# Patient Record
Sex: MALE | Race: WHITE | Employment: UNEMPLOYED | URBAN - METROPOLITAN AREA
[De-identification: names, ages, dates, MRNs, and addresses within clinical notes are randomized per-mention and may not be internally consistent; named-entity substitution may affect disease eponyms.]

---

## 2017-07-31 ENCOUNTER — HOSPITAL ENCOUNTER (OUTPATIENT)
Dept: OCCUPATIONAL THERAPY | Age: 62
Setting detail: THERAPIES SERIES
Discharge: HOME | End: 2017-07-31
Payer: MEDICARE

## 2017-07-31 PROCEDURE — G8987 SELF CARE CURRENT STATUS: HCPCS

## 2017-07-31 PROCEDURE — G8988 SELF CARE GOAL STATUS: HCPCS

## 2017-07-31 PROCEDURE — L3906 WHO W/O JOINTS CF: HCPCS

## 2017-07-31 PROCEDURE — 97167 OT EVAL HIGH COMPLEX 60 MIN: CPT

## 2017-08-02 ENCOUNTER — OFFICE VISIT (OUTPATIENT)
Dept: PRIMARY CARE CLINIC | Age: 62
End: 2017-08-02

## 2017-08-02 VITALS
WEIGHT: 200 LBS | TEMPERATURE: 97.2 F | BODY MASS INDEX: 30.31 KG/M2 | DIASTOLIC BLOOD PRESSURE: 82 MMHG | OXYGEN SATURATION: 97 % | SYSTOLIC BLOOD PRESSURE: 110 MMHG | HEART RATE: 53 BPM | RESPIRATION RATE: 14 BRPM | HEIGHT: 68 IN

## 2017-08-02 DIAGNOSIS — Z87.74 S/P PATENT FORAMEN OVALE CLOSURE: ICD-10-CM

## 2017-08-02 DIAGNOSIS — E78.5 HYPERLIPIDEMIA, UNSPECIFIED HYPERLIPIDEMIA TYPE: ICD-10-CM

## 2017-08-02 DIAGNOSIS — I10 ESSENTIAL HYPERTENSION: ICD-10-CM

## 2017-08-02 DIAGNOSIS — I63.032 CEREBROVASCULAR ACCIDENT (CVA) DUE TO THROMBOSIS OF LEFT CAROTID ARTERY (HCC): Primary | ICD-10-CM

## 2017-08-02 DIAGNOSIS — F33.9 RECURRENT DEPRESSION (HCC): ICD-10-CM

## 2017-08-02 DIAGNOSIS — R56.9 SEIZURE (HCC): ICD-10-CM

## 2017-08-02 PROCEDURE — 99203 OFFICE O/P NEW LOW 30 MIN: CPT | Performed by: FAMILY MEDICINE

## 2017-08-02 RX ORDER — ASPIRIN 81 MG/1
81 TABLET, CHEWABLE ORAL
COMMUNITY
Start: 2013-01-18

## 2017-08-02 RX ORDER — FOLIC ACID 1 MG/1
1 TABLET ORAL
COMMUNITY
Start: 2016-03-12 | End: 2017-08-18 | Stop reason: SDUPTHER

## 2017-08-02 RX ORDER — PREGABALIN 75 MG/1
75 CAPSULE ORAL
COMMUNITY
End: 2017-08-02 | Stop reason: SDUPTHER

## 2017-08-02 RX ORDER — LEVETIRACETAM 500 MG/1
500 TABLET, EXTENDED RELEASE ORAL
COMMUNITY
End: 2017-08-18 | Stop reason: SDUPTHER

## 2017-08-02 RX ORDER — TRAMADOL HYDROCHLORIDE 50 MG/1
TABLET ORAL
Qty: 60 TABLET | Refills: 1 | Status: SHIPPED | OUTPATIENT
Start: 2017-08-02 | End: 2017-08-18 | Stop reason: SDUPTHER

## 2017-08-02 RX ORDER — SENNOSIDES 8.6 MG
TABLET ORAL
COMMUNITY
End: 2017-08-02 | Stop reason: ALTCHOICE

## 2017-08-02 RX ORDER — PROPRANOLOL HYDROCHLORIDE 10 MG/1
10 TABLET ORAL
COMMUNITY
Start: 2016-03-12 | End: 2017-08-18 | Stop reason: SDUPTHER

## 2017-08-02 RX ORDER — ACETAMINOPHEN 325 MG/1
650 TABLET ORAL EVERY 4 HOURS PRN
Qty: 120 TABLET | Refills: 3
Start: 2017-08-02 | End: 2017-08-18 | Stop reason: SDUPTHER

## 2017-08-02 RX ORDER — NORTRIPTYLINE HYDROCHLORIDE 25 MG/1
25 CAPSULE ORAL NIGHTLY
COMMUNITY
End: 2017-08-18 | Stop reason: SDUPTHER

## 2017-08-02 RX ORDER — BUPROPION HYDROCHLORIDE 150 MG/1
150 TABLET ORAL
COMMUNITY
Start: 2016-01-25 | End: 2017-08-18 | Stop reason: SDUPTHER

## 2017-08-02 RX ORDER — IBUPROFEN 800 MG/1
800 TABLET ORAL
COMMUNITY
Start: 2016-03-12 | End: 2017-10-04 | Stop reason: SDUPTHER

## 2017-08-02 RX ORDER — PREGABALIN 150 MG/1
75 CAPSULE ORAL 2 TIMES DAILY
Qty: 60 CAPSULE | Refills: 1 | Status: SHIPPED | OUTPATIENT
Start: 2017-08-02 | End: 2017-08-18 | Stop reason: SDUPTHER

## 2017-08-02 RX ORDER — BACLOFEN 10 MG/1
TABLET ORAL
Refills: 2 | COMMUNITY
Start: 2017-07-25 | End: 2017-08-18 | Stop reason: SDUPTHER

## 2017-08-02 RX ORDER — QUETIAPINE FUMARATE 25 MG/1
25 TABLET, FILM COATED ORAL
COMMUNITY
Start: 2016-03-12 | End: 2017-08-18 | Stop reason: SDUPTHER

## 2017-08-02 RX ORDER — DULOXETIN HYDROCHLORIDE 60 MG/1
CAPSULE, DELAYED RELEASE ORAL
COMMUNITY
End: 2017-08-18 | Stop reason: SDUPTHER

## 2017-08-02 ASSESSMENT — ENCOUNTER SYMPTOMS
SHORTNESS OF BREATH: 0
CHEST TIGHTNESS: 0

## 2017-08-02 ASSESSMENT — PATIENT HEALTH QUESTIONNAIRE - PHQ9
SUM OF ALL RESPONSES TO PHQ9 QUESTIONS 1 & 2: 2
1. LITTLE INTEREST OR PLEASURE IN DOING THINGS: 1
2. FEELING DOWN, DEPRESSED OR HOPELESS: 1
SUM OF ALL RESPONSES TO PHQ QUESTIONS 1-9: 2

## 2017-08-07 ENCOUNTER — HOSPITAL ENCOUNTER (OUTPATIENT)
Dept: OCCUPATIONAL THERAPY | Age: 62
Setting detail: THERAPIES SERIES
Discharge: HOME | End: 2017-08-07
Payer: MEDICARE

## 2017-08-07 PROCEDURE — 97760 ORTHOTIC MGMT&TRAING 1ST ENC: CPT

## 2017-08-07 PROCEDURE — 97530 THERAPEUTIC ACTIVITIES: CPT

## 2017-08-08 ENCOUNTER — HOSPITAL ENCOUNTER (OUTPATIENT)
Dept: OCCUPATIONAL THERAPY | Age: 62
Setting detail: THERAPIES SERIES
Discharge: HOME | End: 2017-08-08
Payer: MEDICARE

## 2017-08-08 ENCOUNTER — APPOINTMENT (OUTPATIENT)
Dept: SPEECH THERAPY | Age: 62
End: 2017-08-08
Payer: MEDICARE

## 2017-08-08 PROCEDURE — 97530 THERAPEUTIC ACTIVITIES: CPT

## 2017-08-15 ENCOUNTER — APPOINTMENT (OUTPATIENT)
Dept: OCCUPATIONAL THERAPY | Age: 62
End: 2017-08-15
Payer: MEDICARE

## 2017-08-15 ENCOUNTER — APPOINTMENT (OUTPATIENT)
Dept: PHYSICAL THERAPY | Age: 62
End: 2017-08-15
Payer: MEDICARE

## 2017-08-18 DIAGNOSIS — I63.032 CEREBROVASCULAR ACCIDENT (CVA) DUE TO THROMBOSIS OF LEFT CAROTID ARTERY (HCC): ICD-10-CM

## 2017-08-18 RX ORDER — DULOXETIN HYDROCHLORIDE 60 MG/1
60 CAPSULE, DELAYED RELEASE ORAL DAILY
Qty: 90 CAPSULE | Refills: 1 | Status: SHIPPED | OUTPATIENT
Start: 2017-08-18 | End: 2017-10-04 | Stop reason: SDUPTHER

## 2017-08-18 RX ORDER — PROPRANOLOL HYDROCHLORIDE 10 MG/1
10 TABLET ORAL DAILY
Qty: 90 TABLET | Refills: 1 | Status: SHIPPED | OUTPATIENT
Start: 2017-08-18 | End: 2017-08-28

## 2017-08-18 RX ORDER — BACLOFEN 10 MG/1
TABLET ORAL
Qty: 90 TABLET | Refills: 1 | Status: SHIPPED | OUTPATIENT
Start: 2017-08-18 | End: 2017-08-28

## 2017-08-18 RX ORDER — LEVETIRACETAM 500 MG/1
500 TABLET, EXTENDED RELEASE ORAL DAILY
Qty: 90 TABLET | Refills: 1 | Status: SHIPPED | OUTPATIENT
Start: 2017-08-18 | End: 2017-10-04 | Stop reason: SDUPTHER

## 2017-08-18 RX ORDER — PREGABALIN 150 MG/1
75 CAPSULE ORAL 2 TIMES DAILY
Qty: 180 CAPSULE | Refills: 1 | Status: SHIPPED | OUTPATIENT
Start: 2017-08-18 | End: 2017-10-04 | Stop reason: SDUPTHER

## 2017-08-18 RX ORDER — BUPROPION HYDROCHLORIDE 150 MG/1
150 TABLET ORAL EVERY MORNING
Qty: 90 TABLET | Refills: 1 | Status: SHIPPED | OUTPATIENT
Start: 2017-08-18 | End: 2017-10-04 | Stop reason: SDUPTHER

## 2017-08-18 RX ORDER — NORTRIPTYLINE HYDROCHLORIDE 25 MG/1
25 CAPSULE ORAL NIGHTLY
Qty: 90 CAPSULE | Refills: 1 | Status: SHIPPED | OUTPATIENT
Start: 2017-08-18 | End: 2017-10-04 | Stop reason: SDUPTHER

## 2017-08-18 RX ORDER — QUETIAPINE FUMARATE 25 MG/1
25 TABLET, FILM COATED ORAL DAILY
Qty: 90 TABLET | Refills: 1 | Status: SHIPPED | OUTPATIENT
Start: 2017-08-18 | End: 2017-10-04

## 2017-08-18 RX ORDER — ACETAMINOPHEN 325 MG/1
650 TABLET ORAL EVERY 4 HOURS PRN
Qty: 120 TABLET | Refills: 1 | Status: SHIPPED | OUTPATIENT
Start: 2017-08-18 | End: 2018-05-07 | Stop reason: ALTCHOICE

## 2017-08-18 RX ORDER — TRAMADOL HYDROCHLORIDE 50 MG/1
TABLET ORAL
Qty: 180 TABLET | Refills: 1 | Status: SHIPPED | OUTPATIENT
Start: 2017-08-18 | End: 2017-10-10 | Stop reason: SDUPTHER

## 2017-08-18 RX ORDER — FOLIC ACID 1 MG/1
1 TABLET ORAL DAILY
Qty: 90 TABLET | Refills: 1 | Status: SHIPPED | OUTPATIENT
Start: 2017-08-18 | End: 2017-10-04 | Stop reason: SDUPTHER

## 2017-08-20 ASSESSMENT — ENCOUNTER SYMPTOMS
ORTHOPNEA: 0
BLURRED VISION: 0

## 2017-08-22 ENCOUNTER — HOSPITAL ENCOUNTER (OUTPATIENT)
Dept: SPEECH THERAPY | Age: 62
Setting detail: THERAPIES SERIES
Discharge: HOME | End: 2017-08-22
Payer: MEDICARE

## 2017-08-22 PROCEDURE — 92522 EVALUATE SPEECH PRODUCTION: CPT

## 2017-08-28 ENCOUNTER — TELEPHONE (OUTPATIENT)
Dept: PRIMARY CARE CLINIC | Age: 62
End: 2017-08-28

## 2017-08-28 ENCOUNTER — HOSPITAL ENCOUNTER (OUTPATIENT)
Dept: OCCUPATIONAL THERAPY | Age: 62
Setting detail: THERAPIES SERIES
Discharge: HOME | End: 2017-08-28
Payer: MEDICARE

## 2017-08-28 PROCEDURE — G8988 SELF CARE GOAL STATUS: HCPCS

## 2017-08-28 PROCEDURE — 97760 ORTHOTIC MGMT&TRAING 1ST ENC: CPT

## 2017-08-28 PROCEDURE — 97112 NEUROMUSCULAR REEDUCATION: CPT

## 2017-08-28 PROCEDURE — G8987 SELF CARE CURRENT STATUS: HCPCS

## 2017-08-28 RX ORDER — BACLOFEN 10 MG/1
TABLET ORAL
Qty: 90 TABLET | Refills: 1
Start: 2017-08-28 | End: 2017-10-04 | Stop reason: SDUPTHER

## 2017-08-28 RX ORDER — PROPRANOLOL HCL 60 MG
60 CAPSULE, EXTENDED RELEASE 24HR ORAL DAILY
Qty: 90 CAPSULE | Refills: 1 | Status: SHIPPED | OUTPATIENT
Start: 2017-08-28 | End: 2017-09-20 | Stop reason: SDUPTHER

## 2017-09-05 ENCOUNTER — HOSPITAL ENCOUNTER (OUTPATIENT)
Dept: OCCUPATIONAL THERAPY | Age: 62
Setting detail: THERAPIES SERIES
Discharge: HOME | End: 2017-09-05
Payer: MEDICARE

## 2017-09-05 ENCOUNTER — HOSPITAL ENCOUNTER (OUTPATIENT)
Dept: SPEECH THERAPY | Age: 62
Setting detail: THERAPIES SERIES
Discharge: HOME | End: 2017-09-05
Payer: MEDICARE

## 2017-09-05 ENCOUNTER — HOSPITAL ENCOUNTER (OUTPATIENT)
Dept: PHYSICAL THERAPY | Age: 62
Setting detail: THERAPIES SERIES
Discharge: HOME | End: 2017-09-05
Payer: MEDICARE

## 2017-09-05 PROCEDURE — 97162 PT EVAL MOD COMPLEX 30 MIN: CPT

## 2017-09-05 PROCEDURE — G0283 ELEC STIM OTHER THAN WOUND: HCPCS

## 2017-09-05 PROCEDURE — 97530 THERAPEUTIC ACTIVITIES: CPT

## 2017-09-05 PROCEDURE — G8978 MOBILITY CURRENT STATUS: HCPCS

## 2017-09-05 PROCEDURE — G8979 MOBILITY GOAL STATUS: HCPCS

## 2017-09-05 PROCEDURE — 97110 THERAPEUTIC EXERCISES: CPT

## 2017-09-05 PROCEDURE — 92507 TX SP LANG VOICE COMM INDIV: CPT

## 2017-09-05 ASSESSMENT — PAIN DESCRIPTION - ORIENTATION: ORIENTATION: RIGHT

## 2017-09-05 ASSESSMENT — PAIN DESCRIPTION - PAIN TYPE: TYPE: CHRONIC PAIN

## 2017-09-05 ASSESSMENT — PAIN SCALES - GENERAL: PAINLEVEL_OUTOF10: 3

## 2017-09-05 ASSESSMENT — PAIN DESCRIPTION - DESCRIPTORS: DESCRIPTORS: ACHING

## 2017-09-05 ASSESSMENT — PAIN DESCRIPTION - LOCATION: LOCATION: ARM;SHOULDER

## 2017-09-07 ENCOUNTER — HOSPITAL ENCOUNTER (OUTPATIENT)
Dept: OCCUPATIONAL THERAPY | Age: 62
Setting detail: THERAPIES SERIES
Discharge: HOME | End: 2017-09-07
Payer: MEDICARE

## 2017-09-07 ENCOUNTER — HOSPITAL ENCOUNTER (OUTPATIENT)
Dept: SPEECH THERAPY | Age: 62
Setting detail: THERAPIES SERIES
Discharge: HOME | End: 2017-09-07
Payer: MEDICARE

## 2017-09-07 PROCEDURE — 97112 NEUROMUSCULAR REEDUCATION: CPT

## 2017-09-07 PROCEDURE — 92507 TX SP LANG VOICE COMM INDIV: CPT

## 2017-09-07 PROCEDURE — G8987 SELF CARE CURRENT STATUS: HCPCS

## 2017-09-12 ENCOUNTER — HOSPITAL ENCOUNTER (OUTPATIENT)
Dept: OCCUPATIONAL THERAPY | Age: 62
Setting detail: THERAPIES SERIES
Discharge: HOME | End: 2017-09-12
Payer: MEDICARE

## 2017-09-12 ENCOUNTER — HOSPITAL ENCOUNTER (OUTPATIENT)
Dept: PHYSICAL THERAPY | Age: 62
Setting detail: THERAPIES SERIES
Discharge: HOME | End: 2017-09-12
Payer: MEDICARE

## 2017-09-12 ENCOUNTER — HOSPITAL ENCOUNTER (OUTPATIENT)
Dept: SPEECH THERAPY | Age: 62
Setting detail: THERAPIES SERIES
Discharge: HOME | End: 2017-09-12
Payer: MEDICARE

## 2017-09-12 PROCEDURE — 92507 TX SP LANG VOICE COMM INDIV: CPT

## 2017-09-12 PROCEDURE — 97112 NEUROMUSCULAR REEDUCATION: CPT

## 2017-09-12 PROCEDURE — 97116 GAIT TRAINING THERAPY: CPT

## 2017-09-12 PROCEDURE — 97110 THERAPEUTIC EXERCISES: CPT

## 2017-09-12 ASSESSMENT — PAIN DESCRIPTION - DESCRIPTORS
DESCRIPTORS: ACHING
DESCRIPTORS: ACHING

## 2017-09-12 ASSESSMENT — PAIN DESCRIPTION - ORIENTATION
ORIENTATION: RIGHT
ORIENTATION: RIGHT

## 2017-09-12 ASSESSMENT — PAIN SCALES - GENERAL
PAINLEVEL_OUTOF10: 5
PAINLEVEL_OUTOF10: 3

## 2017-09-12 ASSESSMENT — PAIN DESCRIPTION - LOCATION
LOCATION: ARM;HAND;SHOULDER
LOCATION: ARM;HAND;SHOULDER

## 2017-09-14 ENCOUNTER — HOSPITAL ENCOUNTER (OUTPATIENT)
Dept: PHYSICAL THERAPY | Age: 62
Setting detail: THERAPIES SERIES
Discharge: HOME | End: 2017-09-14
Payer: MEDICARE

## 2017-09-14 ENCOUNTER — HOSPITAL ENCOUNTER (OUTPATIENT)
Dept: OCCUPATIONAL THERAPY | Age: 62
Setting detail: THERAPIES SERIES
Discharge: HOME | End: 2017-09-14
Payer: MEDICARE

## 2017-09-14 ENCOUNTER — HOSPITAL ENCOUNTER (OUTPATIENT)
Dept: SPEECH THERAPY | Age: 62
Setting detail: THERAPIES SERIES
Discharge: HOME | End: 2017-09-14
Payer: MEDICARE

## 2017-09-14 PROCEDURE — 97112 NEUROMUSCULAR REEDUCATION: CPT

## 2017-09-14 PROCEDURE — 97110 THERAPEUTIC EXERCISES: CPT

## 2017-09-14 PROCEDURE — 97116 GAIT TRAINING THERAPY: CPT

## 2017-09-14 PROCEDURE — 92507 TX SP LANG VOICE COMM INDIV: CPT

## 2017-09-14 ASSESSMENT — PAIN DESCRIPTION - ORIENTATION: ORIENTATION: RIGHT

## 2017-09-14 ASSESSMENT — PAIN SCALES - GENERAL: PAINLEVEL_OUTOF10: 4

## 2017-09-14 ASSESSMENT — PAIN DESCRIPTION - PAIN TYPE: TYPE: CHRONIC PAIN

## 2017-09-14 ASSESSMENT — PAIN DESCRIPTION - LOCATION: LOCATION: ARM;SHOULDER;HAND

## 2017-09-19 ENCOUNTER — HOSPITAL ENCOUNTER (OUTPATIENT)
Dept: PHYSICAL THERAPY | Age: 62
Setting detail: THERAPIES SERIES
Discharge: HOME | End: 2017-09-19
Payer: MEDICARE

## 2017-09-20 ENCOUNTER — OFFICE VISIT (OUTPATIENT)
Dept: FAMILY MEDICINE CLINIC | Age: 62
End: 2017-09-20

## 2017-09-20 VITALS
BODY MASS INDEX: 30.58 KG/M2 | TEMPERATURE: 97.6 F | HEART RATE: 66 BPM | HEIGHT: 68 IN | SYSTOLIC BLOOD PRESSURE: 138 MMHG | DIASTOLIC BLOOD PRESSURE: 82 MMHG | WEIGHT: 201.8 LBS | OXYGEN SATURATION: 93 % | RESPIRATION RATE: 17 BRPM

## 2017-09-20 DIAGNOSIS — Z12.5 PROSTATE CANCER SCREENING: ICD-10-CM

## 2017-09-20 DIAGNOSIS — F33.9 RECURRENT DEPRESSION (HCC): Primary | ICD-10-CM

## 2017-09-20 DIAGNOSIS — I63.032 CEREBROVASCULAR ACCIDENT (CVA) DUE TO THROMBOSIS OF LEFT CAROTID ARTERY (HCC): ICD-10-CM

## 2017-09-20 DIAGNOSIS — G89.4 CHRONIC PAIN SYNDROME: ICD-10-CM

## 2017-09-20 PROCEDURE — 99213 OFFICE O/P EST LOW 20 MIN: CPT | Performed by: FAMILY MEDICINE

## 2017-09-20 RX ORDER — BUPROPION HYDROCHLORIDE 150 MG/1
150 TABLET ORAL EVERY MORNING
Qty: 30 TABLET | Refills: 5 | Status: CANCELLED | OUTPATIENT
Start: 2017-09-20

## 2017-09-20 RX ORDER — TIZANIDINE 4 MG/1
4 TABLET ORAL EVERY 6 HOURS PRN
COMMUNITY
End: 2017-09-20 | Stop reason: SDUPTHER

## 2017-09-20 RX ORDER — PROPRANOLOL HCL 60 MG
60 CAPSULE, EXTENDED RELEASE 24HR ORAL DAILY
Qty: 30 CAPSULE | Refills: 0 | Status: SHIPPED | OUTPATIENT
Start: 2017-09-20 | End: 2017-10-20

## 2017-09-20 RX ORDER — TIZANIDINE 4 MG/1
4 TABLET ORAL EVERY 6 HOURS PRN
Qty: 30 TABLET | Refills: 0 | Status: SHIPPED | OUTPATIENT
Start: 2017-09-20 | End: 2017-10-04 | Stop reason: SDUPTHER

## 2017-09-20 ASSESSMENT — ENCOUNTER SYMPTOMS
ABDOMINAL PAIN: 0
SHORTNESS OF BREATH: 0

## 2017-09-21 ENCOUNTER — APPOINTMENT (OUTPATIENT)
Dept: PHYSICAL THERAPY | Age: 62
End: 2017-09-21
Payer: MEDICARE

## 2017-09-21 ENCOUNTER — APPOINTMENT (OUTPATIENT)
Dept: SPEECH THERAPY | Age: 62
End: 2017-09-21
Payer: MEDICARE

## 2017-09-21 ENCOUNTER — APPOINTMENT (OUTPATIENT)
Dept: OCCUPATIONAL THERAPY | Age: 62
End: 2017-09-21
Payer: MEDICARE

## 2017-09-26 ENCOUNTER — HOSPITAL ENCOUNTER (OUTPATIENT)
Dept: OCCUPATIONAL THERAPY | Age: 62
Setting detail: THERAPIES SERIES
Discharge: HOME | End: 2017-09-26
Payer: MEDICARE

## 2017-09-26 ENCOUNTER — HOSPITAL ENCOUNTER (OUTPATIENT)
Dept: SPEECH THERAPY | Age: 62
Setting detail: THERAPIES SERIES
Discharge: HOME | End: 2017-09-26
Payer: MEDICARE

## 2017-09-26 ENCOUNTER — HOSPITAL ENCOUNTER (OUTPATIENT)
Dept: PHYSICAL THERAPY | Age: 62
Setting detail: THERAPIES SERIES
Discharge: HOME | End: 2017-09-26
Payer: MEDICARE

## 2017-09-26 VITALS — HEART RATE: 86 BPM | DIASTOLIC BLOOD PRESSURE: 81 MMHG | SYSTOLIC BLOOD PRESSURE: 121 MMHG | TEMPERATURE: 97.5 F

## 2017-09-26 PROCEDURE — 97116 GAIT TRAINING THERAPY: CPT

## 2017-09-26 PROCEDURE — 97110 THERAPEUTIC EXERCISES: CPT

## 2017-09-26 PROCEDURE — 97112 NEUROMUSCULAR REEDUCATION: CPT

## 2017-09-26 ASSESSMENT — PAIN DESCRIPTION - LOCATION: LOCATION: ARM;SHOULDER

## 2017-09-26 ASSESSMENT — PAIN SCALES - GENERAL: PAINLEVEL_OUTOF10: 3

## 2017-09-26 ASSESSMENT — PAIN DESCRIPTION - DESCRIPTORS: DESCRIPTORS: ACHING

## 2017-09-28 ENCOUNTER — HOSPITAL ENCOUNTER (OUTPATIENT)
Dept: OCCUPATIONAL THERAPY | Age: 62
Setting detail: THERAPIES SERIES
Discharge: HOME | End: 2017-09-28
Payer: MEDICARE

## 2017-09-28 ENCOUNTER — HOSPITAL ENCOUNTER (OUTPATIENT)
Dept: SPEECH THERAPY | Age: 62
Setting detail: THERAPIES SERIES
Discharge: HOME | End: 2017-09-28
Payer: MEDICARE

## 2017-09-28 ENCOUNTER — HOSPITAL ENCOUNTER (OUTPATIENT)
Dept: PHYSICAL THERAPY | Age: 62
Setting detail: THERAPIES SERIES
Discharge: HOME | End: 2017-09-28
Payer: MEDICARE

## 2017-09-28 VITALS — DIASTOLIC BLOOD PRESSURE: 71 MMHG | HEART RATE: 62 BPM | SYSTOLIC BLOOD PRESSURE: 115 MMHG

## 2017-09-28 PROCEDURE — 97110 THERAPEUTIC EXERCISES: CPT

## 2017-09-28 PROCEDURE — 97112 NEUROMUSCULAR REEDUCATION: CPT

## 2017-09-28 PROCEDURE — 97116 GAIT TRAINING THERAPY: CPT

## 2017-09-28 PROCEDURE — 92507 TX SP LANG VOICE COMM INDIV: CPT

## 2017-09-28 ASSESSMENT — PAIN DESCRIPTION - DESCRIPTORS: DESCRIPTORS: SORE

## 2017-09-28 ASSESSMENT — PAIN SCALES - GENERAL
PAINLEVEL_OUTOF10: 3
PAINLEVEL_OUTOF10: 4

## 2017-09-28 ASSESSMENT — PAIN DESCRIPTION - LOCATION
LOCATION: ARM
LOCATION: ARM;SHOULDER

## 2017-09-28 ASSESSMENT — PAIN DESCRIPTION - ORIENTATION: ORIENTATION: RIGHT

## 2017-09-28 ASSESSMENT — PAIN DESCRIPTION - PAIN TYPE: TYPE: CHRONIC PAIN

## 2017-09-30 DIAGNOSIS — Z12.5 PROSTATE CANCER SCREENING: ICD-10-CM

## 2017-09-30 DIAGNOSIS — I63.032 CEREBROVASCULAR ACCIDENT (CVA) DUE TO THROMBOSIS OF LEFT CAROTID ARTERY (HCC): ICD-10-CM

## 2017-09-30 LAB
ALBUMIN SERPL-MCNC: 4.4 G/DL (ref 3.9–4.9)
ALP BLD-CCNC: 98 U/L (ref 35–104)
ALT SERPL-CCNC: 53 U/L (ref 0–41)
ANION GAP SERPL CALCULATED.3IONS-SCNC: 17 MEQ/L (ref 7–13)
AST SERPL-CCNC: 49 U/L (ref 0–40)
BASOPHILS ABSOLUTE: 0 K/UL (ref 0–0.2)
BASOPHILS RELATIVE PERCENT: 0.9 %
BILIRUB SERPL-MCNC: 0.4 MG/DL (ref 0–1.2)
BUN BLDV-MCNC: 20 MG/DL (ref 8–23)
CALCIUM SERPL-MCNC: 9.7 MG/DL (ref 8.6–10.2)
CHLORIDE BLD-SCNC: 102 MEQ/L (ref 98–107)
CHOLESTEROL, TOTAL: 164 MG/DL (ref 0–199)
CO2: 22 MEQ/L (ref 22–29)
CREAT SERPL-MCNC: 1.24 MG/DL (ref 0.7–1.2)
EOSINOPHILS ABSOLUTE: 0.5 K/UL (ref 0–0.7)
EOSINOPHILS RELATIVE PERCENT: 9.4 %
GFR AFRICAN AMERICAN: >60
GFR NON-AFRICAN AMERICAN: 59.1
GLOBULIN: 2.7 G/DL (ref 2.3–3.5)
GLUCOSE BLD-MCNC: 86 MG/DL (ref 74–109)
HCT VFR BLD CALC: 37.1 % (ref 42–52)
HDLC SERPL-MCNC: 44 MG/DL (ref 40–59)
HEMOGLOBIN: 12.2 G/DL (ref 14–18)
LDL CHOLESTEROL CALCULATED: 106 MG/DL (ref 0–129)
LYMPHOCYTES ABSOLUTE: 1.5 K/UL (ref 1–4.8)
LYMPHOCYTES RELATIVE PERCENT: 29.1 %
MCH RBC QN AUTO: 29.4 PG (ref 27–31.3)
MCHC RBC AUTO-ENTMCNC: 32.8 % (ref 33–37)
MCV RBC AUTO: 89.7 FL (ref 80–100)
MONOCYTES ABSOLUTE: 0.3 K/UL (ref 0.2–0.8)
MONOCYTES RELATIVE PERCENT: 5.4 %
NEUTROPHILS ABSOLUTE: 2.8 K/UL (ref 1.4–6.5)
NEUTROPHILS RELATIVE PERCENT: 55.2 %
PDW BLD-RTO: 13.5 % (ref 11.5–14.5)
PLATELET # BLD: 198 K/UL (ref 130–400)
POTASSIUM SERPL-SCNC: 4.7 MEQ/L (ref 3.5–5.1)
PROSTATE SPECIFIC ANTIGEN: 0.63 NG/ML (ref 0–5.4)
RBC # BLD: 4.14 M/UL (ref 4.7–6.1)
SODIUM BLD-SCNC: 141 MEQ/L (ref 132–144)
TOTAL PROTEIN: 7.1 G/DL (ref 6.4–8.1)
TRIGL SERPL-MCNC: 69 MG/DL (ref 0–200)
WBC # BLD: 5.1 K/UL (ref 4.8–10.8)

## 2017-10-03 ENCOUNTER — APPOINTMENT (OUTPATIENT)
Dept: SPEECH THERAPY | Age: 62
End: 2017-10-03
Payer: MEDICARE

## 2017-10-04 ENCOUNTER — OFFICE VISIT (OUTPATIENT)
Dept: FAMILY MEDICINE CLINIC | Age: 62
End: 2017-10-04

## 2017-10-04 VITALS
HEART RATE: 72 BPM | RESPIRATION RATE: 12 BRPM | DIASTOLIC BLOOD PRESSURE: 80 MMHG | TEMPERATURE: 97.1 F | SYSTOLIC BLOOD PRESSURE: 122 MMHG

## 2017-10-04 DIAGNOSIS — M25.511 CHRONIC RIGHT SHOULDER PAIN: ICD-10-CM

## 2017-10-04 DIAGNOSIS — Z99.81 HYPOXEMIA REQUIRING SUPPLEMENTAL OXYGEN: ICD-10-CM

## 2017-10-04 DIAGNOSIS — I10 ESSENTIAL HYPERTENSION: Primary | ICD-10-CM

## 2017-10-04 DIAGNOSIS — I69.359 HEMIPLEGIA AFFECTING DOMINANT SIDE, POST-STROKE (HCC): ICD-10-CM

## 2017-10-04 DIAGNOSIS — I63.032 CEREBROVASCULAR ACCIDENT (CVA) DUE TO THROMBOSIS OF LEFT CAROTID ARTERY (HCC): ICD-10-CM

## 2017-10-04 DIAGNOSIS — G89.29 CHRONIC RIGHT SHOULDER PAIN: ICD-10-CM

## 2017-10-04 DIAGNOSIS — R56.9 SEIZURE (HCC): ICD-10-CM

## 2017-10-04 DIAGNOSIS — E78.5 HYPERLIPIDEMIA, UNSPECIFIED HYPERLIPIDEMIA TYPE: ICD-10-CM

## 2017-10-04 DIAGNOSIS — R09.02 HYPOXEMIA REQUIRING SUPPLEMENTAL OXYGEN: ICD-10-CM

## 2017-10-04 DIAGNOSIS — F33.9 RECURRENT DEPRESSION (HCC): ICD-10-CM

## 2017-10-04 PROCEDURE — 99214 OFFICE O/P EST MOD 30 MIN: CPT | Performed by: FAMILY MEDICINE

## 2017-10-04 RX ORDER — PREGABALIN 150 MG/1
150 CAPSULE ORAL 2 TIMES DAILY
Qty: 180 CAPSULE | Refills: 1 | Status: SHIPPED | OUTPATIENT
Start: 2017-10-04 | End: 2017-10-26 | Stop reason: SDUPTHER

## 2017-10-04 RX ORDER — LEVETIRACETAM 500 MG/1
500 TABLET, EXTENDED RELEASE ORAL DAILY
Qty: 90 TABLET | Refills: 1 | Status: SHIPPED | OUTPATIENT
Start: 2017-10-04 | End: 2018-06-19 | Stop reason: SDUPTHER

## 2017-10-04 RX ORDER — TIZANIDINE 4 MG/1
4 TABLET ORAL EVERY 6 HOURS PRN
Qty: 30 TABLET | Refills: 0 | Status: SHIPPED | OUTPATIENT
Start: 2017-10-04 | End: 2017-10-26 | Stop reason: SDUPTHER

## 2017-10-04 RX ORDER — BACLOFEN 10 MG/1
TABLET ORAL
Qty: 90 TABLET | Refills: 1 | Status: SHIPPED | OUTPATIENT
Start: 2017-10-04 | End: 2018-01-22 | Stop reason: SDUPTHER

## 2017-10-04 RX ORDER — QUETIAPINE FUMARATE 25 MG/1
25 TABLET, FILM COATED ORAL DAILY
Qty: 90 TABLET | Refills: 1 | Status: CANCELLED | OUTPATIENT
Start: 2017-10-04

## 2017-10-04 RX ORDER — DULOXETIN HYDROCHLORIDE 60 MG/1
60 CAPSULE, DELAYED RELEASE ORAL DAILY
Qty: 90 CAPSULE | Refills: 1 | Status: SHIPPED | OUTPATIENT
Start: 2017-10-04 | End: 2018-04-09 | Stop reason: SDUPTHER

## 2017-10-04 RX ORDER — BUPROPION HYDROCHLORIDE 150 MG/1
150 TABLET ORAL EVERY MORNING
Qty: 90 TABLET | Refills: 1 | Status: SHIPPED | OUTPATIENT
Start: 2017-10-04 | End: 2017-12-28 | Stop reason: SDUPTHER

## 2017-10-04 RX ORDER — FOLIC ACID 1 MG/1
1 TABLET ORAL DAILY
Qty: 90 TABLET | Refills: 1 | Status: SHIPPED | OUTPATIENT
Start: 2017-10-04 | End: 2018-02-22 | Stop reason: SDUPTHER

## 2017-10-04 RX ORDER — IBUPROFEN 800 MG/1
800 TABLET ORAL EVERY 8 HOURS PRN
Qty: 90 TABLET | Refills: 5 | Status: SHIPPED | OUTPATIENT
Start: 2017-10-04 | End: 2017-12-28 | Stop reason: SDUPTHER

## 2017-10-04 RX ORDER — NORTRIPTYLINE HYDROCHLORIDE 25 MG/1
25 CAPSULE ORAL NIGHTLY
Qty: 90 CAPSULE | Refills: 1 | Status: SHIPPED | OUTPATIENT
Start: 2017-10-04 | End: 2018-05-07

## 2017-10-04 ASSESSMENT — ENCOUNTER SYMPTOMS
ALLERGIC/IMMUNOLOGIC NEGATIVE: 1
CHEST TIGHTNESS: 0
WHEEZING: 0
GASTROINTESTINAL NEGATIVE: 1
SHORTNESS OF BREATH: 1
APNEA: 0
ABDOMINAL PAIN: 0
EYES NEGATIVE: 1
STRIDOR: 0
COUGH: 0

## 2017-10-04 NOTE — MR AVS SNAPSHOT
Bring a paper prescription for each of these medications     pregabalin 150 MG capsule    tiZANidine 4 MG tablet               Your Current Medications Are              tiZANidine (ZANAFLEX) 4 MG tablet Take 1 tablet by mouth every 6 hours as needed (muscle cramping)    pregabalin (LYRICA) 150 MG capsule Take 1 capsule by mouth 2 times daily    nortriptyline (PAMELOR) 25 MG capsule Take 1 capsule by mouth nightly    levETIRAcetam (KEPPRA XR) 500 MG TB24 extended release tablet Take 1 tablet by mouth daily    folic acid (FOLVITE) 1 MG tablet Take 1 tablet by mouth daily    DULoxetine (CYMBALTA) 60 MG extended release capsule Take 1 capsule by mouth daily    buPROPion (WELLBUTRIN XL) 150 MG extended release tablet Take 1 tablet by mouth every morning    baclofen (LIORESAL) 10 MG tablet TAKE 1 TABLET BY MOUTH THREE TIMES DAILY AS NEEDED    ibuprofen (ADVIL;MOTRIN) 800 MG tablet Take 1 tablet by mouth every 8 hours as needed for Pain    propranolol (INDERAL LA) 60 MG extended release capsule Take 1 capsule by mouth daily    traMADol (ULTRAM) 50 MG tablet Bid prn    acetaminophen (AMINOFEN) 325 MG tablet Take 2 tablets by mouth every 4 hours as needed for Pain    aspirin 81 MG chewable tablet Take 81 mg by mouth    Multiple Vitamin (MULTI VITAMIN PO) Take by mouth      Allergies           No Known Allergies      We Ordered/Performed the following           Amb External Referral To Neurology     Scheduling Instructions:    Neurology [address]  [address] [facility], [address], [address] [facility]  Phone: [phone]    Comments: The patient can be scheduled with any member of the group, including the provider with the first available appointments. You will be contacted to schedule your appointment. If not contacted within 3 business days, please call the department listed above. DME Order for Home Oxygen as OP     Comments:     You must complete the order parameters below and add the medical necessity documentation for this DME in a separate note. Stationary Oxygen Concentrator at 2 lpm via Nasal Cannula    Stationary Prescribed at:  Non Continuous while sleeping, walking and exercise    Portable Gaseous O2 System and contents at 2 lpm via Nasal Cannula    Conserving Device: Yes    1-2hrs/day    Diagnosis: restrictive lung disease d/t spastic hemiplegia s/p CVA  Length of need: 12 Months         Additional Information        Basic Information     Date Of Birth Sex Race Ethnicity Preferred Language    1955 Male White Non-/Non  English      Problem List as of 10/4/2017  Date Reviewed: 10/4/2017                Chronic right shoulder pain    Hypoxemia requiring supplemental oxygen    Cerebrovascular accident (CVA) due to thrombosis of left carotid artery ([address]    Essential hypertension    Recurrent depression ([facility]    Seizure ([facility]    S/P patent foramen ovale closure    Hyperlipidemia      Immunizations as of 10/4/2017     Name Date    Influenza, Dalila Bravo, 3 yrs and older, IM, Preservative Free 9/28/2016, 10/7/2014    Td 12/15/2004    Tdap (Boostrix, Adacel) 9/28/2016      Preventive Care        Date Due    Hepatitis C screening is recommended for all adults regardless of risk factors born between [facility] at least once (lifetime) who have never been tested. 1955    HIV screening is recommended for all people regardless of risk factors  aged 15-65 years at least once (lifetime) who have never been HIV tested. 12/1/1970    Colonoscopy 12/1/2005    Zoster Vaccine 12/1/2015    Yearly Flu Vaccine (1) 10/4/2018 (Originally 9/1/2017)    Cholesterol Screening 9/30/2022    Tetanus Combination Vaccine (2 - Td) 9/28/2026            MyChart Signup           [facility] allows you to send messages to your doctor, view your test results, renew your prescriptions, schedule appointments, view visit notes, and more. How Do I Sign Up? 1.  In your Internet browser, go to [email or web address]  2. Click on the Sign Up Now link in the Sign In box. You will see the New Member Sign Up page. 3. Enter your Whimt Access Code exactly as it appears below. You will not need to use this code after youve completed the sign-up process. If you do not sign up before the expiration date, you must request a new code. Sp[employer] Access Code: [email or web address]  Expires: 12/4/2017 11:48 AM    4. Enter your Social Security Number ([id number]) and Date of Birth (mm/dd/yyyy) as indicated and click Submit. You will be taken to the next sign-up page. 5. Create a Whimt ID. This will be your Spunkmobile login ID and cannot be changed, so think of one that is secure and easy to remember. 6. Create a Whimt password. You can change your password at any time. 7. Enter your Password Reset Question and Answer. This can be used at a later time if you forget your password. 8. Enter your e-mail address. You will receive e-mail notification when new information is available in [address] 9. Click Sign Up. You can now view your medical record. Additional Information  If you have questions, please contact the physician practice where you receive care. Remember, Spunkmobile is NOT to be used for urgent needs. For medical emergencies, dial 911. For questions regarding your Spunkmobile account call [phone]. If you have a clinical question, please call your doctor's office.

## 2017-10-04 NOTE — PROGRESS NOTES
Subjective  Allan Howard, 64 y.o. male presents today with:  Chief Complaint   Patient presents with    Follow-up     pt. here to check up on oxygen levels, complains of right arm oain     Arm Pain    Dizziness       HPI Comments: Recommended to come in by therapist, as patient was noted to have saturations at around mid 70s when he was dyspneic with exercise. Also needs refills on all of his medications for his spastic hemiplegia affecting his dominant side from prior CVA. He really is uncertain as to why he is on Seroquel. He does have problems sleeping, but has never had issues with agitation. Does have problems with depression and chronic right-sided pain particularly in his right arm. He would like referral to neurology      Review of Systems   Constitutional: Positive for fatigue. Negative for unexpected weight change. HENT: Negative. Eyes: Negative. Respiratory: Positive for shortness of breath (with exertion). Negative for apnea, cough, chest tightness, wheezing and stridor. Cardiovascular: Positive for leg swelling. Negative for chest pain. Gastrointestinal: Negative. Negative for abdominal pain. Endocrine: Negative. Genitourinary: Negative. Musculoskeletal: Positive for arthralgias, gait problem and myalgias. Skin: Negative. Allergic/Immunologic: Negative. Neurological: Positive for weakness and numbness. Hematological: Negative. Psychiatric/Behavioral: Positive for dysphoric mood and sleep disturbance. Negative for agitation, behavioral problems, confusion, decreased concentration, hallucinations, self-injury and suicidal ideas. The patient is not nervous/anxious and is not hyperactive. History reviewed. No pertinent past medical history. History reviewed. No pertinent surgical history.   Social History     Social History    Marital status:      Spouse name: N/A    Number of children: N/A    Years of education: N/A     Occupational History    present. No rigidity. No tracheal deviation present. No thyroid mass and no thyromegaly present. Cardiovascular: Normal rate, regular rhythm and intact distal pulses. Pulmonary/Chest: Effort normal. No accessory muscle usage. No respiratory distress. He has decreased breath sounds. He has no wheezes. He has no rhonchi. He has no rales. He exhibits no tenderness and no deformity. Abdominal: Soft. Normal appearance and bowel sounds are normal. He exhibits no distension and no mass. There is no splenomegaly or hepatomegaly. There is no tenderness. There is no rigidity, no rebound and no guarding. No hernia. Musculoskeletal: Normal range of motion. Cervical back: Normal.        Thoracic back: Normal.        Lumbar back: Normal.   AFO in place Right lower extremity  Bracing, also in place. Right upper extremity  Global right-sided weakness       Lymphadenopathy:        Head (right side): No submandibular and no tonsillar adenopathy present. Head (left side): No submandibular and no tonsillar adenopathy present. He has no cervical adenopathy. Right cervical: No posterior cervical adenopathy present. Left cervical: No posterior cervical adenopathy present. He has no axillary adenopathy. Right: No inguinal adenopathy present. Left: No inguinal adenopathy present. Neurological: He is alert. He displays atrophy. He displays no tremor. A sensory deficit is present. No cranial nerve deficit. He exhibits abnormal muscle tone. He displays no seizure activity. Coordination and gait abnormal.   Burning paresthesias to light touch, right forearm   Skin: Skin is warm, dry and intact. No rash noted. Psychiatric: He has a normal mood and affect. His speech is normal and behavior is normal. Judgment and thought content normal. Cognition and memory are normal.            Assessment & Plan   1. Essential hypertension     2.  Cerebrovascular accident (CVA) due to thrombosis of left carotid artery (HCC)  pregabalin (LYRICA) 150 MG capsule    Amb External Referral To Neurology   3. Hyperlipidemia, unspecified hyperlipidemia type  folic acid (FOLVITE) 1 MG tablet   4. Recurrent depression (HCC)  nortriptyline (PAMELOR) 25 MG capsule    DULoxetine (CYMBALTA) 60 MG extended release capsule    buPROPion (WELLBUTRIN XL) 150 MG extended release tablet   5. Seizure ([address]  levETIRAcetam (KEPPRA XR) 500 MG TB24 extended release tablet    Amb External Referral To Neurology   6. Hemiplegia affecting dominant side, post-stroke (HCC)  tiZANidine (ZANAFLEX) 4 MG tablet    pregabalin (LYRICA) 150 MG capsule    baclofen (LIORESAL) 10 MG tablet    ibuprofen (ADVIL;MOTRIN) 800 MG tablet    DME Order for Home Oxygen as OP    Amb External Referral To Neurology   7. Chronic right shoulder pain  baclofen (LIORESAL) 10 MG tablet    ibuprofen (ADVIL;MOTRIN) 800 MG tablet   8. Hypoxemia requiring supplemental oxygen  DME Order for Home Oxygen as OP     Orders Placed This Encounter   Procedures    Amb External Referral To Neurology     Referral Priority:   Routine     Referral Type:   Consult for Advice and Opinion     Referral Reason:   Specialty Services Required     Referred to Provider:   Helio Grewal MD     Requested Specialty:   Neurology     Number of Visits Requested:   1    DME Order for Home Oxygen as OP     You must complete the order parameters below and add the medical necessity documentation for this DME in a separate note.     Stationary Oxygen Concentrator at 2 lpm via Nasal Cannula    Stationary Prescribed at:  Non Continuous while sleeping, walking and exercise    Portable Gaseous O2 System and contents at 2 lpm via Nasal Cannula    Conserving Device: Yes    1-2hrs/day    Diagnosis: restrictive lung disease d/t spastic hemiplegia s/p CVA  Length of need: 12 Months     Orders Placed This Encounter   Medications    tiZANidine (ZANAFLEX) 4 MG tablet     Sig: Take 1 tablet by mouth every 6 hours as needed (muscle cramping)     Dispense:  30 tablet     Refill:  0    pregabalin (LYRICA) 150 MG capsule     Sig: Take 1 capsule by mouth 2 times daily     Dispense:  180 capsule     Refill:  1    nortriptyline (PAMELOR) 25 MG capsule     Sig: Take 1 capsule by mouth nightly     Dispense:  90 capsule     Refill:  1    levETIRAcetam (KEPPRA XR) 500 MG TB24 extended release tablet     Sig: Take 1 tablet by mouth daily     Dispense:  90 tablet     Refill:  1    folic acid (FOLVITE) 1 MG tablet     Sig: Take 1 tablet by mouth daily     Dispense:  90 tablet     Refill:  1    DULoxetine (CYMBALTA) 60 MG extended release capsule     Sig: Take 1 capsule by mouth daily     Dispense:  90 capsule     Refill:  1    buPROPion (WELLBUTRIN XL) 150 MG extended release tablet     Sig: Take 1 tablet by mouth every morning     Dispense:  90 tablet     Refill:  1    baclofen (LIORESAL) 10 MG tablet     Sig: TAKE 1 TABLET BY MOUTH THREE TIMES DAILY AS NEEDED     Dispense:  90 tablet     Refill:  1    ibuprofen (ADVIL;MOTRIN) 800 MG tablet     Sig: Take 1 tablet by mouth every 8 hours as needed for Pain     Dispense:  90 tablet     Refill:  5     Medications Discontinued During This Encounter   Medication Reason    QUEtiapine (SEROQUEL) 25 MG tablet     tiZANidine (ZANAFLEX) 4 MG tablet Reorder    pregabalin (LYRICA) 150 MG capsule Reorder    nortriptyline (PAMELOR) 25 MG capsule Reorder    levETIRAcetam (KEPPRA XR) 500 MG TB24 extended release tablet Reorder    folic acid (FOLVITE) 1 MG tablet Reorder    DULoxetine (CYMBALTA) 60 MG extended release capsule Reorder    buPROPion (WELLBUTRIN XL) 150 MG extended release tablet Reorder    baclofen (LIORESAL) 10 MG tablet Reorder    ibuprofen (ADVIL;MOTRIN) 800 MG tablet Reorder   with desaturation with exercise.  It will be extremely important to make sure patient maintains O2 at 2 L per nasal cannula with any physical activity and at   Bedtime we will try to get that initiated as soon as possible. We will continue other medications as before, but I would like him to try to taper off of Seroquel as he really has no clear-cut indication and he can actually increase issues with depression. We will work to get him into neurology as soon as possible and pain management to evaluate this painful right arm-at this point concern is more for an RSD type picture. We will have him otherwise follow-up with me as previously scheduled. I did review labs and he is missing some vitamin levels to rule out some neuropathy issues. I will arrange to get those at next visit. Counseling given: Not Answered      Return if symptoms worsen or fail to improve.     Darlene Bauer, DO

## 2017-10-05 ENCOUNTER — APPOINTMENT (OUTPATIENT)
Dept: SPEECH THERAPY | Age: 62
End: 2017-10-05
Payer: MEDICARE

## 2017-10-10 ENCOUNTER — HOSPITAL ENCOUNTER (OUTPATIENT)
Dept: PHYSICAL THERAPY | Age: 62
Setting detail: THERAPIES SERIES
Discharge: HOME | End: 2017-10-10
Payer: MEDICARE

## 2017-10-10 ENCOUNTER — HOSPITAL ENCOUNTER (OUTPATIENT)
Dept: OCCUPATIONAL THERAPY | Age: 62
Setting detail: THERAPIES SERIES
Discharge: HOME | End: 2017-10-10
Payer: MEDICARE

## 2017-10-10 ENCOUNTER — APPOINTMENT (OUTPATIENT)
Dept: SPEECH THERAPY | Age: 62
End: 2017-10-10
Payer: MEDICARE

## 2017-10-10 PROCEDURE — 97112 NEUROMUSCULAR REEDUCATION: CPT

## 2017-10-10 PROCEDURE — 97110 THERAPEUTIC EXERCISES: CPT

## 2017-10-10 PROCEDURE — 97116 GAIT TRAINING THERAPY: CPT

## 2017-10-10 PROCEDURE — L3906 WHO W/O JOINTS CF: HCPCS

## 2017-10-10 PROCEDURE — 97760 ORTHOTIC MGMT&TRAING 1ST ENC: CPT

## 2017-10-10 ASSESSMENT — PAIN DESCRIPTION - ORIENTATION
ORIENTATION: RIGHT
ORIENTATION: RIGHT

## 2017-10-10 ASSESSMENT — PAIN DESCRIPTION - LOCATION
LOCATION: ARM;LEG
LOCATION: ARM;SHOULDER

## 2017-10-10 ASSESSMENT — PAIN DESCRIPTION - PAIN TYPE
TYPE: CHRONIC PAIN
TYPE: CHRONIC PAIN

## 2017-10-10 ASSESSMENT — PAIN DESCRIPTION - DESCRIPTORS
DESCRIPTORS: ACHING
DESCRIPTORS: ACHING

## 2017-10-10 ASSESSMENT — PAIN DESCRIPTION - FREQUENCY: FREQUENCY: INTERMITTENT

## 2017-10-10 ASSESSMENT — PAIN SCALES - GENERAL
PAINLEVEL_OUTOF10: 8
PAINLEVEL_OUTOF10: 5

## 2017-10-10 NOTE — PROGRESS NOTES
states he is very happy with the new splint. I educated the patient to wear, care, and skin integrity with the new splint and this was also provided in writing. Patient was instructed to wear the new splint for 2 or 3 hours and check the skin for red marks, and then check again in one hour for them to have disappeared. If no skin irritation is present and the patient can tolerate wearing the splint during sleeping hours it was recommended. Plan Continue with current POC. Goals  Long term goals 4 & 6 addressed.   Time Frame for Long term goals : 2-3 x a week for 12 weeks  Long term goal 1: Pt will complete upper body dressing with modified independence  Long term goal 2: Pt will complete lower body dressing with min assist and adaptive equipment PRN  Long term goal 3: Pt will complete bathtub transfers with modified independence  Long term goal 4: Pt will tolerate splint and shoulder support on right without SOS of irritation to increase ROM for HEP  Long term goal 5: Patient will be independent with HEP for bilateral UEs for D/C  Long term goals 6: Pt will exhibit increased PROM to right UE to [facility]    Therapy Time   Individual Concurrent Group Co-treatment   Time In  2:00 pm         Time Out  3:00 pm         Minutes  60                 CHEN Kilgore/L  Electronically signed by PAULETTE Kilgore on 10/10/17 at 4:41 PM

## 2017-10-10 NOTE — PROGRESS NOTES
[address] [address]  Outpatient Physical Therapy    Treatment Note        Date: 10/10/2017  Patient: Darren Dahl  : 1955  ACCT #: [de-identified]  Referring Practitioner: Dr. Angle Naylor   Diagnosis: ataxia     Visit Information:  PT Visit Information  PT Insurance Information: M[employer] Medicare   Total # of Visits to Date: 6  Plan of Care/Certification Expiration Date: 10/17/17  No Show: 1  Canceled Appointment: 1  Progress Note Counter: 6/10    Subjective: Pt's sister states pt had appt with  who will possibly prescribe oxygen for at home. O2 levels decreased while at appointment with activity. Follow up appt next week. Pt reports feeling better, recently purchased a new shoulder brace. Pt's sister inquiring about power W/C to improve mobility. HEP Compliance:  [x] Good [] Fair [] Poor [] Reports not doing due to:    Vital Signs  Patient Currently in Pain: Yes   Pain Screening  Patient Currently in Pain: Yes  Pain Assessment  Pain Level: 8  Pain Type: Chronic pain  Pain Location: Arm; Shoulder  Pain Orientation: Right  Pain Descriptors: Aching  Pain Frequency: Intermittent    OBJECTIVE:   Exercises  Exercise 1: gastroc step str 30s/2, hamstring stretch B  Exercise 5: Sink ex's x10  Exercise 10: Step ups 4'' step leading with R x10 F  Exercise 12: Dorsey tasks  Exercise 14: TKEs red TB x10 on R  Exercise 20: HEP: sink ex's, SLS, TKEs                 Ambulation 1  Surface: carpet  Device: Single point cane  Other Apparatus: AFO, Right  Assistance: Modified Independent, Supervision  Quality of Gait: Good brenden and foot clearance,  VCs to improve heel strike and knee flexion at toe off.    Distance: 100ft              Strength: [x] NT  [] MMT completed:     ROM: [x] NT  [] ROM measurements:     *Indicates exercise, modality, or manual techniques to be initiated when appropriate    Assessment:   Activity Tolerance  Activity Tolerance: Patient Tolerated treatment well    Body structures, Functions, Activity limitations: Decreased functional mobility , Decreased ROM, Decreased strength, Decreased ADL status, Decreased safe awareness, Decreased endurance, Decreased coordination, Decreased balance  Assessment: Pt with improved activity tolerance, decreased RBs required. Able to maintain O2 WNLs with activity. Tx focused on strength and balance activities. Continues to display R knee hyperextension with WBing, able to correct but difficulty maintaining. Treatment Diagnosis: ataxia, Rt hemiparesis, Rt LE weakness, decreased ROM Rt LE, imbalance, impaired mobility, abnormal gait   Prognosis: Good       Goals:  Short term goals  Time Frame for Short term goals: 3 wks   Short term goal 1: Independent with HEP   Short term goal 2: Improve LE ROM 5-10 degrees SLR and DF   Short term goal 3: Ambulate with str cane with improved Rt stance and step with decreased circumduction and ER     Long term goals  Time Frame for Long term goals : 6-8 wks   Long term goal 1: Ambulate without ' with improved Rt stance and Rt LE positioning with swing   Long term goal 2: Dorsey >/= 50/56 to demonstrate improved static balance and decreased risk for falls   Long term goal 3: Improve Rt LE strength by 1/2 grade to improve balance and gait quality   Progress toward goals: Strength, balance, gait    POST-PAIN       Pain Rating (0-10 pain scale):   3/10   Location and pain description same as pre-treatment unless indicated.    Action: [] NA   [] Perform HEP  [x] Meds as prescribed  [] Modalities as prescribed   [] Call Physician     Frequency/Duration:  Plan  Times per week: 2  Plan weeks: 6-8  Current Treatment Recommendations: Strengthening, ROM, Balance Training, Functional Mobility Training, Transfer Training, Home Exercise Program, Safety Education & Training, Patient/Caregiver Education & Training, Equipment Evaluation, Education, & procurement, Modalities, Endurance Training, Gait Training, Stair training, Neuromuscular

## 2017-10-12 ENCOUNTER — HOSPITAL ENCOUNTER (OUTPATIENT)
Dept: OCCUPATIONAL THERAPY | Age: 62
Setting detail: THERAPIES SERIES
Discharge: HOME | End: 2017-10-12
Payer: MEDICARE

## 2017-10-12 ENCOUNTER — APPOINTMENT (OUTPATIENT)
Dept: SPEECH THERAPY | Age: 62
End: 2017-10-12
Payer: MEDICARE

## 2017-10-12 ENCOUNTER — HOSPITAL ENCOUNTER (OUTPATIENT)
Dept: PHYSICAL THERAPY | Age: 62
Setting detail: THERAPIES SERIES
End: 2017-10-12
Payer: MEDICARE

## 2017-10-12 ENCOUNTER — HOSPITAL ENCOUNTER (OUTPATIENT)
Dept: PHYSICAL THERAPY | Age: 62
Setting detail: THERAPIES SERIES
Discharge: HOME | End: 2017-10-12
Payer: MEDICARE

## 2017-10-12 PROCEDURE — 97112 NEUROMUSCULAR REEDUCATION: CPT

## 2017-10-12 PROCEDURE — 97116 GAIT TRAINING THERAPY: CPT

## 2017-10-12 PROCEDURE — 97110 THERAPEUTIC EXERCISES: CPT

## 2017-10-12 ASSESSMENT — PAIN DESCRIPTION - DESCRIPTORS
DESCRIPTORS: ACHING;CONSTANT
DESCRIPTORS: SHARP

## 2017-10-12 ASSESSMENT — PAIN SCALES - GENERAL
PAINLEVEL_OUTOF10: 8
PAINLEVEL_OUTOF10: 5

## 2017-10-12 ASSESSMENT — PAIN DESCRIPTION - LOCATION
LOCATION: ARM;SHOULDER
LOCATION: ARM;SHOULDER

## 2017-10-12 ASSESSMENT — PAIN DESCRIPTION - ORIENTATION
ORIENTATION: RIGHT
ORIENTATION: RIGHT

## 2017-10-12 ASSESSMENT — PAIN DESCRIPTION - PAIN TYPE
TYPE: CHRONIC PAIN
TYPE: CHRONIC PAIN

## 2017-10-12 ASSESSMENT — PAIN DESCRIPTION - FREQUENCY: FREQUENCY: CONTINUOUS

## 2017-10-12 NOTE — PROGRESS NOTES
[address] [address]  Outpatient Physical Therapy    Treatment Note        Date: 10/12/2017  Patient: Sumaya Palmer  : 1955  ACCT #: [de-identified]  Referring Practitioner: Dr. Sheth Speaker   Diagnosis: ataxia     Visit Information:  PT Visit Information  PT Insurance Information: M[employer] Medicare   Total # of Visits to Date: 7  Plan of Care/Certification Expiration Date: 10/17/17  No Show: 1  Canceled Appointment: 1  Progress Note Counter: 7/10    Subjective: Pt c/o of AFO moving with every step, brother reports sometimes it is painful on medial ankle, brace was made in [place]     HEP Compliance:  [x] Good [] Fair [] Poor [] Reports not doing due to:    Vital Signs  Patient Currently in Pain: Yes   Pain Screening  Patient Currently in Pain: Yes  Pain Assessment  Pain Level: 5  Pain Type: Chronic pain  Pain Location: Arm; Shoulder  Pain Orientation: Right  Pain Descriptors: Sharp    OBJECTIVE:   Exercises  Exercise 4:  R hip flexor str over edge of mat, and manually in S/L 30s x 3  Exercise 6: 2 way SLR x15  Exercise 10: Step ups 6'' step leading with R x10 F, step downs from 4\" step with good eccentric control  Exercise 12: Dorsey tasks  Exercise 17: Bridges x10  Exercise 18: 6\" step NDT in/out,   Exercise 19: 4' step taps alt with 1 HHA          Balance  Comments: Dorsey/56        Ambulation 1  Surface: carpet  Device: Single point cane  Other Apparatus: AFO, Right  Assistance: Modified Independent, Supervision  Quality of Gait: Good brenden and foot clearance, toe catch x 2, improved speed with distance  Distance: 200ft     *Indicates exercise, modality, or manual techniques to be initiated when appropriate    Assessment:         Body structures, Functions, Activity limitations: Decreased functional mobility , Decreased ROM, Decreased strength, Decreased ADL status, Decreased safe awareness, Decreased endurance, Decreased coordination, Decreased balance  Assessment: Pt demo'd improved Dorsey score by 4 pts, 44/56, dififculty with alternating step taps d/t decreased ability for Rt SLS without ue support although pt attempted and req'd CGA. Pt demo'd increased speed with distance during gait with only 2 episodes towards end of rt toe catch. Pt advised family to seek out opinion from [employer] re: ill fitting AFO        Goals:  Short term goals  Time Frame for Short term goals: 3 wks   Short term goal 1: Independent with HEP   Short term goal 2: Improve LE ROM 5-10 degrees SLR and DF   Short term goal 3: Ambulate with str cane with improved Rt stance and step with decreased circumduction and ER     Long term goals  Time Frame for Long term goals : 6-8 wks   Long term goal 1: Ambulate without ' with improved Rt stance and Rt LE positioning with swing   Long term goal 2: Dorsey >/= 50/56 to demonstrate improved static balance and decreased risk for falls   Long term goal 3: Improve Rt LE strength by 1/2 grade to improve balance and gait quality   Progress toward goals:gait, balance strength, rom    POST-PAIN       Pain Rating (0-10 pain scale):   0/10   Location and pain description same as pre-treatment unless indicated. Action: [] NA   [] Perform HEP  [] Meds as prescribed  [] Modalities as prescribed   [] Call Physician     Frequency/Duration:  Plan  Times per week: 2  Plan weeks: 6-8  Current Treatment Recommendations: Strengthening, ROM, Balance Training, Functional Mobility Training, Transfer Training, Home Exercise Program, Safety Education & Training, Patient/Caregiver Education & Training, Equipment Evaluation, Education, & procurement, Modalities, Endurance Training, Gait Training, Stair training, Neuromuscular Re-education, Manual Therapy - Soft Tissue Mobilization, Manual Therapy - Joint Manipulation     Pt to continue current HEP. See objective section for any therapeutic exercise changes, additions or modifications this date.          PT Individual Minutes  Time In: 1600  Time Out: 4392  Minutes: 58     Neuro x 30  Gait x 10  TE x 28    Signature:  Electronically signed by Gabe Romero PTA on 10/12/17 at 11:40 AM

## 2017-10-12 NOTE — PROGRESS NOTES
Occupational Therapy  Daily Treatment Note  Date: 10/12/2017  Patient Name: Natividad Mckinney  :  1955  MRN: [id number]         Additional Pertinent Hx: Eval 17 PORTIA Garces  Diagnosis: Right hemiplegia    Onset Date: 17  No Show: 1  Canceled Appointment: 4  Progress Note Counter: 8    Subjective: Patient here alone today. Patient Currently in Pain: Yes  Pain Level: 8  Pain Type: Chronic pain  Pain Location: Arm; Shoulder  Pain Orientation: Right  Pain Descriptors: Aching;Constant  Pain Frequency: Continuous     Treatment Activities:  Provided NMES to R UE for digit extension, direct treatment to R 5th digit as DIP joint is hyperextended from the PIP joint being in hyperflexion. Discussed botox injections with the patient and will contact the neurologist when I am informed as to who that is. Provided PROM to R hand during NMES and without. Provided static right wrist extension stretch. Electrical Stimulation  Electrical Stimulation Location: Right; Hand/forearm, for digit extension. Electrical Stimulation Action: decrease pain assoc. with hyperflexion. Electrical Stimulation Parameters: Large  muscle, atrophy, 15  intensity  Electrical Stimulation Goals: Pain  reduction, ;Neuromuscular Facilitation      Splinting  Location: right wrist/hand and forearm  Type: right resting splint   Splinting: Modification;Adjustment;Skin Check  Splinting Education: Wear Schedule;Precautions  Comment: OT recommends daily  use of splint until an alternative splint can be obtained which provides stretch vs. static positioning         Assessment Patient tolerated treatment fair. Splint appears to be fitting well. Patient reports the shoulder orthotic has not been received yet. Patient then went on to physical therapy. Plan Continue with current POC.           G-Code       Goals  Long term goals 4 & 6  Time Frame for Long term goals : 2-3 x a week for 12 weeks  Long term goal 1: Pt will

## 2017-10-17 ENCOUNTER — HOSPITAL ENCOUNTER (OUTPATIENT)
Dept: PHYSICAL THERAPY | Age: 62
Setting detail: THERAPIES SERIES
Discharge: HOME | End: 2017-10-17
Payer: MEDICARE

## 2017-10-17 ENCOUNTER — HOSPITAL ENCOUNTER (OUTPATIENT)
Dept: OCCUPATIONAL THERAPY | Age: 62
Setting detail: THERAPIES SERIES
Discharge: HOME | End: 2017-10-17
Payer: MEDICARE

## 2017-10-17 PROCEDURE — 97140 MANUAL THERAPY 1/> REGIONS: CPT

## 2017-10-17 PROCEDURE — 97112 NEUROMUSCULAR REEDUCATION: CPT

## 2017-10-17 ASSESSMENT — PAIN SCALES - GENERAL
PAINLEVEL_OUTOF10: 4
PAINLEVEL_OUTOF10: 6

## 2017-10-17 ASSESSMENT — PAIN DESCRIPTION - FREQUENCY
FREQUENCY: CONTINUOUS
FREQUENCY: CONTINUOUS

## 2017-10-17 ASSESSMENT — PAIN DESCRIPTION - ORIENTATION
ORIENTATION: RIGHT
ORIENTATION: RIGHT

## 2017-10-17 ASSESSMENT — PAIN DESCRIPTION - DIRECTION: RADIATING_TOWARDS: FINGERS

## 2017-10-17 ASSESSMENT — PAIN DESCRIPTION - DESCRIPTORS
DESCRIPTORS: SHARP
DESCRIPTORS: ACHING;CONSTANT

## 2017-10-17 ASSESSMENT — PAIN DESCRIPTION - PAIN TYPE: TYPE: CHRONIC PAIN

## 2017-10-17 ASSESSMENT — PAIN DESCRIPTION - LOCATION
LOCATION: WRIST;HAND
LOCATION: ARM

## 2017-10-17 NOTE — PROGRESS NOTES
[id number] [address]  Outpatient Physical Therapy    Treatment Note        Date: 10/17/2017  Patient: Trisha Dominguez  : 1955  ACCT #: [de-identified]  Referring Practitioner: Dr. Jose Magallanes   Diagnosis: ataxia     Visit Information:  PT Visit Information  PT Insurance Information: [employer]   Total # of Visits to Date: 8  Plan of Care/Certification Expiration Date: 10/17/17  No Show: 1  Canceled Appointment: 1  Progress Note Counter: 8/10    Subjective: Pt with c/o pain in wrist and hand and not sleeping well  Comments: sister inquired about getting a hospital bed, advised to get script from MD  HEP Compliance:  [x] Good [] Fair [] Poor [] Reports not doing due to:    Vital Signs  Patient Currently in Pain: Yes   Pain Screening  Patient Currently in Pain: Yes  Pain Assessment  Pain Level: 4  Pain Location: Wrist;Hand  Pain Orientation: Right  Pain Descriptors: Sharp  Pain Frequency: Continuous    OBJECTIVE:   Exercises  Exercise 1: gastroc step str 30s/3, hamstring stretch B, contract/relax  Exercise 5: Sink ex's x10 on rt  Exercise 11:  reaching with step  leading with rt x 10  Exercise 13: Clams with R x15 (mod difficulty)  Exercise 16: NDT pivots  x10 bilaterally  Exercise 17: Hip hikes b/l x 10 , mod A rt      Manual:   Manual therapy  PROM:  rt LE all planes , gastroc str 30s x 3, contract /relax HS x 5 approx~23 min  Soft Tissue Mobalization: STM RT Calf and gastroc      *Indicates exercise, modality, or manual techniques to be initiated when appropriate    Assessment: Body structures, Functions, Activity limitations: Decreased functional mobility , Decreased ROM, Decreased strength, Decreased ADL status, Decreased safe awareness, Decreased endurance, Decreased coordination, Decreased balance  Assessment: Pt fatigued upon coming in to appt. focused on rt le rom to initiate tx. Pt req'd hoha to initiate hipe hikes , with visual cues to complete.  Initiated stepping with reach without LOB and pt able to perform NDT pivot without assist. Sister inquiring about hospital bed, massage, for pt comfort. Treatment Diagnosis: ataxia, Rt hemiparesis, Rt LE weakness, decreased ROM Rt LE, imbalance, impaired mobility, abnormal gait      Goals:  Short term goals  Time Frame for Short term goals: 3 wks   Short term goal 1: Independent with HEP   Short term goal 2: Improve LE ROM 5-10 degrees SLR and DF   Short term goal 3: Ambulate with str cane with improved Rt stance and step with decreased circumduction and ER     Long term goals  Time Frame for Long term goals : 6-8 wks   Long term goal 1: Ambulate without ' with improved Rt stance and Rt LE positioning with swing   Long term goal 2: Dorsey >/= 50/56 to demonstrate improved static balance and decreased risk for falls   Long term goal 3: Improve Rt LE strength by 1/2 grade to improve balance and gait quality   Progress toward goals: rom, balance,     POST-PAIN       Pain Rating (0-10 pain scale):  5/10   Location and pain description same as pre-treatment unless indicated. Action: [] NA   [] Perform HEP  [x] Meds as prescribed  [] Modalities as prescribed   [] Call Physician     Frequency/Duration:  Plan  Times per week: 2  Plan weeks: 6-8  Current Treatment Recommendations: Strengthening, ROM, Balance Training, Functional Mobility Training, Transfer Training, Home Exercise Program, Safety Education & Training, Patient/Caregiver Education & Training, Equipment Evaluation, Education, & procurement, Modalities, Endurance Training, Gait Training, Stair training, Neuromuscular Re-education, Manual Therapy - Soft Tissue Mobilization, Manual Therapy - Joint Manipulation     Pt to continue current HEP. See objective section for any therapeutic exercise changes, additions or modifications this date.     PT Individual Minutes  Time In: 1302  Time Out: 4542  Minutes: 56     Manual x 23  Neuro x 33    Signature:  Electronically signed by Ramandeep Stringer PTA on 10/17/17 at 11:46 AM

## 2017-10-18 ENCOUNTER — HOSPITAL ENCOUNTER (OUTPATIENT)
Dept: OCCUPATIONAL THERAPY | Age: 62
Setting detail: THERAPIES SERIES
Discharge: HOME | End: 2017-10-18
Payer: MEDICARE

## 2017-10-18 ENCOUNTER — HOSPITAL ENCOUNTER (OUTPATIENT)
Dept: PHYSICAL THERAPY | Age: 62
Setting detail: THERAPIES SERIES
Discharge: HOME | End: 2017-10-18
Payer: MEDICARE

## 2017-10-18 VITALS — SYSTOLIC BLOOD PRESSURE: 128 MMHG | DIASTOLIC BLOOD PRESSURE: 76 MMHG | HEART RATE: 69 BPM

## 2017-10-18 PROCEDURE — G8991 OTHER PT/OT GOAL STATUS: HCPCS

## 2017-10-18 PROCEDURE — 97140 MANUAL THERAPY 1/> REGIONS: CPT

## 2017-10-18 PROCEDURE — G8990 OTHER PT/OT CURRENT STATUS: HCPCS

## 2017-10-18 PROCEDURE — 97530 THERAPEUTIC ACTIVITIES: CPT

## 2017-10-18 PROCEDURE — 97112 NEUROMUSCULAR REEDUCATION: CPT

## 2017-10-18 ASSESSMENT — PAIN DESCRIPTION - DESCRIPTORS: DESCRIPTORS: ACHING

## 2017-10-18 ASSESSMENT — PAIN DESCRIPTION - PAIN TYPE: TYPE: CHRONIC PAIN

## 2017-10-18 ASSESSMENT — PAIN DESCRIPTION - ORIENTATION: ORIENTATION: RIGHT

## 2017-10-18 ASSESSMENT — PAIN DESCRIPTION - LOCATION: LOCATION: ARM

## 2017-10-18 NOTE — PROGRESS NOTES
[id number] [address]  Outpatient Physical Therapy    Treatment Note        Date: 10/18/2017  Patient: Donita Harrington  : 1955  ACCT #: [de-identified]  Referring Practitioner: Dr. Violette Walters   Diagnosis: ataxia     Visit Information:  PT Visit Information  PT Insurance Information: [employer] Medicare   Total # of Visits to Date: 9  Plan of Care/Certification Expiration Date: 10/17/17  No Show: 1  Canceled Appointment: 1  Progress Note Counter: 9/10    Subjective: Pt reports not sleeping well. Met with W/C company yesterday regarding powered W/C. Sister states pt has  appt with PCP Friday and appt for AFO next monday. Comments: sister inquired about getting a hospital bed, advised to get script from MD  HEP Compliance:  [x] Good [] Fair [] Poor [] Reports not doing due to:    Vital Signs  Pulse: 69  BP: 128/76  Patient Currently in Pain: Yes   Pain Screening  Patient Currently in Pain: Yes  Pain Assessment  Pain Type: Chronic pain  Pain Location: Arm  Pain Orientation: Right  Pain Descriptors: Aching    OBJECTIVE:   Exercises  Exercise 2: Tall kneel: static stand, head turns, weightshifting; Min A to assist R LE onto mat  Exercise 3: Pball hamstring curls on R x10  Exercise 4:  R hip flexor str over edge of mat 3/30''  Exercise 12:  Stepping in 6'' box with R LE, alt toe taps with AD; focus on weightshifting  Exercise 15: NDT Rt sit to stand x5           Strength: [] NT  [x] MMT completed:  Strength RLE  R Hip Flexion: 4+/5  R Hip ABduction: 3/5  R Knee Extension: 5/5          ROM: [] NT  [x] ROM measurements:  PROM RLE (degrees)  R SLR: 60 passively        Manual:   Manual therapy  Joint mobilization: Grade II joint Mobs A/P to increase DF  PROM:  rt LE all planes , gastroc str 30s x 3, contract /relax HS x 5 approx  Soft Tissue Mobalization:  R Great toe stretch 3/30''      *Indicates exercise, modality, or manual techniques to be initiated when appropriate    Assessment:   Activity Tolerance  Activity Tolerance: Patient limited by fatigue  Activity Tolerance: c/o nausea    Body structures, Functions, Activity limitations: Decreased functional mobility , Decreased ROM, Decreased strength, Decreased ADL status, Decreased safe awareness, Decreased endurance, Decreased coordination, Decreased balance  Assessment: Tx focused on NDT and weightshifting ot improve WBing and strength on R LE. Pt fatigued today with reports of nausea. Vitals WNLs throughout tx with activity. Initiated tall kneeling activities with good tolerance, min A to place R LE on mat. Improved hamstring flexibility and R LE strength today. Treatment Diagnosis: ataxia, Rt hemiparesis, Rt LE weakness, decreased ROM Rt LE, imbalance, impaired mobility, abnormal gait   Prognosis: Good       Goals:  Short term goals  Time Frame for Short term goals: 3 wks   Short term goal 1: Independent with HEP   Short term goal 2: Improve LE ROM 5-10 degrees SLR and DF   Short term goal 3: Ambulate with str cane with improved Rt stance and step with decreased circumduction and ER     Long term goals  Time Frame for Long term goals : 6-8 wks   Long term goal 1: Ambulate without ' with improved Rt stance and Rt LE positioning with swing   Long term goal 2: Dorsey >/= 50/56 to demonstrate improved static balance and decreased risk for falls   Long term goal 3: Improve Rt LE strength by 1/2 grade to improve balance and gait quality   Progress toward goals: Progressing towards strength and ROM    POST-PAIN       Pain Rating (0-10 pain scale):   5/10   Location and pain description same as pre-treatment unless indicated.    Action: [] NA   [] Perform HEP  [x] Meds as prescribed  [] Modalities as prescribed   [] Call Physician     Frequency/Duration:  Plan  Times per week: 2  Plan weeks: 6-8  Current Treatment Recommendations: Strengthening, ROM, Balance Training, Functional Mobility Training, Transfer Training, Home Exercise Program, Safety Education & Training, Patient/Caregiver Education & Training, Equipment Evaluation, Education, & procurement, Modalities, Endurance Training, Gait Training, Stair training, Neuromuscular Re-education, Manual Therapy - Soft Tissue Mobilization, Manual Therapy - Joint Manipulation     Pt to continue current HEP. See objective section for any therapeutic exercise changes, additions or modifications this date.          PT Individual Minutes  Time In: 1003  Time Out: 1100  Minutes: 57  Timed Code Treatment Minutes: 56 Minutes  Procedure Minutes: 0  Neuro: 38  Man: 18    Signature:  Electronically signed by Kip Lynn PTA on 10/18/17 at 11:15 AM

## 2017-10-18 NOTE — PROGRESS NOTES
Occupational Therapy  Daily Treatment Note  Date: 10/18/2017  Patient Name: Natividad Mckinney  :  1955  MRN: [id number]       Subjective   OT Visit Information  Onset Date: 17  Progress Note Counter: 10  Subjective  Subjective: Per PTA, Pt is not feeling well this morning  General Comment  Comments: Pt's sister, James Joseph present. Treatment Activities:  OT discussed pt's current plan with focus on interventions and orthotics to provide safe positioning of his right shoulder and his right hand. Pt reports that his current splint causes hand pain. Pt states he has to remove the splint and is unable to tolerate it for very long. OT recommends pt resume use of initial resting splint issued at beginning of certification period. OT discussed recommendation for splint that provides proper positioning, comfort and capability of being donned by the pt. Pt and family report pt is unable to currently pay out of pocket for a Dynasplint. Pt's brother reports he has ordered a shoulder support and it should arrive soon. OT discussed use of NMES as part of HEP. Pt's brother to borrow a unit from the PT department and initiate with pt. OT recommends additional family memebers be educated re: NMES use. Assessment  Pt progress has been slow. Further family training is required for recommended home programs. Plan Continue with established treatment plan with focus on education for recommended home program including orthotics use, therapeutic exercise, modalities use ie. NMES for right UE.          G-Code  OT G-codes  Other OT Primary Current Status (): At least 40 percent but less than 60 percent impaired, limited or restricted  Other OT Primary Goal Status ():  At least 1 percent but less than 20 percent impaired, limited or restricted                      Goals  Long term goals addressed- 4 and 6  Time Frame for Long term goals : 2-3 x a week for 12 weeks  Long term goal 1: Pt will complete upper body dressing with modified independence  Long term goal 2: Pt will complete lower body dressing with min assist and adaptive equipment PRN  Long term goal 3: Pt will complete bathtub transfers with modified independence  Long term goal 4: Pt will tolerate splint and shoulder support on right without SOS of irritation to increase ROM for HEP  Long term goal 5: Patient will be independent with [facility] for bilateral UEs for D/C  Long term goals 6: Pt will exhibit increased PROM to right UE to [facility]    Therapy Time   Individual Concurrent Group Co-treatment   Time In  1105         Time Out  1200         Minutes  54                 PORTIA Cadet/L Electronically signed by PORTIA Obrien/L on 10/18/17 at 6:30 PM

## 2017-10-20 ENCOUNTER — OFFICE VISIT (OUTPATIENT)
Dept: FAMILY MEDICINE CLINIC | Age: 62
End: 2017-10-20

## 2017-10-20 VITALS
WEIGHT: 196 LBS | DIASTOLIC BLOOD PRESSURE: 90 MMHG | TEMPERATURE: 97.5 F | BODY MASS INDEX: 29.7 KG/M2 | SYSTOLIC BLOOD PRESSURE: 136 MMHG | HEART RATE: 63 BPM | OXYGEN SATURATION: 98 % | HEIGHT: 68 IN | RESPIRATION RATE: 12 BRPM

## 2017-10-20 DIAGNOSIS — I63.032 CEREBROVASCULAR ACCIDENT (CVA) DUE TO THROMBOSIS OF LEFT CAROTID ARTERY (HCC): ICD-10-CM

## 2017-10-20 DIAGNOSIS — G56.41 CAUSALGIA OF RIGHT UPPER EXTREMITY: Primary | ICD-10-CM

## 2017-10-20 DIAGNOSIS — E78.5 HYPERLIPIDEMIA, UNSPECIFIED HYPERLIPIDEMIA TYPE: ICD-10-CM

## 2017-10-20 DIAGNOSIS — Z12.11 COLON CANCER SCREENING: ICD-10-CM

## 2017-10-20 DIAGNOSIS — I10 ESSENTIAL HYPERTENSION: ICD-10-CM

## 2017-10-20 PROCEDURE — 99214 OFFICE O/P EST MOD 30 MIN: CPT | Performed by: FAMILY MEDICINE

## 2017-10-20 RX ORDER — POLYETHYLENE GLYCOL 3350, SODIUM CHLORIDE, SODIUM BICARBONATE, POTASSIUM CHLORIDE 420; 11.2; 5.72; 1.48 G/4L; G/4L; G/4L; G/4L
4000 POWDER, FOR SOLUTION ORAL ONCE
Qty: 4000 ML | Refills: 0 | Status: SHIPPED | OUTPATIENT
Start: 2017-10-20 | End: 2017-10-20

## 2017-10-20 RX ORDER — ATORVASTATIN CALCIUM 80 MG/1
80 TABLET, FILM COATED ORAL DAILY
Qty: 90 TABLET | Refills: 3 | Status: SHIPPED | OUTPATIENT
Start: 2017-10-20 | End: 2018-05-07

## 2017-10-20 RX ORDER — TRAMADOL HYDROCHLORIDE 50 MG/1
50 TABLET ORAL 4 TIMES DAILY PRN
Qty: 120 TABLET | Refills: 2 | Status: SHIPPED | OUTPATIENT
Start: 2017-10-20 | End: 2018-01-30

## 2017-10-20 ASSESSMENT — ENCOUNTER SYMPTOMS
ALLERGIC/IMMUNOLOGIC NEGATIVE: 1
SHORTNESS OF BREATH: 1
WHEEZING: 0
ABDOMINAL PAIN: 0
APNEA: 0
COUGH: 0
GASTROINTESTINAL NEGATIVE: 1
EYES NEGATIVE: 1
CHEST TIGHTNESS: 0
STRIDOR: 0

## 2017-10-20 NOTE — PROGRESS NOTES
[place]  Trisha Dominguez, 64 y.o. male presents today with:  Chief Complaint   Patient presents with    1 Month Follow-Up    Results     labs       Patient comes into the office today for one-month follow-up on right arm pain hypertension, hyperlipidemia and to review labs. He has followed with physical therapy and will be following with neurology with consideration of Botox injections for spasticity. He does not yet have pain management visit scheduled but will be working towards that. Review of Systems   Constitutional: Positive for fatigue. Negative for unexpected weight change. HENT: Negative. Eyes: Negative. Respiratory: Positive for shortness of breath (with exertion). Negative for apnea, cough, chest tightness, wheezing and stridor. Cardiovascular: Positive for leg swelling. Negative for chest pain. Gastrointestinal: Negative. Negative for abdominal pain. Endocrine: Negative. Genitourinary: Negative. Musculoskeletal: Positive for arthralgias, gait problem and myalgias. Skin: Negative. Allergic/Immunologic: Negative. Neurological: Positive for weakness and numbness. Hematological: Negative. Psychiatric/Behavioral: Positive for dysphoric mood and sleep disturbance. Negative for agitation, behavioral problems, confusion, decreased concentration, hallucinations, self-injury and suicidal ideas. The patient is not nervous/anxious and is not hyperactive. History reviewed. No pertinent past medical history. History reviewed. No pertinent surgical history. Social History     Social History    Marital status:      Spouse name: N/A    Number of children: N/A    Years of education: N/A     Occupational History    Not on file.      Social History Main Topics    Smoking status: Never Smoker    Smokeless tobacco: Never Used    Alcohol use Not on file    Drug use: Unknown    Sexual activity: Not on file     Other Topics Concern    Not on file Social History Narrative    No narrative on file     History reviewed. No pertinent family history. No Known Allergies  Current Outpatient Prescriptions on File Prior to Visit   Medication Sig Dispense Refill    tiZANidine (ZANAFLEX) 4 MG tablet Take 1 tablet by mouth every 6 hours as needed (muscle cramping) 30 tablet 0    pregabalin (LYRICA) 150 MG capsule Take 1 capsule by mouth 2 times daily 180 capsule 1    nortriptyline (PAMELOR) 25 MG capsule Take 1 capsule by mouth nightly 90 capsule 1    levETIRAcetam (KEPPRA XR) 500 MG TB24 extended release tablet Take 1 tablet by mouth daily 90 tablet 1    folic acid (FOLVITE) 1 MG tablet Take 1 tablet by mouth daily 90 tablet 1    DULoxetine (CYMBALTA) 60 MG extended release capsule Take 1 capsule by mouth daily 90 capsule 1    buPROPion (WELLBUTRIN XL) 150 MG extended release tablet Take 1 tablet by mouth every morning 90 tablet 1    baclofen (LIORESAL) 10 MG tablet TAKE 1 TABLET BY MOUTH THREE TIMES DAILY AS NEEDED 90 tablet 1    ibuprofen (ADVIL;MOTRIN) 800 MG tablet Take 1 tablet by mouth every 8 hours as needed for Pain 90 tablet 5    acetaminophen (AMINOFEN) 325 MG tablet Take 2 tablets by mouth every 4 hours as needed for Pain 120 tablet 1    aspirin 81 MG chewable tablet Take 81 mg by mouth      Multiple Vitamin (MULTI VITAMIN PO) Take by mouth       No current facility-administered medications on file prior to visit. Objective    Vitals:    10/20/17 1123   BP: (!) 136/90   Pulse: 63   Resp: 12   Temp: 97.5 °F (36.4 °C)   TempSrc: Tympanic   SpO2: 98%   Weight: 196 lb (88.9 kg)   Height: 5' 8\" (1.727 m)     Physical Exam   Constitutional: Vital signs are normal. He appears well-developed. HENT:   Head: Normocephalic and atraumatic. Right Ear: Tympanic membrane, external ear and ear canal normal. Tympanic membrane is not injected. No middle ear effusion.    Left Ear: Tympanic membrane, external ear and ear canal normal. Tympanic membrane is not injected. No middle ear effusion. Nose: Nose normal. No mucosal edema or rhinorrhea. Right sinus exhibits no maxillary sinus tenderness and no frontal sinus tenderness. Left sinus exhibits no maxillary sinus tenderness and no frontal sinus tenderness. Eyes: Conjunctivae, EOM and lids are normal. Pupils are equal, round, and reactive to light. Neck: Normal range of motion. Neck supple. No JVD present. No muscular tenderness present. No neck rigidity. No tracheal deviation present. No thyroid mass and no thyromegaly present. Cardiovascular: Normal rate, regular rhythm and intact distal pulses. Pulmonary/Chest: Effort normal. He has no decreased breath sounds. He has no wheezes. He has no rhonchi. He has no rales. He exhibits no tenderness and no deformity. Abdominal: Soft. Normal appearance and bowel sounds are normal. He exhibits no distension and no mass. There is no splenomegaly or hepatomegaly. There is no tenderness. There is no rigidity, no rebound and no guarding. No hernia. Musculoskeletal: Normal range of motion. Right knee: Normal.        Left knee: Normal.        Cervical back: Normal.        Thoracic back: Normal.        Lumbar back: Normal.        Lymphadenopathy:     He has no cervical adenopathy. Neurological: He is alert. He has normal strength. He displays no atrophy and no tremor. No cranial nerve deficit or sensory deficit. Coordination and gait normal.   Skin: Skin is warm, dry and intact. No rash noted. Psychiatric: He has a normal mood and affect. His speech is normal and behavior is normal. Judgment and thought content normal. Cognition and memory are normal.            Assessment & Plan   1. Causalgia of right upper extremity     2. Colon cancer screening  Via Amina Torrez MD - [facility]    polyethylene glycol-electrolytes (NULYTELY) 420 g solution   3. Essential hypertension     4.  Hyperlipidemia, unspecified hyperlipidemia type atorvastatin (LIPITOR) 80 MG tablet   5. Cerebrovascular accident (CVA) due to thrombosis of left carotid artery (HCC)  traMADol (ULTRAM) 50 MG tablet     Orders Placed This Encounter   Procedures   Enid Flowers MD - [facility]     Referral Priority:   Routine     Referral Type:   Consult for Advice and Opinion     Referral Reason:   Specialty Services Required     Referred to Provider:   Law Montana MD     Requested Specialty:   Gastroenterology     Number of Visits Requested:   1    Colonoscopy     This order was created through External Result Entry    Colonoscopy     This order was created through External Result Entry    Colonoscopy     This order was created through External Result Entry     Orders Placed This Encounter   Medications    polyethylene glycol-electrolytes (NULYTELY) 420 g solution     Sig: Take 4,000 mLs by mouth once for 1 dose     Dispense:  4000 mL     Refill:  0    atorvastatin (LIPITOR) 80 MG tablet     Sig: Take 1 tablet by mouth daily     Dispense:  90 tablet     Refill:  3    traMADol (ULTRAM) 50 MG tablet     Sig: Take 1 tablet by mouth 4 times daily as needed for Pain Bid prn     Dispense:  120 tablet     Refill:  2     Medications Discontinued During This Encounter   Medication Reason    propranolol (INDERAL LA) 60 MG extended release capsule     traMADol (ULTRAM) 50 MG tablet Reorder   current thought regarding right  arm pain is either spasticity or causalgia/RSD type picture. Recommend he follow through with neurology and if he is having pain secondary to spasticity. Botox might help. If, however, this is more of a causalgia picture, then pain management, probably recommend the appropriate blocks after initial workup. From our part I will do trial of increased tramadol 4 times daily as needed while awaiting pain management evaluation    Counseling given: Yes      Return in about 6 months (around 4/20/2018).     Iveth Almazan, DO

## 2017-10-23 ENCOUNTER — HOSPITAL ENCOUNTER (OUTPATIENT)
Dept: OCCUPATIONAL THERAPY | Age: 62
Setting detail: THERAPIES SERIES
Discharge: HOME | End: 2017-10-23
Payer: MEDICARE

## 2017-10-23 ENCOUNTER — TELEPHONE (OUTPATIENT)
Dept: FAMILY MEDICINE CLINIC | Age: 62
End: 2017-10-23

## 2017-10-23 ENCOUNTER — HOSPITAL ENCOUNTER (OUTPATIENT)
Dept: PHYSICAL THERAPY | Age: 62
Setting detail: THERAPIES SERIES
Discharge: HOME | End: 2017-10-23
Payer: MEDICARE

## 2017-10-23 PROCEDURE — 97110 THERAPEUTIC EXERCISES: CPT

## 2017-10-23 PROCEDURE — 97112 NEUROMUSCULAR REEDUCATION: CPT

## 2017-10-23 PROCEDURE — 97116 GAIT TRAINING THERAPY: CPT

## 2017-10-23 ASSESSMENT — PAIN DESCRIPTION - ORIENTATION: ORIENTATION: RIGHT

## 2017-10-23 ASSESSMENT — PAIN SCALES - GENERAL
PAINLEVEL_OUTOF10: 4
PAINLEVEL_OUTOF10: 5

## 2017-10-23 ASSESSMENT — PAIN DESCRIPTION - LOCATION: LOCATION: ARM

## 2017-10-23 ASSESSMENT — PAIN DESCRIPTION - PAIN TYPE
TYPE: CHRONIC PAIN
TYPE: CHRONIC PAIN

## 2017-10-23 NOTE — TELEPHONE ENCOUNTER
EQUIPMENT ORDERED  DME Order for Home Oxygen as OP [[id number]] (ORD   #:   [id number]) Priority  Routine Class  Hospital Performed        Associated Diagnosis:  Hemiplegia affecting dominant side, post-stroke (HCC) (I69.359 [ICD-10-CM] 438.21 [ICD-9-CM]); Hypoxemia requiring supplemental oxygen (R09.02,Z99.81 [ICD-10-CM] 799.02 [ICD-9-CM])       When DME company contacted patient to fill above order, patient informed them that you told him he no longer needs to get O2. Nothing seen in chart stating this on last visit. Please advise.

## 2017-10-23 NOTE — PROGRESS NOTES
Splinting: Modification;Adjustment;Skin Check  Splinting Education: Wear Schedule;Precautions  Comment: OT recommends daily  use of splint until an alternative splint can be obtained which provides stretch vs. static positioning         Assessment Patient tolerated treatment well. Patient performed fair with self ROM seated in a chair today. Post Treatment Pain Screening  Pain at present: 5  Scale Used: Numeric Score  Intervention List: Patient able to continue with treatment         Plan Continue with current POC. Discussed with patient the plan to have him perform self ROM in supine on the mat at the next treatment session. G-Code 10th visits       Goals  Long term goals 4, 5, & 6 addressed.   Time Frame for Long term goals : 2-3 x a week for 12 weeks  Long term goal 1: Pt will complete upper body dressing with modified independence  Long term goal 2: Pt will complete lower body dressing with min assist and adaptive equipment PRN  Long term goal 3: Pt will complete bathtub transfers with modified independence  Long term goal 4: Pt will tolerate splint and shoulder support on right without SOS of irritation to increase ROM for HEP  Long term goal 5: Patient will be independent with HEP for bilateral UEs for D/C  Long term goals 6: Pt will exhibit increased PROM to right UE to [facility]    Therapy Time   Individual Concurrent Group Co-treatment   Time In  2:01 pm         Time Out  3:00 pm         Minutes  59                 CHEN Paz/L  Electronically signed by PAULETTE Paz on 10/23/17 at 3:29 PM

## 2017-10-23 NOTE — PROGRESS NOTES
[id number] [address]  Outpatient Physical Therapy    Treatment Note        Date: 10/23/2017  Patient: Allan Howard  : 1955  ACCT #: [de-identified]  Referring Practitioner: Dr. Tiffanie Strauss   Diagnosis: ataxia     Visit Information:  PT Visit Information  PT Insurance Information: MMO Medicare   Total # of Visits to Date: 10  Plan of Care/Certification Expiration Date: 10/17/17  No Show: 1  Canceled Appointment: 1  Progress Note Counter: 10/10    Subjective: Pt sister states R foot was molded for a new AFO. Pt reports being sick Sat with N/V  Comments: sister inquired about getting a hospital bed, advised to get script from MD  HEP Compliance:  [] Good [x] Fair [] Poor [x] Reports not doing due to:ill    Vital Signs  Patient Currently in Pain: Yes   Pain Screening  Patient Currently in Pain: Yes  Pain Assessment  Pain Level: 4  Pain Type: Chronic pain    OBJECTIVE:   Exercises  Exercise 9: Gait: F/March/ , exagg heel strike and knee flex  Exercise 14: TKEs red TB x12 on R  Exercise 16: NDT pivots  x10 bilaterally  Exercise 19: Dorsey/56                 Ambulation 1  Surface: carpet  Device: Single point cane  Other Apparatus: AFO, Right  Assistance: Modified Independent, Supervision  Quality of Gait: Good stride length, decreased R knee flexion with heel off/toe off, R knee hyperextended in stance  Distance: 150ft              Strength: [x] NT  [] MMT completed:          ROM: [x] NT  [] ROM measurements:      *Indicates exercise, modality, or manual techniques to be initiated when appropriate    Assessment:   Activity Tolerance  Activity Tolerance: Patient Tolerated treatment well    Body structures, Functions, Activity limitations: Decreased functional mobility , Decreased ROM, Decreased strength, Decreased ADL status, Decreased safe awareness, Decreased endurance, Decreased coordination, Decreased balance  Assessment: Pt with improved activity tolerance this date. Improved Dorsey score. Challenged with SLS on R and weightshifting on R LE. Fair carry over with VCs to avoid R knee hyperextension, difficulty maintaining. Pt feels he could benefit continuing PT to improve gait, balance and strength. Goals:  Short term goals  Time Frame for Short term goals: 3 wks   Short term goal 1: Independent with HEP   Short term goal 2: Improve LE ROM 5-10 degrees SLR and DF   Short term goal 3: Ambulate with str cane with improved Rt stance and step with decreased circumduction and ER     Long term goals  Time Frame for Long term goals : 6-8 wks   Long term goal 1: Ambulate without ' with improved Rt stance and Rt LE positioning with swing   Long term goal 2: Dorsey >/= 50/56 to demonstrate improved static balance and decreased risk for falls   Long term goal 3: Improve Rt LE strength by 1/2 grade to improve balance and gait quality   Progress toward goals: Strength, ambulation. POST-PAIN       Pain Rating (0-10 pain scale):   4/10   Location and pain description same as pre-treatment unless indicated. Action: [] NA   [] Perform HEP  [x] Meds as prescribed  [] Modalities as prescribed   [] Call Physician     Frequency/Duration:  Plan  Times per week: 2  Plan weeks: 6-8  Current Treatment Recommendations: Strengthening, ROM, Balance Training, Functional Mobility Training, Transfer Training, Home Exercise Program, Safety Education & Training, Patient/Caregiver Education & Training, Equipment Evaluation, Education, & procurement, Modalities, Endurance Training, Gait Training, Stair training, Neuromuscular Re-education, Manual Therapy - Soft Tissue Mobilization, Manual Therapy - Joint Manipulation     Pt to continue current HEP. See objective section for any therapeutic exercise changes, additions or modifications this date.          PT Individual Minutes  Time In: 6854  Time Out: 1600  Minutes: 55  Timed Code Treatment Minutes: 53 Minutes  Procedure Minutes:  Neuro: 30  Gait: 13  There x: 10    Signature:  Electronically signed by Elsy Montesinos, PTA on 10/23/17 at 5:06 PM

## 2017-10-25 ENCOUNTER — HOSPITAL ENCOUNTER (OUTPATIENT)
Dept: OCCUPATIONAL THERAPY | Age: 62
Setting detail: THERAPIES SERIES
Discharge: HOME | End: 2017-10-25
Payer: MEDICARE

## 2017-10-25 ENCOUNTER — TELEPHONE (OUTPATIENT)
Dept: FAMILY MEDICINE CLINIC | Age: 62
End: 2017-10-25

## 2017-10-25 ENCOUNTER — HOSPITAL ENCOUNTER (OUTPATIENT)
Dept: PHYSICAL THERAPY | Age: 62
Setting detail: THERAPIES SERIES
Discharge: HOME | End: 2017-10-25
Payer: MEDICARE

## 2017-10-25 DIAGNOSIS — I63.032 CEREBROVASCULAR ACCIDENT (CVA) DUE TO THROMBOSIS OF LEFT CAROTID ARTERY (HCC): ICD-10-CM

## 2017-10-25 DIAGNOSIS — G81.91 RIGHT HEMIPARESIS (HCC): Primary | ICD-10-CM

## 2017-10-25 PROCEDURE — G0283 ELEC STIM OTHER THAN WOUND: HCPCS

## 2017-10-25 PROCEDURE — G8979 MOBILITY GOAL STATUS: HCPCS

## 2017-10-25 PROCEDURE — 97164 PT RE-EVAL EST PLAN CARE: CPT

## 2017-10-25 PROCEDURE — 97112 NEUROMUSCULAR REEDUCATION: CPT

## 2017-10-25 PROCEDURE — 97530 THERAPEUTIC ACTIVITIES: CPT

## 2017-10-25 PROCEDURE — G8978 MOBILITY CURRENT STATUS: HCPCS

## 2017-10-25 ASSESSMENT — PAIN DESCRIPTION - DESCRIPTORS: DESCRIPTORS: ACHING

## 2017-10-25 ASSESSMENT — PAIN SCALES - GENERAL: PAINLEVEL_OUTOF10: 4

## 2017-10-25 ASSESSMENT — PAIN DESCRIPTION - LOCATION: LOCATION: ARM

## 2017-10-25 NOTE — PROGRESS NOTES
See note to physician  [] Discharge PT    Signature:   Electronically signed by Rahul García PT on 10/25/17 at 2:37 PM    PT Individual Minutes  Time In: 1109  Time Out: 4508  Minutes: 48  Timed Code Treatment Minutes: 18 Minutes (neuro 10, gait 8 )

## 2017-10-25 NOTE — PROGRESS NOTES
Occupational Therapy  Daily Treatment Note  Date: 10/25/2017  Patient Name: Wyline Spatz  :  1955  MRN: [id number]       Subjective   OT Visit Information  Onset Date: 17  No Show: 1  Canceled Appointment: 4  Progress Note Counter: 12  Subjective  Subjective: Pt reports continued pain throughout right UE. General Comment  Comments: Pt does not have a shoulder support, but his brother states it should be arriving soon. Pt wearing right shoulder sling and right resting hand splint. Treatment Activities:  Pt's sister present and his brother Domenica Sow was present several minutes of the session. Per family, pt to obtain a power w/c and a hospital bed. Pt does not have the recommended shoulder support yet and continues to wear a shoulder/arm sling. Pt wearing right, resting splint, but reports the strapping is not effective in securing his DIPs. OT changed strapping for hygiene, added velcro and strapping to area of DIPS. NMES completed with muscle spasm reduction goal.  Pt's brother reports completing Estim to pt's right finger extensors. Pt's did not see visible contraction and was unsure is pt was able to feel the stimulus. Pt did not have the  E stim unit he is using at this session. OT requested family bring to next session to determine if pt can assist with setup or use with minimal assist of another. Pt tolerated 20 minutes+ of NMES to right wrist and digit /s. OT discussed use of resting splint (fabricated at a previous session) that allows digits to stretch into / after NMES. OT reviewed goal for use and pt's family reports understanding. Assessment Pt continues to report pain of right UE. Pt states pain as high as 8-9/10 and that this interferes with sleep. Per sister, pt has a Neurology appointment for an initial consult in December. Per sister, pt was referred for pain management, but has not been scheduled for an appointment yet.   Pt and family continue to attend

## 2017-10-25 NOTE — PLAN OF CARE
Strength RLE  Strength RLE: Exception  R Hip Flexion: 4+/5  R Hip Extension: 3/5  R Hip ABduction: 3+/5  R Knee Flexion: 4/5  R Knee Extension: 5/5  R Ankle Dorsiflexion: 4-/5  Strength LLE  Comment: 4+/5 grossly   Strength RUE  Comment: 0/5  Strength LUE  Comment: 4+ to 5/5     [] yes  [x] no       Body structures, Functions, Activity limitations: Decreased functional mobility , Decreased ROM, Decreased strength, Decreased ADL status, Decreased safe awareness, Decreased endurance, Decreased coordination, Decreased balance    Assessment: Re-eval completed this date for w/c letter of medical necessity. Please see LMN for specific details. Vendor to attend session next week to discuss specific features for w/c. New Education Provided:    G-Niranjan  PT G-Codes  Functional Assessment Tool Used: Dorsey, DGI, and clinical judgment   Score: 49/56, 9/24  Functional Limitation: Mobility: Walking and moving around  Mobility: Walking and Moving Around Current Status (): At least 20 percent but less than 40 percent impaired, limited or restricted  Mobility: Walking and Moving Around Goal Status (): At least 1 percent but less than 20 percent impaired, limited or restricted    PLAN: [] Evaluate and Treat  Frequency/Duration:  Plan  Times per week: 2  Plan weeks: 4-5  Current Treatment Recommendations: Strengthening, ROM, Balance Training, Functional Mobility Training, Transfer Training, Home Exercise Program, Safety Education & Training, Patient/Caregiver Education & Training, Equipment Evaluation, Education, & procurement, Modalities, Endurance Training, Gait Training, Stair training, Neuromuscular Re-education, Manual Therapy - Soft Tissue Mobilization, Manual Therapy - Joint Manipulation     Precautions:             ?     Patient Status:[x] Continue/ Initiate plan of Care    [] Discharge PT. Recommend pt continue with HEP.      [x] Additional visits requested, Please re-certify for additional visits:        Signature: Electronically signed by Maurilio Taylor PT on 10/25/17 at 4:45 PM    If you have any questions or concerns, please don't hesitate to call. Thank you for your referral.    I have reviewed this plan of care and certify a need for medically necessary rehabilitation services.     Physician Signature:__________________________________________________________  Date:  Please sign and return

## 2017-10-26 DIAGNOSIS — I69.359 HEMIPLEGIA AFFECTING DOMINANT SIDE, POST-STROKE (HCC): ICD-10-CM

## 2017-10-26 DIAGNOSIS — I63.032 CEREBROVASCULAR ACCIDENT (CVA) DUE TO THROMBOSIS OF LEFT CAROTID ARTERY (HCC): ICD-10-CM

## 2017-10-26 RX ORDER — PREGABALIN 150 MG/1
150 CAPSULE ORAL 2 TIMES DAILY
Qty: 180 CAPSULE | Refills: 1 | Status: SHIPPED | OUTPATIENT
Start: 2017-10-26 | End: 2018-02-15 | Stop reason: SDUPTHER

## 2017-10-26 RX ORDER — TIZANIDINE 4 MG/1
4 TABLET ORAL EVERY 6 HOURS PRN
Qty: 30 TABLET | Refills: 0 | Status: SHIPPED | OUTPATIENT
Start: 2017-10-26 | End: 2017-11-08 | Stop reason: SDUPTHER

## 2017-10-30 ENCOUNTER — HOSPITAL ENCOUNTER (OUTPATIENT)
Dept: OCCUPATIONAL THERAPY | Age: 62
Setting detail: THERAPIES SERIES
Discharge: HOME | End: 2017-10-30
Payer: MEDICARE

## 2017-10-30 ENCOUNTER — HOSPITAL ENCOUNTER (OUTPATIENT)
Dept: PHYSICAL THERAPY | Age: 62
Setting detail: THERAPIES SERIES
Discharge: HOME | End: 2017-10-30
Payer: MEDICARE

## 2017-10-30 PROCEDURE — 97530 THERAPEUTIC ACTIVITIES: CPT

## 2017-10-30 PROCEDURE — 97112 NEUROMUSCULAR REEDUCATION: CPT

## 2017-10-30 PROCEDURE — 97110 THERAPEUTIC EXERCISES: CPT

## 2017-10-30 ASSESSMENT — PAIN DESCRIPTION - PAIN TYPE: TYPE: CHRONIC PAIN

## 2017-10-30 ASSESSMENT — PAIN SCALES - GENERAL: PAINLEVEL_OUTOF10: 3

## 2017-10-30 ASSESSMENT — PAIN DESCRIPTION - LOCATION: LOCATION: ARM

## 2017-10-30 ASSESSMENT — PAIN DESCRIPTION - DESCRIPTORS: DESCRIPTORS: ACHING

## 2017-10-30 NOTE — PROGRESS NOTES
Large muscle, atrophy, intensity 15. Electrical Stimulation Goals: Pain;Neuromuscular Facilitation         Splinting  Location: right wrist/hand and forearm  Type: right resting splint   Splinting: Modification;Adjustment;Skin Check  Splinting Education: Wear Schedule;Precautions  Comment: OT recommends daily  use of splint until an alternative splint can be obtained which provides stretch vs. static positioning         Assessment Patient tolerated treatment well today. However, patient forgot to bring the static stretch resting pan splint to be worn after NMES only. Patient and family will sign a release of protected health information at the next visit to be able to communicate with his neurologist and send a current update toward his appointment in December. Mellisa Griffith could not remember the name of the neurologist and will bring that and the static splint for the next visit. Patient is wearing the first resting pan splint that currently fits his hand in a flexed posture. Plan Continue with current POC. G-Code 10th visits     Goals  Long term goals 4,5,& 6 addressed.   Time Frame for Long term goals : 2-3 x a week for 12 weeks  Long term goal 1: Pt will complete upper body dressing with modified independence  Long term goal 2: Pt will complete lower body dressing with min assist and adaptive equipment PRN  Long term goal 3: Pt will complete bathtub transfers with modified independence  Long term goal 4: Pt will tolerate splint and shoulder support on right without SOS of irritation to increase ROM for HEP  Long term goal 5: Patient will be independent with HEP for bilateral UEs for D/C  Long term goals 6: Pt will exhibit increased PROM to right UE to [facility]    Therapy Time   Individual Concurrent Group Co-treatment   Time In  2:00 pm         Time Out  3:00 pm         Minutes  60                 CHEN Heredia/L  Electronically signed by PAULETTE Heredia on 10/30/17 at 3:43 PM

## 2017-10-30 NOTE — PROGRESS NOTES
[address] [address]  Outpatient Physical Therapy    Treatment Note        Date: 10/30/2017  Patient: Allan Howard  : 1955  ACCT #: [de-identified]  Referring Practitioner: Dr. Tiffanie Strauss   Diagnosis: ataxia     Visit Information:  PT Visit Information  Onset Date:  ()  PT Insurance Information: MMO Medicare   Total # of Visits Approved:  (based on medical necessity)  Total # of Visits to Date: 11  Plan of Care/Certification Expiration Date: 10/17/17  No Show: 1  Canceled Appointment: 1  Progress Note Counter: 1/10    Subjective: Pt's sisters states to saw pain management Dr the other day, no change in meds. Awaiting to hear from [facility] regarding type of W/C. Pt presents with new R shoulder brace to improve stability. HEP Compliance:  [x] Good [] Fair [] Poor [] Reports not doing due to:    Vital Signs  Patient Currently in Pain: Yes   Pain Screening  Patient Currently in Pain: Yes    OBJECTIVE:   Exercises  Exercise 4:  R hip flexor str over edge of mat 330''  Exercise 5: Tuning board: static stand, L foot on, EO, EC  Exercise 7: NDT STS; partials x5 ea 4'' step under L LE, R foot on step static stand  Exercise 9: Gait: increasing speed  Exercise 10:  Step ups 6'' x10 at steps alternating leading LE , downs leading with R 4''(emphasis on quad control)  Exercise 13: Clams with R x15 (mod difficulty)  Exercise 16: NDT pivots  x10 bilaterally         Strength: [x] NT  [] MMT completed:        ROM: [x] NT  [] ROM measurements:    *Indicates exercise, modality, or manual techniques to be initiated when appropriate    Assessment:   Activity Tolerance  Activity Tolerance: Patient Tolerated treatment well    Body structures, Functions, Activity limitations: Decreased functional mobility , Decreased ROM, Decreased strength, Decreased ADL status, Decreased safe awareness, Decreased endurance, Decreased coordination, Decreased balance  Assessment: Initiated balance activities on tuning board to improve

## 2017-11-01 ENCOUNTER — HOSPITAL ENCOUNTER (OUTPATIENT)
Dept: OCCUPATIONAL THERAPY | Age: 62
Setting detail: THERAPIES SERIES
Discharge: HOME | End: 2017-11-01
Payer: MEDICARE

## 2017-11-01 ENCOUNTER — HOSPITAL ENCOUNTER (OUTPATIENT)
Dept: PHYSICAL THERAPY | Age: 62
Setting detail: THERAPIES SERIES
Discharge: HOME | End: 2017-11-01
Payer: MEDICARE

## 2017-11-01 PROCEDURE — 97112 NEUROMUSCULAR REEDUCATION: CPT

## 2017-11-01 PROCEDURE — 97542 WHEELCHAIR MNGMENT TRAINING: CPT

## 2017-11-01 PROCEDURE — 97110 THERAPEUTIC EXERCISES: CPT

## 2017-11-01 PROCEDURE — 97116 GAIT TRAINING THERAPY: CPT

## 2017-11-01 ASSESSMENT — PAIN SCALES - GENERAL: PAINLEVEL_OUTOF10: 3

## 2017-11-01 ASSESSMENT — PAIN DESCRIPTION - PAIN TYPE: TYPE: CHRONIC PAIN

## 2017-11-01 ASSESSMENT — PAIN DESCRIPTION - ORIENTATION: ORIENTATION: RIGHT

## 2017-11-01 ASSESSMENT — PAIN DESCRIPTION - DESCRIPTORS: DESCRIPTORS: ACHING

## 2017-11-01 ASSESSMENT — PAIN DESCRIPTION - LOCATION: LOCATION: ARM

## 2017-11-01 NOTE — PROGRESS NOTES
coordination, Decreased balance  Assessment: Improved stability and balance on tuning board, able to maintain one foot on/off tuning board with reaching and trunk rotation up to 2'. VCs for safety awareness with transfers as pt attempts to sit too quickly vs turning to align with chair. Discussed appropriate w/c options for ease of mobility with W/C rep. Treatment Diagnosis: ataxia, Rt hemiparesis, Rt LE weakness, decreased ROM Rt LE, imbalance, impaired mobility, abnormal gait   Prognosis: Good       Goals:  Short term goals  Time Frame for Short term goals: 3 wks   Short term goal 1: Independent with HEP   Short term goal 2: Improve LE ROM 5-10 degrees SLR and DF   Short term goal 3: Ambulate with str cane with improved Rt stance and step with decreased circumduction and ER     Long term goals  Time Frame for Long term goals : 6-8 wks   Long term goal 1: Ambulate without ' with improved Rt stance and Rt LE positioning with swing   Long term goal 2: Dorsey >/= 50/56 to demonstrate improved static balance and decreased risk for falls   Long term goal 3: Improve Rt LE strength by 1/2 grade to improve balance and gait quality   Progress toward goals: Balance, gait    POST-PAIN       Pain Rating (0-10 pain scale):   3/10   Location and pain description same as pre-treatment unless indicated.    Action: [] NA   [] Perform HEP  [x] Meds as prescribed  [] Modalities as prescribed   [] Call Physician     Frequency/Duration:  Plan  Times per week: 2  Plan weeks: 4-5  Current Treatment Recommendations: Strengthening, ROM, Balance Training, Functional Mobility Training, Transfer Training, Home Exercise Program, Safety Education & Training, Patient/Caregiver Education & Training, Equipment Evaluation, Education, & procurement, Modalities, Endurance Training, Gait Training, Stair training, Neuromuscular Re-education, Manual Therapy - Soft Tissue Mobilization, Manual Therapy - Joint Manipulation     Pt to continue current

## 2017-11-01 NOTE — PROGRESS NOTES
Occupational Therapy  Daily Treatment Note  Date: 2017  Patient Name: Steven Guillen  :  1955  MRN: [id number]       Subjective Pt seen with sister present. OT Visit Information  Canceled Appointment: 4  Progress Note Counter: 14      Treatment Activities:  Pt provided with arom/aarom/prom to right UE with use of NMES. Pt tolerated,  Pt shoulder sling adjusted to decrease subluxation. Pt tolerated without c/o pain at end of session. Assessment Pt Patient participating in acts to increase ROM. Plan    Continue with OT to address goals per current POC.        G-Code     OutComes Score                                           Goals       Therapy Time   Individual Concurrent Group Co-treatment   Time In 1400         Time Out 1500         Minutes 61                 PORTIA Fields/L Electronically signed by PORTIA Blanco/L on  at 3:46 PM

## 2017-11-06 ENCOUNTER — HOSPITAL ENCOUNTER (OUTPATIENT)
Dept: OCCUPATIONAL THERAPY | Age: 62
Setting detail: THERAPIES SERIES
Discharge: HOME | End: 2017-11-06
Payer: MEDICARE

## 2017-11-06 ENCOUNTER — HOSPITAL ENCOUNTER (OUTPATIENT)
Dept: PHYSICAL THERAPY | Age: 62
Setting detail: THERAPIES SERIES
Discharge: HOME | End: 2017-11-06
Payer: MEDICARE

## 2017-11-06 NOTE — PROGRESS NOTES
[facility]                                                         Cancel Note    Date: 2017  Patient Name: Ginna Young        MRN: [id number]    Account #: [de-identified]  : 1955  (62 y.o.)  Gender: male     No Show: 1  Canceled Appointment: 5    REASON FOR MISSED TREATMENT: see below    []Cancelled due to illness. [] Therapist Canceled Appointment  []Cancelled due to other appointment   []No Show / No call. Pt called with next scheduled appointment. [] Cancelled due to transportation conflict  []Cancelled due to weather  []Frequency of order changed  []Patient on hold due to:     [x]OTHER: Patient cancelled this appointment stating the new brace is bothering him. It is unclear whether it is the new shoulder orthotic or the hand splint that is only to be applied after NMES for approximately 1 hour.         Electronically signed by:    MAURY Clemente            Date:2017   Electronically signed by PAULETTE Clemente on 17 at 2:21 PM

## 2017-11-08 ENCOUNTER — HOSPITAL ENCOUNTER (OUTPATIENT)
Dept: PHYSICAL THERAPY | Age: 62
Setting detail: THERAPIES SERIES
Discharge: HOME | End: 2017-11-08
Payer: MEDICARE

## 2017-11-08 ENCOUNTER — HOSPITAL ENCOUNTER (OUTPATIENT)
Dept: OCCUPATIONAL THERAPY | Age: 62
Setting detail: THERAPIES SERIES
Discharge: HOME | End: 2017-11-08
Payer: MEDICARE

## 2017-11-08 DIAGNOSIS — I69.359 HEMIPLEGIA AFFECTING DOMINANT SIDE, POST-STROKE (HCC): ICD-10-CM

## 2017-11-08 PROCEDURE — 97116 GAIT TRAINING THERAPY: CPT

## 2017-11-08 PROCEDURE — 97110 THERAPEUTIC EXERCISES: CPT

## 2017-11-08 PROCEDURE — 97112 NEUROMUSCULAR REEDUCATION: CPT

## 2017-11-08 PROCEDURE — 97140 MANUAL THERAPY 1/> REGIONS: CPT

## 2017-11-08 RX ORDER — TIZANIDINE 4 MG/1
4 TABLET ORAL EVERY 6 HOURS PRN
Qty: 30 TABLET | Refills: 0 | Status: SHIPPED | OUTPATIENT
Start: 2017-11-08 | End: 2017-11-29 | Stop reason: SDUPTHER

## 2017-11-08 ASSESSMENT — PAIN SCALES - GENERAL: PAINLEVEL_OUTOF10: 2

## 2017-11-08 ASSESSMENT — PAIN DESCRIPTION - DESCRIPTORS: DESCRIPTORS: THROBBING

## 2017-11-08 ASSESSMENT — PAIN DESCRIPTION - PAIN TYPE: TYPE: CHRONIC PAIN

## 2017-11-08 ASSESSMENT — PAIN DESCRIPTION - ORIENTATION: ORIENTATION: RIGHT

## 2017-11-08 ASSESSMENT — PAIN DESCRIPTION - LOCATION: LOCATION: ARM

## 2017-11-08 NOTE — PROGRESS NOTES
[address] [address]  Outpatient Physical Therapy    Treatment Note        Date: 2017  Patient: Rafael No  : 1955  ACCT #: [de-identified]  Referring Practitioner: Dr. Doreen Lee   Diagnosis: ataxia     Visit Information:  PT Visit Information  Onset Date:  ()  PT Insurance Information: MMO Medicare   Total # of Visits Approved:  (based on medical necessity)  Total # of Visits to Date: 13  No Show: 1  Canceled Appointment: 2  Progress Note Counter: 3/10    Subjective: Pt presents with new AFO today. Pain initally although improved lately. Discussed proper wearing time, increasing as able. Discussed possible padding on Medial Malleoli if it improves comfort. HEP Compliance:  [x] Good [] Fair [] Poor [] Reports not doing due to:    Vital Signs  Patient Currently in Pain: Yes   Pain Screening  Patient Currently in Pain: Yes  Pain Assessment  Pain Level: 2  Pain Type: Chronic pain  Pain Location: Arm  Pain Orientation: Right  Pain Descriptors: Throbbing    OBJECTIVE:   Exercises  Exercise 3: Squats x10: VCs to improve technique  Exercise 6: Static stand: tandem, SLS with and without UEs  Exercise 7: NDT: one foot on step with bean bag toss, reaching  Exercise 8: Hip hikes on ground and 4'' step with difficulty  Exercise 12:  Alt toe taps without UE; no LOB  Exercise 16: NDT pivots  x10 bilaterally  Exercise 17: Marching x10 alternating unsupported to promote weightshifting           Strength: [x] NT  [] MMT completed:     ROM: [x] NT  [] ROM measurements:     *Indicates exercise, modality, or manual techniques to be initiated when appropriate    Assessment:   Activity Tolerance  Activity Tolerance: Patient Tolerated treatment well    Body structures, Functions, Activity limitations: Decreased functional mobility , Decreased ROM, Decreased strength, Decreased ADL status, Decreased safe awareness, Decreased endurance, Decreased coordination, Decreased balance  Assessment: Pt with good

## 2017-11-08 NOTE — TELEPHONE ENCOUNTER
From: Ginna Young  Sent: 11/7/2017 6:13 PM EST  Subject: Medication Renewal Request    Ginna Yonug would like a refill of the following medications:  tiZANidine (ZANAFLEX) 4 MG tablet [Emmanuel Garza Query, DO]    Preferred pharmacy: [id number] - [place] [address], [id number] - F [phone]    Comment:  please re-fill

## 2017-11-13 ENCOUNTER — HOSPITAL ENCOUNTER (OUTPATIENT)
Dept: PHYSICAL THERAPY | Age: 62
Setting detail: THERAPIES SERIES
Discharge: HOME | End: 2017-11-13
Payer: MEDICARE

## 2017-11-13 ENCOUNTER — HOSPITAL ENCOUNTER (OUTPATIENT)
Dept: OCCUPATIONAL THERAPY | Age: 62
Setting detail: THERAPIES SERIES
Discharge: HOME | End: 2017-11-13
Payer: MEDICARE

## 2017-11-13 PROCEDURE — 97530 THERAPEUTIC ACTIVITIES: CPT

## 2017-11-13 PROCEDURE — 97116 GAIT TRAINING THERAPY: CPT

## 2017-11-13 PROCEDURE — 97110 THERAPEUTIC EXERCISES: CPT

## 2017-11-13 PROCEDURE — 97112 NEUROMUSCULAR REEDUCATION: CPT

## 2017-11-13 PROCEDURE — 97140 MANUAL THERAPY 1/> REGIONS: CPT

## 2017-11-13 PROCEDURE — 97535 SELF CARE MNGMENT TRAINING: CPT

## 2017-11-13 ASSESSMENT — PAIN DESCRIPTION - LOCATION: LOCATION: ARM

## 2017-11-13 ASSESSMENT — PAIN DESCRIPTION - DESCRIPTORS: DESCRIPTORS: BURNING

## 2017-11-13 ASSESSMENT — PAIN SCALES - GENERAL: PAINLEVEL_OUTOF10: 5

## 2017-11-13 ASSESSMENT — PAIN DESCRIPTION - ORIENTATION: ORIENTATION: RIGHT

## 2017-11-13 ASSESSMENT — PAIN DESCRIPTION - PAIN TYPE: TYPE: CHRONIC PAIN

## 2017-11-13 NOTE — PROGRESS NOTES
Occupational Therapy  Daily Treatment Note  Date: 2017  Patient Name: Rafael Castaneda  :  1955  MRN: [id number]       Subjective Treatment started on time. Patient seen after physical therapy. Additional Pertinent Hx: Lizal 17 PORTIA Garces  Diagnosis: Right hemiplegia    No Show: 1  Canceled Appointment: 5  Progress Note Counter: 16    Patient Currently in Pain: Yes  Pain Level: 5  Pain Type: Chronic pain  Pain Location: Arm  Pain Orientation: Right    Treatment Activities:  Assessed the patient for UE and LE dressing with adaptive techniques to make the patient more successful dressing in a timely manner and less difficulty and frustration. UE dressing modified Independent with shirts and orthotic for the shoulder. LE dressing modified Independent after instruction on modifications for seated safety, and R LE leg support while donning socks and correct donning of AFO and shoe. Provided modification to the straps to increase ease and independence. Provided PROM in supine and tone reduction techniques. Patient has 10/10 pain with any ROM over 90* shoulder flexion. Soft tissue mobility provided and compression with PROM. Splinting: Patient dons the resting pan splint independently. Location: right wrist/hand and forearm  Type: right resting splint   Splinting: Modification;Adjustment;Skin Check  Splinting Education: Wear Schedule;Precautions  Comment: OT recommends daily  use of splint until an alternative splint can be obtained which provides stretch vs. static positioning            Assessment  Patient tolerated the treatment session well, but fatigued. Plan Plan to discharge at the next treatment session and this was discussed with the patient, his sister Lefty Monroy, and lead [employer]. Patient to perform a tub transfer and attempt donning a knee high modified compression stocking at the next visit.           Goals  Long term goals  Time Frame for Long term goals : 2-3 x a week for 12 weeks  Long term goal 1: Pt will complete upper body dressing with modified independence  GOAL MET  Long term goal 2: Pt will complete lower body dressing with min assist and adaptive equipment PRN  GOAL MET  Long term goal 3: Pt will complete bathtub transfers with modified independence NOT TESTED  Long term goal 4: Pt will tolerate splint and shoulder support on right without SOS of irritation to increase ROM for HEP  GOAL MET  Long term goal 5: Patient will be independent with HEP for bilateral UEs for D/C. GOAL MET  Long term goals 6: Pt will exhibit increased PROM to right UE to [facility] NO PROGRESS MADE.     Therapy Time   Individual Concurrent Group Co-treatment   Time In  2:00 pm         Time Out  3:00 pm         Minutes  60                 CHEN Hylton/L  Electronically signed by PAULETTE Hylton on 11/13/17 at 3:25 PM

## 2017-11-13 NOTE — PROGRESS NOTES
[address] [address]  Outpatient Physical Therapy    Treatment Note        Date: 2017  Patient: Wyline Spatz  : 1955  ACCT #: [de-identified]  Referring Practitioner: Dr. Carmina Teixeira   Diagnosis: ataxia     Visit Information:  PT Visit Information  Onset Date:  ()  PT Insurance Information: MMO Medicare   Total # of Visits Approved:  (based on medical necessity)  Total # of Visits to Date: 14  No Show: 1  Canceled Appointment: 2  Progress Note Counter: 4/10    Subjective: Pt present with old AFO today d/t new one cutting in at medial malleoli with a cut noted. Pt to see Orthotist Wed to readjust brace. Pt's brother aware of cut on R ankle. Comments: Will not be here -. HEP Compliance:  [x] Good [] Fair [] Poor [] Reports not doing due to:    Vital Signs  Patient Currently in Pain: Yes   Pain Screening  Patient Currently in Pain: Yes    OBJECTIVE:   Exercises  Exercise 4:  R hip flexor str over edge of mat   Exercise 8: Hip hikes on ground and 4'' step with difficulty  Exercise 15: 360 deg turns, retro stepping in place             Strength: [x] NT  [] MMT completed:        ROM: [x] NT  [] ROM measurements:         Manual:   Manual therapy  Joint mobilization: Grade II joint Mobs A/P to increase DF  PROM: Gastroc stretch on R 3/30''  Soft Tissue Mobalization:  R Great toe stretch 3/30''      *Indicates exercise, modality, or manual techniques to be initiated when appropriate    Assessment:   Activity Tolerance  Activity Tolerance: Patient limited by fatigue, Patient limited by pain    Body structures, Functions, Activity limitations: Decreased functional mobility , Decreased ROM, Decreased strength, Decreased ADL status, Decreased safe awareness, Decreased endurance, Decreased coordination, Decreased balance  Assessment: Pt demo'ing decreased WBing through R LE today possibly d/t ankle pain.  Challenged with step ups, difficulty placing R LE onti 6'' step as well as 10  Neuro: 35    Signature:  Electronically signed by Clyde Alonso PTA on 11/13/17 at 1:49 PM

## 2017-11-15 ENCOUNTER — HOSPITAL ENCOUNTER (OUTPATIENT)
Dept: PHYSICAL THERAPY | Age: 62
Setting detail: THERAPIES SERIES
Discharge: HOME | End: 2017-11-15
Payer: MEDICARE

## 2017-11-15 ENCOUNTER — HOSPITAL ENCOUNTER (OUTPATIENT)
Dept: OCCUPATIONAL THERAPY | Age: 62
Setting detail: THERAPIES SERIES
Discharge: HOME | End: 2017-11-15
Payer: MEDICARE

## 2017-11-15 ENCOUNTER — TELEPHONE (OUTPATIENT)
Dept: FAMILY MEDICINE CLINIC | Age: 62
End: 2017-11-15

## 2017-11-15 PROCEDURE — G8991 OTHER PT/OT GOAL STATUS: HCPCS

## 2017-11-15 PROCEDURE — G8990 OTHER PT/OT CURRENT STATUS: HCPCS

## 2017-11-15 PROCEDURE — 97112 NEUROMUSCULAR REEDUCATION: CPT

## 2017-11-15 PROCEDURE — G8992 OTHER PT/OT  D/C STATUS: HCPCS

## 2017-11-15 PROCEDURE — 97530 THERAPEUTIC ACTIVITIES: CPT

## 2017-11-15 PROCEDURE — 97110 THERAPEUTIC EXERCISES: CPT

## 2017-11-15 NOTE — TELEPHONE ENCOUNTER
1 -Recommend OTC MiraLAX start by following instructions on daily dosing( I.e.  1 cap full  In 8 ounces water daily)and increase every few days until stool is desired consistency and frequency. 2- Rx. Senna 8.6 mg #120 by mouth 2 twice a day when necessary no BM for 3 days.  Refill ×5

## 2017-11-15 NOTE — TELEPHONE ENCOUNTER
Patient's sister stopped in office stating that the medication he is currently receiving for constipation is not working. She is asking if the dose can be increased or something else be prescribed. Please send RX to [facility] in [place]. Thank you.

## 2017-11-16 RX ORDER — SENNA PLUS 8.6 MG/1
2 TABLET ORAL 2 TIMES DAILY
Qty: 120 TABLET | Refills: 5 | Status: SHIPPED | OUTPATIENT
Start: 2017-11-16 | End: 2018-11-16

## 2017-11-20 ENCOUNTER — APPOINTMENT (OUTPATIENT)
Dept: OCCUPATIONAL THERAPY | Age: 62
End: 2017-11-20
Payer: MEDICARE

## 2017-11-22 ENCOUNTER — TELEPHONE (OUTPATIENT)
Dept: FAMILY MEDICINE CLINIC | Age: 62
End: 2017-11-22

## 2017-11-22 ENCOUNTER — APPOINTMENT (OUTPATIENT)
Dept: OCCUPATIONAL THERAPY | Age: 62
End: 2017-11-22
Payer: MEDICARE

## 2017-11-22 NOTE — TELEPHONE ENCOUNTER
PA for senna-lax: per cover my meds:  No products available for selection, review not available, case cannot be created.

## 2017-11-27 ENCOUNTER — APPOINTMENT (OUTPATIENT)
Dept: OCCUPATIONAL THERAPY | Age: 62
End: 2017-11-27
Payer: MEDICARE

## 2017-11-29 ENCOUNTER — APPOINTMENT (OUTPATIENT)
Dept: OCCUPATIONAL THERAPY | Age: 62
End: 2017-11-29
Payer: MEDICARE

## 2017-11-29 DIAGNOSIS — I69.359 HEMIPLEGIA AFFECTING DOMINANT SIDE, POST-STROKE (HCC): ICD-10-CM

## 2017-11-29 RX ORDER — TIZANIDINE 4 MG/1
4 TABLET ORAL EVERY 6 HOURS PRN
Qty: 30 TABLET | Refills: 0 | Status: SHIPPED | OUTPATIENT
Start: 2017-11-29 | End: 2017-12-19 | Stop reason: SDUPTHER

## 2017-12-04 ENCOUNTER — HOSPITAL ENCOUNTER (OUTPATIENT)
Dept: PHYSICAL THERAPY | Age: 62
Setting detail: THERAPIES SERIES
Discharge: HOME | End: 2017-12-04
Payer: MEDICARE

## 2017-12-11 ENCOUNTER — APPOINTMENT (OUTPATIENT)
Dept: PHYSICAL THERAPY | Age: 62
End: 2017-12-11
Payer: MEDICARE

## 2017-12-13 ENCOUNTER — APPOINTMENT (OUTPATIENT)
Dept: PHYSICAL THERAPY | Age: 62
End: 2017-12-13
Payer: MEDICARE

## 2017-12-19 DIAGNOSIS — I69.359 HEMIPLEGIA AFFECTING DOMINANT SIDE, POST-STROKE (HCC): ICD-10-CM

## 2017-12-20 RX ORDER — TIZANIDINE 4 MG/1
4 TABLET ORAL EVERY 6 HOURS PRN
Qty: 60 TABLET | Refills: 0 | Status: SHIPPED | OUTPATIENT
Start: 2017-12-20 | End: 2018-01-22 | Stop reason: SDUPTHER

## 2017-12-28 DIAGNOSIS — G89.29 CHRONIC RIGHT SHOULDER PAIN: ICD-10-CM

## 2017-12-28 DIAGNOSIS — I69.359 HEMIPLEGIA AFFECTING DOMINANT SIDE, POST-STROKE (HCC): ICD-10-CM

## 2017-12-28 DIAGNOSIS — F33.9 RECURRENT DEPRESSION (HCC): ICD-10-CM

## 2017-12-28 DIAGNOSIS — M25.511 CHRONIC RIGHT SHOULDER PAIN: ICD-10-CM

## 2017-12-28 RX ORDER — IBUPROFEN 800 MG/1
800 TABLET ORAL EVERY 8 HOURS PRN
Qty: 90 TABLET | Refills: 1 | Status: SHIPPED | OUTPATIENT
Start: 2017-12-28 | End: 2018-02-15 | Stop reason: SDUPTHER

## 2017-12-28 RX ORDER — BUPROPION HYDROCHLORIDE 150 MG/1
150 TABLET ORAL EVERY MORNING
Qty: 90 TABLET | Refills: 1 | Status: SHIPPED | OUTPATIENT
Start: 2017-12-28 | End: 2018-01-22 | Stop reason: SDUPTHER

## 2017-12-28 NOTE — TELEPHONE ENCOUNTER
From: Sebastian Mixon  Sent: 12/28/2017 9:28 AM EST  Subject: Medication Renewal Request    Sebastian Mixon would like a refill of the following medications:  buPROPion (WELLBUTRIN XL) 150 MG extended release tablet [Emmanuel Nazario DO]  ibuprofen (ADVIL;MOTRIN) 800 MG tablet [Emmanuel Nazario DO]    Preferred pharmacy: [address]    Comment:

## 2018-01-22 DIAGNOSIS — G89.29 CHRONIC RIGHT SHOULDER PAIN: ICD-10-CM

## 2018-01-22 DIAGNOSIS — M25.511 CHRONIC RIGHT SHOULDER PAIN: ICD-10-CM

## 2018-01-22 DIAGNOSIS — I69.359 HEMIPLEGIA AFFECTING DOMINANT SIDE, POST-STROKE (HCC): ICD-10-CM

## 2018-01-22 DIAGNOSIS — F33.9 RECURRENT DEPRESSION (HCC): ICD-10-CM

## 2018-01-22 RX ORDER — BUPROPION HYDROCHLORIDE 150 MG/1
150 TABLET ORAL EVERY MORNING
Qty: 90 TABLET | Refills: 1 | Status: SHIPPED | OUTPATIENT
Start: 2018-01-22 | End: 2018-04-09 | Stop reason: SDUPTHER

## 2018-01-22 RX ORDER — BUPROPION HYDROCHLORIDE 150 MG/1
150 TABLET ORAL EVERY MORNING
Qty: 90 TABLET | Refills: 1 | Status: SHIPPED | OUTPATIENT
Start: 2018-01-22 | End: 2018-01-22 | Stop reason: SDUPTHER

## 2018-01-22 RX ORDER — TIZANIDINE 4 MG/1
4 TABLET ORAL EVERY 6 HOURS PRN
Qty: 60 TABLET | Refills: 0 | Status: SHIPPED | OUTPATIENT
Start: 2018-01-22 | End: 2018-05-07

## 2018-01-22 RX ORDER — TIZANIDINE 4 MG/1
4 TABLET ORAL EVERY 6 HOURS PRN
Qty: 60 TABLET | Refills: 0 | Status: SHIPPED | OUTPATIENT
Start: 2018-01-22 | End: 2018-01-22 | Stop reason: SDUPTHER

## 2018-01-22 RX ORDER — BACLOFEN 10 MG/1
TABLET ORAL
Qty: 90 TABLET | Refills: 1 | Status: SHIPPED | OUTPATIENT
Start: 2018-01-22 | End: 2018-01-22 | Stop reason: SDUPTHER

## 2018-01-22 RX ORDER — BACLOFEN 10 MG/1
TABLET ORAL
Qty: 90 TABLET | Refills: 1 | Status: SHIPPED | OUTPATIENT
Start: 2018-01-22 | End: 2018-02-22 | Stop reason: SDUPTHER

## 2018-01-30 ENCOUNTER — OFFICE VISIT (OUTPATIENT)
Dept: FAMILY MEDICINE CLINIC | Age: 63
End: 2018-01-30
Payer: MEDICARE

## 2018-01-30 VITALS
WEIGHT: 183 LBS | OXYGEN SATURATION: 98 % | BODY MASS INDEX: 27.74 KG/M2 | DIASTOLIC BLOOD PRESSURE: 80 MMHG | RESPIRATION RATE: 12 BRPM | HEART RATE: 68 BPM | HEIGHT: 68 IN | TEMPERATURE: 97 F | SYSTOLIC BLOOD PRESSURE: 110 MMHG

## 2018-01-30 DIAGNOSIS — E78.5 HYPERLIPIDEMIA, UNSPECIFIED HYPERLIPIDEMIA TYPE: ICD-10-CM

## 2018-01-30 DIAGNOSIS — Z23 FLU VACCINE NEED: ICD-10-CM

## 2018-01-30 DIAGNOSIS — I10 ESSENTIAL HYPERTENSION: ICD-10-CM

## 2018-01-30 DIAGNOSIS — I63.032 CEREBROVASCULAR ACCIDENT (CVA) DUE TO THROMBOSIS OF LEFT CAROTID ARTERY (HCC): ICD-10-CM

## 2018-01-30 DIAGNOSIS — I69.359 HEMIPARESIS AFFECTING DOMINANT SIDE AS LATE EFFECT OF CEREBROVASCULAR ACCIDENT (CVA) (HCC): Primary | ICD-10-CM

## 2018-01-30 PROCEDURE — 99214 OFFICE O/P EST MOD 30 MIN: CPT | Performed by: FAMILY MEDICINE

## 2018-01-30 RX ORDER — HYDROCODONE BITARTRATE AND ACETAMINOPHEN 10; 325 MG/1; MG/1
TABLET ORAL
Refills: 0 | COMMUNITY
Start: 2018-01-25 | End: 2018-05-07 | Stop reason: ALTCHOICE

## 2018-01-30 ASSESSMENT — ENCOUNTER SYMPTOMS
ABDOMINAL PAIN: 0
APNEA: 0
ALLERGIC/IMMUNOLOGIC NEGATIVE: 1
COUGH: 0
GASTROINTESTINAL NEGATIVE: 1
EYES NEGATIVE: 1
STRIDOR: 0
CHEST TIGHTNESS: 0
SHORTNESS OF BREATH: 1
WHEEZING: 0

## 2018-01-30 NOTE — PROGRESS NOTES
Subjective  Dyllan Carroll, 58 y.o. male presents today with:  Chief Complaint   Patient presents with    Other     paperwork from [place]        Patient comes into the office today. Apparently for paperwork visit to review and fill out paperwork forwarded by . Of course we don't have any paperwork on hand, but it is likely a statement regarding his long-term ability after his left-sided stroke resulting in right hemiparesis (dominant side). Patient continues to have chronic arm pain. He follows with neurology and he is considering getting set up for Botox injections to reduce the residual pain from spasticity of the right upper extremity. He still really has no usage of the right arm or leg. He is able to ambulate slowly with cane in his left arm and was swinging his right leg. He maintains an AFO on his right lower extremity due to foot drop. As long as he keeps the AFO. He still can ambulate independently, although he must have assistants available with any ambulation due to risks of falling        Review of Systems   Constitutional: Positive for fatigue. Negative for unexpected weight change. HENT: Negative. Eyes: Negative. Respiratory: Positive for shortness of breath (with exertion , chronic-unchanged). Negative for apnea, cough, chest tightness, wheezing and stridor. Cardiovascular: Positive for leg swelling (right greater than left). Negative for chest pain. Gastrointestinal: Negative. Negative for abdominal pain. Endocrine: Negative. Genitourinary: Negative. Negative for difficulty urinating. Musculoskeletal: Positive for arthralgias, gait problem and myalgias. Skin: Negative. Allergic/Immunologic: Negative. Neurological: Positive for facial asymmetry, speech difficulty, weakness and numbness. Hematological: Negative. Psychiatric/Behavioral: Positive for dysphoric mood and sleep disturbance.  Negative for agitation, behavioral problems, confusion, decreased concentration, hallucinations, self-injury and suicidal ideas. The patient is not nervous/anxious and is not hyperactive. No past medical history on file. No past surgical history on file. Social History     Social History    Marital status:      Spouse name: N/A    Number of children: N/A    Years of education: N/A     Occupational History    Not on file. Social History Main Topics    Smoking status: Never Smoker    Smokeless tobacco: Never Used    Alcohol use Not on file    Drug use: Unknown    Sexual activity: Not on file     Other Topics Concern    Not on file     Social History Narrative    No narrative on file     No family history on file.   No Known Allergies  Current Outpatient Prescriptions on File Prior to Visit   Medication Sig Dispense Refill    baclofen (LIORESAL) 10 MG tablet TAKE 1 TABLET BY MOUTH THREE TIMES DAILY AS NEEDED 90 tablet 1    tiZANidine (ZANAFLEX) 4 MG tablet Take 1 tablet by mouth every 6 hours as needed (muscle cramping) 60 tablet 0    buPROPion (WELLBUTRIN XL) 150 MG extended release tablet Take 1 tablet by mouth every morning 90 tablet 1    ibuprofen (ADVIL;MOTRIN) 800 MG tablet Take 1 tablet by mouth every 8 hours as needed for Pain 90 tablet 1    senna (SENOKOT) 8.6 MG tablet Take 2 tablets by mouth 2 times daily 120 tablet 5    pregabalin (LYRICA) 150 MG capsule Take 1 capsule by mouth 2 times daily 180 capsule 1    atorvastatin (LIPITOR) 80 MG tablet Take 1 tablet by mouth daily 90 tablet 3    nortriptyline (PAMELOR) 25 MG capsule Take 1 capsule by mouth nightly 90 capsule 1    levETIRAcetam (KEPPRA XR) 500 MG TB24 extended release tablet Take 1 tablet by mouth daily 90 tablet 1    folic acid (FOLVITE) 1 MG tablet Take 1 tablet by mouth daily 90 tablet 1    DULoxetine (CYMBALTA) 60 MG extended release capsule Take 1 capsule by mouth daily 90 capsule 1    acetaminophen (AMINOFEN) 325 MG tablet Take 2 tablets by mouth every 4

## 2018-01-31 ENCOUNTER — TELEPHONE (OUTPATIENT)
Dept: FAMILY MEDICINE CLINIC | Age: 63
End: 2018-01-31

## 2018-02-01 ENCOUNTER — TELEPHONE (OUTPATIENT)
Dept: FAMILY MEDICINE CLINIC | Age: 63
End: 2018-02-01

## 2018-02-15 DIAGNOSIS — G89.29 CHRONIC RIGHT SHOULDER PAIN: ICD-10-CM

## 2018-02-15 DIAGNOSIS — I63.032 CEREBROVASCULAR ACCIDENT (CVA) DUE TO THROMBOSIS OF LEFT CAROTID ARTERY (HCC): ICD-10-CM

## 2018-02-15 DIAGNOSIS — I69.359 HEMIPLEGIA AFFECTING DOMINANT SIDE, POST-STROKE (HCC): ICD-10-CM

## 2018-02-15 DIAGNOSIS — M25.511 CHRONIC RIGHT SHOULDER PAIN: ICD-10-CM

## 2018-02-15 RX ORDER — PREGABALIN 150 MG/1
150 CAPSULE ORAL 2 TIMES DAILY
Qty: 180 CAPSULE | Refills: 1 | Status: SHIPPED | OUTPATIENT
Start: 2018-02-15 | End: 2018-05-07 | Stop reason: SDUPTHER

## 2018-02-15 RX ORDER — IBUPROFEN 800 MG/1
800 TABLET ORAL EVERY 8 HOURS PRN
Qty: 90 TABLET | Refills: 1 | Status: SHIPPED | OUTPATIENT
Start: 2018-02-15 | End: 2018-04-02 | Stop reason: SDUPTHER

## 2018-02-15 NOTE — TELEPHONE ENCOUNTER
From: Momo Gr  Sent: 2/15/2018 4:54 PM EST  Subject: Medication Renewal Request    Momo Gr would like a refill of the following medications:  pregabalin (LYRICA) 150 MG capsule [Emmanuel Caraballo DO]  ibuprofen (ADVIL;MOTRIN) 800 MG tablet [Emmanuel Caraballo, ]    Preferred pharmacy: [facility] - [place], [id number] - F [phone]    Comment:

## 2018-02-22 DIAGNOSIS — G89.29 CHRONIC RIGHT SHOULDER PAIN: ICD-10-CM

## 2018-02-22 DIAGNOSIS — E78.5 HYPERLIPIDEMIA, UNSPECIFIED HYPERLIPIDEMIA TYPE: ICD-10-CM

## 2018-02-22 DIAGNOSIS — I69.359 HEMIPLEGIA AFFECTING DOMINANT SIDE, POST-STROKE (HCC): ICD-10-CM

## 2018-02-22 DIAGNOSIS — M25.511 CHRONIC RIGHT SHOULDER PAIN: ICD-10-CM

## 2018-02-22 RX ORDER — BACLOFEN 10 MG/1
10 TABLET ORAL 4 TIMES DAILY PRN
Qty: 120 TABLET | Refills: 1 | Status: SHIPPED | OUTPATIENT
Start: 2018-02-22 | End: 2018-04-09 | Stop reason: SDUPTHER

## 2018-02-22 RX ORDER — FOLIC ACID 1 MG/1
1 TABLET ORAL DAILY
Qty: 90 TABLET | Refills: 1 | Status: SHIPPED | OUTPATIENT
Start: 2018-02-22 | End: 2018-08-07 | Stop reason: SDUPTHER

## 2018-02-22 RX ORDER — TIZANIDINE 4 MG/1
4 TABLET ORAL EVERY 6 HOURS PRN
Qty: 60 TABLET | Refills: 0 | OUTPATIENT
Start: 2018-02-22

## 2018-02-22 NOTE — TELEPHONE ENCOUNTER
Recommend discontinue Zanaflex and increased dosage of baclofen. As both are similar muscle relaxants.

## 2018-04-02 DIAGNOSIS — I69.359 HEMIPLEGIA AFFECTING DOMINANT SIDE, POST-STROKE (HCC): ICD-10-CM

## 2018-04-02 DIAGNOSIS — M25.511 CHRONIC RIGHT SHOULDER PAIN: ICD-10-CM

## 2018-04-02 DIAGNOSIS — G89.29 CHRONIC RIGHT SHOULDER PAIN: ICD-10-CM

## 2018-04-02 RX ORDER — IBUPROFEN 800 MG/1
800 TABLET ORAL EVERY 8 HOURS PRN
Qty: 90 TABLET | Refills: 3 | Status: SHIPPED | OUTPATIENT
Start: 2018-04-02 | End: 2018-10-23 | Stop reason: SDUPTHER

## 2018-04-09 DIAGNOSIS — M25.511 CHRONIC RIGHT SHOULDER PAIN: ICD-10-CM

## 2018-04-09 DIAGNOSIS — I69.359 HEMIPLEGIA AFFECTING DOMINANT SIDE, POST-STROKE (HCC): ICD-10-CM

## 2018-04-09 DIAGNOSIS — F33.9 RECURRENT DEPRESSION (HCC): ICD-10-CM

## 2018-04-09 DIAGNOSIS — G89.29 CHRONIC RIGHT SHOULDER PAIN: ICD-10-CM

## 2018-04-09 RX ORDER — BACLOFEN 10 MG/1
10 TABLET ORAL 4 TIMES DAILY PRN
Qty: 120 TABLET | Refills: 11 | Status: SHIPPED | OUTPATIENT
Start: 2018-04-09 | End: 2018-10-01 | Stop reason: SDUPTHER

## 2018-04-09 RX ORDER — BUPROPION HYDROCHLORIDE 150 MG/1
150 TABLET ORAL EVERY MORNING
Qty: 90 TABLET | Refills: 1 | Status: SHIPPED | OUTPATIENT
Start: 2018-04-09 | End: 2018-10-23 | Stop reason: SDUPTHER

## 2018-04-09 RX ORDER — DULOXETIN HYDROCHLORIDE 60 MG/1
60 CAPSULE, DELAYED RELEASE ORAL DAILY
Qty: 90 CAPSULE | Refills: 3 | Status: SHIPPED | OUTPATIENT
Start: 2018-04-09 | End: 2019-05-07 | Stop reason: SDUPTHER

## 2018-04-09 RX ORDER — TIZANIDINE 4 MG/1
4 TABLET ORAL EVERY 6 HOURS PRN
Qty: 60 TABLET | Refills: 11 | OUTPATIENT
Start: 2018-04-09

## 2018-05-01 ENCOUNTER — TELEPHONE (OUTPATIENT)
Dept: FAMILY MEDICINE CLINIC | Age: 63
End: 2018-05-01

## 2018-05-07 ENCOUNTER — OFFICE VISIT (OUTPATIENT)
Dept: FAMILY MEDICINE CLINIC | Age: 63
End: 2018-05-07
Payer: MEDICARE

## 2018-05-07 VITALS
RESPIRATION RATE: 12 BRPM | DIASTOLIC BLOOD PRESSURE: 90 MMHG | OXYGEN SATURATION: 96 % | SYSTOLIC BLOOD PRESSURE: 130 MMHG | HEART RATE: 68 BPM | WEIGHT: 180 LBS | TEMPERATURE: 97.9 F | HEIGHT: 68 IN | BODY MASS INDEX: 27.28 KG/M2

## 2018-05-07 DIAGNOSIS — L30.9 DERMATITIS: ICD-10-CM

## 2018-05-07 DIAGNOSIS — I63.032 CEREBROVASCULAR ACCIDENT (CVA) DUE TO THROMBOSIS OF LEFT CAROTID ARTERY (HCC): ICD-10-CM

## 2018-05-07 DIAGNOSIS — M79.2 NEUROPATHIC PAIN: ICD-10-CM

## 2018-05-07 DIAGNOSIS — I69.359 HEMIPARESIS AFFECTING DOMINANT SIDE AS LATE EFFECT OF CEREBROVASCULAR ACCIDENT (CVA) (HCC): Primary | ICD-10-CM

## 2018-05-07 DIAGNOSIS — G89.4 CHRONIC PAIN SYNDROME: ICD-10-CM

## 2018-05-07 PROBLEM — I63.9 CEREBROVASCULAR ACCIDENT (HCC): Status: ACTIVE | Noted: 2017-08-02

## 2018-05-07 PROCEDURE — 99214 OFFICE O/P EST MOD 30 MIN: CPT | Performed by: FAMILY MEDICINE

## 2018-05-07 RX ORDER — TRIAMCINOLONE ACETONIDE 1 MG/G
CREAM TOPICAL
Qty: 45 G | Refills: 3 | Status: SHIPPED | OUTPATIENT
Start: 2018-05-07 | End: 2019-03-19

## 2018-05-07 RX ORDER — PREGABALIN 200 MG/1
200 CAPSULE ORAL 2 TIMES DAILY
Qty: 180 CAPSULE | Refills: 1 | Status: SHIPPED | OUTPATIENT
Start: 2018-05-07 | End: 2018-12-11 | Stop reason: SDUPTHER

## 2018-05-07 RX ORDER — HYDROCODONE BITARTRATE AND ACETAMINOPHEN 10; 325 MG/1; MG/1
1 TABLET ORAL 2 TIMES DAILY
Qty: 30 TABLET | Refills: 0 | Status: SHIPPED | OUTPATIENT
Start: 2018-05-07 | End: 2018-05-22

## 2018-05-08 ASSESSMENT — ENCOUNTER SYMPTOMS
CHEST TIGHTNESS: 0
SHORTNESS OF BREATH: 1
GASTROINTESTINAL NEGATIVE: 1
APNEA: 0
ABDOMINAL PAIN: 0
COUGH: 0
WHEEZING: 0
STRIDOR: 0
EYES NEGATIVE: 1
ALLERGIC/IMMUNOLOGIC NEGATIVE: 1

## 2018-06-19 DIAGNOSIS — R56.9 SEIZURE (HCC): ICD-10-CM

## 2018-06-19 RX ORDER — LEVETIRACETAM 500 MG/1
500 TABLET, EXTENDED RELEASE ORAL DAILY
Qty: 90 TABLET | Refills: 1 | Status: SHIPPED | OUTPATIENT
Start: 2018-06-19 | End: 2018-12-11 | Stop reason: SDUPTHER

## 2018-07-20 ENCOUNTER — HOSPITAL ENCOUNTER (OUTPATIENT)
Dept: NEUROLOGY | Age: 63
Discharge: HOME | End: 2018-07-20
Payer: MEDICARE

## 2018-07-20 PROCEDURE — 6360000002 HC RX W HCPCS

## 2018-07-20 PROCEDURE — 95870 NDL EMG LMTD STD MUSC 1 XTR: CPT

## 2018-07-20 PROCEDURE — 96379 UNL THER/PROP/DIAG INJ/INF: CPT

## 2018-07-20 PROCEDURE — 96372 THER/PROPH/DIAG INJ SC/IM: CPT

## 2018-08-07 DIAGNOSIS — E78.5 HYPERLIPIDEMIA, UNSPECIFIED HYPERLIPIDEMIA TYPE: ICD-10-CM

## 2018-08-07 RX ORDER — FOLIC ACID 1 MG/1
1 TABLET ORAL DAILY
Qty: 30 TABLET | Refills: 0 | Status: SHIPPED | OUTPATIENT
Start: 2018-08-07 | End: 2018-10-01 | Stop reason: SDUPTHER

## 2018-08-20 DIAGNOSIS — I10 ESSENTIAL HYPERTENSION: Primary | ICD-10-CM

## 2018-08-20 DIAGNOSIS — E78.5 HYPERLIPIDEMIA, UNSPECIFIED HYPERLIPIDEMIA TYPE: ICD-10-CM

## 2018-08-21 DIAGNOSIS — I10 ESSENTIAL HYPERTENSION: ICD-10-CM

## 2018-08-21 DIAGNOSIS — E78.5 HYPERLIPIDEMIA, UNSPECIFIED HYPERLIPIDEMIA TYPE: ICD-10-CM

## 2018-08-21 LAB
ALBUMIN SERPL-MCNC: 4.5 G/DL (ref 3.9–4.9)
ALP BLD-CCNC: 94 U/L (ref 35–104)
ALT SERPL-CCNC: 22 U/L (ref 0–41)
ANION GAP SERPL CALCULATED.3IONS-SCNC: 16 MEQ/L (ref 7–13)
AST SERPL-CCNC: 23 U/L (ref 0–40)
BASOPHILS ABSOLUTE: 0.1 K/UL (ref 0–0.2)
BASOPHILS RELATIVE PERCENT: 1.2 %
BILIRUB SERPL-MCNC: 0.6 MG/DL (ref 0–1.2)
BUN BLDV-MCNC: 11 MG/DL (ref 8–23)
CALCIUM SERPL-MCNC: 9.5 MG/DL (ref 8.6–10.2)
CHLORIDE BLD-SCNC: 100 MEQ/L (ref 98–107)
CHOLESTEROL, TOTAL: 164 MG/DL (ref 0–199)
CO2: 24 MEQ/L (ref 22–29)
CREAT SERPL-MCNC: 0.94 MG/DL (ref 0.7–1.2)
EOSINOPHILS ABSOLUTE: 0.3 K/UL (ref 0–0.7)
EOSINOPHILS RELATIVE PERCENT: 4.3 %
GFR AFRICAN AMERICAN: >60
GFR NON-AFRICAN AMERICAN: >60
GLOBULIN: 2.9 G/DL (ref 2.3–3.5)
GLUCOSE BLD-MCNC: 90 MG/DL (ref 74–109)
HCT VFR BLD CALC: 41.8 % (ref 42–52)
HDLC SERPL-MCNC: 47 MG/DL (ref 40–59)
HEMOGLOBIN: 14.4 G/DL (ref 14–18)
LDL CHOLESTEROL CALCULATED: 99 MG/DL (ref 0–129)
LYMPHOCYTES ABSOLUTE: 1.8 K/UL (ref 1–4.8)
LYMPHOCYTES RELATIVE PERCENT: 27 %
MCH RBC QN AUTO: 30.9 PG (ref 27–31.3)
MCHC RBC AUTO-ENTMCNC: 34.3 % (ref 33–37)
MCV RBC AUTO: 90.2 FL (ref 80–100)
MONOCYTES ABSOLUTE: 0.5 K/UL (ref 0.2–0.8)
MONOCYTES RELATIVE PERCENT: 7.9 %
NEUTROPHILS ABSOLUTE: 3.9 K/UL (ref 1.4–6.5)
NEUTROPHILS RELATIVE PERCENT: 59.6 %
PDW BLD-RTO: 14.1 % (ref 11.5–14.5)
PLATELET # BLD: 180 K/UL (ref 130–400)
PLATELET SLIDE REVIEW: NORMAL
POTASSIUM SERPL-SCNC: 4 MEQ/L (ref 3.5–5.1)
RBC # BLD: 4.64 M/UL (ref 4.7–6.1)
SODIUM BLD-SCNC: 140 MEQ/L (ref 132–144)
TOTAL PROTEIN: 7.4 G/DL (ref 6.4–8.1)
TRIGL SERPL-MCNC: 90 MG/DL (ref 0–200)
TSH SERPL DL<=0.05 MIU/L-ACNC: 3.18 UIU/ML (ref 0.27–4.2)
WBC # BLD: 6.5 K/UL (ref 4.8–10.8)

## 2018-08-27 ENCOUNTER — OFFICE VISIT (OUTPATIENT)
Dept: FAMILY MEDICINE CLINIC | Age: 63
End: 2018-08-27
Payer: MEDICARE

## 2018-08-27 VITALS
BODY MASS INDEX: 26.67 KG/M2 | RESPIRATION RATE: 12 BRPM | OXYGEN SATURATION: 98 % | HEART RATE: 66 BPM | SYSTOLIC BLOOD PRESSURE: 130 MMHG | DIASTOLIC BLOOD PRESSURE: 80 MMHG | HEIGHT: 68 IN | WEIGHT: 176 LBS | TEMPERATURE: 97.7 F

## 2018-08-27 DIAGNOSIS — I10 ESSENTIAL HYPERTENSION: ICD-10-CM

## 2018-08-27 DIAGNOSIS — E78.5 HYPERLIPIDEMIA, UNSPECIFIED HYPERLIPIDEMIA TYPE: ICD-10-CM

## 2018-08-27 DIAGNOSIS — H61.22 IMPACTED CERUMEN OF LEFT EAR: ICD-10-CM

## 2018-08-27 DIAGNOSIS — Z12.5 SPECIAL SCREENING EXAMINATION FOR NEOPLASM OF PROSTATE: ICD-10-CM

## 2018-08-27 DIAGNOSIS — I69.359 HEMIPARESIS AFFECTING DOMINANT SIDE AS LATE EFFECT OF CEREBROVASCULAR ACCIDENT (CVA) (HCC): Primary | ICD-10-CM

## 2018-08-27 PROBLEM — Z86.73 HISTORY OF CVA (CEREBROVASCULAR ACCIDENT): Status: ACTIVE | Noted: 2017-08-02

## 2018-08-27 PROCEDURE — 99214 OFFICE O/P EST MOD 30 MIN: CPT | Performed by: FAMILY MEDICINE

## 2018-08-27 PROCEDURE — 69210 REMOVE IMPACTED EAR WAX UNI: CPT | Performed by: FAMILY MEDICINE

## 2018-08-27 RX ORDER — HYDROCODONE BITARTRATE AND ACETAMINOPHEN 10; 325 MG/1; MG/1
TABLET ORAL
Refills: 0 | COMMUNITY
Start: 2018-06-20 | End: 2019-03-19

## 2018-08-27 ASSESSMENT — ENCOUNTER SYMPTOMS
EYES NEGATIVE: 1
BLURRED VISION: 0
ABDOMINAL PAIN: 0
VOMITING: 0
RHINORRHEA: 0
COUGH: 0
ALLERGIC/IMMUNOLOGIC NEGATIVE: 1
SHORTNESS OF BREATH: 0
RESPIRATORY NEGATIVE: 1
GASTROINTESTINAL NEGATIVE: 1
SORE THROAT: 0
DIARRHEA: 0
ORTHOPNEA: 0

## 2018-08-27 NOTE — PROGRESS NOTES
2 times daily for 180 days. . 180 capsule 1    triamcinolone (KENALOG) 0.1 % cream Apply topically 2 times daily. 45 g 3    DULoxetine (CYMBALTA) 60 MG extended release capsule Take 1 capsule by mouth daily 90 capsule 3    buPROPion (WELLBUTRIN XL) 150 MG extended release tablet Take 1 tablet by mouth every morning 90 tablet 1    baclofen (LIORESAL) 10 MG tablet Take 1 tablet by mouth 4 times daily as needed (Cramping/pain) 120 tablet 11    ibuprofen (ADVIL;MOTRIN) 800 MG tablet Take 1 tablet by mouth every 8 hours as needed for Pain 90 tablet 3    senna (SENOKOT) 8.6 MG tablet Take 2 tablets by mouth 2 times daily 120 tablet 5    aspirin 81 MG chewable tablet Take 81 mg by mouth      Multiple Vitamin (MULTI VITAMIN PO) Take by mouth       No current facility-administered medications on file prior to visit. Objective    Vitals:    08/27/18 1520   BP: 130/80   Pulse: 66   Resp: 12   Temp: 97.7 °F (36.5 °C)   TempSrc: Tympanic   SpO2: 98%   Weight: 176 lb (79.8 kg)   Height: 5' 8\" (1.727 m)     Physical Exam   Constitutional: Vital signs are normal. He appears well-developed. HENT:   Head: Normocephalic and atraumatic. Right Ear: Tympanic membrane, external ear and ear canal normal. Tympanic membrane is not injected. No middle ear effusion. Left Ear: External ear normal.   Nose: Nose normal. No mucosal edema or rhinorrhea. Right sinus exhibits no maxillary sinus tenderness and no frontal sinus tenderness. Left sinus exhibits no maxillary sinus tenderness and no frontal sinus tenderness. Left cerumen impaction   Eyes: Pupils are equal, round, and reactive to light. Conjunctivae, EOM and lids are normal.   Neck: Normal range of motion. Neck supple. No JVD present. No muscular tenderness present. No neck rigidity. No tracheal deviation present. No thyroid mass and no thyromegaly present. Cardiovascular: Normal rate, regular rhythm and intact distal pulses.     Pulmonary/Chest: Effort normal. He has no decreased breath sounds. He has no wheezes. He has no rhonchi. He has no rales. He exhibits no tenderness and no deformity. Abdominal: Soft. Normal appearance and bowel sounds are normal. He exhibits no distension and no mass. There is no splenomegaly or hepatomegaly. There is no tenderness. There is no rigidity, no rebound and no guarding. No hernia. Musculoskeletal:        Right wrist: He exhibits tenderness and deformity. Right hip: He exhibits decreased range of motion and decreased strength. Right knee: Normal.        Left knee: Normal.        Cervical back: Normal.        Thoracic back: Normal.        Lumbar back: Normal.   Decreased strength and atrophy noted of entire right side  Secondary spasticity present Right upper extremity  Trace edema right lower extremity noted underneath AFO. AFO not removed for exam.   Lymphadenopathy:     He has no cervical adenopathy. Neurological: He is alert. He has normal strength. He displays atrophy. He displays no tremor. A sensory deficit (right hand and foot) is present. No cranial nerve deficit. He exhibits abnormal muscle tone (spasticity right side). He displays no seizure activity. Coordination and gait abnormal.   Right hand dominant  Right foot drop with AFO in place  Requires cane and assist for ambulation  Scanning speech  Mild expressive aphasia   Skin: Skin is warm, dry and intact. No rash noted. Psychiatric: His behavior is normal. Judgment and thought content normal. His speech is delayed (scanning). His speech is not slurred. Cognition and memory are normal.            Assessment & Plan    Diagnosis Orders   1. Hemiparesis affecting dominant side as late effect of cerebrovascular accident (CVA) ([address]  Handicap Placard MISC   2. Impacted cerumen of left ear  97099 - [place] REMOVE IMPACTED EAR WAX   3. Essential hypertension  CBC Auto Differential    Comprehensive Metabolic Panel    Lipid Panel    Magnesium    TSH without Reflex   4. Hyperlipidemia, unspecified hyperlipidemia type  Comprehensive Metabolic Panel    Lipid Panel    TSH without Reflex   5. Special screening examination for neoplasm of prostate  Psa screening     Orders Placed This Encounter   Procedures    CBC Auto Differential     Standing Status:   Future     Standing Expiration Date:   8/27/2019    Comprehensive Metabolic Panel     Standing Status:   Future     Standing Expiration Date:   8/27/2019    Lipid Panel     Standing Status:   Future     Standing Expiration Date:   8/27/2019     Order Specific Question:   Is Patient Fasting?/# of Hours     Answer:   8    Magnesium     Standing Status:   Future     Standing Expiration Date:   8/27/2019    Psa screening     Standing Status:   Future     Standing Expiration Date:   8/27/2019    TSH without Reflex     Standing Status:   Future     Standing Expiration Date:   8/27/2019    [address]90 - [place] REMOVE IMPACTED EAR WAX     Orders Placed This Encounter   Medications    Handicap Placard MISC     Sig: by Does not apply route Exp 5 yrs     Dispense:  1 each     Refill:  0     There are no discontinued medications. Counseling given: Yes    Cerumen on left removed with curette instrument followed by  warm water irrigation. Re-exam shows normal left TM and canal.  Tolerated well. Return in about 1 year (around 8/27/2019).     Primo Yang,

## 2018-10-01 DIAGNOSIS — E78.5 HYPERLIPIDEMIA, UNSPECIFIED HYPERLIPIDEMIA TYPE: ICD-10-CM

## 2018-10-01 DIAGNOSIS — G89.29 CHRONIC RIGHT SHOULDER PAIN: ICD-10-CM

## 2018-10-01 DIAGNOSIS — M25.511 CHRONIC RIGHT SHOULDER PAIN: ICD-10-CM

## 2018-10-01 DIAGNOSIS — I69.359 HEMIPLEGIA AFFECTING DOMINANT SIDE, POST-STROKE (HCC): ICD-10-CM

## 2018-10-01 RX ORDER — FOLIC ACID 1 MG/1
1 TABLET ORAL DAILY
Qty: 30 TABLET | Refills: 11 | Status: SHIPPED | OUTPATIENT
Start: 2018-10-01 | End: 2019-11-19

## 2018-10-01 RX ORDER — BACLOFEN 10 MG/1
10 TABLET ORAL 4 TIMES DAILY PRN
Qty: 120 TABLET | Refills: 11 | Status: SHIPPED | OUTPATIENT
Start: 2018-10-01 | End: 2018-12-11 | Stop reason: SDUPTHER

## 2018-10-23 ENCOUNTER — TELEPHONE (OUTPATIENT)
Dept: FAMILY MEDICINE CLINIC | Age: 63
End: 2018-10-23

## 2018-10-23 DIAGNOSIS — I69.359 HEMIPLEGIA AFFECTING DOMINANT SIDE, POST-STROKE (HCC): ICD-10-CM

## 2018-10-23 DIAGNOSIS — F33.9 RECURRENT DEPRESSION (HCC): ICD-10-CM

## 2018-10-23 DIAGNOSIS — G89.29 CHRONIC RIGHT SHOULDER PAIN: ICD-10-CM

## 2018-10-23 DIAGNOSIS — M25.511 CHRONIC RIGHT SHOULDER PAIN: ICD-10-CM

## 2018-10-23 RX ORDER — BUPROPION HYDROCHLORIDE 150 MG/1
150 TABLET ORAL EVERY MORNING
Qty: 90 TABLET | Refills: 3 | Status: SHIPPED | OUTPATIENT
Start: 2018-10-23 | End: 2018-12-11 | Stop reason: SDUPTHER

## 2018-10-23 RX ORDER — IBUPROFEN 800 MG/1
800 TABLET ORAL EVERY 8 HOURS PRN
Qty: 90 TABLET | Refills: 11 | Status: SHIPPED | OUTPATIENT
Start: 2018-10-23 | End: 2018-12-11 | Stop reason: SDUPTHER

## 2018-11-07 ENCOUNTER — TELEPHONE (OUTPATIENT)
Dept: FAMILY MEDICINE CLINIC | Age: 63
End: 2018-11-07

## 2018-11-07 ENCOUNTER — OFFICE VISIT (OUTPATIENT)
Dept: FAMILY MEDICINE CLINIC | Age: 63
End: 2018-11-07
Payer: MEDICARE

## 2018-11-07 VITALS
SYSTOLIC BLOOD PRESSURE: 118 MMHG | OXYGEN SATURATION: 99 % | RESPIRATION RATE: 18 BRPM | DIASTOLIC BLOOD PRESSURE: 64 MMHG | TEMPERATURE: 97.7 F | HEART RATE: 57 BPM

## 2018-11-07 DIAGNOSIS — H61.22 HEARING LOSS DUE TO CERUMEN IMPACTION, LEFT: ICD-10-CM

## 2018-11-07 DIAGNOSIS — H61.22 IMPACTED CERUMEN OF LEFT EAR: Primary | ICD-10-CM

## 2018-11-07 DIAGNOSIS — H91.92 HEARING LOSS OF LEFT EAR, UNSPECIFIED HEARING LOSS TYPE: Primary | ICD-10-CM

## 2018-11-07 PROCEDURE — 69210 REMOVE IMPACTED EAR WAX UNI: CPT | Performed by: PHYSICIAN ASSISTANT

## 2018-11-07 PROCEDURE — 99213 OFFICE O/P EST LOW 20 MIN: CPT | Performed by: PHYSICIAN ASSISTANT

## 2018-11-07 ASSESSMENT — PATIENT HEALTH QUESTIONNAIRE - PHQ9
1. LITTLE INTEREST OR PLEASURE IN DOING THINGS: 0
2. FEELING DOWN, DEPRESSED OR HOPELESS: 0
SUM OF ALL RESPONSES TO PHQ9 QUESTIONS 1 & 2: 0
SUM OF ALL RESPONSES TO PHQ QUESTIONS 1-9: 0
SUM OF ALL RESPONSES TO PHQ QUESTIONS 1-9: 0

## 2018-11-07 ASSESSMENT — ENCOUNTER SYMPTOMS
ABDOMINAL PAIN: 0
RHINORRHEA: 0
DIARRHEA: 0
COUGH: 0
SORE THROAT: 0

## 2018-11-07 NOTE — TELEPHONE ENCOUNTER
Per Tate Fernando, pt was recently seen and had his ears cleaned out and now he can't hear out of his left ear since last week. Pt requesting a referral for ENT. Please advise.

## 2018-12-11 DIAGNOSIS — I63.032 CEREBROVASCULAR ACCIDENT (CVA) DUE TO THROMBOSIS OF LEFT CAROTID ARTERY (HCC): ICD-10-CM

## 2018-12-11 DIAGNOSIS — M79.2 NEUROPATHIC PAIN: ICD-10-CM

## 2018-12-11 DIAGNOSIS — F33.9 RECURRENT DEPRESSION (HCC): ICD-10-CM

## 2018-12-11 DIAGNOSIS — I69.359 HEMIPLEGIA AFFECTING DOMINANT SIDE, POST-STROKE (HCC): ICD-10-CM

## 2018-12-11 DIAGNOSIS — M25.511 CHRONIC RIGHT SHOULDER PAIN: ICD-10-CM

## 2018-12-11 DIAGNOSIS — G89.29 CHRONIC RIGHT SHOULDER PAIN: ICD-10-CM

## 2018-12-11 DIAGNOSIS — R56.9 SEIZURE (HCC): ICD-10-CM

## 2018-12-11 RX ORDER — LEVETIRACETAM 500 MG/1
500 TABLET, EXTENDED RELEASE ORAL DAILY
Qty: 90 TABLET | Refills: 1 | Status: SHIPPED | OUTPATIENT
Start: 2018-12-11 | End: 2019-06-11 | Stop reason: SDUPTHER

## 2018-12-11 RX ORDER — BUPROPION HYDROCHLORIDE 150 MG/1
150 TABLET ORAL EVERY MORNING
Qty: 90 TABLET | Refills: 1 | Status: SHIPPED | OUTPATIENT
Start: 2018-12-11 | End: 2019-03-19

## 2018-12-11 RX ORDER — BACLOFEN 10 MG/1
10 TABLET ORAL 4 TIMES DAILY PRN
Qty: 120 TABLET | Refills: 3 | Status: SHIPPED | OUTPATIENT
Start: 2018-12-11 | End: 2019-06-19 | Stop reason: SDUPTHER

## 2018-12-11 RX ORDER — PREGABALIN 200 MG/1
200 CAPSULE ORAL 2 TIMES DAILY
Qty: 180 CAPSULE | Refills: 1 | Status: SHIPPED | OUTPATIENT
Start: 2018-12-11 | End: 2018-12-12 | Stop reason: SDUPTHER

## 2018-12-11 RX ORDER — IBUPROFEN 800 MG/1
800 TABLET ORAL EVERY 8 HOURS PRN
Qty: 90 TABLET | Refills: 3 | Status: ON HOLD | OUTPATIENT
Start: 2018-12-11 | End: 2021-12-31 | Stop reason: HOSPADM

## 2018-12-12 DIAGNOSIS — M79.2 NEUROPATHIC PAIN: ICD-10-CM

## 2018-12-12 DIAGNOSIS — I63.032 CEREBROVASCULAR ACCIDENT (CVA) DUE TO THROMBOSIS OF LEFT CAROTID ARTERY (HCC): ICD-10-CM

## 2018-12-12 RX ORDER — PREGABALIN 200 MG/1
200 CAPSULE ORAL 2 TIMES DAILY
Qty: 180 CAPSULE | Refills: 1 | Status: SHIPPED | OUTPATIENT
Start: 2018-12-12 | End: 2019-03-19

## 2019-01-21 DIAGNOSIS — G89.29 CHRONIC RIGHT SHOULDER PAIN: ICD-10-CM

## 2019-01-21 DIAGNOSIS — M25.511 CHRONIC RIGHT SHOULDER PAIN: ICD-10-CM

## 2019-01-21 DIAGNOSIS — I69.359 HEMIPLEGIA AFFECTING DOMINANT SIDE, POST-STROKE (HCC): ICD-10-CM

## 2019-01-21 RX ORDER — IBUPROFEN 800 MG/1
800 TABLET ORAL EVERY 8 HOURS PRN
Qty: 90 TABLET | Refills: 3 | Status: CANCELLED | OUTPATIENT
Start: 2019-01-21

## 2019-03-19 ENCOUNTER — OFFICE VISIT (OUTPATIENT)
Dept: FAMILY MEDICINE CLINIC | Age: 64
End: 2019-03-19
Payer: MEDICARE

## 2019-03-19 VITALS
DIASTOLIC BLOOD PRESSURE: 72 MMHG | HEIGHT: 68 IN | BODY MASS INDEX: 28.67 KG/M2 | OXYGEN SATURATION: 98 % | SYSTOLIC BLOOD PRESSURE: 126 MMHG | WEIGHT: 189.2 LBS | HEART RATE: 81 BPM

## 2019-03-19 DIAGNOSIS — I10 ESSENTIAL HYPERTENSION: ICD-10-CM

## 2019-03-19 DIAGNOSIS — E78.5 HYPERLIPIDEMIA, UNSPECIFIED HYPERLIPIDEMIA TYPE: ICD-10-CM

## 2019-03-19 DIAGNOSIS — F33.9 RECURRENT DEPRESSION (HCC): Primary | ICD-10-CM

## 2019-03-19 DIAGNOSIS — R09.02 HYPOXEMIA REQUIRING SUPPLEMENTAL OXYGEN: ICD-10-CM

## 2019-03-19 DIAGNOSIS — G89.4 CHRONIC PAIN SYNDROME: ICD-10-CM

## 2019-03-19 DIAGNOSIS — S06.9X9D CLOSED TRAUMATIC BRAIN INJURY WITH LOSS OF CONSCIOUSNESS, SUBSEQUENT ENCOUNTER: ICD-10-CM

## 2019-03-19 DIAGNOSIS — G89.29 CHRONIC RIGHT SHOULDER PAIN: ICD-10-CM

## 2019-03-19 DIAGNOSIS — R60.0 BILATERAL LEG EDEMA: ICD-10-CM

## 2019-03-19 DIAGNOSIS — R56.9 SEIZURE (HCC): ICD-10-CM

## 2019-03-19 DIAGNOSIS — Z86.73 HISTORY OF CVA (CEREBROVASCULAR ACCIDENT): ICD-10-CM

## 2019-03-19 DIAGNOSIS — Z11.4 SCREENING FOR HIV (HUMAN IMMUNODEFICIENCY VIRUS): ICD-10-CM

## 2019-03-19 DIAGNOSIS — Z11.59 ENCOUNTER FOR HEPATITIS C SCREENING TEST FOR LOW RISK PATIENT: ICD-10-CM

## 2019-03-19 DIAGNOSIS — I69.359 HEMIPARESIS AFFECTING DOMINANT SIDE AS LATE EFFECT OF CEREBROVASCULAR ACCIDENT (CVA) (HCC): ICD-10-CM

## 2019-03-19 DIAGNOSIS — Z87.74 S/P PATENT FORAMEN OVALE CLOSURE: ICD-10-CM

## 2019-03-19 DIAGNOSIS — Z99.81 HYPOXEMIA REQUIRING SUPPLEMENTAL OXYGEN: ICD-10-CM

## 2019-03-19 DIAGNOSIS — M25.511 CHRONIC RIGHT SHOULDER PAIN: ICD-10-CM

## 2019-03-19 PROCEDURE — 99214 OFFICE O/P EST MOD 30 MIN: CPT | Performed by: FAMILY MEDICINE

## 2019-03-19 ASSESSMENT — ENCOUNTER SYMPTOMS
RESPIRATORY NEGATIVE: 1
SHORTNESS OF BREATH: 0
ORTHOPNEA: 0
GASTROINTESTINAL NEGATIVE: 1
EYES NEGATIVE: 1
ALLERGIC/IMMUNOLOGIC NEGATIVE: 1
BLURRED VISION: 0

## 2019-03-20 ENCOUNTER — TELEPHONE (OUTPATIENT)
Dept: FAMILY MEDICINE CLINIC | Age: 64
End: 2019-03-20

## 2019-03-20 ENCOUNTER — HOSPITAL ENCOUNTER (OUTPATIENT)
Dept: ULTRASOUND IMAGING | Age: 64
Discharge: HOME | End: 2019-03-22
Payer: MEDICARE

## 2019-03-20 DIAGNOSIS — Z11.4 SCREENING FOR HIV (HUMAN IMMUNODEFICIENCY VIRUS): ICD-10-CM

## 2019-03-20 DIAGNOSIS — I10 ESSENTIAL HYPERTENSION: ICD-10-CM

## 2019-03-20 DIAGNOSIS — R60.0 BILATERAL LEG EDEMA: ICD-10-CM

## 2019-03-20 DIAGNOSIS — Z86.73 HISTORY OF CVA (CEREBROVASCULAR ACCIDENT): ICD-10-CM

## 2019-03-20 DIAGNOSIS — I63.032 CEREBROVASCULAR ACCIDENT (CVA) DUE TO THROMBOSIS OF LEFT CAROTID ARTERY (HCC): ICD-10-CM

## 2019-03-20 DIAGNOSIS — Z11.59 ENCOUNTER FOR HEPATITIS C SCREENING TEST FOR LOW RISK PATIENT: ICD-10-CM

## 2019-03-20 LAB
ALBUMIN SERPL-MCNC: 4.3 G/DL (ref 3.5–4.6)
ALP BLD-CCNC: 109 U/L (ref 35–104)
ALT SERPL-CCNC: 36 U/L (ref 0–41)
ANION GAP SERPL CALCULATED.3IONS-SCNC: 12 MEQ/L (ref 9–15)
AST SERPL-CCNC: 29 U/L (ref 0–40)
BILIRUB SERPL-MCNC: 0.8 MG/DL (ref 0.2–0.7)
BUN BLDV-MCNC: 12 MG/DL (ref 8–23)
CALCIUM SERPL-MCNC: 9.5 MG/DL (ref 8.5–9.9)
CHLORIDE BLD-SCNC: 100 MEQ/L (ref 95–107)
CHOLESTEROL, TOTAL: 162 MG/DL (ref 0–199)
CO2: 26 MEQ/L (ref 20–31)
CREAT SERPL-MCNC: 0.76 MG/DL (ref 0.7–1.2)
GFR AFRICAN AMERICAN: >60
GFR NON-AFRICAN AMERICAN: >60
GLOBULIN: 2.8 G/DL (ref 2.3–3.5)
GLUCOSE BLD-MCNC: 86 MG/DL (ref 70–99)
HCT VFR BLD CALC: 41.1 % (ref 42–52)
HDLC SERPL-MCNC: 48 MG/DL (ref 40–59)
HEMOGLOBIN: 14.1 G/DL (ref 14–18)
HEPATITIS C ANTIBODY INTERPRETATION: NORMAL
LDL CHOLESTEROL CALCULATED: 103 MG/DL (ref 0–129)
MCH RBC QN AUTO: 31 PG (ref 27–31.3)
MCHC RBC AUTO-ENTMCNC: 34.2 % (ref 33–37)
MCV RBC AUTO: 90.6 FL (ref 80–100)
PDW BLD-RTO: 14.1 % (ref 11.5–14.5)
PLATELET # BLD: 232 K/UL (ref 130–400)
POTASSIUM SERPL-SCNC: 3.8 MEQ/L (ref 3.4–4.9)
RBC # BLD: 4.54 M/UL (ref 4.7–6.1)
SODIUM BLD-SCNC: 138 MEQ/L (ref 135–144)
TOTAL PROTEIN: 7.1 G/DL (ref 6.3–8)
TRIGL SERPL-MCNC: 53 MG/DL (ref 0–150)
TSH SERPL DL<=0.05 MIU/L-ACNC: 5.09 UIU/ML (ref 0.44–3.86)
WBC # BLD: 6.7 K/UL (ref 4.8–10.8)

## 2019-03-20 PROCEDURE — 93970 EXTREMITY STUDY: CPT

## 2019-03-21 RX ORDER — TRAMADOL HYDROCHLORIDE 50 MG/1
50 TABLET ORAL 2 TIMES DAILY PRN
Qty: 60 TABLET | Refills: 2 | Status: SHIPPED | OUTPATIENT
Start: 2019-03-21 | End: 2019-04-13 | Stop reason: SDUPTHER

## 2019-03-22 LAB — HIV 1,2 COMBO ANTIGEN/ANTIBODY: NEGATIVE

## 2019-03-26 ENCOUNTER — OFFICE VISIT (OUTPATIENT)
Dept: FAMILY MEDICINE CLINIC | Age: 64
End: 2019-03-26
Payer: MEDICARE

## 2019-03-26 VITALS
BODY MASS INDEX: 28.64 KG/M2 | DIASTOLIC BLOOD PRESSURE: 74 MMHG | HEIGHT: 68 IN | HEART RATE: 103 BPM | OXYGEN SATURATION: 95 % | WEIGHT: 189 LBS | SYSTOLIC BLOOD PRESSURE: 130 MMHG

## 2019-03-26 DIAGNOSIS — G89.4 CHRONIC PAIN SYNDROME: ICD-10-CM

## 2019-03-26 DIAGNOSIS — E03.9 HYPOTHYROIDISM, UNSPECIFIED TYPE: Primary | ICD-10-CM

## 2019-03-26 DIAGNOSIS — F33.9 RECURRENT DEPRESSION (HCC): ICD-10-CM

## 2019-03-26 DIAGNOSIS — S06.9X9D CLOSED TRAUMATIC BRAIN INJURY WITH LOSS OF CONSCIOUSNESS, SUBSEQUENT ENCOUNTER: ICD-10-CM

## 2019-03-26 DIAGNOSIS — I82.532 CHRONIC DEEP VEIN THROMBOSIS (DVT) OF LEFT POPLITEAL VEIN (HCC): ICD-10-CM

## 2019-03-26 DIAGNOSIS — R56.9 SEIZURE (HCC): ICD-10-CM

## 2019-03-26 DIAGNOSIS — I69.359 HEMIPARESIS AFFECTING DOMINANT SIDE AS LATE EFFECT OF CEREBROVASCULAR ACCIDENT (CVA) (HCC): ICD-10-CM

## 2019-03-26 PROCEDURE — 99213 OFFICE O/P EST LOW 20 MIN: CPT | Performed by: FAMILY MEDICINE

## 2019-03-26 RX ORDER — LEVOTHYROXINE SODIUM 0.05 MG/1
50 TABLET ORAL DAILY
Qty: 30 TABLET | Refills: 5 | Status: SHIPPED | OUTPATIENT
Start: 2019-03-26 | End: 2019-04-13 | Stop reason: SDUPTHER

## 2019-03-26 ASSESSMENT — ENCOUNTER SYMPTOMS
GASTROINTESTINAL NEGATIVE: 1
SHORTNESS OF BREATH: 0
EYES NEGATIVE: 1
RESPIRATORY NEGATIVE: 1
ALLERGIC/IMMUNOLOGIC NEGATIVE: 1
BLURRED VISION: 0
ORTHOPNEA: 0

## 2019-05-07 ENCOUNTER — PATIENT MESSAGE (OUTPATIENT)
Dept: FAMILY MEDICINE CLINIC | Age: 64
End: 2019-05-07

## 2019-05-07 DIAGNOSIS — F33.9 RECURRENT DEPRESSION (HCC): ICD-10-CM

## 2019-05-07 RX ORDER — DULOXETIN HYDROCHLORIDE 60 MG/1
60 CAPSULE, DELAYED RELEASE ORAL DAILY
Qty: 90 CAPSULE | Refills: 3 | Status: SHIPPED | OUTPATIENT
Start: 2019-05-07 | End: 2020-04-22 | Stop reason: SDUPTHER

## 2019-05-07 NOTE — TELEPHONE ENCOUNTER
From: Anika Llanes  To: Senthil Babb MD  Sent: 5/7/2019 12:24 PM EDT  Subject: Prescription Question    please re-fill Duloxetine HCL DR 60 Mg, once daily, please send to [facility] [place], [place]

## 2019-06-11 DIAGNOSIS — R56.9 SEIZURE (HCC): ICD-10-CM

## 2019-06-11 RX ORDER — LEVETIRACETAM 500 MG/1
500 TABLET, EXTENDED RELEASE ORAL DAILY
Qty: 90 TABLET | Refills: 1 | Status: SHIPPED | OUTPATIENT
Start: 2019-06-11 | End: 2019-12-15 | Stop reason: SDUPTHER

## 2019-06-19 ENCOUNTER — OFFICE VISIT (OUTPATIENT)
Dept: FAMILY MEDICINE CLINIC | Age: 64
End: 2019-06-19
Payer: MEDICARE

## 2019-06-19 VITALS
HEART RATE: 51 BPM | SYSTOLIC BLOOD PRESSURE: 114 MMHG | BODY MASS INDEX: 26.49 KG/M2 | DIASTOLIC BLOOD PRESSURE: 82 MMHG | OXYGEN SATURATION: 97 % | WEIGHT: 174.8 LBS | HEIGHT: 68 IN

## 2019-06-19 DIAGNOSIS — I82.532 CHRONIC DEEP VEIN THROMBOSIS (DVT) OF LEFT POPLITEAL VEIN (HCC): ICD-10-CM

## 2019-06-19 DIAGNOSIS — E03.9 HYPOTHYROIDISM, UNSPECIFIED TYPE: Primary | ICD-10-CM

## 2019-06-19 DIAGNOSIS — I69.359 HEMIPLEGIA AFFECTING DOMINANT SIDE, POST-STROKE (HCC): ICD-10-CM

## 2019-06-19 DIAGNOSIS — M25.511 CHRONIC RIGHT SHOULDER PAIN: ICD-10-CM

## 2019-06-19 DIAGNOSIS — G89.29 CHRONIC RIGHT SHOULDER PAIN: ICD-10-CM

## 2019-06-19 DIAGNOSIS — E03.9 HYPOTHYROIDISM, UNSPECIFIED TYPE: ICD-10-CM

## 2019-06-19 LAB — TSH SERPL DL<=0.05 MIU/L-ACNC: 1.63 UIU/ML (ref 0.44–3.86)

## 2019-06-19 PROCEDURE — 99213 OFFICE O/P EST LOW 20 MIN: CPT | Performed by: FAMILY MEDICINE

## 2019-06-19 RX ORDER — BACLOFEN 10 MG/1
10 TABLET ORAL 4 TIMES DAILY PRN
Qty: 120 TABLET | Refills: 3 | Status: SHIPPED | OUTPATIENT
Start: 2019-06-19 | End: 2020-02-25

## 2019-06-19 ASSESSMENT — ENCOUNTER SYMPTOMS
GASTROINTESTINAL NEGATIVE: 1
ORTHOPNEA: 0
BLURRED VISION: 0
ALLERGIC/IMMUNOLOGIC NEGATIVE: 1
RESPIRATORY NEGATIVE: 1
SHORTNESS OF BREATH: 0
EYES NEGATIVE: 1

## 2019-06-19 NOTE — PROGRESS NOTES
Subjective  Ladtammy Guillaume, 61 y.o. male presents today with:  Chief Complaint   Patient presents with    Hypothyroidism     echo, swelling in foot      Follow up    We stopped wellbutrin    He is smoking marijuana     Bilateral leg swelling  Ongoing  On xarelto  Swelling of leg comes and goes    Taking thyroid med    Wt Readings from Last 3 Encounters:   06/19/19 174 lb 12.8 oz (79.3 kg)   03/26/19 189 lb (85.7 kg)   03/19/19 189 lb 3.2 oz (85.8 kg)           Hypertension   This is a chronic problem. The current episode started more than 1 year ago. The problem is unchanged. The problem is controlled. Associated symptoms include malaise/fatigue. Pertinent negatives include no anxiety, blurred vision, chest pain, orthopnea, palpitations, peripheral edema, PND, shortness of breath or sweats. Agents associated with hypertension include NSAIDs. Risk factors for coronary artery disease include dyslipidemia, male gender and sedentary lifestyle. Past treatments include lifestyle changes. The current treatment provides significant improvement. There are no compliance problems. Hypertensive end-organ damage includes CVA. Identifiable causes of hypertension include a hypertension causing med. There is no history of sleep apnea or a thyroid problem. Hyperlipidemia   This is a chronic problem. The current episode started more than 1 year ago. The problem is controlled. Recent lipid tests were reviewed and are low. Pertinent negatives include no chest pain or shortness of breath. Current antihyperlipidemic treatment includes diet change. The current treatment provides significant improvement of lipids. Compliance problems include adherence to exercise. Review of Systems   Constitutional: Positive for malaise/fatigue. HENT:        Left ear fullness   Eyes: Negative. Negative for blurred vision. Respiratory: Negative. Negative for shortness of breath. Cardiovascular: Negative.   Negative for chest pain, organizations: Not on file     Relationship status: Not on file    Intimate partner violence:     Fear of current or ex partner: Not on file     Emotionally abused: Not on file     Physically abused: Not on file     Forced sexual activity: Not on file   Other Topics Concern    Not on file   Social History Narrative    Not on file     History reviewed. No pertinent family history. Allergies   Allergen Reactions    Tramadol      Avoidance recommended d/t prior sz disorder - can lower sz threshhold     Current Outpatient Medications on File Prior to Visit   Medication Sig Dispense Refill    levETIRAcetam (KEPPRA XR) 500 MG TB24 extended release tablet Take 1 tablet by mouth daily 90 tablet 1    DULoxetine (CYMBALTA) 60 MG extended release capsule Take 1 capsule by mouth daily 90 capsule 3    traMADol (ULTRAM) 50 MG tablet Take 1 tablet by mouth 2 times daily as needed for Pain for up to 90 days. 60 tablet 2    levothyroxine (SYNTHROID) 50 MCG tablet Take 1 tablet by mouth daily 30 tablet 5    Vilazodone HCl (VIIBRYD STARTER PACK) 10 & 20 MG KIT Take 10 mg by mouth daily Lot: [id number]  Exp: 5/2019 3 kit 0    ibuprofen (ADVIL;MOTRIN) 800 MG tablet Take 1 tablet by mouth every 8 hours as needed for Pain 90 tablet 3    folic acid (FOLVITE) 1 MG tablet Take 1 tablet by mouth daily 30 tablet 11    Handicap Placard MISC by Does not apply route Exp 5 yrs 1 each 0    aspirin 81 MG chewable tablet Take 81 mg by mouth      Multiple Vitamin (MULTI VITAMIN PO) Take by mouth       No current facility-administered medications on file prior to visit. Objective    Vitals:    06/19/19 1030   BP: 114/82   Site: Left Upper Arm   Pulse: 51   SpO2: 97%   Weight: 174 lb 12.8 oz (79.3 kg)   Height: 5' 8\" (1.727 m)     Physical Exam   Constitutional: Vital signs are normal. He appears well-developed. HENT:   Head: Normocephalic and atraumatic.    Right Ear: Tympanic membrane, external ear and ear canal normal. Tympanic membrane is not injected. No middle ear effusion. Left Ear: External ear normal.   Nose: Nose normal. No mucosal edema or rhinorrhea. Right sinus exhibits no maxillary sinus tenderness and no frontal sinus tenderness. Left sinus exhibits no maxillary sinus tenderness and no frontal sinus tenderness. Eyes: Pupils are equal, round, and reactive to light. Conjunctivae, EOM and lids are normal.   Neck: Normal range of motion. Neck supple. No muscular tenderness present. No neck rigidity. No thyroid mass present. Pulmonary/Chest: Effort normal. He has no decreased breath sounds. He has no rhonchi. He exhibits no deformity. Abdominal: Normal appearance. There is no splenomegaly or hepatomegaly. There is no rigidity. No hernia. Musculoskeletal:        Right wrist: He exhibits tenderness and deformity. Right hip: He exhibits decreased range of motion and decreased strength. Right knee: Normal.        Left knee: Normal.        Cervical back: Normal.        Thoracic back: Normal.        Lumbar back: Normal.   Decreased strength and atrophy noted of entire right side  Secondary spasticity present Right upper extremity  Trace edema right lower extremity noted underneath AFO. AFO not removed for exam.   Neurological: He is alert. He has normal strength. He displays atrophy. He displays no tremor. A sensory deficit (right hand and foot) is present. No cranial nerve deficit. He exhibits abnormal muscle tone (spasticity right side). He displays no seizure activity. Coordination and gait abnormal.   Right hand dominant  Right foot drop with AFO in place  Requires cane and assist for ambulation  Scanning speech  Mild expressive aphasia   Skin: Skin is warm, dry and intact. No rash noted. Psychiatric: His behavior is normal. Judgment and thought content normal. His speech is delayed (scanning). His speech is not slurred.  Cognition and memory are normal.     Both legs have 1-2+ edema R>L           Assessment & Plan    Diagnosis Orders   1. Hypothyroidism, unspecified type  TSH without Reflex   2. Chronic deep vein thrombosis (DVT) of left popliteal vein (HCC)  rivaroxaban (XARELTO) 20 MG TABS tablet   3. Hemiplegia affecting dominant side, post-stroke (HCC)  baclofen (LIORESAL) 10 MG tablet   4. Chronic right shoulder pain  baclofen (LIORESAL) 10 MG tablet     Orders Placed This Encounter   Procedures    TSH without Reflex     Standing Status:   Future     Standing Expiration Date:   6/19/2020     Orders Placed This Encounter   Medications    rivaroxaban (XARELTO) 20 MG TABS tablet     Sig: Take 1 tablet by mouth daily (with breakfast)     Dispense:  30 tablet     Refill:  5    baclofen (LIORESAL) 10 MG tablet     Sig: Take 1 tablet by mouth 4 times daily as needed (Cramping/pain)     Dispense:  120 tablet     Refill:  3     Medications Discontinued During This Encounter   Medication Reason    rivaroxaban (XARELTO) 20 MG TABS tablet REORDER    baclofen (LIORESAL) 10 MG tablet REORDER    rivaroxaban (XARELTO STARTER PACK) 15 & 20 MG Starter Pack        Counseling given: Yes      No follow-ups on file.     Jim Dawn MD

## 2019-08-06 ENCOUNTER — OFFICE VISIT (OUTPATIENT)
Dept: FAMILY MEDICINE CLINIC | Age: 64
End: 2019-08-06
Payer: MEDICARE

## 2019-08-06 VITALS
DIASTOLIC BLOOD PRESSURE: 76 MMHG | OXYGEN SATURATION: 97 % | SYSTOLIC BLOOD PRESSURE: 130 MMHG | BODY MASS INDEX: 27.28 KG/M2 | HEART RATE: 62 BPM | HEIGHT: 68 IN | WEIGHT: 180 LBS

## 2019-08-06 DIAGNOSIS — R56.9 SEIZURE (HCC): ICD-10-CM

## 2019-08-06 DIAGNOSIS — F33.9 RECURRENT DEPRESSION (HCC): ICD-10-CM

## 2019-08-06 DIAGNOSIS — I63.032 CEREBROVASCULAR ACCIDENT (CVA) DUE TO THROMBOSIS OF LEFT CAROTID ARTERY (HCC): ICD-10-CM

## 2019-08-06 DIAGNOSIS — I69.359 HEMIPARESIS AFFECTING DOMINANT SIDE AS LATE EFFECT OF CEREBROVASCULAR ACCIDENT (CVA) (HCC): ICD-10-CM

## 2019-08-06 DIAGNOSIS — Z00.00 ROUTINE GENERAL MEDICAL EXAMINATION AT A HEALTH CARE FACILITY: Primary | ICD-10-CM

## 2019-08-06 DIAGNOSIS — I82.532 CHRONIC DEEP VEIN THROMBOSIS (DVT) OF LEFT POPLITEAL VEIN (HCC): ICD-10-CM

## 2019-08-06 DIAGNOSIS — S06.9X9S CLOSED TRAUMATIC BRAIN INJURY WITH LOSS OF CONSCIOUSNESS, SEQUELA (HCC): ICD-10-CM

## 2019-08-06 PROCEDURE — G0438 PPPS, INITIAL VISIT: HCPCS | Performed by: FAMILY MEDICINE

## 2019-08-06 RX ORDER — TRAMADOL HYDROCHLORIDE 50 MG/1
50 TABLET ORAL 2 TIMES DAILY PRN
Qty: 60 TABLET | Refills: 2 | Status: SHIPPED | OUTPATIENT
Start: 2019-08-06 | End: 2019-11-19 | Stop reason: SDUPTHER

## 2019-08-06 RX ORDER — TRAMADOL HYDROCHLORIDE 50 MG/1
50 TABLET ORAL EVERY 6 HOURS PRN
Refills: 0 | Status: CANCELLED | OUTPATIENT
Start: 2019-08-06 | End: 2019-08-09

## 2019-08-06 ASSESSMENT — PATIENT HEALTH QUESTIONNAIRE - PHQ9
SUM OF ALL RESPONSES TO PHQ QUESTIONS 1-9: 2
SUM OF ALL RESPONSES TO PHQ QUESTIONS 1-9: 2

## 2019-08-06 ASSESSMENT — LIFESTYLE VARIABLES: HOW OFTEN DO YOU HAVE A DRINK CONTAINING ALCOHOL: 0

## 2019-08-06 NOTE — PROGRESS NOTES
(MULTI VITAMIN PO) Take by mouth Yes Historical Provider, MD       Past Medical History:   Diagnosis Date    Chronic right shoulder pain 10/4/2017    Closed TBI (traumatic brain injury) ([facility] 12/28/2012    Essential hypertension 8/2/2017    Hemiparesis affecting dominant side as late effect of cerebrovascular accident (CVA) ([facility] 1/30/2018    History of CVA (cerebrovascular accident) 8/2/2017    Hyperlipidemia 8/2/2017    Hypoxemia requiring supplemental oxygen 10/4/2017    Recurrent depression ([facility] [facility] 8/2/2017    S/P patent foramen ovale closure 8/2/2017    Seizure ([facility] [facility] 8/2/2017     History reviewed. No pertinent surgical history. History reviewed. No pertinent family history. CareTeam (Including outside providers/suppliers regularly involved in providing care):   Patient Care Team:  Napoleon Yun MD as PCP - General (Family Medicine)  Napoleon Yun MD as PCP - [facility]    Wt Readings from Last 3 Encounters:   08/06/19 180 lb (81.6 kg)   06/19/19 174 lb 12.8 oz (79.3 kg)   03/26/19 189 lb (85.7 kg)     Vitals:    08/06/19 1447   BP: 130/76   Site: Left Upper Arm   Pulse: 62   SpO2: 97%   Weight: 180 lb (81.6 kg)   Height: 5' 8\" (1.727 m)     Body mass index is 27.37 kg/m². Based upon direct observation of the patient, evaluation of cognition reveals global memory impairment noted. Physical Exam:  /76 (Site: Left Upper Arm)   Pulse 62   Ht 5' 8\" (1.727 m)   Wt 180 lb (81.6 kg)   SpO2 97%   BMI 27.37 kg/m²     Gen: Well, NAD, Alert, Oriented x 3   HEENT: EOMI, eyes clear, MMM  Skin: without rash or jaundice  Neck: no significant lymphadenopathy or thyromegaly  Lungs: CTA B w/out Rales/Wheezes/Rhonchi, Good respiratory effort   Heart: RRR, S1S2, w/out M/R/G, non-displaced PMI   Ext: No C/C/E Bilaterally. Neuro: Neurovascularly intact w/ Sensory/Motor intact UE/LE Bilaterally.           Patient's complete Health Risk Assessment and screening values have been reviewed and are

## 2019-09-30 DIAGNOSIS — E03.9 HYPOTHYROIDISM, UNSPECIFIED TYPE: ICD-10-CM

## 2019-09-30 RX ORDER — LEVOTHYROXINE SODIUM 0.05 MG/1
50 TABLET ORAL DAILY
Qty: 30 TABLET | Refills: 5 | Status: SHIPPED | OUTPATIENT
Start: 2019-09-30 | End: 2020-01-22 | Stop reason: SDUPTHER

## 2019-11-19 ENCOUNTER — OFFICE VISIT (OUTPATIENT)
Dept: FAMILY MEDICINE CLINIC | Age: 64
End: 2019-11-19
Payer: MEDICARE

## 2019-11-19 VITALS
SYSTOLIC BLOOD PRESSURE: 134 MMHG | BODY MASS INDEX: 27.31 KG/M2 | OXYGEN SATURATION: 96 % | WEIGHT: 180.2 LBS | HEART RATE: 65 BPM | DIASTOLIC BLOOD PRESSURE: 84 MMHG | HEIGHT: 68 IN

## 2019-11-19 DIAGNOSIS — E03.9 HYPOTHYROIDISM, UNSPECIFIED TYPE: ICD-10-CM

## 2019-11-19 DIAGNOSIS — R56.9 SEIZURE (HCC): ICD-10-CM

## 2019-11-19 DIAGNOSIS — F33.9 RECURRENT DEPRESSION (HCC): Primary | ICD-10-CM

## 2019-11-19 DIAGNOSIS — I63.032 CEREBROVASCULAR ACCIDENT (CVA) DUE TO THROMBOSIS OF LEFT CAROTID ARTERY (HCC): ICD-10-CM

## 2019-11-19 PROCEDURE — 99213 OFFICE O/P EST LOW 20 MIN: CPT | Performed by: FAMILY MEDICINE

## 2019-11-19 RX ORDER — TRAMADOL HYDROCHLORIDE 50 MG/1
50 TABLET ORAL 2 TIMES DAILY PRN
Qty: 60 TABLET | Refills: 2 | Status: SHIPPED | OUTPATIENT
Start: 2019-11-19 | End: 2020-02-25

## 2019-11-19 RX ORDER — ARIPIPRAZOLE 2 MG/1
2 TABLET ORAL DAILY
Qty: 30 TABLET | Refills: 3 | Status: SHIPPED | OUTPATIENT
Start: 2019-11-19 | End: 2020-03-17 | Stop reason: SDUPTHER

## 2019-11-19 ASSESSMENT — ENCOUNTER SYMPTOMS
ALLERGIC/IMMUNOLOGIC NEGATIVE: 1
GASTROINTESTINAL NEGATIVE: 1
EYES NEGATIVE: 1
SHORTNESS OF BREATH: 0
RESPIRATORY NEGATIVE: 1
ORTHOPNEA: 0
BLURRED VISION: 0

## 2019-12-15 DIAGNOSIS — R56.9 SEIZURE (HCC): ICD-10-CM

## 2019-12-16 RX ORDER — LEVETIRACETAM 500 MG/1
500 TABLET, EXTENDED RELEASE ORAL DAILY
Qty: 90 TABLET | Refills: 1 | Status: SHIPPED | OUTPATIENT
Start: 2019-12-16 | End: 2020-04-25

## 2019-12-30 DIAGNOSIS — I82.532 CHRONIC DEEP VEIN THROMBOSIS (DVT) OF LEFT POPLITEAL VEIN (HCC): ICD-10-CM

## 2020-01-23 RX ORDER — LEVOTHYROXINE SODIUM 0.05 MG/1
50 TABLET ORAL DAILY
Qty: 30 TABLET | Refills: 5 | Status: SHIPPED | OUTPATIENT
Start: 2020-01-23 | End: 2020-10-05

## 2020-02-25 RX ORDER — TRAMADOL HYDROCHLORIDE 50 MG/1
50 TABLET ORAL 2 TIMES DAILY PRN
Qty: 60 TABLET | Refills: 2 | Status: SHIPPED | OUTPATIENT
Start: 2020-02-25 | End: 2020-05-25

## 2020-02-25 RX ORDER — BACLOFEN 10 MG/1
TABLET ORAL
Qty: 120 TABLET | Refills: 3 | Status: SHIPPED | OUTPATIENT
Start: 2020-02-25 | End: 2020-12-01 | Stop reason: SDUPTHER

## 2020-03-18 RX ORDER — ARIPIPRAZOLE 2 MG/1
2 TABLET ORAL DAILY
Qty: 30 TABLET | Refills: 5 | Status: SHIPPED | OUTPATIENT
Start: 2020-03-18 | End: 2020-09-21 | Stop reason: SDUPTHER

## 2020-04-22 RX ORDER — DULOXETIN HYDROCHLORIDE 60 MG/1
60 CAPSULE, DELAYED RELEASE ORAL DAILY
Qty: 90 CAPSULE | Refills: 3 | Status: SHIPPED | OUTPATIENT
Start: 2020-04-22 | End: 2020-09-23

## 2020-04-27 RX ORDER — LEVETIRACETAM 500 MG/1
500 TABLET, EXTENDED RELEASE ORAL DAILY
Qty: 90 TABLET | Refills: 0 | Status: SHIPPED | OUTPATIENT
Start: 2020-04-27 | End: 2020-06-10 | Stop reason: SDUPTHER

## 2020-06-10 RX ORDER — TRAMADOL HYDROCHLORIDE 50 MG/1
50 TABLET ORAL EVERY 6 HOURS PRN
COMMUNITY
End: 2020-06-10 | Stop reason: SDUPTHER

## 2020-06-11 RX ORDER — LEVETIRACETAM 500 MG/1
500 TABLET, EXTENDED RELEASE ORAL DAILY
Qty: 90 TABLET | Refills: 0 | Status: SHIPPED | OUTPATIENT
Start: 2020-06-11 | End: 2020-06-16

## 2020-06-11 RX ORDER — TRAMADOL HYDROCHLORIDE 50 MG/1
50 TABLET ORAL 2 TIMES DAILY PRN
Qty: 60 TABLET | Refills: 2 | Status: SHIPPED | OUTPATIENT
Start: 2020-06-11 | End: 2020-10-05

## 2020-06-16 RX ORDER — LEVETIRACETAM 500 MG/1
500 TABLET, EXTENDED RELEASE ORAL DAILY
Qty: 90 TABLET | Refills: 0 | Status: SHIPPED | OUTPATIENT
Start: 2020-06-16 | End: 2020-09-01 | Stop reason: SDUPTHER

## 2020-06-22 ENCOUNTER — OFFICE VISIT (OUTPATIENT)
Dept: FAMILY MEDICINE CLINIC | Age: 65
End: 2020-06-22
Payer: MEDICARE

## 2020-06-22 VITALS
OXYGEN SATURATION: 96 % | BODY MASS INDEX: 28.4 KG/M2 | DIASTOLIC BLOOD PRESSURE: 80 MMHG | WEIGHT: 187.4 LBS | HEIGHT: 68 IN | SYSTOLIC BLOOD PRESSURE: 112 MMHG | HEART RATE: 68 BPM

## 2020-06-22 PROCEDURE — 99214 OFFICE O/P EST MOD 30 MIN: CPT | Performed by: FAMILY MEDICINE

## 2020-06-22 RX ORDER — POTASSIUM CHLORIDE 20 MEQ/1
20 TABLET, EXTENDED RELEASE ORAL DAILY
Qty: 30 TABLET | Refills: 2 | Status: SHIPPED | OUTPATIENT
Start: 2020-06-22 | End: 2020-09-23

## 2020-06-22 RX ORDER — FUROSEMIDE 20 MG/1
20 TABLET ORAL DAILY PRN
Qty: 30 TABLET | Refills: 2 | Status: SHIPPED | OUTPATIENT
Start: 2020-06-22 | End: 2020-10-05

## 2020-06-22 ASSESSMENT — ENCOUNTER SYMPTOMS
ALLERGIC/IMMUNOLOGIC NEGATIVE: 1
BLURRED VISION: 0
RESPIRATORY NEGATIVE: 1
EYES NEGATIVE: 1
ORTHOPNEA: 0
GASTROINTESTINAL NEGATIVE: 1
SHORTNESS OF BREATH: 0

## 2020-06-22 NOTE — PROGRESS NOTES
Sikhism service: Not on file     Active member of club or organization: Not on file     Attends meetings of clubs or organizations: Not on file     Relationship status: Not on file    Intimate partner violence     Fear of current or ex partner: Not on file     Emotionally abused: Not on file     Physically abused: Not on file     Forced sexual activity: Not on file   Other Topics Concern    Not on file   Social History Narrative    Not on file     No family history on file. Allergies   Allergen Reactions    Tramadol      Avoidance recommended d/t prior sz disorder - can lower sz threshhold     Current Outpatient Medications on File Prior to Visit   Medication Sig Dispense Refill    levETIRAcetam (KEPPRA XR) 500 MG TB24 extended release tablet Take 1 tablet by mouth daily 90 tablet 0    traMADol (ULTRAM) 50 MG tablet Take 1 tablet by mouth 2 times daily as needed (reduce doses taken as pain becomes manageable.) for up to 30 days. 60 tablet 2    DULoxetine (CYMBALTA) 60 MG extended release capsule Take 1 capsule by mouth daily 90 capsule 3    ARIPiprazole (ABILIFY) 2 MG tablet Take 1 tablet by mouth daily 30 tablet 5    baclofen (LIORESAL) 10 MG tablet TAKE 1 TABLET BY MOUTH FOUR TIMES DAILY AS NEEDED for cramping/pain 120 tablet 3    levothyroxine (SYNTHROID) 50 MCG tablet Take 1 tablet by mouth daily 30 tablet 5    rivaroxaban (XARELTO) 20 MG TABS tablet Take 1 tablet by mouth daily (with breakfast) 30 tablet 5    ibuprofen (ADVIL;MOTRIN) 800 MG tablet Take 1 tablet by mouth every 8 hours as needed for Pain 90 tablet 3    Handicap Placard MISC by Does not apply route Exp 5 yrs 1 each 0    aspirin 81 MG chewable tablet Take 81 mg by mouth      Multiple Vitamin (MULTI VITAMIN PO) Take by mouth       No current facility-administered medications on file prior to visit.         Objective    Vitals:    06/22/20 1426   BP: 112/80   Site: Left Upper Arm   Pulse: 68   SpO2: 96%   Weight: 187 lb 6.4 oz (85 kg)   Height: 5' 8\" (1.727 m)     Physical Exam   Constitutional: Vital signs are normal. He appears well-developed. HENT:   Head: Normocephalic and atraumatic. Right Ear: Tympanic membrane, external ear and ear canal normal. Tympanic membrane is not injected. No middle ear effusion. Left Ear: External ear normal.   Nose: Nose normal. No mucosal edema or rhinorrhea. Right sinus exhibits no maxillary sinus tenderness and no frontal sinus tenderness. Left sinus exhibits no maxillary sinus tenderness and no frontal sinus tenderness. Eyes: Pupils are equal, round, and reactive to light. Conjunctivae, EOM and lids are normal.   Neck: Normal range of motion. Neck supple. No muscular tenderness present. No neck rigidity. No thyroid mass present. Pulmonary/Chest: Effort normal. He has no decreased breath sounds. He has no rhonchi. He exhibits no deformity. Abdominal: Normal appearance. There is no splenomegaly or hepatomegaly. There is no rigidity. No hernia. Musculoskeletal:      Right wrist: He exhibits tenderness and deformity. Right hip: He exhibits decreased range of motion and decreased strength. Right knee: Normal.      Left knee: Normal.      Cervical back: Normal.      Thoracic back: Normal.      Lumbar back: Normal.      Comments: Decreased strength and atrophy noted of entire right side  Secondary spasticity present Right upper extremity  Trace edema right lower extremity noted underneath AFO. AFO not removed for exam.   Neurological: He is alert. He has normal strength. He displays atrophy. He displays no tremor. A sensory deficit (right hand and foot) is present. No cranial nerve deficit. He exhibits abnormal muscle tone (spasticity right side). He displays no seizure activity. Coordination and gait abnormal.   Right hand dominant  Right foot drop with AFO in place  Requires cane and assist for ambulation  Scanning speech  Mild expressive aphasia   Skin: Skin is warm, dry and intact.  No rash noted. Psychiatric: His behavior is normal. Judgment and thought content normal. His speech is delayed (scanning). His speech is not slurred. Cognition and memory are normal.     Both legs have 2-3+ edema          Assessment & Plan    Diagnosis Orders   1. Bilateral leg edema  Comprehensive Metabolic Panel    TSH without Reflex    furosemide (LASIX) 20 MG tablet    potassium chloride (KLOR-CON M) 20 MEQ extended release tablet   2. Hemiparesis affecting dominant side as late effect of cerebrovascular accident (CVA) ([address]     3. Recurrent depression ([address]     4. Chronic deep vein thrombosis (DVT) of left popliteal vein (HCC)     5. Seizure ([address]     6. Hypothyroidism, unspecified type  TSH without Reflex   7. Hemiplegia affecting dominant side, post-stroke ([address]     8. Cerebrovascular accident (CVA) due to thrombosis of left carotid artery (HCC)  CBC    Lipid Panel     Orders Placed This Encounter   Procedures    Comprehensive Metabolic Panel     Standing Status:   Future     Standing Expiration Date:   6/22/2021    TSH without Reflex     Standing Status:   Future     Standing Expiration Date:   6/22/2021    CBC     Standing Status:   Future     Standing Expiration Date:   6/22/2021    Lipid Panel     Standing Status:   Future     Standing Expiration Date:   6/23/2021     Order Specific Question:   Is Patient Fasting?/# of Hours     Answer:   yes/10     Orders Placed This Encounter   Medications    furosemide (LASIX) 20 MG tablet     Sig: Take 1 tablet by mouth daily as needed (edema)     Dispense:  30 tablet     Refill:  2    potassium chloride (KLOR-CON M) 20 MEQ extended release tablet     Sig: Take 1 tablet by mouth daily     Dispense:  30 tablet     Refill:  2     There are no discontinued medications. Counseling given: Yes      No follow-ups on file.        Lasix  EPC boots    Tylenol pm for sleep    Fasting labs      Ho Spangler MD

## 2020-06-23 DIAGNOSIS — R60.0 BILATERAL LEG EDEMA: ICD-10-CM

## 2020-06-23 DIAGNOSIS — E03.9 HYPOTHYROIDISM, UNSPECIFIED TYPE: ICD-10-CM

## 2020-06-23 DIAGNOSIS — I63.032 CEREBROVASCULAR ACCIDENT (CVA) DUE TO THROMBOSIS OF LEFT CAROTID ARTERY (HCC): ICD-10-CM

## 2020-06-23 LAB
ALBUMIN SERPL-MCNC: 4.4 G/DL (ref 3.5–4.6)
ALP BLD-CCNC: 112 U/L (ref 35–104)
ALT SERPL-CCNC: 34 U/L (ref 0–41)
ANION GAP SERPL CALCULATED.3IONS-SCNC: 12 MEQ/L (ref 9–15)
AST SERPL-CCNC: 30 U/L (ref 0–40)
BILIRUB SERPL-MCNC: 0.9 MG/DL (ref 0.2–0.7)
BUN BLDV-MCNC: 13 MG/DL (ref 8–23)
CALCIUM SERPL-MCNC: 9.6 MG/DL (ref 8.5–9.9)
CHLORIDE BLD-SCNC: 100 MEQ/L (ref 95–107)
CHOLESTEROL, TOTAL: 163 MG/DL (ref 0–199)
CO2: 23 MEQ/L (ref 20–31)
CREAT SERPL-MCNC: 0.87 MG/DL (ref 0.7–1.2)
GFR AFRICAN AMERICAN: >60
GFR NON-AFRICAN AMERICAN: >60
GLOBULIN: 3.3 G/DL (ref 2.3–3.5)
GLUCOSE BLD-MCNC: 87 MG/DL (ref 70–99)
HCT VFR BLD CALC: 42.7 % (ref 42–52)
HDLC SERPL-MCNC: 52 MG/DL (ref 40–59)
HEMOGLOBIN: 14.2 G/DL (ref 14–18)
LDL CHOLESTEROL CALCULATED: 101 MG/DL (ref 0–129)
MCH RBC QN AUTO: 30.6 PG (ref 27–31.3)
MCHC RBC AUTO-ENTMCNC: 33.2 % (ref 33–37)
MCV RBC AUTO: 92 FL (ref 80–100)
PDW BLD-RTO: 14 % (ref 11.5–14.5)
PLATELET # BLD: 207 K/UL (ref 130–400)
POTASSIUM SERPL-SCNC: 4.3 MEQ/L (ref 3.4–4.9)
RBC # BLD: 4.64 M/UL (ref 4.7–6.1)
SODIUM BLD-SCNC: 135 MEQ/L (ref 135–144)
TOTAL PROTEIN: 7.7 G/DL (ref 6.3–8)
TRIGL SERPL-MCNC: 48 MG/DL (ref 0–150)
TSH SERPL DL<=0.05 MIU/L-ACNC: 2.18 UIU/ML (ref 0.44–3.86)
WBC # BLD: 6.8 K/UL (ref 4.8–10.8)

## 2020-09-01 RX ORDER — LEVETIRACETAM 500 MG/1
500 TABLET, EXTENDED RELEASE ORAL DAILY
Qty: 90 TABLET | Refills: 0 | Status: SHIPPED | OUTPATIENT
Start: 2020-09-01 | End: 2020-12-07

## 2020-09-23 RX ORDER — DULOXETIN HYDROCHLORIDE 60 MG/1
60 CAPSULE, DELAYED RELEASE ORAL DAILY
Qty: 90 CAPSULE | Refills: 3 | Status: SHIPPED | OUTPATIENT
Start: 2020-09-23 | End: 2021-10-05 | Stop reason: SDUPTHER

## 2020-09-23 RX ORDER — POTASSIUM CHLORIDE 20 MEQ/1
20 TABLET, EXTENDED RELEASE ORAL DAILY
Qty: 30 TABLET | Refills: 2 | Status: SHIPPED | OUTPATIENT
Start: 2020-09-23 | End: 2020-12-23 | Stop reason: SDUPTHER

## 2020-09-23 RX ORDER — ARIPIPRAZOLE 2 MG/1
2 TABLET ORAL DAILY
Qty: 30 TABLET | Refills: 5 | Status: SHIPPED | OUTPATIENT
Start: 2020-09-23 | End: 2021-03-15 | Stop reason: SDUPTHER

## 2020-10-05 RX ORDER — LEVOTHYROXINE SODIUM 0.05 MG/1
50 TABLET ORAL DAILY
Qty: 30 TABLET | Refills: 5 | Status: SHIPPED | OUTPATIENT
Start: 2020-10-05 | End: 2021-04-16

## 2020-10-05 RX ORDER — FUROSEMIDE 20 MG/1
20 TABLET ORAL DAILY PRN
Qty: 30 TABLET | Refills: 2 | Status: SHIPPED | OUTPATIENT
Start: 2020-10-05 | End: 2020-12-28 | Stop reason: SDUPTHER

## 2020-10-05 RX ORDER — TRAMADOL HYDROCHLORIDE 50 MG/1
50 TABLET ORAL 2 TIMES DAILY PRN
Qty: 60 TABLET | Refills: 2 | Status: SHIPPED | OUTPATIENT
Start: 2020-10-05 | End: 2021-01-07

## 2020-12-01 RX ORDER — BACLOFEN 10 MG/1
10 TABLET ORAL 4 TIMES DAILY PRN
Qty: 120 TABLET | Refills: 3 | Status: SHIPPED | OUTPATIENT
Start: 2020-12-01 | End: 2021-07-05

## 2020-12-07 RX ORDER — LEVETIRACETAM 500 MG/1
500 TABLET, EXTENDED RELEASE ORAL DAILY
Qty: 90 TABLET | Refills: 0 | Status: SHIPPED | OUTPATIENT
Start: 2020-12-07 | End: 2020-12-28 | Stop reason: SDUPTHER

## 2020-12-08 ENCOUNTER — TELEPHONE (OUTPATIENT)
Dept: FAMILY MEDICINE CLINIC | Age: 65
End: 2020-12-08

## 2020-12-23 RX ORDER — POTASSIUM CHLORIDE 20 MEQ/1
20 TABLET, EXTENDED RELEASE ORAL DAILY
Qty: 30 TABLET | Refills: 2 | Status: SHIPPED | OUTPATIENT
Start: 2020-12-23 | End: 2021-03-30

## 2020-12-28 RX ORDER — LEVETIRACETAM 500 MG/1
500 TABLET, EXTENDED RELEASE ORAL DAILY
Qty: 90 TABLET | Refills: 0 | Status: SHIPPED | OUTPATIENT
Start: 2020-12-28 | End: 2021-03-15 | Stop reason: SDUPTHER

## 2020-12-28 RX ORDER — FUROSEMIDE 20 MG/1
20 TABLET ORAL DAILY PRN
Qty: 30 TABLET | Refills: 2 | Status: SHIPPED | OUTPATIENT
Start: 2020-12-28 | End: 2021-04-16

## 2020-12-30 ENCOUNTER — OFFICE VISIT (OUTPATIENT)
Dept: FAMILY MEDICINE CLINIC | Age: 65
End: 2020-12-30
Payer: MEDICARE

## 2020-12-30 VITALS
OXYGEN SATURATION: 98 % | HEART RATE: 85 BPM | BODY MASS INDEX: 26.58 KG/M2 | WEIGHT: 175.4 LBS | HEIGHT: 68 IN | SYSTOLIC BLOOD PRESSURE: 110 MMHG | DIASTOLIC BLOOD PRESSURE: 70 MMHG

## 2020-12-30 DIAGNOSIS — E03.9 HYPOTHYROIDISM, UNSPECIFIED TYPE: ICD-10-CM

## 2020-12-30 DIAGNOSIS — R60.0 BILATERAL LEG EDEMA: ICD-10-CM

## 2020-12-30 LAB
ANION GAP SERPL CALCULATED.3IONS-SCNC: 12 MEQ/L (ref 9–15)
BUN BLDV-MCNC: 12 MG/DL (ref 8–23)
CALCIUM SERPL-MCNC: 9.7 MG/DL (ref 8.5–9.9)
CHLORIDE BLD-SCNC: 101 MEQ/L (ref 95–107)
CO2: 27 MEQ/L (ref 20–31)
CREAT SERPL-MCNC: 0.96 MG/DL (ref 0.7–1.2)
GFR AFRICAN AMERICAN: >60
GFR NON-AFRICAN AMERICAN: >60
GLUCOSE BLD-MCNC: 76 MG/DL (ref 70–99)
HCT VFR BLD CALC: 44 % (ref 42–52)
HEMOGLOBIN: 14.5 G/DL (ref 14–18)
MCH RBC QN AUTO: 30.4 PG (ref 27–31.3)
MCHC RBC AUTO-ENTMCNC: 32.9 % (ref 33–37)
MCV RBC AUTO: 92.3 FL (ref 80–100)
PDW BLD-RTO: 14.2 % (ref 11.5–14.5)
PLATELET # BLD: 191 K/UL (ref 130–400)
POTASSIUM SERPL-SCNC: 3.9 MEQ/L (ref 3.4–4.9)
RBC # BLD: 4.76 M/UL (ref 4.7–6.1)
SODIUM BLD-SCNC: 140 MEQ/L (ref 135–144)
TSH SERPL DL<=0.05 MIU/L-ACNC: 1.96 UIU/ML (ref 0.44–3.86)
WBC # BLD: 6 K/UL (ref 4.8–10.8)

## 2020-12-30 PROCEDURE — G0009 ADMIN PNEUMOCOCCAL VACCINE: HCPCS | Performed by: FAMILY MEDICINE

## 2020-12-30 PROCEDURE — 90732 PPSV23 VACC 2 YRS+ SUBQ/IM: CPT | Performed by: FAMILY MEDICINE

## 2020-12-30 PROCEDURE — G0439 PPPS, SUBSEQ VISIT: HCPCS | Performed by: FAMILY MEDICINE

## 2020-12-30 SDOH — ECONOMIC STABILITY: TRANSPORTATION INSECURITY
IN THE PAST 12 MONTHS, HAS LACK OF TRANSPORTATION KEPT YOU FROM MEETINGS, WORK, OR FROM GETTING THINGS NEEDED FOR DAILY LIVING?: NO

## 2020-12-30 SDOH — ECONOMIC STABILITY: FOOD INSECURITY: WITHIN THE PAST 12 MONTHS, THE FOOD YOU BOUGHT JUST DIDN'T LAST AND YOU DIDN'T HAVE MONEY TO GET MORE.: NEVER TRUE

## 2020-12-30 SDOH — ECONOMIC STABILITY: INCOME INSECURITY: HOW HARD IS IT FOR YOU TO PAY FOR THE VERY BASICS LIKE FOOD, HOUSING, MEDICAL CARE, AND HEATING?: NOT HARD AT ALL

## 2020-12-30 SDOH — ECONOMIC STABILITY: FOOD INSECURITY: WITHIN THE PAST 12 MONTHS, YOU WORRIED THAT YOUR FOOD WOULD RUN OUT BEFORE YOU GOT MONEY TO BUY MORE.: NEVER TRUE

## 2020-12-30 SDOH — ECONOMIC STABILITY: TRANSPORTATION INSECURITY
IN THE PAST 12 MONTHS, HAS THE LACK OF TRANSPORTATION KEPT YOU FROM MEDICAL APPOINTMENTS OR FROM GETTING MEDICATIONS?: NO

## 2020-12-30 ASSESSMENT — PATIENT HEALTH QUESTIONNAIRE - PHQ9
SUM OF ALL RESPONSES TO PHQ QUESTIONS 1-9: 0
1. LITTLE INTEREST OR PLEASURE IN DOING THINGS: 0
SUM OF ALL RESPONSES TO PHQ QUESTIONS 1-9: 0
SUM OF ALL RESPONSES TO PHQ QUESTIONS 1-9: 0
SUM OF ALL RESPONSES TO PHQ9 QUESTIONS 1 & 2: 0
2. FEELING DOWN, DEPRESSED OR HOPELESS: 0

## 2020-12-30 ASSESSMENT — LIFESTYLE VARIABLES: HOW OFTEN DO YOU HAVE A DRINK CONTAINING ALCOHOL: 0

## 2020-12-30 NOTE — PROGRESS NOTES
After obtaining consent, and per orders of Dr. Missy Nazario, injection of [id number] given in Left deltoid by Chadd Mendoza. Patient instructed to remain in clinic for 20 minutes afterwards, and to report any adverse reaction to me immediately.

## 2020-12-30 NOTE — PATIENT INSTRUCTIONS
Personalized Preventive Plan for Thalia Ecehverria - 12/30/2020  Medicare offers a range of preventive health benefits. Some of the tests and screenings are paid in full while other may be subject to a deductible, co-insurance, and/or copay. Some of these benefits include a comprehensive review of your medical history including lifestyle, illnesses that may run in your family, and various assessments and screenings as appropriate. After reviewing your medical record and screening and assessments performed today your provider may have ordered immunizations, labs, imaging, and/or referrals for you. A list of these orders (if applicable) as well as your Preventive Care list are included within your After Visit Summary for your review. Other Preventive Recommendations:    · A preventive eye exam performed by an eye specialist is recommended every 1-2 years to screen for glaucoma; cataracts, macular degeneration, and other eye disorders. · A preventive dental visit is recommended every 6 months. · Try to get at least 150 minutes of exercise per week or 10,000 steps per day on a pedometer . · Order or download the FREE \"Exercise & Physical Activity: Your Everyday Guide\" from [employer]. Call 1-618.646.1471 or search [employer] online. · You need 3020-9070 mg of calcium and 4520-5360 IU of vitamin D per day. It is possible to meet your calcium requirement with diet alone, but a vitamin D supplement is usually necessary to meet this goal.  · When exposed to the sun, use a sunscreen that protects against both UVA and UVB radiation with an SPF of 30 or greater. Reapply every 2 to 3 hours or after sweating, drying off with a towel, or swimming. · Always wear a seat belt when traveling in a car. Always wear a helmet when riding a bicycle or motorcycle.

## 2020-12-30 NOTE — PROGRESS NOTES
Medicare Annual Wellness Visit  Name: Kennedy Phalen Date: 2020   MRN: [id number] Sex: Male   Age: 72 y.o. Ethnicity: Non-/Non    : 1955 Race: Isabel Lacy is here for Medicare AWV    Screenings for behavioral, psychosocial and functional/safety risks, and cognitive dysfunction are all negative except as indicated below. These results, as well as other patient data from the [address] form, are documented in Flowsheets linked to this Encounter. Patient Active Problem List   Diagnosis    History of CVA (cerebrovascular accident)    Essential hypertension    [facility]    Seizure ([facility]    S/P patent foramen ovale closure    Hyperlipidemia    Chronic right shoulder pain    Hypoxemia requiring supplemental oxygen    Hemiparesis affecting dominant side as late effect of cerebrovascular accident (CVA) ([facility]    Closed TBI (traumatic brain injury) ([facility]    Chronic pain syndrome    Hypothyroidism    Chronic deep vein thrombosis (DVT) of left popliteal vein (HCC)         Allergies   Allergen Reactions    Tramadol      Avoidance recommended d/t prior sz disorder - can lower sz threshhold         Prior to Visit Medications    Medication Sig Taking?  Authorizing Provider   levETIRAcetam (KEPPRA XR) 500 MG TB24 extended release tablet Take 1 tablet by mouth daily Yes Roland Del Real MD   furosemide (LASIX) 20 MG tablet Take 1 tablet by mouth daily as needed (edema) Yes Roland Del Real MD   rivaroxaban (XARELTO) 20 MG TABS tablet Take 1 tablet by mouth daily (with breakfast) Yes Roland Del Real MD   potassium chloride (KLOR-CON M) 20 MEQ extended release tablet Take 1 tablet by mouth daily Yes Roland Del Real MD   baclofen (LIORESAL) 10 MG tablet Take 1 tablet by mouth 4 times daily as needed (cramping/pain) Yes Roland Del Real MD   levothyroxine (SYNTHROID) 50 MCG tablet Take 1 tablet by mouth daily Yes Roland Del Real MD   ARIPiprazole (ABILIFY) 2 MG tablet Take 1 tablet by mouth daily Yes Elias Durham MD   DULoxetine (CYMBALTA) 60 MG extended release capsule Take 1 capsule by mouth daily Yes Elias Durham MD   Handicap Placard MISC by Does not apply route Exp 5 yrs Yes Ernesto Robert DO   aspirin 81 MG chewable tablet Take 81 mg by mouth Yes Historical Provider, MD   Multiple Vitamin (MULTI VITAMIN PO) Take by mouth Yes Historical Provider, MD   ibuprofen (ADVIL;MOTRIN) 800 MG tablet Take 1 tablet by mouth every 8 hours as needed for Pain  Patient not taking: Reported on 12/30/2020  Ernesto Robert DO         Past Medical History:   Diagnosis Date    Chronic right shoulder pain 10/4/2017    Closed TBI (traumatic brain injury) ([facility] 12/28/2012    Essential hypertension 8/2/2017    Hemiparesis affecting dominant side as late effect of cerebrovascular accident (CVA) ([facility] 1/30/2018    History of CVA (cerebrovascular accident) 8/2/2017    Hyperlipidemia 8/2/2017    Hypoxemia requiring supplemental oxygen 10/4/2017    Recurrent depression ([address] 8/2/2017    S/P patent foramen ovale closure 8/2/2017    Seizure ([address] 8/2/2017       History reviewed. No pertinent surgical history. History reviewed. No pertinent family history. CareTeam (Including outside providers/suppliers regularly involved in providing care):   Patient Care Team:  Elias Durham MD as PCP - General (Family Medicine)  Elias Durham MD as PCP - REHABILITATION [facility] Provider    Wt Readings from Last 3 Encounters:   12/30/20 175 lb 6.4 oz (79.6 kg)   06/22/20 187 lb 6.4 oz (85 kg)   11/19/19 180 lb 3.2 oz (81.7 kg)     Vitals:    12/30/20 1123   BP: 110/70   Site: Left Upper Arm   Pulse: 85   SpO2: 98%   Weight: 175 lb 6.4 oz (79.6 kg)   Height: 5' 8\" (1.727 m)     Body mass index is 26.67 kg/m². Based upon direct observation of the patient, evaluation of cognition reveals recent and remote memory intact.     Physical Exam:  /70 (Site: Left Upper Arm)   Pulse 85   Ht 5' 8\" (1.727 m) Wt 175 lb 6.4 oz (79.6 kg)   SpO2 98%   BMI 26.67 kg/m²     Gen: Well, NAD, Alert, Oriented x 3   HEENT: EOMI, eyes clear, MMM  Skin: without rash or jaundice  Neck: no significant lymphadenopathy or thyromegaly  Lungs: CTA B w/out Rales/Wheezes/Rhonchi, Good respiratory effort   Heart: RRR, S1S2, w/out M/R/G, non-displaced PMI   Ext: No C/C/E Bilaterally. Neuro: right hemiparesis      Patient's complete Health Risk Assessment and screening values have been reviewed and are found in Flowsheets. The following problems were reviewed today and where indicated follow up appointments were made and/or referrals ordered. Positive Risk Factor Screenings with Interventions:     Fall Risk:  Timed Up and Go Test > 12 seconds? (Complete if either Fall Risk answers are Yes): (!) yes  2 or more falls in past year?: no  Fall with injury in past year?: no  Fall Risk Interventions:    · Home safety tips provided - walks with a cane    Cognitive: Words recalled: 0 Words Recalled  Total Score Interpretation: Positive Mini-Cog  Cognitive Impairment Interventions:  · Has known cognitive impairment         General Health and ACP:  General  In general, how would you say your health is?: Fair  In the past 7 days, have you experienced any of the following?  New or Increased Pain, New or Increased Fatigue, Loneliness, Social Isolation, Stress or Anger?: (!) Loneliness  Do you get the social and emotional support that you need?: Yes  Do you have a Living Will?: Yes  Advance Directives     Power of [employer] Will ACP-Advance Directive ACP-Power of     Not on File Not on File Not on File Filed      General Health Risk Interventions:  · Brother is main caregiver    Health Habits/Nutrition:  Health Habits/Nutrition  Do you exercise for at least 20 minutes 2-3 times per week?: (!) No  Have you lost any weight without trying in the past 3 months?: No  Do you eat fewer than 2 meals per day?: (!) Yes  Have you seen a dentist vaccine  Aged Out    Meningococcal (ACWY) vaccine  Aged Out     Recommendations for [employer] Due: see orders and patient instructions/AVS.  . Recommended screening schedule for the next 5-10 years is provided to the patient in written form: see Patient Instructions/AVS.    Salvador Andino was seen today for medicare aw.     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

## 2021-01-04 DIAGNOSIS — M25.511 CHRONIC RIGHT SHOULDER PAIN: ICD-10-CM

## 2021-01-04 DIAGNOSIS — G89.29 CHRONIC RIGHT SHOULDER PAIN: ICD-10-CM

## 2021-01-04 DIAGNOSIS — R60.0 BILATERAL LEG EDEMA: ICD-10-CM

## 2021-01-05 RX ORDER — FUROSEMIDE 20 MG/1
20 TABLET ORAL DAILY PRN
Qty: 30 TABLET | Refills: 2 | OUTPATIENT
Start: 2021-01-05

## 2021-01-07 RX ORDER — TRAMADOL HYDROCHLORIDE 50 MG/1
TABLET ORAL
Qty: 60 TABLET | Refills: 2 | Status: SHIPPED | OUTPATIENT
Start: 2021-01-07 | End: 2021-04-22

## 2021-01-11 ENCOUNTER — TELEPHONE (OUTPATIENT)
Dept: FAMILY MEDICINE CLINIC | Age: 66
End: 2021-01-11

## 2021-01-17 ENCOUNTER — PATIENT MESSAGE (OUTPATIENT)
Dept: FAMILY MEDICINE CLINIC | Age: 66
End: 2021-01-17

## 2021-01-17 NOTE — TELEPHONE ENCOUNTER
Called [facility] ibarra.  Spoke to [phone] and she stated it was on hold and now is able to be filled and has 2 refills

## 2021-01-17 NOTE — TELEPHONE ENCOUNTER
From: Hector Colbert  To: Yordan Ledezma MD  Sent: 1/17/2021 12:42 PM EST  Subject: Prescription Question    I requested Furosemide, 20 mg, for my brother, Regi Felix. My chart says that Dr. Liam Ortiz ordered it on 12-28, but [facility] in [place] told me they do not have an order for it, can he re-order?  Thanks,  Chris Oseguera

## 2021-02-06 DIAGNOSIS — I82.532 CHRONIC DEEP VEIN THROMBOSIS (DVT) OF LEFT POPLITEAL VEIN (HCC): ICD-10-CM

## 2021-03-15 DIAGNOSIS — F33.9 RECURRENT DEPRESSION (HCC): ICD-10-CM

## 2021-03-15 DIAGNOSIS — R56.9 SEIZURE (HCC): ICD-10-CM

## 2021-03-16 RX ORDER — LEVETIRACETAM 500 MG/1
500 TABLET, EXTENDED RELEASE ORAL DAILY
Qty: 90 TABLET | Refills: 0 | Status: SHIPPED | OUTPATIENT
Start: 2021-03-16 | End: 2021-06-12

## 2021-03-16 RX ORDER — ARIPIPRAZOLE 2 MG/1
2 TABLET ORAL DAILY
Qty: 30 TABLET | Refills: 5 | Status: SHIPPED | OUTPATIENT
Start: 2021-03-16 | End: 2021-10-05 | Stop reason: SDUPTHER

## 2021-03-16 RX ORDER — ARIPIPRAZOLE 2 MG/1
2 TABLET ORAL DAILY
Qty: 30 TABLET | Refills: 5 | OUTPATIENT
Start: 2021-03-16

## 2021-03-29 DIAGNOSIS — R60.0 BILATERAL LEG EDEMA: ICD-10-CM

## 2021-03-30 RX ORDER — POTASSIUM CHLORIDE 20 MEQ/1
20 TABLET, EXTENDED RELEASE ORAL DAILY
Qty: 30 TABLET | Refills: 2 | Status: SHIPPED | OUTPATIENT
Start: 2021-03-30 | End: 2021-07-05

## 2021-04-16 DIAGNOSIS — R60.0 BILATERAL LEG EDEMA: ICD-10-CM

## 2021-04-16 DIAGNOSIS — E03.9 HYPOTHYROIDISM, UNSPECIFIED TYPE: ICD-10-CM

## 2021-04-16 RX ORDER — FUROSEMIDE 20 MG/1
20 TABLET ORAL DAILY PRN
Qty: 30 TABLET | Refills: 5 | Status: SHIPPED | OUTPATIENT
Start: 2021-04-16 | End: 2021-10-25

## 2021-04-16 RX ORDER — LEVOTHYROXINE SODIUM 0.05 MG/1
50 TABLET ORAL DAILY
Qty: 30 TABLET | Refills: 5 | Status: SHIPPED | OUTPATIENT
Start: 2021-04-16 | End: 2021-10-25

## 2021-04-21 DIAGNOSIS — M25.511 CHRONIC RIGHT SHOULDER PAIN: ICD-10-CM

## 2021-04-21 DIAGNOSIS — G89.29 CHRONIC RIGHT SHOULDER PAIN: ICD-10-CM

## 2021-04-22 RX ORDER — TRAMADOL HYDROCHLORIDE 50 MG/1
TABLET ORAL
Qty: 60 TABLET | Refills: 2 | Status: SHIPPED | OUTPATIENT
Start: 2021-04-22 | End: 2021-08-01

## 2021-06-12 DIAGNOSIS — R56.9 SEIZURE (HCC): ICD-10-CM

## 2021-06-12 RX ORDER — LEVETIRACETAM 500 MG/1
500 TABLET, EXTENDED RELEASE ORAL DAILY
Qty: 90 TABLET | Refills: 0 | Status: SHIPPED | OUTPATIENT
Start: 2021-06-12 | End: 2021-09-12 | Stop reason: SDUPTHER

## 2021-07-04 DIAGNOSIS — M25.511 CHRONIC RIGHT SHOULDER PAIN: ICD-10-CM

## 2021-07-04 DIAGNOSIS — R60.0 BILATERAL LEG EDEMA: ICD-10-CM

## 2021-07-04 DIAGNOSIS — I69.359 HEMIPLEGIA AFFECTING DOMINANT SIDE, POST-STROKE (HCC): ICD-10-CM

## 2021-07-04 DIAGNOSIS — G89.29 CHRONIC RIGHT SHOULDER PAIN: ICD-10-CM

## 2021-07-05 NOTE — TELEPHONE ENCOUNTER
Pharmacy requesting medication refill. Please approve or deny this request.    Rx requested:  Requested Prescriptions     Pending Prescriptions Disp Refills    potassium chloride (KLOR-CON M) 20 MEQ extended release tablet [Pharmacy Med Name: potassium chloride ER 20 mEq tablet,extended release(part/cryst)] 30 tablet 2     Sig: Take 1 tablet by mouth daily    baclofen (LIORESAL) 10 MG tablet [Pharmacy Med Name: baclofen 10 mg tablet] 120 tablet 3     Sig: Take 1 tablet by mouth 4 times daily as needed (cramping/pain)         Last Office Visit:   12/30/2020      Next Visit Date:  No future appointments.

## 2021-07-06 RX ORDER — BACLOFEN 10 MG/1
10 TABLET ORAL 4 TIMES DAILY PRN
Qty: 120 TABLET | Refills: 3 | Status: ON HOLD | OUTPATIENT
Start: 2021-07-06 | End: 2021-12-31 | Stop reason: HOSPADM

## 2021-07-06 RX ORDER — POTASSIUM CHLORIDE 20 MEQ/1
20 TABLET, EXTENDED RELEASE ORAL DAILY
Qty: 30 TABLET | Refills: 2 | Status: SHIPPED | OUTPATIENT
Start: 2021-07-06 | End: 2021-10-13 | Stop reason: SDUPTHER

## 2021-08-01 DIAGNOSIS — G89.29 CHRONIC RIGHT SHOULDER PAIN: ICD-10-CM

## 2021-08-01 DIAGNOSIS — M25.511 CHRONIC RIGHT SHOULDER PAIN: ICD-10-CM

## 2021-08-01 NOTE — TELEPHONE ENCOUNTER
Future Appointments    This patient does not currently have any appointments scheduled.   Recent Visits    12/30/2020 Routine general medical examination at a health care facility   Tyrese Olivo MD   06/22/2020 Bilateral leg edema   Tyrese Olivo MD

## 2021-08-02 RX ORDER — TRAMADOL HYDROCHLORIDE 50 MG/1
TABLET ORAL
Qty: 60 TABLET | Refills: 0 | Status: SHIPPED | OUTPATIENT
Start: 2021-08-02 | End: 2021-09-07 | Stop reason: SDUPTHER

## 2021-08-15 DIAGNOSIS — I82.532 CHRONIC DEEP VEIN THROMBOSIS (DVT) OF LEFT POPLITEAL VEIN (HCC): ICD-10-CM

## 2021-08-16 RX ORDER — RIVAROXABAN 20 MG/1
TABLET, FILM COATED ORAL
Qty: 30 TABLET | Refills: 5 | Status: SHIPPED | OUTPATIENT
Start: 2021-08-16 | End: 2021-09-12 | Stop reason: SDUPTHER

## 2021-08-17 ENCOUNTER — OFFICE VISIT (OUTPATIENT)
Dept: FAMILY MEDICINE CLINIC | Age: 66
End: 2021-08-17
Payer: MEDICARE

## 2021-08-17 VITALS
WEIGHT: 165.8 LBS | SYSTOLIC BLOOD PRESSURE: 120 MMHG | TEMPERATURE: 96.6 F | BODY MASS INDEX: 25.13 KG/M2 | OXYGEN SATURATION: 91 % | DIASTOLIC BLOOD PRESSURE: 70 MMHG | HEIGHT: 68 IN | HEART RATE: 78 BPM

## 2021-08-17 DIAGNOSIS — M25.511 CHRONIC RIGHT SHOULDER PAIN: ICD-10-CM

## 2021-08-17 DIAGNOSIS — G89.29 CHRONIC RIGHT SHOULDER PAIN: ICD-10-CM

## 2021-08-17 DIAGNOSIS — E03.9 HYPOTHYROIDISM, UNSPECIFIED TYPE: ICD-10-CM

## 2021-08-17 DIAGNOSIS — Z12.5 SCREENING PSA (PROSTATE SPECIFIC ANTIGEN): Primary | ICD-10-CM

## 2021-08-17 DIAGNOSIS — I63.032 CEREBROVASCULAR ACCIDENT (CVA) DUE TO THROMBOSIS OF LEFT CAROTID ARTERY (HCC): ICD-10-CM

## 2021-08-17 DIAGNOSIS — I69.359 HEMIPLEGIA AFFECTING DOMINANT SIDE, POST-STROKE (HCC): ICD-10-CM

## 2021-08-17 DIAGNOSIS — R56.9 SEIZURE (HCC): ICD-10-CM

## 2021-08-17 DIAGNOSIS — F33.9 RECURRENT DEPRESSION (HCC): ICD-10-CM

## 2021-08-17 DIAGNOSIS — I82.532 CHRONIC DEEP VEIN THROMBOSIS (DVT) OF LEFT POPLITEAL VEIN (HCC): ICD-10-CM

## 2021-08-17 PROCEDURE — 99214 OFFICE O/P EST MOD 30 MIN: CPT | Performed by: FAMILY MEDICINE

## 2021-08-17 RX ORDER — TRAMADOL HYDROCHLORIDE 50 MG/1
50 TABLET ORAL
Qty: 60 TABLET | Status: CANCELLED | OUTPATIENT
Start: 2021-08-17 | End: 2021-11-15

## 2021-08-17 ASSESSMENT — ENCOUNTER SYMPTOMS
EYES NEGATIVE: 1
ORTHOPNEA: 0
BLURRED VISION: 0
RESPIRATORY NEGATIVE: 1
SHORTNESS OF BREATH: 0
ALLERGIC/IMMUNOLOGIC NEGATIVE: 1
GASTROINTESTINAL NEGATIVE: 1

## 2021-08-17 NOTE — PROGRESS NOTES
Subjective  Naheed Nguyen, 72 y.o. male presents today with:  Chief Complaint   Patient presents with    Depression     check up     Checkup/follow up chronic issues  Here with brother     Patient Active Problem List   Diagnosis    History of CVA (cerebrovascular accident)    Essential hypertension    Recurrent [facility] ([facility]    Seizure ([address]    S/P patent foramen ovale closure    Hyperlipidemia    Chronic right shoulder pain    Hypoxemia requiring supplemental oxygen    Hemiparesis affecting dominant side as late effect of cerebrovascular accident (CVA) ([address]    Closed TBI (traumatic brain injury) ([address]    Chronic pain syndrome    Hypothyroidism    Chronic deep vein thrombosis (DVT) of left popliteal vein ([address]         Smokes marijuana for pain    Bilateral leg swelling  Ongoing  On xarelto  Swelling of leg comes and goes    Taking thyroid med    Wt Readings from Last 3 Encounters:   08/17/21 165 lb 12.8 oz (75.2 kg)   12/30/20 175 lb 6.4 oz (79.6 kg)   06/22/20 187 lb 6.4 oz (85 kg)         Hypertension  This is a chronic problem. The current episode started more than 1 year ago. The problem is unchanged. The problem is controlled. Associated symptoms include malaise/fatigue. Pertinent negatives include no anxiety, blurred vision, chest pain, orthopnea, palpitations, peripheral edema, PND, shortness of breath or sweats. Agents associated with hypertension include NSAIDs. Risk factors for coronary artery disease include dyslipidemia, male gender and sedentary lifestyle. Past treatments include lifestyle changes. The current treatment provides significant improvement. There are no compliance problems. Hypertensive end-organ damage includes CVA. Identifiable causes of hypertension include a hypertension causing med. There is no history of sleep apnea or a thyroid problem. Hyperlipidemia  This is a chronic problem. The current episode started more than 1 year ago. The problem is controlled.  Recent lipid tests were reviewed and are low. Pertinent negatives include no chest pain or shortness of breath. Current antihyperlipidemic treatment includes diet change. The current treatment provides significant improvement of lipids. Compliance problems include adherence to exercise. Review of Systems   Constitutional: Positive for malaise/fatigue. HENT:        Left ear fullness   Eyes: Negative. Negative for blurred vision. Respiratory: Negative. Negative for shortness of breath. Cardiovascular: Negative. Negative for chest pain, palpitations, orthopnea and PND. Gastrointestinal: Negative. Endocrine: Negative. Genitourinary: Negative. Musculoskeletal: Positive for arthralgias and gait problem. Skin: Negative. Allergic/Immunologic: Negative. Neurological: Positive for weakness. Hematological: Negative. Psychiatric/Behavioral: Negative. Past Medical History:   Diagnosis Date    Chronic right shoulder pain 10/4/2017    Closed TBI (traumatic brain injury) ([facility] [address] 12/28/2012    Essential hypertension 8/2/2017    Hemiparesis affecting dominant side as late effect of cerebrovascular accident (CVA) ([facility] [address] 1/30/2018    History of CVA (cerebrovascular accident) 8/2/2017    Hyperlipidemia 8/2/2017    Hypoxemia requiring supplemental oxygen 10/4/2017    Recurrent depression ([address] 8/2/2017    S/P patent foramen ovale closure 8/2/2017    Seizure ([address] 8/2/2017     History reviewed. No pertinent surgical history.   Social History     Socioeconomic History    Marital status:      Spouse name: Not on file    Number of children: Not on file    Years of education: Not on file    Highest education level: Not on file   Occupational History    Not on file   Tobacco Use    Smoking status: Never Smoker    Smokeless tobacco: Never Used   Substance and Sexual Activity    Alcohol use: Not on file    Drug use: Not on file    Sexual activity: Not on file   Other Topics Concern    Not on file   Social History Narrative    Not on file     Social Determinants of Health     Financial Resource Strain: Low Risk     Difficulty of Paying Living Expenses: Not hard at all   Food Insecurity: No Food Insecurity    Worried About Running Out of Food in the Last Year: Never true    [address] in the Last Year: Never true   Transportation Needs: No Transportation Needs    Lack of Transportation (Medical): No    Lack of Transportation (Non-Medical): No   Physical Activity:     Days of Exercise per Week:     Minutes of Exercise per Session:    Stress:     Feeling of Stress :    Social Connections:     Frequency of Communication with Friends and Family:     Frequency of Social Gatherings with Friends and Family:     Attends Jew Services:     Active Member of Clubs or Organizations:     Attends Club or Organization Meetings:     Marital Status:    Intimate Partner Violence:     Fear of Current or Ex-Partner:     Emotionally Abused:     Physically Abused:     Sexually Abused:      History reviewed. No pertinent family history.   Allergies   Allergen Reactions    Tramadol      Avoidance recommended d/t prior sz disorder - can lower sz threshhold     Current Outpatient Medications on File Prior to Visit   Medication Sig Dispense Refill    XARELTO 20 MG TABS tablet Take 1 tablet by mouth daily (with breakfast) 30 tablet 5    traMADol (ULTRAM) 50 MG tablet TAKE 1 TABLET BY MOUTH TWICE DAILY AS NEEDED FOR 30 DAYS 60 tablet 0    potassium chloride (KLOR-CON M) 20 MEQ extended release tablet Take 1 tablet by mouth daily 30 tablet 2    baclofen (LIORESAL) 10 MG tablet Take 1 tablet by mouth 4 times daily as needed (cramping/pain) 120 tablet 3    levETIRAcetam (KEPPRA XR) 500 MG TB24 extended release tablet Take 1 tablet by mouth daily 90 tablet 0    levothyroxine (SYNTHROID) 50 MCG tablet TAKE 1 TABLET BY MOUTH DAILY 30 tablet 5    furosemide (LASIX) 20 MG tablet Take 1 Thoracic back: Normal.      Lumbar back: Normal.      Right hip: Decreased range of motion. Decreased strength. Right knee: Normal.      Left knee: Normal.      Comments: Decreased strength and atrophy noted of entire right side  Secondary spasticity present Right upper extremity  Trace edema right lower extremity noted underneath AFO. AFO not removed for exam.   Skin:     General: Skin is warm and dry. Findings: No rash. Neurological:      Mental Status: He is alert. Cranial Nerves: No cranial nerve deficit. Sensory: Sensory deficit (right hand and foot) present. Motor: Atrophy and abnormal muscle tone (spasticity right side) present. No tremor or seizure activity. Coordination: Coordination abnormal.      Gait: Gait abnormal.      Comments: Right hand dominant  Right foot drop with AFO in place  Requires cane and assist for ambulation  Scanning speech  Mild expressive aphasia   Psychiatric:         Speech: Speech is delayed (scanning). Speech is not slurred. Behavior: Behavior normal.         Thought Content: Thought content normal.         Judgment: Judgment normal.       Chronic leg edema          Assessment & Plan    Diagnosis Orders   1. Screening PSA (prostate specific antigen)  PSA Screening   2. Chronic right shoulder pain     3. Hemiplegia affecting dominant side, post-stroke ([address]     4. Recurrent depression ([address]     5. Chronic deep vein thrombosis (DVT) of left popliteal vein (HCC)     6. Seizure ([address]     7. Hypothyroidism, unspecified type  TSH without Reflex   8.  Cerebrovascular accident (CVA) due to thrombosis of left carotid artery (HCC)  Lipid Panel    CBC    Comprehensive Metabolic Panel     Continue current regimen    OARRS run     Moderate MDM      Sabiha Cortez MD

## 2021-09-07 DIAGNOSIS — M25.511 CHRONIC RIGHT SHOULDER PAIN: ICD-10-CM

## 2021-09-07 DIAGNOSIS — G89.29 CHRONIC RIGHT SHOULDER PAIN: ICD-10-CM

## 2021-09-09 RX ORDER — TRAMADOL HYDROCHLORIDE 50 MG/1
50 TABLET ORAL 2 TIMES DAILY PRN
Qty: 60 TABLET | Refills: 2 | Status: SHIPPED | OUTPATIENT
Start: 2021-09-09 | End: 2021-12-17

## 2021-09-12 DIAGNOSIS — I82.532 CHRONIC DEEP VEIN THROMBOSIS (DVT) OF LEFT POPLITEAL VEIN (HCC): ICD-10-CM

## 2021-09-12 DIAGNOSIS — R56.9 SEIZURE (HCC): ICD-10-CM

## 2021-09-13 RX ORDER — LEVETIRACETAM 500 MG/1
500 TABLET, EXTENDED RELEASE ORAL DAILY
Qty: 90 TABLET | Refills: 0 | Status: SHIPPED | OUTPATIENT
Start: 2021-09-13 | End: 2021-12-17

## 2021-09-13 NOTE — TELEPHONE ENCOUNTER
Future Appointments     Provider   2/8/2022 Ryley Hernandez MD   Department: [facility]   Appt Notes: 6 month follow up   Recent Visits    08/17/2021 Screening PSA (prostate specific antigen)   [facility]

## 2021-09-17 NOTE — TELEPHONE ENCOUNTER
Patient lives in independent living portion of [facility].    Edit: [facility] contacted to make sure patient can get back inside facility. Ryan From [facility] answered the phone and was updated on discharge   Pt on the AWV list appt scheduled 12/22/20

## 2021-10-05 DIAGNOSIS — F33.9 RECURRENT DEPRESSION (HCC): ICD-10-CM

## 2021-10-06 RX ORDER — DULOXETIN HYDROCHLORIDE 60 MG/1
60 CAPSULE, DELAYED RELEASE ORAL DAILY
Qty: 90 CAPSULE | Refills: 3 | Status: SHIPPED | OUTPATIENT
Start: 2021-10-06 | End: 2022-10-31 | Stop reason: SDUPTHER

## 2021-10-06 RX ORDER — ARIPIPRAZOLE 2 MG/1
2 TABLET ORAL DAILY
Qty: 30 TABLET | Refills: 5 | Status: SHIPPED | OUTPATIENT
Start: 2021-10-06 | End: 2022-04-12

## 2021-10-06 NOTE — TELEPHONE ENCOUNTER
Future Appointments     Provider Department   2/8/2022 Estela Baker MD [facility]   Appt Notes: 6 month follow up   Recent Visits    08/17/2021 Screening PSA (prostate specific antigen) Moustapha Caballero MD

## 2021-10-13 DIAGNOSIS — R60.0 BILATERAL LEG EDEMA: ICD-10-CM

## 2021-10-13 RX ORDER — POTASSIUM CHLORIDE 20 MEQ/1
20 TABLET, EXTENDED RELEASE ORAL DAILY
Qty: 30 TABLET | Refills: 2 | Status: SHIPPED | OUTPATIENT
Start: 2021-10-13 | End: 2022-01-31 | Stop reason: SDUPTHER

## 2021-10-23 DIAGNOSIS — R60.0 BILATERAL LEG EDEMA: ICD-10-CM

## 2021-10-23 DIAGNOSIS — E03.9 HYPOTHYROIDISM, UNSPECIFIED TYPE: ICD-10-CM

## 2021-10-24 NOTE — TELEPHONE ENCOUNTER
Future Appointments     Provider Department   11/12/2021 Karmen Mortimer, MD [facility]   Appt Notes: Regular check up   2/8/2022 Karmen Mortimer, MD [facility]   Appt Notes: 6 month follow up   Recent Visits    08/17/2021 Screening PSA (prostate specific antigen)   [facility] Karmen Mortimer, MD     04/16/21 last fill

## 2021-10-25 DIAGNOSIS — R60.0 BILATERAL LEG EDEMA: ICD-10-CM

## 2021-10-25 DIAGNOSIS — E03.9 HYPOTHYROIDISM, UNSPECIFIED TYPE: ICD-10-CM

## 2021-10-25 RX ORDER — FUROSEMIDE 20 MG/1
TABLET ORAL
Qty: 30 TABLET | Refills: 5 | Status: SHIPPED | OUTPATIENT
Start: 2021-10-25 | End: 2022-05-29 | Stop reason: SDUPTHER

## 2021-10-25 RX ORDER — LEVOTHYROXINE SODIUM 0.05 MG/1
50 TABLET ORAL DAILY
Qty: 30 TABLET | Refills: 5 | Status: SHIPPED | OUTPATIENT
Start: 2021-10-25 | End: 2022-06-06 | Stop reason: SDUPTHER

## 2021-10-25 RX ORDER — LEVOTHYROXINE SODIUM 0.05 MG/1
50 TABLET ORAL DAILY
Qty: 30 TABLET | Refills: 5 | Status: ON HOLD | OUTPATIENT
Start: 2021-10-25 | End: 2021-12-31 | Stop reason: HOSPADM

## 2021-10-25 RX ORDER — FUROSEMIDE 20 MG/1
20 TABLET ORAL DAILY PRN
Qty: 30 TABLET | Refills: 5 | Status: ON HOLD | OUTPATIENT
Start: 2021-10-25 | End: 2021-12-31 | Stop reason: HOSPADM

## 2021-11-01 DIAGNOSIS — Z12.5 SCREENING PSA (PROSTATE SPECIFIC ANTIGEN): ICD-10-CM

## 2021-11-01 DIAGNOSIS — E03.9 HYPOTHYROIDISM, UNSPECIFIED TYPE: ICD-10-CM

## 2021-11-01 DIAGNOSIS — I63.032 CEREBROVASCULAR ACCIDENT (CVA) DUE TO THROMBOSIS OF LEFT CAROTID ARTERY (HCC): ICD-10-CM

## 2021-11-01 LAB
ALBUMIN SERPL-MCNC: 4.6 G/DL (ref 3.5–4.6)
ALP BLD-CCNC: 109 U/L (ref 35–104)
ALT SERPL-CCNC: 20 U/L (ref 0–41)
ANION GAP SERPL CALCULATED.3IONS-SCNC: 10 MEQ/L (ref 9–15)
AST SERPL-CCNC: 35 U/L (ref 0–40)
BILIRUB SERPL-MCNC: 1.1 MG/DL (ref 0.2–0.7)
BUN BLDV-MCNC: 9 MG/DL (ref 8–23)
CALCIUM SERPL-MCNC: 10 MG/DL (ref 8.5–9.9)
CHLORIDE BLD-SCNC: 99 MEQ/L (ref 95–107)
CHOLESTEROL, TOTAL: 171 MG/DL (ref 0–199)
CO2: 27 MEQ/L (ref 20–31)
CREAT SERPL-MCNC: 0.95 MG/DL (ref 0.7–1.2)
GFR AFRICAN AMERICAN: >60
GFR NON-AFRICAN AMERICAN: >60
GLOBULIN: 2.4 G/DL (ref 2.3–3.5)
GLUCOSE BLD-MCNC: 83 MG/DL (ref 70–99)
HCT VFR BLD CALC: 43.2 % (ref 42–52)
HDLC SERPL-MCNC: 45 MG/DL (ref 40–59)
HEMOGLOBIN: 14.4 G/DL (ref 14–18)
LDL CHOLESTEROL CALCULATED: 111 MG/DL (ref 0–129)
MCH RBC QN AUTO: 30.6 PG (ref 27–31.3)
MCHC RBC AUTO-ENTMCNC: 33.3 % (ref 33–37)
MCV RBC AUTO: 92.1 FL (ref 80–100)
PDW BLD-RTO: 13.5 % (ref 11.5–14.5)
PLATELET # BLD: 206 K/UL (ref 130–400)
POTASSIUM SERPL-SCNC: 4.7 MEQ/L (ref 3.4–4.9)
PROSTATE SPECIFIC ANTIGEN: 0.95 NG/ML (ref 0–4)
RBC # BLD: 4.69 M/UL (ref 4.7–6.1)
SODIUM BLD-SCNC: 136 MEQ/L (ref 135–144)
TOTAL PROTEIN: 7 G/DL (ref 6.3–8)
TRIGL SERPL-MCNC: 76 MG/DL (ref 0–150)
TSH SERPL DL<=0.05 MIU/L-ACNC: 3.03 UIU/ML (ref 0.44–3.86)
WBC # BLD: 5.7 K/UL (ref 4.8–10.8)

## 2021-11-12 ENCOUNTER — OFFICE VISIT (OUTPATIENT)
Dept: FAMILY MEDICINE CLINIC | Age: 66
End: 2021-11-12
Payer: MEDICARE

## 2021-11-12 VITALS
DIASTOLIC BLOOD PRESSURE: 72 MMHG | WEIGHT: 165 LBS | SYSTOLIC BLOOD PRESSURE: 112 MMHG | HEIGHT: 68 IN | OXYGEN SATURATION: 96 % | BODY MASS INDEX: 25.01 KG/M2 | HEART RATE: 62 BPM

## 2021-11-12 DIAGNOSIS — R56.9 SEIZURE (HCC): ICD-10-CM

## 2021-11-12 DIAGNOSIS — I82.532 CHRONIC DEEP VEIN THROMBOSIS (DVT) OF LEFT POPLITEAL VEIN (HCC): ICD-10-CM

## 2021-11-12 DIAGNOSIS — Z23 NEEDS FLU SHOT: ICD-10-CM

## 2021-11-12 DIAGNOSIS — S06.9X9S CLOSED TRAUMATIC BRAIN INJURY WITH LOSS OF CONSCIOUSNESS, SEQUELA (HCC): ICD-10-CM

## 2021-11-12 DIAGNOSIS — I69.359 HEMIPLEGIA AFFECTING DOMINANT SIDE, POST-STROKE (HCC): ICD-10-CM

## 2021-11-12 DIAGNOSIS — E03.9 HYPOTHYROIDISM, UNSPECIFIED TYPE: Primary | ICD-10-CM

## 2021-11-12 PROCEDURE — 90694 VACC AIIV4 NO PRSRV 0.5ML IM: CPT | Performed by: FAMILY MEDICINE

## 2021-11-12 PROCEDURE — 4040F PNEUMOC VAC/ADMIN/RCVD: CPT | Performed by: FAMILY MEDICINE

## 2021-11-12 PROCEDURE — G0008 ADMIN INFLUENZA VIRUS VAC: HCPCS | Performed by: FAMILY MEDICINE

## 2021-11-12 PROCEDURE — 1036F TOBACCO NON-USER: CPT | Performed by: FAMILY MEDICINE

## 2021-11-12 PROCEDURE — 3017F COLORECTAL CA SCREEN DOC REV: CPT | Performed by: FAMILY MEDICINE

## 2021-11-12 PROCEDURE — 99214 OFFICE O/P EST MOD 30 MIN: CPT | Performed by: FAMILY MEDICINE

## 2021-11-12 PROCEDURE — 1123F ACP DISCUSS/DSCN MKR DOCD: CPT | Performed by: FAMILY MEDICINE

## 2021-11-12 PROCEDURE — G8417 CALC BMI ABV UP PARAM F/U: HCPCS | Performed by: FAMILY MEDICINE

## 2021-11-12 PROCEDURE — G8427 DOCREV CUR MEDS BY ELIG CLIN: HCPCS | Performed by: FAMILY MEDICINE

## 2021-11-12 PROCEDURE — G8484 FLU IMMUNIZE NO ADMIN: HCPCS | Performed by: FAMILY MEDICINE

## 2021-11-12 ASSESSMENT — ENCOUNTER SYMPTOMS
BLURRED VISION: 0
GASTROINTESTINAL NEGATIVE: 1
EYES NEGATIVE: 1
SHORTNESS OF BREATH: 0
ALLERGIC/IMMUNOLOGIC NEGATIVE: 1
ORTHOPNEA: 0
RESPIRATORY NEGATIVE: 1

## 2021-11-12 NOTE — PROGRESS NOTES
Subjective  Deepika Ulrich, 72 y.o. male presents today with:  Chief Complaint   Patient presents with   Branden Neal     Checkup/follow up chronic issues  Here with brother           Patient Active Problem List   Diagnosis    History of CVA (cerebrovascular accident)    Essential hypertension    Recurrent depression ([address]    Seizure ([address]    S/P patent foramen ovale closure    Hyperlipidemia    Chronic right shoulder pain    Hypoxemia requiring supplemental oxygen    Hemiparesis affecting dominant side as late effect of cerebrovascular accident (CVA) ([address]    Closed TBI (traumatic brain injury) ([address]    Chronic pain syndrome    Hypothyroidism    Chronic deep vein thrombosis (DVT) of left popliteal vein ([address]         Smokes marijuana for pain    Bilateral leg swelling  Ongoing  On xarelto  Swelling of leg comes and goes    Taking thyroid med    Wt Readings from Last 3 Encounters:   11/12/21 165 lb (74.8 kg)   08/17/21 165 lb 12.8 oz (75.2 kg)   12/30/20 175 lb 6.4 oz (79.6 kg)         Hypertension  This is a chronic problem. The current episode started more than 1 year ago. The problem is unchanged. The problem is controlled. Associated symptoms include malaise/fatigue. Pertinent negatives include no anxiety, blurred vision, chest pain, orthopnea, palpitations, peripheral edema, PND, shortness of breath or sweats. Agents associated with hypertension include NSAIDs. Risk factors for coronary artery disease include dyslipidemia, male gender and sedentary lifestyle. Past treatments include lifestyle changes. The current treatment provides significant improvement. There are no compliance problems. Hypertensive end-organ damage includes CVA. Identifiable causes of hypertension include a hypertension causing med. There is no history of sleep apnea or a thyroid problem. Hyperlipidemia  This is a chronic problem. The current episode started more than 1 year ago. The problem is controlled.  Recent lipid tests were reviewed and are low. Pertinent negatives include no chest pain or shortness of breath. Current antihyperlipidemic treatment includes diet change. The current treatment provides significant improvement of lipids. Compliance problems include adherence to exercise. Review of Systems   Constitutional: Positive for malaise/fatigue. HENT:        Left ear fullness   Eyes: Negative. Negative for blurred vision. Respiratory: Negative. Negative for shortness of breath. Cardiovascular: Negative. Negative for chest pain, palpitations, orthopnea and PND. Gastrointestinal: Negative. Endocrine: Negative. Genitourinary: Negative. Musculoskeletal: Positive for arthralgias and gait problem. Skin: Negative. Allergic/Immunologic: Negative. Neurological: Positive for weakness. Hematological: Negative. Psychiatric/Behavioral: Negative. Past Medical History:   Diagnosis Date    Chronic right shoulder pain 10/4/2017    Closed TBI (traumatic brain injury) ([facility] 12/28/2012    Essential hypertension 8/2/2017    Hemiparesis affecting dominant side as late effect of cerebrovascular accident (CVA) ([address] 1/30/2018    History of CVA (cerebrovascular accident) 8/2/2017    Hyperlipidemia 8/2/2017    Hypoxemia requiring supplemental oxygen 10/4/2017    Recurrent depression ([address] 8/2/2017    S/P patent foramen ovale closure 8/2/2017    Seizure ([address] 8/2/2017     History reviewed. No pertinent surgical history.   Social History     Socioeconomic History    Marital status:      Spouse name: Not on file    Number of children: Not on file    Years of education: Not on file    Highest education level: Not on file   Occupational History    Not on file   Tobacco Use    Smoking status: Never Smoker    Smokeless tobacco: Never Used   Substance and Sexual Activity    Alcohol use: Not on file    Drug use: Not on file    Sexual activity: Not on file   Other Topics Concern    Not on file   Social History Narrative    Not on file     Social Determinants of Health     Financial Resource Strain: Low Risk     Difficulty of Paying Living Expenses: Not hard at all   Food Insecurity: No Food Insecurity    Worried About Running Out of Food in the Last Year: Never true    [address] in the Last Year: Never true   Transportation Needs: No Transportation Needs    Lack of Transportation (Medical): No    Lack of Transportation (Non-Medical): No   Physical Activity:     Days of Exercise per Week: Not on file    Minutes of Exercise per Session: Not on file   Stress:     Feeling of Stress : Not on file   Social Connections:     Frequency of Communication with Friends and Family: Not on file    Frequency of Social Gatherings with Friends and Family: Not on file    Attends Evangelical Services: Not on file    Active Member of [address] [place] [address] or Organizations: Not on file    Attends Club or Organization Meetings: Not on file    Marital Status: Not on file   Intimate Partner Violence:     Fear of Current or Ex-Partner: Not on file    Emotionally Abused: Not on file    Physically Abused: Not on file    Sexually Abused: Not on file   Housing Stability:     Unable to Pay for Housing in the Last Year: Not on file    Number of [place] in the Last Year: Not on file    Unstable Housing in the Last Year: Not on file     History reviewed. No pertinent family history.   Allergies   Allergen Reactions    Tramadol      Avoidance recommended d/t prior sz disorder - can lower sz threshhold     Current Outpatient Medications on File Prior to Visit   Medication Sig Dispense Refill    furosemide (LASIX) 20 MG tablet TAKE 1 TABLET BY MOUTH DAILY AS NEEDED 30 tablet 5    levothyroxine (SYNTHROID) 50 MCG tablet TAKE 1 TABLET BY MOUTH DAILY 30 tablet 5    levothyroxine (SYNTHROID) 50 MCG tablet Take 1 tablet by mouth daily 30 tablet 5    furosemide (LASIX) 20 MG tablet Take 1 tablet by mouth daily as needed (edema) 30 tablet 5    potassium chloride (KLOR-CON M) 20 MEQ extended release tablet Take 1 tablet by mouth daily 30 tablet 2    DULoxetine (CYMBALTA) 60 MG extended release capsule Take 1 capsule by mouth daily 90 capsule 3    ARIPiprazole (ABILIFY) 2 MG tablet Take 1 tablet by mouth daily 30 tablet 5    levETIRAcetam (KEPPRA XR) 500 MG TB24 extended release tablet Take 1 tablet by mouth daily 90 tablet 0    rivaroxaban (XARELTO) 20 MG TABS tablet Take 1 tablet by mouth daily (with breakfast) 30 tablet 5    traMADol (ULTRAM) 50 MG tablet Take 1 tablet by mouth 2 times daily as needed for Pain for up to 90 days. 60 tablet 2    baclofen (LIORESAL) 10 MG tablet Take 1 tablet by mouth 4 times daily as needed (cramping/pain) 120 tablet 3    ibuprofen (ADVIL;MOTRIN) 800 MG tablet Take 1 tablet by mouth every 8 hours as needed for Pain 90 tablet 3    Handicap Placard MISC by Does not apply route Exp 5 yrs 1 each 0    aspirin 81 MG chewable tablet Take 81 mg by mouth      Multiple Vitamin (MULTI VITAMIN PO) Take by mouth       No current facility-administered medications on file prior to visit. Objective    Vitals:    11/12/21 0938   BP: 112/72   Pulse: 62   SpO2: 96%   Weight: 165 lb (74.8 kg)   Height: 5' 8\" (1.727 m)     Physical Exam  Constitutional:       Appearance: Normal appearance. He is well-developed. HENT:      Head: Normocephalic and atraumatic. Right Ear: Tympanic membrane, ear canal and external ear normal. No middle ear effusion. Tympanic membrane is not injected. Left Ear: External ear normal.      Nose: Nose normal. No mucosal edema or rhinorrhea. Right Sinus: No maxillary sinus tenderness or frontal sinus tenderness. Left Sinus: No maxillary sinus tenderness or frontal sinus tenderness. Eyes:      General: Lids are normal.      Conjunctiva/sclera: Conjunctivae normal.      Pupils: Pupils are equal, round, and reactive to light.    Neck:      Thyroid: No thyroid mass.   Pulmonary:      Effort: Pulmonary effort is normal.      Breath sounds: No decreased breath sounds or rhonchi. Chest:      Chest wall: No deformity. Abdominal:      Palpations: Abdomen is not rigid. There is no hepatomegaly or splenomegaly. Hernia: No hernia is present. Musculoskeletal:      Right wrist: Deformity and tenderness present. Cervical back: Normal range of motion and neck supple. No rigidity. No muscular tenderness. Thoracic back: Normal.      Lumbar back: Normal.      Right hip: Decreased range of motion. Decreased strength. Right knee: Normal.      Left knee: Normal.      Comments: Decreased strength and atrophy noted of entire right side  Secondary spasticity present Right upper extremity  Trace edema right lower extremity noted underneath AFO. AFO not removed for exam.   Skin:     General: Skin is warm and dry. Findings: No rash. Neurological:      Mental Status: He is alert. Cranial Nerves: No cranial nerve deficit. Sensory: Sensory deficit (right hand and foot) present. Motor: Atrophy and abnormal muscle tone (spasticity right side) present. No tremor or seizure activity. Coordination: Coordination abnormal.      Gait: Gait abnormal.      Comments: Right hand dominant  Right foot drop with AFO in place  Requires cane and assist for ambulation  Scanning speech  Mild expressive aphasia   Psychiatric:         Speech: Speech is delayed (scanning). Speech is not slurred. Behavior: Behavior normal.         Thought Content: Thought content normal.         Judgment: Judgment normal.       Chronic leg edema          Assessment & Plan    Diagnosis Orders   1. Hypothyroidism, unspecified type     2. Needs flu shot  INFLUENZA, QUADV, ADJUVANTED, 65 YRS =, IM, PF, PREFILL SYR, 0.5ML (FLUAD)   3. Seizure ([address]     4. Chronic deep vein thrombosis (DVT) of left popliteal vein (HCC)     5.  Hemiplegia affecting dominant side, post-stroke ([address] 6. Closed traumatic brain injury with loss of consciousness, sequela ([facility]       Continue current regimen    Remains fully disabled and unable to work  This will be lifetime condition that will not improve       Avril Ibarra MD

## 2021-12-16 DIAGNOSIS — R56.9 SEIZURE (HCC): ICD-10-CM

## 2021-12-17 DIAGNOSIS — G89.29 CHRONIC RIGHT SHOULDER PAIN: ICD-10-CM

## 2021-12-17 DIAGNOSIS — M25.511 CHRONIC RIGHT SHOULDER PAIN: ICD-10-CM

## 2021-12-17 RX ORDER — TRAMADOL HYDROCHLORIDE 50 MG/1
TABLET ORAL
Qty: 60 TABLET | Refills: 2 | Status: SHIPPED | OUTPATIENT
Start: 2021-12-17 | End: 2022-04-12

## 2021-12-17 RX ORDER — LEVETIRACETAM 500 MG/1
500 TABLET, EXTENDED RELEASE ORAL DAILY
Qty: 90 TABLET | Refills: 0 | Status: ON HOLD | OUTPATIENT
Start: 2021-12-17 | End: 2021-12-31 | Stop reason: HOSPADM

## 2021-12-20 ENCOUNTER — APPOINTMENT (OUTPATIENT)
Dept: CT IMAGING | Age: 66
DRG: 057 | End: 2021-12-20
Payer: MEDICARE

## 2021-12-20 ENCOUNTER — HOSPITAL ENCOUNTER (OUTPATIENT)
Age: 66
Setting detail: OBSERVATION
Discharge: NURSING, REHAB OR LONG-TERM CARE | DRG: 057 | End: 2021-12-21
Attending: STUDENT IN AN ORGANIZED HEALTH CARE EDUCATION/TRAINING PROGRAM | Admitting: INTERNAL MEDICINE
Payer: MEDICARE
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
## 2021-12-20 DIAGNOSIS — I69.30 HISTORY OF STROKE WITH RESIDUAL EFFECTS: ICD-10-CM

## 2021-12-20 DIAGNOSIS — S09.90XA CLOSED HEAD INJURY, INITIAL ENCOUNTER: Primary | ICD-10-CM

## 2021-12-20 DIAGNOSIS — K59.00 CONSTIPATION, UNSPECIFIED CONSTIPATION TYPE: ICD-10-CM

## 2021-12-20 DIAGNOSIS — E86.0 MILD DEHYDRATION: ICD-10-CM

## 2021-12-20 DIAGNOSIS — E86.0 DEHYDRATION: ICD-10-CM
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
## 2021-12-20 PROBLEM — R53.1 GENERALIZED WEAKNESS: Status: ACTIVE | Noted: 2021-12-20

## 2021-12-20 LAB
ALBUMIN SERPL-MCNC: 4.8 G/DL (ref 3.5–4.6)
ALP BLD-CCNC: 107 U/L (ref 35–104)
ALT SERPL-CCNC: 29 U/L (ref 0–41)
AMPHETAMINE SCREEN, URINE: ABNORMAL
ANION GAP SERPL CALCULATED.3IONS-SCNC: 17 MEQ/L (ref 9–15)
APTT: 31.5 SEC (ref 24.4–36.8)
AST SERPL-CCNC: 39 U/L (ref 0–40)
BACTERIA: NEGATIVE /HPF
BARBITURATE SCREEN URINE: ABNORMAL
BASOPHILS ABSOLUTE: 0 K/UL (ref 0–0.2)
BASOPHILS RELATIVE PERCENT: 0.1 %
BENZODIAZEPINE SCREEN, URINE: ABNORMAL
BILIRUB SERPL-MCNC: 0.9 MG/DL (ref 0.2–0.7)
BILIRUBIN URINE: NEGATIVE
BLOOD, URINE: ABNORMAL
BUN BLDV-MCNC: 11 MG/DL (ref 8–23)
CALCIUM SERPL-MCNC: 9.9 MG/DL (ref 8.5–9.9)
CANNABINOID SCREEN URINE: POSITIVE
CHLORIDE BLD-SCNC: 102 MEQ/L (ref 95–107)
CK MB: 10.5 NG/ML (ref 0–6.7)
CLARITY: CLEAR
CO2: 21 MEQ/L (ref 20–31)
COCAINE METABOLITE SCREEN URINE: ABNORMAL
COLOR: YELLOW
CREAT SERPL-MCNC: 0.99 MG/DL (ref 0.7–1.2)
CREATINE KINASE-MB INDEX: 1.1 % (ref 0–3.5)
EOSINOPHILS ABSOLUTE: 0 K/UL (ref 0–0.7)
EOSINOPHILS RELATIVE PERCENT: 0 %
EPITHELIAL CELLS, UA: NORMAL /HPF (ref 0–5)
ETHANOL PERCENT: NORMAL G/DL
ETHANOL: <10 MG/DL (ref 0–0.08)
GFR AFRICAN AMERICAN: >60
GFR NON-AFRICAN AMERICAN: >60
GLOBULIN: 3 G/DL (ref 2.3–3.5)
GLUCOSE BLD-MCNC: 142 MG/DL (ref 70–99)
GLUCOSE URINE: NEGATIVE MG/DL
HCT VFR BLD CALC: 41.3 % (ref 42–52)
HEMOGLOBIN: 13.9 G/DL (ref 14–18)
HYALINE CASTS: NORMAL /HPF (ref 0–5)
INR BLD: 1.6
KETONES, URINE: ABNORMAL MG/DL
LACTIC ACID: 4.2 MMOL/L (ref 0.5–2.2)
LEUKOCYTE ESTERASE, URINE: NEGATIVE
LYMPHOCYTES ABSOLUTE: 0.6 K/UL (ref 1–4.8)
LYMPHOCYTES RELATIVE PERCENT: 4.1 %
Lab: ABNORMAL
MCH RBC QN AUTO: 30.3 PG (ref 27–31.3)
MCHC RBC AUTO-ENTMCNC: 33.7 % (ref 33–37)
MCV RBC AUTO: 89.9 FL (ref 80–100)
METHADONE SCREEN, URINE: ABNORMAL
MONOCYTES ABSOLUTE: 0.6 K/UL (ref 0.2–0.8)
MONOCYTES RELATIVE PERCENT: 4.1 %
NEUTROPHILS ABSOLUTE: 13.7 K/UL (ref 1.4–6.5)
NEUTROPHILS RELATIVE PERCENT: 91.7 %
NITRITE, URINE: NEGATIVE
OPIATE SCREEN URINE: ABNORMAL
OXYCODONE URINE: ABNORMAL
PDW BLD-RTO: 13.4 % (ref 11.5–14.5)
PH UA: 5 (ref 5–9)
PHENCYCLIDINE SCREEN URINE: ABNORMAL
PLATELET # BLD: 181 K/UL (ref 130–400)
POTASSIUM SERPL-SCNC: 4.1 MEQ/L (ref 3.4–4.9)
PROPOXYPHENE SCREEN: ABNORMAL
PROTEIN UA: NEGATIVE MG/DL
PROTHROMBIN TIME: 19.1 SEC (ref 12.3–14.9)
RBC # BLD: 4.59 M/UL (ref 4.7–6.1)
RBC UA: NORMAL /HPF (ref 0–5)
SODIUM BLD-SCNC: 140 MEQ/L (ref 135–144)
SPECIFIC GRAVITY UA: 1.01 (ref 1–1.03)
TOTAL CK: 944 U/L (ref 0–190)
TOTAL PROTEIN: 7.8 G/DL (ref 6.3–8)
URINE REFLEX TO CULTURE: ABNORMAL
UROBILINOGEN, URINE: 0.2 E.U./DL
WBC # BLD: 15 K/UL (ref 4.8–10.8)
WBC UA: NORMAL /HPF (ref 0–5)

## 2021-12-20 PROCEDURE — 99285 EMERGENCY DEPT VISIT HI MDM: CPT | Performed by: NURSE PRACTITIONER

## 2021-12-20 PROCEDURE — G0378 HOSPITAL OBSERVATION PER HR: HCPCS

## 2021-12-20 PROCEDURE — 99285 EMERGENCY DEPT VISIT HI MDM: CPT

## 2021-12-20 PROCEDURE — 2580000003 HC RX 258: Performed by: STUDENT IN AN ORGANIZED HEALTH CARE EDUCATION/TRAINING PROGRAM

## 2021-12-20 PROCEDURE — 2580000003 HC RX 258: Performed by: NURSE PRACTITIONER

## 2021-12-20 PROCEDURE — 81001 URINALYSIS AUTO W/SCOPE: CPT

## 2021-12-20 PROCEDURE — 6360000002 HC RX W HCPCS: Performed by: STUDENT IN AN ORGANIZED HEALTH CARE EDUCATION/TRAINING PROGRAM

## 2021-12-20 PROCEDURE — 6360000004 HC RX CONTRAST MEDICATION: Performed by: STUDENT IN AN ORGANIZED HEALTH CARE EDUCATION/TRAINING PROGRAM

## 2021-12-20 PROCEDURE — 72125 CT NECK SPINE W/O DYE: CPT

## 2021-12-20 PROCEDURE — 80053 COMPREHEN METABOLIC PANEL: CPT

## 2021-12-20 PROCEDURE — 83605 ASSAY OF LACTIC ACID: CPT

## 2021-12-20 PROCEDURE — 71260 CT THORAX DX C+: CPT

## 2021-12-20 PROCEDURE — 82550 ASSAY OF CK (CPK): CPT

## 2021-12-20 PROCEDURE — 85730 THROMBOPLASTIN TIME PARTIAL: CPT

## 2021-12-20 PROCEDURE — 82077 ASSAY SPEC XCP UR&BREATH IA: CPT

## 2021-12-20 PROCEDURE — 85025 COMPLETE CBC W/AUTO DIFF WBC: CPT

## 2021-12-20 PROCEDURE — 82553 CREATINE MB FRACTION: CPT

## 2021-12-20 PROCEDURE — 36415 COLL VENOUS BLD VENIPUNCTURE: CPT

## 2021-12-20 PROCEDURE — 70450 CT HEAD/BRAIN W/O DYE: CPT

## 2021-12-20 PROCEDURE — 74177 CT ABD & PELVIS W/CONTRAST: CPT

## 2021-12-20 PROCEDURE — 6830039000 HC L3 TRAUMA ALERT

## 2021-12-20 PROCEDURE — 85610 PROTHROMBIN TIME: CPT

## 2021-12-20 PROCEDURE — 80307 DRUG TEST PRSMV CHEM ANLYZR: CPT

## 2021-12-20 PROCEDURE — 96374 THER/PROPH/DIAG INJ IV PUSH: CPT

## 2021-12-20 RX ORDER — ONDANSETRON 4 MG/1
4 TABLET, ORALLY DISINTEGRATING ORAL EVERY 8 HOURS PRN
Status: DISCONTINUED | OUTPATIENT
Start: 2021-12-20 | End: 2021-12-21 | Stop reason: HOSPADM

## 2021-12-20 RX ORDER — SODIUM CHLORIDE 0.9 % (FLUSH) 0.9 %
5-40 SYRINGE (ML) INJECTION PRN
Status: DISCONTINUED | OUTPATIENT
Start: 2021-12-20 | End: 2021-12-21 | Stop reason: HOSPADM

## 2021-12-20 RX ORDER — DOCUSATE SODIUM 100 MG/1
100 CAPSULE, LIQUID FILLED ORAL 2 TIMES DAILY
Qty: 60 CAPSULE | Refills: 0 | Status: SHIPPED | OUTPATIENT
Start: 2021-12-20 | End: 2022-01-19

## 2021-12-20 RX ORDER — ACETAMINOPHEN 325 MG/1
650 TABLET ORAL EVERY 6 HOURS PRN
Status: DISCONTINUED | OUTPATIENT
Start: 2021-12-20 | End: 2021-12-21 | Stop reason: HOSPADM

## 2021-12-20 RX ORDER — MAG HYDROX/ALUMINUM HYD/SIMETH 400-400-40
1 SUSPENSION, ORAL (FINAL DOSE FORM) ORAL DAILY PRN
Qty: 1 SUPPOSITORY | Refills: 0 | Status: SHIPPED | OUTPATIENT
Start: 2021-12-20

## 2021-12-20 RX ORDER — ACETAMINOPHEN 650 MG/1
650 SUPPOSITORY RECTAL EVERY 6 HOURS PRN
Status: DISCONTINUED | OUTPATIENT
Start: 2021-12-20 | End: 2021-12-21 | Stop reason: HOSPADM

## 2021-12-20 RX ORDER — SODIUM CHLORIDE 9 MG/ML
25 INJECTION, SOLUTION INTRAVENOUS PRN
Status: DISCONTINUED | OUTPATIENT
Start: 2021-12-20 | End: 2021-12-21 | Stop reason: HOSPADM

## 2021-12-20 RX ORDER — SODIUM CHLORIDE 9 MG/ML
INJECTION, SOLUTION INTRAVENOUS ONCE
Status: COMPLETED | OUTPATIENT
Start: 2021-12-20 | End: 2021-12-20

## 2021-12-20 RX ORDER — LORAZEPAM 2 MG/ML
1 INJECTION INTRAMUSCULAR ONCE
Status: COMPLETED | OUTPATIENT
Start: 2021-12-20 | End: 2021-12-20

## 2021-12-20 RX ORDER — SODIUM CHLORIDE 0.9 % (FLUSH) 0.9 %
10 SYRINGE (ML) INJECTION
Status: DISPENSED | OUTPATIENT
Start: 2021-12-20 | End: 2021-12-20

## 2021-12-20 RX ORDER — ONDANSETRON 2 MG/ML
4 INJECTION INTRAMUSCULAR; INTRAVENOUS EVERY 6 HOURS PRN
Status: DISCONTINUED | OUTPATIENT
Start: 2021-12-20 | End: 2021-12-21 | Stop reason: HOSPADM

## 2021-12-20 RX ORDER — POLYETHYLENE GLYCOL 3350 17 G/17G
17 POWDER, FOR SOLUTION ORAL DAILY PRN
Status: DISCONTINUED | OUTPATIENT
Start: 2021-12-20 | End: 2021-12-21 | Stop reason: HOSPADM

## 2021-12-20 RX ORDER — SODIUM CHLORIDE 0.9 % (FLUSH) 0.9 %
5-40 SYRINGE (ML) INJECTION EVERY 12 HOURS SCHEDULED
Status: DISCONTINUED | OUTPATIENT
Start: 2021-12-20 | End: 2021-12-21 | Stop reason: HOSPADM

## 2021-12-20 RX ADMIN — SODIUM CHLORIDE: 9 INJECTION, SOLUTION INTRAVENOUS at 16:50

## 2021-12-20 RX ADMIN — SODIUM CHLORIDE, PRESERVATIVE FREE 10 ML: 5 INJECTION INTRAVENOUS at 23:17

## 2021-12-20 RX ADMIN — LORAZEPAM 1 MG: 2 INJECTION INTRAMUSCULAR; INTRAVENOUS at 13:49

## 2021-12-20 RX ADMIN — IOPAMIDOL 100 ML: 612 INJECTION, SOLUTION INTRAVENOUS at 14:06

## 2021-12-20 ASSESSMENT — ENCOUNTER SYMPTOMS
DIARRHEA: 0
VOMITING: 0

## 2021-12-20 NOTE — ED NOTES
Bed: 22  Expected date:   Expected time:   Means of arrival:   Comments:     [id number] [facility] [address] V, RN  12/20/21 1524

## 2021-12-20 NOTE — ED NOTES
Patient arrived via EMS for c/o fall. Patient lives at home alone, h/o stroke approx 5 years ago with right sided paralysis. Patient's brother at bedside, states patient only verbalizes 1 word at a time, does not form sentences. Patient takes care of self, ambulates with quad cane and family checks in on him frequently. Patient does not have any visible injuries. MSPs intact. Pupils equal and reactive. Abdomen soft. Lungs clear.       Theone GORDON Mckeon  12/20/21 2131

## 2021-12-20 NOTE — ED PROVIDER NOTES
[address] [place] [address] Jeff Goodwin [address]  eMERGENCY dEPARTMENT eNCOUnter      Pt Name: Sabine Chen  MRN: [id number]  Ne[place]acey 1955  Date of evaluation: 12/20/2021  Provider: Jose Boston DO    CHIEF COMPLAINT       Chief Complaint   Patient presents with    Fall         HISTORY OF PRESENT ILLNESS   (Location/Symptom, Timing/Onset,Context/Setting, Quality, Duration, Modifying Factors, Severity)  Note limiting factors. Sabine Chen is a 77 y.o. male who presents to the emergency department with complaint of a fall at home. Patient has a history of a stroke that has left him with right-sided paresis. Patient occasionally says yes but otherwise is nonverbal.  That is his baseline. His brother reports the patient on phone today. He called the ambulance patient came in the emergency room. Patient is dry cracked lips and dry mucous membranes on physical exam.  Trauma protocol was then instituted for patient with fall greater than 72years old. The history is provided by the patient. NursingNotes were reviewed. REVIEW OF SYSTEMS    (2-9 systems for level 4, 10 or more for level 5)     Review of Systems   Unable to perform ROS: Patient nonverbal   Constitutional: Negative for fever. Gastrointestinal: Negative for diarrhea and vomiting. Except as noted above the remainder of the review of systems was reviewed and negative.        PAST MEDICAL HISTORY     Past Medical History:   Diagnosis Date    Chronic right shoulder pain 10/4/2017    Closed TBI (traumatic brain injury) ([address] 12/28/2012    Essential hypertension 8/2/2017    Hemiparesis affecting dominant side as late effect of cerebrovascular accident (CVA) ([address] 1/30/2018    History of CVA (cerebrovascular accident) 8/2/2017    Hyperlipidemia 8/2/2017    Hypoxemia requiring supplemental oxygen 10/4/2017    Recurrent depression ([address] 8/2/2017    S/P patent foramen ovale closure 8/2/2017    Seizure ([address] 8/2/2017         SURGICALHISTORY     No past surgical history on file.       CURRENT MEDICATIONS       Current Discharge Medication List      CONTINUE these medications which have NOT CHANGED    Details   levETIRAcetam (KEPPRA XR) 500 MG TB24 extended release tablet Take 1 tablet by mouth daily  Qty: 90 tablet, Refills: 0    Associated Diagnoses: Seizure (HCC)      traMADol (ULTRAM) 50 MG tablet TAKE 1 TABLET BY MOUTH TWICE DAILY AS NEEDED FOR PAIN  Qty: 60 tablet, Refills: 2    Comments: Reduce doses taken as pain becomes manageable  Associated Diagnoses: Chronic right shoulder pain      !! furosemide (LASIX) 20 MG tablet TAKE 1 TABLET BY MOUTH DAILY AS NEEDED  Qty: 30 tablet, Refills: 5    Associated Diagnoses: Bilateral leg edema      !! levothyroxine (SYNTHROID) 50 MCG tablet TAKE 1 TABLET BY MOUTH DAILY  Qty: 30 tablet, Refills: 5    Associated Diagnoses: Hypothyroidism, unspecified type      !! levothyroxine (SYNTHROID) 50 MCG tablet Take 1 tablet by mouth daily  Qty: 30 tablet, Refills: 5    Associated Diagnoses: Hypothyroidism, unspecified type      !! furosemide (LASIX) 20 MG tablet Take 1 tablet by mouth daily as needed (edema)  Qty: 30 tablet, Refills: 5    Associated Diagnoses: Bilateral leg edema      potassium chloride (KLOR-CON M) 20 MEQ extended release tablet Take 1 tablet by mouth daily  Qty: 30 tablet, Refills: 2    Associated Diagnoses: Bilateral leg edema      DULoxetine (CYMBALTA) 60 MG extended release capsule Take 1 capsule by mouth daily  Qty: 90 capsule, Refills: 3    Associated Diagnoses: Recurrent depression (HCC)      ARIPiprazole (ABILIFY) 2 MG tablet Take 1 tablet by mouth daily  Qty: 30 tablet, Refills: 5    Associated Diagnoses: Recurrent depression (HCC)      rivaroxaban (XARELTO) 20 MG TABS tablet Take 1 tablet by mouth daily (with breakfast)  Qty: 30 tablet, Refills: 5    Associated Diagnoses: Chronic deep vein thrombosis (DVT) of left popliteal vein (HCC)      baclofen (LIORESAL) 10 MG tablet Take 1 tablet by mouth 4 times daily as needed (cramping/pain)  Qty: 120 tablet, Refills: 3    Associated Diagnoses: Hemiplegia affecting dominant side, post-stroke ([address] Chronic right shoulder pain      ibuprofen (ADVIL;MOTRIN) 800 MG tablet Take 1 tablet by mouth every 8 hours as needed for Pain  Qty: 90 tablet, Refills: 3    Associated Diagnoses: Hemiplegia affecting dominant side, post-stroke ([address] Chronic right shoulder pain      Handicap Placard MISC by Does not apply route Exp 5 yrs  Qty: 1 each, Refills: 0    Associated Diagnoses: Hemiparesis affecting dominant side as late effect of cerebrovascular accident (CVA) (HCC)      aspirin 81 MG chewable tablet Take 81 mg by mouth      Multiple Vitamin (MULTI VITAMIN PO) Take by mouth       !! - Potential duplicate medications found. Please discuss with provider. ALLERGIES     Tramadol    FAMILY HISTORY     No family history on file. SOCIAL HISTORY       Social History     Socioeconomic History    Marital status:      Spouse name: Not on file    Number of children: Not on file    Years of education: Not on file    Highest education level: Not on file   Occupational History    Not on file   Tobacco Use    Smoking status: Never Smoker    Smokeless tobacco: Never Used   Substance and Sexual Activity    Alcohol use: Not on file    Drug use: Not on file    Sexual activity: Not on file   Other Topics Concern    Not on file   Social History Narrative    Not on file     Social Determinants of Health     Financial Resource Strain: Low Risk     Difficulty of Paying Living Expenses: Not hard at all   Food Insecurity: No Food Insecurity    Worried About Running Out of Food in the Last Year: Never true    Kelley of Food in the Last Year: Never true   Transportation Needs: No Transportation Needs    Lack of Transportation (Medical): No    Lack of Transportation (Non-Medical):  No   Physical Activity:     Days of Exercise per Week: Not on file    Minutes of Exercise per Session: Not on file   Stress:     Feeling of Stress : Not on file   Social Connections:     Frequency of Communication with Friends and Family: Not on file    Frequency of Social Gatherings with Friends and Family: Not on file    Attends Gnosticism Services: Not on file    Active Member of Clubs or Organizations: Not on file    Attends Club or Organization Meetings: Not on file    Marital Status: Not on file   Intimate Partner Violence:     Fear of Current or Ex-Partner: Not on file    Emotionally Abused: Not on file    Physically Abused: Not on file    Sexually Abused: Not on file   Housing Stability:     Unable to Pay for Housing in the Last Year: Not on file    Number of [place] in the Last Year: Not on file    Unstable Housing in the Last Year: Not on file       SCREENINGS    [place] Coma Scale  Eye Opening: Spontaneous  Best Verbal Response: Incomprehensible speech  Best Motor Response: Obeys commands  [place] Coma Scale Score: 12 @FLOW([id number])@      PHYSICAL EXAM    (up to 7 for level 4, 8 or more for level 5)     ED Triage Vitals   BP Temp Temp Source Pulse Resp SpO2 Height Weight   12/20/21 1158 12/20/21 1202 12/20/21 1202 12/20/21 1158 12/20/21 1158 12/20/21 1158 -- --   131/72 98.6 °F (37 °C) Temporal 84 18 97 %         Physical Exam  Vitals and nursing note reviewed. Constitutional:       General: He is awake. He is not in acute distress. Appearance: Normal appearance. He is well-developed and normal weight. He is not ill-appearing, toxic-appearing or diaphoretic. Comments: No photophobia. No phonophobia. HENT:      Head: Normocephalic and atraumatic. No Kim's sign. Right Ear: External ear normal.      Left Ear: External ear normal.      Nose: Nose normal. No congestion or rhinorrhea. Mouth/Throat:      Mouth: Mucous membranes are dry. Pharynx: Oropharynx is clear. No oropharyngeal exudate or posterior oropharyngeal erythema.       Comments: Dry cracked lips with dry mucous membranes. Eyes:      General: No scleral icterus. Right eye: No foreign body or discharge. Left eye: No discharge. Extraocular Movements: Extraocular movements intact. Conjunctiva/sclera: Conjunctivae normal.      Left eye: No exudate. Pupils: Pupils are equal, round, and reactive to light. Neck:      Vascular: No JVD. Trachea: No tracheal deviation. Comments: No meningismus. Cardiovascular:      Rate and Rhythm: Normal rate and regular rhythm. Pulses: Normal pulses. Heart sounds: Normal heart sounds. Heart sounds not distant. No murmur heard. No friction rub. No gallop. Pulmonary:      Effort: Pulmonary effort is normal. No respiratory distress. Breath sounds: Normal breath sounds. No stridor. No wheezing, rhonchi or rales. Chest:      Chest wall: No tenderness. Abdominal:      General: Abdomen is flat. Bowel sounds are normal. There is no distension. Palpations: Abdomen is soft. There is no mass. Tenderness: There is no abdominal tenderness. There is no right CVA tenderness, left CVA tenderness, guarding or rebound. Hernia: No hernia is present. Musculoskeletal:         General: No swelling, tenderness, deformity or signs of injury. Cervical back: Normal range of motion and neck supple. No rigidity. Comments: Chronic right-sided paresis. No bony crepitus to the extremities. Lymphadenopathy:      Head:      Right side of head: No submental adenopathy. Left side of head: No submental adenopathy. Skin:     General: Skin is warm and dry. Capillary Refill: Capillary refill takes less than 2 seconds. Coloration: Skin is not jaundiced or pale. Findings: No bruising, erythema, lesion or rash. Neurological:      Mental Status: He is alert. Mental status is at baseline. Cranial Nerves: No cranial nerve deficit. Motor: No weakness.       Coordination: Coordination normal.      Deep Tendon Reflexes: Reflexes are normal and symmetric. Psychiatric:         Mood and Affect: Affect is flat. Behavior: Behavior is cooperative. DIAGNOSTIC RESULTS     EKG: All EKG's are interpreted by the Emergency Department Physician who either signs or Co-signsthis chart in the absence of a cardiologist.        RADIOLOGY:   [facility] such as CT, Ultrasound and MRI are read by the radiologist. Plain radiographic images are visualized and preliminarily interpreted by the emergency physician with the below findings:        Interpretation per the Radiologist below, if available at the time ofthis note:    CT HEAD WO CONTRAST   Final Result   Impression:      Remote left frontal and left temporal lobe infarcts. Mild cerebral atrophy. Chronic ischemic white matter disease. All CT scans at this facility use dose modulation, iterative reconstruction, and/or weight based dosing when appropriate to reduce radiation dose to as low as reasonably achievable. CT CERVICAL SPINE WO CONTRAST   Final Result      No fracture. Marked diffuse degenerative change cervical spine. C4 anterolisthesis and loss of body height, C6 and C7, most likely secondary to degenerative change. All CT scans at this facility use dose modulation, iterative reconstruction, and/or weight based dosing when appropriate to reduce radiation dose to as low as reasonably achievable. CT ABDOMEN PELVIS W IV CONTRAST Additional Contrast? None   Final Result      No acute abnormality in the chest, abdomen and pelvis. Large amount of colonic stool. CT CHEST W CONTRAST   Final Result      No acute abnormality in the chest, abdomen and pelvis. Large amount of colonic stool.                                     ED BEDSIDE ULTRASOUND:   Performed by ED Physician - none    LABS:  Labs Reviewed   CBC WITH AUTO DIFFERENTIAL - Abnormal; Notable for the following components:       Result Value    WBC 15.0 (*)     RBC 4.59 (*)     Hemoglobin 13.9 (*)     Hematocrit 41.3 (*)     Neutrophils Absolute 13.7 (*)     Lymphocytes Absolute 0.6 (*)     All other components within normal limits   COMPREHENSIVE METABOLIC PANEL - Abnormal; Notable for the following components:    Anion Gap 17 (*)     Glucose 142 (*)     Albumin 4.8 (*)     Total Bilirubin 0.9 (*)     Alkaline Phosphatase 107 (*)     All other components within normal limits   PROTIME-INR - Abnormal; Notable for the following components:    Protime 19.1 (*)     All other components within normal limits   CK - Abnormal; Notable for the following components: Total  (*)     All other components within normal limits   URINE RT REFLEX TO CULTURE - Abnormal; Notable for the following components:    Ketones, Urine TRACE (*)     Blood, Urine TRACE (*)     All other components within normal limits   URINE DRUG SCREEN - Abnormal; Notable for the following components:    Cannabinoid Scrn, Ur POSITIVE (*)     All other components within normal limits   LACTIC ACID, PLASMA - Abnormal; Notable for the following components:    Lactic Acid 4.2 (*)     All other components within normal limits    Narrative:     CALL  [facility]  [facility] tel. [phone],  LA[facility] results called to and read back by DR BRINK, 12/20/2021 13:18, by [facility]   CKMB & RELATIVE PERCENT - Abnormal; Notable for the following components:    CK-MB 10.5 (*)     All other components within normal limits   APTT   ETHANOL    Narrative:     CALL  [facility]  [facility] tel. [phone],  [facility] results called to and read back by DR BRINK, 12/20/2021 13:18, by Jacob BARTON/ REFLEX TO MG FOR LOW K   CBC WITH AUTO DIFFERENTIAL       All other labs were within normal range or not returned as of this dictation.     EMERGENCY DEPARTMENT COURSE and DIFFERENTIAL DIAGNOSIS/MDM:   Vitals:    Vitals:    12/20/21 1800 12/20/21 1830 12/20/21 1900 12/20/21 2000   BP: 105/65 (!) 101/56 99/61 99/61   Pulse: 105 81 75 102   Resp: 16 20 25 21   Temp:       TempSrc:       SpO2: 97% 98% 97% 99%   Height:               MDM patient's brother has been attending to the patient is sitting next to them. He reports that normally his brother is able to get up and walk that he try to get his own brother up and the patient nearly fell. Case was discussed with Dr. Rodriguez Seen. PT OT eval has been ordered for tomorrow. Inpatient rehab eval has been ordered for tomorrow. CONSULTS:  IP CONSULT TO REHAB/TCU ADMISSION COORDINATOR    PROCEDURES:  Unless otherwise noted below, none     Procedures    FINAL IMPRESSION      1. Closed head injury, initial encounter    2. Mild dehydration    3. History of stroke with residual effects    4. Dehydration    5. Fall in home, initial encounter    6. Constipation, unspecified constipation type          DISPOSITION/PLAN   DISPOSITION        PATIENT REFERRED TO:  Melissa Bocanegra MD  [address]  [phone]    Call in 1 day  To arrange a follow up visit.     [facility]) ED  [address] [id number] [phone]  Go to   If symptoms worsen    [facility]  [address] [place] [address]  [phone]  In 2 days  If symptoms worsen      DISCHARGE MEDICATIONS:  Current Discharge Medication List      START taking these medications    Details   glycerin 2 g suppository Place 1 suppository rectally daily as needed for Constipation  Qty: 1 suppository, Refills: 0      docusate sodium (COLACE) 100 MG capsule Take 1 capsule by mouth 2 times daily  Qty: 60 capsule, Refills: 0                (Please note that portions of this note were completed with a voice recognition program.  Efforts were made to edit the dictations but occasionally words are mis-transcribed.)    Sam Luevano DO (electronically signed)  Attending Emergency Physician          Sam Luevano DO  12/20/21 2235

## 2021-12-20 NOTE — ED NOTES
Pt soiled himself. Magda care performed. Bed linens changed. Condom catheter in use. Placed back on monitor.       Davonte Cain  12/20/21 4933

## 2021-12-20 NOTE — H&P
Hospitalist History and Physical  Name: Arnulfo Oseguera  Age: 77 y.o. Gender: male  CodeStatus: No Order  Allergies: Tramadol    Chief Complaint:Fall    Primary Care Provider: Pieter Zhao MD  InpatientTreatment Team: Treatment Team: Attending Provider: Magalys Kapadia DO  Admission Date: 12/20/2021      Subjective: Arnulfo Oseguera is a 77 y.o. male with a PMHx of depression, CVA with Right hemiplegia, TBI, seizures, HTN, and HLD presents to the ED after presumed fall from standing and being found down by brother just prior to arrival with altered mental status. Pateint aphasic from previous CVA. Unknown LOC. Unknown headstrike (+) ASA. Last known well earlier this AM. Patient awake, in no distress. Per brother, patient baseline communication is one word responses, does not form sentences. Patient lives alone and ambulates with quad cane with difficult movement of right leg. Laboratory findings significant for lactic acidosis (4.2) and barely eleveted CK (944). CT negative for acute pathology. Physical Exam  Constitutional:       General: He is awake. HENT:      Head: Normocephalic and atraumatic. Right Ear: External ear normal.      Left Ear: External ear normal.      Nose: Nose normal.      Mouth/Throat:      Pharynx: Oropharynx is clear. Eyes:      Extraocular Movements: Extraocular movements intact. Conjunctiva/sclera: Conjunctivae normal.      Pupils: Pupils are equal, round, and reactive to light. Cardiovascular:      Rate and Rhythm: Normal rate and regular rhythm. Pulses: Normal pulses. Heart sounds: Normal heart sounds. Pulmonary:      Effort: Pulmonary effort is normal.      Breath sounds: Normal breath sounds. Abdominal:      General: Bowel sounds are normal.      Palpations: Abdomen is soft. Musculoskeletal:         General: Normal range of motion. Cervical back: Normal range of motion and neck supple. Skin:     General: Skin is warm and dry.       Capillary Refill: Capillary refill takes less than 2 seconds. Neurological:      Mental Status: He is confused. Sensory: Sensation is intact. Comments:  not following commands however pulled pants down to use urinal appropriately, moving LUE, LLE and RUE            Review of Systems   Unable to perform ROS: Mental status change   Genitourinary:        Incontinent    Psychiatric/Behavioral: Positive for confusion. Medications:  Reviewed      No current facility-administered medications on file prior to encounter.      Current Outpatient Medications on File Prior to Encounter   Medication Sig Dispense Refill    levETIRAcetam (KEPPRA XR) 500 MG TB24 extended release tablet Take 1 tablet by mouth daily 90 tablet 0    traMADol (ULTRAM) 50 MG tablet TAKE 1 TABLET BY MOUTH TWICE DAILY AS NEEDED FOR PAIN 60 tablet 2    furosemide (LASIX) 20 MG tablet TAKE 1 TABLET BY MOUTH DAILY AS NEEDED 30 tablet 5    levothyroxine (SYNTHROID) 50 MCG tablet TAKE 1 TABLET BY MOUTH DAILY 30 tablet 5    levothyroxine (SYNTHROID) 50 MCG tablet Take 1 tablet by mouth daily 30 tablet 5    furosemide (LASIX) 20 MG tablet Take 1 tablet by mouth daily as needed (edema) 30 tablet 5    potassium chloride (KLOR-CON M) 20 MEQ extended release tablet Take 1 tablet by mouth daily 30 tablet 2    DULoxetine (CYMBALTA) 60 MG extended release capsule Take 1 capsule by mouth daily 90 capsule 3    ARIPiprazole (ABILIFY) 2 MG tablet Take 1 tablet by mouth daily 30 tablet 5    rivaroxaban (XARELTO) 20 MG TABS tablet Take 1 tablet by mouth daily (with breakfast) 30 tablet 5    baclofen (LIORESAL) 10 MG tablet Take 1 tablet by mouth 4 times daily as needed (cramping/pain) 120 tablet 3    ibuprofen (ADVIL;MOTRIN) 800 MG tablet Take 1 tablet by mouth every 8 hours as needed for Pain 90 tablet 3    Handicap Placard MISC by Does not apply route Exp 5 yrs 1 each 0    aspirin 81 MG chewable tablet Take 81 mg by mouth      Multiple Vitamin (MULTI VITAMIN PO) Take by mouth          Past Medical History:   Diagnosis Date    Chronic right shoulder pain 10/4/2017    Closed TBI (traumatic brain injury) ([facility] 12/28/2012    Essential hypertension 8/2/2017    Hemiparesis affecting dominant side as late effect of cerebrovascular accident (CVA) ([facility] 1/30/2018    History of CVA (cerebrovascular accident) 8/2/2017    Hyperlipidemia 8/2/2017    Hypoxemia requiring supplemental oxygen 10/4/2017    Recurrent depression ([facility] 8/2/2017    S/P patent foramen ovale closure 8/2/2017    Seizure ([facility] 8/2/2017       No past surgical history on file. No family history on file. Social History     Socioeconomic History    Marital status:      Spouse name: Not on file    Number of children: Not on file    Years of education: Not on file    Highest education level: Not on file   Occupational History    Not on file   Tobacco Use    Smoking status: Never Smoker    Smokeless tobacco: Never Used   Substance and Sexual Activity    Alcohol use: Not on file    Drug use: Not on file    Sexual activity: Not on file   Other Topics Concern    Not on file   Social History Narrative    Not on file     Social Determinants of Health     Financial Resource Strain: Low Risk     Difficulty of Paying Living Expenses: Not hard at all   Food Insecurity: No Food Insecurity    Worried About Running Out of Food in the Last Year: Never true    Kelley of Food in the Last Year: Never true   Transportation Needs: No Transportation Needs    Lack of Transportation (Medical): No    Lack of Transportation (Non-Medical):  No   Physical Activity:     Days of Exercise per Week: Not on file    Minutes of Exercise per Session: Not on file   Stress:     Feeling of Stress : Not on file   Social Connections:     Frequency of Communication with Friends and Family: Not on file    Frequency of Social Gatherings with Friends and Family: Not on file    Attends [employer] Services: Not on file    Active Member of Clubs or Organizations: Not on file    Attends Club or Organization Meetings: Not on file    Marital Status: Not on file   Intimate Partner Violence:     Fear of Current or Ex-Partner: Not on file    Emotionally Abused: Not on file    Physically Abused: Not on file    Sexually Abused: Not on file   Housing Stability:     Unable to Pay for Housing in the Last Year: Not on file    Number of [place] in the Last Year: Not on file    Unstable Housing in the Last Year: Not on file        Infusion Medications:   Scheduled Medications:   PRN Meds: sodium chloride flush    Labs:   Recent Labs     12/20/21  1230   WBC 15.0*   HGB 13.9*   HCT 41.3*        Recent Labs     12/20/21  1230      K 4.1      CO2 21   BUN 11   CREATININE 0.99   CALCIUM 9.9     Recent Labs     12/20/21  1230   AST 39   ALT 29   BILITOT 0.9*   ALKPHOS 107*     Recent Labs     12/20/21  1230   INR 1.6     Recent Labs     12/20/21  1230   CKTOTAL 944*       Urinalysis:   Lab Results   Component Value Date    NITRU Negative 12/20/2021    WBCUA 3-5 12/20/2021    BACTERIA Negative 12/20/2021    RBCUA 0-2 12/20/2021    BLOODU TRACE 12/20/2021    SPECGRAV 1.010 12/20/2021    GLUCOSEU Negative 12/20/2021       Radiology:   Most recent    Chest CT      WITH CONTRAST:Results for orders placed during the hospital encounter of 12/20/21    CT CHEST W CONTRAST    Narrative  EXAMINATION:  CT CHEST WITH IV CONTRAST and CT ABDOMEN AND PELVIS WITH IV CONTRAST    CLINICAL HISTORY:  Fall    TECHNIQUE:  CT of the chest from the thoracic inlet to the upper abdomen was performed following IV contrast.  CT of the abdomen and pelvis was performed using standard technique, scanning from just above the dome of the diaphragm to the symphysis pubis. Contrast:  IV:  100 ml of Isovue 300    COMPARISON: None. RESULT:    Limitations:  None. Chest:    Lines, tubes, and devices:  None.     Lung parenchyma and pleura: No consolidation. No suspicious pulmonary nodule. No pleural effusion. Central airways are patent. Mild bibasilar atelectasis. Thoracic inlet, heart, and mediastinum:  No lymphadenopathy in the axillary, mediastinal, or hilar regions. Mild atherosclerotic vascular thoracic aorta. The thoracic aorta and main pulmonary artery are normal in caliber. The cardiac chambers are normal  in size. Status post foramen ovale closure. Mild coronary artery atherosclerotic calcifications are noted, although the study is not optimized for coronary assessment. No pericardial effusion or thickening. Thyroid gland is unremarkable. Esophagus is  nondilated. Bones/Soft Tissues: Degenerative changes. Abdomen / Pelvis:    Liver: No mass. Right hepatic lobe metallic density. Biliary: No bile duct dilation. Gallbladder is unremarkable. Spleen: No mass. No splenomegaly. Pancreas: No mass or duct dilation. Adrenals: No mass. Kidneys: No mass, calculus or hydronephrosis. GI tract: No dilation or wall thickening. Large amount of colonic stool. Lymph nodes: No abdominal or pelvic lymphadenopathy. Mesentery/Peritoneum: No ascites or mass. Retroperitoneum: No mass. Vasculature: The celiac axis and SMA are patent. The portal vein and branches, splenic vein, SMV, and hepatic veins are patent. Arterial atherosclerotic disease without aneurysm. There is an IVC filter. Pelvis: No mass, ascites or fluid collection. Urinary bladder is unremarkable. Soft tissue/ bones: Grade I anterolisthesis of L5 on S1. Multilevel degenerative changes of the lumbar spine. Impression  No acute abnormality in the chest, abdomen and pelvis. Large amount of colonic stool. WITHOUT CONTRAST: No results found for this or any previous visit. CXR      2-view: No results found for this or any previous visit. Portable: No results found for this or any previous visit.       Echo No results found for this or any previous visit. Assessment/Plan:    Mildly Increased AGAP Metabolic acidosis:  Patient received 2L Bolus, repeat  Lactic acid level. Monitor renal function for azotemia. Repeat BMP in AM.     Gait Instability: Fall precautions, Up with assist. PT OT. Maximize nutritional status. H/o seizures: resume keppra     I personally spent estimated 60 minutes with this patient today. Additional work up or/and treatment plan may be added today or then after based on clinical progression. I am managing a portion of pt care. Some medical issues are handled by other specialists. Additional work up and treatment should be done in out pt setting by pt PCP and other out pt providers. In addition to examining and evaluating pt, I spent additional time explaining care, normaland abnormal findings, and treatment plan. All of pt questions were answered. Counseling, diet and education were provided. Case will be discussed with nursing staff when appropriate. Family will be updated if and when appropriate.       Electronically signed by JULITA Zapata CNP on 12/20/2021 at 6:34 PM

## 2021-12-20 NOTE — ED TRIAGE NOTES
Patient brought in by EMS for fall. Patient fell at home, uses a quad cane for ambulation. Patient is aphasic from stroke 2016. Brother at bedside, trauma doctor at bedside.

## 2021-12-20 NOTE — H&P
lacerations/wounds  Pulmonary: External exam: no crepitus or pain with palpation, no contusions or abrasions; and Lung exam: breath sounds clear, no wheezes, no rales  Cardiovascular:    Pulses: Bilateral radial, femoral, DP and PT pulses are normal;  Abdomen: Appearance: Non-distended, No scars, lacerations, contusions; and Palpation: no tenderness   Rectal: No gross blood noted  Pelvis/Perineum: Pelvis is stable to palpation; and No blood noted at the urethral meatus;  Musculoskeletal:    Back/Spine: Thoracolumbar spinal column non-tender; no step off or deformity; and Thoracolumbar spinal column tender to palpation; Extremities: No gross upper or lower extremity signs of trauma;    PAST MEDICAL HISTORY:  Past Medical History:   Diagnosis Date    Chronic right shoulder pain 10/4/2017    Closed TBI (traumatic brain injury) ([address] 12/28/2012    Essential hypertension 8/2/2017    Hemiparesis affecting dominant side as late effect of cerebrovascular accident (CVA) ([address] 1/30/2018    History of CVA (cerebrovascular accident) 8/2/2017    Hyperlipidemia 8/2/2017    Hypoxemia requiring supplemental oxygen 10/4/2017    Recurrent depression ([address] 8/2/2017    S/P patent foramen ovale closure 8/2/2017    Seizure ([address] 8/2/2017       PAST SURGICAL HISTORY:  No past surgical history on file. PRE-ADMISSION MEDICATIONS:   Prior to Admission medications    Medication Sig Start Date End Date Taking?  Authorizing Provider   levETIRAcetam (KEPPRA XR) 500 MG TB24 extended release tablet Take 1 tablet by mouth daily 12/17/21   Sean Jenkins MD   traMADol (ULTRAM) 50 MG tablet TAKE 1 TABLET BY MOUTH TWICE DAILY AS NEEDED FOR PAIN 12/17/21 3/17/22  Sean Jenkins MD   furosemide (LASIX) 20 MG tablet TAKE 1 TABLET BY MOUTH DAILY AS NEEDED 10/25/21   Sean Jenkins MD   levothyroxine (SYNTHROID) 50 MCG tablet TAKE 1 TABLET BY MOUTH DAILY 10/25/21   Sean Jenkins MD   levothyroxine (SYNTHROID) 50 MCG tablet Take 1 tablet by mouth daily 10/25/21   David Acosta MD   furosemide (LASIX) 20 MG tablet Take 1 tablet by mouth daily as needed (edema) 10/25/21   David Acosta MD   potassium chloride (KLOR-CON M) 20 MEQ extended release tablet Take 1 tablet by mouth daily 10/13/21   David Acosta MD   DULoxetine (CYMBALTA) 60 MG extended release capsule Take 1 capsule by mouth daily 10/6/21   David Acosta MD   ARIPiprazole (ABILIFY) 2 MG tablet Take 1 tablet by mouth daily 10/6/21   David Acosta MD   rivaroxaban (XARELTO) 20 MG TABS tablet Take 1 tablet by mouth daily (with breakfast) 9/13/21   David Acosta MD   baclofen (LIORESAL) 10 MG tablet Take 1 tablet by mouth 4 times daily as needed (cramping/pain) 7/6/21   David Acosta MD   ibuprofen (ADVIL;MOTRIN) 800 MG tablet Take 1 tablet by mouth every 8 hours as needed for Pain 12/11/18   [facility], DO   Handicap Placard MISC by Does not apply route Exp 5 yrs 8/27/18   [facility], DO   aspirin 81 MG chewable tablet Take 81 mg by mouth 1/18/13   Historical Provider, MD   Multiple Vitamin (MULTI VITAMIN PO) Take by mouth    Historical Provider, MD       ALLERGIES:  Tramadol    SOCIAL HISTORY:   Social History     Socioeconomic History    Marital status:      Spouse name: Not on file    Number of children: Not on file    Years of education: Not on file    Highest education level: Not on file   Occupational History    Not on file   Tobacco Use    Smoking status: Never Smoker    Smokeless tobacco: Never Used   Substance and Sexual Activity    Alcohol use: Not on file    Drug use: Not on file    Sexual activity: Not on file   Other Topics Concern    Not on file   Social History Narrative    Not on file     Social Determinants of Health     Financial Resource Strain: Low Risk     Difficulty of Paying Living Expenses: Not hard at all   Food Insecurity: No Food Insecurity    Worried About [address] in the Last Year: Never true    Kelley of Food in the Last Year: Never true   Transportation Needs: No Transportation Needs    Lack of Transportation (Medical): No    Lack of Transportation (Non-Medical): No   Physical Activity:     Days of Exercise per Week: Not on file    Minutes of Exercise per Session: Not on file   Stress:     Feeling of Stress : Not on file   Social Connections:     Frequency of Communication with Friends and Family: Not on file    Frequency of Social Gatherings with Friends and Family: Not on file    Attends Ca[employer] Services: Not on file    Active Member of [address] [place] [address] or Organizations: Not on file    Attends Club or Organization Meetings: Not on file    Marital Status: Not on file   Intimate Partner Violence:     Fear of Current or Ex-Partner: Not on file    Emotionally Abused: Not on file    Physically Abused: Not on file    Sexually Abused: Not on file   Housing Stability:     Unable to Pay for Housing in the Last Year: Not on file    Number of [place] in the Last Year: Not on file    Unstable Housing in the Last Year: Not on file       FAMILY HISTORY:  No family history on file. REVIEW OF SYSTEMS:   Constitutional: Negative for weight loss  HENT: Negative for congestion, facial swelling and bloody nose  Eyes: Negative for vision changes  Respiratory: Negative for shortness of breath, difficulty breathing  Cardiovascular: Negative for chest wall pain. Gastrointestinal: Negative for abdominal distention, abdominal pain and vomiting. Genitourinary: Negative for hematuria  Musculoskeletal: Negative for gait difficulties   Skin: Negative for bruising, abrasions  Neurological: Negative for dizziness, weakness and light-headedness. Hematological: Negative for easy bruising/bleeding  Psychiatric/Behavioral: Negative for behavioral problems. Except as noted above the remainder of the review of systems was reviewed and negative.      BASIC LABS:   CBC with Differential:    Lab Results   Component Value Date    WBC 15.0 12/20/2021 RBC 4.59 12/20/2021    HGB 13.9 12/20/2021    HCT 41.3 12/20/2021     12/20/2021    MCV 89.9 12/20/2021    MCH 30.3 12/20/2021    MCHC 33.7 12/20/2021    RDW 13.4 12/20/2021    LYMPHOPCT 4.1 12/20/2021    MONOPCT 4.1 12/20/2021    BASOPCT 0.1 12/20/2021    MONOSABS 0.6 12/20/2021    LYMPHSABS 0.6 12/20/2021    EOSABS 0.0 12/20/2021    BASOSABS 0.0 12/20/2021     CMP:   Lab Results   Component Value Date     12/20/2021    K 4.1 12/20/2021     12/20/2021    CO2 21 12/20/2021    BUN 11 12/20/2021    CREATININE 0.99 12/20/2021    GLUCOSE 142 (H) 12/20/2021    CALCIUM 9.9 12/20/2021    PROT 7.8 12/20/2021    LABALBU 4.8 (H) 12/20/2021    BILITOT 0.9 (H) 12/20/2021    ALKPHOS 107 (H) 12/20/2021    AST 39 12/20/2021    ALT 29 12/20/2021    LABGLOM >60.0 12/20/2021    GFRAA >60.0 12/20/2021    GLOB 3.0 12/20/2021       CK: 944  CK-MB/CKMB index: 10.5/1.1  Lactic Acid: 4.2  PT/INR:   Recent Labs     12/20/21  1230   PROTIME 19.1*   INR 1.6     APTT:   Recent Labs     12/20/21  1230   APTT 31.5     EtOH:   Lab Results   Component Value Date    ETOH <10 12/20/2021       RADIOLOGY: (Personally reviewed)  CT Head:   Remote left frontal and left temporal lobe infarcts.       Mild cerebral atrophy.       Chronic ischemic white matter disease.           All CT scans at this facility use dose modulation, iterative reconstruction, and/or weight based dosing when appropriate to reduce radiation dose to as low as reasonably achievable. CT C-Spine:   No fracture.       Marked diffuse degenerative change cervical spine.       C4 anterolisthesis and loss of body height, C6 and C7, most likely secondary to degenerative change.               All CT scans at this facility use dose modulation, iterative reconstruction, and/or weight based dosing when appropriate to reduce radiation dose to as low as reasonably achievable.        CT Chest:   No acute abnormality in the chest, abdomen and pelvis.     Large amount of colonic stool. CT Abdomen/Pelvis:   No acute abnormality in the chest, abdomen and pelvis.     Large amount of colonic stool       ASSESSMENT:   Betty Spivey is a 77 y.o. male with PMHx of depression, CVA with Right hemiplegia, TBI, seizures, HTN, and HLD presents to the ED after FFS, being found down and AMS. Pateint aphasic from previous CVA. On exam, unable to elicit any facial grimacing with palpation and movement of all extremities. No outwardley signs of trauma. Trauma workup found no acute traumatic injuries. Laboratory findings significant for lactic acidosis (4.2) and barely eleveted CK (944). No leukocytosis, anemia, electrolyte abnormalities ot acute renal insufficiency. PLAN:  1. No acute traumatic injuries requiring admission to trauma surgery services. 2. Recommend NS 1 L bolus for lactic acidosis  3. No follow up with trauma surgery service indicated  4.  Final dispo per ED        JULITA Ray - CNP  Trauma/Critical Care/General Surgery  [phone] ([id number])  [phone]     This patient's plan of care was discussed and made in collaboration with Trauma Attending physician, Gera Lamb MD.

## 2021-12-21 ENCOUNTER — HOSPITAL ENCOUNTER (INPATIENT)
Age: 66
LOS: 11 days | Discharge: HOME | DRG: 057 | End: 2022-01-01
Attending: PHYSICAL MEDICINE & REHABILITATION | Admitting: PHYSICAL MEDICINE & REHABILITATION
Payer: MEDICARE

## 2021-12-21 VITALS
RESPIRATION RATE: 20 BRPM | HEART RATE: 72 BPM | OXYGEN SATURATION: 99 % | DIASTOLIC BLOOD PRESSURE: 64 MMHG | BODY MASS INDEX: 25.09 KG/M2 | HEIGHT: 68 IN | SYSTOLIC BLOOD PRESSURE: 108 MMHG | TEMPERATURE: 97.9 F

## 2021-12-21 DIAGNOSIS — G89.29 CHRONIC RIGHT SHOULDER PAIN: ICD-10-CM

## 2021-12-21 DIAGNOSIS — Z78.9 DEFICIT IN ACTIVITIES OF DAILY LIVING (ADL): ICD-10-CM

## 2021-12-21 DIAGNOSIS — S09.90XS COGNITIVE DEFICIT DUE TO OLD HEAD INJURY: ICD-10-CM

## 2021-12-21 DIAGNOSIS — Z74.09 IMPAIRED MOBILITY AND ACTIVITIES OF DAILY LIVING: Primary | ICD-10-CM

## 2021-12-21 DIAGNOSIS — M25.511 CHRONIC RIGHT SHOULDER PAIN: ICD-10-CM

## 2021-12-21 DIAGNOSIS — Z78.9 IMPAIRED MOBILITY AND ACTIVITIES OF DAILY LIVING: Primary | ICD-10-CM

## 2021-12-21 DIAGNOSIS — R41.89 COGNITIVE DEFICIT DUE TO OLD HEAD INJURY: ICD-10-CM

## 2021-12-21 DIAGNOSIS — R47.01 APHASIA: ICD-10-CM

## 2021-12-21 PROBLEM — R32 BOWEL AND BLADDER INCONTINENCE: Status: ACTIVE | Noted: 2021-12-21

## 2021-12-21 PROBLEM — R15.9 BOWEL AND BLADDER INCONTINENCE: Status: ACTIVE | Noted: 2021-12-21

## 2021-12-21 PROBLEM — M47.812 DJD (DEGENERATIVE JOINT DISEASE), CERVICAL: Status: ACTIVE | Noted: 2021-12-21

## 2021-12-21 PROBLEM — M47.816 DJD (DEGENERATIVE JOINT DISEASE), LUMBAR: Status: ACTIVE | Noted: 2021-12-21

## 2021-12-21 LAB
ALBUMIN SERPL-MCNC: 4.1 G/DL (ref 3.5–4.6)
ALP BLD-CCNC: 88 U/L (ref 35–104)
ALT SERPL-CCNC: 35 U/L (ref 0–41)
ANION GAP SERPL CALCULATED.3IONS-SCNC: 10 MEQ/L (ref 9–15)
AST SERPL-CCNC: 107 U/L (ref 0–40)
BASOPHILS ABSOLUTE: 0 K/UL (ref 0–0.2)
BASOPHILS ABSOLUTE: 0 K/UL (ref 0–0.2)
BASOPHILS RELATIVE PERCENT: 0.1 %
BASOPHILS RELATIVE PERCENT: 0.1 %
BILIRUB SERPL-MCNC: 1.5 MG/DL (ref 0.2–0.7)
BUN BLDV-MCNC: 14 MG/DL (ref 8–23)
CALCIUM SERPL-MCNC: 9.2 MG/DL (ref 8.5–9.9)
CHLORIDE BLD-SCNC: 106 MEQ/L (ref 95–107)
CO2: 22 MEQ/L (ref 20–31)
CREAT SERPL-MCNC: 0.85 MG/DL (ref 0.7–1.2)
EOSINOPHILS ABSOLUTE: 0 K/UL (ref 0–0.7)
EOSINOPHILS ABSOLUTE: 0 K/UL (ref 0–0.7)
EOSINOPHILS RELATIVE PERCENT: 0 %
EOSINOPHILS RELATIVE PERCENT: 0 %
GFR AFRICAN AMERICAN: >60
GFR NON-AFRICAN AMERICAN: >60
GLOBULIN: 2.7 G/DL (ref 2.3–3.5)
GLUCOSE BLD-MCNC: 104 MG/DL (ref 70–99)
HCT VFR BLD CALC: 37.9 % (ref 42–52)
HCT VFR BLD CALC: 39 % (ref 42–52)
HEMOGLOBIN: 13 G/DL (ref 14–18)
HEMOGLOBIN: 13.3 G/DL (ref 14–18)
LYMPHOCYTES ABSOLUTE: 1.4 K/UL (ref 1–4.8)
LYMPHOCYTES ABSOLUTE: 1.5 K/UL (ref 1–4.8)
LYMPHOCYTES RELATIVE PERCENT: 11.1 %
LYMPHOCYTES RELATIVE PERCENT: 14.1 %
MAGNESIUM: 2.4 MG/DL (ref 1.7–2.4)
MCH RBC QN AUTO: 30.4 PG (ref 27–31.3)
MCH RBC QN AUTO: 30.6 PG (ref 27–31.3)
MCHC RBC AUTO-ENTMCNC: 34.2 % (ref 33–37)
MCHC RBC AUTO-ENTMCNC: 34.2 % (ref 33–37)
MCV RBC AUTO: 88.8 FL (ref 80–100)
MCV RBC AUTO: 89.5 FL (ref 80–100)
MONOCYTES ABSOLUTE: 0.9 K/UL (ref 0.2–0.8)
MONOCYTES ABSOLUTE: 1 K/UL (ref 0.2–0.8)
MONOCYTES RELATIVE PERCENT: 7.8 %
MONOCYTES RELATIVE PERCENT: 8.3 %
NEUTROPHILS ABSOLUTE: 10.3 K/UL (ref 1.4–6.5)
NEUTROPHILS ABSOLUTE: 8.5 K/UL (ref 1.4–6.5)
NEUTROPHILS RELATIVE PERCENT: 77.5 %
NEUTROPHILS RELATIVE PERCENT: 81 %
PDW BLD-RTO: 13.3 % (ref 11.5–14.5)
PDW BLD-RTO: 13.7 % (ref 11.5–14.5)
PLATELET # BLD: 179 K/UL (ref 130–400)
PLATELET # BLD: 185 K/UL (ref 130–400)
POTASSIUM REFLEX MAGNESIUM: 3.5 MEQ/L (ref 3.4–4.9)
RBC # BLD: 4.26 M/UL (ref 4.7–6.1)
RBC # BLD: 4.36 M/UL (ref 4.7–6.1)
SARS-COV-2, NAAT: NOT DETECTED
SODIUM BLD-SCNC: 138 MEQ/L (ref 135–144)
TOTAL PROTEIN: 6.8 G/DL (ref 6.3–8)
WBC # BLD: 10.9 K/UL (ref 4.8–10.8)
WBC # BLD: 12.8 K/UL (ref 4.8–10.8)

## 2021-12-21 PROCEDURE — 36415 COLL VENOUS BLD VENIPUNCTURE: CPT

## 2021-12-21 PROCEDURE — 85025 COMPLETE CBC W/AUTO DIFF WBC: CPT

## 2021-12-21 PROCEDURE — 1180000000 HC REHAB R&B

## 2021-12-21 PROCEDURE — 80053 COMPREHEN METABOLIC PANEL: CPT

## 2021-12-21 PROCEDURE — 99221 1ST HOSP IP/OBS SF/LOW 40: CPT | Performed by: PHYSICAL MEDICINE & REHABILITATION

## 2021-12-21 PROCEDURE — G0378 HOSPITAL OBSERVATION PER HR: HCPCS

## 2021-12-21 PROCEDURE — 97535 SELF CARE MNGMENT TRAINING: CPT

## 2021-12-21 PROCEDURE — 83735 ASSAY OF MAGNESIUM: CPT

## 2021-12-21 PROCEDURE — 6370000000 HC RX 637 (ALT 250 FOR IP): Performed by: INTERNAL MEDICINE

## 2021-12-21 PROCEDURE — 97166 OT EVAL MOD COMPLEX 45 MIN: CPT

## 2021-12-21 PROCEDURE — 6360000002 HC RX W HCPCS: Performed by: NURSE PRACTITIONER

## 2021-12-21 PROCEDURE — 97163 PT EVAL HIGH COMPLEX 45 MIN: CPT

## 2021-12-21 PROCEDURE — 96372 THER/PROPH/DIAG INJ SC/IM: CPT

## 2021-12-21 PROCEDURE — 6370000000 HC RX 637 (ALT 250 FOR IP): Performed by: NURSE PRACTITIONER

## 2021-12-21 PROCEDURE — 2580000003 HC RX 258: Performed by: NURSE PRACTITIONER

## 2021-12-21 PROCEDURE — 87635 SARS-COV-2 COVID-19 AMP PRB: CPT

## 2021-12-21 RX ORDER — POLYETHYLENE GLYCOL 3350 17 G/17G
17 POWDER, FOR SOLUTION ORAL DAILY PRN
Status: DISCONTINUED | OUTPATIENT
Start: 2021-12-21 | End: 2022-01-01 | Stop reason: HOSPADM

## 2021-12-21 RX ORDER — ARIPIPRAZOLE 2 MG/1
2 TABLET ORAL DAILY
Status: CANCELLED | OUTPATIENT
Start: 2021-12-21

## 2021-12-21 RX ORDER — ACETAMINOPHEN 325 MG/1
650 TABLET ORAL EVERY 6 HOURS PRN
Status: CANCELLED | OUTPATIENT
Start: 2021-12-21

## 2021-12-21 RX ORDER — ONDANSETRON 2 MG/ML
4 INJECTION INTRAMUSCULAR; INTRAVENOUS EVERY 6 HOURS PRN
Status: CANCELLED | OUTPATIENT
Start: 2021-12-21

## 2021-12-21 RX ORDER — LEVOTHYROXINE SODIUM 0.05 MG/1
50 TABLET ORAL DAILY
Status: DISCONTINUED | OUTPATIENT
Start: 2021-12-22 | End: 2022-01-01 | Stop reason: HOSPADM

## 2021-12-21 RX ORDER — ASPIRIN 81 MG/1
81 TABLET, CHEWABLE ORAL DAILY
Status: DISCONTINUED | OUTPATIENT
Start: 2021-12-21 | End: 2021-12-21 | Stop reason: HOSPADM

## 2021-12-21 RX ORDER — ASPIRIN 81 MG/1
81 TABLET, CHEWABLE ORAL DAILY
Status: DISCONTINUED | OUTPATIENT
Start: 2021-12-22 | End: 2022-01-01 | Stop reason: HOSPADM

## 2021-12-21 RX ORDER — POLYETHYLENE GLYCOL 3350 17 G/17G
17 POWDER, FOR SOLUTION ORAL DAILY PRN
Status: CANCELLED | OUTPATIENT
Start: 2021-12-21

## 2021-12-21 RX ORDER — SODIUM CHLORIDE 0.9 % (FLUSH) 0.9 %
5-40 SYRINGE (ML) INJECTION PRN
Status: CANCELLED | OUTPATIENT
Start: 2021-12-21

## 2021-12-21 RX ORDER — SODIUM CHLORIDE 0.9 % (FLUSH) 0.9 %
5-40 SYRINGE (ML) INJECTION PRN
Status: DISCONTINUED | OUTPATIENT
Start: 2021-12-21 | End: 2022-01-01 | Stop reason: HOSPADM

## 2021-12-21 RX ORDER — ONDANSETRON 4 MG/1
4 TABLET, ORALLY DISINTEGRATING ORAL EVERY 8 HOURS PRN
Status: DISCONTINUED | OUTPATIENT
Start: 2021-12-21 | End: 2022-01-01 | Stop reason: HOSPADM

## 2021-12-21 RX ORDER — BACLOFEN 10 MG/1
10 TABLET ORAL 4 TIMES DAILY PRN
Status: DISCONTINUED | OUTPATIENT
Start: 2021-12-21 | End: 2021-12-23

## 2021-12-21 RX ORDER — DULOXETIN HYDROCHLORIDE 60 MG/1
60 CAPSULE, DELAYED RELEASE ORAL DAILY
Status: DISCONTINUED | OUTPATIENT
Start: 2021-12-21 | End: 2021-12-21 | Stop reason: HOSPADM

## 2021-12-21 RX ORDER — ARIPIPRAZOLE 2 MG/1
2 TABLET ORAL DAILY
Status: DISCONTINUED | OUTPATIENT
Start: 2021-12-22 | End: 2022-01-01 | Stop reason: HOSPADM

## 2021-12-21 RX ORDER — TRAMADOL HYDROCHLORIDE 50 MG/1
25 TABLET ORAL EVERY 4 HOURS PRN
Status: DISCONTINUED | OUTPATIENT
Start: 2021-12-21 | End: 2021-12-21 | Stop reason: HOSPADM

## 2021-12-21 RX ORDER — DULOXETIN HYDROCHLORIDE 60 MG/1
60 CAPSULE, DELAYED RELEASE ORAL DAILY
Status: CANCELLED | OUTPATIENT
Start: 2021-12-21

## 2021-12-21 RX ORDER — ARIPIPRAZOLE 2 MG/1
2 TABLET ORAL DAILY
Status: DISCONTINUED | OUTPATIENT
Start: 2021-12-21 | End: 2021-12-21 | Stop reason: HOSPADM

## 2021-12-21 RX ORDER — ACETAMINOPHEN 650 MG/1
650 SUPPOSITORY RECTAL EVERY 6 HOURS PRN
Status: DISCONTINUED | OUTPATIENT
Start: 2021-12-21 | End: 2021-12-22

## 2021-12-21 RX ORDER — SODIUM CHLORIDE 0.9 % (FLUSH) 0.9 %
5-40 SYRINGE (ML) INJECTION EVERY 12 HOURS SCHEDULED
Status: CANCELLED | OUTPATIENT
Start: 2021-12-21

## 2021-12-21 RX ORDER — ONDANSETRON 2 MG/ML
4 INJECTION INTRAMUSCULAR; INTRAVENOUS EVERY 6 HOURS PRN
Status: DISCONTINUED | OUTPATIENT
Start: 2021-12-21 | End: 2022-01-01 | Stop reason: HOSPADM

## 2021-12-21 RX ORDER — ASPIRIN 81 MG/1
81 TABLET, CHEWABLE ORAL DAILY
Status: CANCELLED | OUTPATIENT
Start: 2021-12-21

## 2021-12-21 RX ORDER — TRAMADOL HYDROCHLORIDE 50 MG/1
25 TABLET ORAL EVERY 4 HOURS PRN
Status: DISCONTINUED | OUTPATIENT
Start: 2021-12-21 | End: 2021-12-22

## 2021-12-21 RX ORDER — DULOXETIN HYDROCHLORIDE 60 MG/1
60 CAPSULE, DELAYED RELEASE ORAL DAILY
Status: DISCONTINUED | OUTPATIENT
Start: 2021-12-22 | End: 2022-01-01 | Stop reason: HOSPADM

## 2021-12-21 RX ORDER — LEVOTHYROXINE SODIUM 0.05 MG/1
50 TABLET ORAL DAILY
Status: CANCELLED | OUTPATIENT
Start: 2021-12-21

## 2021-12-21 RX ORDER — ONDANSETRON 4 MG/1
4 TABLET, ORALLY DISINTEGRATING ORAL EVERY 8 HOURS PRN
Status: CANCELLED | OUTPATIENT
Start: 2021-12-21

## 2021-12-21 RX ORDER — BACLOFEN 10 MG/1
10 TABLET ORAL 4 TIMES DAILY PRN
Status: DISCONTINUED | OUTPATIENT
Start: 2021-12-21 | End: 2021-12-21 | Stop reason: HOSPADM

## 2021-12-21 RX ORDER — LEVOTHYROXINE SODIUM 0.05 MG/1
50 TABLET ORAL DAILY
Status: DISCONTINUED | OUTPATIENT
Start: 2021-12-21 | End: 2021-12-21 | Stop reason: HOSPADM

## 2021-12-21 RX ORDER — ACETAMINOPHEN 650 MG/1
650 SUPPOSITORY RECTAL EVERY 6 HOURS PRN
Status: CANCELLED | OUTPATIENT
Start: 2021-12-21

## 2021-12-21 RX ORDER — AMMONIUM LACTATE 12 G/100G
LOTION TOPICAL NIGHTLY
Status: DISCONTINUED | OUTPATIENT
Start: 2021-12-21 | End: 2022-01-01 | Stop reason: HOSPADM

## 2021-12-21 RX ORDER — SODIUM CHLORIDE 9 MG/ML
25 INJECTION, SOLUTION INTRAVENOUS PRN
Status: CANCELLED | OUTPATIENT
Start: 2021-12-21

## 2021-12-21 RX ORDER — BACLOFEN 10 MG/1
10 TABLET ORAL 4 TIMES DAILY PRN
Status: CANCELLED | OUTPATIENT
Start: 2021-12-21

## 2021-12-21 RX ORDER — TRAMADOL HYDROCHLORIDE 50 MG/1
25 TABLET ORAL EVERY 4 HOURS PRN
Status: CANCELLED | OUTPATIENT
Start: 2021-12-21

## 2021-12-21 RX ORDER — SODIUM CHLORIDE 0.9 % (FLUSH) 0.9 %
5-40 SYRINGE (ML) INJECTION EVERY 12 HOURS SCHEDULED
Status: DISCONTINUED | OUTPATIENT
Start: 2021-12-21 | End: 2022-01-01 | Stop reason: HOSPADM

## 2021-12-21 RX ORDER — SODIUM CHLORIDE 9 MG/ML
25 INJECTION, SOLUTION INTRAVENOUS PRN
Status: DISCONTINUED | OUTPATIENT
Start: 2021-12-21 | End: 2022-01-01 | Stop reason: HOSPADM

## 2021-12-21 RX ORDER — ACETAMINOPHEN 325 MG/1
650 TABLET ORAL EVERY 6 HOURS PRN
Status: DISCONTINUED | OUTPATIENT
Start: 2021-12-21 | End: 2021-12-22

## 2021-12-21 RX ADMIN — LEVOTHYROXINE SODIUM 50 MCG: 0.05 TABLET ORAL at 12:20

## 2021-12-21 RX ADMIN — DULOXETINE 60 MG: 60 CAPSULE, DELAYED RELEASE ORAL at 12:20

## 2021-12-21 RX ADMIN — ASPIRIN 81 MG: 81 TABLET, CHEWABLE ORAL at 12:20

## 2021-12-21 RX ADMIN — SODIUM CHLORIDE, PRESERVATIVE FREE 10 ML: 5 INJECTION INTRAVENOUS at 09:16

## 2021-12-21 RX ADMIN — POLYETHYLENE GLYCOL 3350 17 G: 17 POWDER, FOR SOLUTION ORAL at 14:50

## 2021-12-21 RX ADMIN — ENOXAPARIN SODIUM 40 MG: 100 INJECTION SUBCUTANEOUS at 09:14

## 2021-12-21 RX ADMIN — ARIPIPRAZOLE 2 MG: 2 TABLET ORAL at 12:20

## 2021-12-21 ASSESSMENT — ENCOUNTER SYMPTOMS
GASTROINTESTINAL NEGATIVE: 1
SHORTNESS OF BREATH: 0
RESPIRATORY NEGATIVE: 1
EYES NEGATIVE: 1

## 2021-12-21 ASSESSMENT — PAIN SCALES - WONG BAKER: WONGBAKER_NUMERICALRESPONSE: 0

## 2021-12-21 ASSESSMENT — PAIN SCALES - GENERAL: PAINLEVEL_OUTOF10: 0

## 2021-12-21 NOTE — ED NOTES
AR nursing report to Rony Parrish RN. Transfer of nursing care at this time.      Liv Og RN  12/20/21 1935

## 2021-12-21 NOTE — PROGRESS NOTES
[facility] OCCUPATIONAL THERAPY EVALUATION - ACUTE     NAME: Maylin Murphy  : 1955 (77 y.o.)  MRN: [id number]  CODE STATUS: Full Code  [facility]    Date of Service: 2021    Patient Diagnosis(es): Dehydration [E86.0]  Generalized weakness [R53.1]  Mild dehydration [E86.0]  History of stroke with residual effects [I69.30]  Closed head injury, initial encounter [E30.00EV]  Fall in home, initial encounter [W19. XXXA, Y92.009]  Constipation, unspecified constipation type [K59.00]   Chief Complaint   Patient presents with    Fall     Patient Active Problem List    Diagnosis Date Noted    Cognitive deficit due to old head injury 2021    DJD (degenerative joint disease), lumbar 2021    DJD (degenerative joint disease), cervical 2021    Bowel and bladder incontinence 2021    Aphasia 2021    Generalized weakness 2021    Hypothyroidism 2019    Chronic deep vein thrombosis (DVT) of left popliteal vein (HCC) 2019    Chronic pain syndrome 2018    Hemiparesis affecting dominant side as late effect of cerebrovascular accident (CVA) ([address] 2018    Chronic right shoulder pain 10/04/2017    Hypoxemia requiring supplemental oxygen 10/04/2017    History of CVA (cerebrovascular accident) 2017    Essential hypertension 2017    Recurrent depression ([address] 2017    Seizure ([address] 2017    S/P patent foramen ovale closure 2017    Hyperlipidemia 2017    Closed TBI (traumatic brain injury) ([address] 2012        Past Medical History:   Diagnosis Date    Chronic right shoulder pain 10/4/2017    Closed TBI (traumatic brain injury) ([address] 2012    Essential hypertension 2017    Hemiparesis affecting dominant side as late effect of cerebrovascular accident (CVA) ([address] 2018    History of CVA (cerebrovascular accident) 2017    Hyperlipidemia 2017    Hypoxemia requiring supplemental oxygen 10/4/2017    Recurrent depression ([facility] 8/2/2017    S/P patent foramen ovale closure 8/2/2017    Seizure ([facility] 8/2/2017     History reviewed. No pertinent surgical history. Restrictions  Restrictions/Precautions: Fall Risk (High per reese)     Safety Devices: Safety Devices  Safety Devices in place: Yes  Type of devices: All fall risk precautions in place        Subjective  Pre Treatment Pain Screening  Pain at present: 0  Scale Used: Faces  Intervention List: Patient able to continue with treatment,Patient declined any intervention    Pain Reassessment:   Pain Assessment  Patient Currently in Pain: No  Pain Assessment: Faces  Rai-Teague Pain Rating: No hurt     Prior Level of Function:  Social/Functional History  Lives With: Alone  Type of Home: House (In law suite at [employer])  Home Layout: One level  Home Access: Stairs to enter without rails  Entrance Stairs - Number of Steps: 1 stoop  Bathroom Shower/Tub: Tub/Shower unit  Bathroom Equipment: Shower chair,Grab bars in [address]: Quad cane,Electric scooter,Wheelchair-manual  ADL Assistance: Independent  Homemaking Assistance: Independent  Ambulation Assistance: Independent (QC, occasionally w/c in home, scooter in community)  Transfer Assistance: Independent  Active : No  Additional Comments: Family locally- information confirmed with pt. permission from brother via phone    OBJECTIVE:     Orientation Status:  Orientation  Overall Orientation Status: Within Functional Limits (Unable to state orientation questions however appears to understand situation)    Observation:  Observation/Palpation  Posture: Fair  Observation: Pt alert and pleasant, no acute distress    Cognition Status:  Cognition  Overall Cognitive Status: Exceptions  Arousal/Alertness: Appropriate responses to stimuli  Following Commands:  Follows one step commands with repetition  Attention Span: Appears intact  Memory:  (Difficulty fully assessing d/t aphasia)  Safety Judgement: Decreased awareness of need for assistance,Decreased awareness of need for safety  Problem Solving: Assistance required to generate solutions,Assistance required to implement solutions,Assistance required to correct errors made,Assistance required to identify errors made  Insights: Decreased awareness of deficits  Initiation: Requires cues for some  Sequencing: Requires cues for some  Cognition Comment: Verbal cues throughout, limited by aphasia. Does best with short, simple commands. Per family report, pt. also texts well and can answer multi-answer questions with emoji (by selecting matching option)    Perception Status:  Perception  Overall Perceptual Status: WFL    Sensation Status:  Sensation  Overall Sensation Status: [facility]    Vision and Hearing Status:  Vision  Vision: Within Functional Limits  Hearing  Hearing: Within functional limits     ROM:   LUE AROM (degrees)  LUE AROM : WFL  Left Hand AROM (degrees)  Left Hand AROM: WFL  RUE AROM (degrees)  RUE General AROM: Flexion contracture throughout- baseline  Right Hand AROM (degrees)  Right Hand General AROM: Flexion contracture throughout- baseline    Strength:  LUE Strength  Gross LUE Strength: WFL  L Hand General: 4+/5  LUE Strength Comment: 4+/5 all planes  RUE Strength  RUE Strength Comment: Minimal use of RUE at baseline    Coordination, Tone, Quality of Movement: Tone RUE  RUE Tone: Hypertonic  Tone Description: Flexion contracture  Tone LUE  LUE Tone: Normotonic  Coordination  Movements Are Fluid And Coordinated: Yes  Quality of Movement Other  Comment: RUE with residual increased tone from previous CVA with flexion contracture throughout hand/wrist/elbow. Hand Dominance:  Hand Dominance  Hand Dominance: Right (But uses L hand d/t previous CVA)    ADL Status:  ADL  Feeding: Setup  Grooming: Setup  UE Bathing: Minimal assistance  LE Bathing: Moderate assistance  UE Dressing: Minimal assistance  LE Dressing:  Moderate assistance  Toileting: Moderate assistance  Additional Comments: Simulated ADLs as above. Changed brief following incontinence, required assistance for balance and hygiene as unable to maintain standing position for pants management. Unable to perform figure 4 to don socks or to bend forward. Required increased time and assist.  Toilet Transfers  Toilet Transfer: Unable to assess  Toilet Transfers Comments: Unsafe to ambulate that distance; anticipate min A      Educated pt. on transfer technique and weight shifting for brief management. Performed hygiene and mobility in standing for changing brief with total A to manage brief. Therapy key for assistance levels    Independent = Pt. is able to perform task with no assistance but may require a device   Stand by assistance = Pt. does not perform task at an independent level but does not need physical assistance, requires verbal cues  Minimal, Moderate, Maximal Assistance = Pt. requires physical assistance (25%, 50%, 75% assist from helper) for task but is able to actively participate in task   Dependent = Pt. requires total assistance with task and is not able to actively participate with task completion     Functional Mobility:  Functional Mobility  Functional - Mobility Device: Cane (Quad cane)  Activity: Other  Assist Level: Moderate assistance  Functional Mobility Comments: Assist of 1-2, intermittent assist of 2nd person for balance, improves with mobility. Steps forward, back and side to side with increased time  Transfers  Sit to stand:  Moderate assistance,2 Person assistance  Stand to sit: Contact guard assistance  Transfer Comments: Multiple trials; improved with multiple trials    Bed Mobility  Bed mobility  Rolling to Left: Stand by assistance  Rolling to Right: Stand by assistance  Supine to Sit: Minimal assistance (posterior lean and LOB with required steadying assist, min lifting A at end of transiton)  Sit to Supine: Stand by assistance  Comment: Increased energy required; decreased R extremity motor control    Seated and Standing Balance:  Balance  Sitting Balance: Contact guard assistance  Standing Balance: Moderate assistance (Varies min-mod throughout)    Functional Endurance:  Activity Tolerance  Activity Tolerance: Patient Tolerated treatment well    D/C Recommendations:  OT D/C RECOMMENDATIONS  REQUIRES OT FOLLOW UP: Yes    Equipment Recommendations:  OT Equipment Recommendations  Other: Continue to assess    OT Education:   OT Education  OT Education: Ashlee Mendez [facility]  Patient Education: Educated pt. on role of acute care OT  Barriers to Learning: Aphasia    OT Follow Up:  OT D/C RECOMMENDATIONS  REQUIRES OT FOLLOW UP: Yes     Assessment/Discharge Disposition:  Assessment: Pt is a 77year old man from home who presents to [facility] with the above deficits which impact his ability to perform ADLs and IADLs at his baseline. Pt. would benefit from continued OT to maximize independence and safety with ADL tasks.   Performance deficits / Impairments: Decreased functional mobility ,Decreased ADL status,Decreased endurance,Decreased balance,Decreased high-level IADLs,Decreased cognition,Decreased safe awareness  Prognosis: Good  Discharge Recommendations: Continue to assess pending progress  Decision Making: Medium Complexity  History: Pt's medical history is moderately complex  Exam: Pt. has 7 performance deficits  Assistance / Modification: Pt. requires mod A    Six Click Score   How much help for putting on and taking off regular lower body clothing?: A Lot  How much help for Bathing?: A Lot  How much help for Toileting?: A Lot  How much help for putting on and taking off regular upper body clothing?: A Little  How much help for taking care of personal grooming?: A Little  How much help for eating meals?: A Little  AM-[facility] Inpatient Daily Activity Raw Score: 15  AM-[facility] Inpatient ADL T-Scale Score : 34.69  ADL Inpatient CMS 0-100% Score: 56.46    Plan:  Plan  Times per week: 1-4x  Plan weeks: Length of acute stay  Current Treatment Recommendations: Balance Training,Functional Mobility Training,Endurance Training,Safety Education & Training,Patient/Caregiver Education & Training,Equipment Evaluation, Education, & procurement,Self-Care / ADL,Home Management Training,Cognitive/Perceptual Training,Neuromuscular Re-education    Goals:   Patient will:    - Improve functional endurance to tolerate/complete 30 mins of ADL's  - Be Mod I in UB ADLs   - Be Mod I in LB ADLs  - Be Mod I in ADL transfers without LOB  - Be Mod I in toileting tasks  - Access appropriate D/C site with as few architectural barriers as possible. - Sequence self-care tasks with no verbal cues for safety    Patient Goal: Patient goals : Pt aphasic but answers 'yes' when asked if goal is to return home independently     Discussed and agreed upon: Yes Comments:     Therapy Time:   OT Individual Minutes  Time In: 0935  Time Out: 0958  Minutes: 23    ADL/IADL training: 10 minutes  Eval: 13 minutes     Electronically signed by:     PORTIA Laurent/SNEHA, PORTIA/L  12/21/2021, 10:17 AM

## 2021-12-21 NOTE — PROGRESS NOTES
Progress Note  Date:2021       Room:[place] [address]-  Patient Name:Aramis David     YOB: 1955     Age:66 y.o. Subjective    Subjective   Review of Systems  Objective         Vitals Last 24 Hours:  TEMPERATURE:  Temp  Av.4 °F (36.9 °C)  Min: 97.9 °F (36.6 °C)  Max: 98.6 °F (37 °C)  RESPIRATIONS RANGE: Resp  Av.3  Min: 16  Max: 25  PULSE OXIMETRY RANGE: SpO2  Av.9 %  Min: 97 %  Max: 99 %  PULSE RANGE: Pulse  Av.4  Min: 66  Max: 105  BLOOD PRESSURE RANGE: Systolic (35ARU), FGD:067 , Min:99 , FCX:964   ; Diastolic (83OJM), UZC:68, Min:56, Max:72    I/O (24Hr): Intake/Output Summary (Last 24 hours) at 2021 1141  Last data filed at 2021 1728  Gross per 24 hour   Intake 0 ml   Output    Net 0 ml     Objective  Labs/Imaging/Diagnostics    Labs:  CBC:  Recent Labs     21  1230   WBC 15.0*   RBC 4.59*   HGB 13.9*   HCT 41.3*   MCV 89.9   RDW 13.4        CHEMISTRIES:  Recent Labs     21  1230      K 4.1      CO2 21   BUN 11   CREATININE 0.99   GLUCOSE 142*     PT/INR:  Recent Labs     21  1230   PROTIME 19.1*   INR 1.6     APTT:  Recent Labs     21  1230   APTT 31.5     LIVER PROFILE:  Recent Labs     21  1230   AST 39   ALT 29   BILITOT 0.9*   ALKPHOS 107*       Imaging Last 24 Hours:  CT HEAD WO CONTRAST    Result Date: 2021  CT Brain. Contrast medium:  without contrast.. History:  Fall at home. Aphasic from remote stroke. . Technical factors: CT imaging of the brain was obtained and formatted as 5 mm contiguous axial images. 2.5 mm contiguous axial images were obtained through the osseous structures. Sagittal and coronal reconstruction obtained during postprocessing. Comparison:  None. Findings: Extra-axial spaces:  Normal. Intracranial hemorrhage:  None. Ventricular system: Ventricles mildly enlarged. Sulci mildly prominent. Ex vacuo dilatation, left lateral ventricle.  Basal Cisterns:  Normal. Cerebral Parenchyma: Bilateral symmetric periventricular areas decreased attenuation. Geographic area of decreased attenuation, left frontal and left temporal lobes exerting no mass effect. Midline Shift:  None. Cerebellum:  Normal. Paranasal sinuses and mastoid air cells:  Normal. Visualized Orbits:  Normal.     Impression: Remote left frontal and left temporal lobe infarcts. Mild cerebral atrophy. Chronic ischemic white matter disease. All CT scans at this facility use dose modulation, iterative reconstruction, and/or weight based dosing when appropriate to reduce radiation dose to as low as reasonably achievable. CT CHEST W CONTRAST    Result Date: 12/20/2021  EXAMINATION:  CT CHEST WITH IV CONTRAST and CT ABDOMEN AND PELVIS WITH IV CONTRAST CLINICAL HISTORY:  Fall   TECHNIQUE:  CT of the chest from the thoracic inlet to the upper abdomen was performed following IV contrast.  CT of the abdomen and pelvis was performed using standard technique, scanning from just above the dome of the diaphragm to the symphysis pubis. Contrast: IV:  100 ml of Isovue 300 COMPARISON: None. RESULT: Limitations:  None. Chest: Lines, tubes, and devices:  None. Lung parenchyma and pleura: No consolidation. No suspicious pulmonary nodule. No pleural effusion. Central airways are patent. Mild bibasilar atelectasis. Thoracic inlet, heart, and mediastinum:  No lymphadenopathy in the axillary, mediastinal, or hilar regions. Mild atherosclerotic vascular thoracic aorta. The thoracic aorta and main pulmonary artery are normal in caliber. The cardiac chambers are normal in size. Status post foramen ovale closure. Mild coronary artery atherosclerotic calcifications are noted, although the study is not optimized for coronary assessment. No pericardial effusion or thickening. Thyroid gland is unremarkable. Esophagus is nondilated. Bones/Soft Tissues: Degenerative changes. Abdomen / Pelvis: Liver: No mass. Right hepatic lobe metallic density.  Biliary: No bile duct dilation. Gallbladder is unremarkable. Spleen: No mass. No splenomegaly. Pancreas: No mass or duct dilation. Adrenals: No mass. Kidneys: No mass, calculus or hydronephrosis. GI tract: No dilation or wall thickening. Large amount of colonic stool. Lymph nodes: No abdominal or pelvic lymphadenopathy. Mesentery/Peritoneum: No ascites or mass. Retroperitoneum: No mass. Vasculature: The celiac axis and SMA are patent. The portal vein and branches, splenic vein, SMV, and hepatic veins are patent. Arterial atherosclerotic disease without aneurysm. There is an IVC filter. Pelvis: No mass, ascites or fluid collection. Urinary bladder is unremarkable. Soft tissue/ bones: Grade I anterolisthesis of L5 on S1. Multilevel degenerative changes of the lumbar spine. No acute abnormality in the chest, abdomen and pelvis. Large amount of colonic stool. CT CERVICAL SPINE WO CONTRAST    Result Date: 12/20/2021  CT cervical spine without intravenous contrast medium. HISTORY: Fall. History of stroke with right sided paresis TECHNICAL FACTORS: CT cervical spine obtained and formatted as 2.5 mm contiguous axial images from skull base to the level of. Sagittal and coronal reconstructions were obtained during postprocessing. No contrast medium was utilized. COMPARISON: None FINDINGS: Loss of height, C5 and C6 with cranial caudal dimensions 1.2 cm, and 0.8 cm, respectively. Loss cervical lordosis. Remote 8mm anterolisthesis C4 on C5. Atlantooccipital articulation maintained. Atlantoaxial interval preserved. Neural foramina narrowing, left C4-5. Diffuse disc space narrowing C3-4, through T2-3, greatest at C4-5 through C6-7. No fractures, dislocations, bone lesions. Limited imaging lung apices without anomaly. Carotid arteries and soft tissues are without anomaly. No fracture. Marked diffuse degenerative change cervical spine.  C4 anterolisthesis and loss of body height, C6 and C7, most likely secondary to degenerative change. All CT scans at this facility use dose modulation, iterative reconstruction, and/or weight based dosing when appropriate to reduce radiation dose to as low as reasonably achievable. CT ABDOMEN PELVIS W IV CONTRAST Additional Contrast? None    Result Date: 12/20/2021  EXAMINATION:  CT CHEST WITH IV CONTRAST and CT ABDOMEN AND PELVIS WITH IV CONTRAST CLINICAL HISTORY:  Fall   TECHNIQUE:  CT of the chest from the thoracic inlet to the upper abdomen was performed following IV contrast.  CT of the abdomen and pelvis was performed using standard technique, scanning from just above the dome of the diaphragm to the symphysis pubis. Contrast: IV:  100 ml of Isovue 300 COMPARISON: None. RESULT: Limitations:  None. Chest: Lines, tubes, and devices:  None. Lung parenchyma and pleura: No consolidation. No suspicious pulmonary nodule. No pleural effusion. Central airways are patent. Mild bibasilar atelectasis. Thoracic inlet, heart, and mediastinum:  No lymphadenopathy in the axillary, mediastinal, or hilar regions. Mild atherosclerotic vascular thoracic aorta. The thoracic aorta and main pulmonary artery are normal in caliber. The cardiac chambers are normal in size. Status post foramen ovale closure. Mild coronary artery atherosclerotic calcifications are noted, although the study is not optimized for coronary assessment. No pericardial effusion or thickening. Thyroid gland is unremarkable. Esophagus is nondilated. Bones/Soft Tissues: Degenerative changes. Abdomen / Pelvis: Liver: No mass. Right hepatic lobe metallic density. Biliary: No bile duct dilation. Gallbladder is unremarkable. Spleen: No mass. No splenomegaly. Pancreas: No mass or duct dilation. Adrenals: No mass. Kidneys: No mass, calculus or hydronephrosis. GI tract: No dilation or wall thickening. Large amount of colonic stool. Lymph nodes: No abdominal or pelvic lymphadenopathy. Mesentery/Peritoneum: No ascites or mass.  Retroperitoneum: No mass. Vasculature: The celiac axis and SMA are patent. The portal vein and branches, splenic vein, SMV, and hepatic veins are patent. Arterial atherosclerotic disease without aneurysm. There is an IVC filter. Pelvis: No mass, ascites or fluid collection. Urinary bladder is unremarkable. Soft tissue/ bones: Grade I anterolisthesis of L5 on S1. Multilevel degenerative changes of the lumbar spine. No acute abnormality in the chest, abdomen and pelvis. Large amount of colonic stool. Assessment//Plan           Hospital Problems           Last Modified POA    History of CVA (cerebrovascular accident) 12/21/2021 Yes    Overview Signed 12/21/2021  8:25 AM by Julio Rater, DO     Remote left frontal and left temporal lobe infarcts. Essential hypertension 12/21/2021 Yes    Recurrent depression ([address] 12/21/2021 Yes    Hemiparesis affecting dominant side as late effect of cerebrovascular accident (CVA) ([address] 12/21/2021 Yes    Chronic pain syndrome 12/21/2021 Yes    Chronic deep vein thrombosis (DVT) of left popliteal vein ([address] 12/21/2021 Yes    Generalized weakness 12/20/2021 Yes    Cognitive deficit due to old head injury 12/21/2021 Yes    DJD (degenerative joint disease), lumbar 12/21/2021 Yes    Overview Signed 12/21/2021  8:26 AM by Julio Rater, DO     Multilevel degenerative changes of the lumbar spine. DJD (degenerative joint disease), cervical 12/21/2021 Yes    Overview Signed 12/21/2021  8:26 AM by Julio Rater, DO     Marked diffuse degenerative change cervical spine. C         Bowel and bladder incontinence 12/21/2021 Yes    Aphasia 12/21/2021 Yes    Overview Signed 12/21/2021  8:29 AM by Julio Rater, DO      Patient is aphasic from stroke 2016. Assessment & Plan  12/21: Patient is alert. Home medication resumed. Spoke with nursing. Patient did go to rehab for weakness. Follow-up labs still pending. Leukocytosis could be reactive. Follow-up CBC results. Continue with PT OT.   Electronically signed by Aaliyah Jordan MD on 12/21/21 at 11:41 AM EST

## 2021-12-21 NOTE — CONSULTS
Physical Medicine & Rehabilitation  Consult Note      Admitting Physician: Aaliyah Jordan MD    Primary Care Provider: [facility], [facility]     Reason for Consult:  Asses rehab needs, promote physical and mental function, and decrease likelihood of re-admit to the hospital after discharge. History of Present Illness:    Heath Grayson is a 77 y.o. male admitted to [facility] on 12/20/2021. Patient was admitted after falling at home. Brought in by EMS. Is hard to get a history from him because he is aphasic from a stroke in two thousand sixteen. Fall  The accident occurred 12 to 24 hours ago. I reviewed recent nursing notes discussed care with acute care providers, \" Portable tele placed on pt. Confirmed pt identifier. \". Their inpatient work up has included:    Imaging:      Imaging and other studies reviewed and discussed with patient and staff    CT HEAD   12/20/2021   Extra-axial spaces:  Normal. Intracranial hemorrhage:  None. Ventricular system: Ventricles mildly enlarged. Sulci mildly prominent. Ex vacuo dilatation, left lateral ventricle. Basal Cisterns:  Normal. Cerebral Parenchyma: Bilateral symmetric periventricular areas decreased attenuation. Geographic area of decreased attenuation, left frontal and left temporal lobes exerting no mass effect. Midline Shift:  None. Cerebellum:  Normal. Paranasal sinuses and mastoid air cells:  Normal. Visualized Orbits:  Normal.     Impression: Remote left frontal and left temporal lobe infarcts. Mild cerebral atrophy. Chronic ischemic white matter disease. CT CHEST  12/20/2021   Chest: Lines, tubes, and devices:  None. Lung parenchyma and pleura: No consolidation. No suspicious pulmonary nodule. No pleural effusion. Central airways are patent. Mild bibasilar atelectasis. Thoracic inlet, heart, and mediastinum:  No lymphadenopathy in the axillary, mediastinal, or hilar regions. Mild atherosclerotic vascular thoracic aorta. The thoracic aorta and main pulmonary artery are normal in caliber. The cardiac chambers are normal in size. Status post foramen ovale closure. Mild coronary artery atherosclerotic calcifications are noted, although the study is not optimized for coronary assessment. No pericardial effusion or thickening. Thyroid gland is unremarkable. Esophagus is nondilated. Bones/Soft Tissues: Degenerative changes. Abdomen / Pelvis: Liver: No mass. Right hepatic lobe metallic density. Biliary: No bile duct dilation. Gallbladder is unremarkable. Spleen: No mass. No splenomegaly. Pancreas: No mass or duct dilation. Adrenals: No mass. Kidneys: No mass, calculus or hydronephrosis. GI tract: No dilation or wall thickening. Large amount of colonic stool. Lymph nodes: No abdominal or pelvic lymphadenopathy. Mesentery/Peritoneum: No ascites or mass. Retroperitoneum: No mass. Vasculature: The celiac axis and SMA are patent. The portal vein and branches, splenic vein, SMV, and hepatic veins are patent. Arterial atherosclerotic disease without aneurysm. There is an IVC filter. Pelvis: No mass, ascites or fluid collection. Urinary bladder is unremarkable. Soft tissue/ bones: Grade I anterolisthesis of L5 on S1. Multilevel degenerative changes of the lumbar spine. No acute abnormality in the chest, abdomen and pelvis. Large amount of colonic stool. CT CERVICAL SPINE  12/20/2021    Loss of height, C5 and C6 with cranial caudal dimensions 1.2 cm, and 0.8 cm, respectively. Loss cervical lordosis. Remote 8mm anterolisthesis C4 on C5. Atlantooccipital articulation maintained. Atlantoaxial interval preserved. Neural foramina narrowing, left C4-5. Diffuse disc space narrowing C3-4, through T2-3, greatest at C4-5 through C6-7. No fractures, dislocations, bone lesions. Limited imaging lung apices without anomaly. Carotid arteries and soft tissues are without anomaly. No fracture. Marked diffuse degenerative change cervical spine. C4 anterolisthesis and loss of body height, C6 and C7, most likely secondary to degenerative change. CT ABDOMEN PELVIS  12/20/2021   Chest: Lines, tubes, and devices:  None. Lung parenchyma and pleura: No consolidation. No suspicious pulmonary nodule. No pleural effusion. Central airways are patent. Mild bibasilar atelectasis. Thoracic inlet, heart, and mediastinum:  No lymphadenopathy in the axillary, mediastinal, or hilar regions. Mild atherosclerotic vascular thoracic aorta. The thoracic aorta and main pulmonary artery are normal in caliber. The cardiac chambers are normal in size. Status post foramen ovale closure. Mild coronary artery atherosclerotic calcifications are noted, although the study is not optimized for coronary assessment. No pericardial effusion or thickening. Thyroid gland is unremarkable. Esophagus is nondilated. Bones/Soft Tissues: Degenerative changes. Abdomen / Pelvis: Liver: No mass. Right hepatic lobe metallic density. Biliary: No bile duct dilation. Gallbladder is unremarkable. Spleen: No mass. No splenomegaly. Pancreas: No mass or duct dilation. Adrenals: No mass. Kidneys: No mass, calculus or hydronephrosis. GI tract: No dilation or wall thickening. Large amount of colonic stool. Lymph nodes: No abdominal or pelvic lymphadenopathy. Mesentery/Peritoneum: No ascites or mass. Retroperitoneum: No mass. Vasculature: The celiac axis and SMA are patent. The portal vein and branches, splenic vein, SMV, and hepatic veins are patent. Arterial atherosclerotic disease without aneurysm. There is an IVC filter. Pelvis: No mass, ascites or fluid collection. Urinary bladder is unremarkable. Soft tissue/ bones: Grade I anterolisthesis of L5 on S1. Multilevel degenerative changes of the lumbar spine. No acute abnormality in the chest, abdomen and pelvis. Large amount of colonic stool.               Labs:       labs reviewed and discussed with patient and staff    No results found for: POCGLU  Lab Results   Component Value Date     12/21/2021    K 3.5 12/21/2021     12/21/2021    CO2 22 12/21/2021    BUN 14 12/21/2021    CREATININE 0.85 12/21/2021    CALCIUM 9.2 12/21/2021    LABALBU 4.1 12/21/2021    BILITOT 1.5 12/21/2021    ALKPHOS 88 12/21/2021     12/21/2021    ALT 35 12/21/2021     Lab Results   Component Value Date    WBC 12.8 12/21/2021    RBC 4.36 12/21/2021    HGB 13.3 12/21/2021    HCT 39.0 12/21/2021    MCV 89.5 12/21/2021    MCH 30.6 12/21/2021    MCHC 34.2 12/21/2021    RDW 13.3 12/21/2021     12/21/2021     No results found for: VITD25  Lab Results   Component Value Date    COLORU Yellow 12/20/2021    NITRU Negative 12/20/2021    GLUCOSEU Negative 12/20/2021    KETUA TRACE 12/20/2021    UROBILINOGEN 0.2 12/20/2021    BILIRUBINUR Negative 12/20/2021     Lab Results   Component Value Date    PROTIME 19.1 12/20/2021     Lab Results   Component Value Date    INR 1.6 12/20/2021         I discussed results with patient. Current Rehabilitation Assessments:    Rehabilitation:  Physical therapy: FIMS:  BedMobility:      Transfers: Sit to Stand: Minimal Assistance,2 Person Assistance,Moderate Assistance (multiple trials with progression from +2 to +1 noted by last trial)  Stand to sit: Minimal Assistance, Ambulation 1  Surface: level tile  Device: Large Dann Laith  Assistance: 2 Person assistance,Moderate assistance (intermittent CGA of second person to ensure safety)  Quality of Gait: reduced RLE weight acceptance, advance RLE with minimal weight shift assistance, right knee recurvatum at stance, reduced trunk and pelvic mobility, LOB post and lat requiring assistance for recovery  Distance: 5 steps forward and lat - limited by fatigue,      FIMS: ,  , Assessment: Pt demonstrates deficits as listed. He was most recently indep in mobility. He is currently requiring assistance with all mobility.  Pt would benefit from continued PT prior to return to home      Occupational therapy: FIMS:   ,  , Assessment: Pt is a 77year old man from home who presents to [address] [facility] with the above deficits which impact his ability to perform ADLs and IADLs at his baseline. Pt. would benefit from continued OT to maximize independence and safety with ADL tasks. ADL  Feeding: Setup (12/21/21 1000)  Grooming: Setup (12/21/21 1000)  UE Bathing: Minimal assistance (12/21/21 1000)  LE Bathing: Moderate assistance (12/21/21 1000)  UE Dressing: Minimal assistance (12/21/21 1000)  LE Dressing: Moderate assistance (12/21/21 1000)  Toileting: Moderate assistance (12/21/21 1000)  Additional Comments: Simulated ADLs as above. Changed brief following incontinence, required assistance for balance and hygiene as unable to maintain standing position for pants management. Unable to perform figure 4 to don socks or to bend forward. Required increased time and assist. (12/21/21 1000)  OCCUPATIONAL THERAPY  Hand Dominance: Right (But uses L hand d/t previous CVA)  ADL  Feeding: Setup (12/21/21 1000)  Grooming: Setup (12/21/21 1000)  UE Bathing: Minimal assistance (12/21/21 1000)  LE Bathing: Moderate assistance (12/21/21 1000)  UE Dressing: Minimal assistance (12/21/21 1000)  LE Dressing: Moderate assistance (12/21/21 1000)  Toileting: Moderate assistance (12/21/21 1000)  Additional Comments: Simulated ADLs as above. Changed brief following incontinence, required assistance for balance and hygiene as unable to maintain standing position for pants management. Unable to perform figure 4 to don socks or to bend forward.  Required increased time and assist. (12/21/21 1000)  Toilet Transfers  Toilet Transfer: Unable to assess (12/21/21 1010)  Toilet Transfers Comments: Unsafe to ambulate that distance; anticipate min A (12/21/21 1010)            LTG:                 Speech therapy: FIMS:       SPEECH THERAPY               Diet/Swallow:                           COGNITION    OT: Cognition Comment: Verbal cues throughout, limited by aphasia. Does best with short, simple commands. Per family report, pt. also texts well and can answer multi-answer questions with emoji (by selecting matching option)  SP:          Past Medical History:          Diagnosis Date    Chronic right shoulder pain 10/4/2017    Closed TBI (traumatic brain injury) ([address] 12/28/2012    Essential hypertension 8/2/2017    Hemiparesis affecting dominant side as late effect of cerebrovascular accident (CVA) ([address] 1/30/2018    History of CVA (cerebrovascular accident) 8/2/2017    Hyperlipidemia 8/2/2017    Hypoxemia requiring supplemental oxygen 10/4/2017    Recurrent depression ([address] 8/2/2017    S/P patent foramen ovale closure 8/2/2017    Seizure ([address] 8/2/2017         PastSurgical History:      History reviewed. No pertinent surgical history. Allergies:      Allergies   Allergen Reactions    Tramadol      Avoidance recommended d/t prior sz disorder - can lower sz threshhold        CurrentMedications:     Current Facility-Administered Medications: ARIPiprazole (ABILIFY) tablet 2 mg, 2 mg, Oral, Daily  aspirin chewable tablet 81 mg, 81 mg, Oral, Daily  baclofen (LIORESAL) tablet 10 mg, 10 mg, Oral, 4x Daily PRN  DULoxetine (CYMBALTA) extended release capsule 60 mg, 60 mg, Oral, Daily  levothyroxine (SYNTHROID) tablet 50 mcg, 50 mcg, Oral, Daily  [START ON 12/22/2021] rivaroxaban (XARELTO) tablet 20 mg, 20 mg, Oral, Daily with breakfast  traMADol (ULTRAM) tablet 25 mg, 25 mg, Oral, Q4H PRN  sodium chloride flush 0.9 % injection 5-40 mL, 5-40 mL, IntraVENous, 2 times per day  sodium chloride flush 0.9 % injection 5-40 mL, 5-40 mL, IntraVENous, PRN  0.9 % sodium chloride infusion, 25 mL, IntraVENous, PRN  ondansetron (ZOFRAN-ODT) disintegrating tablet 4 mg, 4 mg, Oral, Q8H PRN **OR** ondansetron (ZOFRAN) injection 4 mg, 4 mg, IntraVENous, Q6H PRN  polyethylene glycol (GLYCOLAX) packet 17 g, 17 g, Oral, Daily PRN  acetaminophen (TYLENOL) tablet 650 mg, 650 mg, Oral, Q6H PRN **OR** acetaminophen (TYLENOL) suppository 650 mg, 650 mg, Rectal, Q6H PRN      Social History:    Social History     Socioeconomic History    Marital status:      Spouse name: Not on file    Number of children: Not on file    Years of education: Not on file    Highest education level: Not on file   Occupational History    Not on file   Tobacco Use    Smoking status: Never Smoker    Smokeless tobacco: Never Used   Substance and Sexual Activity    Alcohol use: Not on file    Drug use: Not on file    Sexual activity: Not on file   Other Topics Concern    Not on file   Social History Narrative     Lives alone in [place]- [address]         Type of Home: House (In law suite at [place])    Home Layout: One level--Stairs to enter without rails- Number of Steps: 1 stoop    Bathroom Shower/Tub: Tub/Shower unit    Bathroom Equipment: Shower chair,Grab bars in [place]: Quad cane,Electric scooter,Wheelchair-manual    ADL Assistance: Independent    Homemaking Assistance: Independent    Ambulation Assistance: Independent (QC, occasionally w/c in home, scooter in community)    Transfer Assistance: Independent    Active : No    Additional Comments: Family locally- information confirmed with pt. permission from brother via phone                 Social Determinants of Health     Financial Resource Strain: Low Risk     Difficulty of Paying Living Expenses: Not hard at all   Food Insecurity: No Food Insecurity    Worried About [facility] in the Last Year: Never true    [facility] in the Last Year: Never true   Transportation Needs: No Transportation Needs    Lack of Transportation (Medical): No    Lack of Transportation (Non-Medical):  No   Physical Activity:     Days of Exercise per Week: Not on file    Minutes of Exercise per Session: Not on file   Stress:     Feeling of Stress : Not on file   Social Connections:     Frequency of Communication with Friends and Family: Not on file    Frequency of Social Gatherings with Friends and Family: Not on file    Attends Latter day Services: Not on file    Active Member of Clubs or Organizations: Not on file    Attends Club or Organization Meetings: Not on file    Marital Status: Not on file   Intimate Partner Violence:     Fear of Current or Ex-Partner: Not on file    Emotionally Abused: Not on file    Physically Abused: Not on file    Sexually Abused: Not on file   Housing Stability:     Unable to Pay for Housing in the Last Year: Not on file    Number of [place] in the Last Year: Not on file    Unstable Housing in the Last Year: Not on file          Family History:     History reviewed. No pertinent family history. Review of Systems:    Review of Systems   HENT:        Left ear fullness   Eyes: Negative. Respiratory: Negative. Negative for shortness of breath. Cardiovascular: Negative. Negative for chest pain and palpitations. Gastrointestinal: Negative. Endocrine: Negative. Genitourinary: Negative. Musculoskeletal: Positive for arthralgias and gait problem. Skin: Negative. Allergic/Immunologic: Positive for immunocompromised state. Neurological: Positive for dizziness and weakness. Hematological: Negative. Psychiatric/Behavioral: Positive for dysphoric mood. Physical Exam:    /64   Pulse 72   Temp 97.9 °F (36.6 °C) (Oral)   Resp 20   Ht 5' 8\" (1.727 m)   SpO2 99%   BMI 25.09 kg/m²      Physical Exam  Vitals reviewed. Constitutional:       General: He is not in acute distress. Appearance: He is well-developed. He is ill-appearing. He is not diaphoretic. HENT:      Head: Normocephalic and atraumatic. Right Ear: Hearing, tympanic membrane, ear canal and external ear normal.      Left Ear: Decreased hearing noted. No drainage (cerumen impaction, L ear).    Eyes:      Conjunctiva/sclera: Conjunctivae normal.   Cardiovascular:      Rate and Rhythm: Normal rate. Pulmonary:      Effort: Pulmonary effort is normal.   Musculoskeletal:      Cervical back: Normal range of motion. Skin:     General: Skin is warm and dry. Neurological:      Mental Status: He is oriented to person, place, and time. He is lethargic. Cranial Nerves: No cranial nerve deficit. Coordination: Finger-Nose-Finger Test abnormal and Romberg Test abnormal.      Comments:  Patient is aphasic from stroke 2016       Ortho Exam  Neurologic Exam     Mental Status   Oriented to person, place, and time.      Gait, Coordination, and Reflexes     Gait  Gait: wide-based    Coordination   Romberg: positive  Finger to nose coordination: abnormal            Diagnostics:    Recent Results (from the past 24 hour(s))   Comprehensive Metabolic Panel w/ Reflex to MG    Collection Time: 12/21/21 11:10 AM   Result Value Ref Range    Sodium 138 135 - 144 mEq/L    Potassium reflex Magnesium 3.5 3.4 - 4.9 mEq/L    Chloride 106 95 - 107 mEq/L    CO2 22 20 - 31 mEq/L    Anion Gap 10 9 - 15 mEq/L    Glucose 104 (H) 70 - 99 mg/dL    BUN 14 8 - 23 mg/dL    CREATININE 0.85 0.70 - 1.20 mg/dL    GFR Non-African American >60.0 >60    GFR  >60.0 >60    Calcium 9.2 8.5 - 9.9 mg/dL    Total Protein 6.8 6.3 - 8.0 g/dL    Albumin 4.1 3.5 - 4.6 g/dL    Total Bilirubin 1.5 (H) 0.2 - 0.7 mg/dL    Alkaline Phosphatase 88 35 - 104 U/L    ALT 35 0 - 41 U/L     (H) 0 - 40 U/L    Globulin 2.7 2.3 - 3.5 g/dL   CBC auto differential    Collection Time: 12/21/21 11:10 AM   Result Value Ref Range    WBC 12.8 (H) 4.8 - 10.8 K/uL    RBC 4.36 (L) 4.70 - 6.10 M/uL    Hemoglobin 13.3 (L) 14.0 - 18.0 g/dL    Hematocrit 39.0 (L) 42.0 - 52.0 %    MCV 89.5 80.0 - 100.0 fL    MCH 30.6 27.0 - 31.3 pg    MCHC 34.2 33.0 - 37.0 %    RDW 13.3 11.5 - 14.5 %    Platelets 569 458 - 247 K/uL    Neutrophils % 81.0 %    Lymphocytes % 11.1 %    Monocytes % 7.8 %    Eosinophils % 0.0 %    Basophils % 0.1 %    Neutrophils Absolute 10.3 (H) 1.4 - 6.5 K/uL    Lymphocytes Absolute 1.4 1.0 - 4.8 K/uL    Monocytes Absolute 1.0 (H) 0.2 - 0.8 K/uL    Eosinophils Absolute 0.0 0.0 - 0.7 K/uL    Basophils Absolute 0.0 0.0 - 0.2 K/uL   Magnesium    Collection Time: 12/21/21 11:10 AM   Result Value Ref Range    Magnesium 2.4 1.7 - 2.4 mg/dL              Impression:    1. Impaired mobility and ADLs due to Late Effects of CVA  2. Fatigue and Malaise      Complex Active General Medical Issues thatcomplicate care Assess & Plan:     1. Active Problems:    History of CVA (cerebrovascular accident)    Essential hypertension    Recurrent depression (HCC)    Hemiparesis affecting dominant side as late effect of cerebrovascular accident (CVA) ([facility]    Chronic pain syndrome    Chronic deep vein thrombosis (DVT) of left popliteal vein (HCC)    Generalized weakness    Cognitive deficit due to old head injury    DJD (degenerative joint disease), lumbar    DJD (degenerative joint disease), cervical    Bowel and bladder incontinence    Aphasia  Resolved Problems:    * No resolved hospital problems. *            Recommendations:    1. Considering all of the factors aboveincluding the patient's current medical status, social status/home envirnment, their functional needs and their ability to participate in a therapy program, I feel that they would best be served at acute vs skilled  rehabilitation evel of care. It is my opinion that they will be able to tolerate and benefit from 3 hours of therapy a day. I reviewed the varous local options re these levels of care with the patient and family. 2. Nursing care to focus on bowel and bladder issues. 3. spread therapy out over a 7-day window-adding scheduled rest breaks when needed. Focus on energy conservation. Monitor your heart rate and medications effects on heart rate and blood pressure before during and after therapy. 4. Vitamin B12 shot times one  5.  Monitor for urinary retention. 6. Improve hydration and Nutrition by adding Proteinex and push PO fluids       It was my pleasure to evaluate Brooke Irving today. Please call [phone] with questions.     Eliecer Daniel, DO

## 2021-12-21 NOTE — PROGRESS NOTES
Physical Therapy Med Surg Initial Assessment  Fac[facility]Department: [facility]  [facility]       NAME: Brandi Oakley  : 1955 (77 y.o.)  MRN: [id number]  CODE STATUS: Full Code    Date of Service: 2021    Patient Diagnosis(es): Dehydration [E86.0]  Generalized weakness [R53.1]  Mild dehydration [E86.0]  History of stroke with residual effects [I69.30]  Closed head injury, initial encounter [V03.00ZX]  Fall in home, initial encounter [W19. XXXA, Y92.009]  Constipation, unspecified constipation type [K59.00]   Chief Complaint   Patient presents with    Fall     Patient Active Problem List    Diagnosis Date Noted    Cognitive deficit due to old head injury 2021    DJD (degenerative joint disease), lumbar 2021    DJD (degenerative joint disease), cervical 2021    Bowel and bladder incontinence 2021    Aphasia 2021    Generalized weakness 2021    Hypothyroidism 2019    Chronic deep vein thrombosis (DVT) of left popliteal vein (HCC) 2019    Chronic pain syndrome 2018    Hemiparesis affecting dominant side as late effect of cerebrovascular accident (CVA) ([address] 2018    Chronic right shoulder pain 10/04/2017    Hypoxemia requiring supplemental oxygen 10/04/2017    History of CVA (cerebrovascular accident) 2017    Essential hypertension 2017    Recurrent depression ([address] 2017    Seizure ([address] 2017    S/P patent foramen ovale closure 2017    Hyperlipidemia 2017    Closed TBI (traumatic brain injury) ([address] 2012        Past Medical History:   Diagnosis Date    Chronic right shoulder pain 10/4/2017    Closed TBI (traumatic brain injury) ([address] 2012    Essential hypertension 2017    Hemiparesis affecting dominant side as late effect of cerebrovascular accident (CVA) ([address] 2018    History of CVA (cerebrovascular accident) 2017    Hyperlipidemia 2017    Hypoxemia requiring supplemental oxygen 10/4/2017    Recurrent depression ([facility] 8/2/2017    S/P patent foramen ovale closure 8/2/2017    Seizure ([facility] 8/2/2017     History reviewed. No pertinent surgical history.     Chart Reviewed: Yes  Patient assessed for rehabilitation services?: Yes  Family / Caregiver Present: No (contacted pts brother by phone)    Restrictions:  Restrictions/Precautions: Fall Risk     SUBJECTIVE:      Pain  Pre Treatment Pain Screening  Pain at present: 0    Post Treatment Pain Screening:        Prior Level of Function:  Social/Functional History  Lives With: Alone  Type of Home: House (in law suite at friends home)  Home Layout: One level  Home Access: Stairs to enter without rails  Entrance Stairs - Number of Steps: 1 +1  Bathroom Shower/Tub: Tub/Shower unit  Bathroom Equipment: Hand-held shower,Tub transfer bench  Home Equipment: Quad cane,Wheelchair-manual,Electric scooter (occ uses w/c in home - uses scooter in community)  ADL Assistance: [address] [place] [address] Avenue: Independent (makes coffee and preprepared microwave meals)  Ambulation Assistance: Independent (with qc)  Transfer Assistance: Independent  Active : No    OBJECTIVE:        Cognition:  Overall Orientation Status: Impaired  Orientation Level: Oriented to person,Oriented to place  Follows Commands: Impaired (inconsistent)         ROM:  RLE PROM: WFL  RLE General PROM: lacking 20 degrees df before neutral  LLE PROM: WFL    Strength:  Strength RLE  Comment: active movement patterns at hip and knee at 3-/5 level; 1/5 toe ext however no df noted  Strength LLE  Strength LLE: WFL    Neuro:  Balance  Sitting - Static: Fair;- (initial post lean and LOB with sitting - improved to fair with repositioning into midline)  Sitting - Dynamic: Fair;- (able to reach with CGA)  Standing - Static: Poor (pt requires one UE support and +2 for balance maintenance initially - improved to +1)  Standing - Dynamic: Poor     Tone RLE  RLE Tone: Hyperreflexive (clonus present oin weight bearing)  Motor Control  Gross Motor?: Exceptions (poor RLE motor control in mobility tasks - fair in LLE)       Bed mobility  Rolling to Left: Stand by assistance  Rolling to Right: Stand by assistance  Supine to Sit: Minimal assistance (postlean and LOB with steadying assist required- lifting assist at end of tasks additionally required)  Sit to Supine: Stand by assistance  Comment: increased energy required - decreased Right extremity motor control noted in all tasks    Transfers  Sit to Stand: Minimal Assistance;2 Person Assistance; Moderate Assistance (multiple trials with progression from +2 to +1 noted by last trial)  Stand to sit: Minimal Assistance  Comment: poor RLE weight acceptance initially with post LOB requiring assistance of 2 for success. Improved to mild LOB however weight acceptance remains poor    Ambulation  Ambulation?: Yes  Ambulation 1  Surface: level tile  Device: Large [place]  Assistance: 2 Person assistance; Moderate assistance (intermittent CGA of second person to ensure safety)  Quality of Gait: reduced RLE weight acceptance, advance RLE with minimal weight shift assistance, right knee recurvatum at stance, reduced trunk and pelvic mobility, LOB post and lat requiring assistance for recovery  Distance: 5 steps forward and lat - limited by fatigue    Stairs/Curb  Stairs?: No         Activity Tolerance  Activity Tolerance: Patient Tolerated treatment well               ASSESSMENT:   Body structures, Functions, Activity limitations: Decreased functional mobility ; Decreased strength;Decreased endurance;Decreased coordination;Decreased safe awareness;Decreased ROM; Decreased balance;Decreased sensation  Decision Making: High Complexity  History: high  Exam: high  Clinical Presentation: high    Barriers to Learning: aphasia    DISCHARGE RECOMMENDATIONS:  Discharge Recommendations: Continue to assess pending progress    Assessment: Pt demonstrates deficits as listed. He was most recently indep in mobility. He is currently requiring assistance with all mobility. Pt would benefit from continued PT prior to return to home         PLAN OF CARE:  Plan  Times per week: 3-6  Current Treatment Recommendations: Strengthening,Balance Training,Transfer Training,Endurance Training,Gait Training,Neuromuscular Re-education,Home Exercise Program,Equipment Evaluation, Education, & procurement,Functional Mobility Training,Manual Therapy - Soft Tissue Mobilization,Patient/Caregiver Education & Training,Positioning  Safety Devices  Type of devices: Bed alarm in place,Call light within reach,Left in bed    Goals:  Long term goals  Long term goal 1: indep and effecient bed mobility  Long term goal 2: indep sit to stand and bed transfers  Long term goal 3: indep gait with qc 50 feet in protected environment  Long term goal 4: supervision with curb step performance for home entry    [facility] (6 CLICK) [address] Mobility Raw Score : 12     Therapy Time:   Individual   Time In 0925   Time Out 0958   Minutes [place], [place], 12/21/21 at 10:24 AM         Definitions for assistance levels  Independent = pt does not require any physical supervision or assistance from another person for activity completion. Device may be needed.   Stand by assistance = pt requires verbal cues or instructions from another person, close to but not touching, to perform the activity  Minimal assistance= pt performs 75% or more of the activity; assistance is required to complete the activity  Moderate assistance= pt performs 50% of the activity; assistance is required to complete the activity  Maximal assistance = pt performs 25% of the activity; assistance is required to complete the activity  Dependent = pt requires total physical assistance to accomplish the task

## 2021-12-21 NOTE — PROGRESS NOTES
The pt came from [address] w/diagnosis of late effects of a CVA around dinner time. The pt is here for falling at home R/T CVA debility. The pt has no skin issues upon admission. Upon evaluation of the client, pt is observed to be alert, oriented to place, person, and situation. Pt can not verbalize as he is aphasic from previous CVA w/Rt jose paresis. The pt also makes eye contact when prompted. The client also exhibits unlabored breathing, skin is warm & dry, and is observed as having relaxed extremities and facial expressions. The client's chest rise and expansion are equal and symmetrical with breaths. The client presents with a non toxic appearance. The client obeys commands and moves left sided extremities freely and without hesitation or guarding. The pt performs this task with contact guard assistance X 1 pivot and with a cane at home [address] [facility] [address] was present during skin assessment and pt was only noted to have dry skin to BLE. I updated Dr. Padmini Cuadra and she ordered lac-hydrin (see orders).  Electronically signed by Yudy Pereyra RN on 12/21/2021 at 7:05 PM

## 2021-12-21 NOTE — PROGRESS NOTES
Pt family member Jim Encinas states pt is not allergic to Ultram and the pt's PCP prescribes this medication to the pt.  Removed from pt list per Damien Oliver request. Electronically signed by Myriam Mcdowell RN on 12/21/2021 at 4:13 PM

## 2021-12-21 NOTE — PROGRESS NOTES
Physical Therapy Med Surg Initial Assessment  Facility/Department: [address] [place] [address] [address]  Room: [place] [address]       NAME: Hazel Diaz  : 1955 (77 y.o.)  MRN: [id number]  CODE STATUS: Full Code    Date of Service: 2021    Patient Diagnosis(es): Dehydration [E86.0]  Generalized weakness [R53.1]  Mild dehydration [E86.0]  History of stroke with residual effects [I69.30]  Closed head injury, initial encounter [V85.85IG]  Fall in home, initial encounter [W19. XXXA, Y92.009]  Constipation, unspecified constipation type [K59.00]   Chief Complaint   Patient presents with    Fall     Patient Active Problem List    Diagnosis Date Noted    Cognitive deficit due to old head injury 2021    DJD (degenerative joint disease), lumbar 2021    DJD (degenerative joint disease), cervical 2021    Bowel and bladder incontinence 2021    Aphasia 2021    Generalized weakness 2021    Hypothyroidism 2019    Chronic deep vein thrombosis (DVT) of left popliteal vein (HCC) 2019    Chronic pain syndrome 2018    Hemiparesis affecting dominant side as late effect of cerebrovascular accident (CVA) ([address] 2018    Chronic right shoulder pain 10/04/2017    Hypoxemia requiring supplemental oxygen 10/04/2017    History of CVA (cerebrovascular accident) 2017    Essential hypertension 2017    Recurrent depression ([address] 2017    Seizure ([address] 2017    S/P patent foramen ovale closure 2017    Hyperlipidemia 2017    Closed TBI (traumatic brain injury) ([address] 2012        Past Medical History:   Diagnosis Date    Chronic right shoulder pain 10/4/2017    Closed TBI (traumatic brain injury) ([address] 2012    Essential hypertension 2017    Hemiparesis affecting dominant side as late effect of cerebrovascular accident (CVA) ([address] 2018    History of CVA (cerebrovascular accident) 2017    Hyperlipidemia 2017    Hypoxemia requiring supplemental oxygen 10/4/2017    Recurrent depression ([facility] 8/2/2017    S/P patent foramen ovale closure 8/2/2017    Seizure ([facility] 8/2/2017     History reviewed. No pertinent surgical history.     Chart Reviewed: Yes  Patient assessed for rehabilitation services?: Yes  Family / Caregiver Present: No (contacted pts brother by phone)    Restrictions:  Restrictions/Precautions: Fall Risk     SUBJECTIVE:      Pain  Pre Treatment Pain Screening  Pain at present: 0    Post Treatment Pain Screening:        Prior Level of Function:  Social/Functional History  Lives With: Alone  Type of Home: House (in law suite at friends home)  Home Layout: One level  Home Access: Stairs to enter without rails  Entrance Stairs - Number of Steps: 1 +1  Bathroom Shower/Tub: Tub/Shower unit  Bathroom Equipment: Hand-held shower,Tub transfer bench  Home Equipment: Quad cane,Wheelchair-manual,Electric scooter (occ uses w/c in home - uses scooter in community)  ADL Assistance: [address] [place] [address] Avenue: Independent (makes coffee and preprepared microwave meals)  Ambulation Assistance: Independent (with qc)  Transfer Assistance: Independent  Active : No    OBJECTIVE:        Cognition:  Overall Orientation Status: Impaired  Orientation Level: Oriented to person,Oriented to place  Follows Commands: Impaired (inconsistent)         ROM:  RLE PROM: WFL  RLE General PROM: lacking 20 degrees df before neutral  LLE PROM: WFL    Strength:  Strength RLE  Comment: active movement patterns at hip and knee at 3-/5 level; 1/5 toe ext however no df noted  Strength LLE  Strength LLE: WFL    Neuro:  Balance  Sitting - Static: Fair;- (initial post lean and LOB with sitting - improved to fair with repositioning into midline)  Sitting - Dynamic: Fair;- (able to reach with CGA)  Standing - Static: Poor (pt requires one UE support and +2 for balance maintenance initially - improved to +1)  Standing - Dynamic: Poor     Tone RLE  RLE Tone: Hyperreflexive (clonus present oin weight bearing)  Motor Control  Gross Motor?: Exceptions (poor RLE motor control in mobility tasks - fair in LLE)       Bed mobility  Rolling to Left: Stand by assistance  Rolling to Right: Stand by assistance  Supine to Sit: Minimal assistance (postlean and LOB with steadying assist required- lifting assist at end of tasks additionally required)  Sit to Supine: Stand by assistance  Comment: increased energy required - decreased Right extremity motor control noted in all tasks    Transfers  Sit to Stand: Minimal Assistance;2 Person Assistance; Moderate Assistance (multiple trials with progression from +2 to +1 noted by last trial)  Stand to sit: Minimal Assistance  Comment: poor RLE weight acceptance initially with post LOB requiring assistance of 2 for success. Improved to mild LOB however weight acceptance remains poor    Ambulation  Ambulation?: Yes  Ambulation 1  Surface: level tile  Device: Large Dann Camino  Assistance: 2 Person assistance; Moderate assistance (intermittent CGA of second person to ensure safety)  Quality of Gait: reduced RLE weight acceptance, advance RLE with minimal weight shift assistance, right knee recurvatum at stance, reduced trunk and pelvic mobility, LOB post and lat requiring assistance for recovery  Distance: 5 steps forward and lat - limited by fatigue    Stairs/Curb  Stairs?: No         Activity Tolerance  Activity Tolerance: Patient Tolerated treatment well               ASSESSMENT:   Body structures, Functions, Activity limitations: Decreased functional mobility ; Decreased strength;Decreased endurance;Decreased coordination;Decreased safe awareness;Decreased ROM; Decreased balance;Decreased sensation  Decision Making: High Complexity  History: high  Exam: high  Clinical Presentation: high    Barriers to Learning: aphasia    DISCHARGE RECOMMENDATIONS:       Assessment: Pt demonstrates deficits as listed.  He was most recently indep in mobility. He is currently requiring assistance with all mobility. Pt would benefit from continued PT prior to return to home         PLAN OF CARE:  Plan  Times per week: 3-6  Current Treatment Recommendations: Strengthening,Balance Training,Transfer Training,Endurance Training,Gait Training,Neuromuscular Re-education,Home Exercise Program,Equipment Evaluation, Education, & procurement,Functional Mobility Training,Manual Therapy - Soft Tissue Mobilization,Patient/Caregiver Education & Training,Positioning  Safety Devices  Type of devices: Bed alarm in place,Call light within reach,Left in bed    Goals:  Long term goals  Long term goal 1: indep and effecient bed mobility  Long term goal 2: indep sit to stand and bed transfers  Long term goal 3: indep gait with qc 50 feet in protected environment  Long term goal 4: supervision with curb step performance for home entry    [facility] (6 CLICK) [address] Mobility Raw Score : 12     Therapy Time:   Individual   Time In 0925   Time Out 0958   Minutes [place], [place], 12/21/21 at 10:17 AM         Definitions for assistance levels  Independent = pt does not require any physical supervision or assistance from another person for activity completion. Device may be needed.   Stand by assistance = pt requires verbal cues or instructions from another person, close to but not touching, to perform the activity  Minimal assistance= pt performs 75% or more of the activity; assistance is required to complete the activity  Moderate assistance= pt performs 50% of the activity; assistance is required to complete the activity  Maximal assistance = pt performs 25% of the activity; assistance is required to complete the activity  Dependent = pt requires total physical assistance to accomplish the task

## 2021-12-21 NOTE — ED NOTES
Portable tele placed on pt. Confirmed pt identifier.      Denis Jean Baptiste RN  12/20/21 2028       Denis Jean Baptiste RN  12/20/21 2029

## 2021-12-21 NOTE — CARE COORDINATION
[facility] Pre-Admission Screening Document      Patient Name: Valentina Gooden       MRN: [id number]    : 1955    Age: 77 y.o. Gender: male   Payor: Payor: MEDICARE / Plan: MEDICARE PART A AND B / Product Type: *No Product type* /   MSSP: Yes    Admitted from: [facility] Floor: ED/2W  Attending Care Provider: Zackery Gamboa MD  Inpatient Rehab Referring Care Provider: Harpreet Lobato DO  Primary Care Provider: Barbara Ramos MD  Inpatient Treatment Team including Consults: Treatment Team: Attending Provider: Zackery Gamboa MD; : Chani Alexis RN; Utilization Reviewer: Haydee Moore RN; : Xiang Trinidad RN    Reason for Hospitalization:   1. Closed head injury, initial encounter    2. Mild dehydration    3. History of stroke with residual effects    4. Dehydration    5. Fall in home, initial encounter    6. Constipation, unspecified constipation type      Chief Complaint   Patient presents with    Fall     Isolation:No active isolations    Hospital Course:  Admit Date: 2021 12:00 PM  Inpatient Rehab Referral Date: 21  Narrative of hospital course/history of present illness: 77 yr old male, PMH significant for stroke with right-sided paresis and seizures, presented to ED s/p fall at home. Trauma workup found no acute traumatic injuries. Laboratory findings significant for lactic acidosis (4.2) and treated with IVF bolus.       Medical & Surgical History/Current Comorbidities:  Past Medical History:   Diagnosis Date    Chronic right shoulder pain 10/4/2017    Closed TBI (traumatic brain injury) ([address] 2012    Essential hypertension 2017    Hemiparesis affecting dominant side as late effect of cerebrovascular accident (CVA) ([address] 2018    History of CVA (cerebrovascular accident) 2017    Hyperlipidemia 2017    Hypoxemia requiring supplemental oxygen 10/4/2017    Recurrent depression ([address] 8/2/2017    S/P patent foramen ovale closure 8/2/2017    Seizure ([address] 8/2/2017     History reviewed. No pertinent surgical history. Advanced Directives:  Advance Directives     Power of  Living Will ACP-Advance Directive ACP-Power of     Not on File Not on File Not on File Filed          Labs/Infection Control:  Recent Labs     12/20/21  1230 12/21/21  1110   WBC 15.0* 12.8*   HGB 13.9* 13.3*   HCT 41.3* 39.0*    179   BUN 11 14   CREATININE 0.99 0.85   GLUCOSE 142* 104*    138   K 4.1 3.5   INR 1.6  --    CALCIUM 9.9 9.2   PROT 7.8 6.8      Blood cultures:  No results for input(s): BC in the last 72 hours. Urinalysis/C&S:  Recent Labs     12/20/21  1230   WBCUA 3-5   RBCUA 0-2   BACTERIA Negative   LEUKOCYTESUR Negative   BLOODU TRACE*   GLUCOSEU Negative   KETUA TRACE*       Radiology:  CT HEAD WO CONTRAST  Result Date: 12/20/2021  Impression: Remote left frontal and left temporal lobe infarcts. Mild cerebral atrophy. Chronic ischemic white matter disease. All CT scans at this facility use dose modulation, iterative reconstruction, and/or weight based dosing when appropriate to reduce radiation dose to as low as reasonably achievable. CT CHEST W CONTRAST and CT ABDOMEN AND PELVIS WITH IV CONTRAST  Result Date: 12/20/2021  No acute abnormality in the chest, abdomen and pelvis. Large amount of colonic stool. CT CERVICAL SPINE WO CONTRAST  Result Date: 12/20/2021  No fracture. Marked diffuse degenerative change cervical spine. C4 anterolisthesis and loss of body height, C6 and C7, most likely secondary to degenerative change. All CT scans at this facility use dose modulation, iterative reconstruction, and/or weight based dosing when appropriate to reduce radiation dose to as low as reasonably achievable. Medications/IV's:  The patient is currently on aspirin and Xarelto for DVT prophylaxis.      Scheduled:    ARIPiprazole, 2 mg, Oral, Daily    aspirin, 81 mg, Independent  Ambulation Assistance: Independent (QC, occasionally w/c in home, scooter in community)  Transfer Assistance: Independent  Active : No  Additional Comments: Family locally- information confirmed with pt. permission from brother via phone  Dental Appliances: None  Vision - Corrective Lenses: None  Hearing Aid: None  Personal Equipment:   Dental Appliances: None  Vision - Corrective Lenses: None  Hearing Aid: None    CURRENT FUNCTIONAL LEVEL:  Physical Therapy  Bed mobility:  Supine to Sit: Minimal assistance (postlean and LOB with steadying assist required- lifting assist at end of tasks additionally required) (12/21/21 1002)  Sit to Supine: Stand by assistance (12/21/21 1002)  Transfers:  Sit to Stand: Minimal Assistance;2 Person Assistance; Moderate Assistance (multiple trials with progression from +2 to +1 noted by last trial) (12/21/21 1003)  Gait:   Device: [email or web address] (12/21/21 1006)  Assistance: 2 Person assistance; Moderate assistance (intermittent CGA of second person to ensure safety) (12/21/21 1006)  Distance: 5 steps forward and lat - limited by fatigue (12/21/21 1006)  Quality of Gait: reduced RLE weight acceptance, advance RLE with minimal weight shift assistance, right knee recurvatum at stance, reduced trunk and pelvic mobility, LOB post and lat requiring assistance for recovery (12/21/21 1006)  Stairs: NT     W/C mobility: NT       Occupational Therapy  Hand Dominance: Right (But uses L hand d/t previous CVA)  ADL  Feeding: Setup (12/21/21 1000)  Grooming: Setup (12/21/21 1000)  UE Bathing: Minimal assistance (12/21/21 1000)  LE Bathing: Moderate assistance (12/21/21 1000)  UE Dressing: Minimal assistance (12/21/21 1000)  LE Dressing: Moderate assistance (12/21/21 1000)  Toileting: Moderate assistance (12/21/21 1000)  Additional Comments: Simulated ADLs as above.  Changed brief following incontinence, required assistance for balance and hygiene as unable to maintain standing position for pants management. Unable to perform figure 4 to don socks or to bend forward.  Required increased time and assist. (12/21/21 1000)  Toilet Transfers  Toilet Transfer: Unable to assess (12/21/21 1010)  Toilet Transfers Comments: Unsafe to ambulate that distance; anticipate min A (12/21/21 1010)          Speech Language Pathology: NT       Current Conditions Requiring Inpatient Rehabilitation  Bowel/Bladder Dysfunction: Yes  Intervention Required = Frequent toileting  Risk for Medical/Clinical Complications = high  Skin Healing/Breakdown Risk: Yes  Intervention Required = Side to side turns  Risk for Medical/Clinical Complications = high  Nutrition/Hydration Deficiency: Yes  Intervention Required = Monitor I&Os and Check Labs  Risk for Medical/Clinical Complications = high  Medical Comorbidities: Yes  Intervention Required = DVT risk and CVA, DJD, TBI  Risk for Medical/Clinical Complications = high    Rehab/Skilled Needs:   3 hours of Intensive Acute Rehab therapy daily, 5 days/week for a total of 900 minutes  PT Treatment Time:  1.5 hrs/day  OT Treatment Time: 1.5 hrs/day  Rehabilitation Nursing   Case management/Social work  Cultural needs:     none  Funding needs:     none    Expected Level of Improvement with Rehab  Assist for ADL Modified Berk[place]  Assist for Transfers Modified Berkley  Assist for Gait Modified Berk[place]    Patient's willingness to participate: Yes  Patient's ability to tolerate proposed care: Yes  Patient/Family Goals of Rehab (in patient's/family's own words): get stronger, return home    Anticipated Discharge Plan:  Home with   [address] [place] [address] Dave De Gaulle, RN PT OT Aide to be determined      Barriers to Discharge:  Home entry accessibility  Caregiver availability  [address] transportation  Resource availability    Rehab evaluation plan: Recommend Acute Rehab   Rehabilitation Impairment Group Code: 1.2  Rehab Impairment Group: Neurologic: Late effects CVA  Estimated Length of Stay (days): 11  Rehab Diagnosis: Impaired mobility and ADL's due to Late Effects CVA  Rehab Admitting Doctor: Dr. Marya Peralta DO    Electronically signed by Katy Ross RN on 12/21/21 at 12:26 PM EST

## 2021-12-21 NOTE — PLAN OF CARE
See OT evaluation for all goals and OT POC.  Electronically signed by PORTIA Inman/L on 12/21/2021 at 10:20 AM

## 2021-12-22 PROBLEM — Z79.01 BLOOD THINNED DUE TO LONG-TERM ANTICOAGULANT USE: Status: ACTIVE | Noted: 2021-12-22

## 2021-12-22 PROBLEM — Z74.09 IMPAIRED MOBILITY AND ACTIVITIES OF DAILY LIVING: Status: ACTIVE | Noted: 2021-12-22

## 2021-12-22 PROBLEM — Z78.9 IMPAIRED MOBILITY AND ACTIVITIES OF DAILY LIVING: Status: ACTIVE | Noted: 2021-12-22

## 2021-12-22 PROBLEM — R26.9 ABNORMALITY OF GAIT AND MOBILITY: Status: ACTIVE | Noted: 2021-12-22

## 2021-12-22 LAB
GFR AFRICAN AMERICAN: >60
GFR NON-AFRICAN AMERICAN: >60
PERFORMED ON: NORMAL
PERFORMED ON: NORMAL
POC CREATININE: 1 MG/DL (ref 0.8–1.3)
POC SAMPLE TYPE: NORMAL
POC SAMPLE TYPE: NORMAL

## 2021-12-22 PROCEDURE — 97116 GAIT TRAINING THERAPY: CPT

## 2021-12-22 PROCEDURE — 96372 THER/PROPH/DIAG INJ SC/IM: CPT

## 2021-12-22 PROCEDURE — 1180000000 HC REHAB R&B

## 2021-12-22 PROCEDURE — 6360000002 HC RX W HCPCS: Performed by: PHYSICAL MEDICINE & REHABILITATION

## 2021-12-22 PROCEDURE — 97166 OT EVAL MOD COMPLEX 45 MIN: CPT

## 2021-12-22 PROCEDURE — 6370000000 HC RX 637 (ALT 250 FOR IP): Performed by: INTERNAL MEDICINE

## 2021-12-22 PROCEDURE — 99222 1ST HOSP IP/OBS MODERATE 55: CPT | Performed by: PHYSICAL MEDICINE & REHABILITATION

## 2021-12-22 PROCEDURE — 97535 SELF CARE MNGMENT TRAINING: CPT

## 2021-12-22 PROCEDURE — 92610 EVALUATE SWALLOWING FUNCTION: CPT

## 2021-12-22 PROCEDURE — 92523 SPEECH SOUND LANG COMPREHEN: CPT

## 2021-12-22 PROCEDURE — 97112 NEUROMUSCULAR REEDUCATION: CPT

## 2021-12-22 PROCEDURE — 97530 THERAPEUTIC ACTIVITIES: CPT

## 2021-12-22 PROCEDURE — 96125 COGNITIVE TEST BY HC PRO: CPT

## 2021-12-22 PROCEDURE — 6370000000 HC RX 637 (ALT 250 FOR IP): Performed by: PHYSICAL MEDICINE & REHABILITATION

## 2021-12-22 PROCEDURE — 97163 PT EVAL HIGH COMPLEX 45 MIN: CPT

## 2021-12-22 RX ORDER — LEVETIRACETAM 500 MG/1
500 TABLET, EXTENDED RELEASE ORAL DAILY
Status: CANCELLED | OUTPATIENT
Start: 2021-12-22

## 2021-12-22 RX ORDER — VITAMIN B COMPLEX
2000 TABLET ORAL
Status: DISCONTINUED | OUTPATIENT
Start: 2021-12-22 | End: 2022-01-01 | Stop reason: HOSPADM

## 2021-12-22 RX ORDER — OXYCODONE HYDROCHLORIDE 5 MG/1
5 TABLET ORAL EVERY 4 HOURS PRN
Status: DISCONTINUED | OUTPATIENT
Start: 2021-12-22 | End: 2022-01-01 | Stop reason: HOSPADM

## 2021-12-22 RX ORDER — SODIUM PHOSPHATE, DIBASIC AND SODIUM PHOSPHATE, MONOBASIC 7; 19 G/133ML; G/133ML
1 ENEMA RECTAL DAILY PRN
Status: DISCONTINUED | OUTPATIENT
Start: 2021-12-22 | End: 2022-01-01 | Stop reason: HOSPADM

## 2021-12-22 RX ORDER — TRAMADOL HYDROCHLORIDE 50 MG/1
25 TABLET ORAL EVERY 8 HOURS PRN
Status: DISCONTINUED | OUTPATIENT
Start: 2021-12-22 | End: 2021-12-23

## 2021-12-22 RX ORDER — ZOLPIDEM TARTRATE 5 MG/1
5 TABLET ORAL NIGHTLY PRN
Status: DISCONTINUED | OUTPATIENT
Start: 2021-12-22 | End: 2022-01-01 | Stop reason: HOSPADM

## 2021-12-22 RX ORDER — ACETAMINOPHEN 325 MG/1
650 TABLET ORAL EVERY 4 HOURS PRN
Status: DISCONTINUED | OUTPATIENT
Start: 2021-12-22 | End: 2022-01-01 | Stop reason: HOSPADM

## 2021-12-22 RX ORDER — BISACODYL 10 MG
10 SUPPOSITORY, RECTAL RECTAL DAILY PRN
Status: DISCONTINUED | OUTPATIENT
Start: 2021-12-22 | End: 2022-01-01 | Stop reason: HOSPADM

## 2021-12-22 RX ORDER — LIDOCAINE 4 G/G
3 PATCH TOPICAL DAILY
Status: DISCONTINUED | OUTPATIENT
Start: 2021-12-22 | End: 2022-01-01 | Stop reason: HOSPADM

## 2021-12-22 RX ORDER — UBIDECARENONE 100 MG
100 CAPSULE ORAL DAILY
Status: DISCONTINUED | OUTPATIENT
Start: 2021-12-22 | End: 2022-01-01 | Stop reason: HOSPADM

## 2021-12-22 RX ORDER — CYANOCOBALAMIN 1000 UG/ML
1000 INJECTION INTRAMUSCULAR; SUBCUTANEOUS WEEKLY
Status: DISCONTINUED | OUTPATIENT
Start: 2021-12-22 | End: 2022-01-01 | Stop reason: HOSPADM

## 2021-12-22 RX ADMIN — CYANOCOBALAMIN 1000 MCG: 1000 INJECTION, SOLUTION INTRAMUSCULAR at 18:08

## 2021-12-22 RX ADMIN — LEVOTHYROXINE SODIUM 50 MCG: 0.05 TABLET ORAL at 08:33

## 2021-12-22 RX ADMIN — ZOLPIDEM TARTRATE 5 MG: 5 TABLET ORAL at 23:08

## 2021-12-22 RX ADMIN — ASPIRIN 81 MG: 81 TABLET, CHEWABLE ORAL at 08:33

## 2021-12-22 RX ADMIN — Medication: at 23:07

## 2021-12-22 RX ADMIN — ARIPIPRAZOLE 2 MG: 2 TABLET ORAL at 08:33

## 2021-12-22 RX ADMIN — DULOXETINE 60 MG: 60 CAPSULE, DELAYED RELEASE ORAL at 08:33

## 2021-12-22 RX ADMIN — Medication 2000 UNITS: at 18:08

## 2021-12-22 RX ADMIN — RIVAROXABAN 20 MG: 20 TABLET, FILM COATED ORAL at 08:34

## 2021-12-22 RX ADMIN — Medication 100 MG: at 18:08

## 2021-12-22 ASSESSMENT — ENCOUNTER SYMPTOMS
STRIDOR: 0
DIARRHEA: 0
SHORTNESS OF BREATH: 1
BOWEL INCONTINENCE: 0
SORE THROAT: 0
NAUSEA: 0
BACK PAIN: 1
ABDOMINAL PAIN: 0
WHEEZING: 0
CONSTIPATION: 1
EYE PAIN: 0
BLOOD IN STOOL: 0
EYE REDNESS: 0
PHOTOPHOBIA: 0
COUGH: 0
VOMITING: 0

## 2021-12-22 ASSESSMENT — PAIN SCALES - GENERAL
PAINLEVEL_OUTOF10: 0

## 2021-12-22 NOTE — CARE COORDINATION
Social/Functional Status:  Social/Functional History  Lives With: Alone  Type of Home: House (in law suite on friend's home)  Home Layout: One level  Home Access: Stairs to enter without rails  Entrance Stairs - Number of Steps: 1 stoop  Bathroom Shower/Tub: Tub/Shower unit  Bathroom Equipment: Shower chair,Grab bars in [facility]: Quad cane,Electric scooter,Wheelchair-manual,Reacher  ADL Assistance: Independent  Homemaking Assistance: Independent  Meal Prep Responsibility: Primary (Patient reported that he uses the microwave and he does not use the stovetop,oven or a crockpot)  Laundry Responsibility: Primary  Cleaning Responsibility: No (daughter cleans)  Bill Paying/Finance Responsibility: No  Shopping Responsibility: Secondary  Health Care Management: No (Patient has someone that fills a pill organizer)  Ambulation Assistance: Independent  Transfer Assistance: Independent  Active : No  Occupation: Retired  Leisure & Hobbies: Patient demonstrated by moving his arm in circles that he goes around the house  Additional Comments: Patient reported that he has 2 dogs that he is responsible for. Patient does not have to go outside with them to potty them per his report but patient had difficulty with describing the situation. Spoke with patients brother and explained role in the team. Patients brother  questions answered appropriately. Explained discharge process. Patients brother stated understanding. Patient lives alone, has family local. Patient has w/c, quad cane, and electric scooter. Patient was independent prior to admission.  Electronically signed by Noemy Mtz RN on 12/22/2021 at 4:45 PM

## 2021-12-22 NOTE — PLAN OF CARE
Problem: Falls - Risk of:  Goal: Will remain free from falls  Description: Will remain free from falls  Outcome: Ongoing  Goal: Absence of physical injury  Description: Absence of physical injury  Outcome: Ongoing     Problem: HEMODYNAMIC STATUS  Goal: Patient has stable vital signs and fluid balance  Outcome: Ongoing     Problem: ACTIVITY INTOLERANCE/IMPAIRED MOBILITY  Goal: Mobility/activity is maintained at optimum level for patient  Outcome: Ongoing     Problem: COMMUNICATION IMPAIRMENT  Goal: Ability to express needs and understand communication  Outcome: Ongoing     Problem:  Activity:  Goal: Ability to avoid complications of mobility impairment will improve  Description: Ability to avoid complications of mobility impairment will improve  Outcome: Ongoing  Goal: Range of joint motion will improve  Description: Range of joint motion will improve  Outcome: Ongoing  Goal: Ability to ambulate will improve  Description: Ability to ambulate will improve  Outcome: Ongoing  Goal: Muscle strength will improve  Description: Muscle strength will improve  Outcome: Ongoing     Problem: Health Behavior:  Goal: Identification of resources available to assist in meeting health care needs will improve  Description: Identification of resources available to assist in meeting health care needs will improve  Outcome: Ongoing     Problem: Safety:  Goal: Ability to remain free from injury will improve  Description: Ability to remain free from injury will improve  Outcome: Ongoing     Problem: IP COMMUNICATION/DYSARTHRIA  Goal: LTG - patient will improve expressive language skills to allow for communication of wants and needs in daily activities  12/22/2021 1853 by Deanna Chen RN  Outcome: Ongoing  12/22/2021 1150 by OSCAR Heller  Outcome: Ongoing     Problem: IP SWALLOWING  Goal: LTG - patient will tolerate the least restrictive diet consistency to allow for safe consumption of daily meals  12/22/2021 1853 by Srini Uriarte RN  Outcome: Ongoing  12/22/2021 1150 by Warden Medrano SLP  Outcome: Ongoing

## 2021-12-22 NOTE — H&P
HISTORY & PHYSICAL       DATE OF ADMISSION:  12/21/2021    DATE OF SERVICE:  12/22/21    Subjective:    Oscar Clemens, 77 y.o. male presents today with:     CHIEF COMPLAINT:  71-year-old gentleman with a history of residual cognitive, communication, right-sided weakness and seizure secondary to previous CVA unfortunately has had a recent closed head injury secondary to a fall with decline in functional status. He was admission admitted through the emergency room on 12/20/2021. He had fallen at home. Trauma work-up showed no acute traumatic fractures. Labs were significant for positive and elevated lactic acid level. He was prescribed IV fluid boluses. His toxicology screen was positive for cannabinoids. His urinary analysis showed ketones but no bacteria culture was not indicated. SARS-CoV-2 testing was negative. Patient was found to have mobility ADL and cognitive deficits requiring acute level rehab his goal is to return home and independent level after acute rehab with home health care PT OT. Neurologic Problem  The patient's primary symptoms include an altered mental status, clumsiness, focal sensory loss, focal weakness, a loss of balance, memory loss, slurred speech and weakness. This is a recurrent problem. The current episode started in the past 7 days. The neurological problem developed insidiously. The problem has been gradually improving since onset. There was right-sided, upper extremity and lower extremity focality noted. Associated symptoms include back pain, bladder incontinence, confusion, dizziness, fatigue, light-headedness, neck pain and shortness of breath. Pertinent negatives include no abdominal pain, bowel incontinence, chest pain, diaphoresis, fever, headaches, nausea, palpitations or vomiting. Past treatments include walking. The treatment provided mild relief. His past medical history is significant for a CVA and mood changes.  There is no history of a bleeding disorder or a clotting disorder. Neck Pain   This is a chronic problem. The current episode started more than 1 year ago. The problem occurs constantly. The problem has been waxing and waning. The pain is associated with nothing. The pain is present in the midline. The pain is at a severity of 6/10. The pain is severe. The symptoms are aggravated by twisting. Associated symptoms include weakness. Pertinent negatives include no chest pain, fever, headaches or photophobia. Fatigue  Associated symptoms include fatigue, myalgias, neck pain and weakness. Pertinent negatives include no abdominal pain, chest pain, chills, congestion, coughing, diaphoresis, fever, headaches, nausea, rash, sore throat or vomiting. I reviewed recent nursing note, \" Patient oriented to self, disoriented to situation, time and place. Incontinent, had a small BM. Patient has a right facial droop, right arm is flaccid. Pt able to lift BLE, right is weaker. Pt seems to have expressive aphasia. Denies pain at the moment. \". The patient has stabilized medically andis able to participate at acute level rehab but is too medically complex for SNF due to need for therapy at the acute level with at least 15 hours a week of PT OT and cognitive and recreational therapy at an acute level with daily medical monitoring. Imaging:    Imaging and other studies reviewed and discussed with patient and staff    CT HEAD   12/20/2021    Extra-axial spaces:  Normal. Intracranial hemorrhage:  None. Ventricular system: Ventricles mildly enlarged. Sulci mildly prominent. Ex vacuo dilatation, left lateral ventricle. Basal Cisterns:  Normal. Cerebral Parenchyma: Bilateral symmetric periventricular areas decreased attenuation. Geographic area of decreased attenuation, left frontal and left temporal lobes exerting no mass effect. Midline Shift:  None.  Cerebellum:  Normal. Paranasal sinuses and mastoid air cells:  Normal. Visualized Orbits:  Normal. Impression: Remote left frontal and left temporal lobe infarcts. Mild cerebral atrophy. Chronic ischemic white matter disease. CT CHEST 12/20/2021    Chest: Lines, tubes, and devices:  None. Lung parenchyma and pleura: No consolidation. No suspicious pulmonary nodule. No pleural effusion. Central airways are patent. Mild bibasilar atelectasis. Thoracic inlet, heart, and mediastinum:  No lymphadenopathy in the axillary, mediastinal, or hilar regions. Mild atherosclerotic vascular thoracic aorta. The thoracic aorta and main pulmonary artery are normal in caliber. The cardiac chambers are normal in size. Status post foramen ovale closure. Mild coronary artery atherosclerotic calcifications are noted, although the study is not optimized for coronary assessment. No pericardial effusion or thickening. Thyroid gland is unremarkable. Esophagus is nondilated. Bones/Soft Tissues: Degenerative changes. Abdomen / Pelvis: Liver: No mass. Right hepatic lobe metallic density. Biliary: No bile duct dilation. Gallbladder is unremarkable. Spleen: No mass. No splenomegaly. Pancreas: No mass or duct dilation. Adrenals: No mass. Kidneys: No mass, calculus or hydronephrosis. GI tract: No dilation or wall thickening. Large amount of colonic stool. Lymph nodes: No abdominal or pelvic lymphadenopathy. Mesentery/Peritoneum: No ascites or mass. Retroperitoneum: No mass. Vasculature: The celiac axis and SMA are patent. The portal vein and branches, splenic vein, SMV, and hepatic veins are patent. Arterial atherosclerotic disease without aneurysm. There is an IVC filter. Pelvis: No mass, ascites or fluid collection. Urinary bladder is unremarkable. Soft tissue/ bones: Grade I anterolisthesis of L5 on S1. Multilevel degenerative changes of the lumbar spine. No acute abnormality in the chest, abdomen and pelvis. Large amount of colonic stool.          CT CERVICAL SPINE   12/20/2021  Loss of height, C5 and C6 with cranial caudal dimensions 1.2 cm, and 0.8 cm, respectively. Loss cervical lordosis. Remote 8mm anterolisthesis C4 on C5. Atlantooccipital articulation maintained. Atlantoaxial interval preserved. Neural foramina narrowing, left C4-5. Diffuse disc space narrowing C3-4, through T2-3, greatest at C4-5 through C6-7. No fractures, dislocations, bone lesions. Limited imaging lung apices without anomaly. Carotid arteries and soft tissues are without anomaly. No fracture. Marked diffuse degenerative change cervical spine. C4 anterolisthesis and loss of body height, C6 and C7, most likely secondary to degenerative change. CT ABDOMEN PELVIS : 12/20/2021     Chest: Lines, tubes, and devices:  None. Lung parenchyma and pleura: No consolidation. No suspicious pulmonary nodule. No pleural effusion. Central airways are patent. Mild bibasilar atelectasis. Thoracic inlet, heart, and mediastinum:  No lymphadenopathy in the axillary, mediastinal, or hilar regions. Mild atherosclerotic vascular thoracic aorta. The thoracic aorta and main pulmonary artery are normal in caliber. The cardiac chambers are normal in size. Status post foramen ovale closure. Mild coronary artery atherosclerotic calcifications are noted, although the study is not optimized for coronary assessment. No pericardial effusion or thickening. Thyroid gland is unremarkable. Esophagus is nondilated. Bones/Soft Tissues: Degenerative changes. Abdomen / Pelvis: Liver: No mass. Right hepatic lobe metallic density. Biliary: No bile duct dilation. Gallbladder is unremarkable. Spleen: No mass. No splenomegaly. Pancreas: No mass or duct dilation. Adrenals: No mass. Kidneys: No mass, calculus or hydronephrosis. GI tract: No dilation or wall thickening. Large amount of colonic stool. Lymph nodes: No abdominal or pelvic lymphadenopathy. Mesentery/Peritoneum: No ascites or mass. Retroperitoneum: No mass. Vasculature: The celiac axis and SMA are patent.  The portal vein and branches, splenic vein, SMV, and hepatic veins are patent. Arterial atherosclerotic disease without aneurysm. There is an IVC filter. Pelvis: No mass, ascites or fluid collection. Urinary bladder is unremarkable. Soft tissue/ bones: Grade I anterolisthesis of L5 on S1. Multilevel degenerative changes of the lumbar spine. No acute abnormality in the chest, abdomen and pelvis. Large amount of colonic stool. Labs:     labs reviewed and discussed with patient and staff    No results found for: POCGLU  Lab Results   Component Value Date     12/21/2021    K 3.5 12/21/2021     12/21/2021    CO2 22 12/21/2021    BUN 14 12/21/2021    CREATININE 0.85 12/21/2021    CALCIUM 9.2 12/21/2021    LABALBU 4.1 12/21/2021    BILITOT 1.5 12/21/2021    ALKPHOS 88 12/21/2021     12/21/2021    ALT 35 12/21/2021     Lab Results   Component Value Date    WBC 10.9 12/21/2021    RBC 4.26 12/21/2021    HGB 13.0 12/21/2021    HCT 37.9 12/21/2021    MCV 88.8 12/21/2021    MCH 30.4 12/21/2021    MCHC 34.2 12/21/2021    RDW 13.7 12/21/2021     12/21/2021     No results found for: VITD25  Lab Results   Component Value Date    COLORU Yellow 12/20/2021    NITRU Negative 12/20/2021    GLUCOSEU Negative 12/20/2021    KETUA TRACE 12/20/2021    UROBILINOGEN 0.2 12/20/2021    BILIRUBINUR Negative 12/20/2021     Lab Results   Component Value Date    PROTIME 19.1 12/20/2021     Lab Results   Component Value Date    INR 1.6 12/20/2021         I discussed results with patient. The patient remains highly medically complex and continues to have severe problems with activities of daily living and mobility. The patient was assessed to be able to tolerate intensive rehabilitation and therefore was admitted to Rehabilitation to address these needs.     The patient has been found to have severe abnormality of gait and mobility with impaired self care and is admitted to the acute inpatient rehab program.       Prior Function; everyday activities:     Social History     Socioeconomic History    Marital status:      Spouse name: Not on file    Number of children: Not on file    Years of education: Not on file    Highest education level: Not on file   Occupational History    Not on file   Tobacco Use    Smoking status: Never Smoker    Smokeless tobacco: Never Used   Substance and Sexual Activity    Alcohol use: Not on file    Drug use: Not on file    Sexual activity: Not on file   Other Topics Concern    Not on file   Social History Narrative     Lives alone in [place]- [address]         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 permission from brother via phone                 Social Determinants of Health     Financial Resource Strain: Low Risk     Difficulty of Paying Living Expenses: Not hard at all   Food Insecurity: No Food Insecurity    Worried About [facility] in the Last Year: Never true    [facility] in the Last Year: Never true   Transportation Needs: No Transportation Needs    Lack of Transportation (Medical): No    Lack of Transportation (Non-Medical):  No   Physical Activity:     Days of Exercise per Week: Not on file    Minutes of Exercise per Session: Not on file   Stress:     Feeling of Stress : Not on file   Social Connections:     Frequency of Communication with Friends and Family: Not on file    Frequency of Social Gatherings with Friends and Family: Not on file    Attends Zoroastrian Services: Not on file    Active Member of Clubs or Organizations: Not on file    Attends Club or Organization Meetings: Not on file    Marital Status: Not on file   Intimate Partner Violence:     Fear of Current or Ex-Partner: Not on file    Emotionally Abused: Not on file    Physically Abused: Not on file    Sexually Abused: Not on file   Housing Stability:     Unable to Pay for Housing in the Last Year: Not on file    Number of [place] in the Last Year: Not on file    Unstable Housing in the Last Year: Not on file     Social supports listed above. Prior Device(s) used:  As above    History of falls:  Rarely falls    In depth analysis of complex functional data; the patient has been:    Current Rehabilitation Assessments:    Physical therapy: FIMS:  Bed Mobility:      Transfers:Sit to Stand: Minimal Assistance  Stand to sit: Minimal Assistance  Bed to Chair: Moderate assistance,Minimal assistance (pivot - min lifting assist from chair and steadying from bed level), Ambulation 1  Surface: carpet  Device: Large Dann Laith  Assistance: Moderate assistance  Quality of Gait: reduced RLE weight acceptance, advanced RLE with minimal weight shift assistance, right knee recurvatum at stance, RLE foot placement inconsistent, concern for right ankle stability at stance due to everted position initially, reduced trunk and pelvic mobility with position of pelvis rotated to right, LOB to the left requiring assistance for recovery  Distance: 3 steps  Comments: chair brought up to pt d/t pt fatigued from previous tx, Stairs  # Steps : 2  Stairs Height: 6\"  Rails: Left ascending  Assistance: Moderate assistance  Comment: heavy support on rail reuired with lean to left - unsafe to test curb style step with qc use as result    FIMS:  , , Assessment: Pt exhibits decreased safety secondary to occ impulsive movements.  When explaining next task, pt standing prior to being prepared for task. Pt exhibits ff posture with standing activities requiring cues. Good follow through of vc's. Pt fatigued this session and unable to increase ambulatory distance. Increased time and effort required secondary to fatigue and RB's. Occupational therapy: FIMS:   ,  ,      OCCUPATIONAL THERAPY                   Speech therapy: FIMS:       SPEECH THERAPY               Diet/Swallow:  Diet Solids Recommendation: Regular  Liquid Consistency Recommendation: Thin  Dysphagia Outcome Severity Scale: Level 5: Mild dysphagia- Distant supervision. May need one diet consistency restricted    Compensatory Swallowing Strategies: Upright as possible for all oral intake,Small bites/sips  Therapeutic Interventions: Diet tolerance monitoring,Patient/Family education          COGNITION  OT:    SP:          Prior to admission patient was independent with all ADLs and mobilityand did not require any outside services. Past Medical History:   Diagnosis Date    Chronic right shoulder pain 10/4/2017    Closed TBI (traumatic brain injury) ([facility] 12/28/2012    Essential hypertension 8/2/2017    Hemiparesis affecting dominant side as late effect of cerebrovascular accident (CVA) ([address] 1/30/2018    History of CVA (cerebrovascular accident) 8/2/2017    Hyperlipidemia 8/2/2017    Hypoxemia requiring supplemental oxygen 10/4/2017    Recurrent depression ([address] 8/2/2017    S/P patent foramen ovale closure 8/2/2017    Seizure ([address] 8/2/2017       History reviewed. No pertinent surgical history.     Current Facility-Administered Medications   Medication Dose Route Frequency Provider Last Rate Last Admin    acetaminophen (TYLENOL) tablet 650 mg  650 mg Oral Q4H PRN Alicia Scullin, DO        bisacodyl (DULCOLAX) suppository 10 mg  10 mg Rectal Daily PRN Fer Meter, DO        fleet rectal enema 1 enema  1 enema Rectal Daily PRN Alicia Scullin, DO        Vitamin D (CHOLECALCIFEROL) tablet 2,000 Units  2,000 Units Oral Dinner Patrick Mathias, DO        cyanocobalamin injection 1,000 mcg  1,000 mcg IntraMUSCular Weekly Alicia Scullin, DO        coenzyme Q10 capsule 100 mg  100 mg Oral Daily Alicia Scullin, DO        lidocaine 4 % external patch 3 patch  3 patch TransDERmal Daily Alicia Scullin, DO        zolpidem (AMBIEN) tablet 5 mg  5 mg Oral Nightly PRN Alicia Scullin, DO        oxyCODONE (ROXICODONE) immediate release tablet 5 mg  5 mg Oral Q4H PRN Alicia Scullin, DO        traMADol (ULTRAM) tablet 25 mg  25 mg Oral Q8H PRN Alicia Scullin, DO        0.9 % sodium chloride infusion  25 mL IntraVENous PRN Star Mead MD        ondansetron (ZOFRAN-ODT) disintegrating tablet 4 mg  4 mg Oral Q8H PRN Star Mead MD        Or    ondansetron (ZOFRAN) injection 4 mg  4 mg IntraVENous Q6H PRN Star Mead MD        polyethylene glycol (GLYCOLAX) packet 17 g  17 g Oral Daily PRN Star Mead MD        sodium chloride flush 0.9 % injection 5-40 mL  5-40 mL IntraVENous 2 times per day Star Mead MD        sodium chloride flush 0.9 % injection 5-40 mL  5-40 mL IntraVENous PRN Star Mead MD        ARIPiprazole (ABILIFY) tablet 2 mg  2 mg Oral Daily Star Mead MD   2 mg at 12/22/21 4287    aspirin chewable tablet 81 mg  81 mg Oral Daily Star Mead MD   81 mg at 12/22/21 5540    baclofen (LIORESAL) tablet 10 mg  10 mg Oral 4x Daily PRN Star Mead MD        DULoxetine (CYMBALTA) extended release capsule 60 mg  60 mg Oral Daily Star Mead MD   60 mg at 12/22/21 5483    levothyroxine (SYNTHROID) tablet 50 mcg  50 mcg Oral Daily Star Mead MD   50 mcg at 12/22/21 1923    rivaroxaban (XARELTO) tablet 20 mg  20 mg Oral Daily with breakfast Star Mead MD   20 mg at 12/22/21 0834    ammonium lactate (LAC-HYDRIN) 12 % lotion   Topical Nightly Alicia Scullin, DO           No Known Allergies                   FAMILY HISTORY:  Does not pertain tochief complaint.      Review of Systems   Unable to perform ROS: Mental status change   Constitutional: Positive for activity change and fatigue. Negative for chills, diaphoresis and fever. HENT: Negative for congestion, ear discharge, ear pain, hearing loss, nosebleeds, sore throat and tinnitus. Eyes: Negative for photophobia, pain and redness. Respiratory: Positive for shortness of breath. Negative for cough, wheezing and stridor. Shortness of breath on exertion   Cardiovascular: Negative for chest pain, palpitations and leg swelling. Gastrointestinal: Positive for constipation. Negative for abdominal pain, blood in stool, bowel incontinence, diarrhea, nausea and vomiting. Endocrine: Negative for polydipsia. Genitourinary: Positive for bladder incontinence. Negative for dysuria, flank pain, frequency, hematuria and urgency. Incontinent    Musculoskeletal: Positive for back pain, gait problem, myalgias and neck pain. Skin: Negative for rash. Allergic/Immunologic: Positive for immunocompromised state. Negative for environmental allergies. Neurological: Positive for dizziness, focal weakness, seizures, speech difficulty, weakness, light-headedness and loss of balance. Negative for tremors and headaches. Hematological: Does not bruise/bleed easily. Psychiatric/Behavioral: Positive for confusion, decreased concentration, dysphoric mood and memory loss. Negative for hallucinations and suicidal ideas. The patient is not nervous/anxious. Objective  /70   Pulse 101   Temp 98.2 °F (36.8 °C) (Oral)   Resp 16   SpO2 97% *    Physical Exam  Constitutional:       General: He is awake. HENT:      Head: Normocephalic and atraumatic. Right Ear: External ear normal.      Left Ear: External ear normal.      Nose: Nose normal.      Mouth/Throat:      Pharynx: Oropharynx is clear. Eyes:      Extraocular Movements: Extraocular movements intact.       Conjunctiva/sclera: Conjunctivae normal. Pupils: Pupils are equal, round, and reactive to light. Cardiovascular:      Rate and Rhythm: Normal rate and regular rhythm. Pulses: Normal pulses. Heart sounds: Normal heart sounds. Pulmonary:      Effort: Pulmonary effort is normal.      Breath sounds: Normal breath sounds. Abdominal:      General: Bowel sounds are normal.      Palpations: Abdomen is soft. Musculoskeletal:      Right shoulder: Deformity and bony tenderness present. Decreased range of motion. Cervical back: Neck supple. Bony tenderness present. Pain with movement present. Decreased range of motion. Thoracic back: Tenderness present. Lumbar back: Tenderness and bony tenderness present. Back:       Comments: Large right shoulder subluxation   Skin:     General: Skin is warm and dry. Capillary Refill: Capillary refill takes less than 2 seconds. Findings: Bruising present. Neurological:      Mental Status: He is confused. Sensory: Sensation is intact. Coordination: Finger-Nose-Finger Test abnormal, Heel to [employer] abnormal and Romberg Test abnormal.      Gait: Tandem walk abnormal.      Comments:  not following commands however pulled pants down to use urinal appropriately, moving LUE, LLE and RUE      Psychiatric:         Attention and Perception: Attention normal.         Mood and Affect: Mood normal.         Speech: Speech is slurred. Behavior: Behavior normal.         Thought Content: Thought content normal.         Cognition and Memory: Cognition is impaired. Memory is impaired. Judgment: Judgment normal.       Ortho Exam  Neurologic Exam     Mental Status   Speech: slurred   Level of consciousness: alert  Knowledge: poor. Able to name object. Unable to read. Able to repeat. Unable to write. Abnormal comprehension. Cranial Nerves     CN III, IV, VI   Pupils are equal, round, and reactive to light.     Motor Exam   Muscle bulk: normal  Right arm tone: spastic  Left arm tone: normal  Right leg tone: decreased  Left leg tone: normal    Sensory Exam   Sensory deficit distribution on right: non-dermatomal distribution    Gait, Coordination, and Reflexes     Coordination   Romberg: positive  Finger to nose coordination: abnormal  Heel to shin coordination: abnormal  Tandem walking coordination: abnormal      After extensive review of the records and above physical exam, I have formulated the followingdiagnoses and plan:      DIAGNOSES:    1. The patient was admitted to the acute rehabilitation unit with the primary rehab diagnoses being severe abnormality of gait and mobility and impaired self care and ADL's due to Remote left frontal and left temporal lobe infarcts-    Compared to Pre-Admission Assessment, patients medical and functional status remain challengingly complex and patient continues to requireintensive therapeutic intervention from multiple therapies, therefore, initiate acute intensive comprehensive inpatient rehabilitation program including PT/OT to improve balance, ambulation, ADLs, and to improve the P/AROM. Functional and medical status reassessed regarding patients ability to participate in therapies and patient found to be able to participate in acute intensivecomprehensive inpatient rehabilitation program.  Therapeutic modifications regarding activities in therapies, place, amount of time per day and intensity of therapy made daily. Enroll in acute course of therapy program to include 1/2 hours per day of PT 5 to 7days per week and 1 1/2 hours per day of OT 5 to 7 days per week,   SLP and Rec T 1/2 hour per day 3-5 days per week. The patient is stable medically and physically on previous exam.  When necessary therapy will be spread out over a 7-day window.      This patient present with significant new onset decreased mobility andinability to perform activities of daily living skills independently and is at significant risk for prolonged disability  For this reason they have been [facility] to [facility]. Thepatient's current functional and medical status are highly complex but the patient is able to participate in intensive rehabilitation. A comprehensive inpatient rehabilitation program is appropriate. The patient Arrie Gary initial evaluation by the rehabilitation team and be discussed at regular treatment team meetings to assess progress, mobility, self care, mood and discharge issues. Physical therapy will be consulted for mobilityand endurance issues and should be performed 1 to 2 times per day, 7 days per week for the length of stay. Occupational therapy will be consulted for activities of daily living and should be performed 1 to 2 times per day,7 days per week for the length of stay. Their capacity to participate at an acute level, decision to be treated in the gym, room or on the unit, their activity goals for the day and the number of minutes of activeparticipation will be reassessed and re-prescribed daily. Because this patient is medically complex, I will check a CBC, BMP, UA and orthostatic blood pressures. They will be reassessed daily regarding their ability toparticipate in an acute level rehabilitation program.  Recreational Therapy will be consulted for community re-entry and adjustment to disability. Communication, cognitive and emotional issues will also be addressed duringthis rehabilitation stay by rehabilitation psychologist or speech therapist as appropriate. I reviewed the patient's old and current charts and discussed other rehabilitation options with the rehabilitation team including the rehab RN and the admission team as well as the patient.   I feel that the patient's functional recovery would be best served at an acute inpatient rehabilitation program because the patient needs intense therapy three hours per day, direct RN supervision and daily monitoring by a physician for medical status. This cannot be sufficiently provided by home health care, a skilled nursing facility or in an outpatient setting. I further feel that the patient has the potential to improve functional abilities in an acute intensive rehabilitation program.    Old records were reviewed and summarized. 2.  Other diagnoses which complicate rehabilitation stay include:     Principal Problem:    Impaired mobility and activities of daily living dt late effects of CVA  Active Problems:  1. History of CVA (cerebrovascular accident)-focus on mobility ADL and cognitive deficits  2. Recurrent depression-emotional support provided daily, vitamin B12, encourage participation in rehabilitation support group and recreational therapy, adjust/add medications (Abilify, Cymbalta, add co-Q10 vitamin B12) it appears the patient uses cannabinoids at home. 3.   Hyperlipidemia, coronary artery disease, cerebrovascular disease-continue blood signs every shift focusing on heart rate and blood pressure checks, consult hospitalist for backup medical and adjust/add medications (Xarelto). Monitor heart rate and blood pressure as well as medications effects on vital signs before during and after therapy. 4.   Hemiparesis affecting dominant side as late effect of cerebrovascular accident (CVA)   5. 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 Chronic pain syndrome,   DJD (degenerative joint disease), lumbar, DJD (degenerative joint disease), cervical-transition to lowest effective dose of pain medication use topicals when available lowest effective dose of Ultram especially given cannabinoid use and Cymbalta. 7.   Hypothyroidism-  Synthroid and titrate dosing. Follow vital signs, recheck TSH and thyroid studies as outpatient. 8.   Chronic deep vein thrombosis (DVT) of left popliteal vein-Xaralto  9.    Bowel and bladder incontinence-check post void residuals add scheduled well as the names of the other physicians with an explanation of our individual roles in their care, as well as the rehab process.             Loyd Mcdowell D.O., [facility]ASCALE[employer]HARMAN

## 2021-12-22 NOTE — PROGRESS NOTES
Physical Therapy Rehab Treatment Note  Facility/Department: [facility]  Room: K88/N364-10       NAME: Yordan Hawk  : 1955 (89 y.o.)  MRN: [id number]  CODE STATUS: Full Code    Date of Service: 2021  Chart Reviewed: Yes  Family / Caregiver Present: No    Restrictions:  Restrictions/Precautions: Fall Risk,Seizure       SUBJECTIVE: Subjective: \"I'm doing okay. \"  Pain Screening  Patient Currently in Pain: Denies  Pre Treatment Pain Screening  Pain at present: 0  Intervention List: Patient able to continue with treatment    Post Treatment Pain Screening:  Pain Assessment  Pain Level: 0    OBJECTIVE:   Orientation Level: Oriented to person;Oriented to time;Oriented to place    Neuromuscular Education  Neuromuscular Comments: Standing weight shifting, fwd stepping, hip abduction on LLE in // bars. PTA's knee placed behind pt's R knee d/t instability with WB. Transfers  Sit to Stand: Minimal Assistance  Stand to sit: Minimal Assistance  Comment: Poor RLE weight acceptance, vc's for fwd weight shift, vc's for hand placement with stand > sit. Multiple STS performed throughout tx. Ambulation  Ambulation?: Yes  More Ambulation?: No  Ambulation 1  Surface: carpet  Device: Large Dann Laith  Assistance: Moderate assistance  Distance: 3 steps  Comments: chair brought up to pt d/t pt fatigued from previous tx      ASSESSMENT/PROGRESS TOWARDS GOALS:  Assessment: Pt exhibits decreased safety secondary to occ impulsive movements. When explaining next task, pt standing prior to being prepared for task. Pt exhibits ff posture with standing activities requiring cues. Good follow through of vc's. Pt fatigued this session and unable to increase ambulatory distance. Increased time and effort required secondary to fatigue and RB's.     Goals:  Long term goals  Long term goal 1: indep and effecient bed mobility  Long term goal 2: indep sit to stand and bed transfers  Long term goal 3: indep gait with qc 50 feet in protected environment  Long term goal 4: supervision with curb step performance for home entry  Long term goal 5:  for villalba balance testing    PLAN OF CARE/Safety:   Plan Comment: Cont. POC  Safety Devices  Type of devices: Chair alarm in place; Left in chair      Therapy Time:   Individual   Time In 1030   Time Out 1100   Minutes 30     Minutes:      Transfer/Bed mobility training: 15      Gait trainin      Neuro re education: 10     Therapeutic ex: 0      Carl Espitia PTA, 21 at 11:47 AM

## 2021-12-22 NOTE — PROGRESS NOTES
[facility]   Facility/Department: [facility]  Speech Language Pathology  Initial Speech/Language/Cognitive Assessment    NAME:Aramis Pinedo  : 1955 (26 y.o.)   MRN: [id number]  [id number]  ADMISSION DATE: 2021  PATIENT DIAGNOSIS(ES): Encounter for rehabilitation [Z51.89]  Abnormality of gait and mobility [R26.9]  No chief complaint on file.     Patient Active Problem List    Diagnosis Date Noted    Impaired mobility and activities of daily living dt late effects of CVA 2021    Abnormality of gait and mobility 2021    Blood thinned due to long-term anticoagulant use-Xaralto 2021    Cognitive deficit due to old head injury 2021    DJD (degenerative joint disease), lumbar 2021    DJD (degenerative joint disease), cervical 2021    Bowel and bladder incontinence 2021    Aphasia 2021    Generalized weakness 2021    Hypothyroidism 2019    Chronic deep vein thrombosis (DVT) of left popliteal vein (HCC) 2019    Chronic pain syndrome 2018    Hemiparesis affecting dominant side as late effect of cerebrovascular accident (CVA) ([address] 2018    Chronic right shoulder pain 10/04/2017    Hypoxemia requiring supplemental oxygen 10/04/2017    History of CVA (cerebrovascular accident) 2017    Essential hypertension 2017    Recurrent depression ([address] 2017    Seizure ([address] 2017    S/P patent foramen ovale closure 2017    Hyperlipidemia 2017    Closed TBI (traumatic brain injury) ([address] 2012     Past Medical History:   Diagnosis Date    Chronic right shoulder pain 10/4/2017    Closed TBI (traumatic brain injury) ([address] 2012    Essential hypertension 2017    Hemiparesis affecting dominant side as late effect of cerebrovascular accident (CVA) ([address] 2018    History of CVA (cerebrovascular accident) 2017    Hyperlipidemia 2017    Hypoxemia requiring supplemental oxygen 10/4/2017    Rec[facility] 8/2/2017    S/P patent foramen ovale closure 8/2/2017    Seizure ([facility] 8/2/2017     History reviewed. No pertinent surgical history. DATE ONSET: 12/20/21    Date of Evaluation: 12/22/2021   Evaluating Therapist: OSCAR Hernandez    Assessment:      Diagnosis: Moderate to severe expressive and receptive aphasia characterized difficulty following greater than 1 step directions, ID letters/body parts/left-right discrimination, answering yes/no questions, paragraph level comprehension, phrase level repetition, responsive naming, confrontatial naming, sentence completion, and convergent naming. Patient's comprehension increased with visual cues and expression improved with sentence completion and/or phonemic cueing. Patient presented with verbal apraxia at word level including groping during word finding tasks and imprecise consonant production, which improved with phonemic cues. ST is aware of baseline expressive and receptive language deficits, but unaware if the follwoing SLE results are patient's baselin skill level. Per PT, Jimmy Noriega, patient would utilize icons to text his brother what he wanted for dinner. Patient denies utilizing a communication device such as an ipad. ST provided patient with communication boards to improve communication with staff. ST discuss test results with patient's brother to determine baseline skills. Patient reported increased difficulty with receptive language skills. Recommendations:  Requires SLP Intervention: Yes  Duration/Frequency of Treatment: 2-4x/week for LOS or until all goals are met  D/C Recommendations: To be determined          Goals:  Short-term Goals  Timeframe for Short-term Goals: 1-2 weeks  Goal 1: Pt will follow (2-3) step directions given orally with 80% accuracy with min cues to increase the pt's ability to follow directions provided by caregivers for safe follow through with ADLs.   Goal 2: Pt will answer mid to high level yes/no questions with 90% accuracy with min cues to assist the caregiver in obtaining important information regarding the patient's personal, medical, and safety needs. Goal 3: Patient will demonstrate improved communication abilities with caregivers through the use of an AAC device (i.e. communciation board, Ipad, etc.) with min assist to improve meeting wants and needs. Long-term Goals  Timeframe for Long-term Goals: 2-3 weeks  Goal 1: Pt will improve his/her Expressive Language abilities to a superivised level with AAC  for expression of complex wants, needs, feelings/ideas, and medical/safety information. Goal 2: Pt will improve her/his Receptive Language abilities to a supervise to min assist level for comprehension of conversation and safety directions with familiar and unfamiliar communication partners. Patient's goals: Patient agreeable with ST POC    Subjective:   Previous level of function and limitations: Baseline receptive and expressive aphasia and verbal apraxia  General  Chart Reviewed: Yes  Patient assessed for rehabilitation services?: Yes  Family / Caregiver Present: No  Subjective  Subjective: Patient was pleasant and cooperative. ST to contact patient's brother, Jillian Leon, to determine baseline expressive and receptive language skills. Per chart patient uses 1 word answers. Patient has baseline expressive and receptive aphasia secondary to CVA in 2017. Per ST notes at OP [facility] at [facility] in [facility]/2017 patient presents with aphasia and apraxia. Vision  Vision: Within Functional Limits  Hearing  Hearing: Within functional limits           Objective:     Oral/Motor  Oral Motor: Exceptions to [facility]  Labial Symmetry: Abnormal symmetry right  Labial Strength: Reduced  Labial Coordination: Reduced    Auditory Comprehension  Comprehension: Exceptions  Yes/No Questions:  Moderate (11/15 I)  One Step Basic Commands:  (WFL 3/3 I)  Two Step Basic Commands: Severe (0-4 I accuracy with 2-step direction. ST provided repetition X1, which did not increase comprehension.)  Multistep Basic Commands: Severe  Pictures: Moderate (Pt ID 6/6 pictures of objects, 4/6 shapes, 0/6 letters, 5/6 numbers, 4/6 colors, 0/6 body parts, 0/3 right-left discrimation)  L/R Discrimination: Severe (0/3 I)  Conversation: Moderate (Patient required repetition to improve comprehension of directions during the eval. Visual cueing improved comprehension)  Interfering Components:  (Baseline aphasia and apraxia)  Effective Techniques: Extra processing time;Repetition;Visual/Gestural cues    Reading Comprehension  Reading Status: Unable to assess (to be assessed in therapy)    Expression  Primary Mode of Expression: Verbal (combination of verbal, gestures and AAC)    Verbal Expression  Verbal Expression: Exceptions to functional limits  Initiation: Moderate  Repetition: Moderate (repeated 7/9 words; repeated high probablity phrases with 0/2 accuracy)  Automatic Speech: Mild (stated 6/7 LEEANNE with initial cue; counted 2-10 with initial \"1\" cue)  Confrontation: Moderate (named 3/6 pictures of objects I; with sentence completion 83% accuracy, with phonemi cues 100% accuracy)  Convergent: Severe  Divergent: Severe  Responsive: Severe (0/4 I responsive naming. 3/8 I with sentence completion (phonemic cueing increased accuracy))  Conversation: Severe (Patient demonstrated difficulty meeting wants and needs verbally. Pt repeated and utilized visual cues to communicate)  Interfering Components: Paraphasia (baseline expressive aphasia and apraxia)  Effective Techniques: Word retrived strategies; Communication board (St provided communication boards X3 with an alphabet)    Written Expression  Dominant Hand: Right (right hemiparesis. Utilizes left hand to write)  Written Expression:  (Patient unable to write full name PTA. When asked to write his name patient wrote his initials.  Patient answered \"yes\" when ST asked if he writes his initials for his signature.)    Motor Speech  Motor Speech: Exceptions to [facility]  Apraxia: Moderate (Patient is able to correct with phonemic cueing. Groping noted during word finding task)    Pragmatics/Social Functioning  Pragmatics: Within functional limits    Cognition:      Orientation  Overall Orientation Status:  (Unable to assess secondary to expressive aphasia)  Memory  Memory:  (Unable to assess secondary to expressive aphasia)    Prognosis:  Speech Therapy Prognosis  Prognosis: Fair  Prognosis Considerations: Severity of Impairments;Previous Level of Function  Individuals consulted  Consulted and agree with results and recommendations: Patient;RN Roby Otoole)    Education:  Patient Education: Patient was educated on SLE results  Patient Education Response: Verbalizes understanding;Needs reinforcement  Safety Devices in place: Yes  Type of devices: Chair alarm in place    Pain Assessment:  Pre-Treatment  Pain assessment: 0-10  Pain level: 0  Intervention:  Patient denies pain. Post-Treatment  Pain assessment: 0-10  Pain level: 0  Intervention:  Patient denies pain.     NATIONAL OUTCOMES MEASUREMENT SYSTEM (NOMS):  SPOKEN LANGUAGE COMPREHENSION  Rating: 3    SPOKEN LANGUAGE EXPRESSION  Rating: 3    APRAXIA  Rating: 3               Therapy Time  SLP Individual Minutes  Time In: 1110  Time Out: 2346  Minutes: 25          Signature: Electronically signed by OSCAR Osuna on 12/22/2021 at 2:29 PM

## 2021-12-22 NOTE — PROGRESS NOTES
Facility/Department: [facility]  Rehabilitation Initial Assessment: Physical Therapy  Room: R249R249-01    NAME: Pershing Cockayne  : 1955  MRN: [id number]    Date of Service: 2021    Rehab Diagnosis(es):Impaired mobility and ADL's due to Late Effects CVA  Patient Active Problem List    Diagnosis Date Noted    Impaired mobility and activities of daily living dt late effects of CVA 2021    Abnormality of gait and mobility 2021    Blood thinned due to long-term anticoagulant use-Xaralto 2021    Cognitive deficit due to old head injury 2021    DJD (degenerative joint disease), lumbar 2021    DJD (degenerative joint disease), cervical 2021    Bowel and bladder incontinence 2021    Aphasia 2021    Generalized weakness 2021    Hypothyroidism 2019    Chronic deep vein thrombosis (DVT) of left popliteal vein (HCC) 2019    Chronic pain syndrome 2018    Hemiparesis affecting dominant side as late effect of cerebrovascular accident (CVA) ([address] 2018    Chronic right shoulder pain 10/04/2017    Hypoxemia requiring supplemental oxygen 10/04/2017    History of CVA (cerebrovascular accident) 2017    Essential hypertension 2017    Recurrent depression ([address] 2017    Seizure ([address] 2017    S/P patent foramen ovale closure 2017    Hyperlipidemia 2017    Closed TBI (traumatic brain injury) ([address] 2012       Past Medical History:   Diagnosis Date    Chronic right shoulder pain 10/4/2017    Closed TBI (traumatic brain injury) ([address] 2012    Essential hypertension 2017    Hemiparesis affecting dominant side as late effect of cerebrovascular accident (CVA) ([address] 2018    History of CVA (cerebrovascular accident) 2017    Hyperlipidemia 2017    Hypoxemia requiring supplemental oxygen 10/4/2017    Recurrent depression ([address] 2017    S/P patent foramen ovale closure 2017    Seizure [facility] 2017     History reviewed. No pertinent surgical history. Chart Reviewed: Yes  Family / Caregiver Present: No  Diagnosis: Impaired mobility and ADL's due to Late Effects CVA    Restrictions:  Restrictions/Precautions: Fall Risk,Seizure       SUBJECTIVE:     Pre and post Treatment Pain Screenin/10       Prior Level of Function:  Social/Functional History  Lives With: Alone  Type of Home: House (in law suite at friends home)  Home Layout: One level  Home Access: Stairs to enter without rails  Entrance Stairs - Number of Steps: 1 +1  Bathroom Shower/Tub: Tub/Shower unit  Bathroom Equipment: Hand-held shower,Tub transfer bench  Home Equipment: Quad cane,Wheelchair-manual,Electric scooter (occ uses w/c in home - uses scooter in community)  ADL Assistance: [address] [place] [address] Avenue: Independent (makes coffee and preprepared microwave meals)  Ambulation Assistance: Independent (with qc)  Transfer Assistance: Independent  Active : No    OBJECTIVE:   Vision/Hearing:  Vision: Within Functional Limits  Hearing: Within functional limits    Cognition:  Orientation Level: Oriented to person,Oriented to time,Oriented to place       ROM:  RLE PROM: WFL  RLE General PROM: lacking 20 degrees df before neutral  LLE PROM: WFL    Strength:  Strength RLE  Comment: active movement patterns at hip and knee at 3-/5 level; 1/5 toe ext however no df noted  Strength LLE  Strength LLE: WFL    Neuro:        Balance  Sitting - Static: Fair;- (initial lean post with pt able to recover without assistance)  Standing - Static: Poor (pt unable to stand without UE support - with UE support min assist required)  Standing - Dynamic: Poor (LOB with each step in gait requiring qc and assistance for recovery)  Tone RLE  RLE Tone: Hyperreflexive (right gastroc clonus noted intermittently with high baseline tone)  Motor Control  Gross Motor? :  (poor RLE motor control due to previous CVA)    Bed mobility  Rolling to Left: Stand by assistance  Rolling to Right: Stand by assistance  Supine to Sit: Minimal assistance (mild lifting assistance with trunk)  Sit to Supine: Stand by assistance    Transfers  Sit to Stand: Minimal Assistance; Moderate Assistance  Stand to sit: Minimal Assistance; Moderate Assistance  Bed to Chair: Moderate assistance;Minimal assistance (pivot - min lifting assist from chair and steadying from bed level)    Ambulation  Ambulation?: Yes  Ambulation 1  Surface: carpet  Device: Large Dann Clear  Assistance: Moderate assistance  Quality of Gait: reduced RLE weight acceptance, advanced RLE with minimal weight shift assistance, right knee recurvatum at stance, RLE foot placement inconsistent, concern for right ankle stability at stance due to everted position initially, reduced trunk and pelvic mobility with position of pelvis rotated to right, LOB to the left requiring assistance for recovery  Distance: 20 feet  Comments: Pt reports he previously had a brace but that it was not effective    Stairs/Curb  Stairs?: Yes  Stairs  # Steps : 2  Stairs Height: 6\"  Rails: Left ascending  Assistance:  Moderate assistance  Comment: heavy support on rail reuired with lean to left - unsafe to test curb style step with qc use as result    Wheelchair Activities  Propulsion: Yes  Propulsion 1  Propulsion: Manual  Level: Level Tile  Method: LLE  Level of Assistance: Stand by assistance  Description/ Details: cues for safe environmentl maneuvering required  Distance: 70 feet with 2 turns - limited by fatigue    Activity Tolerance  Activity Tolerance: Patient Tolerated treatment well          Quality Indicators (IRF-COLLINS):  Rolling L and R: Supervision or Touching Assistance - 4  Sit>Supine: Supervision or Touching Assistance - 4  Supine>Sit: Partial/Moderate Assistance (helper does <50%) - 3  Sit>Stand: Partial/Moderate Assistance (helper does <50%) - 3  Chair/Bed>Chair Transfer: Partial/Moderate Assistance (helper does <50%) - 3  Car Transfers: Not attempted due to Medical Condition or Safety Concerns (I.e. unsafe or physician orders) - 80  Walk 10 ft: Partial/Moderate Assistance (helper does <50%) - 3  Walk 50 ft with two 90 degree turns: Not attempted due to Medical Condition or Safety Concerns (I.e. unsafe or physician orders) - 80  Walk 150 ft in [address]: Not attempted due to Medical Condition or Safety Concerns (I.e. unsafe or physician orders) - 80  Walking 10 ft on Unlevel Surface: Not attempted due to Medical Condition or Safety Concerns (I.e. unsafe or physician orders) - 80  Picking up Objects from Standing Position: Not attempted due to Medical Condition or Safety Concerns (I.e. unsafe or physician orders) - 80  Stairs: Yes  WC Mobility: Yes      ASSESSMENT:  Body structures, Functions, Activity limitations: Decreased functional mobility ; Decreased strength;Decreased endurance;Decreased coordination;Decreased safe awareness;Decreased ROM; Decreased balance;Decreased sensation  Decision Making: High Complexity  History: high  Exam: high  Clinical Presentation: high    PT Education: Goals;PT Role;Plan of Care  Barriers to Learning: aphasia    CLINICAL IMPRESSION: Pt demonstrates deficits as listed. He was most recently indep in mobility. He is currently requiring assistance with all mobility.  Pt would benefit from continued PT prior to return to home    PLAN OF CARE:  Frequency: 1-2 treatment sessions per day, 5-7 days per week     Current Treatment Recommendations: Strengthening,Balance Training,Transfer Training,Endurance Training,Gait Training,Neuromuscular Re-education,Home Exercise Program,Equipment Evaluation, Education, & procurement,Functional Mobility Training,Manual Therapy - Soft Tissue Mobilization,Patient/Caregiver Education & Training,Positioning,Stair training,Wheelchair Mobility Training    Patient's Goal:   To go home walking safely    GOALS:  Long term goals  Long term goal 1: indep and effecient bed mobility  Long term goal 2: indep sit to stand and bed transfers  Long term goal 3: indep gait with qc 50 feet in protected environment  Long term goal 4: supervision with curb step performance for home entry  Long term goal 5: 20/56 for villalba balance testing    ELOS:   Plan weeks: 1-2    Therapy Time:    Individual   Time In 0955   Time Out 1030   Minutes [address], [place], 12/22/21 at 10:51 AM

## 2021-12-22 NOTE — PROGRESS NOTES
[facility]   Facility/Department: [facility]  Speech Language Pathology  Clinical Bedside Swallow Evaluation    NAME:Aramis Frost  : 1955 (68 y.o.)   MRN: [id number]  [facility]  ADMISSION DATE: 2021  PATIENT DIAGNOSIS(ES): Encounter for rehabilitation [Z51.89]  Abnormality of gait and mobility [R26.9]  No chief complaint on file.     Patient Active Problem List    Diagnosis Date Noted    Impaired mobility and activities of daily living dt late effects of CVA 2021    Abnormality of gait and mobility 2021    Blood thinned due to long-term anticoagulant use-Xaralto 2021    Cognitive deficit due to old head injury 2021    DJD (degenerative joint disease), lumbar 2021    DJD (degenerative joint disease), cervical 2021    Bowel and bladder incontinence 2021    Aphasia 2021    Generalized weakness 2021    Hypothyroidism 2019    Chronic deep vein thrombosis (DVT) of left popliteal vein (HCC) 2019    Chronic pain syndrome 2018    Hemiparesis affecting dominant side as late effect of cerebrovascular accident (CVA) ([address] 2018    Chronic right shoulder pain 10/04/2017    Hypoxemia requiring supplemental oxygen 10/04/2017    History of CVA (cerebrovascular accident) 2017    Essential hypertension 2017    Recurrent depression ([address] 2017    Seizure ([address] 2017    S/P patent foramen ovale closure 2017    Hyperlipidemia 2017    Closed TBI (traumatic brain injury) ([address] 2012     Past Medical History:   Diagnosis Date    Chronic right shoulder pain 10/4/2017    Closed TBI (traumatic brain injury) ([address] 2012    Essential hypertension 2017    Hemiparesis affecting dominant side as late effect of cerebrovascular accident (CVA) ([address] 2018    History of CVA (cerebrovascular accident) 2017    Hyperlipidemia 2017    Hypoxemia requiring supplemental oxygen 10/4/2017    Recurrent depression ([facility] 8/2/2017    S/P patent foramen ovale closure 8/2/2017    Seizure ([facility] 8/2/2017     History reviewed. No pertinent surgical history. No Known Allergies    DATE ONSET: 12/20/21    Date of Evaluation: 12/22/2021   Evaluating Therapist: Sylvia Mcacin SLP      Recommended Diet and Intervention  Diet Solids Recommendation: Regular  Liquid Consistency Recommendation: Thin  Recommended Form of Meds: PO     Therapeutic Interventions: Diet tolerance monitoring,Patient/Family education    Compensatory Swallowing Strategies  Compensatory Swallowing Strategies: Upright as possible for all oral intake;Small bites/sips    Reason for Referral  Yolanda Green was referred for a bedside swallow evaluation to assess the efficiency of his swallow function, identify signs and symptoms of aspiration, identify risk factors, and make recommendations regarding safe dietary consistencies, effective compensatory strategies, and safe eating environment. General  Chart Reviewed: Yes  Subjective  Subjective: Patient was pleasant and cooperative. Patient denies swallowing difficulties. Patient denied swallow deficits after CVA in 2017  Behavior/Cognition: Alert; Cooperative;Pleasant mood  Respiratory Status: Room air  O2 Device: None (Room air)  Communication Observation: Aphasia (Baseline aphasia from CVA 2017)  Follows Directions: Simple  Dentition: Adequate  Patient Positioning: Upright in chair  Baseline Vocal Quality: Normal  Prior Dysphagia History: OP ST at [facility] 09/2017 for aphasia and apraxia  Consistencies Administered: Reg solid; Dysphagia Pureed (Dysphagia I); Nectar - cup; Thin - cup    Current Diet level:  Current Diet : Regular  Current Liquid Diet : Thin    Oral Motor Deficits  Oral/Motor  Oral Motor: Exceptions to [facility]  Labial Symmetry: Abnormal symmetry right  Labial Strength: Reduced  Labial Coordination: Reduced    Oral Phase Dysfunction  Oral Phase  Oral Phase: Exceptions  Oral Phase Dysfunction  Decreased Anterior to Posterior Transit: All  Oral Phase  Oral Phase - Comment: Mild oral dysphagia noted     Indicators of Pharyngeal Phase Dysfunction   Pharyngeal Phase  Pharyngeal Phase: Exceptions  Indicators of Pharyngeal Phase Dysfunction  Throat Clearing - Delayed: Thin - cup  Pharyngeal Phase   Pharyngeal: Suspect mild pharyngeal dysphagia    Impression  Dysphagia Diagnosis: Mild oral stage dysphagia;Mild pharyngeal stage dysphagia  Dysphagia Impression : Mild oral dysphagia was noted and mild pharyngeal dysphagia is suspected. Oral deficits were characterizzed by reduced right side facial symmetry at rest, reduced labial closure around the spoon and reduced a-p control with liquids and solids. Patient utilized left hand to eat, which is his nondominant hand. Spillage was noted from the spoon with 2/3 puree solid trials. Pharyngeal deficits included delayed throat clearing X1 with large sip of thin liquids. Multiple swallows were noted with large sips of thin liquids. ST cued patient to consume small sips, which improved oral control and reduced amount of swallows. Vocal quality remained clear. CT of chest on 12/20/21 was clear. ST recommends 1x mm to assess swallow mechanism further. Dysphagia Outcome Severity Scale: Level 5: Mild dysphagia- Distant supervision. May need one diet consistency restricted     Treatment Plan  Requires SLP Intervention: Yes  Duration/Frequency of Treatment: 1x f/u at a meal  D/C Recommendations: To be determined       Treatment/Goals  Short-term Goals  Timeframe for Short-term Goals: 1-2 weeks  Goal 1: Patient will tolerate regular solids with thin liquids consuming small bites and sips without overt s/s of aspiration with 100% of trials.   Long-term Goals  Timeframe for Long-term Goals: 1-2 weeks  Goal 1: Patient will tolerate LRD without overt s/s of aspiration    Prognosis  Prognosis  Prognosis for safe diet advancement: good  Individuals consulted  Consulted and agree with results and recommendations: Patient;GORDON Holley)    Education  Patient Education: patient educated on BSE results  Patient Education Response: Verbalizes understanding;Needs reinforcement  Safety Devices in place: Yes  Type of devices: Chair alarm in place    [x] Orlando Sherwood RN notified       Pain Assessment:  Pre-Treatment  Pain assessment: 0-10  Pain level: 0  Intervention:  Patient denies pain. Post-Treatment  Pain assessment: 0-10  Pain level: 1  Intervention:  Patient denies pain.          NATIONAL OUTCOMES MEASUREMENT SYSTEM (NOMS):    SWALLOWING  Ratin    Therapy Time  SLP Individual Minutes  Time In: 1100  Time Out: 1110  Minutes: 10            Signature: Electronically signed by OSCAR Hollingsworth on 2021 at 12:48 PM

## 2021-12-22 NOTE — PROGRESS NOTES
Nutrition Assessment     Type and Reason for Visit: Initial,Consult    Nutrition Recommendations/Plan:  Continue Current Diet    Nutrition Assessment:  Pt is stable from a nutritional standpoint, with verbalized fair to good appetite, and noted good intake, with no nutritional complaints. Malnutrition Assessment:  Malnutrition Status: No malnutrition    Nutrition Related Findings: PMH-htn, hld, CVA, TBI; adm s/p fall. Current Nutrition Therapies:    ADULT DIET; Regular    Anthropometric Measures:  · Height:  5'8\"  · Current Body Wt:   165lb (11/12 ?source)  · BMI:  25.1    Nutrition Diagnosis:   No nutrition diagnosis at this time     Nutrition Interventions:   Food and/or Nutrient Delivery:  Continue Current Diet  Nutrition Education/Counseling:  Education not indicated   Coordination of Nutrition Care:  Continue to monitor while inpatient    Goals:  Intake >75% of meals. stable weight.        Nutrition Monitoring and Evaluation:   Food/Nutrient Intake Outcomes:  Food and Nutrient Intake  Physical Signs/Symptoms Outcomes:  Weight,Meal Time Behavior     Electronically signed by Moustapha Mina RD, DAHLIA on 12/22/21 at 1:30 PM EST

## 2021-12-22 NOTE — CONSULTS
bladder incontinence    Aphasia    Abnormality of gait and mobility    Blood thinned due to long-term anticoagulant use-Xaralto  Resolved Problems:    * No resolved hospital problems. *      ** Total time spent reviewing medical records, evaluating patient, speaking with RN's and consultants where I was focused exclusively on this patient:  minutes. This time is excluding time spent performing procedures or significant events occurring earlier or later in the day requiring my attention and focus. Subjective:   Admit Date: 12/21/2021  PCP: MD Donato Bruner a 77 y. o. male with a PMHx of depression, CVA with Right hemiplegia, TBI, seizures, HTN, and HLD presents to the ED after presumed fall from standing and being found down by brother just prior to arrival with altered mental status. Pateint aphasic from previous CVA.  Unknown LOC. Unknown headstrike (+) ASA. Last known well earlier this AM. Patient awake, in no distress. Per brother, patient baseline communication is one word responses, does not form sentences. Patient lives alone and ambulates with quad cane with difficult movement of right leg. Transferred to acute rehab for further therapies. No acute events overnight. Afebrile. No new complaints. Pt denies chest pain, SOB, N/V, fevers or chills. Objective:     Vitals:    12/21/21 1530 12/21/21 2053 12/22/21 0830   BP: 115/78 112/73 101/70   Pulse: 97 82 101   Resp: 19 16 16   Temp: 99.4 °F (37.4 °C) 98.6 °F (37 °C) 98.2 °F (36.8 °C)   TempSrc: Oral  Oral   SpO2: 96% 97% 97%     General appearance: No acute distress, Dentition intact. Has aphasia. Neurological: Alert, awake, aphasia. R hemiplegia  Lungs: CTAB. No  exp wheezes, No  rales No retractions; No use of accessory muscles  Heart:  S1, S2 normal, RRR, no MRG appreciated  Abdomen: (+) BS, soft, non-tender; non distended no guarding or rigidity.    Extremities:  no cyanosis, trace edema bilat lower exts, no calf tenderness bilaterally. Dry skin noted       Medications:      sodium chloride        Vitamin D  2,000 Units Oral Dinner    cyanocobalamin  1,000 mcg IntraMUSCular Weekly    coenzyme Q10  100 mg Oral Daily    lidocaine  3 patch TransDERmal Daily    sodium chloride flush  5-40 mL IntraVENous 2 times per day    ARIPiprazole  2 mg Oral Daily    aspirin  81 mg Oral Daily    DULoxetine  60 mg Oral Daily    levothyroxine  50 mcg Oral Daily    rivaroxaban  20 mg Oral Daily with breakfast    ammonium lactate   Topical Nightly       LABS Reviewed    IMAGING Reviewed    JULITA Snyder NP  Rounding Hospitalist    Additional work up or/and treatment plan may be added today or then after based on clinical progression. I am managing a portion of pt care. Some medical issues are handled by other specialists and Primary Rehabilitation provider. Additional work up and treatment should be done in out pt setting by pt PCP and other out pt providers.

## 2021-12-22 NOTE — FLOWSHEET NOTE
Patient assessment completed. Patient oriented to self, disoriented to situation, time and place. Incontinent, had a small BM. Patient has a right facial droop, right arm is flaccid. Pt able to lift BLE, right is weaker. Pt seems to have expressive aphasia. Denies pain at the moment.

## 2021-12-22 NOTE — PROGRESS NOTES
Occupational Therapy   Occupational Therapy Initial Assessment  Date: 2021   Patient Name: Theresa Erickson  MRN: [id number]     : 1955    Date of Service: 2021    Discharge Recommendations:  Continue to assess pending progress  OT Equipment Recommendations  Other: Continue to assess    Assessment   Performance deficits / Impairments: Decreased functional mobility ; Decreased ADL status; Decreased endurance;Decreased balance;Decreased high-level IADLs;Decreased cognition;Decreased safe awareness  Assessment: Pt is a 77year old man from home who presents to [address] [place] [address] with the above deficits which impact his ability to perform ADLs and IADLs at his baseline. Pt. would benefit from continued OT to maximize independence and safety with ADL tasks. Prognosis: Good  Decision Making: Medium Complexity  History: Pt's medical history is moderately complex  Exam: Pt. has 7 performance deficits  Assistance / Modification: Pt. requires mod A  OT Education: OT Role;Plan of Care  Barriers to Learning: Aphasia  REQUIRES OT FOLLOW UP: Yes  Activity Tolerance  Activity Tolerance: Patient Tolerated treatment well  Safety Devices  Safety Devices in place: Yes  Type of devices: All fall risk precautions in place           Patient Diagnosis(es): There were no encounter diagnoses. has a past medical history of Chronic right shoulder pain, Closed TBI (traumatic brain injury) ([address] Essential hypertension, Hemiparesis affecting dominant side as late effect of cerebrovascular accident (CVA) ([address] History of CVA (cerebrovascular accident), Hyperlipidemia, Hypoxemia requiring supplemental oxygen, Recurrent depression ([address] S/P patent foramen ovale closure, and Seizure ([address] has no past surgical history on file.            Restrictions  Restrictions/Precautions  Restrictions/Precautions: Fall Risk,Seizure    Subjective   General  Chart Reviewed: Yes  Referring Practitioner: Dr Rafita Landaverde  Diagnosis: Impaired mobility and activities of daily living dt late effects of CVA  Patient Currently in Pain: No  Pain Assessment  Pain Level: 0  Pre Treatment Pain Screening  Pain at present: 0    Social/Functional History  Social/Functional History  Lives With: Alone  Type of Home: House (in law suite on friend's home)  Home Layout: One level  Home Access: Stairs to enter without rails  Entrance Stairs - Number of Steps: 1 stoop  Bathroom Shower/Tub: Tub/Shower unit  Bathroom Equipment: Shower chair,Grab bars in shower  Home Equipment: Quad cane,Electric scooter,Wheelchair-manual,Reacher  ADL Assistance: Independent  Homemaking Assistance: Independent  Meal Prep Responsibility: Primary (Patient reported that he uses the microwave and he does not use the stovetop,oven or a crockpot)  Laundry Responsibility: Primary  Cleaning Responsibility: No (daughter cleans)  Bill Paying/Finance Responsibility: No  Shopping Responsibility: Secondary  Health Care Management: No (Patient has someone that fills a pill organizer)  Ambulation Assistance: Independent  Transfer Assistance: Independent  Active : No  Occupation: Retired  Leisure & Hobbies: Patient demonstrated by moving his arm in circles that he goes around the house  Additional Comments: Patient reported that he has 2 dogs that he is responsible for. Patient does not have to go outside with them to potty them per his report but patient had difficulty with describing the situation.        Objective   Vision: Within Functional Limits  Hearing: Within functional limits    Orientation  Overall Orientation Status: Within Functional Limits (unable to completely report information but reported that he understood where he was)  Observation/Palpation  Observation: Patient participated in eval well  Balance  Sitting Balance: Stand by assistance  Standing Balance: Minimal assistance  Functional Mobility  Functional Mobility Comments: Patient was taken to the bathroom at a wheelchair level in order to conserve energy for the shower  Toilet Transfers  Toilet - Technique: Stand step  Equipment Used: Grab bars  Toilet Transfer: Moderate assistance  Shower Transfers  Shower - Transfer From: Wheelchair  Shower - Transfer Type: To and From  Shower - Transfer To: Shower seat with back  Shower - Technique: Stand pivot  Shower Transfers: Moderate assistance  ADL  Feeding: Setup  Grooming: Setup  UE Bathing: Minimal assistance  LE Bathing: Minimal assistance  UE Dressing: Supervision  LE Dressing: Moderate assistance  Toileting: Moderate assistance (rear ara cleaning after large BM)  Tone RUE  RUE Tone: Hypertonic  Tone Description: Flexion contracture of the wrist and fingers  Tone LUE  LUE Tone: Normotonic  Coordination  Movements Are Fluid And Coordinated: Yes  Coordination and Movement description: Left UE  Quality of Movement Other  Comment: RUE with residual increased tone from previous CVA . Patient exhibits a 2 finger shoulder subluxation on the right side with a large bruise noted in the area        Transfers  Sit to stand: Minimal assistance  Stand to sit: Minimal assistance     Cognition  Arousal/Alertness: Appropriate responses to stimuli  Following Commands:  Follows one step commands with repetition  Attention Span: Appears intact  Memory: Appears intact  Safety Judgement: Decreased awareness of need for safety  Problem Solving: Assistance required to generate solutions;Assistance required to implement solutions;Assistance required to correct errors made;Assistance required to identify errors made  Insights: Fully aware of deficits  Initiation: Requires cues for some  Sequencing: Requires cues for some  Cognition Comment: comp - supervision, exp - min, soc int - mod ind, prob solv - supervision, mem - supervision  Perception  Overall Perceptual Status: WFL     Sensation  Overall Sensation Status: Impaired  Additional Comments: Left UE WFL   Right UE has decreased sensation with patient reporting that it feels \"weird\". LUE AROM (degrees)  LUE AROM : WFL  Left Hand AROM (degrees)  Left Hand AROM: WFL  RUE AROM (degrees)  RUE General AROM: Flexion contracture throughout- baseline  Right Hand AROM (degrees)  Right Hand General AROM: Flexion contracture throughout- baseline  LUE Strength  LUE Strength Comment: 4+/5  overall  RUE Strength  RUE Strength Comment: unable to assess     Hand Dominance  Hand Dominance: Right (uses his left hand since previous CVA)             Plan   Plan  Times per week: 5-7  Plan weeks: 1.5-2 weeks  Current Treatment Recommendations: Balance Training,Functional Mobility Training,Endurance Training,Safety Education & Training,Patient/Caregiver Education & Training,Equipment Evaluation, Education, & procurement,Self-Care / ADL,Home Management Training,Cognitive/Perceptual Training,Neuromuscular Re-education      Goals  Long term goals  Time Frame for Long term goals : Within 1.5-2 weeks patient to demonstrate progress in the following areas listed below to achieve specific LTGs as stated in the initial evaluation  Long term goal 1: improve overall endurance  Long term goal 2: improve balance for ADLs and IADLs  Long term goal 3: Demo an understanding of tone reduction  Patient Goals   Patient goals : \"the fall. .. I am sorry\"    Patient agreed to stated goal of improving his balance        - Patient will complete self care as followed using the recommended adaptive equipment and/or adaptive techniques as instructed:  Feeding: Mod I  Grooming: Mod I  Bathing: Mod I  UE Dressing: Mod I  LE Dressing: Mod I  Toileting: Mod I  Toilet Transfer: Mod I  Shower/Tub Transfer: Mod I  - Patient will sequence self-care routine with no and zero verbal/tactile cues  - Patient will improve right UE sensation and/or utilize compensatory techniques for safe completion of self-care as projected.   - Patient will improve static and dynamic standing balance to complete pants management at mod ind level  - Patient will improve functional endurance to tolerate/complete 60 minutes of ADLs. - Patient will perform kitchen mobility at device level without episodes of LOB and good safety awareness   - Patient will perform basic room mobility at mod ind level. - Patient and/or caregiver will demonstrate understanding of recommended HEP for tone reduction.   - Patient's cognition will improve or maintain baseline to safely perform ADLs:  Comprehension: Mod I  Expression: Mod I  Social Interaction: Mod I  Problem Solving: Mod I  Memory:  Mod I  - Patient to tolerate interventions needed to decrease tone in the right UE in order to allow for cleansing of the hand     Therapy Time   Individual Concurrent Group Co-treatment   Time In 0830         Time Out 0940         Minutes 70              Eval: 70 minutes    PORTIA Howard/L    Electronically signed by SHANNEN Howard on 12/22/2021 at 1:24 PM

## 2021-12-22 NOTE — PROGRESS NOTES
Physical Therapy Rehab Treatment Note  [facility]       NAME: Gordon Figueroa  : 1955 (77 y.o.)  MRN: [id number]  CODE STATUS: Full Code    Date of Service: 2021  Chart Reviewed: Yes  Family / Caregiver Present: No    Restrictions:  Restrictions/Precautions: Fall Risk,Seizure       SUBJECTIVE: Subjective: \"I'm tired\"  Pain Screening  Patient Currently in Pain: Denies  Pre Treatment Pain Screening  Pain at present: 0  Scale Used: Numeric Score  Intervention List: Patient able to continue with treatment    Post Treatment Pain Screening:  Pain Assessment  Pain Assessment: 0-10  Pain Level: 0    OBJECTIVE:   Orientation Level: Oriented to person;Oriented to time;Oriented to place    Bed mobility  Rolling to Left: Stand by assistance  Rolling to Right: Stand by assistance  Supine to Sit: Stand by assistance  Sit to Supine: Stand by assistance  Scooting: Stand by assistance    Transfers  Sit to Stand: Contact guard assistance;Stand by assistance  Stand to sit: Contact guard assistance  Bed to Chair: Contact guard assistance x 4 trials    Ambulation  Ambulation?: Yes  More Ambulation?: No  Ambulation 1  Surface: carpet  Device: Large Dann [place]  Assistance: Contact guard assistance;Minimal assistance  Quality of Gait: Improved weight acceptance on RLE  decreased R knee flexion, circumducts to advance RLE, slow pace step to pattern  Distance: 25'    ASSESSMENT/PROGRESS TOWARDS GOALS:  Body structures, Functions, Activity limitations: Decreased functional mobility ; Decreased strength;Decreased endurance;Decreased coordination;Decreased safe awareness;Decreased ROM; Decreased balance;Decreased sensation  Assessment: Patient requires decreased assistance for gait transfers and bed mobility this p.m.  Increased time to complete all mobility    Goals:  Long term goals  Long term goal 1: indep and effecient bed mobility  Long term goal 2: indep sit to stand and bed transfers  Long term goal 3: indep gait with qc 50 feet in protected environment  Long term goal 4: supervision with curb step performance for home entry  Long term goal 5:  for villalba balance testing    PLAN OF CARE/Safety:   Plan Comment: Cont. POC  Safety Devices  Type of devices: Chair alarm in place; Left in chair      Therapy Time:   Individual   Time In 1520   Time Out 1550   Minutes 30     Minutes: 30      Transfer/Bed mobility trainin      Gait training: FRAN Mckeon, 21 at 4:07 PM

## 2021-12-22 NOTE — PROGRESS NOTES
Occupational Therapy   Facility/Department: [facility]  Daily Treatment Note  NAME: Sarita Vasquez  : 1955  MRN: [id number]    Date of Service: 2021    Discharge Recommendations:  Continue to assess pending progress       Assessment   Performance deficits / Impairments: Decreased functional mobility ; Decreased ADL status; Decreased endurance;Decreased balance;Decreased high-level IADLs;Decreased cognition;Decreased safe awareness  Assessment: Pt is a 77year old man from home who presents to [facility] with the above deficits which impact his ability to perform ADLs and IADLs at his baseline. Pt. would benefit from continued OT to maximize independence and safety with ADL tasks. Prognosis: Good  Decision Making: Medium Complexity  History: Pt's medical history is moderately complex  Exam: Pt. has 7 performance deficits  Assistance / Modification: Pt. requires mod A  OT Education: OT Role;Plan of Care  Barriers to Learning: Aphasia  REQUIRES OT FOLLOW UP: Yes  Activity Tolerance  Activity Tolerance: Patient Tolerated treatment well  Safety Devices  Safety Devices in place: Yes  Type of devices: All fall risk precautions in place         Patient Diagnosis(es): There were no encounter diagnoses. has a past medical history of Chronic right shoulder pain, Closed TBI (traumatic brain injury) ([facility] [address] Essential hypertension, Hemiparesis affecting dominant side as late effect of cerebrovascular accident (CVA) ([address] History of CVA (cerebrovascular accident), Hyperlipidemia, Hypoxemia requiring supplemental oxygen, Recurrent depression ([address] S/P patent foramen ovale closure, and Seizure ([address] has no past surgical history on file.     Restrictions  Restrictions/Precautions  Restrictions/Precautions: Fall Risk,Seizure  Subjective   General  Chart Reviewed: Yes  Patient assessed for rehabilitation services?: Yes  Referring Practitioner: Dr Garcia Setting  Diagnosis: Impaired mobility and activities of daily living dt late effects of CVA  Pre Treatment Pain Screening  Pain at present: 0  Scale Used: Faces  Intervention List: Patient able to continue with treatment  Vital Signs  Patient Currently in Pain: Denies   Orientation     Objective     Patient engaged in tabletop therapeutic activities to increase endurance and fine motor coordination to promote independence with ADLs. Therapist performed 1x10 PROM for finger flexion to decrease contracture. Patient tolerated well. Patient completed towel exercises on tabletop, completing 1x10 laterally with minimal difficulty reaching towards left side. Patient engaged in resistive theraputty activity to increase hand strength and [facility] in L hand. Patient squeezed and rotated putty x10. Patient then rolled putty into a log, requiring mod VCs for continuation of task and proper form. Patient then pinched putty using L thumb and index finger. Patient engaged in ADL retraining with buttons, snaps, and zipper. Patient buttoned and unbuttoned 4 buttons independently. Patient zipped up zipper independently. Patient snapped snaps independently, but required mod A to pull snaps apart. Plan   Plan  Times per week: 5-7  Plan weeks: 1.5-2 weeks  Current Treatment Recommendations: Balance Training,Functional Mobility Training,Endurance Training,Safety Education & Training,Patient/Caregiver Education & Training,Equipment Evaluation, Education, & procurement,Self-Care / ADL,Home Management Training,Cognitive/Perceptual Training,Neuromuscular Re-education    Goals  Long term goals  Time Frame for Long term goals : Within 1.5-2 weeks patient to demonstrate progress in the following areas listed below to achieve specific LTGs as stated in the initial evaluation  Long term goal 1: improve overall endurance  Long term goal 2: improve balance for ADLs and IADLs  Long term goal 3: Demo an understanding of tone reduction  Patient Goals   Patient goals : \"the fall. ..  I am sorry\"    Patient agreed to stated goal of improving his balance       Therapy Time   Individual Concurrent Group Co-treatment   Time In 1300         Time Out 1330         Minutes 30           Therapeutic activities: 30 minutes         PAULETTE Gould   Electronically signed by PAULETTE Gould on 12/22/2021 at 2:48 PM

## 2021-12-23 PROBLEM — R25.2 SPASTICITY AS LATE EFFECT OF CEREBROVASCULAR ACCIDENT (CVA): Status: ACTIVE | Noted: 2021-12-23

## 2021-12-23 PROBLEM — I69.398 SPASTICITY AS LATE EFFECT OF CEREBROVASCULAR ACCIDENT (CVA): Status: ACTIVE | Noted: 2021-12-23

## 2021-12-23 PROCEDURE — 6370000000 HC RX 637 (ALT 250 FOR IP): Performed by: NURSE PRACTITIONER

## 2021-12-23 PROCEDURE — 97116 GAIT TRAINING THERAPY: CPT

## 2021-12-23 PROCEDURE — 6370000000 HC RX 637 (ALT 250 FOR IP): Performed by: INTERNAL MEDICINE

## 2021-12-23 PROCEDURE — 99233 SBSQ HOSP IP/OBS HIGH 50: CPT | Performed by: PHYSICAL MEDICINE & REHABILITATION

## 2021-12-23 PROCEDURE — 97530 THERAPEUTIC ACTIVITIES: CPT

## 2021-12-23 PROCEDURE — 92507 TX SP LANG VOICE COMM INDIV: CPT

## 2021-12-23 PROCEDURE — 97129 THER IVNTJ 1ST 15 MIN: CPT

## 2021-12-23 PROCEDURE — 97535 SELF CARE MNGMENT TRAINING: CPT

## 2021-12-23 PROCEDURE — 99221 1ST HOSP IP/OBS SF/LOW 40: CPT | Performed by: NURSE PRACTITIONER

## 2021-12-23 PROCEDURE — 97112 NEUROMUSCULAR REEDUCATION: CPT

## 2021-12-23 PROCEDURE — 1180000000 HC REHAB R&B

## 2021-12-23 PROCEDURE — 97140 MANUAL THERAPY 1/> REGIONS: CPT

## 2021-12-23 PROCEDURE — 6370000000 HC RX 637 (ALT 250 FOR IP): Performed by: PHYSICAL MEDICINE & REHABILITATION

## 2021-12-23 RX ORDER — BACLOFEN 10 MG/1
10 TABLET ORAL 3 TIMES DAILY
Status: DISCONTINUED | OUTPATIENT
Start: 2021-12-23 | End: 2022-01-01 | Stop reason: HOSPADM

## 2021-12-23 RX ADMIN — LEVETIRACETAM 750 MG: 500 TABLET, FILM COATED ORAL at 21:25

## 2021-12-23 RX ADMIN — LEVOTHYROXINE SODIUM 50 MCG: 0.05 TABLET ORAL at 08:20

## 2021-12-23 RX ADMIN — Medication 100 MG: at 08:20

## 2021-12-23 RX ADMIN — Medication 2000 UNITS: at 17:40

## 2021-12-23 RX ADMIN — ASPIRIN 81 MG: 81 TABLET, CHEWABLE ORAL at 08:20

## 2021-12-23 RX ADMIN — BACLOFEN 10 MG: 10 TABLET ORAL at 21:25

## 2021-12-23 RX ADMIN — RIVAROXABAN 20 MG: 20 TABLET, FILM COATED ORAL at 08:20

## 2021-12-23 RX ADMIN — BACLOFEN 10 MG: 10 TABLET ORAL at 17:42

## 2021-12-23 RX ADMIN — Medication: at 21:26

## 2021-12-23 RX ADMIN — ARIPIPRAZOLE 2 MG: 2 TABLET ORAL at 08:20

## 2021-12-23 RX ADMIN — DULOXETINE 60 MG: 60 CAPSULE, DELAYED RELEASE ORAL at 08:20

## 2021-12-23 ASSESSMENT — PAIN SCALES - GENERAL
PAINLEVEL_OUTOF10: 0

## 2021-12-23 ASSESSMENT — ENCOUNTER SYMPTOMS
TROUBLE SWALLOWING: 0
VOMITING: 0
CHEST TIGHTNESS: 0
ABDOMINAL PAIN: 0
NAUSEA: 0
COLOR CHANGE: 0
SHORTNESS OF BREATH: 0
ABDOMINAL DISTENTION: 0
WHEEZING: 0
COUGH: 0

## 2021-12-23 NOTE — PROGRESS NOTES
Physical Therapy Rehab Treatment Note  [facility]       NAME: Yordan Hawk  : 1955 (77 y.o.)  MRN: [id number]  CODE STATUS: Full Code    Date of Service: 2021       Restrictions:  Restrictions/Precautions: Fall Risk,Seizure       SUBJECTIVE:       Pre Treatment Pain Screening  Pain at present: 0    Post Treatment Pain Screening:  Pain Assessment  Pain Level: 0    OBJECTIVE:               Neuromuscular Education  PNF: Inhibition techniques utilized for reduction of tone in right gastroc, foot and toe flexors. Facilitation techniques in supine for trunk and LE motor control with manual techniuqes utilized, BLE weight acceptace facilitation with partial sit to stand and weight shifting utilized as challenges  Neuromuscular Comments: Pt presents with long standing motor control deficits in RLE related to prior CVA. RLE responds to both facilitation and inhibition techniques however carry over into functional tasks varies    Bed mobility  Rolling to Left: Modified independent  Rolling to Right: Modified independent  Supine to Sit: Stand by assistance  Sit to Supine: Stand by assistance  Scooting: Stand by assistance  Comment: decreased right ext motor control    Transfers  Sit to Stand: Contact guard assistance;Stand by assistance  Stand to sit: Contact guard assistance  Bed to Chair: Contact guard assistance;Stand by assistance (SBA to left - CGA with mild unsteadiness to the right)  Comment: improved control of balance and attention to safety    Ambulation  Ambulation?: Yes  More Ambulation?: Yes  Ambulation 1  Surface: carpet  Device: Large Dann Cloudcroft  Other Apparatus:  (with and without air cast on RLE)  Assistance: Minimal assistance; Moderate assistance  Quality of Gait: Improved weight acceptance on RLE with concern for right ankle stability at initial stance,  decreased R knee flexion at swing and recurvatum significant at stance, circumducts to advance RLE, slow pace step to pattern with halting pattern noted  Distance: 30 feet x2; 50 feet  Comments: improved quality following intervention in neuro re-ed section  Ambulation 2  Surface - 2: carpet  Device 2:  (lite gait)  Assistance 2: 2 Person assistance  Quality of Gait 2: gait training with lite gait focus on pelvic rotation to neutral , reducing right knee recurvatum, improving fluidity of gait and increasing endurance  Distance: 120 ft  Comments: pt requires hands on facilitation to complete during this session. Improvement in gait quality noted following this with qc utilization              Activity Tolerance  Activity Tolerance: Patient Tolerated treatment well          ASSESSMENT/PROGRESS TOWARDS GOALS:  Assessment: Pt presents with improvement in quality and safety of transfers and be mobility. Concern for right ankle stability noted. Pt reports having brace previously but has not used it \"in a long time\". Trial with air cast in place resulted in improvement of ankle stability. Will leave in place to impove safety in transfers throughout this day    Goals:  Long term goals  Long term goal 1: indep and effecient bed mobility  Long term goal 2: indep sit to stand and bed transfers  Long term goal 3: indep gait with qc 50 feet in protected environment  Long term goal 4: supervision with curb step performance for home entry  Long term goal 5:  for villalba balance testing    PLAN OF CARE/Safety:   Safety Devices  Type of devices: Call light within reach; Chair alarm in place; Left in chair      Therapy Time:   Individual   Time In 1030   Time Out 1130   Minutes 60     Minutes:      Transfer/Bed mobility trainin      Gait trainin      Neuro re education:30        Cal Morales PT, 21 at 11:59 AM

## 2021-12-23 NOTE — PROGRESS NOTES
[facility]  Facility/Department: [facility]  Speech Language Pathology   Treatment Note          Dayanara Albright  1955  [id number]        Rehab Dx/Hx: Encounter for rehabilitation [Z51.89]  Abnormality of gait and mobility [R26.9]    Precautions: falls    Medical Dx: Encounter for rehabilitation [Z51.89]  Abnormality of gait and mobility [R26.9]  Speech Dx: Aphasia and Other: Communication boards    Date: 12/23/2021    Subjective:  Alert, Cooperative and Pleasant        Interventions used this date:  Receptive Language and AAC    Objective/Assessment:  Patient progressing towards goals:  Short-term Goals  Timeframe for Short-term Goals: 1-2 weeks  Goal 1: Pt will follow (2-3) step directions given orally with 80% accuracy with min cues to increase the pt's ability to follow directions provided by caregivers for safe follow through with ADLs. Patient ID everyday pictures in a field of 3 I with 71% accuracy, with visual cueing 100% accuracy    Patient ID everyday pictures in a field of 4 I with 55% accuracy, with visual cueing 64% accuracy, with repetition  73% accuracy. Goal 3: Patient will demonstrate improved communication abilities with caregivers through the use of an AAC device (i.e. communciation board, Ipad, etc.) with min assist to improve meeting wants and needs. Patient ID pictures on communication boards in a field of 8 I with 80% accuracy    Patient ID pictures on communication board in a field of 16 I with 50% accuracy, with descriptive cueing 75% accuracy. ST to continue to follow patient to improve use of communication boards to improve communication with caregivers. Patient is agreeable. ST reviewed communication boards. ST spoke with patient's brother, Jimbo Kapoor, over the phone this AM. Per Jimbo Kapoor patient's expressive and receptive language skills are at baseline.  ST discussed patient working with OP ST after discharge from Acute rehab to be evaluated by [employer] and develop an AAC device on Ipad. Elsi Luoron is agreeable. Patient notified of discussion with Elsi Luoron. Patient also agreeable with OP ST to develop AAC device to improve communication with family and friends. Long-term Goals  Timeframe for Long-term Goals: 2-3 weeks  Goal 1: Pt will improve his/her Expressive Language abilities to a superivised level with AAC  for expression of complex wants, needs, feelings/ideas, and medical/safety information. Goal 2: Pt will improve her/his Receptive Language abilities to a supervise to min assist level for comprehension of conversation and safety directions with familiar and unfamiliar communication partners. Short-term Goals  Timeframe for Short-term Goals: 1-2 weeks  Goal 1: Patient will tolerate regular solids with thin liquids consuming small bites and sips without overt s/s of aspiration with 100% of trials. Compensatory Swallowing Strategies: Upright as possible for all oral intake,Small bites/sips      Treatment/Activity Tolerance:  Patient tolerated treatment well. Reduce frequency to 1-3x/week     Plan:  Continue per POC    Pain Assessment:  Pre-Treatment  Pain assessment: 0-10  Pain level: 0  Intervention:  Patient denies pain. Post-Treatment  Pain assessment: 0-10  Pain level: 0  Intervention:  Patient denies pain. Patient/Caregiver Education:  Patient educated on session and progression towards goals.     Safety Devices:  Chair alarm in place      [id number] (NOM[facility]):    SWALLOWING  Rating:  DNT    SPOKEN LANGUAGE COMPREHENSION  Rating:  3    AAC  Rating:  3-4            Therapy Time  SLP Individual Minutes  Time In: 1000  Time Out: 2616  Minutes: 30              Signature: Electronically signed by OSCAR Villatoro on 12/23/2021 at 10:47 AM

## 2021-12-23 NOTE — CARE COORDINATION
[address] [place] [address] NOTE  Room: R249/R249-01  Admit Date: 2021       Date: 2021  Patient Name: Gordon Figueroa        MRN: [id number]    : 1955  (68 y.o.)  Gender: male        REHAB DIAGNOSIS:   Diagnosis: Impaired mobility and ADL's due to Late Effects CVA    CO MORBIDITIES:      Past Medical History:   Diagnosis Date    Chronic right shoulder pain 10/4/2017    Closed TBI (traumatic brain injury) ([facility] [address] 2012    Essential hypertension 2017    Hemiparesis affecting dominant side as late effect of cerebrovascular accident (CVA) ([facility] [address] 2018    History of CVA (cerebrovascular accident) 2017    Hyperlipidemia 2017    Hypoxemia requiring supplemental oxygen 10/4/2017    Recurrent depression ([facility] [address] 2017    S/P patent foramen ovale closure 2017    [facility]     History reviewed. No pertinent surgical history.      Restrictions  Restrictions/Precautions: Fall Risk,Seizure  CASE MANAGEMENT    Social/Functional History  Social/Functional History  Lives With: Alone  Type of Home: House (in law suite on friend's home)  Home Layout: One level  Home Access: Stairs to enter without rails  Entrance Stairs - Number of Steps: 1 stoop  Bathroom Shower/Tub: Tub/Shower unit  Bathroom Equipment: Shower chair,Grab bars in [address] [place]: Quad cane,Electric scooter,Wheelchair-manual,Reacher  ADL Assistance: Independent  Homemaking Assistance: Independent  Meal Prep Responsibility: Primary (Patient reported that he uses the microwave and he does not use the stovetop,oven or a crockpot)  Laundry Responsibility: Primary  Cleaning Responsibility: No (daughter cleans)  Bill Paying/Finance Responsibility: No  Shopping Responsibility: Secondary  Health Care Management: No (Patient has someone that fills a pill organizer)  Ambulation Assistance: Independent  Transfer Assistance: Independent  Active : No  Occupation: Retired  Leisure & Hobbies: Patient demonstrated by moving his arm in circles that he goes around the house  Additional Comments: Patient reported that he has 2 dogs that he is responsible for. Patient does not have to go outside with them to potty them per his report but patient had difficulty with describing the situation. Pts personal preferences: n/a    Pts assets/resources/support system: family    COVERAGE INFORMATION:Payor: MEDICARE / Plan: MEDICARE PART A AND B / Product Type: *No Product type* /       NURSING    / There is no height or weight on file to calculate BMI. ADULT DIET; Regular    SpO2: 100 % (12/23/21 0802)  No active isolations    Skin Issues: No    Pain Managed: Yes    Bladder continence: Yes    Bowel continence: Yes      Other: had some blood in the brief, no cut noted. PHYSICAL THERAPY  Bed mobility:  Supine to Sit: Stand by assistance (12/22/21 1603)  Sit to Supine: Stand by assistance (12/22/21 1603)  Transfers:  Sit to Stand: Contact guard assistance;Stand by assistance (12/22/21 1559)  Bed to Chair: Contact guard assistance (12/22/21 1559)  Gait:   Device: Explore Engage (12/22/21 1603)  Assistance: Contact guard assistance;Minimal assistance (12/22/21 1603)  Distance: 25' (12/22/21 1603)  Quality of Gait: Improved weight acceptance on RLE  decreased R knee flexion, circumducts to advance RLE, slow pace step to pattern (12/22/21 1603)  Comments: chair brought up to pt d/t pt fatigued from previous tx (12/22/21 1053)  Stairs:  # Steps : 2 (12/22/21 1044)  Rails: Left ascending (12/22/21 1044)  Assistance:  Moderate assistance (12/22/21 1044)  Comment: heavy support on rail reuired with lean to left - unsafe to test curb style step with qc use as result (12/22/21 1044)  W/C mobility:  Level of Assistance: Stand by assistance (12/22/21 1044)  Distance: 70 feet with 2 turns - limited by fatigue (12/22/21 1044)  Description/ Details: cues for safe environmentl maneuvering required (12/22/21 1044)  LTG:  Long term goal 1: indep and effecient bed mobility  Long term goal 2: indep sit to stand and bed transfers  Long term goal 3: indep gait with qc 50 feet in protected environment  Long term goal 4: supervision with curb step performance for home entry  Long term goal 5: 20/56 for villalba balance testing  PT Treatment Time:  1.5 hrs      OCCUPATIONAL THERAPY  Hand Dominance: Right (uses his left hand since previous CVA)  ADL  Feeding: Setup (12/22/21 1309)  Grooming: Setup (12/22/21 1309)  UE Bathing: Minimal assistance (12/22/21 1309)  LE Bathing: Minimal assistance (12/22/21 1309)  UE Dressing: Supervision (12/22/21 1309)  LE Dressing: Moderate assistance (12/22/21 1309)  Toileting: Moderate assistance (rear ara cleaning after large BM) (12/22/21 1309)  Toilet Transfers  Toilet - Technique: Stand step (12/22/21 1311)  Equipment Used: Grab bars (12/22/21 1311)  Toilet Transfer: Moderate assistance (12/22/21 1311)     Shower Transfers  Shower - Transfer From: Wheelchair (12/22/21 1311)  Shower - Transfer Type: To and From (12/22/21 1311)  Shower - Transfer To: Shower seat with back (12/22/21 1311)  Shower - Technique: Stand pivot (12/22/21 1311)  Shower Transfers: Moderate assistance (12/22/21 1311)  LTG:   ,  ,  ,     ,  ,  ,    OT Treatment Time: 1.5 hrs      SPEECH THERAPY    Motor Speech: Exceptions to [facility]  Comprehension: Exceptions  Verbal Expression: Exceptions to functional limits      Diet/Swallow:  Diet Solids Recommendation: Regular  Liquid Consistency Recommendation: Thin  Dysphagia Outcome Severity Scale: Level 5: Mild dysphagia- Distant supervision.  May need one diet consistency restricted    Compensatory Swallowing Strategies: Upright as possible for all oral intake,Small bites/sips  Therapeutic Interventions: Diet tolerance monitoring,Patient/Family education      LTG:  Long-term Goals  Timeframe for Long-term Goals: 2-3 weeks  Goal 1: Pt will improve his/her Expressive Language abilities to a superivised level with AAC  for expression of complex wants, needs, feelings/ideas, and medical/safety information. Goal 2: Pt will improve her/his Receptive Language abilities to a supervise to min assist level for comprehension of conversation and safety directions with familiar and unfamiliar communication partners. Long-term Goals  Timeframe for Long-term Goals: 1-2 weeks  Goal 1: Patient will tolerate LRD without overt s/s of aspiration          COGNITION  OT: Cognition Comment: comp - supervision, exp - min, soc int - mod ind, prob solv - supervision, mem - supervision  SP:Memory:  (Unable to assess secondary to expressive aphasia)       RECREATIONAL THERAPY  Attendance to recreational therapy programs:    []  Pet Therapy  [] Music Therapy  [] Art Therapy    [] Recreation Therapy Group [] Support Group           Patient social interaction (mood, participation): good      Patient strengths: good support    Patients goal: to go home    Problems/Barriers: lives alone        1. Safety:          - Intervention / Plan:    [x]  falls protocol     [x]  PT/OT    [x]  SP        - Results:         2. Potential DME needs:         - Intervention / Plan:  [x]  PT/OT     [x]  Assess equipment needs/access       - Results:         3. Weakness:          - Intervention / Plan:  [x]  PT/OT      []  Other:         - Results:         4. Discharge planning needs:          - Intervention / Plan:  []  Weekly team conference      [x]  family training        - Results:         5.            - Intervention / Plan:          - Results:         6.            - Intervention / Plan:         - Results:         7.            - Intervention / Plan:         - Results:           Discharge Plan   Estimated Length of Stay: TBD    Tentative Discharge date: 1/1/22      Anticipated Discharge Destination:  Home      Team recommendations:    1. Follow up Therapy :    PT  OT  SLP    2.  Home Health vs OP     Other:     Equipment needed at Discharge:  Other: TBD      Team Members Present at Conference:    Physician: Dr. Levy Frankel  : Magdiel Jeter RN  RN: Ciro Bhatti RN  Physical Therapist: Rosa Jamison, YOU  Occupational Therapist: Jorden Astudillo OTR  Speech Therapist: Agueda Moore, OSCAR    Electronically signed by Radha Baron RN on 12/23/2021 at 12:35 PM

## 2021-12-23 NOTE — CARE COORDINATION
Call placed to patient brother Erick Every to updated of discharge date and plan. Stated understanding and questions answered.  Electronically signed by Marcial Beavers RN on 12/23/2021 at 4:05 PM

## 2021-12-23 NOTE — PROGRESS NOTES
Subjective: The patient complains of severe acute on chronic progressive fatigue and right-sided weakness secondary to previous CVA, recent fall with closed head injury aphasia, cognitive decline partially relieved by rest,medications, PT,  OT, history of residual cognitive, communication, right-sided weakness and seizure secondary to previous CVA unfortunately has had a recent closed head injury secondary to a fall with decline in functional status. He was admission admitted through the emergency room on 12/20/2021. He had fallen at home. Trauma work-up showed no acute traumatic fractures. Labs were significant for positive and elevated lactic acid level. He was prescribed IV fluid boluses. His toxicology screen was positive for cannabinoids. His urinary analysis showed ketones but no bacteria culture was not indicated. SARS-CoV-2 testing was negative. SLP and rest and exacerbated by recent illness. I am concerned about patients medical complexities including residual cognitive, communication, right-sided weakness and seizure secondary to previous CVA unfortunately has had a recent closed head injury secondary to a fall with decline in functional status. He was admission admitted through the emergency room on 12/20/2021. He had fallen at home. Trauma work-up showed no acute traumatic fractures. Labs were significant for positive and elevated lactic acid level. He was prescribed IV fluid boluses. His toxicology screen was positive for cannabinoids. His urinary analysis showed ketones but no bacteria culture was not indicated. SARS-CoV-2 testing was negative. .        I reviewed current care and plans for further care with other rehab providers including nursing and case management.   According to recent nursing note, \" Pt resting quietly, shift assessment completed earlier this shift, Pt medicate with Ambien for sleep, LBM 12/22/2021 skin intact, Pt continent B&B shift assessment completed, call light within reach, bed alarm on \". ROS x10: The patient also complains of severely impaired mobility and activities of daily living. Otherwise no new problems with vision, hearing, nose, mouth, throat, dermal, cardiovascular, GI, , pulmonary, musculoskeletal, psychiatric or neurological. See Rehab H&P on Rehab chart dated . Vital signs:  /70   Pulse 72   Temp 98.4 °F (36.9 °C)   Resp 17   SpO2 98%   I/O:   PO/Intake:  fair PO intake, frequent constipation. Bowel/Bladder:  incontinent, assist with urinal  General:  Patient is well developed, adequately nourished, non-obese and     well kempt. HEENT:    PERRLA, hearing intact to loud voice, external inspection of ear     and nose benign. Inspection of lips, tongue and gums benign  Musculoskeletal: No significant change in strength or tone. All joints stable. Inspection and palpation of digits and nails show no clubbing,       cyanosis or inflammatory conditions. Neuro/Psychiatric: Affect: flat but pleasant. Alert and oriented to person, place and     Situation with  mod cues. No significant change in deep tendon reflexes or     sensation  Lungs:  Diminished, CTA-B. Respiration effort is normal at rest.     Heart:   S1 = S2, RRR. No loud murmurs. Abdomen:  Soft, non-tender, no enlargement of liver or spleen. Extremities:  No significant lower extremity edema or tenderness.  Upper extremity spasticity -right shoulder subluxation  Skin:   Intact to general survey, laceration and bruising left face and forehead    Rehabilitation:  Physical therapy:   Bed Mobility: Scooting: Stand by assistance    Transfers: Sit to Stand: Contact guard assistance,Stand by assistance  Stand to sit: Contact guard assistance  Bed to Chair: Contact guard assistance, Ambulation 1  Surface: carpet  Device: Large Dann Laith  Assistance: Contact guard assistance,Minimal assistance  Quality of Gait: Improved weight acceptance on RLE  decreased R knee flexion, circumducts to advance RLE, slow pace step to pattern  Distance: 25'  Comments: chair brought up to pt d/t pt fatigued from previous tx, Stairs  # Steps : 2  Stairs Height: 6\"  Rails: Left ascending  Assistance: Moderate assistance  Comment: heavy support on rail reuired with lean to left - unsafe to test curb style step with qc use as result    FIMS:  ,  , Assessment: Patient requires decreased assistance for gait transfers and bed mobility this p.m. Increased time to complete all mobility    Occupational therapy:    ,  , Assessment: Pt is a 77year old man from home who presents to [facility] with the above deficits which impact his ability to perform ADLs and IADLs at his baseline. Pt. would benefit from continued OT to maximize independence and safety with ADL tasks. Speech therapy:        Lab/X-ray studies reviewed, analyzed and discussed with patient and staff:   No results found for this or any previous visit (from the past 24 hour(s)). CT HEAD  12/20/2021  Extra-axial spaces:  Normal. Intracranial hemorrhage:  None. Ventricular system: Ventricles mildly enlarged. Sulci mildly prominent. Ex vacuo dilatation, left lateral ventricle. Basal Cisterns:  Normal. Cerebral Parenchyma: Bilateral symmetric periventricular areas decreased attenuation. Geographic area of decreased attenuation, left frontal and left temporal lobes exerting no mass effect. Midline Shift:  None. Cerebellum:  Normal. Paranasal sinuses and mastoid air cells:  Normal. Visualized Orbits:  Normal.     Impression: Remote left frontal and left temporal lobe infarcts. Mild cerebral atrophy. Chronic ischemic white matter disease. CT CHEST 12/20/2021   . Chest: Lines, tubes, and devices:  None. Lung parenchyma and pleura: No consolidation. No suspicious pulmonary nodule. No pleural effusion. Central airways are patent. Mild bibasilar atelectasis.  Thoracic inlet, heart, and mediastinum:  No lymphadenopathy in the axillary, mediastinal, or hilar regions. Mild atherosclerotic vascular thoracic aorta. The thoracic aorta and main pulmonary artery are normal in caliber. The cardiac chambers are normal in size. Status post foramen ovale closure. Mild coronary artery atherosclerotic calcifications are noted, although the study is not optimized for coronary assessment. No pericardial effusion or thickening. Thyroid gland is unremarkable. Esophagus is nondilated. Bones/Soft Tissues: Degenerative changes. Abdomen / Pelvis: Liver: No mass. Right hepatic lobe metallic density. Biliary: No bile duct dilation. Gallbladder is unremarkable. Spleen: No mass. No splenomegaly. Pancreas: No mass or duct dilation. Adrenals: No mass. Kidneys: No mass, calculus or hydronephrosis. GI tract: No dilation or wall thickening. Large amount of colonic stool. Lymph nodes: No abdominal or pelvic lymphadenopathy. Mesentery/Peritoneum: No ascites or mass. Retroperitoneum: No mass. Vasculature: The celiac axis and SMA are patent. The portal vein and branches, splenic vein, SMV, and hepatic veins are patent. Arterial atherosclerotic disease without aneurysm. There is an IVC filter. Pelvis: No mass, ascites or fluid collection. Urinary bladder is unremarkable. Soft tissue/ bones: Grade I anterolisthesis of L5 on S1. Multilevel degenerative changes of the lumbar spine. No acute abnormality in the chest, abdomen and pelvis. Large amount of colonic stool. CT CERVICAL SPINE   12/20/2021: Loss of height, C5 and C6 with cranial caudal dimensions 1.2 cm, and 0.8 cm, respectively. Loss cervical lordosis. Remote 8mm anterolisthesis C4 on C5. Atlantooccipital articulation maintained. Atlantoaxial interval preserved. Neural foramina narrowing, left C4-5. Diffuse disc space narrowing C3-4, through T2-3, greatest at C4-5 through C6-7. No fractures, dislocations, bone lesions. Limited imaging lung apices without anomaly.  Carotid arteries and soft tissues are without anomaly. No fracture. Marked diffuse degenerative change cervical spine. C4 anterolisthesis and loss of body height, C6 and C7, most likely secondary to degenerative change. CT ABDOMEN PELVIS   12/20/2021Chest: Lines, tubes, and devices:  None. Lung parenchyma and pleura: No consolidation. No suspicious pulmonary nodule. No pleural effusion. Central airways are patent. Mild bibasilar atelectasis. Thoracic inlet, heart, and mediastinum:  No lymphadenopathy in the axillary, mediastinal, or hilar regions. Mild atherosclerotic vascular thoracic aorta. The thoracic aorta and main pulmonary artery are normal in caliber. The cardiac chambers are normal in size. Status post foramen ovale closure. Mild coronary artery atherosclerotic calcifications are noted, although the study is not optimized for coronary assessment. No pericardial effusion or thickening. Thyroid gland is unremarkable. Esophagus is nondilated. Bones/Soft Tissues: Degenerative changes. Abdomen / Pelvis: Liver: No mass. Right hepatic lobe metallic density. Biliary: No bile duct dilation. Gallbladder is unremarkable. Spleen: No mass. No splenomegaly. Pancreas: No mass or duct dilation. Adrenals: No mass. Kidneys: No mass, calculus or hydronephrosis. GI tract: No dilation or wall thickening. Large amount of colonic stool. Lymph nodes: No abdominal or pelvic lymphadenopathy. Mesentery/Peritoneum: No ascites or mass. Retroperitoneum: No mass. Vasculature: The celiac axis and SMA are patent. The portal vein and branches, splenic vein, SMV, and hepatic veins are patent. Arterial atherosclerotic disease without aneurysm. There is an IVC filter. Pelvis: No mass, ascites or fluid collection. Urinary bladder is unremarkable. Soft tissue/ bones: Grade I anterolisthesis of L5 on S1. Multilevel degenerative changes of the lumbar spine. No acute abnormality in the chest, abdomen and pelvis. Large amount of colonic stool.         Previous extensive, complex labs, notes and diagnostics reviewed and analyzed. ALLERGIES:    Allergies as of 12/21/2021    (No Known Allergies)      (please also verify by checking MAR)     Today I evaluated this patient for periodic reassessment of medical and functional status. The patient was discussed in detail at the treatment team meeting focusing on current medical issues, progress in therapies, social issues, psychological issues, barriers to progress and strategies to address these barriers, and discharge planning. See the addendum to rehab progress note-as a second progress note in the chart. The patient continues to be high risk for future disability and their medical and rehabilitation prognosis continue to be good and therefore, we will continue the patient's rehabilitation course as planned. The patient's tentative discharge date was set. Patient and family education was discussed. The patient was made aware of the team discussion regarding their progress. Complex Physical Medicine & Rehab Issues Assess & Plan:   1. Severe abnormality of gait and mobility and impaired self-care and ADL's secondary to  Late effects of CVA Remote left frontal and left temporal lobe infarcts- . Functional and medical status reassessed regarding patients ability to participate in therapies and patient found to be able to participate in acute intensive comprehensive inpatient rehabilitation program including PT/OT to improve balance, ambulation, ADLs, and to improve the P/AROM. Therapeutic modifications regarding activities in therapies, place, amount of time per day and intensity of therapy made daily. In bed therapies or bedside therapies prn.   2. Bowel and Bladder dysfunction neurogenic bowel and bladder:  frequent toileting, ambulate to bathroom with assistance, check post void residuals. Check for C.difficile x1 if >2 loose stools in 24 hours, continue bowel & bladder program.  Monitor bowel and bladder function.   Lactinex 2 PO every AC. MOM prn, Brown Bomb prn, Glycerin suppository prn, enema prn.  3. Severe cervical and low back pain as well as generalized OA pain: reassess pain every shift and prior to and after each therapy session, give prn Tylenol and   Ultram versus OxyIR, modalities prn in therapy, masage, Lidoderm, K-pad prn. Consider scheduled AM pain meds. 4. Skin healing and breakdown risk:  continue pressure relief program.  Daily skin exams and reports from nursing. 5. Severe fatigue due to nutritional and hydration deficiency: Add and titrate vitamin B12 vitamin D and CoQ10 continue to monitor I&Os, calorie counts prn, dietary consult prn.  6. Acute episodic insomnia with situational adjustment disorder:  prn Ambien, monitor for day time sedation. 7. Falls risk elevated:  patient to use call light to get nursing assistance to get up, bed and chair alarm. 8. Elevated DVT risk: progressive activities in PT, continue prophylaxis LUCIANO hose, elevation and Xarelto. 9. Complex discharge planning:  Weekly team meeting every  Thursday to assess progress towards goals, discuss and address social, psychological and medical comorbidities and to address difficulties they may be having progressing in therapy. Patient and family education is in progress. The patient is to follow-up with their family physician after discharge. Complex Active General Medical Issues that complicate care Assess & Plan:    1. History of CVA (cerebrovascular accident)-focus on mobility ADL and cognitive deficits  2. Recurrent depression-emotional support provided daily, vitamin B12, encourage participation in rehabilitation support group and recreational therapy, adjust/add medications (Abilify, Cymbalta, add co-Q10 vitamin B12) it appears the patient uses cannabinoids at home.   3.   Hyperlipidemia, coronary artery disease, cerebrovascular disease-continue blood signs every shift focusing on heart rate and blood pressure checks, consult hospitalist for backup medical and adjust/add medications (Xarelto). Monitor heart rate and blood pressure as well as medications effects on vital signs before during and after therapy. 4.   Hemiparesis affecting dominant side as late effect of cerebrovascular accident (CVA)   5. 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 Chronic pain syndrome,   DJD (degenerative joint disease), lumbar, DJD (degenerative joint disease), cervical-transition to lowest effective dose of pain medication use topicals when available lowest effective dose of Ultram especially given cannabinoid use and Cymbalta. 7.   Hypothyroidism-  Synthroid and titrate dosing. Follow vital signs, recheck TSH and thyroid studies as outpatient. 8.   Chronic deep vein thrombosis (DVT) of left popliteal vein-Xaralto  9. Bowel and bladder incontinence-check post void residuals add scheduled toileting  10.    Aphasia-consult speech and language pathology         Electronically signed by Harjinder Adams DO on 12/23/21 at 7:57 AM REYNA Wilkes D.O., PM&R     Attending    [address]

## 2021-12-23 NOTE — FLOWSHEET NOTE
Earlier today patient noted to have a quarter-sized area of blood in his brief at the area of the penis. Patient indicated he cut his penis while using the urianl. Penis and scrotum checked, no sign of any cut or sore. Patient denies pain with urination.

## 2021-12-23 NOTE — CONSULTS
Spiritual Care Services     Summary of Visit:  Consult placed because of emotional distress. This is a very pleasant gentleman who is frustrated with his circumstances. He suffered a stroke 7 years ago and is unable to do what he could before and is dependent upon others. He has learned coping skills but it is apparent that he is frustrated at what he cannot do. This writer did note that when he became frustrated at his inability to find his words, he would stop himself, breathe, concentrate and try again. I encouraged him with that skill he is learning. He expressed that he used to like to read, but now he finds it too difficult. He enjoys watching TV. He expressed that he has a large family and they are a support to him. His sister brought him the Clerk" tree in his room and that brings him pleasure. He is hoping to be able to go home for a few hours for Favian with his family. Patient expressed that he enjoys music and could benefit from the music therapist coming by to spend time with him. Patient is not particularly Zoroastrianism. He did say that he was raised [facility] but does not attend any one particular Temple. We prayed together and he was willing for a  to stop by periodically to sit and chat and encourage him.     Spiritual Assessment/Intervention/Outcomes:    Encounter Summary  Services provided to[de-identified] Patient  Referral/Consult From[de-identified] Physician  Support System: Fermín aguillon  Place of Gnosticist: None in particular  Contact Religious: No  Continue Visiting: Yes (Would benefit from periodic  visits)  Complexity of Encounter: Low  Length of Encounter: 30 minutes  Spiritual Assessment Completed: Yes  Routine  Type: Initial           Grief and Life Adjustment  Type: Adjustment to illness,Grief and loss  Assessment: Calm,Approachable,Coping,Helplessness,Unresolved grief  Intervention: Explored feelings, thoughts, concerns,Nurtured hope,Prayer,Discussed relationship with God,Discussed belief system/Pentecostalism practices/mukund,Discussed illness/injury and it's impact  Outcome: Expressed gratitude,Engaged in conversation,Expressed feelings/needs/concerns,Receptive,Encouraged  Primary Decision Maker (Healthcare Proxy)  [facility] is[de-identified] Named in [facility] [address] / Beliefs  Do you have any ethnic, cultural, sacramental, or spiritual Pentecostalism needs you would like us to be aware of while you are in the hospital?: No    Care Plan:    Music therapy  Periodic visits with chaplains. Spiritual Care Services   Electronically signed by Elba Estrella on 12/23/21 at 3:46 PM EST     To reach a  for emotional and spiritual support, place an [facility] consult request.   If a  is needed immediately, dial 0 and ask to page the on-call .

## 2021-12-23 NOTE — PROGRESS NOTES
Physical Therapy Rehab Treatment Note  [facility]Department[facility]  [facility]       NAME: Brandi Oakley  : 1955 (77 y.o.)  MRN: [id number]  CODE STATUS: Full Code    Date of Service: 2021       Restrictions:  Restrictions/Precautions: Fall Risk,Seizure       SUBJECTIVE:       Pre Treatment Pain Screening  Pain at present: 0    Post Treatment Pain Screening:  Pain Assessment  Pain Level: 0    OBJECTIVE:               Neuromuscular Education  PNF: facilitation of BLE motor control and standing balance facilitation. Challenges included LE movement patterns in varying planes of motion  Neuromuscular Comments: flexor tone of RLE dominates movement patterns        Transfers  Sit to Stand: Contact guard assistance;Stand by assistance  Stand to sit: Contact guard assistance  Bed to Chair: Contact guard assistance;Stand by assistance  Comment: improved control of balance and attention to safety    Ambulation  Ambulation?: Yes  More Ambulation?: Yes  Ambulation 1  Surface: carpet  Device: Large Digital Lifeboat  Other Apparatus: Dorsiflex Assist (air cast on LLE - theraband hip flexor and df assist)  Assistance: Minimal assistance; Moderate assistance  Quality of Gait: Improved weight acceptance on RLE with concern for right ankle stability at initial stance,  decreased R knee flexion at swing and recurvatum significant at stance, circumducts to advance RLE, slow pace step to pattern with halting pattern noted  Distance: 40 feet x 2  Comments: improved hip flexion with theraband on - no carry over following    Stairs/Curb  Stairs?: Yes  Stairs  # Steps : 4  Stairs Height: 6\"  Rails: Left ascending  Curbs: 2\"  Device: [employer]  Assistance: Minimal assistance; Moderate assistance (min assist ascending and mod assist descending)  Comment: pt descended steps in reverse - forward from curb step         Activity Tolerance  Activity Tolerance: Patient Tolerated treatment well ASSESSMENT/PROGRESS TOWARDS GOALS:  Assessment: Pt demonstrates flexor tone in RLE which predominates movement patterns. This poses risk for pt injury with ankle stability as well as effeciency of mobility. Goals:  Long term goals  Long term goal 1: indep and effecient bed mobility  Long term goal 2: indep sit to stand and bed transfers  Long term goal 3: indep gait with qc 50 feet in protected environment  Long term goal 4: supervision with curb step performance for home entry  Long term goal 5:  for villalba balance testing    PLAN OF CARE/Safety:   Safety Devices  Type of devices: Chair alarm in place; Left in chair      Therapy Time:   Individual   Time In 1330   Time Out 1405   Minutes 35     Minutes:      Transfer/Bed mobility trainin      Gait training:15      Neuro re education:15           Elias Chase PT, 21 at 2:10 PM

## 2021-12-23 NOTE — PLAN OF CARE
Problem: Falls - Risk of:  Goal: Will remain free from falls  Description: Will remain free from falls  Outcome: Ongoing  Goal: Absence of physical injury  Description: Absence of physical injury  Outcome: Ongoing     Problem: HEMODYNAMIC STATUS  Goal: Patient has stable vital signs and fluid balance  Outcome: Ongoing     Problem: ACTIVITY INTOLERANCE/IMPAIRED MOBILITY  Goal: Mobility/activity is maintained at optimum level for patient  Outcome: Ongoing     Problem: COMMUNICATION IMPAIRMENT  Goal: Ability to express needs and understand communication  Outcome: Ongoing     Problem:  Activity:  Goal: Ability to avoid complications of mobility impairment will improve  Description: Ability to avoid complications of mobility impairment will improve  Outcome: Ongoing  Goal: Range of joint motion will improve  Description: Range of joint motion will improve  Outcome: Ongoing  Goal: Ability to ambulate will improve  Description: Ability to ambulate will improve  Outcome: Ongoing  Goal: Muscle strength will improve  Description: Muscle strength will improve  Outcome: Ongoing     Problem: Health Behavior:  Goal: Identification of resources available to assist in meeting health care needs will improve  Description: Identification of resources available to assist in meeting health care needs will improve  Outcome: Ongoing     Problem: Safety:  Goal: Ability to remain free from injury will improve  Description: Ability to remain free from injury will improve  Outcome: Ongoing     Problem: IP COMMUNICATION/DYSARTHRIA  Goal: LTG - patient will improve expressive language skills to allow for communication of wants and needs in daily activities  Outcome: Ongoing     Problem: IP SWALLOWING  Goal: LTG - patient will tolerate the least restrictive diet consistency to allow for safe consumption of daily meals  Outcome: Ongoing

## 2021-12-23 NOTE — FLOWSHEET NOTE
Spoke with neurology, Andrew Krause CNP who requested to cancel neurology consult due to no acute diagnosis and possible botox would be done on an outpatient basis.

## 2021-12-23 NOTE — PROGRESS NOTES
INDIVIDUALIZED OVERALL REHAB PLAN OF CARE  ADDENDUM TO REHAB PROGRESS NOTE-for audit purposes must also refer to this day's clinical note and combine the information      Date: 2021  Patient Name: Edward Martinez    Room: T085/X473-63    MRN: [id number]    : 1955  (68 y.o.)  Gender: male       Today 2021 during weekly team meeting, I reviewed the patient Edward Martinez in detail with the therapists and nurses involved in patient's care gathering complex physiatric data regarding current medical issues, progress in therapies, factors limiting progress, social issues, psychological issues, ongoing therapeutic plans and discharge planning. Legend:  I= independent Im =Modified independent  S=Supervised SB=stand by HUMPHREY=set up CG=contact windy Min= minimal Mod=Moderate Max=maximal Max of 2 =maximal assist of 2 people      CURRENT FUNCTIONAL STATUS:    NURSING ISSUES:    Can't tollerate AFO--dt tone--doing well with Aircast.   Severe tone and clonus LLE LUE. Pt resting quietly, shift assessment completed earlier this shift, Pt medicate with Ambien for sleep, [facility] 2021 skin intact, Pt continent B&B shift assessment completed, call light within reach, bed alarm on --consider Botox as an out pt. Nursing will continue to focus on bowel and bladder continence transitioning toward independence by time of discharge. Monitoring post void residuals monitoring for severe constipation and bowel obstruction. Focus on achieving ADL goals with co-treating with OT when possible. Focus on cognition and co treat with SLP when possible.     PHYSICAL THERAPY  Bed mobility:  Supine to Sit: Stand by assistance (21 1147)  Sit to Supine: Stand by assistance (21 1147)  Transfers:  Sit to Stand: Contact guard assistance;Stand by assistance (21 114)  Bed to Chair: Contact guard assistance;Stand by assistance (SBA to left - CGA with mild unsteadiness to the right) (21 114)  Gait:   Device: [employer] (12/23/21 1148)  Other Apparatus:  (with and without air cast on RLE) (12/23/21 1148)  Assistance: Minimal assistance; Moderate assistance (12/23/21 1148)  Distance: 30 feet x2; 50 feet (12/23/21 1148)  Quality of Gait: Improved weight acceptance on RLE with concern for right ankle stability at initial stance,  decreased R knee flexion at swing and recurvatum significant at stance, circumducts to advance RLE, slow pace step to pattern with halting pattern noted (12/23/21 1148)  Comments: improved quality following intervention in neuro re-ed section (12/23/21 1148)  Stairs:  # Steps : 2 (12/22/21 1044)  Rails: Left ascending (12/22/21 1044)  Assistance: Moderate assistance (12/22/21 1044)  Comment: heavy support on rail reuired with lean to left - unsafe to test curb style step with qc use as result (12/22/21 1044)  W/C mobility:  Level of Assistance: Stand by assistance (12/22/21 1044)  Distance: 70 feet with 2 turns - limited by fatigue (12/22/21 1044)  Description/ Details: cues for safe environmentl maneuvering required (12/22/21 1044)    Focus on energy conservation and consistency of function. OCCUPATIONAL THERAPY  Hand Dominance: Right (uses his left hand since previous CVA)  ADL  Feeding: Setup (12/23/21 1231)  Grooming: Modified independent  (12/23/21 1231)  UE Bathing: Stand by assistance (12/23/21 1231)  LE Bathing: Minimal assistance (12/23/21 1231)  UE Dressing: Supervision (12/23/21 1231)  LE Dressing: Minimal assistance (12/23/21 1231)  Toileting: Minimal assistance (12/23/21 1231)  Toilet Transfers  Toilet - Technique: Stand step (12/23/21 1232)  Equipment Used: Grab bars (12/23/21 1232)  Toilet Transfer: Contact guard assistance (12/23/21 1232)     [address] - Transfer From: Wheelchair (12/22/21 1311)  Shower - Transfer Type: To and From (12/22/21 1311)  Shower - Transfer To:  Shower seat with back (12/22/21 1311)  Shower - Technique: Stand pivot (12/22/21 1311)  Shower Transfers: Moderate assistance (12/22/21 1311)  Shower Transfers Comments: declined due to being cold (12/23/21 1232)    Focus on sequencing. SPEECH THERAPY/SLP  Motor Speech: Exceptions to [facility]  Comprehension: Exceptions  Verbal Expression: Exceptions to functional limits      Diet/Swallow:  Diet Solids Recommendation: Regular  Liquid Consistency Recommendation: Thin  Dysphagia Outcome Severity Scale: Level 5: Mild dysphagia- Distant supervision. May need one diet consistency restricted    Compensatory Swallowing Strategies: Upright as possible for all oral intake,Small bites/sips  Therapeutic Interventions: Diet tolerance monitoring,Patient/Family education          COGNITION  OT: Cognition Comment: comp - supervision, exp - min, soc int - mod ind, prob solv - supervision, mem - supervision  SP:Memory:  (Unable to assess secondary to expressive aphasia)           THERAPY, MEDICAL AND NURSING COORDINATION:    [x]  Pain medication before therapies     [x]  Check orthostatic BP and monitor heart rate and medications effects with therapy      [x]  Ambulate to the bathroom in room    [x]  Add scheduled rest beaks     []  In room therapies      Discharge date set for:               1/1/2022      Home with:   alone  with help from   brother            And:      Home Health Care:     [x]  PT    []  OT    []  ST   [x]  Aide   []  SW    [x]  RN                    Outpatient Therapy:  []  PT    []  OT    []  ST   []  Rehab Psych                 Equipment:  [employer]      At D/C their function is goaled at:   PT:Long term goal 1: indep and effecient bed mobility  Long term goal 2: indep sit to stand and bed transfers  Long term goal 3: indep gait with qc 50 feet in protected environment  Long term goal 4: supervision with curb step performance for home entry  Long term goal 5: 20/56 for villalba balance testing  OT: ,  ,  ,     ,  ,  ,    SP:Long-term Goals  Timeframe for Long-term Goals: 2-3 weeks  Goal 1: Pt will improve his/her Expressive Language abilities to a superivised level with AAC  for expression of complex wants, needs, feelings/ideas, and medical/safety information. Goal 2: Pt will improve her/his Receptive Language abilities to a supervise to min assist level for comprehension of conversation and safety directions with familiar and unfamiliar communication partners. Long-term Goals  Timeframe for Long-term Goals: 1-2 weeks  Goal 1: Patient will tolerate LRD without overt s/s of aspiration           From a cognitive standpoint they will need:        24 hr supervision  --progress to occasional           Significant problems/ barriers to functional progress include: Pt is at a high risk for functional loss,    [x]  Acute infection/UTI    []  Low BP's     []  COPD flare-up   []  Uncontrolled blood sugar     []  Progressive anemia     [x]  poor endurance    [x]  Severe pain exacerbation-left shoulder     [x]  Impaired mental status    []  Urinary incontinence    []  Bowel incontinence           Plan to correct barriers to functional progress: Add scheduled rest breaks, CoQ 10 and Vit B 12, control pain by using ice Lidoderm rest and massage as well as pain medications prior to therapy. Spread therapy of 15 hours out over a 7 day window to accommodate rest breaks and medical interventions. Patient seems to be making fair to good response to these interventions. Based on a comprehensive evaluation of the above, the individualized therapy and Discharge plan will be:    -Times stated are an average that will be varied based on the patient's daily need.        PT    1 1/2  hrs/day 5-7 days per week           OT    1 1/2 hrs per day 5-7 days per week  ST   1/2  hrs /day 3-5 days per week       Estimated LOS   2 week(s)    - Overall functional prognosis:     [x]  Good    []  Fair    []  Poor -Medical Prognosis:   [x]  Good    []  Fair    []  Poor    This patient was made aware of the discussion of Plan of Care,

## 2021-12-23 NOTE — PROGRESS NOTES
Pt resting quietly, shift assessment completed earlier this shift, Pt medicate with Ambien for sleep, LBM 12/22/2021 skin intact, Pt continent B&B shift assessment completed, call light within reach, bed alarm on. Electronically signed by Bryson Shaw LPN on 22/32/4989 at 5:43 AM

## 2021-12-23 NOTE — PROGRESS NOTES
Patient has metallic foreign bodies in each eye   Best seen on CT SCAN BRAIN  Per radiologist Dr Keyanna Hightower MRI contraindicated  Nurse Julieta Hahn notified  Jonathon Stevens notified

## 2021-12-23 NOTE — PROGRESS NOTES
Occupational Therapy  Facility/Department: [facility]  Daily Treatment Note  NAME: Sabine Chen  : 1955  MRN: [id number]    Date of Service: 2021    Discharge Recommendations:  Continue to assess pending progress       Assessment      Activity Tolerance  Activity Tolerance: Patient Tolerated treatment well  Safety Devices  Safety Devices in place: Yes  Type of devices: All fall risk precautions in place         Patient Diagnosis(es): There were no encounter diagnoses. has a past medical history of Chronic right shoulder pain, Closed TBI (traumatic brain injury) ([facility] [address] Essential hypertension, Hemiparesis affecting dominant side as late effect of cerebrovascular accident (CVA) ([facility] [address] History of CVA (cerebrovascular accident), Hyperlipidemia, Hypoxemia requiring supplemental oxygen, Recurrent depression ([facility] [address] S/P patent foramen ovale closure, and Seizure ([facility] [address] has no past surgical history on file. Restrictions  Restrictions/Precautions  Restrictions/Precautions: Fall Risk,Seizure  Subjective    When patient's Depends undergarment was removed new red and older blood was noted in the area of the penis. Vinicio Mccann RN was notified and assessed patient during the ADL. General  Chart Reviewed: Yes  Patient assessed for rehabilitation services?: Yes  Referring Practitioner: Dr Bety Mcneil  Diagnosis: Impaired mobility and activities of daily living dt late effects of CVA    Patient reported no pain at the beginning and the end of the session. Orientation   WFL  Objective  Patient was observed placing shampoo on a washcloth and then attempting to lick the washcloth. Patient was stopped and accepted feedback  Patient then attempted to lick the deodorant and was redirected.   ADL  Feeding: Setup  Grooming: Modified independent   UE Bathing: Stand by assistance  LE Bathing: Minimal assistance  UE Dressing: Supervision  LE Dressing: Minimal assistance  Toileting: Minimal assistance        Toilet

## 2021-12-23 NOTE — CONSULTS
reports he was giving it to him twice daily routinely at home. In regards to the events surrounding his admission the patient states he does not remember any of it. The brother states that the patient had had a normal day prior and watched football and ate pizza. On the day in question he was found on the floor at home and had lost control of his bladder. There is no obvious tongue trauma. Patient was brought to the emergency room. Laboratory testing did reveal elevated CK levels as well as white blood cell margination and elevated lactic acid. Patient reported daily compliance with Keppra. Vitals:    12/23/21 0802   BP: 121/75   Pulse: 93   Resp: 17   Temp: 97.5 °F (36.4 °C)   SpO2: 100%    Breast Cancer-related family history is not on file. 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 Neurological: Positive for seizures, speech difficulty and weakness. Negative for dizziness, tremors, syncope, facial asymmetry, light-headedness, numbness and headaches. Psychiatric/Behavioral: Negative for agitation, confusion and hallucinations. The patient is not nervous/anxious. Physical Exam  Vitals and nursing note reviewed. Constitutional:       General: He is not in acute distress. Appearance: He is not ill-appearing or diaphoretic. HENT:      Head: Normocephalic and atraumatic. Eyes:      General: No visual field deficit. Extraocular Movements: Extraocular movements intact. Pupils: Pupils are equal, round, and reactive to light. Cardiovascular:      Rate and Rhythm: Normal rate and regular rhythm. Pulmonary:      Effort: Pulmonary effort is normal. No respiratory distress. Breath sounds: Normal breath sounds. Abdominal:      General: Bowel sounds are normal.      Palpations: Abdomen is soft. Skin:     General: Skin is warm and dry. Neurological:      Mental Status: He is alert and oriented to person, place, and time. Cranial Nerves: No cranial nerve deficit, dysarthria or facial asymmetry. Motor: Weakness, atrophy and abnormal muscle tone present. No tremor or seizure activity. Gait: Gait abnormal.      Comments: Patient with right-sided hemiparesis. He has contracture of right hand and right ankle. He has increased tone and spasticity with clonus of the lower extremity.                Medications:  Reviewed    Infusion Medications:    sodium chloride       Scheduled Medications:    Vitamin D  2,000 Units Oral Dinner    cyanocobalamin  1,000 mcg IntraMUSCular Weekly    coenzyme Q10  100 mg Oral Daily    lidocaine  3 patch TransDERmal Daily    sodium chloride flush  5-40 mL IntraVENous 2 times per day    ARIPiprazole  2 mg Oral Daily    aspirin  81 mg Oral Daily    DULoxetine  60 mg Oral Daily    levothyroxine  50 mcg Oral Daily    rivaroxaban  20 mg Oral Daily with breakfast    ammonium lactate   Topical Nightly     PRN Meds: acetaminophen, bisacodyl, fleet, zolpidem, oxyCODONE, traMADol, sodium chloride, ondansetron **OR** ondansetron, polyethylene glycol, sodium chloride flush, baclofen    Labs:   Recent Labs     12/21/21  1110 12/21/21  1621   WBC 12.8* 10.9*   HGB 13.3* 13.0*   HCT 39.0* 37.9*    185     Recent Labs     12/21/21  1110      K 3.5      CO2 22   BUN 14   CREATININE 0.85   CALCIUM 9.2     Recent Labs     12/21/21  1110   *   ALT 35   BILITOT 1.5*   ALKPHOS 88     No results for input(s): INR in the last 72 hours. No results for input(s): Chyrel Lions in the last 72 hours. Urinalysis:   Lab Results   Component Value Date    NITRU Negative 12/20/2021    WBCUA 3-5 12/20/2021    BACTERIA Negative 12/20/2021    RBCUA 0-2 12/20/2021    BLOODU TRACE 12/20/2021    SPECGRAV 1.010 12/20/2021    GLUCOSEU Negative 12/20/2021       Radiology:   Most recent    EEG No valid procedures specified. MRI of Brain No results found for this or any previous visit. No results found for this or any previous visit. MRA of the Head and Neck: No results found for this or any previous visit. No results found for this or any previous visit. No results found for this or any previous visit. CT of the Head: Results for orders placed during the hospital encounter of 12/20/21    [address]  CT Brain. Contrast medium:  without contrast.. History:  Fall at home. Aphasic from remote stroke. .    Technical factors: CT imaging of the brain was obtained and formatted as 5 mm contiguous axial images. 2.5 mm contiguous axial images were obtained through the osseous structures. Sagittal and coronal reconstruction obtained during postprocessing. Comparison:  None.     Findings:    Extra-axial spaces:  Normal.    Intracranial hemorrhage: None.    Ventricular system: Ventricles mildly enlarged. Sulci mildly prominent. Ex vacuo dilatation, left lateral ventricle. Basal Cisterns:  Normal.    Cerebral Parenchyma: Bilateral symmetric periventricular areas decreased attenuation. Geographic area of decreased attenuation, left frontal and left temporal lobes exerting no mass effect. Midline Shift:  None. Cerebellum:  Normal.    Paranasal sinuses and mastoid air cells:  Normal.    Visualized Orbits:  Normal.    Impression  Impression:    Remote left frontal and left temporal lobe infarcts. Mild cerebral atrophy. Chronic ischemic white matter disease. All CT scans at this facility use dose modulation, iterative reconstruction, and/or weight based dosing when appropriate to reduce radiation dose to as low as reasonably achievable. No results found for this or any previous visit. No results found for this or any previous visit. Carotid duplex: No results found for this or any previous visit. No results found for this or any previous visit. No results found for this or any previous visit. Echo No results found for this or any previous visit. Assessment/Plan:    Patient is a 78-year-old  male with history of large left MCA, M1 and M2 thrombus, PE and bilateral DVT secondary to PFO in 2012 and underwent PFO closure in 2013. Stroke resulted in right hemiparesis and expressive aphasia. Hypercoagulable work-up at that time was negative. Patient currently continues on Xarelto 20 mg daily and aspirin 81 mg daily. Patient has increased tone on the right side with spasticity and clonus as late effects of his CVA. He has tried Botox prior, according to his brother, without any improvement. He is currently on baclofen 10 mg 4 times a day as needed. Brother reports that patient was taking this twice daily routinely at home. Will initiate patient on baclofen 10 mg 3 times daily here and monitor response.   Patient also with history of seizure disorder prior to his stroke and has been on Keppra for many years. Previous seizures were in the setting of anxiety. Patient reports daily compliance with Keppra. Given patient's high risk for seizures due to his previous CVA location and we have concern that he might have suffered a seizure rather than a fall that brought him into the hospital.  There was evidence of white blood cell margination as well as elevated CK levels and lactic acid. At this time we will change Keppra to 750 mg twice daily. Will obtain EEG and MRI of the brain. We will discontinue tramadol as this can lower his seizure threshold. Further recommendations to follow pending the above work-up.     Collaborating physicians: Dr Sumaya Cox and Dr Villatoro    Electronically signed by JULITA Arango CNP on 12/23/2021 at 2:03 PM

## 2021-12-23 NOTE — PROGRESS NOTES
Occupational Therapy  Facility/Department: [facility]  Daily Treatment Note  NAME: Pershing Cockayne  : 1955  MRN: [id number]    Date of Service: 2021    Discharge Recommendations:  Continue to assess pending progress       Assessment      REQUIRES OT FOLLOW UP: Yes  Activity Tolerance  Activity Tolerance: Patient Tolerated treatment well  Safety Devices  Safety Devices in place: Yes  Type of devices: All fall risk precautions in place         Patient Diagnosis(es): There were no encounter diagnoses. has a past medical history of Chronic right shoulder pain, Closed TBI (traumatic brain injury) ([facility] [address] Essential hypertension, Hemiparesis affecting dominant side as late effect of cerebrovascular accident (CVA) ([facility] [address] History of CVA (cerebrovascular accident), Hyperlipidemia, Hypoxemia requiring supplemental oxygen, Recurrent depression ([facility] [address] S/P patent foramen ovale closure, and Seizure ([facility] [address] has no past surgical history on file. Restrictions  Restrictions/Precautions  Restrictions/Precautions: Fall Risk,Seizure  Subjective   General  Chart Reviewed: Yes  Patient assessed for rehabilitation services?: Yes  Family / Caregiver Present: No  Referring Practitioner: Dr Branden José  Diagnosis: Impaired mobility and activities of daily living dt late effects of CVA  Pain Assessment  Pain Assessment: 0-10  Pain Level: 0  Pre Treatment Pain Screening  Pain at present: 0  Scale Used: Numeric Score  Intervention List: Patient able to continue with treatment  Vital Signs  Patient Currently in Pain: Denies   Orientation  Orientation  Overall Orientation Status: Within Functional Limits  Objective        While seated at tabletop, pt sorted playing cards based on suit using the L UE only to improve problem solving during ADLs and IADLs. Pt demonstrates no difficulty to sort cards and had no errors. Pt was then instructed to sort each pile in numerical order.  Pt requires min verbal cues to initiate and had one error but was able to correct independently after provided with one verbal cue. Pt completed with good attention to task. While seated at tabletop, pt sorted colored plastic pegs into plastic peg board using the L UE only to improve fine motor coordination during self care. Pt demonstrates min difficulty to manipulate pegs but had no difficulty sorting pegs into pegboard. Pt with no errors. Pt was instructed to remove pegs using finger to palm translation to improve in hand manipulation during self care. Pt demonstrates min difficulty to manipulate requiring increased effort. Nany Buck from Neurology arrived to speak with pt at end of session and pt missed last 5 minutes of therapy time. Plan   Plan  Times per week: 5-7  Plan weeks: 1.5-2 weeks  Current Treatment Recommendations: Balance Training,Functional Mobility Training,Endurance Training,Safety Education & Training,Patient/Caregiver Education & Training,Equipment Evaluation, Education, & procurement,Self-Care / ADL,Home Management Training,Cognitive/Perceptual Training,Neuromuscular Re-education  Plan Comment: continue OT POC  G-Code     OutComes Score                                                  AM-PAC Score             Goals  Long term goals  Time Frame for Long term goals : Within 1.5-2 weeks patient to demonstrate progress in the following areas listed below to achieve specific LTGs as stated in the initial evaluation  Long term goal 1: improve overall endurance  Long term goal 2: improve balance for ADLs and IADLs  Long term goal 3: Demo an understanding of tone reduction  Patient Goals   Patient goals : \"the fall. ..  I am sorry\"    Patient agreed to stated goal of improving his balance       Therapy Time   Individual Concurrent Group Co-treatment   Time In 1300         Time Out 1325         Minutes 25           Therapeutic activities: 8 minutes  Cognitive Retrainin minutes  Missed 5 minutes due to neurology, will attempt makeup as able     [facility] OT   Electronically signed by Abel Rodriguez OT on 12/23/2021 at 1:50 PM

## 2021-12-24 PROCEDURE — 6370000000 HC RX 637 (ALT 250 FOR IP): Performed by: NURSE PRACTITIONER

## 2021-12-24 PROCEDURE — 97116 GAIT TRAINING THERAPY: CPT

## 2021-12-24 PROCEDURE — 97530 THERAPEUTIC ACTIVITIES: CPT

## 2021-12-24 PROCEDURE — 6370000000 HC RX 637 (ALT 250 FOR IP): Performed by: INTERNAL MEDICINE

## 2021-12-24 PROCEDURE — 6370000000 HC RX 637 (ALT 250 FOR IP): Performed by: PHYSICAL MEDICINE & REHABILITATION

## 2021-12-24 PROCEDURE — 97112 NEUROMUSCULAR REEDUCATION: CPT

## 2021-12-24 PROCEDURE — 97535 SELF CARE MNGMENT TRAINING: CPT

## 2021-12-24 PROCEDURE — 1180000000 HC REHAB R&B

## 2021-12-24 RX ADMIN — ASPIRIN 81 MG: 81 TABLET, CHEWABLE ORAL at 09:25

## 2021-12-24 RX ADMIN — Medication 2000 UNITS: at 16:49

## 2021-12-24 RX ADMIN — BACLOFEN 10 MG: 10 TABLET ORAL at 13:43

## 2021-12-24 RX ADMIN — Medication 100 MG: at 09:25

## 2021-12-24 RX ADMIN — LEVETIRACETAM 750 MG: 500 TABLET, FILM COATED ORAL at 19:29

## 2021-12-24 RX ADMIN — BACLOFEN 10 MG: 10 TABLET ORAL at 09:25

## 2021-12-24 RX ADMIN — DULOXETINE 60 MG: 60 CAPSULE, DELAYED RELEASE ORAL at 09:25

## 2021-12-24 RX ADMIN — LEVETIRACETAM 750 MG: 500 TABLET, FILM COATED ORAL at 09:25

## 2021-12-24 RX ADMIN — ARIPIPRAZOLE 2 MG: 2 TABLET ORAL at 09:25

## 2021-12-24 RX ADMIN — RIVAROXABAN 20 MG: 20 TABLET, FILM COATED ORAL at 09:25

## 2021-12-24 RX ADMIN — Medication: at 19:33

## 2021-12-24 RX ADMIN — BACLOFEN 10 MG: 10 TABLET ORAL at 19:29

## 2021-12-24 RX ADMIN — LEVOTHYROXINE SODIUM 50 MCG: 0.05 TABLET ORAL at 09:25

## 2021-12-24 ASSESSMENT — PAIN SCALES - GENERAL
PAINLEVEL_OUTOF10: 0

## 2021-12-24 NOTE — PROGRESS NOTES
Physical Therapy Rehab Treatment Note  [facility]Department[facility]  [facility]       NAME: Rahul Cortes  : 1955 (77 y.o.)  MRN: [id number]  CODE STATUS: Full Code    Date of Service: 2021  Diagnosis: Impaired mobility and ADL's due to Late Effects CVA    Restrictions:  Restrictions/Precautions: Fall Risk,Seizure       SUBJECTIVE: Subjective: Reports ok for treatment     Pre Treatment Pain Screening  Pain at present: 0  Pre Treatment Pain Screening  Pain at present: 0    Post Treatment Pain Screenin  Pain Assessment  Pain Level: 0    OBJECTIVE:    Bed mobility  Rolling to Left: Modified independent  Rolling to Right: Modified independent  Supine to Sit: Stand by assistance  Sit to Supine: Stand by assistance    Transfers  Sit to Stand: Contact guard assistance;Stand by assistance  Stand to sit: Contact guard assistance  Bed to Chair: Contact guard assistance;Stand by assistance    Ambulation 1  Surface: carpet  Device: Paperless [employer] Management  Other Apparatus:  (aircast)  Assistance: Minimal assistance  Quality of Gait: decrease knee flexion with swing phase, genu recurvatum with stance, decrease circumduction , cues to decrease speed for improved quality and control  Distance: 50ft x 2    Stairs  # Steps : 4  Stairs Height: 6\"  Rails: Left ascending  Assistance: Contact guard assistance;Minimal assistance  Comment: non-reciprocal         Activity Tolerance  Activity Tolerance: Patient Tolerated treatment well    Exercises  Comments: static weight shifts  Other exercises  Other exercises?: Yes  Other exercises 1: single steps  Other exercises 2: toe taps on block for increase hip/knee flexion  Other exercises 3: static stand without AD      ASSESSMENT/PROGRESS TOWARDS GOALS:Pt progressing well towards transfers, gait, balance, and bed mobility goals. Body structures, Functions, Activity limitations: Decreased functional mobility ; Decreased strength;Decreased endurance;Decreased coordination;Decreased safe awareness;Decreased ROM; Decreased balance;Decreased sensation  Assessment: Pt with initial right LE clonus with out of bed. After stretching and weight bearing decrease clonus and tone noted. Unable to correct recurvatum during ambulation, but able to decrease circumduction and foot placement. Also able to initiate foward facing descend on stairs. Pt progressing well with transfers, bed mobility, gait, and balance. Goals:  Long term goals  Long term goal 1: indep and effecient bed mobility  Long term goal 2: indep sit to stand and bed transfers  Long term goal 3: indep gait with qc 50 feet in protected environment  Long term goal 4: supervision with curb step performance for home entry  Long term goal 5:  for villalba balance testing    PLAN OF CARE/Safety:   Plan Comment: Cont per POC  Safety Devices  Type of devices: Chair alarm in place; Left in chair      Therapy Time:   Individual   Time In 0830   Time Out 0930   Minutes 60     Minutes:60      Transfer/Bed mobility training:      Gait trainin      Neuro re education:30     Therapeutic ex:      Su Connor PTA, 21 at 9:25 AM

## 2021-12-24 NOTE — PROGRESS NOTES
Occupational Therapy  Facility/Department: [facility]  Daily Treatment Note  NAME: Pershing Cockayne  : 1955  MRN: [id number]    Date of Service: 2021    Discharge Recommendations:  Continue to assess pending progress    Assessment   Activity Tolerance  Activity Tolerance: Patient Tolerated treatment well  Safety Devices  Safety Devices in place: Yes  Type of devices: All fall risk precautions in place       Patient Diagnosis(es): There were no encounter diagnoses. has a past medical history of Chronic right shoulder pain, Closed TBI (traumatic brain injury) ([facility] [address] Essential hypertension, Hemiparesis affecting dominant side as late effect of cerebrovascular accident (CVA) ([facility] [address] History of CVA (cerebrovascular accident), Hyperlipidemia, Hypoxemia requiring supplemental oxygen, Recurrent depression ([facility] [address] S/P patent foramen ovale closure, and Seizure ([facility] [address] has no past surgical history on file. Restrictions  Restrictions/Precautions  Restrictions/Precautions: Fall Risk,Seizure     Subjective   General  Chart Reviewed: Yes  Patient assessed for rehabilitation services?: Yes  Response to previous treatment: Patient with no complaints from previous session  Family / Caregiver Present: No  Referring Practitioner: Dr Branden José  Diagnosis: Impaired mobility and activities of daily living dt late effects of CVA  Pre Treatment Pain Screening  Pain at present: 0  Scale Used: Numeric Score  Intervention List: Patient able to continue with treatment  Vital Signs  Patient Currently in Pain: No     Objective  ADL  Grooming: Modified independent  (To perform oral care, hair care, and wash face while seated at ink in [facility])  UE Bathing: Modified independent   LE Bathing: Contact guard assistance  UE Dressing: Independent; Increased time to complete (To doff/don t-shirt)  LE Dressing: Minimal assistance (Pt doffed/donned pants past hips only to wash ara areas and legs.  Assistance to pull past up to knees in order to don past hips)    Balance  Standing Balance: Contact guard assistance     Transfers  Sit to stand: Stand by assistance  Stand to sit: Stand by assistance     Cognition  Overall Cognitive Status: Exceptions  Arousal/Alertness: Appropriate responses to stimuli  Following Commands: Follows one step commands with repetition  Attention Span: Appears intact  Memory: Appears intact  Safety Judgement: Decreased awareness of need for safety  Problem Solving: Assistance required to generate solutions  Insights: Fully aware of deficits  Initiation: Requires cues for some  Sequencing: Requires cues for some  Cognition Comment: Comprehension: Sup., Expression: Min A., Social Interaction: MI., Problem Solving: Sup., Memory: MI    Socks:   Pt wore a 1# wrist weight and used his left hand to retrieve socks that were spread out across the tabletop. Pt matched/folded socks and placed them in a bag placed on the floor to the left of him. Pt had Min difficulty with activity and worked at a steady pace without rest breaks. Pt had Min difficulty with cognitive challenges of activity. Pt completed activity to improve functional endurance for ADL/IADL completion. Placement of objects out of reach to facilitate dynamic sitting with trunk rotation and lateral flexion. Reacher Activity: Pt used his left hand to manipulate a reacher in order to pick various sized rings up from the floor that were spread out around him. Pt then used the reacher to don rings onto horizontal rods at graded heights and distances. (x 87 rings). Pt had Min/Mod difficulty with activity and worked at a slow and steady pace with occasional rest breaks. Pt completed activity to improve reacher skills for use during ADL/IADL completion. Coordination  Theraputty: Green putty (medium resistance), squeezing, rolling, pinching, folding, inserting and removing beads. Pt successfully inserted/removed 15 of 15 beads with Mod difficulty.  Pt completed activity to improve hand strength/fine motor coordination for mgmt of clothing fasteners/ADL containers in a timely manner. Large Nuts and Bolts: Pt used his left hand to disconnect large nuts and bolts. Pt had Min difficulty physically with activity and worked at a slow and steady pace without rest breaks. Pt had Min difficulty with problem solving aspects of activity. Pt completed activity to improve left hand fine motor coordination for mgmt of clothing fasteners in a timely manner. Plan  Plan  Times per week: 5-7  Plan weeks: 1.5-2 weeks  Current Treatment Recommendations: Balance Training,Functional Mobility Training,Endurance Training,Safety Education & Training,Patient/Caregiver Education & Training,Equipment Evaluation, Education, & procurement,Self-Care / ADL,Home Management Training,Cognitive/Perceptual Training,Neuromuscular Re-education  Plan Comment: continue OT POC    Goals  Long term goals  Time Frame for Long term goals : Within 1.5-2 weeks patient to demonstrate progress in the following areas listed below to achieve specific LTGs as stated in the initial evaluation  Long term goal 1: improve overall endurance  Long term goal 2: improve balance for ADLs and IADLs  Long term goal 3: Demo an understanding of tone reduction  Patient Goals   Patient goals : \"the fall. ..  I am sorry\"    Patient agreed to stated goal of improving his balance    Therapy Time   Individual Concurrent Group Co-treatment   Time In 1000         Time Out 1130         Minutes 90         ADL/IADL trainin minutes  Therapeutic activities: 60 minutes    Electronically signed by PAULETTE Goncalves on 2021 at 11:38 AM    PAULETTE Goncalves

## 2021-12-24 NOTE — PROGRESS NOTES
Subjective: The patient complains of severe acute on chronic progressive fatigue and right-sided weakness secondary to previous CVA, recent fall with closed head injury aphasia, cognitive decline partially relieved by rest,medications, PT,  OT, history of residual cognitive, communication, right-sided weakness and seizure secondary to previous CVA unfortunately has had a recent closed head injury secondary to a fall with decline in functional status. He was admission admitted through the emergency room on 12/20/2021. He had fallen at home. Trauma work-up showed no acute traumatic fractures. Labs were significant for positive and elevated lactic acid level. He was prescribed IV fluid boluses. His toxicology screen was positive for cannabinoids. His urinary analysis showed ketones but no bacteria culture was not indicated. SARS-CoV-2 testing was negative. SLP and rest and exacerbated by recent illness. ROS x10: The patient also complains of severely impaired mobility and activities of daily living. Otherwise no new problems with vision, hearing, nose, mouth, throat, dermal, cardiovascular, GI, , pulmonary, musculoskeletal, psychiatric or neurological. See Rehab H&P on Rehab chart dated . Vital signs:  /72   Pulse 110   Temp 97.8 °F (36.6 °C) (Oral)   Resp 18   SpO2 97%   I/O:   PO/Intake:  fair PO intake, frequent constipation. Bowel/Bladder:  incontinent, assist with urinal  General:  Patient is well developed, adequately nourished, non-obese and     well kempt. HEENT:    PERRLA, hearing intact to loud voice, external inspection of ear     and nose benign. Inspection of lips, tongue and gums benign  Musculoskeletal: No significant change in strength or tone. All joints stable. Inspection and palpation of digits and nails show no clubbing,       cyanosis or inflammatory conditions. Neuro/Psychiatric: Affect: flat but pleasant.   Alert and oriented to person, place and     Situation with  mod cues. No significant change in deep tendon reflexes or     sensation  Lungs:  Diminished, CTA-B. Respiration effort is normal at rest.     Heart:   S1 = S2, RRR. No loud murmurs. Abdomen:  Soft, non-tender, no enlargement of liver or spleen. Extremities:  No significant lower extremity edema or tenderness. Upper extremity spasticity -right shoulder subluxation  Skin:   Intact to general survey, laceration and bruising left face and forehead    Rehabilitation:  Physical therapy:   Bed Mobility: Scooting: Stand by assistance    Transfers: Sit to Stand: Contact guard assistance,Stand by assistance  Stand to sit: Contact guard assistance  Bed to Chair: Contact guard assistance,Stand by assistance, Ambulation 1  Surface: carpet  Device: Large Optifreeze  Other Apparatus:  (aircast)  Assistance: Minimal assistance  Quality of Gait: decrease knee flexion with swing phase, genu recurvatum with stance, decrease circumduction , cues to decrease speed for improved quality and control  Distance: 50ft x 2  Comments: improved hip flexion with theraband on - no carry over following, Stairs  # Steps : 4  Stairs Height: 6\"  Rails: Left ascending  Curbs: 2\"  Device: Torrent LoadingSystems  Assistance: Contact guard assistance,Minimal assistance  Comment: non-reciprocal    FIMS:  ,  , Assessment: Pt with initial right LE clonus with out of bed. After stretching and weight bearing decrease clonus and tone noted. Unable to correct recurvatum during ambulation, but able to decrease circumduction and foot placement. Also able to initiate foward facing descend on stairs. Pt progressing well with transfers, bed mobility, gait, and balance. Occupational therapy:    ,  , Assessment: Pt is a 77year old man from home who presents to [facility] with the above deficits which impact his ability to perform ADLs and IADLs at his baseline.  Pt. would benefit from continued OT to maximize independence and safety thickening. Large amount of colonic stool. Lymph nodes: No abdominal or pelvic lymphadenopathy. Mesentery/Peritoneum: No ascites or mass. Retroperitoneum: No mass. Vasculature: The celiac axis and SMA are patent. The portal vein and branches, splenic vein, SMV, and hepatic veins are patent. Arterial atherosclerotic disease without aneurysm. There is an IVC filter. Pelvis: No mass, ascites or fluid collection. Urinary bladder is unremarkable. Soft tissue/ bones: Grade I anterolisthesis of L5 on S1. Multilevel degenerative changes of the lumbar spine. No acute abnormality in the chest, abdomen and pelvis. Large amount of colonic stool. CT CERVICAL SPINE   12/20/2021: Loss of height, C5 and C6 with cranial caudal dimensions 1.2 cm, and 0.8 cm, respectively. Loss cervical lordosis. Remote 8mm anterolisthesis C4 on C5. Atlantooccipital articulation maintained. Atlantoaxial interval preserved. Neural foramina narrowing, left C4-5. Diffuse disc space narrowing C3-4, through T2-3, greatest at C4-5 through C6-7. No fractures, dislocations, bone lesions. Limited imaging lung apices without anomaly. Carotid arteries and soft tissues are without anomaly. No fracture. Marked diffuse degenerative change cervical spine. C4 anterolisthesis and loss of body height, C6 and C7, most likely secondary to degenerative change. CT ABDOMEN PELVIS   12/20/2021Chest: Lines, tubes, and devices:  None. Lung parenchyma and pleura: No consolidation. No suspicious pulmonary nodule. No pleural effusion. Central airways are patent. Mild bibasilar atelectasis. Thoracic inlet, heart, and mediastinum:  No lymphadenopathy in the axillary, mediastinal, or hilar regions. Mild atherosclerotic vascular thoracic aorta. The thoracic aorta and main pulmonary artery are normal in caliber. The cardiac chambers are normal in size. Status post foramen ovale closure.  Mild coronary artery atherosclerotic calcifications are noted, although the study is not optimized for coronary assessment. No pericardial effusion or thickening. Thyroid gland is unremarkable. Esophagus is nondilated. Bones/Soft Tissues: Degenerative changes. Abdomen / Pelvis: Liver: No mass. Right hepatic lobe metallic density. Biliary: No bile duct dilation. Gallbladder is unremarkable. Spleen: No mass. No splenomegaly. Pancreas: No mass or duct dilation. Adrenals: No mass. Kidneys: No mass, calculus or hydronephrosis. GI tract: No dilation or wall thickening. Large amount of colonic stool. Lymph nodes: No abdominal or pelvic lymphadenopathy. Mesentery/Peritoneum: No ascites or mass. Retroperitoneum: No mass. Vasculature: The celiac axis and SMA are patent. The portal vein and branches, splenic vein, SMV, and hepatic veins are patent. Arterial atherosclerotic disease without aneurysm. There is an IVC filter. Pelvis: No mass, ascites or fluid collection. Urinary bladder is unremarkable. Soft tissue/ bones: Grade I anterolisthesis of L5 on S1. Multilevel degenerative changes of the lumbar spine. No acute abnormality in the chest, abdomen and pelvis. Large amount of colonic stool. Previous extensive, complex labs, notes and diagnostics reviewed and analyzed. ALLERGIES:    Allergies as of 12/21/2021    (No Known Allergies)      (please also verify by checking MAR)     Today I evaluated this patient for periodic reassessment of medical and functional status. The patient was discussed in detail at the treatment team meeting focusing on current medical issues, progress in therapies, social issues, psychological issues, barriers to progress and strategies to address these barriers, and discharge planning. See the addendum to rehab progress note-as a second progress note in the chart.   The patient continues to be high risk for future disability and their medical and rehabilitation prognosis continue to be good and therefore, we will continue the patient's rehabilitation course as planned. The patient's tentative discharge date was set. Patient and family education was discussed. The patient was made aware of the team discussion regarding their progress. Complex Physical Medicine & Rehab Issues Assess & Plan:   1. Severe abnormality of gait and mobility and impaired self-care and ADL's secondary to  Late effects of CVA Remote left frontal and left temporal lobe infarcts- . Functional and medical status reassessed regarding patients ability to participate in therapies and patient found to be able to participate in acute intensive comprehensive inpatient rehabilitation program including PT/OT to improve balance, ambulation, ADLs, and to improve the P/AROM. Therapeutic modifications regarding activities in therapies, place, amount of time per day and intensity of therapy made daily. In bed therapies or bedside therapies prn.   2. Bowel and Bladder dysfunction neurogenic bowel and bladder:  frequent toileting, ambulate to bathroom with assistance, check post void residuals. Check for C.difficile x1 if >2 loose stools in 24 hours, continue bowel & bladder program.  Monitor bowel and bladder function. Lactinex 2 PO every AC. MOM prn, Brown Bomb prn, Glycerin suppository prn, enema prn.  3. Severe cervical and low back pain as well as generalized OA pain: reassess pain every shift and prior to and after each therapy session, give prn Tylenol and   Ultram versus OxyIR, modalities prn in therapy, masage, Lidoderm, K-pad prn. Consider scheduled AM pain meds. 4. Skin healing and breakdown risk:  continue pressure relief program.  Daily skin exams and reports from nursing. 5. Severe fatigue due to nutritional and hydration deficiency: Add and titrate vitamin B12 vitamin D and CoQ10 continue to monitor I&Os, calorie counts prn, dietary consult prn.  6. Acute episodic insomnia with situational adjustment disorder:  prn Ambien, monitor for day time sedation.   7. Falls risk elevated:  patient to use call light to get nursing assistance to get up, bed and chair alarm. 8. Elevated DVT risk: progressive activities in PT, continue prophylaxis LUCIANO hose, elevation and Xarelto. 9. Complex discharge planning:  Weekly team meeting every  Thursday to assess progress towards goals, discuss and address social, psychological and medical comorbidities and to address difficulties they may be having progressing in therapy. Patient and family education is in progress. The patient is to follow-up with their family physician after discharge. Complex Active General Medical Issues that complicate care Assess & Plan:    1. History of CVA (cerebrovascular accident)-focus on mobility ADL and cognitive deficits  2. Recurrent depression-emotional support provided daily, vitamin B12, encourage participation in rehabilitation support group and recreational therapy, adjust/add medications (Abilify, Cymbalta, add co-Q10 vitamin B12) it appears the patient uses cannabinoids at home. 3.   Hyperlipidemia, coronary artery disease, cerebrovascular disease-continue blood signs every shift focusing on heart rate and blood pressure checks, consult hospitalist for backup medical and adjust/add medications (Xarelto). Monitor heart rate and blood pressure as well as medications effects on vital signs before during and after therapy. 4.   Hemiparesis affecting dominant side as late effect of cerebrovascular accident (CVA)   5. 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 Chronic pain syndrome,   DJD (degenerative joint disease), lumbar, DJD (degenerative joint disease), cervical-transition to lowest effective dose of pain medication use topicals when available lowest effective dose of Ultram especially given cannabinoid use and Cymbalta. 7.   Hypothyroidism-  Synthroid and titrate dosing. Follow vital signs, recheck TSH and thyroid studies as outpatient.   8. Chronic deep vein thrombosis (DVT) of left popliteal vein-Xaralto  9. Bowel and bladder incontinence-check post void residuals add scheduled toileting  10.    Aphasia-consult speech and language pathology      Emmett Badillo D.O., PM&R     Attending    [facility]

## 2021-12-24 NOTE — PROGRESS NOTES
Physical Therapy Rehab Treatment Note  Facility/Department: [facility] [place]  [facility]       NAME: Hipolito Bang  : 1955 (77 y.o.)  MRN: [id number]  CODE STATUS: Full Code    Date of Service: 2021  Diagnosis: Impaired mobility and ADL's due to Late Effects CVA    Restrictions:  Restrictions/Precautions: Fall Risk,Seizure       SUBJECTIVE: Subjective: Reports still tired from am treatment  Pain Screening  Patient Currently in Pain: No  Pre Treatment Pain Screening  Pain at present: 0  Pre Treatment Pain Screening  Pain at present: 0    Post Treatment Pain Screening:  Pain Assessment  Pain Level: 0    OBJECTIVE:         Transfers  Sit to Stand: Contact guard assistance;Stand by assistance  Stand to sit: Contact guard assistance  Bed to Chair: Contact guard assistance;Stand by assistance    Activity Tolerance  Activity Tolerance: Patient Tolerated treatment well    Exercises  Comments: static weight shifts   Other exercises  Other exercises: DF stretch with belt   Other exercises 1: standing hip abduction, hip extension, hip flexion x 10  Other exercises 2: side steps in // bars with light CGA  Other exercises 3: static stand without AD    ASSESSMENT/COMMENTS:  Body structures, Functions, Activity limitations: Decreased functional mobility ; Decreased strength;Decreased endurance;Decreased coordination;Decreased safe awareness;Decreased ROM; Decreased balance;Decreased sensation  Assessment:  Pt progressing well with transfers, bed mobility, gait, and balance. Education on self stretch with belt for improved floor clearance with ambulation. PLAN OF CARE/Safety:   Plan Comment: Cont per POC  Safety Devices  Type of devices: Chair alarm in place; Left in chair    Goals:  Long term goals  Long term goal 1: indep and effecient bed mobility  Long term goal 2: indep sit to stand and bed transfers  Long term goal 3: indep gait with qc 50 feet in protected environment  Long term goal 4: supervision with

## 2021-12-24 NOTE — PLAN OF CARE
Problem: Falls - Risk of:  Goal: Will remain free from falls  Description: Will remain free from falls  12/24/2021 0053 by Nia Yuen RN  Outcome: Ongoing     Problem: Falls - Risk of:  Goal: Absence of physical injury  Description: Absence of physical injury  12/24/2021 0053 by Nia Yuen RN  Outcome: Ongoing     Problem: HEMODYNAMIC STATUS  Goal: Patient has stable vital signs and fluid balance  12/24/2021 0053 by Nia Yuen RN  Outcome: Ongoing     Problem: ACTIVITY INTOLERANCE/IMPAIRED MOBILITY  Goal: Mobility/activity is maintained at optimum level for patient  12/24/2021 0053 by Nia Yuen RN  Outcome: Ongoing     Problem: COMMUNICATION IMPAIRMENT  Goal: Ability to express needs and understand communication  12/24/2021 0053 by Nia Yuen RN  Outcome: Ongoing     Problem: Activity:  Goal: Ability to avoid complications of mobility impairment will improve  Description: Ability to avoid complications of mobility impairment will improve  12/24/2021 0053 by Nia Yuen RN  Outcome: Ongoing     Problem: Activity:  Goal: Range of joint motion will improve  Description: Range of joint motion will improve  12/24/2021 0053 by Nia Yuen RN  Outcome: Ongoing     Problem: Activity:  Goal: Ability to ambulate will improve  Description: Ability to ambulate will improve  12/24/2021 0053 by Nia Yuen RN  Outcome: Ongoing     Problem:  Activity:  Goal: Muscle strength will improve  Description: Muscle strength will improve  12/24/2021 0053 by Nia Yuen RN  Outcome: Ongoing     Problem: Health Behavior:  Goal: Identification of resources available to assist in meeting health care needs will improve  Description: Identification of resources available to assist in meeting health care needs will improve  12/24/2021 0053 by Nia Yuen RN  Outcome: Ongoing     Problem: Safety:  Goal: Ability to remain free from injury will improve  Description: Ability to remain free from injury will improve  12/24/2021 0053 by Becki Sandifer, RN  Outcome: Ongoing     Problem: IP COMMUNICATION/DYSARTHRIA  Goal: LTG - patient will improve expressive language skills to allow for communication of wants and needs in daily activities  12/24/2021 0053 by Becki Sandifer, RN  Outcome: Ongoing     Problem: IP SWALLOWING  Goal: LTG - patient will tolerate the least restrictive diet consistency to allow for safe consumption of daily meals  12/24/2021 0053 by Becki Sandifer, RN  Outcome: Ongoing

## 2021-12-24 NOTE — PROGRESS NOTES
Patient was referred for evaluation due to history of depression. He sustained a major stroke in 2016 resulting in persisting right hemiparesis and severe expressive aphasia. I observed him in OT  this morning, and spoke with his brother, Isabelle Fortune, who is a physical therapy assistant. It's my understanding that Jenn Muñiz resides next to his brother's home. Annabella Kerr reported that Jenn Muñiz has \"been pretty steady\" in recovering from the previous stroke, and is largely independent with respect to ADLs. His aphasia has persisted. He has been treated with Abilify, 2mg, and Cymbalta, 60mg, for several years; I think these medications may have been started by the primary care physician. I asked Annabella Kerr how his brother's mood was, and he said, \"Pretty good\". I asked Annabella Kerr about the incident that lead to his brother's current hospitalization. He said that another brother found Jenn Muñiz in the bathroom and was not able to get him up, so they called the emergency squad. Annabella Kerr said Jenn Muñiz was \"out of it most of the day\" following the fall. It's unclear whether Jenn Muñiz had a seizure which lead to the fall; he has been treated with Keppra for many years. He did have possible seizures prior to the stroke. Annabella Kerr reported that Jenn Muñiz used to drink heavily , but had not been drinking for many years. I asked Annabella Kerr about his brother's functioning at this time, and he said that he thought that Jenn Muñiz was \"back to 90% normal\". Klaus Gabriel is alert and cooperating with OT and PT. According to his brother, he was previously largely independent with respect to his mobility and ADLs. It is not really known whether he had a seizure (see note by Ms. Sergio dated 12/23). Jenn Muñiz is reported to be amnesic for the events surrounding the fall at home, but this is difficult to assess due to the aphasia. His brother who is very involved does not think that Jenn Muñiz is currently depressed.

## 2021-12-24 NOTE — PROGRESS NOTES
Assessment completed. VSS. LBM 12/23. Denies pain. Neuro made baclofen scheduled- given as ordered. Right hand contracted. No distress noted. Call light within reach and bed alarm activated.   Electronically signed by Teetee Melara RN on 12/24/2021 at 1:57 AM

## 2021-12-25 PROCEDURE — 6370000000 HC RX 637 (ALT 250 FOR IP): Performed by: NURSE PRACTITIONER

## 2021-12-25 PROCEDURE — 6370000000 HC RX 637 (ALT 250 FOR IP): Performed by: INTERNAL MEDICINE

## 2021-12-25 PROCEDURE — 1180000000 HC REHAB R&B

## 2021-12-25 PROCEDURE — 99232 SBSQ HOSP IP/OBS MODERATE 35: CPT | Performed by: PHYSICAL MEDICINE & REHABILITATION

## 2021-12-25 PROCEDURE — 6370000000 HC RX 637 (ALT 250 FOR IP): Performed by: PHYSICAL MEDICINE & REHABILITATION

## 2021-12-25 RX ADMIN — RIVAROXABAN 20 MG: 20 TABLET, FILM COATED ORAL at 11:57

## 2021-12-25 RX ADMIN — BACLOFEN 10 MG: 10 TABLET ORAL at 11:57

## 2021-12-25 RX ADMIN — Medication 2000 UNITS: at 16:36

## 2021-12-25 RX ADMIN — ZOLPIDEM TARTRATE 5 MG: 5 TABLET ORAL at 20:31

## 2021-12-25 RX ADMIN — LEVETIRACETAM 750 MG: 500 TABLET, FILM COATED ORAL at 20:31

## 2021-12-25 RX ADMIN — Medication: at 20:32

## 2021-12-25 RX ADMIN — Medication 100 MG: at 11:57

## 2021-12-25 RX ADMIN — ASPIRIN 81 MG: 81 TABLET, CHEWABLE ORAL at 11:57

## 2021-12-25 RX ADMIN — DULOXETINE 60 MG: 60 CAPSULE, DELAYED RELEASE ORAL at 11:57

## 2021-12-25 RX ADMIN — BACLOFEN 10 MG: 10 TABLET ORAL at 13:43

## 2021-12-25 RX ADMIN — ARIPIPRAZOLE 2 MG: 2 TABLET ORAL at 11:56

## 2021-12-25 RX ADMIN — LEVETIRACETAM 750 MG: 500 TABLET, FILM COATED ORAL at 11:56

## 2021-12-25 RX ADMIN — LEVOTHYROXINE SODIUM 50 MCG: 0.05 TABLET ORAL at 11:58

## 2021-12-25 RX ADMIN — BACLOFEN 10 MG: 10 TABLET ORAL at 20:31

## 2021-12-25 NOTE — PROGRESS NOTES
Subjective: The patient complains of severe acute on chronic progressive fatigue and right-sided weakness secondary to previous CVA, recent fall with closed head injury aphasia, cognitive decline partially relieved by rest,medications, PT,  OT, history of residual cognitive, communication, right-sided weakness and seizure secondary to previous CVA unfortunately has had a recent closed head injury secondary to a fall with decline in functional status. He was admission admitted through the emergency room on 12/20/2021. He had fallen at home. Trauma work-up showed no acute traumatic fractures. Labs were significant for positive and elevated lactic acid level. He was prescribed IV fluid boluses. His toxicology screen was positive for cannabinoids. His urinary analysis showed ketones but no bacteria culture was not indicated. SARS-CoV-2 testing was negative. SLP and rest and exacerbated by recent illness. I am concerned about patients medical complexities including residual cognitive, communication, right-sided weakness and seizure secondary to previous CVA unfortunately has had a recent closed head injury secondary to a fall with decline in functional status. He was admission admitted through the emergency room on 12/20/2021. He had fallen at home. Trauma work-up showed no acute traumatic fractures. Labs were significant for positive and elevated lactic acid level. He was prescribed IV fluid boluses. His toxicology screen was positive for cannabinoids. His urinary analysis showed ketones but no bacteria culture was not indicated. SARS-CoV-2 testing was negative. .        I reviewed current care and plans for further care with other rehab providers including nursing and case management. According to recent nursing note, \" VSS. Denies any distress or complaints at this time. Rt hand remains contracted. Brace to rt leg when pt is ambulating. LBM 12/24/2021. Bed alarm on. Call light in reach.  Bed alarm on.\".    ROS x10: The patient also complains of severely impaired mobility and activities of daily living. Otherwise no new problems with vision, hearing, nose, mouth, throat, dermal, cardiovascular, GI, , pulmonary, musculoskeletal, psychiatric or neurological. See Rehab H&P on Rehab chart dated . Vital signs:  /74   Pulse 68   Temp 99.1 °F (37.3 °C) (Oral)   Resp 18   Wt 174 lb 6.4 oz (79.1 kg)   SpO2 96%   BMI 26.52 kg/m²   I/O:   PO/Intake:  fair PO intake, frequent constipation. Bowel/Bladder:  incontinent, assist with urinal, LBM 12/24  General:  Patient is well developed, adequately nourished, non-obese and     well kempt. HEENT:    PERRLA, hearing intact to loud voice, external inspection of ear     and nose benign. Inspection of lips, tongue and gums benign  Musculoskeletal: No significant change in strength or tone. All joints stable. Inspection and palpation of digits and nails show no clubbing,       cyanosis or inflammatory conditions. Neuro/Psychiatric: Affect: flat but pleasant. Alert and oriented to person, place and     Situation with  mod cues. No significant change in deep tendon reflexes or     sensation  Lungs:  Diminished, CTA-B. Respiration effort is normal at rest.     Heart:   S1 = S2, RRR. No loud murmurs. Abdomen:  Soft, non-tender, no enlargement of liver or spleen. Extremities:  No significant lower extremity edema or tenderness.  Upper extremity spasticity -right shoulder subluxation  Skin:   Intact to general survey, laceration and bruising left face and forehead    Rehabilitation:  Physical therapy:   Bed Mobility: Scooting: Stand by assistance    Transfers: Sit to Stand: Contact guard assistance,Stand by assistance  Stand to sit: Contact guard assistance  Bed to Chair: Contact guard assistance,Stand by assistance, Ambulation 1  Surface: carpet  Device: Large Dann Laurel  Other Apparatus:  (aircast)  Assistance: Minimal assistance  Quality of Gait: decrease knee flexion with swing phase, genu recurvatum with stance, decrease circumduction , cues to decrease speed for improved quality and control  Distance: 50ft x 2  Comments: improved hip flexion with theraband on - no carry over following, Stairs  # Steps : 4  Stairs Height: 6\"  Rails: Left ascending  Curbs: 2\"  Device: AbilTo  Assistance: Contact guard assistance,Minimal assistance  Comment: non-reciprocal    FIMS:  ,  , Assessment: Pt with initial right LE clonus with out of bed. After stretching and weight bearing decrease clonus and tone noted. Unable to correct recurvatum during ambulation, but able to decrease circumduction and foot placement. Also able to initiate foward facing descend on stairs. Pt progressing well with transfers, bed mobility, gait, and balance. Occupational therapy:    ,  , Assessment: Pt is a 77year old man from home who presents to [facility] with the above deficits which impact his ability to perform ADLs and IADLs at his baseline. Pt. would benefit from continued OT to maximize independence and safety with ADL tasks. Speech therapy:        Lab/X-ray studies reviewed, analyzed and discussed with patient and staff:   No results found for this or any previous visit (from the past 24 hour(s)). CT HEAD  12/20/2021  Extra-axial spaces:  Normal. Intracranial hemorrhage:  None. Ventricular system: Ventricles mildly enlarged. Sulci mildly prominent. Ex vacuo dilatation, left lateral ventricle. Basal Cisterns:  Normal. Cerebral Parenchyma: Bilateral symmetric periventricular areas decreased attenuation. Geographic area of decreased attenuation, left frontal and left temporal lobes exerting no mass effect. Midline Shift:  None. Cerebellum:  Normal. Paranasal sinuses and mastoid air cells:  Normal. Visualized Orbits:  Normal.     Impression: Remote left frontal and left temporal lobe infarcts. Mild cerebral atrophy.  Chronic ischemic white matter disease. CT CHEST 12/20/2021   . Chest: Lines, tubes, and devices:  None. Lung parenchyma and pleura: No consolidation. No suspicious pulmonary nodule. No pleural effusion. Central airways are patent. Mild bibasilar atelectasis. Thoracic inlet, heart, and mediastinum:  No lymphadenopathy in the axillary, mediastinal, or hilar regions. Mild atherosclerotic vascular thoracic aorta. The thoracic aorta and main pulmonary artery are normal in caliber. The cardiac chambers are normal in size. Status post foramen ovale closure. Mild coronary artery atherosclerotic calcifications are noted, although the study is not optimized for coronary assessment. No pericardial effusion or thickening. Thyroid gland is unremarkable. Esophagus is nondilated. Bones/Soft Tissues: Degenerative changes. Abdomen / Pelvis: Liver: No mass. Right hepatic lobe metallic density. Biliary: No bile duct dilation. Gallbladder is unremarkable. Spleen: No mass. No splenomegaly. Pancreas: No mass or duct dilation. Adrenals: No mass. Kidneys: No mass, calculus or hydronephrosis. GI tract: No dilation or wall thickening. Large amount of colonic stool. Lymph nodes: No abdominal or pelvic lymphadenopathy. Mesentery/Peritoneum: No ascites or mass. Retroperitoneum: No mass. Vasculature: The celiac axis and SMA are patent. The portal vein and branches, splenic vein, SMV, and hepatic veins are patent. Arterial atherosclerotic disease without aneurysm. There is an IVC filter. Pelvis: No mass, ascites or fluid collection. Urinary bladder is unremarkable. Soft tissue/ bones: Grade I anterolisthesis of L5 on S1. Multilevel degenerative changes of the lumbar spine. No acute abnormality in the chest, abdomen and pelvis. Large amount of colonic stool. CT CERVICAL SPINE   12/20/2021: Loss of height, C5 and C6 with cranial caudal dimensions 1.2 cm, and 0.8 cm, respectively. Loss cervical lordosis. Remote 8mm anterolisthesis C4 on C5. Atlantooccipital articulation maintained. Atlantoaxial interval preserved. Neural foramina narrowing, left C4-5. Diffuse disc space narrowing C3-4, through T2-3, greatest at C4-5 through C6-7. No fractures, dislocations, bone lesions. Limited imaging lung apices without anomaly. Carotid arteries and soft tissues are without anomaly. No fracture. Marked diffuse degenerative change cervical spine. C4 anterolisthesis and loss of body height, C6 and C7, most likely secondary to degenerative change. CT ABDOMEN PELVIS   12/20/2021 No acute abnormality in the chest, abdomen and pelvis. Large amount of colonic stool. Previous extensive, complex labs, notes and diagnostics reviewed and analyzed. ALLERGIES:    Allergies as of 12/21/2021    (No Known Allergies)      (please also verify by checking MAR)      Recently, I evaluated this patient for periodic reassessment of medical and functional status. The patient was discussed in detail at the treatment team meeting focusing on current medical issues, progress in therapies, social issues, psychological issues, barriers to progress and strategies to address these barriers, and discharge planning. See the hand written addendum to rehab progress note. The patient continues to be high risk for future disability and their medical and rehabilitation prognosis continue to be good and therefore, we will continue the patient's rehabilitation course as planned. The patient's tentative discharge date was set. Patient and family education was discussed. The patient was made aware of the team discussion regarding their progress. Discharge plans were discussed along with barriers to progress and strategies to address these barriers, patient encouraged to continue to discuss discharge plans with . Complex Physical Medicine & Rehab Issues Assess & Plan:   1.  Severe abnormality of gait and mobility and impaired self-care and ADL's secondary to  Late effects of CVA Remote left frontal and left temporal lobe infarcts- . Functional and medical status reassessed regarding patients ability to participate in therapies and patient found to be able to participate in acute intensive comprehensive inpatient rehabilitation program including PT/OT to improve balance, ambulation, ADLs, and to improve the P/AROM. Therapeutic modifications regarding activities in therapies, place, amount of time per day and intensity of therapy made daily. In bed therapies or bedside therapies prn.   2. Bowel and Bladder dysfunction neurogenic bowel and bladder:  frequent toileting, ambulate to bathroom with assistance, check post void residuals. Check for C.difficile x1 if >2 loose stools in 24 hours, continue bowel & bladder program.  Monitor bowel and bladder function. Lactinex 2 PO every AC. MOM prn, Brown Bomb prn, Glycerin suppository prn, enema prn.  3. Severe cervical and low back pain as well as generalized OA pain: reassess pain every shift and prior to and after each therapy session, give prn Tylenol and   Ultram versus OxyIR, modalities prn in therapy, masage, Lidoderm, K-pad prn. Consider scheduled AM pain meds. 4. Skin healing and breakdown risk:  continue pressure relief program.  Daily skin exams and reports from nursing. 5. Severe fatigue due to nutritional and hydration deficiency: Add and titrate vitamin B12 vitamin D and CoQ10 continue to monitor I&Os, calorie counts prn, dietary consult prn.  6. Acute episodic insomnia with situational adjustment disorder:  prn Ambien, monitor for day time sedation. 7. Falls risk elevated:  patient to use call light to get nursing assistance to get up, bed and chair alarm. 8. Elevated DVT risk: progressive activities in PT, continue prophylaxis LUCIANO hose, elevation and Xarelto.   9. Complex discharge planning: Discharge January 1, 2020 to home alone with help from his family who lives locally and home health care PT OT RN aide and social work. Until then continue weekly team meeting every  Thursday to assess progress towards goals, discuss and address social, psychological and medical comorbidities and to address difficulties they may be having progressing in therapy. Patient and family education is in progress. The patient is to follow-up with their family physician after discharge. Complex Active General Medical Issues that complicate care Assess & Plan:    1. History of CVA (cerebrovascular accident)-focus on mobility ADL and cognitive deficits  2. Recurrent depression-emotional support provided daily, vitamin B12, encourage participation in rehabilitation support group and recreational therapy, adjust/add medications (Abilify, Cymbalta, add co-Q10 vitamin B12) it appears the patient uses cannabinoids at home. 3.   Hyperlipidemia, coronary artery disease, cerebrovascular disease-continue blood signs every shift focusing on heart rate and blood pressure checks, consult hospitalist for backup medical and adjust/add medications (Xarelto). Monitor heart rate and blood pressure as well as medications effects on vital signs before during and after therapy. 4.   Hemiparesis affecting dominant side as late effect of cerebrovascular accident (CVA)   5. 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 Chronic pain syndrome,   DJD (degenerative joint disease), lumbar, DJD (degenerative joint disease), cervical-transition to lowest effective dose of pain medication use topicals when available lowest effective dose of Ultram especially given cannabinoid use and Cymbalta. 7.   Hypothyroidism-  Synthroid and titrate dosing. Follow vital signs, recheck TSH and thyroid studies as outpatient. 8.   Chronic deep vein thrombosis (DVT) of left popliteal vein-Xaralto  9. Bowel and bladder incontinence-check post void residuals add scheduled toileting  10.    Aphasia-consult speech and language pathology         Electronically signed by Patrick Mathias DO on 12/23/21 at 7:57 AM EST       Peterson Crowe D.O., PM&R     Attending    [address]

## 2021-12-25 NOTE — PROGRESS NOTES
Assessment complete. VSS. Denies any distress or complaints at this time. Rt hand remains contracted. Brace to rt leg when pt is ambulating. LBM 12/24/2021. Bed alarm on. Call light in reach. Bed alarm on.  Electronically signed by Christi Jerez RN on 12/25/2021 at 2:35 AM

## 2021-12-25 NOTE — PROGRESS NOTES
MUSIC THERAPY NOTE    This music therapist visited the patient for social and comfort needs this [place]. Joined by two fully vaccinated/ masked carolers, an intervention of Favian rodriguez was presented. The patient smiled, conversed and listened intently to the music. Will refer to rehab MT for further sessions.

## 2021-12-25 NOTE — PLAN OF CARE
Problem: Falls - Risk of:  Goal: Will remain free from falls  Description: Will remain free from falls  Outcome: Ongoing  Goal: Absence of physical injury  Description: Absence of physical injury  Outcome: Ongoing     Problem: HEMODYNAMIC STATUS  Goal: Patient has stable vital signs and fluid balance  Outcome: Ongoing     Problem: ACTIVITY INTOLERANCE/IMPAIRED MOBILITY  Goal: Mobility/activity is maintained at optimum level for patient  Outcome: Ongoing     Problem: COMMUNICATION IMPAIRMENT  Goal: Ability to express needs and understand communication  Outcome: Ongoing     Problem:  Activity:  Goal: Ability to avoid complications of mobility impairment will improve  Description: Ability to avoid complications of mobility impairment will improve  Outcome: Ongoing  Goal: Range of joint motion will improve  Description: Range of joint motion will improve  Outcome: Ongoing  Goal: Ability to ambulate will improve  Description: Ability to ambulate will improve  Outcome: Ongoing  Goal: Muscle strength will improve  Description: Muscle strength will improve  Outcome: Ongoing     Problem: Health Behavior:  Goal: Identification of resources available to assist in meeting health care needs will improve  Description: Identification of resources available to assist in meeting health care needs will improve  Outcome: Ongoing     Problem: Safety:  Goal: Ability to remain free from injury will improve  Description: Ability to remain free from injury will improve  Outcome: Ongoing     Problem: IP COMMUNICATION/DYSARTHRIA  Goal: LTG - patient will improve expressive language skills to allow for communication of wants and needs in daily activities  Outcome: Ongoing     Problem: IP SWALLOWING  Goal: LTG - patient will tolerate the least restrictive diet consistency to allow for safe consumption of daily meals  Outcome: Ongoing     Problem: Skin Integrity:  Goal: Will show no infection signs and symptoms  Description: Will show no infection signs and symptoms  Outcome: Ongoing  Goal: Absence of new skin breakdown  Description: Absence of new skin breakdown  Outcome: Ongoing

## 2021-12-26 PROCEDURE — 97112 NEUROMUSCULAR REEDUCATION: CPT

## 2021-12-26 PROCEDURE — 97530 THERAPEUTIC ACTIVITIES: CPT

## 2021-12-26 PROCEDURE — 1180000000 HC REHAB R&B

## 2021-12-26 PROCEDURE — 97535 SELF CARE MNGMENT TRAINING: CPT

## 2021-12-26 PROCEDURE — 97116 GAIT TRAINING THERAPY: CPT

## 2021-12-26 PROCEDURE — 6370000000 HC RX 637 (ALT 250 FOR IP): Performed by: NURSE PRACTITIONER

## 2021-12-26 PROCEDURE — 6370000000 HC RX 637 (ALT 250 FOR IP): Performed by: PHYSICAL MEDICINE & REHABILITATION

## 2021-12-26 PROCEDURE — 6370000000 HC RX 637 (ALT 250 FOR IP): Performed by: INTERNAL MEDICINE

## 2021-12-26 RX ADMIN — BACLOFEN 10 MG: 10 TABLET ORAL at 10:12

## 2021-12-26 RX ADMIN — Medication 100 MG: at 10:11

## 2021-12-26 RX ADMIN — RIVAROXABAN 20 MG: 20 TABLET, FILM COATED ORAL at 10:12

## 2021-12-26 RX ADMIN — ASPIRIN 81 MG: 81 TABLET, CHEWABLE ORAL at 10:11

## 2021-12-26 RX ADMIN — ZOLPIDEM TARTRATE 5 MG: 5 TABLET ORAL at 20:27

## 2021-12-26 RX ADMIN — BACLOFEN 10 MG: 10 TABLET ORAL at 20:24

## 2021-12-26 RX ADMIN — LEVETIRACETAM 750 MG: 500 TABLET, FILM COATED ORAL at 20:24

## 2021-12-26 RX ADMIN — Medication: at 20:24

## 2021-12-26 RX ADMIN — BACLOFEN 10 MG: 10 TABLET ORAL at 14:39

## 2021-12-26 RX ADMIN — LEVOTHYROXINE SODIUM 50 MCG: 0.05 TABLET ORAL at 10:11

## 2021-12-26 RX ADMIN — DULOXETINE 60 MG: 60 CAPSULE, DELAYED RELEASE ORAL at 10:10

## 2021-12-26 RX ADMIN — ARIPIPRAZOLE 2 MG: 2 TABLET ORAL at 10:11

## 2021-12-26 RX ADMIN — LEVETIRACETAM 750 MG: 500 TABLET, FILM COATED ORAL at 10:10

## 2021-12-26 RX ADMIN — Medication 2000 UNITS: at 17:04

## 2021-12-26 ASSESSMENT — PAIN SCALES - GENERAL
PAINLEVEL_OUTOF10: 0
PAINLEVEL_OUTOF10: 0

## 2021-12-26 NOTE — PROGRESS NOTES
Occupational Therapy  Facility/Department: [facility]  Daily Treatment Note  NAME: Arnulfo Oseguera  : 1955  MRN: [id number]    Date of Service: 2021    Discharge Recommendations:  Continue to assess pending progress       Assessment      Activity Tolerance  Activity Tolerance: Patient Tolerated treatment well  Safety Devices  Safety Devices in place: Yes  Type of devices: All fall risk precautions in place         Patient Diagnosis(es): There were no encounter diagnoses. has a past medical history of Chronic right shoulder pain, Closed TBI (traumatic brain injury) ([facility] [address] Essential hypertension, Hemiparesis affecting dominant side as late effect of cerebrovascular accident (CVA) ([facility] [address] History of CVA (cerebrovascular accident), Hyperlipidemia, Hypoxemia requiring supplemental oxygen, Recurrent depression ([facility] [address] S/P patent foramen ovale closure, and Seizure ([facility] [address] has no past surgical history on file. Restrictions  Restrictions/Precautions  Restrictions/Precautions: Fall Risk,Seizure  Subjective   General  Chart Reviewed: Yes  Patient assessed for rehabilitation services?: Yes  Response to previous treatment: Patient with no complaints from previous session  Family / Caregiver Present: No  Referring Practitioner: Dr Pam Perla  Diagnosis: Impaired mobility and activities of daily living dt late effects of CVA  Pain Assessment  Pain Assessment: 0-10  Pain Level: 0  Pre Treatment Pain Screening  Pain at present: 0  Scale Used: Numeric Score  Intervention List: Patient able to continue with treatment  Vital Signs  Patient Currently in Pain: No   Orientation  Orientation  Overall Orientation Status: Within Functional Limits  Objective  Pt utilized left pincer grasp using resistive clips to place marbles on pegboard (x100). He demonstrated tremors and slightly uncoordinated movements, requiring increased time to complete.  He placed rubber rings on vertical dowels to increase GM movement and increase UE strength and coordination. He placed small pegs in pegboard and replicated design with accuracy to increase distal dexterity and attention to detail. He ended session with manipulating green theraputty to increase strength and coordination needed to complete ADL tasks. He retrieved 10/10 beads with increased time. Coordination  Fine Motor: marbles and resistive clip (red), vertical dowels/rubber rings, pegboard, green putty                                                          Plan   Plan  Times per week: 5-7  Plan weeks: 1.5-2 weeks  Current Treatment Recommendations: Balance Training,Functional Mobility Training,Endurance Training,Safety Education & Training,Patient/Caregiver Education & Training,Equipment Evaluation, Education, & procurement,Self-Care / ADL,Home Management Training,Cognitive/Perceptual Training,Neuromuscular Re-education  Plan Comment: continue OT POC    Goals  Long term goals  Time Frame for Long term goals : Within 1.5-2 weeks patient to demonstrate progress in the following areas listed below to achieve specific LTGs as stated in the initial evaluation  Long term goal 1: improve overall endurance  Long term goal 2: improve balance for ADLs and IADLs  Long term goal 3: Demo an understanding of tone reduction  Patient Goals   Patient goals : \"the fall. ..  I am sorry\"    Patient agreed to stated goal of improving his balance       Therapy Time   Individual Concurrent Group Co-treatment   Time In 1000         Time Out [address], OTR/L    Electronically signed by Lubna Lowe OT on 12/26/2021 at 1:28 PM

## 2021-12-26 NOTE — PROGRESS NOTES
Subjective: The patient complains of severe acute on chronic progressive fatigue and right-sided weakness secondary to previous CVA, recent fall with closed head injury aphasia, cognitive decline partially relieved by rest,medications, PT,  OT, history of residual cognitive, communication, right-sided weakness and seizure secondary to previous CVA unfortunately has had a recent closed head injury secondary to a fall with decline in functional status. He was admission admitted through the emergency room on 12/20/2021. He had fallen at home. Trauma work-up showed no acute traumatic fractures. Labs were significant for positive and elevated lactic acid level. He was prescribed IV fluid boluses. His toxicology screen was positive for cannabinoids. His urinary analysis showed ketones but no bacteria culture was not indicated. SARS-CoV-2 testing was negative. SLP and rest and exacerbated by recent illness. ROS x10: The patient also complains of severely impaired mobility and activities of daily living. Otherwise no new problems with vision, hearing, nose, mouth, throat, dermal, cardiovascular, GI, , pulmonary, musculoskeletal, psychiatric or neurological. See Rehab H&P on Rehab chart dated . Vital signs:  /76   Pulse 69   Temp 98.1 °F (36.7 °C)   Resp 16   Wt 174 lb 6.4 oz (79.1 kg)   SpO2 99%   BMI 26.52 kg/m²   I/O:   PO/Intake:  fair PO intake, frequent constipation. Bowel/Bladder:  incontinent, assist with urinal, LBM 12/24  General:  Patient is well developed, adequately nourished, non-obese and     well kempt. HEENT:    PERRLA, hearing intact to loud voice, external inspection of ear     and nose benign. Inspection of lips, tongue and gums benign  Musculoskeletal: No significant change in strength or tone. All joints stable. Inspection and palpation of digits and nails show no clubbing,       cyanosis or inflammatory conditions.    Neuro/Psychiatric: Affect: flat but pleasant. Alert and oriented to person, place and     Situation with  mod cues. No significant change in deep tendon reflexes or     sensation  Lungs:  Diminished, CTA-B. Respiration effort is normal at rest.     Heart:   S1 = S2, RRR. No loud murmurs. Abdomen:  Soft, non-tender, no enlargement of liver or spleen. Extremities:  No significant lower extremity edema or tenderness. Upper extremity spasticity -right shoulder subluxation  Skin:   Intact to general survey, laceration and bruising left face and forehead    Rehabilitation:  Physical therapy:   Bed Mobility: Scooting: Stand by assistance    Transfers: Sit to Stand: Contact guard assistance,Stand by assistance  Stand to sit: Contact guard assistance  Bed to Chair: Stand by assistance,Contact guard assistance, Ambulation 1  Surface: carpet  Device: Large Dann Laith  Other Apparatus:  (aircast)  Assistance: Contact guard assistance  Quality of Gait: decrease knee flexion with swing phase, genu recurvatum with stance, decrease circumduction  Distance: 70ft, 40ft limited by destination  Comments: improved hip flexion with theraband on - no carry over following, Stairs  # Steps : 4  Stairs Height: 6\"  Rails: Left ascending  Curbs: 4\"  Device: Large Dann [place]  Other Apparatus:  (rt ankle Aircast)  Assistance: Contact guard assistance,Minimal assistance  Comment: non-reciprocal, VCs for technique to improve safety    FIMS:  ,  , Assessment: Kia impulsively placing SBQC OOBOS when approaching chair with VCs d/t safety concerns, pt with good understanding when position visualized. Occupational therapy:    ,  , Assessment: Pt is a 77year old man from home who presents to [facility] with the above deficits which impact his ability to perform ADLs and IADLs at his baseline. Pt. would benefit from continued OT to maximize independence and safety with ADL tasks.     Speech therapy:        Lab/X-ray studies reviewed, analyzed and discussed with patient and staff:   No results found for this or any previous visit (from the past 24 hour(s)). CT HEAD  12/20/2021  Extra-axial spaces:  Normal. Intracranial hemorrhage:  None. Ventricular system: Ventricles mildly enlarged. Sulci mildly prominent. Ex vacuo dilatation, left lateral ventricle. Basal Cisterns:  Normal. Cerebral Parenchyma: Bilateral symmetric periventricular areas decreased attenuation. Geographic area of decreased attenuation, left frontal and left temporal lobes exerting no mass effect. Midline Shift:  None. Cerebellum:  Normal. Paranasal sinuses and mastoid air cells:  Normal. Visualized Orbits:  Normal.     Impression: Remote left frontal and left temporal lobe infarcts. Mild cerebral atrophy. Chronic ischemic white matter disease. CT CHEST 12/20/2021   . Chest: Lines, tubes, and devices:  None. Lung parenchyma and pleura: No consolidation. No suspicious pulmonary nodule. No pleural effusion. Central airways are patent. Mild bibasilar atelectasis. Thoracic inlet, heart, and mediastinum:  No lymphadenopathy in the axillary, mediastinal, or hilar regions. Mild atherosclerotic vascular thoracic aorta. The thoracic aorta and main pulmonary artery are normal in caliber. The cardiac chambers are normal in size. Status post foramen ovale closure. Mild coronary artery atherosclerotic calcifications are noted, although the study is not optimized for coronary assessment. No pericardial effusion or thickening. Thyroid gland is unremarkable. Esophagus is nondilated. Bones/Soft Tissues: Degenerative changes. Abdomen / Pelvis: Liver: No mass. Right hepatic lobe metallic density. Biliary: No bile duct dilation. Gallbladder is unremarkable. Spleen: No mass. No splenomegaly. Pancreas: No mass or duct dilation. Adrenals: No mass. Kidneys: No mass, calculus or hydronephrosis. GI tract: No dilation or wall thickening. Large amount of colonic stool. Lymph nodes: No abdominal or pelvic lymphadenopathy. Mesentery/Peritoneum: No ascites or mass. Retroperitoneum: No mass. Vasculature: The celiac axis and SMA are patent. The portal vein and branches, splenic vein, SMV, and hepatic veins are patent. Arterial atherosclerotic disease without aneurysm. There is an IVC filter. Pelvis: No mass, ascites or fluid collection. Urinary bladder is unremarkable. Soft tissue/ bones: Grade I anterolisthesis of L5 on S1. Multilevel degenerative changes of the lumbar spine. No acute abnormality in the chest, abdomen and pelvis. Large amount of colonic stool. CT CERVICAL SPINE   12/20/2021: Loss of height, C5 and C6 with cranial caudal dimensions 1.2 cm, and 0.8 cm, respectively. Loss cervical lordosis. Remote 8mm anterolisthesis C4 on C5. Atlantooccipital articulation maintained. Atlantoaxial interval preserved. Neural foramina narrowing, left C4-5. Diffuse disc space narrowing C3-4, through T2-3, greatest at C4-5 through C6-7. No fractures, dislocations, bone lesions. Limited imaging lung apices without anomaly. Carotid arteries and soft tissues are without anomaly. No fracture. Marked diffuse degenerative change cervical spine. C4 anterolisthesis and loss of body height, C6 and C7, most likely secondary to degenerative change. CT ABDOMEN PELVIS   12/20/2021 No acute abnormality in the chest, abdomen and pelvis. Large amount of colonic stool. Previous extensive, complex labs, notes and diagnostics reviewed and analyzed. ALLERGIES:    Allergies as of 12/21/2021    (No Known Allergies)      (please also verify by checking MAR)      Recently, I evaluated this patient for periodic reassessment of medical and functional status. The patient was discussed in detail at the treatment team meeting focusing on current medical issues, progress in therapies, social issues, psychological issues, barriers to progress and strategies to address these barriers, and discharge planning.   See the hand written addendum to rehab progress note. The patient continues to be high risk for future disability and their medical and rehabilitation prognosis continue to be good and therefore, we will continue the patient's rehabilitation course as planned. The patient's tentative discharge date was set. Patient and family education was discussed. The patient was made aware of the team discussion regarding their progress. Discharge plans were discussed along with barriers to progress and strategies to address these barriers, patient encouraged to continue to discuss discharge plans with . Complex Physical Medicine & Rehab Issues Assess & Plan:   1. Severe abnormality of gait and mobility and impaired self-care and ADL's secondary to  Late effects of CVA Remote left frontal and left temporal lobe infarcts- . Functional and medical status reassessed regarding patients ability to participate in therapies and patient found to be able to participate in acute intensive comprehensive inpatient rehabilitation program including PT/OT to improve balance, ambulation, ADLs, and to improve the P/AROM. Therapeutic modifications regarding activities in therapies, place, amount of time per day and intensity of therapy made daily. In bed therapies or bedside therapies prn.   2. Bowel and Bladder dysfunction neurogenic bowel and bladder:  frequent toileting, ambulate to bathroom with assistance, check post void residuals. Check for C.difficile x1 if >2 loose stools in 24 hours, continue bowel & bladder program.  Monitor bowel and bladder function. Lactinex 2 PO every AC. MOM prn, Brown Bomb prn, Glycerin suppository prn, enema prn.  3. Severe cervical and low back pain as well as generalized OA pain: reassess pain every shift and prior to and after each therapy session, give prn Tylenol and   Ultram versus OxyIR, modalities prn in therapy, masage, Lidoderm, K-pad prn. Consider scheduled AM pain meds.   4. Skin healing and breakdown risk:  continue pressure relief program.  Daily skin exams and reports from nursing. 5. Severe fatigue due to nutritional and hydration deficiency: Add and titrate vitamin B12 vitamin D and CoQ10 continue to monitor I&Os, calorie counts prn, dietary consult prn.  6. Acute episodic insomnia with situational adjustment disorder:  prn Ambien, monitor for day time sedation. 7. Falls risk elevated:  patient to use call light to get nursing assistance to get up, bed and chair alarm. 8. Elevated DVT risk: progressive activities in PT, continue prophylaxis LUCIANO hose, elevation and Xarelto. 9. Complex discharge planning: Discharge January 1, 2020 to home alone with help from his family who lives locally and home health care PT OT RN aide and social work. Until then continue weekly team meeting every  Thursday to assess progress towards goals, discuss and address social, psychological and medical comorbidities and to address difficulties they may be having progressing in therapy. Patient and family education is in progress. The patient is to follow-up with their family physician after discharge. Complex Active General Medical Issues that complicate care Assess & Plan:    1. History of CVA (cerebrovascular accident)-focus on mobility ADL and cognitive deficits  2. Recurrent depression-emotional support provided daily, vitamin B12, encourage participation in rehabilitation support group and recreational therapy, adjust/add medications (Abilify, Cymbalta, add co-Q10 vitamin B12) it appears the patient uses cannabinoids at home. 3.   Hyperlipidemia, coronary artery disease, cerebrovascular disease-continue blood signs every shift focusing on heart rate and blood pressure checks, consult hospitalist for backup medical and adjust/add medications (Xarelto). Monitor heart rate and blood pressure as well as medications effects on vital signs before during and after therapy.   4.   Hemiparesis affecting dominant side as late effect of cerebrovascular accident (CVA)   5. 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 Chronic pain syndrome,   DJD (degenerative joint disease), lumbar, DJD (degenerative joint disease), cervical-transition to lowest effective dose of pain medication use topicals when available lowest effective dose of Ultram especially given cannabinoid use and Cymbalta. 7.   Hypothyroidism-  Synthroid and titrate dosing. Follow vital signs, recheck TSH and thyroid studies as outpatient. 8.   Chronic deep vein thrombosis (DVT) of left popliteal vein-Xaralto  9. Bowel and bladder incontinence-check post void residuals add scheduled toileting  10.    Aphasia-consult speech and language pathology         Oliver Patel D.O., PM&R     Attending    [address]

## 2021-12-26 NOTE — PROGRESS NOTES
Physical Therapy Rehab Treatment Note  [facility]/Department[facility]       NAME: Brooke Irving  : 1955 (77 y.o.)  MRN: [id number]  CODE STATUS: Full Code    Date of Service: 2021  Family / Caregiver Present: No    Restrictions:  Restrictions/Precautions: Fall Risk,Seizure       SUBJECTIVE: Subjective: Pt reports nothing new  Pain Screening  Patient Currently in Pain: No       Post Treatment Pain Screenin/10       OBJECTIVE:      Neuromuscular Education  Neuromuscular Comments: RLE, attempted 4\" in /out in standing with inc'd flexor tone to complete, up/over push up block seated with VCs for dec'd compensations. RIOS= 20/56    Bed mobility  Rolling to Left: Modified independent  Rolling to Right: Modified independent  Supine to Sit: Stand by assistance;Supervision  Comment: Bed mobility    Transfers  Sit to Stand: Contact guard assistance;Stand by assistance  Bed to Chair: Stand by assistance;Contact guard assistance  Comment: VCs for safety with 180 turns as pt reaching OOBOS    Ambulation  Ambulation?: Yes  Ambulation 1  Surface: carpet  Device: Large Dann [place]  Assistance: Contact guard assistance (Light CGA<> SBA)  Quality of Gait: decrease knee flexion with swing phase, genu recurvatum with stance, decrease circumduction  Distance: 70ft, 40ft limited by destination    ASSESSMENT/PROGRESS TOWARDS GOALS:Gait, transfers, BM, Balance  Body structures, Functions, Activity limitations: Decreased functional mobility ; Decreased strength;Decreased endurance;Decreased coordination;Decreased safe awareness;Decreased ROM; Decreased balance;Decreased sensation  Assessment: Pt with baseline Rios SCORE OF 20/56 indicating increased risk for falls. Pt required VCs for safety with transfers on approach to chair d/t reaching unsafely OOBOS.   REQUIRES PT FOLLOW UP: Yes    Goals:  Long term goals  Long term goal 1: indep and effecient bed mobility  Long term goal 2: indep sit to stand and bed transfers  Long term goal 3: indep gait with qc 50 feet in protected environment  Long term goal 4: supervision with curb step performance for home entry  Long term goal 5:  for villalba balance testing    PLAN OF CARE/Safety:   Plan Comment: Cont per POC  Safety Devices  Type of devices: Chair alarm in place; Left in chair      Therapy Time:   Individual   Time In 0800   Time Out 0900   Minutes 60     Minutes:60      Transfer/Bed mobility training:15      Gait trainin      Neuro re education:20     Therapeutic ex:      Christi Kaplan PTA, 21 at 12:56 PM

## 2021-12-26 NOTE — PROGRESS NOTES
Physical Therapy Rehab Treatment Note  [facility]  [facility]       NAME: Benita Aguiar  : 1955 (77 y.o.)  MRN: [id number]  CODE STATUS: Full Code    Date of Service: 2021  Chart Reviewed: Yes  Family / Caregiver Present: No    Restrictions:  Restrictions/Precautions: Fall Risk,Seizure       SUBJECTIVE: Subjective: Pt agreeable to tx  Pain Screening  Patient Currently in Pain: No       Post Treatment Pain Screenin/10       OBJECTIVE:      Neuromuscular Education  Neuromuscular Comments: RLE up lunge 2\" block, single stepping over SC to increase heel strike    Bed mobility  Rolling to Left: Modified independent  Rolling to Right: Modified independent  Supine to Sit: Stand by assistance;Supervision  Comment: Bed mobility    Transfers  Sit to Stand: Contact guard assistance;Stand by assistance  Bed to Chair: Stand by assistance;Contact guard assistance  Comment: VCs for safety with 180 turns as pt reaching OOBOS    Ambulation  Ambulation?: Yes  Ambulation 1  Surface: carpet  Device: Large Dann [place]  Assistance: Contact guard assistance  Quality of Gait: decrease knee flexion with swing phase, genu recurvatum with stance, decrease circumduction  Distance: 70ft, 40ft limited by destination    Stairs/Curb  Stairs?: Yes  Stairs  Curbs: 4\"  Device: [employer]  Other Apparatus:  (rt ankle Aircast)  Assistance: Contact guard assistance;Minimal assistance  Comment: non-reciprocal, VCs for technique to improve safety    ASSESSMENT/PROGRESS TOWARDS GOALS:gait ,balance  Body structures, Functions, Activity limitations: Decreased functional mobility ; Decreased strength;Decreased endurance;Decreased coordination;Decreased safe awareness;Decreased ROM; Decreased balance;Decreased sensation  Assessment: Kia impulsively placing SBQC OOBOS when approaching chair with VCs d/t safety concerns, pt with good understanding when position visualized.   REQUIRES PT FOLLOW UP: Yes    Goals:  Long term goals  Long term goal 1: indep and effecient bed mobility  Long term goal 2: indep sit to stand and bed transfers  Long term goal 3: indep gait with qc 50 feet in protected environment  Long term goal 4: supervision with curb step performance for home entry  Long term goal 5: 20/56 for villalba balance testing    PLAN OF CARE/Safety:   Plan Comment: Cont per POC  Safety Devices  Type of devices: Chair alarm in place; Left in chair      Therapy Time:   Individual   Time In 1300   Time Out 1330   Minutes 30     Minutes:30      Transfer/Bed mobility training:      Gait training:15      Neuro re education:15     Therapeutic ex:      Marcela Tanner PTA, 12/26/21 at 2:48 PM

## 2021-12-26 NOTE — PROGRESS NOTES
Assessment completed. VSS. LBM 12/25. Denies pain. Requested ambien- medicated per mar. Presents with expressive aphasia, but able to make needs known. No distress noted. Call light within reach and bed alarm activated.   Electronically signed by Rahul Iqbal RN on 12/26/2021 at 3:47 AM

## 2021-12-26 NOTE — PROGRESS NOTES
Occupational Therapy  Facility/Department: [facility]  Daily Treatment Note  NAME: Benoit Ramirez  : 1955  MRN: [id number]    Date of Service: 2021    Discharge Recommendations:  Continue to assess pending progress       Assessment      Activity Tolerance  Activity Tolerance: Patient Tolerated treatment well  Safety Devices  Safety Devices in place: Yes  Type of devices: All fall risk precautions in place         Patient Diagnosis(es): There were no encounter diagnoses. has a past medical history of Chronic right shoulder pain, Closed TBI (traumatic brain injury) ([facility] [address] Essential hypertension, Hemiparesis affecting dominant side as late effect of cerebrovascular accident (CVA) ([facility] [address] History of CVA (cerebrovascular accident), Hyperlipidemia, Hypoxemia requiring supplemental oxygen, Recurrent depression ([facility] [address] S/P patent foramen ovale closure, and Seizure ([facility] [address] has no past surgical history on file. Restrictions  Restrictions/Precautions  Restrictions/Precautions: Fall Risk,Seizure  Subjective   General  Chart Reviewed: Yes  Patient assessed for rehabilitation services?: Yes  Response to previous treatment: Patient with no complaints from previous session  Family / Caregiver Present: No  Referring Practitioner: Dr Shane Hayes  Diagnosis: Impaired mobility and activities of daily living dt late effects of CVA  Pain Assessment  Pain Assessment: 0-10  Pain Level: 0  Pre Treatment Pain Screening  Pain at present: 0  Scale Used: Numeric Score  Intervention List: Patient able to continue with treatment  Vital Signs  Patient Currently in Pain: Denies   Orientation  Orientation  Overall Orientation Status: Within Functional Limits  Objective  Pt completed repetitions of GM movement across vertical/horizontal planes to gain strength and endurance to complete ADL/IADL with increased independence. Pt required increased time to complete and needed MOD A for problem solving/sequencing.  He transitioned from W/C>bed x1 with min cues for safety and sequencing, completing with SBA. Balance  Sitting Balance: Supervision  Standing Balance: Contact guard assistance  Bed mobility  Supine to Sit: Stand by assistance;Supervision  Sit to Supine: Stand by assistance  Comment: BED MOBILITY  Transfers  Sit to stand: Stand by assistance  Stand to sit: Stand by assistance  Transfer Comments: Multiple trials; improved with multiple trials        Coordination  Gross Motor: design replication with wooden blocks on vertical dowels  Fine Motor: jump game along vertical plane to increase UE endurance and strength. He was able to manipulate small objects with pincer grasp pattern              Cognition  Arousal/Alertness: Appropriate responses to stimuli  Following Commands: Follows one step commands with repetition  Attention Span: Appears intact  Memory: Appears intact  Safety Judgement: Decreased awareness of need for safety  Problem Solving: Assistance required to generate solutions  Insights: Fully aware of deficits  Initiation: Requires cues for some  Sequencing: Requires cues for some  Cognition Comment: Comprehension: Sup., Expression: Min A., Social Interaction: MI., Problem Solving: Sup., Memory: MI                                         Plan   Plan  Times per week: 5-7  Plan weeks: 1.5-2 weeks  Current Treatment Recommendations: Balance Training,Functional Mobility Training,Endurance Training,Safety Education & Training,Patient/Caregiver Education & Training,Equipment Evaluation, Education, & procurement,Self-Care / ADL,Home Management Training,Cognitive/Perceptual Training,Neuromuscular Re-education  Plan Comment: continue OT POC    Goals  Long term goals  Time Frame for Long term goals :  Within 1.5-2 weeks patient to demonstrate progress in the following areas listed below to achieve specific LTGs as stated in the initial evaluation  Long term goal 1: improve overall endurance  Long term goal 2: improve balance for ADLs and IADLs  Long term goal 3: Demo an understanding of tone reduction  Patient Goals   Patient goals : \"the fall. ..  I am sorry\"    Patient agreed to stated goal of improving his balance       Therapy Time   Individual Concurrent Group Co-treatment   Time In 1430         Time Out 1500         Minutes [facility], OTR/L    Electronically signed by Anand Lacy OT on 12/26/2021 at 3:23 PM

## 2021-12-27 PROCEDURE — 95816 EEG AWAKE AND DROWSY: CPT

## 2021-12-27 PROCEDURE — 1180000000 HC REHAB R&B

## 2021-12-27 PROCEDURE — 99233 SBSQ HOSP IP/OBS HIGH 50: CPT | Performed by: PSYCHIATRY & NEUROLOGY

## 2021-12-27 PROCEDURE — 6370000000 HC RX 637 (ALT 250 FOR IP): Performed by: NURSE PRACTITIONER

## 2021-12-27 PROCEDURE — 6370000000 HC RX 637 (ALT 250 FOR IP): Performed by: INTERNAL MEDICINE

## 2021-12-27 PROCEDURE — APPSS30 APP SPLIT SHARED TIME 16-30 MINUTES: Performed by: NURSE PRACTITIONER

## 2021-12-27 PROCEDURE — 97110 THERAPEUTIC EXERCISES: CPT

## 2021-12-27 PROCEDURE — 92507 TX SP LANG VOICE COMM INDIV: CPT

## 2021-12-27 PROCEDURE — 97535 SELF CARE MNGMENT TRAINING: CPT

## 2021-12-27 PROCEDURE — 97116 GAIT TRAINING THERAPY: CPT

## 2021-12-27 PROCEDURE — 6370000000 HC RX 637 (ALT 250 FOR IP): Performed by: PHYSICAL MEDICINE & REHABILITATION

## 2021-12-27 PROCEDURE — 87635 SARS-COV-2 COVID-19 AMP PRB: CPT

## 2021-12-27 PROCEDURE — 97530 THERAPEUTIC ACTIVITIES: CPT

## 2021-12-27 RX ADMIN — RIVAROXABAN 20 MG: 20 TABLET, FILM COATED ORAL at 06:53

## 2021-12-27 RX ADMIN — LEVETIRACETAM 750 MG: 500 TABLET, FILM COATED ORAL at 21:53

## 2021-12-27 RX ADMIN — OXYCODONE 5 MG: 5 TABLET ORAL at 16:37

## 2021-12-27 RX ADMIN — Medication 100 MG: at 06:53

## 2021-12-27 RX ADMIN — LEVOTHYROXINE SODIUM 50 MCG: 0.05 TABLET ORAL at 06:53

## 2021-12-27 RX ADMIN — OXYCODONE 5 MG: 5 TABLET ORAL at 21:53

## 2021-12-27 RX ADMIN — ASPIRIN 81 MG: 81 TABLET, CHEWABLE ORAL at 06:53

## 2021-12-27 RX ADMIN — BACLOFEN 10 MG: 10 TABLET ORAL at 06:53

## 2021-12-27 RX ADMIN — Medication 2000 UNITS: at 16:33

## 2021-12-27 RX ADMIN — BACLOFEN 10 MG: 10 TABLET ORAL at 21:53

## 2021-12-27 RX ADMIN — DULOXETINE 60 MG: 60 CAPSULE, DELAYED RELEASE ORAL at 06:53

## 2021-12-27 RX ADMIN — Medication: at 21:55

## 2021-12-27 RX ADMIN — ARIPIPRAZOLE 2 MG: 2 TABLET ORAL at 06:53

## 2021-12-27 RX ADMIN — BACLOFEN 10 MG: 10 TABLET ORAL at 16:33

## 2021-12-27 RX ADMIN — ZOLPIDEM TARTRATE 5 MG: 5 TABLET ORAL at 21:54

## 2021-12-27 RX ADMIN — LEVETIRACETAM 750 MG: 500 TABLET, FILM COATED ORAL at 06:52

## 2021-12-27 ASSESSMENT — ENCOUNTER SYMPTOMS
ABDOMINAL PAIN: 0
VOMITING: 0
COUGH: 0
CHEST TIGHTNESS: 0
WHEEZING: 0
COLOR CHANGE: 0
TROUBLE SWALLOWING: 0
NAUSEA: 0
ABDOMINAL DISTENTION: 0
SHORTNESS OF BREATH: 0

## 2021-12-27 ASSESSMENT — PAIN SCALES - GENERAL
PAINLEVEL_OUTOF10: 4
PAINLEVEL_OUTOF10: 8
PAINLEVEL_OUTOF10: 0

## 2021-12-27 NOTE — PROGRESS NOTES
Subjective: The patient complains of severe acute on chronic progressive fatigue and right-sided weakness secondary to previous CVA, recent fall with closed head injury aphasia, cognitive decline partially relieved by rest,medications, PT,  OT, history of residual cognitive, communication, right-sided weakness and seizure secondary to previous CVA unfortunately has had a recent closed head injury secondary to a fall with decline in functional status. He was admission admitted through the emergency room on 12/20/2021. He had fallen at home. Trauma work-up showed no acute traumatic fractures. Labs were significant for positive and elevated lactic acid level. He was prescribed IV fluid boluses. His toxicology screen was positive for cannabinoids. His urinary analysis showed ketones but no bacteria culture was not indicated. SARS-CoV-2 testing was negative. SLP and rest and exacerbated by recent illness. ROS x10: The patient also complains of severely impaired mobility and activities of daily living. Otherwise no new problems with vision, hearing, nose, mouth, throat, dermal, cardiovascular, GI, , pulmonary, musculoskeletal, psychiatric or neurological. See Rehab H&P on Rehab chart dated . Vital signs:  /78   Pulse 69   Temp 97.9 °F (36.6 °C) (Oral)   Resp 16   Wt 174 lb 6.4 oz (79.1 kg)   SpO2 98%   BMI 26.52 kg/m²   I/O:   PO/Intake:  fair PO intake, frequent constipation. Bowel/Bladder:  incontinent, assist with urinal, LBM 12/24  General:  Patient is well developed, adequately nourished, non-obese and     well kempt. HEENT:    PERRLA, hearing intact to loud voice, external inspection of ear     and nose benign. Inspection of lips, tongue and gums benign  Musculoskeletal: No significant change in strength or tone. All joints stable. Inspection and palpation of digits and nails show no clubbing,       cyanosis or inflammatory conditions.    Neuro/Psychiatric: Affect: flat but pleasant. Alert and oriented to person, place and     Situation with  mod cues. No significant change in deep tendon reflexes or     sensation  Lungs:  Diminished, CTA-B. Respiration effort is normal at rest.     Heart:   S1 = S2, RRR. No loud murmurs. Abdomen:  Soft, non-tender, no enlargement of liver or spleen. Extremities:  No significant lower extremity edema or tenderness. Upper extremity spasticity -right shoulder subluxation  Skin:   Intact to general survey, laceration and bruising left face and forehead    Rehabilitation:  Physical therapy:   Bed Mobility: Scooting: Stand by assistance    Transfers: Sit to Stand: Modified independent  Stand to sit: Modified independent  Bed to Chair: Modified independent, Ambulation 1  Surface: level tile,carpet  Device: Sellvana  Other Apparatus: Right (ankle air cast)  Assistance: Supervision  Quality of Gait: R hemipalegic gait pattern, decreased knee flexion with swing phase, genu recurvatum with stance  Distance: 50ftx2  Comments: Amb 20ft x2 without ankle air cast with minimal change. Pt reports improved support with air cast., Stairs  # Steps : 4  Stairs Height: 6\"  Rails: Left ascending  Curbs: 4\"  Device: Sellvana  Other Apparatus:  (rt ankle Aircast)  Assistance: Stand by assistance  Comment: Good sequencing without cues. FIMS:  ,  , Assessment: Pt has met bed mobility and transfer goal.  Pt fatigued after session. Improved carryover from previous session with sequencing  on curb step. Occupational therapy:    ,  , Assessment: Pt is a 77year old man from home who [facility] to [facility] with the above deficits which impact his ability to perform ADLs and IADLs at his baseline. Pt. would benefit from continued OT to maximize independence and safety with ADL tasks.     Speech therapy:        Lab/X-ray studies reviewed, analyzed and discussed with patient and staff:   No results found for this or any previous visit (from the past 24 hour(s)). CT HEAD  12/20/2021  Extra-axial spaces:  Normal. Intracranial hemorrhage:  None. Ventricular system: Ventricles mildly enlarged. Sulci mildly prominent. Ex vacuo dilatation, left lateral ventricle. Basal Cisterns:  Normal. Cerebral Parenchyma: Bilateral symmetric periventricular areas decreased attenuation. Geographic area of decreased attenuation, left frontal and left temporal lobes exerting no mass effect. Midline Shift:  None. Cerebellum:  Normal. Paranasal sinuses and mastoid air cells:  Normal. Visualized Orbits:  Normal.     Impression: Remote left frontal and left temporal lobe infarcts. Mild cerebral atrophy. Chronic ischemic white matter disease. CT CHEST 12/20/2021   . Chest: Lines, tubes, and devices:  None. Lung parenchyma and pleura: No consolidation. No suspicious pulmonary nodule. No pleural effusion. Central airways are patent. Mild bibasilar atelectasis. Thoracic inlet, heart, and mediastinum:  No lymphadenopathy in the axillary, mediastinal, or hilar regions. Mild atherosclerotic vascular thoracic aorta. The thoracic aorta and main pulmonary artery are normal in caliber. The cardiac chambers are normal in size. Status post foramen ovale closure. Mild coronary artery atherosclerotic calcifications are noted, although the study is not optimized for coronary assessment. No pericardial effusion or thickening. Thyroid gland is unremarkable. Esophagus is nondilated. Bones/Soft Tissues: Degenerative changes. Abdomen / Pelvis: Liver: No mass. Right hepatic lobe metallic density. Biliary: No bile duct dilation. Gallbladder is unremarkable. Spleen: No mass. No splenomegaly. Pancreas: No mass or duct dilation. Adrenals: No mass. Kidneys: No mass, calculus or hydronephrosis. GI tract: No dilation or wall thickening. Large amount of colonic stool. Lymph nodes: No abdominal or pelvic lymphadenopathy. Mesentery/Peritoneum: No ascites or mass. Retroperitoneum: No mass.  Vasculature: The celiac axis and SMA are patent. The portal vein and branches, splenic vein, SMV, and hepatic veins are patent. Arterial atherosclerotic disease without aneurysm. There is an IVC filter. Pelvis: No mass, ascites or fluid collection. Urinary bladder is unremarkable. Soft tissue/ bones: Grade I anterolisthesis of L5 on S1. Multilevel degenerative changes of the lumbar spine. No acute abnormality in the chest, abdomen and pelvis. Large amount of colonic stool. CT CERVICAL SPINE   12/20/2021: Loss of height, C5 and C6 with cranial caudal dimensions 1.2 cm, and 0.8 cm, respectively. Loss cervical lordosis. Remote 8mm anterolisthesis C4 on C5. Atlantooccipital articulation maintained. Atlantoaxial interval preserved. Neural foramina narrowing, left C4-5. Diffuse disc space narrowing C3-4, through T2-3, greatest at C4-5 through C6-7. No fractures, dislocations, bone lesions. Limited imaging lung apices without anomaly. Carotid arteries and soft tissues are without anomaly. No fracture. Marked diffuse degenerative change cervical spine. C4 anterolisthesis and loss of body height, C6 and C7, most likely secondary to degenerative change. CT ABDOMEN PELVIS   12/20/2021 No acute abnormality in the chest, abdomen and pelvis. Large amount of colonic stool. Previous extensive, complex labs, notes and diagnostics reviewed and analyzed. ALLERGIES:    Allergies as of 12/21/2021    (No Known Allergies)      (please also verify by checking MAR)      Recently, I evaluated this patient for periodic reassessment of medical and functional status. The patient was discussed in detail at the treatment team meeting focusing on current medical issues, progress in therapies, social issues, psychological issues, barriers to progress and strategies to address these barriers, and discharge planning. See the hand written addendum to rehab progress note.   The patient continues to be high risk for future disability and their medical and rehabilitation prognosis continue to be good and therefore, we will continue the patient's rehabilitation course as planned. The patient's tentative discharge date was set. Patient and family education was discussed. The patient was made aware of the team discussion regarding their progress. Discharge plans were discussed along with barriers to progress and strategies to address these barriers, patient encouraged to continue to discuss discharge plans with . Complex Physical Medicine & Rehab Issues Assess & Plan:   1. Severe abnormality of gait and mobility and impaired self-care and ADL's secondary to  Late effects of CVA Remote left frontal and left temporal lobe infarcts- . Functional and medical status reassessed regarding patients ability to participate in therapies and patient found to be able to participate in acute intensive comprehensive inpatient rehabilitation program including PT/OT to improve balance, ambulation, ADLs, and to improve the P/AROM. Therapeutic modifications regarding activities in therapies, place, amount of time per day and intensity of therapy made daily. In bed therapies or bedside therapies prn.   2. Bowel and Bladder dysfunction neurogenic bowel and bladder:  frequent toileting, ambulate to bathroom with assistance, check post void residuals. Check for C.difficile x1 if >2 loose stools in 24 hours, continue bowel & bladder program.  Monitor bowel and bladder function. Lactinex 2 PO every AC. MOM prn, Brown Bomb prn, Glycerin suppository prn, enema prn.  3. Severe cervical and low back pain as well as generalized OA pain: reassess pain every shift and prior to and after each therapy session, give prn Tylenol and   Ultram versus OxyIR, modalities prn in therapy, masage, Lidoderm, K-pad prn. Consider scheduled AM pain meds.   4. Skin healing and breakdown risk:  continue pressure relief program.  Daily skin exams and reports from nursing. 5. Severe fatigue due to nutritional and hydration deficiency: Add and titrate vitamin B12 vitamin D and CoQ10 continue to monitor I&Os, calorie counts prn, dietary consult prn.  6. Acute episodic insomnia with situational adjustment disorder:  prn Ambien, monitor for day time sedation. 7. Falls risk elevated:  patient to use call light to get nursing assistance to get up, bed and chair alarm. 8. Elevated DVT risk: progressive activities in PT, continue prophylaxis LUCIANO hose, elevation and Xarelto. 9. Complex discharge planning: Discharge January 1, 2020 to home alone with help from his family who lives locally and home health care PT OT RN aide and social work. Until then continue weekly team meeting every  Thursday to assess progress towards goals, discuss and address social, psychological and medical comorbidities and to address difficulties they may be having progressing in therapy. Patient and family education is in progress. The patient is to follow-up with their family physician after discharge. Complex Active General Medical Issues that complicate care Assess & Plan:    1. History of CVA (cerebrovascular accident)-focus on mobility ADL and cognitive deficits  2. Recurrent depression-emotional support provided daily, vitamin B12, encourage participation in rehabilitation support group and recreational therapy, adjust/add medications (Abilify, Cymbalta, add co-Q10 vitamin B12) it appears the patient uses cannabinoids at home. 3.   Hyperlipidemia, coronary artery disease, cerebrovascular disease-continue blood signs every shift focusing on heart rate and blood pressure checks, consult hospitalist for backup medical and adjust/add medications (Xarelto). Monitor heart rate and blood pressure as well as medications effects on vital signs before during and after therapy. 4.   Hemiparesis affecting dominant side as late effect of cerebrovascular accident (CVA)   5.    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 Chronic pain syndrome,   DJD (degenerative joint disease), lumbar, DJD (degenerative joint disease), cervical-transition to lowest effective dose of pain medication use topicals when available lowest effective dose of Ultram especially given cannabinoid use and Cymbalta. 7.   Hypothyroidism-  Synthroid and titrate dosing. Follow vital signs, recheck TSH and thyroid studies as outpatient. 8.   Chronic deep vein thrombosis (DVT) of left popliteal vein-Xaralto  9. Bowel and bladder incontinence-check post void residuals add scheduled toileting  10.    Aphasia-consult speech and language pathology         Chelsea Byrne D.O., PM&R     Attending    [address]

## 2021-12-27 NOTE — PROGRESS NOTES
Occupational Therapy  Facility/Department: [facility]  Daily Treatment Note  NAME: Vi Anglin  : 1955  MRN: [id number]    Date of Service: 2021    Discharge Recommendations:  Continue to assess pending progress  OT Equipment Recommendations  Other: Continue to assess    Assessment      REQUIRES OT FOLLOW UP: Yes  Activity Tolerance  Activity Tolerance: Patient Tolerated treatment well  Safety Devices  Safety Devices in place: Yes  Type of devices: All fall risk precautions in place         Patient Diagnosis(es): There were no encounter diagnoses. has a past medical history of Chronic right shoulder pain, Closed TBI (traumatic brain injury) ([facility] [address] Essential hypertension, Hemiparesis affecting dominant side as late effect of cerebrovascular accident (CVA) ([facility] [address] History of CVA (cerebrovascular accident), Hyperlipidemia, Hypoxemia requiring supplemental oxygen, Recurrent depression ([facility] [address] S/P patent foramen ovale closure, and Seizure ([facility] [address] has no past surgical history on file. Restrictions  Restrictions/Precautions  Restrictions/Precautions: Fall Risk,Seizure  Subjective   General  Chart Reviewed: Yes  Patient assessed for rehabilitation services?: Yes  Response to previous treatment: Patient with no complaints from previous session  Family / Caregiver Present: No  Referring Practitioner: Dr Evelyn Duran  Diagnosis: Impaired mobility and activities of daily living dt late effects of CVA  Pre Treatment Pain Screening  Pain at present: 0  Scale Used: Numeric Score  Intervention List: Patient able to continue with treatment  Vital Signs  Patient Currently in Pain: Denies   Orientation     Objective        Pt completed therapeutic activities to increase functional independence with ADL tasks. Pt required total assist to fasten zipper jacket. Pt reached on incline board with LUE completing in hand manipulation of small wooden [place] and replacing to board to improve independence with fasteners.  PROM to RUE to reduce tone, elbow flexion/extension, wrist extension to aprox neutral and minimal digit extension. Edu on strategies for good hand hygiene to prevent skin breakdown. Pt completed parquetry x2 replicating design from visual model with 1# wrist weight on L only. Pt able to replicate designs x2 with 100% accuracy. Pt placed clothespins on vertical yardstick with LUE to promote shoulder flexion and increase strength. Plan   Plan  Times per week: 5-7  Plan weeks: 1.5-2 weeks  Current Treatment Recommendations: Balance Training,Functional Mobility Training,Endurance Training,Safety Education & Training,Patient/Caregiver Education & Training,Equipment Evaluation, Education, & procurement,Self-Care / ADL,Home Management Training,Cognitive/Perceptual Training,Neuromuscular Re-education  Plan Comment: continue OT POC    Goals  Long term goals  Time Frame for Long term goals : Within 1.5-2 weeks patient to demonstrate progress in the following areas listed below to achieve specific LTGs as stated in the initial evaluation  Long term goal 1: improve overall endurance  Long term goal 2: improve balance for ADLs and IADLs  Long term goal 3: Demo an understanding of tone reduction  Patient Goals   Patient goals : \"the fall. ..  I am sorry\"    Patient agreed to stated goal of improving his balance       Therapy Time   Individual Concurrent Group Co-treatment   Time In 1400         Time Out 1430         Minutes 30           Therapeutic activities: 30 minutes       Vasiliy Lora OT    Electronically signed by Vasiliy Lora OT on 12/27/2021 at 4:25 PM

## 2021-12-27 NOTE — PROGRESS NOTES
[facility]  Facility/Department: [facility]  Speech Language Pathology   Treatment Note          Gordon Figueroa  1955  [id number]        Rehab Dx/Hx: Encounter for rehabilitation [Z51.89]  Abnormality of gait and mobility [R26.9]    Precautions: falls    Medical Dx: Encounter for rehabilitation [Z51.89]  Abnormality of gait and mobility [R26.9]  Speech Dx: Aphasia    Date: 12/27/2021    Subjective:  Alert, Cooperative and Pleasant        Interventions used this date:  Expressive Language, AAC and Instruction in Compensatory Strategies    Objective/Assessment:  Patient progressing towards goals:  Short-term Goals  Timeframe for Short-term Goals: 1-2 weeks  Goal 1: Pt will follow (2-3) step directions given orally with 80% accuracy with min cues to increase the pt's ability to follow directions provided by caregivers for safe follow through with ADLs. Goal 2: Pt will answer mid to high level yes/no questions with 90% accuracy with min cues to assist the caregiver in obtaining important information regarding the patient's personal, medical, and safety needs. Goal 3: Patient will demonstrate improved communication abilities with caregivers through the use of an AAC device (i.e. communciation board, Ipad, etc.) with min assist to improve meeting wants and needs. Pt ID pictured objects with single words on AAC communication board in a field of 8 with 73% acc Independently and with 86% acc min cues  Pt ID pictured objects in a field of 8 given functional situation with 83% acc min verbal cues  SLP educated patient on plan for referral to outpatient speech therapy for further AAC evaluation      Treatment/Activity Tolerance:  Patient tolerated treatment well    Plan:  Continue per POC    Pain Assessment:  Pre-Treatment  Pain assessment: 0-10  Pain level: 0  Intervention:  Patient denies pain. Post-Treatment  Pain assessment: 0-10  Pain level: 0  Intervention:  Patient denies pain.       Patient/Caregiver Education:  Patient educated on session and progression towards goals. Education needs reinforcement.     Safety Devices:  Bed alarm in place and Call light within reach      [id number] [address] (NOMS):    SWALLOWING  Rating:  DNT    SPOKEN LANGUAGE COMPREHENSION  Ratin    AAC  Rating:  3-4            Therapy Time  SLP Individual Minutes  Time In: 1000  Time Out: 1030  Minutes: 30              Signature: Electronically signed by OSCAR Reddy on 2021 at 10:53 AM

## 2021-12-27 NOTE — PROGRESS NOTES
Physical Therapy Rehab Treatment Note  Facility/Department: [facility]  Room: [place] [address]       NAME: Rahul Cortes  : 1955 (77 y.o.)  MRN: [id number]  CODE STATUS: Full Code    Date of Service: 2021       Restrictions:  Restrictions/Precautions: Fall Risk,Seizure       SUBJECTIVE:            Pre and post Treatment Pain Screenin/10       OBJECTIVE:                    Bed mobility  Sit to Supine: Modified independent  Comment: remainder NT due to time limits    Transfers  Sit to Stand: Modified independent  Stand to sit: Modified independent  Bed to Chair: Modified independent  Comment: good safety with trials this session    Ambulation  Ambulation?: Yes  More Ambulation?: Yes  Ambulation 1  Surface: carpet;uneven;level tile  Device: Large Dann King  Other Apparatus: Right (air cast)  Assistance: Modified Independent;Supervision  Quality of Gait: decrease knee flexion with swing phase, genu recurvatum with stance  Distance: 25 ft(limited by destination) and 200 feet  Comments: multiple turns and change of surfaces included. Supervision to ensure safe maneuvering in busy environment                         ASSESSMENT/PROGRESS TOWARDS GOALS: Pt has progressed towards goals. Limited time in session limited by pt involved in EEG completion.        Goals:  Long term goals  Long term goal 1: indep and effecient bed mobility  Long term goal 2: indep sit to stand and bed transfers - met  Long term goal 3: indep gait with qc 50 feet in protected environment - met for 25 feet  Long term goal 4: supervision with curb step performance for home entry  Long term goal 5: 20/56 for villalba balance testing    PLAN OF CARE/Safety: with OT staff following          Therapy Time:   Individual   Time In 1120   Time Out 1130   Minutes 10     20 min of tx missed due to pt in EEG test in room  Minutes:      Transfer/Bed mobility training:3      Gait trainin        Ash Del Valle PT, 21 at 11:41 AM

## 2021-12-27 NOTE — PROGRESS NOTES
Assessment complete. VSS. Denies any pain or SOB. PRN Ambien give this evening. Rt arm contracted and rt leg very weak. LBM 12/26/2021. Bed alarm on. Call light in reach.  Electronically signed by Derik Rangel RN on 12/27/2021 at 1:13 AM

## 2021-12-27 NOTE — PROGRESS NOTES
Occupational Therapy  Facility/Department: [facility]  Daily Treatment Note  NAME: Heath Grayson  : 1955  MRN: [id number]    Date of Service: 2021    Discharge Recommendations:  Continue to assess pending progress  OT Equipment Recommendations  Other: Continue to assess    Assessment      REQUIRES OT FOLLOW UP: Yes  Activity Tolerance  Activity Tolerance: Patient Tolerated treatment well  Safety Devices  Safety Devices in place: Yes  Type of devices: All fall risk precautions in place         Patient Diagnosis(es): There were no encounter diagnoses. has a past medical history of Chronic right shoulder pain, Closed TBI (traumatic brain injury) ([facility] [address] Essential hypertension, Hemiparesis affecting dominant side as late effect of cerebrovascular accident (CVA) ([facility] [address] History of CVA (cerebrovascular accident), Hyperlipidemia, Hypoxemia requiring supplemental oxygen, Recurrent depression ([facility] [address] S/P patent foramen ovale closure, and Seizure ([facility] [address] has no past surgical history on file. Restrictions  Restrictions/Precautions  Restrictions/Precautions: Fall Risk,Seizure  Subjective   General  Chart Reviewed: Yes  Patient assessed for rehabilitation services?: Yes  Response to previous treatment: Patient with no complaints from previous session  Family / Caregiver Present: No  Referring Practitioner: Dr Margaret Mendoza  Diagnosis: Impaired mobility and activities of daily living dt late effects of CVA  Pre Treatment Pain Screening  Pain at present: 0  Scale Used: Numeric Score  Intervention List: Patient able to continue with treatment  Vital Signs  Patient Currently in Pain: Denies   Orientation     Objective                 Pt completed therapeutic activities to increase strength/endurance in LUE for functional tasks. Pt placed/removed various size rubber washers on corresponding dowels using LUE to promote repetitive forward functional reach.  Pt replicated design from visual model using mushroom pegs requiring increased time to follow visual cue card for accuracy. Pt completed repetitive forward reach across table top with L to retrieve mushroom pegs to place on board. Pt completed FM task manipulating marbles in L hand with min difficulty to balance on mushroom pegs. Pt completed task to increase independence with 1 handed task during ADL. Plan   Plan  Times per week: 5-7  Plan weeks: 1.5-2 weeks  Current Treatment Recommendations: Balance Training,Functional Mobility Training,Endurance Training,Safety Education & Training,Patient/Caregiver Education & Training,Equipment Evaluation, Education, & procurement,Self-Care / ADL,Home Management Training,Cognitive/Perceptual Training,Neuromuscular Re-education  Plan Comment: continue OT POC    Goals  Long term goals  Time Frame for Long term goals : Within 1.5-2 weeks patient to demonstrate progress in the following areas listed below to achieve specific LTGs as stated in the initial evaluation  Long term goal 1: improve overall endurance  Long term goal 2: improve balance for ADLs and IADLs  Long term goal 3: Demo an understanding of tone reduction  Patient Goals   Patient goals : \"the fall. ..  I am sorry\"    Patient agreed to stated goal of improving his balance       Therapy Time   Individual Concurrent Group Co-treatment   Time In 1300         Time Out 1330         Minutes 30           Therapeutic activities: 30 minutes       Luis Alberto Banks OT    Electronically signed by Luis Alberto Banks OT on 12/27/2021 at 2:22 PM

## 2021-12-27 NOTE — PROGRESS NOTES
[facility] Neurology Daily Progress Note  Name: Sukumar Henry  Age: 77 y.o. Gender: male  CodeStatus: Full Code  Allergies: No Known Allergies    Chief Complaint:No chief complaint on file. Primary Care Provider: Tamanna Nguyen MD  InpatientTreatment Team: Treatment Team: Attending Provider: Tianna Martínez DO; Consulting Physician: Ngoc Lugo, APRN - NP; Consulting Physician: Ras Stoner, PhD; Consulting Physician: Jose Guadalupe Valdez MD; : Miguelito Reeder, GORDON; Patient Care Tech: Tiara Everett; Consulting Physician: Mil Louie MD; : Sheron Terry, MSW, LSW; Registered Nurse: Destiny Simon, RN; Charge Nurse: Papi Everett RN  Admission Date: 12/21/2021      HPI   Pt seen and examined for neuro follow up for seizure and late effect CVA. Patient currently alert and cooperative. No recurrent seizure activity reported. Tolerating increased dose of Keppra without mood changes or agitation. Baclofen increased last week with improvement in right-sided spasticity. Tolerating without somnolence or adverse effects. Events noted, no seizures,  Vitals:    12/27/21 0745   BP: 118/78   Pulse: 69   Resp: 16   Temp: 97.9 °F (36.6 °C)   SpO2: 98%      Review of Systems   Constitutional: Negative for appetite change, fatigue and fever. HENT: Negative for hearing loss and trouble swallowing. Eyes: Negative for visual disturbance. Respiratory: Negative for cough, chest tightness, shortness of breath and wheezing. Cardiovascular: Negative for chest pain, palpitations and leg swelling. Gastrointestinal: Negative for abdominal distention, abdominal pain, nausea and vomiting. Musculoskeletal: Positive for gait problem. Skin: Negative for color change and rash. Neurological: Positive for speech difficulty and weakness. Negative for dizziness, tremors, seizures, syncope, facial asymmetry, light-headedness, numbness and headaches.    Psychiatric/Behavioral: Negative for agitation, confusion and hallucinations. The patient is not nervous/anxious. Physical Exam  Vitals and nursing note reviewed. Constitutional:       General: He is not in acute distress. Appearance: He is not ill-appearing or diaphoretic. HENT:      Head: Normocephalic and atraumatic. Eyes:      Extraocular Movements: Extraocular movements intact. Pupils: Pupils are equal, round, and reactive to light. Cardiovascular:      Rate and Rhythm: Normal rate and regular rhythm. Pulmonary:      Effort: Pulmonary effort is normal. No respiratory distress. Breath sounds: Normal breath sounds. Abdominal:      General: Bowel sounds are normal. There is no distension. Palpations: Abdomen is soft. Skin:     General: Skin is warm and dry. Neurological:      Mental Status: He is alert and oriented to person, place, and time. Cranial Nerves: No cranial nerve deficit. Motor: Weakness, atrophy and abnormal muscle tone present. No tremor or seizure activity. Gait: Gait abnormal.               Medications:  Reviewed    Infusion Medications:    sodium chloride       Scheduled Medications:    levETIRAcetam  750 mg Oral BID    baclofen  10 mg Oral TID    Vitamin D  2,000 Units Oral Dinner    cyanocobalamin  1,000 mcg IntraMUSCular Weekly    coenzyme Q10  100 mg Oral Daily    lidocaine  3 patch TransDERmal Daily    sodium chloride flush  5-40 mL IntraVENous 2 times per day    ARIPiprazole  2 mg Oral Daily    aspirin  81 mg Oral Daily    DULoxetine  60 mg Oral Daily    levothyroxine  50 mcg Oral Daily    rivaroxaban  20 mg Oral Daily with breakfast    ammonium lactate   Topical Nightly     PRN Meds: acetaminophen, bisacodyl, fleet, zolpidem, oxyCODONE, sodium chloride, ondansetron **OR** ondansetron, polyethylene glycol, sodium chloride flush    Labs:   No results for input(s): WBC, HGB, HCT, PLT in the last 72 hours.   No results for input(s): NA, K, CL, CO2, BUN, CREATININE, CALCIUM, PHOS in the last 72 hours. Invalid input(s): MAGNES  No results for input(s): AST, ALT, BILIDIR, BILITOT, ALKPHOS in the last 72 hours. No results for input(s): INR in the last 72 hours. No results for input(s): Evonnie Montane in the last 72 hours. Urinalysis:   Lab Results   Component Value Date    NITRU Negative 12/20/2021    WBCUA 3-5 12/20/2021    BACTERIA Negative 12/20/2021    RBCUA 0-2 12/20/2021    BLOODU TRACE 12/20/2021    SPECGRAV 1.010 12/20/2021    GLUCOSEU Negative 12/20/2021       Radiology:   Most recent    EEG No valid procedures specified. MRI of Brain No results found for this or any previous visit. No results found for this or any previous visit. MRA of the Head and Neck: No results found for this or any previous visit. No results found for this or any previous visit. No results found for this or any previous visit. CT of the Head: Results for orders placed during the hospital encounter of 12/20/21    2 [address]  CT Brain. Contrast medium:  without contrast.. History:  Fall at home. Aphasic from remote stroke. .    Technical factors: CT imaging of the brain was obtained and formatted as 5 mm contiguous axial images. 2.5 mm contiguous axial images were obtained through the osseous structures. Sagittal and coronal reconstruction obtained during postprocessing. Comparison:  None. Findings:    Extra-axial spaces:  Normal.    Intracranial hemorrhage:  None. Ventricular system: Ventricles mildly enlarged. Sulci mildly prominent. Ex vacuo dilatation, left lateral ventricle. Basal Cisterns:  Normal.    Cerebral Parenchyma: Bilateral symmetric periventricular areas decreased attenuation. Geographic area of decreased attenuation, left frontal and left temporal lobes exerting no mass effect. Midline Shift:  None.     Cerebellum:  Normal.    Paranasal sinuses and mastoid air cells: as elevated CK levels and lactic acid. At this time we will change Keppra to 750 mg twice daily. Will obtain EEG and MRI of the brain. We will discontinue tramadol as this can lower his seizure threshold. Further recommendations to follow pending the above work-up. Patient doing well today. Right-sided spasticity improving with increased baclofen dose. Tolerating without somnolence or side effects. No recurrent seizures. Continue increased dose of Keppra as ordered. We will continue to follow. I have personally performed a face to face diagnostic evaluation on this patient, reviewed all data and investigations, and am the sole provider of all clinical decisions on the neurological status of this patient. pt feels better, doing well. CPK better      Andrzej Flynn MD, Catarina Miller, American Board of Psychiatry & Neurology  Board Certified in Vascular Neurology  Board Certified in Neuromuscular Medicine  Certified in Neurorehabilitation     Collaborating physicians: Dr Roslyn Flynn and Dr Villatoro    Electronically signed by JULITA Soares - CNP on 12/27/2021 at 1:52 PM

## 2021-12-27 NOTE — PROGRESS NOTES
Occupational Therapy  Facility/Department: [facility]  Daily Treatment Note  NAME: Sarita Vasquez  : 1955  MRN: [id number]    Date of Service: 2021    Discharge Recommendations:  Continue to assess pending progress       Assessment      REQUIRES OT FOLLOW UP: Yes  Activity Tolerance  Activity Tolerance: Patient Tolerated treatment well  Safety Devices  Safety Devices in place: Yes  Type of devices: All fall risk precautions in place         Patient Diagnosis(es): There were no encounter diagnoses. has a past medical history of Chronic right shoulder pain, Closed TBI (traumatic brain injury) ([facility] [address] Essential hypertension, Hemiparesis affecting dominant side as late effect of cerebrovascular accident (CVA) ([facility] [address] History of CVA (cerebrovascular accident), Hyperlipidemia, Hypoxemia requiring supplemental oxygen, Recurrent depression ([facility] [address] S/P patent foramen ovale closure, and Seizure ([facility] [address] has no past surgical history on file.     Restrictions  Restrictions/Precautions  Restrictions/Precautions: Fall Risk,Seizure  Subjective   General  Chart Reviewed: Yes  Patient assessed for rehabilitation services?: Yes  Response to previous treatment: Patient with no complaints from previous session  Family / Caregiver Present: No  Referring Practitioner: Dr Garcia Setting  Diagnosis: Impaired mobility and activities of daily living dt late effects of CVA  Pain Assessment  Pain Assessment: 0-10  Pain Level: 0  Pre Treatment Pain Screening  Pain at present: 0  Scale Used: Numeric Score  Intervention List: Patient able to continue with treatment  Vital Signs  Patient Currently in Pain: Denies   Orientation  Orientation  Overall Orientation Status: Within Functional Limits  Objective          Balance  Standing Balance: Supervision  Standing Balance  Time: 6 minutes  Activity: fold socks  Comment: Pt completed sit<>stand transfers at [facility] and stood with supervision to sort and fold socks using the L UE only to improve standing balance and endurance during IADLs. Pt worked at a steady pace and had no LOB. Pt able to stand total of 6 minutes before requring to sit and completed the remainder of the task seated. Pt able to fold 10 socks standing and 12 socks seated. Functional Mobility  Functional - Mobility Device: Cane  Activity: Other  Assist Level: Contact guard assistance  Functional Mobility Comments: Pt ambulated from table to w/c using quad cane at [facility] before therapist transported pt to his room via w/c at total assist.     Transfers  Sit to stand: Contact guard assistance  Stand to sit: Contact guard assistance     Pt completed grooming tasks and donned his jacket at the levels below to improve independence during ADLs. ADL  Grooming: Modified independent   UE dressing: Setup (jacket)        Plan   Plan  Times per week: 5-7  Plan weeks: 1.5-2 weeks  Current Treatment Recommendations: Balance Training,Functional Mobility Training,Endurance Training,Safety Education & Training,Patient/Caregiver Education & Training,Equipment Evaluation, Education, & procurement,Self-Care / ADL,Home Management Training,Cognitive/Perceptual Training,Neuromuscular Re-education  Plan Comment: continue OT POC  G-Code     OutComes Score                                                  AM-PAC Score             Goals  Long term goals  Time Frame for Long term goals : Within 1.5-2 weeks patient to demonstrate progress in the following areas listed below to achieve specific LTGs as stated in the initial evaluation  Long term goal 1: improve overall endurance  Long term goal 2: improve balance for ADLs and IADLs  Long term goal 3: Demo an understanding of tone reduction  Patient Goals   Patient goals : \"the fall. ..  I am sorry\"    Patient agreed to stated goal of improving his balance       Therapy Time   Individual Concurrent Group Co-treatment   Time In 1130         Time Out 1200         Minutes 30           ADL/IADL trainin minutes     Deepali Mcnamara Walker, OT   Electronically signed by Lonnie Cunha OT on 12/27/2021 at 12:16 PM

## 2021-12-27 NOTE — PROGRESS NOTES
Physical Therapy Rehab Treatment Note  [facility]/Department: [facility]  Room: R249/R249-01       NAME: Vi Anglin  : 1955 (77 y.o.)  MRN: [id number]  CODE STATUS: Full Code    Date of Service: 2021     Restrictions:  Restrictions/Precautions: Fall Risk,Seizure    SUBJECTIVE:          Pre and Post Treatment Pain Screenin/10     OBJECTIVE:              Bed mobility  Rolling to Left: Modified independent  Rolling to Right: Modified independent  Supine to Sit: Modified independent  Sit to Supine: Modified independent  Comment: Completed sup<>sit to L and R sides on flat mat. Transfers  Sit to Stand: Modified independent  Stand to sit: Modified independent  Bed to Chair: Modified independent     Ambulation  Ambulation?: Yes  Ambulation 1  Surface: level tile;carpet  Device: 46elks  Other Apparatus: Right (ankle air cast)  Assistance: Supervision  Quality of Gait: R hemipalegic gait pattern, decreased knee flexion with swing phase, genu recurvatum with stance  Distance: 50ftx2  Comments: Amb 20ft x2 without ankle air cast with minimal change. Pt reports improved support with air cast.    Stairs/Curb  Stairs?: Yes  Stairs  Curbs: 4\"  Device: 46elks  Assistance: Stand by assistance  Comment: Good sequencing without cues. Exercises  Neurodevelopmental Techniques: STS with L foot on 2in block to force wt shift and WBing onto R LE. Ambulation with GTB wrap and manual cues to to increase knee/hip flexion. ASSESSMENT/PROGRESS TOWARDS GOALS:  Assessment: Pt has met bed mobility and transfer goal.  Pt fatigued after session. Improved carryover from previous session with sequencing  on curb step.      Goals:   Long term goals  Long term goal 1: indep and effecient bed mobility  Long term goal 2: indep sit to stand and bed transfers - met  Long term goal 3: indep gait with qc 50 feet in protected environment - met for 25 feet  Long term goal 4: supervision with curb step performance for home entry  Long term goal 5:  for villalba balance testing    PLAN OF CARE/Safety: Cont per POC. Therapy Time:   Individual   Time In 1330   Time Out 1400   Minutes 30   Therapy Time   Individual      Time In 1430         Time Out 1500         Minutes 30           Minutes:60min. Pt had missed time earlier this AM.  Another therapist available after scheduled time however pt fatigued. States \"I'm done. \"        Transfer/Bed mobility training:10      Gait trainin      Neuro re [id number]     Therapeutic ex:0    Jimmy People, PTA, 21 at 4:18 PM

## 2021-12-27 NOTE — PROGRESS NOTES
INDIVIDUALIZED OVERALL REHAB PLAN OF CARE  ADDENDUM TO REHAB PROGRESS NOTE-for audit purposes must also refer to this day's clinical note and combine the information      Date: 2021  Patient Name: Maylin Murphy   Room: K957/H704-55    MRN: [id number]    : 1955  (68 y.o.)  Gender: male       Today 2021 during weekly team meeting, I reviewed the patient Maylin Murphy in detail with the therapists and nurses involved in patient's care gathering complex physiatric data regarding current medical issues, progress in therapies, factors limiting progress, social issues, psychological issues, ongoing therapeutic plans and discharge planning. Legend:  I= independent Im =Modified independent  S=Supervised SB=stand by HUMPHREY=set up CG=contact windy Min= minimal Mod=Moderate Max=maximal Max of 2 =maximal assist of 2 people      CURRENT FUNCTIONAL STATUS:    NURSING ISSUES:         Nursing will continue to focus on bowel and bladder continence transitioning toward independence by time of discharge. Monitoring post void residuals monitoring for severe constipation and bowel obstruction. Focus on achieving ADL goals with co-treating with OT when possible. Focus on cognition and co treat with SLP when possible. PHYSICAL THERAPY  Bed mobility:  Supine to Sit: Modified independent (21 1613)  Sit to Supine: Modified independent (21 1613)  Transfers:  Sit to Stand: Modified independent (21 1614)  Bed to Chair: Modified independent (21 1614)  Gait:   Device: [employer] (21 1344)  Other Apparatus: Right (ankle air cast) (21 1344)  Assistance: Supervision (21 1344)  Distance: 50ftx2 (21 1344)  Quality of Gait: R hemipalegic gait pattern, decreased knee flexion with swing phase, genu recurvatum with stance (21 1344)  Comments: Amb 20ft x2 without ankle air cast with minimal change.   Pt reports improved support with air cast. (21 1344)  Stairs:  # Steps : 4 (12/24/21 0826)  Curbs: 4\" (12/27/21 1344)  Rails: Left ascending (12/24/21 0826)  Assistance: Stand by assistance (12/27/21 1344)  Comment: Good sequencing without cues. (12/27/21 1344)  W/C mobility:  Level of Assistance: Stand by assistance (12/22/21 1044)  Distance: 70 feet with 2 turns - limited by fatigue (12/22/21 1044)  Description/ Details: cues for safe environmentl maneuvering required (12/22/21 1044)    Focus on energy conservation and consistency of function. OCCUPATIONAL THERAPY  Hand Dominance: Right (uses his left hand since previous CVA)  ADL  Feeding: Setup (12/23/21 1231)  Grooming: Modified independent  (12/27/21 1211)  UE Bathing: Modified independent  (12/24/21 1105)  LE Bathing: Contact guard assistance (12/24/21 1105)  UE Dressing: Setup (12/27/21 1211)  LE Dressing: Minimal assistance (Pt doffed/donned pants past hips only to wash ara areas and legs. Assistance to pull past up to knees in order to don past hips) (12/24/21 1105)  Toileting: Minimal assistance (12/23/21 1231)  Toilet Transfers  Toilet - Technique: Stand step (12/23/21 1232)  Equipment Used: Grab bars (12/23/21 1232)  Toilet Transfer: Contact guard assistance (12/23/21 1232)     [address] - Transfer From: Wheelchair (12/22/21 1311)  Shower - Transfer Type: To and From (12/22/21 1311)  Shower - Transfer To: Shower seat with back (12/22/21 1311)  Shower - Technique: Stand pivot (12/22/21 1311)  Shower Transfers: Moderate assistance (12/22/21 1311)  Shower Transfers Comments: declined due to being cold (12/23/21 1232)    Focus on sequencing. SPEECH THERAPY/SLP  Motor Speech: Exceptions to [facility]  Comprehension: Exceptions  Verbal Expression: Exceptions to functional limits      Diet/Swallow:  Diet Solids Recommendation: Regular  Liquid Consistency Recommendation: Thin  Dysphagia Outcome Severity Scale: Level 5: Mild dysphagia- Distant supervision.  May need one diet consistency restricted    Compensatory Swallowing Strategies: Upright as possible for all oral intake,Small bites/sips  Therapeutic Interventions: Diet tolerance monitoring,Patient/Family education          COGNITION  OT: Cognition Comment: Comprehension: Sup., Expression: Min A., Social Interaction: MI., Problem Solving: Sup., Memory: MI  SP:Memory:  (Unable to assess secondary to expressive aphasia)           THERAPY, MEDICAL AND NURSING COORDINATION:    [x]  Pain medication before therapies     [x]  Check orthostatic BP and monitor heart rate and medications effects with therapy      [x]  Ambulate to the bathroom in room    [x]  Add scheduled rest beaks     []  In room therapies      Discharge date set for:               See ntoes       Home with:    fam  with help from    fam             And:      Home Health Care:     [x]  PT    []  OT    []  ST   [x]  Aide   []  SW    [x]  RN                    Outpatient Therapy:  []  PT    []  OT    []  ST   []  Rehab Psych                 Equipment:  [employer]      At D/C their function is goaled at:   PT:Long term goal 1: indep and effecient bed mobility  Long term goal 2: indep sit to stand and bed transfers - met  Long term goal 3: indep gait with qc 50 feet in protected environment - met for 25 feet  Long term goal 4: supervision with curb step performance for home entry  Long term goal 5: 20/56 for vlilalba balance testing  OT:Eating  Assistance Needed: Setup or clean-up assistance  CARE Score: 5  Discharge Goal: Independent, Oral Hygiene  Assistance Needed: Setup or clean-up assistance  CARE Score: 5  Discharge Goal: Independent, [address] Needed: Partial/moderate assistance  CARE Score: 3  Discharge Goal: Independent, Shower/Bathe Self  Assistance Needed: Partial/moderate assistance  CARE Score: 3  Discharge Goal: Independent  Upper Body Dressing  Assistance Needed: Supervision or touching assistance  CARE Score: 4  Discharge Goal: Independent, Lower Body Dressing  Assistance Needed: Partial/moderate assistance  CARE Score: 3  Discharge Goal: Independent, Putting On/Taking Off Footwear  Assistance Needed: Partial/moderate assistance  CARE Score: 3  Discharge Goal: Independent, Toilet Transfer  Assistance Needed: Partial/moderate assistance  CARE Score: 3  Discharge Goal: Independent  SP:Long-term Goals  Timeframe for Long-term Goals: 2-3 weeks  Goal 1: Pt will improve his/her Expressive Language abilities to a superivised level with AAC  for expression of complex wants, needs, feelings/ideas, and medical/safety information. Goal 2: Pt will improve her/his Receptive Language abilities to a supervise to min assist level for comprehension of conversation and safety directions with familiar and unfamiliar communication partners. Long-term Goals  Timeframe for Long-term Goals: 1-2 weeks  Goal 1: Patient will tolerate LRD without overt s/s of aspiration           From a cognitive standpoint they will need:        24 hr supervision  --progress to occasional           Significant problems/ barriers to functional progress include: Pt is at a high risk for functional loss,    [x]  Acute infection/UTI    []  Low BP's     []  COPD flare-up   []  Uncontrolled blood sugar     []  Progressive anemia     []  poor endurance    [x]  Severe pain exacerbation     []  Impaired mental status    []  Urinary incontinence    []  Bowel incontinence           Plan to correct barriers to functional progress: Add scheduled rest breaks, CoQ 10 and Vit B 12, control pain by using ice Lidoderm rest and massage as well as pain medications prior to therapy. Spread therapy of 15 hours out over a 7 day window to accommodate rest breaks and medical interventions. Patient seems to be making fair to good response to these interventions.          Based on a comprehensive evaluation of the above, the individualized therapy and Discharge plan will be:    -Times stated are an average that will be varied based on the patient's daily need. PT    1 1/2  hrs/day 5-7 days per week           OT    1 1/2 hrs per day 5-7 days per week ST   1/2      hrs /day 3-5 days per week       Estimated LOS   2 week(s)    - Overall functional prognosis:     [x]  Good    []  Fair    []  Poor -Medical Prognosis:   [x]  Good    []  Fair    []  Poor    This patient was made aware of the discussion of Plan of Care, their projected dicharge date and their projected function at discharge.        Christiana Lyons MD

## 2021-12-28 LAB — SARS-COV-2, NAAT: NOT DETECTED

## 2021-12-28 PROCEDURE — 97110 THERAPEUTIC EXERCISES: CPT

## 2021-12-28 PROCEDURE — 6370000000 HC RX 637 (ALT 250 FOR IP): Performed by: INTERNAL MEDICINE

## 2021-12-28 PROCEDURE — 6370000000 HC RX 637 (ALT 250 FOR IP): Performed by: PHYSICAL MEDICINE & REHABILITATION

## 2021-12-28 PROCEDURE — 97112 NEUROMUSCULAR REEDUCATION: CPT

## 2021-12-28 PROCEDURE — 6370000000 HC RX 637 (ALT 250 FOR IP): Performed by: NURSE PRACTITIONER

## 2021-12-28 PROCEDURE — 97530 THERAPEUTIC ACTIVITIES: CPT

## 2021-12-28 PROCEDURE — 1180000000 HC REHAB R&B

## 2021-12-28 PROCEDURE — 96372 THER/PROPH/DIAG INJ SC/IM: CPT

## 2021-12-28 PROCEDURE — 97535 SELF CARE MNGMENT TRAINING: CPT | Performed by: OCCUPATIONAL THERAPIST

## 2021-12-28 PROCEDURE — 97116 GAIT TRAINING THERAPY: CPT

## 2021-12-28 PROCEDURE — 97535 SELF CARE MNGMENT TRAINING: CPT

## 2021-12-28 RX ADMIN — BACLOFEN 10 MG: 10 TABLET ORAL at 08:10

## 2021-12-28 RX ADMIN — RIVAROXABAN 20 MG: 20 TABLET, FILM COATED ORAL at 08:11

## 2021-12-28 RX ADMIN — Medication 2000 UNITS: at 16:41

## 2021-12-28 RX ADMIN — LEVETIRACETAM 750 MG: 500 TABLET, FILM COATED ORAL at 08:10

## 2021-12-28 RX ADMIN — ASPIRIN 81 MG: 81 TABLET, CHEWABLE ORAL at 08:10

## 2021-12-28 RX ADMIN — BACLOFEN 10 MG: 10 TABLET ORAL at 21:57

## 2021-12-28 RX ADMIN — BACLOFEN 10 MG: 10 TABLET ORAL at 14:13

## 2021-12-28 RX ADMIN — OXYCODONE 5 MG: 5 TABLET ORAL at 21:57

## 2021-12-28 RX ADMIN — OXYCODONE 5 MG: 5 TABLET ORAL at 03:04

## 2021-12-28 RX ADMIN — LEVOTHYROXINE SODIUM 50 MCG: 0.05 TABLET ORAL at 08:10

## 2021-12-28 RX ADMIN — DULOXETINE 60 MG: 60 CAPSULE, DELAYED RELEASE ORAL at 08:10

## 2021-12-28 RX ADMIN — Medication: at 21:58

## 2021-12-28 RX ADMIN — LEVETIRACETAM 750 MG: 500 TABLET, FILM COATED ORAL at 21:57

## 2021-12-28 RX ADMIN — ARIPIPRAZOLE 2 MG: 2 TABLET ORAL at 08:10

## 2021-12-28 RX ADMIN — ZOLPIDEM TARTRATE 5 MG: 5 TABLET ORAL at 21:57

## 2021-12-28 RX ADMIN — Medication 100 MG: at 08:11

## 2021-12-28 RX ADMIN — Medication 650 MG: at 11:01

## 2021-12-28 ASSESSMENT — PAIN DESCRIPTION - FREQUENCY: FREQUENCY: CONTINUOUS

## 2021-12-28 ASSESSMENT — PAIN DESCRIPTION - LOCATION
LOCATION: ARM;LEG

## 2021-12-28 ASSESSMENT — PAIN DESCRIPTION - ORIENTATION
ORIENTATION: LEFT
ORIENTATION: LEFT

## 2021-12-28 ASSESSMENT — PAIN SCALES - WONG BAKER: WONGBAKER_NUMERICALRESPONSE: 2

## 2021-12-28 ASSESSMENT — PAIN SCALES - GENERAL
PAINLEVEL_OUTOF10: 0
PAINLEVEL_OUTOF10: 2
PAINLEVEL_OUTOF10: 3
PAINLEVEL_OUTOF10: 4
PAINLEVEL_OUTOF10: 3
PAINLEVEL_OUTOF10: 3

## 2021-12-28 ASSESSMENT — PAIN DESCRIPTION - PAIN TYPE: TYPE: CHRONIC PAIN

## 2021-12-28 ASSESSMENT — PAIN DESCRIPTION - DESCRIPTORS: DESCRIPTORS: TINGLING

## 2021-12-28 NOTE — PROGRESS NOTES
Occupational Therapy  Facility/Department: [facility]  Daily Treatment Note  NAME: Heath Grayson  : 1955  MRN: [id number]    Date of Service: 2021    Discharge Recommendations:  Continue to assess pending progress       Assessment      OT Education: OT Role  Activity Tolerance  Activity Tolerance: Patient Tolerated treatment well  Safety Devices  Safety Devices in place: Yes  Type of devices: All fall risk precautions in place         Patient Diagnosis(es): There were no encounter diagnoses. has a past medical history of Chronic right shoulder pain, Closed TBI (traumatic brain injury) ([facility] [address] Essential hypertension, Hemiparesis affecting dominant side as late effect of cerebrovascular accident (CVA) ([facility] [address] History of CVA (cerebrovascular accident), Hyperlipidemia, Hypoxemia requiring supplemental oxygen, Recurrent depression ([facility] [address] S/P patent foramen ovale closure, and Seizure ([facility] [address] has no past surgical history on file.     Restrictions  Restrictions/Precautions  Restrictions/Precautions: Fall Risk,Seizure  Subjective   General  Chart Reviewed: Yes  Patient assessed for rehabilitation services?: Yes  Response to previous treatment: Patient with no complaints from previous session  Family / Caregiver Present: No  Referring Practitioner: Dr Margarte Mendoza  Diagnosis: Impaired mobility and activities of daily living dt late effects of CVA  Pain Assessment  Pain Assessment: 0-10  Pain Level: 2  Pain Type: Chronic pain  Pain Location: Arm;Leg  Pain Orientation: Left  Pain Descriptors: Tingling  Pain Frequency: Continuous  Pre Treatment Pain Screening  Pain at present: 0  Scale Used: Numeric Score  Intervention List: Patient able to continue with treatment  Vital Signs  Patient Currently in Pain: Yes   Orientation  Orientation  Overall Orientation Status: Within Functional Limits  Objective    ADL  Grooming: Supervision (setup of shower cap to complete shampooing; brushed hair, completed oral care and shaved with electric shaver seated w/c level at sink)  UE Bathing: Supervision (patient declined offer for a shower)  LE Bathing: Contact guard assistance (patient declined offer for a shower)  UE Dressing: Minimal assistance  LE Dressing: Minimal assistance (CGA for balance in standing to pull pants over hips; seated patient doffed/donned socks set up only; mod assist with L ankle support and setup of shoes to slip on)  Toileting: Contact guard assistance  Additional Comments: assist for balance to stand for hygiene        Balance  Sitting Balance: Modified independent   Standing Balance: Contact guard assistance  Standing Balance  Time: 2 minutes  Activity: standing for ara hygiene without LOB  Toilet Transfers  Toilet - Technique: Stand pivot  Equipment Used: Raised toilet seat with rails  Toilet Transfer: Contact guard assistance  Wheelchair Bed Transfers  Wheelchair/Bed - Technique: Stand pivot  Equipment Used: Bed;Wheelchair  Level of Asssistance: Contact guard assistance  Bed mobility  Supine to Sit: Modified independent  Transfers  Stand Pivot Transfers: Contact guard assistance  Sit to stand: Contact guard assistance  Stand to sit: Contact guard assistance                                                           Plan   Plan  Times per week: 5-7  Plan weeks: 1.5-2 weeks  Current Treatment Recommendations: Balance Training,Functional Mobility Training,Endurance Training,Safety Education & Training,Patient/Caregiver Education & Training,Equipment Evaluation, Education, & procurement,Self-Care / ADL,Home Management Training,Cognitive/Perceptual Training,Neuromuscular Re-education  Plan Comment: continue OT POC  G-Code     OutComes Score                                                  AM-PAC Score             Goals  Long term goals  Time Frame for Long term goals :  Within 1.5-2 weeks patient to demonstrate progress in the following areas listed below to achieve specific LTGs as stated in the initial evaluation  Long term goal 1: improve overall endurance  Long term goal 2: improve balance for ADLs and IADLs  Long term goal 3: Demo an understanding of tone reduction  Patient Goals   Patient goals : \"the fall. ..  I am sorry\"    Patient agreed to stated goal of improving his balance       Therapy Time   Individual Concurrent Group Co-treatment   Time In 830         Time Out 09         Minutes 60           ADL/IADL trainin minutes           Electronically signed by Abigail Fox OT on 2021 at 9:30 AM

## 2021-12-28 NOTE — PROGRESS NOTES
goal 3: indep gait with qc 50 feet in protected environment - met for 25 feet  Long term goal 4: supervision with curb step performance for home entry  Long term goal 5:  for villalba balance testing    PLAN OF CARE/Safety:   Plan Comment: Cont per POC     Therapy Time:   Individual   Time In 1100   Time Out 1130   Minutes 30     Minutes:      Transfer/Bed mobility trainin      Gait training:15      Neuro re education:0     Therapeutic ex:Rocío Olivares PTA, 21 at 11:28 AM

## 2021-12-28 NOTE — PROGRESS NOTES
Occupational Therapy  Facility/Department: [facility]  Daily Treatment Note  NAME: Brandi Oakley  : 1955  MRN: [id number]    Date of Service: 2021    Discharge Recommendations:  Continue to assess pending progress  OT Equipment Recommendations  Other: Continue to assess    Assessment      OT Education: OT Role  Activity Tolerance  Activity Tolerance: Patient Tolerated treatment well  Safety Devices  Safety Devices in place: Yes  Type of devices: All fall risk precautions in place         Patient Diagnosis(es): There were no encounter diagnoses. has a past medical history of Chronic right shoulder pain, Closed TBI (traumatic brain injury) ([facility] [address] Essential hypertension, Hemiparesis affecting dominant side as late effect of cerebrovascular accident (CVA) ([facility] [address] History of CVA (cerebrovascular accident), Hyperlipidemia, Hypoxemia requiring supplemental oxygen, Recurrent depression ([facility] [address] S/P patent foramen ovale closure, and Seizure ([facility] [address] has no past surgical history on file. Restrictions  Restrictions/Precautions  Restrictions/Precautions: Fall Risk,Seizure  Subjective   General  Chart Reviewed: Yes  Patient assessed for rehabilitation services?: Yes  Response to previous treatment: Patient with no complaints from previous session  Family / Caregiver Present: No  Referring Practitioner: Dr Angelina Clay  Diagnosis: Impaired mobility and activities of daily living dt late effects of CVA  Pain Assessment  Pain Assessment: Faces  Rai-Baker Pain Rating: Hurts a little bit  Pain Location: Arm;Leg  Pre Treatment Pain Screening  Pain at present: 2  Scale Used: Faces  Intervention List: Patient able to continue with treatment   Orientation     Objective           Pt agreeable to Ot in therapy gym. Pt stood with SBA and able to maintain balance during 1 handed standing task to increase standing tolerance for functional tasks.  Pt placed/removed rings on horizontal dowels at various heights promoting repetitive forward functional reach. Pt required sitting rest break at 4 min 40 sec. Pt returned to standing for 3 min 45 sec after seated rest break to remove all pieces. Pt squeezed various clothespins and pinned to horizontal and vertical lines to promote repetitive forward reach, increasing UE strength/endurance for functional tasks. Plan   Plan  Times per week: 5-7  Plan weeks: 1.5-2 weeks  Current Treatment Recommendations: Balance Training,Functional Mobility Training,Endurance Training,Safety Education & Training,Patient/Caregiver Education & Training,Equipment Evaluation, Education, & procurement,Self-Care / ADL,Home Management Training,Cognitive/Perceptual Training,Neuromuscular Re-education  Plan Comment: continue OT POC    Goals  Long term goals  Time Frame for Long term goals : Within 1.5-2 weeks patient to demonstrate progress in the following areas listed below to achieve specific LTGs as stated in the initial evaluation  Long term goal 1: improve overall endurance  Long term goal 2: improve balance for ADLs and IADLs  Long term goal 3: Demo an understanding of tone reduction  Patient Goals   Patient goals : \"the fall. ..  I am sorry\"    Patient agreed to stated goal of improving his balance       Therapy Time   Individual Concurrent Group Co-treatment   Time In 1300         Time Out 1330         Minutes 30              Therapeutic activities: 30 minutes    Mary Ann Issa OT    Electronically signed by Mary Ann Issa OT on 12/28/2021 at 1:29 PM

## 2021-12-28 NOTE — PROGRESS NOTES
Physical Therapy Rehab Treatment Note  Facility/Department: [facility]  Room: [facility]/R249-01       NAME: Gordon Figueroa  : 1955 (77 y.o.)  MRN: [id number]  CODE STATUS: Full Code    Date of Service: 2021       Restrictions:  Restrictions/Precautions: Fall Risk,Seizure       SUBJECTIVE:            Pre and post Treatment Pain Screening:     \"a little bit\" in RLE - no pain medication needed(per pt)    OBJECTIVE:               Neuromuscular Education  PNF: BLE and core strength and balance facilitation in sitting supine and standing. challenges included: LE placing with and without UE support, varying planes of motion utilized, targeted movement tasks in gait emphasizing balance recovery with multiple changes of direction, MRE, swiss ball movement tasks with RLE, RLE weight bearing tasks with and without inhibition techniques and stepping tasks  Neuromuscular Comments: continued limitation in Right gastroc length results in recurvatum required for safe right foot weight acceptance.  Pt demonstrated good planning and judgement with LE placing tasks with no LOB with qc utilized for support    Bed mobility  Rolling to Left: Modified independent  Rolling to Right: Modified independent  Supine to Sit: Modified independent  Sit to Supine: Modified independent  Comment: on mat this session    Transfers  Sit to Stand: Modified independent (weight primarily on the LLE)  Stand to sit: Modified independent  Bed to Chair: Modified independent  Comment: good safety and judgment with approach to chairs    Ambulation  Ambulation?: Yes  Ambulation 1  Surface: level tile;uneven;carpet  Device: Large Dann [place]  Other Apparatus: Right (air cast)  Assistance: Supervision;Modified Independent (mod indep in protected area, supervision in ahll way with unexpected distraction a mild concern)  Quality of Gait: R hemipalegic gait pattern, decreased knee flexion with swing phase, genu recurvatum with stance  Distance: 50 feet in protected area; 150 feet in hallway  Comments: Py able to ambulate on ramp with qc with close SBA due to pt apprehension - no LOB noted  Ambulation 2  Surface - 2: carpet  Device 2:  (Lite gait)  Other Apparatus 2: Right (air cast)  Assistance 2: 2 Person assistance  Quality of Gait 2: lite gait utilized to focus on pt endurance, change of pace, reducing recurvatum and stand balance reactions  Distance: 170 ft  Comments: pt able to participate in challenge for varying distances - SOB noted with slight standing rest break required         Wheelchair Activities  Propulsion: Yes  Propulsion 1  Propulsion: Manual  Level: Carpet  Level of Assistance: Independent  Distance: 70 feet with 2 turns - limited by destination    Activity Tolerance  Activity Tolerance: Patient Tolerated treatment well  Activity Tolerance: fatigue with challenges noted however pt recovers well to continue with session          ASSESSMENT/PROGRESS TOWARDS GOALS:  Assessment: Pt is demonstrating improvement in all areas. Gastroc tightness remains and is long standing. Air cast received for home going. Pt demonstrates indep in mobility in a protected are at this time  PT Education: General Safety  Patient Education: improved skills and judgment noted with improved safety carry over a result    Goals:  Long term goals  Long term goal 1: indep and effecient bed mobility  Long term goal 2: indep sit to stand and bed transfers - met  Long term goal 3: indep gait with qc 50 feet in protected environment - met for 25 feet  Long term goal 4: supervision with curb step performance for home entry  Long term goal 5:  for villalba balance testing    PLAN OF CARE/Safety:   Plan Comment: Cont per POC  Safety Devices  Type of devices: Chair alarm in place; Left in chair      Therapy Time:   Individual   Time In 1330   Time Out 1430   Minutes 60     Minutes:      Transfer/Bed mobility training:10      Gait trainin      Neuro re education:25      Martin Garza PT, 12/28/21 at 3:58 PM

## 2021-12-29 PROCEDURE — 97535 SELF CARE MNGMENT TRAINING: CPT

## 2021-12-29 PROCEDURE — 97129 THER IVNTJ 1ST 15 MIN: CPT

## 2021-12-29 PROCEDURE — 6370000000 HC RX 637 (ALT 250 FOR IP): Performed by: PHYSICAL MEDICINE & REHABILITATION

## 2021-12-29 PROCEDURE — 1180000000 HC REHAB R&B

## 2021-12-29 PROCEDURE — 6370000000 HC RX 637 (ALT 250 FOR IP): Performed by: NURSE PRACTITIONER

## 2021-12-29 PROCEDURE — 92507 TX SP LANG VOICE COMM INDIV: CPT

## 2021-12-29 PROCEDURE — 97112 NEUROMUSCULAR REEDUCATION: CPT

## 2021-12-29 PROCEDURE — 6360000002 HC RX W HCPCS: Performed by: PHYSICAL MEDICINE & REHABILITATION

## 2021-12-29 PROCEDURE — 97116 GAIT TRAINING THERAPY: CPT

## 2021-12-29 PROCEDURE — 99233 SBSQ HOSP IP/OBS HIGH 50: CPT | Performed by: PSYCHIATRY & NEUROLOGY

## 2021-12-29 PROCEDURE — APPSS15 APP SPLIT SHARED TIME 0-15 MINUTES: Performed by: NURSE PRACTITIONER

## 2021-12-29 PROCEDURE — 6370000000 HC RX 637 (ALT 250 FOR IP): Performed by: INTERNAL MEDICINE

## 2021-12-29 PROCEDURE — 97530 THERAPEUTIC ACTIVITIES: CPT

## 2021-12-29 RX ORDER — BACLOFEN 10 MG/1
10 TABLET ORAL DAILY PRN
Status: DISCONTINUED | OUTPATIENT
Start: 2021-12-29 | End: 2022-01-01 | Stop reason: HOSPADM

## 2021-12-29 RX ADMIN — ARIPIPRAZOLE 2 MG: 2 TABLET ORAL at 07:46

## 2021-12-29 RX ADMIN — OXYCODONE 5 MG: 5 TABLET ORAL at 05:04

## 2021-12-29 RX ADMIN — Medication: at 19:58

## 2021-12-29 RX ADMIN — Medication 100 MG: at 07:46

## 2021-12-29 RX ADMIN — Medication 2000 UNITS: at 17:21

## 2021-12-29 RX ADMIN — RIVAROXABAN 20 MG: 20 TABLET, FILM COATED ORAL at 07:46

## 2021-12-29 RX ADMIN — Medication 650 MG: at 10:14

## 2021-12-29 RX ADMIN — CYANOCOBALAMIN 1000 MCG: 1000 INJECTION, SOLUTION INTRAMUSCULAR at 07:46

## 2021-12-29 RX ADMIN — DULOXETINE 60 MG: 60 CAPSULE, DELAYED RELEASE ORAL at 07:46

## 2021-12-29 RX ADMIN — Medication 650 MG: at 04:27

## 2021-12-29 RX ADMIN — LEVETIRACETAM 750 MG: 500 TABLET, FILM COATED ORAL at 19:58

## 2021-12-29 RX ADMIN — BACLOFEN 10 MG: 10 TABLET ORAL at 07:46

## 2021-12-29 RX ADMIN — ASPIRIN 81 MG: 81 TABLET, CHEWABLE ORAL at 07:46

## 2021-12-29 RX ADMIN — LEVOTHYROXINE SODIUM 50 MCG: 0.05 TABLET ORAL at 04:27

## 2021-12-29 RX ADMIN — LEVETIRACETAM 750 MG: 500 TABLET, FILM COATED ORAL at 07:46

## 2021-12-29 RX ADMIN — OXYCODONE 5 MG: 5 TABLET ORAL at 19:58

## 2021-12-29 RX ADMIN — LEVOTHYROXINE SODIUM 50 MCG: 0.05 TABLET ORAL at 07:46

## 2021-12-29 RX ADMIN — BACLOFEN 10 MG: 10 TABLET ORAL at 13:54

## 2021-12-29 RX ADMIN — BACLOFEN 10 MG: 10 TABLET ORAL at 19:58

## 2021-12-29 ASSESSMENT — PAIN SCALES - GENERAL
PAINLEVEL_OUTOF10: 2
PAINLEVEL_OUTOF10: 3
PAINLEVEL_OUTOF10: 4
PAINLEVEL_OUTOF10: 2
PAINLEVEL_OUTOF10: 4
PAINLEVEL_OUTOF10: 2

## 2021-12-29 ASSESSMENT — PAIN DESCRIPTION - ORIENTATION: ORIENTATION: LEFT

## 2021-12-29 ASSESSMENT — PAIN SCALES - WONG BAKER: WONGBAKER_NUMERICALRESPONSE: 2

## 2021-12-29 ASSESSMENT — PAIN DESCRIPTION - ONSET: ONSET: ON-GOING

## 2021-12-29 ASSESSMENT — ENCOUNTER SYMPTOMS
ABDOMINAL DISTENTION: 0
ABDOMINAL PAIN: 0
SHORTNESS OF BREATH: 0
TROUBLE SWALLOWING: 0
CHEST TIGHTNESS: 0
NAUSEA: 0
WHEEZING: 0
VOMITING: 0
COUGH: 0
COLOR CHANGE: 0

## 2021-12-29 ASSESSMENT — PAIN DESCRIPTION - PROGRESSION: CLINICAL_PROGRESSION: GRADUALLY IMPROVING

## 2021-12-29 ASSESSMENT — PAIN DESCRIPTION - LOCATION
LOCATION: LEG
LOCATION: HEAD

## 2021-12-29 ASSESSMENT — PAIN DESCRIPTION - FREQUENCY: FREQUENCY: CONTINUOUS

## 2021-12-29 NOTE — PROGRESS NOTES
Pt continues to complain of h/a after tylenol was given. 4/10 pain. Requests something stronger. Pt given Roxicodone 5 mg po prn for h/a at this time. Lights out and tv on. Electronically signed by Vic Quick.  Raymond Stock on 12/29/2021 at 5:08 AM

## 2021-12-29 NOTE — PROGRESS NOTES
Pt complains of h/a to left side of head this am and requests Tylenol. Tylenol 650 mg given or pain. Pt stated that he did sleep tonight. Electronically signed by Francis Soto.  Fabian Sessions on 12/29/2021 at 4:30 AM

## 2021-12-29 NOTE — PROGRESS NOTES
Subjective: The patient complains of severe acute on chronic progressive fatigue and right-sided weakness secondary to previous CVA, recent fall with closed head injury aphasia, cognitive decline partially relieved by rest,medications, PT,  OT, history of residual cognitive, communication, right-sided weakness and seizure secondary to previous CVA unfortunately has had a recent closed head injury secondary to a fall with decline in functional status. He was admission admitted through the emergency room on 12/20/2021. He had fallen at home. Trauma work-up showed no acute traumatic fractures. Labs were significant for positive and elevated lactic acid level. He was prescribed IV fluid boluses. His toxicology screen was positive for cannabinoids. His urinary analysis showed ketones but no bacteria culture was not indicated. SARS-CoV-2 testing was negative. SLP and rest and exacerbated by recent illness. ROS x10: The patient also complains of severely impaired mobility and activities of daily living. Otherwise no new problems with vision, hearing, nose, mouth, throat, dermal, cardiovascular, GI, , pulmonary, musculoskeletal, psychiatric or neurological. See Rehab H&P on Rehab chart dated . Vital signs:  /65   Pulse 74   Temp 98.4 °F (36.9 °C) (Oral)   Resp 20   Wt 174 lb 6.4 oz (79.1 kg)   SpO2 98%   BMI 26.52 kg/m²   I/O:   PO/Intake:  fair PO intake, frequent constipation. Bowel/Bladder:  incontinent, assist with urinal, LBM 12/24  General:  Patient is well developed, adequately nourished, non-obese and     well kempt. HEENT:    PERRLA, hearing intact to loud voice, external inspection of ear     and nose benign. Inspection of lips, tongue and gums benign  Musculoskeletal: No significant change in strength or tone. All joints stable. Inspection and palpation of digits and nails show no clubbing,       cyanosis or inflammatory conditions.    Neuro/Psychiatric: Affect: flat but pleasant. Alert and oriented to person, place and     Situation with  mod cues. No significant change in deep tendon reflexes or     sensation  Lungs:  Diminished, CTA-B. Respiration effort is normal at rest.     Heart:   S1 = S2, RRR. No loud murmurs. Abdomen:  Soft, non-tender, no enlargement of liver or spleen. Extremities:  No significant lower extremity edema or tenderness. Upper extremity spasticity -right shoulder subluxation  Skin:   Intact to general survey, laceration and bruising left face and forehead    Rehabilitation:  Physical therapy:   Bed Mobility: Scooting: Stand by assistance    Transfers: Sit to Stand: Modified independent (weight primarily on the LLE)  Stand to sit: Modified independent  Bed to Chair: Modified independent, Ambulation 1  Surface: level tile,uneven,carpet  Device: [employer]  Other Apparatus: Right (air cast)  Assistance: Supervision,Modified Independent (mod indep in protected area, supervision in ahll way with unexpected distraction a mild concern)  Quality of Gait: R hemipalegic gait pattern, decreased knee flexion with swing phase, genu recurvatum with stance  Distance: 50 feet in protected area; 150 feet in hallway  Comments: Py able to ambulate on ramp with qc with close SBA due to pt apprehension - no LOB noted, Stairs  # Steps : 4  Stairs Height: 6\"  Rails: Left ascending  Curbs: 4\"  Device: [employer]  Other Apparatus:  (rt ankle Aircast)  Assistance: Stand by assistance  Comment: VCs for sequencing and LE to lead with. FIMS:  ,  , Assessment: Pt is demonstrating improvement in all areas. Gastroc tightness remains and is long standing. Air cast received for home going. Pt demonstrates indep in mobility in a protected are at this time    Occupational therapy:    ,  , Assessment: Pt is a 77year old man from home who [facility] with the above deficits which impact his ability to perform ADLs and IADLs at his baseline.  Pt. would benefit from continued OT to maximize independence and safety with ADL tasks. Speech therapy:        Lab/X-ray studies reviewed, analyzed and discussed with patient and staff:   No results found for this or any previous visit (from the past 24 hour(s)). CT HEAD  12/20/2021  Extra-axial spaces:  Normal. Intracranial hemorrhage:  None. Ventricular system: Ventricles mildly enlarged. Sulci mildly prominent. Ex vacuo dilatation, left lateral ventricle. Basal Cisterns:  Normal. Cerebral Parenchyma: Bilateral symmetric periventricular areas decreased attenuation. Geographic area of decreased attenuation, left frontal and left temporal lobes exerting no mass effect. Midline Shift:  None. Cerebellum:  Normal. Paranasal sinuses and mastoid air cells:  Normal. Visualized Orbits:  Normal.     Impression: Remote left frontal and left temporal lobe infarcts. Mild cerebral atrophy. Chronic ischemic white matter disease. CT CHEST 12/20/2021   . Chest: Lines, tubes, and devices:  None. Lung parenchyma and pleura: No consolidation. No suspicious pulmonary nodule. No pleural effusion. Central airways are patent. Mild bibasilar atelectasis. Thoracic inlet, heart, and mediastinum:  No lymphadenopathy in the axillary, mediastinal, or hilar regions. Mild atherosclerotic vascular thoracic aorta. The thoracic aorta and main pulmonary artery are normal in caliber. The cardiac chambers are normal in size. Status post foramen ovale closure. Mild coronary artery atherosclerotic calcifications are noted, although the study is not optimized for coronary assessment. No pericardial effusion or thickening. Thyroid gland is unremarkable. Esophagus is nondilated. Bones/Soft Tissues: Degenerative changes. Abdomen / Pelvis: Liver: No mass. Right hepatic lobe metallic density. Biliary: No bile duct dilation. Gallbladder is unremarkable. Spleen: No mass. No splenomegaly. Pancreas: No mass or duct dilation. Adrenals: No mass.  Kidneys: No mass, calculus or hydronephrosis. GI tract: No dilation or wall thickening. Large amount of colonic stool. Lymph nodes: No abdominal or pelvic lymphadenopathy. Mesentery/Peritoneum: No ascites or mass. Retroperitoneum: No mass. Vasculature: The celiac axis and SMA are patent. The portal vein and branches, splenic vein, SMV, and hepatic veins are patent. Arterial atherosclerotic disease without aneurysm. There is an IVC filter. Pelvis: No mass, ascites or fluid collection. Urinary bladder is unremarkable. Soft tissue/ bones: Grade I anterolisthesis of L5 on S1. Multilevel degenerative changes of the lumbar spine. No acute abnormality in the chest, abdomen and pelvis. Large amount of colonic stool. CT CERVICAL SPINE   12/20/2021: Loss of height, C5 and C6 with cranial caudal dimensions 1.2 cm, and 0.8 cm, respectively. Loss cervical lordosis. Remote 8mm anterolisthesis C4 on C5. Atlantooccipital articulation maintained. Atlantoaxial interval preserved. Neural foramina narrowing, left C4-5. Diffuse disc space narrowing C3-4, through T2-3, greatest at C4-5 through C6-7. No fractures, dislocations, bone lesions. Limited imaging lung apices without anomaly. Carotid arteries and soft tissues are without anomaly. No fracture. Marked diffuse degenerative change cervical spine. C4 anterolisthesis and loss of body height, C6 and C7, most likely secondary to degenerative change. CT ABDOMEN PELVIS   12/20/2021 No acute abnormality in the chest, abdomen and pelvis. Large amount of colonic stool. Previous extensive, complex labs, notes and diagnostics reviewed and analyzed. ALLERGIES:    Allergies as of 12/21/2021    (No Known Allergies)      (please also verify by checking MAR)      Recently, I evaluated this patient for periodic reassessment of medical and functional status.   The patient was discussed in detail at the treatment team meeting focusing on current medical issues, progress in therapies, social issues, psychological issues, barriers to progress and strategies to address these barriers, and discharge planning. See the hand written addendum to rehab progress note. The patient continues to be high risk for future disability and their medical and rehabilitation prognosis continue to be good and therefore, we will continue the patient's rehabilitation course as planned. The patient's tentative discharge date was set. Patient and family education was discussed. The patient was made aware of the team discussion regarding their progress. Discharge plans were discussed along with barriers to progress and strategies to address these barriers, patient encouraged to continue to discuss discharge plans with . Complex Physical Medicine & Rehab Issues Assess & Plan:   1. Severe abnormality of gait and mobility and impaired self-care and ADL's secondary to  Late effects of CVA Remote left frontal and left temporal lobe infarcts- . Functional and medical status reassessed regarding patients ability to participate in therapies and patient found to be able to participate in acute intensive comprehensive inpatient rehabilitation program including PT/OT to improve balance, ambulation, ADLs, and to improve the P/AROM. Therapeutic modifications regarding activities in therapies, place, amount of time per day and intensity of therapy made daily. In bed therapies or bedside therapies prn.   2. Bowel and Bladder dysfunction neurogenic bowel and bladder:  frequent toileting, ambulate to bathroom with assistance, check post void residuals. Check for C.difficile x1 if >2 loose stools in 24 hours, continue bowel & bladder program.  Monitor bowel and bladder function. Lactinex 2 PO every AC.   MOM prn, Brown Bomb prn, Glycerin suppository prn, enema prn.  3. Severe cervical and low back pain as well as generalized OA pain: reassess pain every shift and prior to and after each therapy session, give prn Monitor heart rate and blood pressure as well as medications effects on vital signs before during and after therapy. 4.   Hemiparesis affecting dominant side as late effect of cerebrovascular accident (CVA)   5. 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 Chronic pain syndrome,   DJD (degenerative joint disease), lumbar, DJD (degenerative joint disease), cervical-transition to lowest effective dose of pain medication use topicals when available lowest effective dose of Ultram especially given cannabinoid use and Cymbalta. 7.   Hypothyroidism-  Synthroid and titrate dosing. Follow vital signs, recheck TSH and thyroid studies as outpatient. 8.   Chronic deep vein thrombosis (DVT) of left popliteal vein-Xaralto  9. Bowel and bladder incontinence-check post void residuals add scheduled toileting  10.    Aphasia-consult speech and language pathology         Rupesh Espana D.O., PM&R     Attending    [facility] Kemi Juares

## 2021-12-29 NOTE — PROGRESS NOTES
Nutrition Assessment     Type and Reason for Visit: Reassess    Nutrition Recommendations/Plan:   · Continue current plan of care    Nutrition Assessment:  Pt remains stable from a nutritional standpoint, with verbalized fair to good appetite, and noted good intake, with no nutritional complaints. Malnutrition Assessment:  Malnutrition Status: No malnutrition    Nutrition Related Findings: PMH-htn, hld, CVA, TBI; adm s/p fall. No labs recorded. Last BM 12/27. Trace RLE edema noted. Pt strugglings forming sentences d/t previous stroke but is A&Ox4. Current Nutrition Therapies:    ADULT DIET; Regular    Anthropometric Measures:  · Height:  5'8\"  · Current Body Wt: 174 lb 6.4 oz (79.1 kg) (bed 12/28)   · BMI: 26.5    Nutrition Diagnosis:   No nutrition diagnosis at this time     Nutrition Interventions:   Food and/or Nutrient Delivery:  Continue Current Diet  Nutrition Education/Counseling:  Education not indicated   Coordination of Nutrition Care:  Continue to monitor while inpatient    Goals:  Intake >75% of meals. stable weight.        Nutrition Monitoring and Evaluation:   Food/Nutrient Intake Outcomes:  Food and Nutrient Intake  Physical Signs/Symptoms Outcomes:  Weight,Meal Time Behavior       Electronically signed by Boogie Mcghee MS, RD, LD on 12/29/21 at 1:49 PM EST

## 2021-12-29 NOTE — PLAN OF CARE
Problem: Falls - Risk of:  Goal: Will remain free from falls  Description: Will remain free from falls  Outcome: Ongoing  Goal: Absence of physical injury  Description: Absence of physical injury  Outcome: Ongoing     Problem: HEMODYNAMIC STATUS  Goal: Patient has stable vital signs and fluid balance  Outcome: Ongoing     Problem: ACTIVITY INTOLERANCE/IMPAIRED MOBILITY  Goal: Mobility/activity is maintained at optimum level for patient  Outcome: Ongoing     Problem: COMMUNICATION IMPAIRMENT  Goal: Ability to express needs and understand communication  Outcome: Ongoing     Problem:  Activity:  Goal: Ability to avoid complications of mobility impairment will improve  Description: Ability to avoid complications of mobility impairment will improve  Outcome: Ongoing  Goal: Range of joint motion will improve  Description: Range of joint motion will improve  Outcome: Ongoing  Goal: Ability to ambulate will improve  Description: Ability to ambulate will improve  Outcome: Ongoing  Goal: Muscle strength will improve  Description: Muscle strength will improve  Outcome: Ongoing     Problem: Health Behavior:  Goal: Identification of resources available to assist in meeting health care needs will improve  Description: Identification of resources available to assist in meeting health care needs will improve  Outcome: Ongoing     Problem: Safety:  Goal: Ability to remain free from injury will improve  Description: Ability to remain free from injury will improve  Outcome: Ongoing     Problem: IP COMMUNICATION/DYSARTHRIA  Goal: LTG - patient will improve expressive language skills to allow for communication of wants and needs in daily activities  Outcome: Ongoing     Problem: IP SWALLOWING  Goal: LTG - patient will tolerate the least restrictive diet consistency to allow for safe consumption of daily meals  Outcome: Ongoing     Problem: Skin Integrity:  Goal: Will show no infection signs and symptoms  Description: Will show no infection signs and symptoms  Outcome: Ongoing  Goal: Absence of new skin breakdown  Description: Absence of new skin breakdown  Outcome: Ongoing     Problem: Pain:  Goal: Pain level will decrease  Description: Pain level will decrease  Outcome: Ongoing  Goal: Control of acute pain  Description: Control of acute pain  Outcome: Ongoing  Goal: Control of chronic pain  Description: Control of chronic pain  Outcome: Ongoing

## 2021-12-29 NOTE — PROGRESS NOTES
[facility] Neurology Daily Progress Note  Name: Preet Ward  Age: 77 y.o. Gender: male  CodeStatus: Full Code  Allergies: No Known Allergies    Chief Complaint:No chief complaint on file. Primary Care Provider: Terri Sandy MD  InpatientTreatment Team: Treatment Team: Attending Provider: Archie Dobson DO; Consulting Physician: Alea Trevizo APRN - NP; Consulting Physician: Karina Sandy, PhD; Consulting Physician: Kenneth Pressley MD; Consulting Physician: John Cortés MD; : Bonnie Plasencia, GORDON; Registered Nurse: Morgan Lau, RN; Patient Care Tech: Laith Atkins; Registered Nurse: Rabia Saucedo, GORDON; : ABHAY Avina, [facility]  Admission Date: 12/21/2021      HPI   Pt seen and examined for neuro follow up for seizure and late effect CVA. Patient currently alert and cooperative. No recurrent seizure activity reported. Tolerating increased dose of Keppra without mood changes or agitation. Patient continues to have right-sided pain and spasticity despite routine baclofen dosing. Will add as needed dose. Evnts noted, doing better, keppra with no side effects     Vitals:    12/29/21 0740   BP: 100/67   Pulse: 57   Resp: 17   Temp: 98.1 °F (36.7 °C)   SpO2: 98%      Review of Systems   Constitutional: Negative for appetite change, fatigue and fever. HENT: Negative for hearing loss and trouble swallowing. Eyes: Negative for visual disturbance. Respiratory: Negative for cough, chest tightness, shortness of breath and wheezing. Cardiovascular: Negative for chest pain, palpitations and leg swelling. Gastrointestinal: Negative for abdominal distention, abdominal pain, nausea and vomiting. Musculoskeletal: Positive for gait problem. Skin: Negative for color change and rash. Neurological: Positive for speech difficulty and weakness. Negative for dizziness, tremors, seizures, syncope, facial asymmetry, light-headedness, numbness and headaches. Psychiatric/Behavioral: Negative for agitation, confusion and hallucinations. The patient is not nervous/anxious. Physical Exam  Vitals and nursing note reviewed. Constitutional:       General: He is not in acute distress. Appearance: He is not ill-appearing or diaphoretic. HENT:      Head: Normocephalic and atraumatic. Eyes:      Extraocular Movements: Extraocular movements intact. Pupils: Pupils are equal, round, and reactive to light. Cardiovascular:      Rate and Rhythm: Normal rate and regular rhythm. Pulmonary:      Effort: Pulmonary effort is normal. No respiratory distress. Breath sounds: Normal breath sounds. Abdominal:      General: Bowel sounds are normal. There is no distension. Palpations: Abdomen is soft. Skin:     General: Skin is warm and dry. Neurological:      Mental Status: He is alert and oriented to person, place, and time. Cranial Nerves: No cranial nerve deficit. Motor: Weakness, atrophy and abnormal muscle tone present. No tremor or seizure activity. Gait: Gait abnormal.               Medications:  Reviewed    Infusion Medications:    sodium chloride       Scheduled Medications:    levETIRAcetam  750 mg Oral BID    baclofen  10 mg Oral TID    Vitamin D  2,000 Units Oral Dinner    cyanocobalamin  1,000 mcg IntraMUSCular Weekly    coenzyme Q10  100 mg Oral Daily    lidocaine  3 patch TransDERmal Daily    sodium chloride flush  5-40 mL IntraVENous 2 times per day    ARIPiprazole  2 mg Oral Daily    aspirin  81 mg Oral Daily    DULoxetine  60 mg Oral Daily    levothyroxine  50 mcg Oral Daily    rivaroxaban  20 mg Oral Daily with breakfast    ammonium lactate   Topical Nightly     PRN Meds: acetaminophen, bisacodyl, fleet, zolpidem, oxyCODONE, sodium chloride, ondansetron **OR** ondansetron, polyethylene glycol, sodium chloride flush    Labs:   No results for input(s): WBC, HGB, HCT, PLT in the last 72 hours.   No results for input(s): NA, K, CL, CO2, BUN, CREATININE, CALCIUM, PHOS in the last 72 hours. Invalid input(s): MAGNES  No results for input(s): AST, ALT, BILIDIR, BILITOT, ALKPHOS in the last 72 hours. No results for input(s): INR in the last 72 hours. No results for input(s): Ramón Min in the last 72 hours. Urinalysis:   Lab Results   Component Value Date    NITRU Negative 12/20/2021    WBCUA 3-5 12/20/2021    BACTERIA Negative 12/20/2021    RBCUA 0-2 12/20/2021    BLOODU TRACE 12/20/2021    SPECGRAV 1.010 12/20/2021    GLUCOSEU Negative 12/20/2021       Radiology:   Most recent    EEG No valid procedures specified. MRI of Brain No results found for this or any previous visit. No results found for this or any previous visit. MRA of the Head and Neck: No results found for this or any previous visit. No results found for this or any previous visit. No results found for this or any previous visit. CT of the Head: Results for orders placed during the hospital encounter of 12/20/21    [address]  CT Brain. Contrast medium:  without contrast.. History:  Fall at home. Aphasic from remote stroke. .    Technical factors: CT imaging of the brain was obtained and formatted as 5 mm contiguous axial images. 2.5 mm contiguous axial images were obtained through the osseous structures. Sagittal and coronal reconstruction obtained during postprocessing. Comparison:  None. Findings:    Extra-axial spaces:  Normal.    Intracranial hemorrhage:  None. Ventricular system: Ventricles mildly enlarged. Sulci mildly prominent. Ex vacuo dilatation, left lateral ventricle. Basal Cisterns:  Normal.    Cerebral Parenchyma: Bilateral symmetric periventricular areas decreased attenuation. Geographic area of decreased attenuation, left frontal and left temporal lobes exerting no mass effect. Midline Shift:  None.     Cerebellum:  Normal.    Paranasal sinuses and mastoid air cells:  Normal.    Visualized Orbits:  Normal.    Impression  Impression:    Remote left frontal and left temporal lobe infarcts. Mild cerebral atrophy. Chronic ischemic white matter disease. All CT scans at this facility use dose modulation, iterative reconstruction, and/or weight based dosing when appropriate to reduce radiation dose to as low as reasonably achievable. No results found for this or any previous visit. No results found for this or any previous visit. Carotid duplex: No results found for this or any previous visit. No results found for this or any previous visit. No results found for this or any previous visit. Echo No results found for this or any previous visit. Assessment/Plan:    Patient is a 55-year-old  male with history of large left MCA, M1 and M2 thrombus, PE and bilateral DVT secondary to PFO in 2012 and underwent PFO closure in 2013. Stroke resulted in right hemiparesis and expressive aphasia. Hypercoagulable work-up at that time was negative. Patient currently continues on Xarelto 20 mg daily and aspirin 81 mg daily. Patient has increased tone on the right side with spasticity and clonus as late effects of his CVA. He has tried Botox prior, according to his brother, without any improvement. He is currently on baclofen 10 mg 4 times a day as needed. Brother reports that patient was taking this twice daily routinely at home. Will initiate patient on baclofen 10 mg 3 times daily here and monitor response. Patient also with history of seizure disorder prior to his stroke and has been on Keppra for many years. Previous seizures were in the setting of anxiety. Patient reports daily compliance with Keppra.   Given patient's high risk for seizures due to his previous CVA location and we have concern that he might have suffered a seizure rather than a fall that brought him into the hospital.  There was evidence of white blood cell margination as well as elevated CK levels and lactic acid. At this time we will change Keppra to 750 mg twice daily. Will obtain EEG and MRI of the brain. We will discontinue tramadol as this can lower his seizure threshold. Further recommendations to follow pending the above work-up. Patient doing well today. Right-sided spasticity improving with increased baclofen dose. Tolerating without somnolence or side effects. No recurrent seizures. Continue increased dose of Keppra as ordered. We will continue to follow. Patient continuing to have right-sided pain and spasticity. Will add as needed dose of baclofen in addition to routine dosing. I have personally performed a face to face diagnostic evaluation on this patient, reviewed all data and investigations, and am the sole provider of all clinical decisions on the neurological status of this patient.events noted, right side still painful, but will see what happens       Andrzej Merino MD, [address], American Board of Psychiatry & Neurology  Board Certified in Vascular Neurology  Board Certified in Neuromuscular Medicine  Certified in Neurorehabilitation     Collaborating physicians: Dr Tuan Merino and Dr Villatoro    Electronically signed by JULITA Gill CNP on 12/29/2021 at 1:40 PM

## 2021-12-29 NOTE — PROGRESS NOTES
Occupational Therapy   Facility/Department: [facility]  Daily Treatment Note  NAME: Edward Martinez  : 1955  MRN: [id number]    Date of Service: 2021    Discharge Recommendations:  Continue to assess pending progress       Assessment      Activity Tolerance  Activity Tolerance: Patient Tolerated treatment well  Safety Devices  Safety Devices in place: Yes  Type of devices: All fall risk precautions in place         Patient Diagnosis(es): There were no encounter diagnoses. has a past medical history of Chronic right shoulder pain, Closed TBI (traumatic brain injury) ([facility] [address] Essential hypertension, Hemiparesis affecting dominant side as late effect of cerebrovascular accident (CVA) ([facility] [address] History of CVA (cerebrovascular accident), Hyperlipidemia, Hypoxemia requiring supplemental oxygen, Recurrent depression ([facility] [address] S/P patent foramen ovale closure, and Seizure ([facility] [address] has no past surgical history on file. Restrictions  Restrictions/Precautions  Restrictions/Precautions: Fall Risk,Seizure  Subjective   General  Chart Reviewed: Yes  Patient assessed for rehabilitation services?: Yes  Response to previous treatment: Patient with no complaints from previous session  Family / Caregiver Present: No  Referring Practitioner: Dr Riley Elise  Diagnosis: Impaired mobility and activities of daily living dt late effects of CVA  Subjective  Subjective: \"I am going home on the .\" Meaning . Pain Assessment  Pain Assessment: 0-10  Pain Level: 2  Pre Treatment Pain Screening  Pain at present: 2  Scale Used: Numeric Score  Intervention List: Patient able to continue with treatment;Patient declined any intervention  Comments / Details: Patient reported already receiving pain medication proior to OT session.   Vital Signs  Patient Currently in Pain: Yes   Orientation     Objective           Patient completed kitchen mobility task at below levels to retrieve cones from levels below, above, and at waist to improve dynamic standing balance, standing tolerance, endurance, and safety awareness to promote independence with ADLs and IADLs. Balance  Standing Balance: Contact guard assistance  Standing Balance  Time: 10 minutes  Activity: kitchen mobility  Functional Mobility  Functional - Mobility Device: Cane  Activity: Retrieve items;Transport items  Assist Level: Contact guard assistance  Functional Mobility Comments: Patient completed functional mobility around kitchen to retrieve cones in places at, below, and above waist level. Patient required occasional verbal cues for safe placement of cane while bending and reaching as patient left in places out of reach. Transfers  Sit to stand: Stand by assistance  Stand to sit: Stand by assistance          While seated at table top, patient completed folding towels, pillowcases, and washcloths to improve independence with IADLs. Patient folded 3 pillow cases, 4 washcloths, and 3 towels using one-handed techniques with min-no difficulty. Plan   Plan  Times per week: 5-7  Plan weeks: 1.5-2 weeks  Current Treatment Recommendations: Balance Training,Functional Mobility Training,Endurance Training,Safety Education & Training,Patient/Caregiver Education & Training,Equipment Evaluation, Education, & procurement,Self-Care / ADL,Home Management Training,Cognitive/Perceptual Training,Neuromuscular Re-education  Plan Comment: continue OT POC    Goals  Long term goals  Time Frame for Long term goals : Within 1.5-2 weeks patient to demonstrate progress in the following areas listed below to achieve specific LTGs as stated in the initial evaluation  Long term goal 1: improve overall endurance  Long term goal 2: improve balance for ADLs and IADLs  Long term goal 3: Demo an understanding of tone reduction  Patient Goals   Patient goals : \"the fall. ..  I am sorry\"    Patient agreed to stated goal of improving his balance       Therapy Time   Individual Concurrent Group Co-treatment   Time In 1400         Time Out 1430         Minutes 30           ADL/IADL trainin minutes         Leighton Severin, OTA   Electronically signed by Leighton Severin, OTA on 2021 at 4:23 PM

## 2021-12-29 NOTE — PROGRESS NOTES
4Physical Therapy Rehab Treatment Note  Facility/Department: [facility]  Room: R2/R249-01       NAME: Jhon Hadley  : 1955 (77 y.o.)  MRN: [id number]  CODE STATUS: Full Code    Date of Service: 2021       Restrictions:  Restrictions/Precautions: Fall Risk,Seizure       SUBJECTIVE:       Pre Treatment Pain Screening  Pain at present: 2  Intervention List: Patient able to continue with treatment;Nurse called to administer meds  Comments / Details: right arm and leg    Post Treatment Pain Screening:  Pain Assessment  Pain Level: 2    OBJECTIVE:               Neuromuscular Education  PNF: standing and ambulatory balance facilitation. Challenges included: obstacle course performance over foam surfaces, static standing on foam surface, targeted LE movement tasks on and off foam. RLE motor control facilitation with manual facilitation and iinhibition technqiues utilized  Neuromuscular Comments: Pt presents with ability to stand on foal with no UE support for periods of 1 min.  He requires UE support for gait across foam surfaces    Bed mobility  Rolling to Left: Modified independent  Rolling to Right: Modified independent  Supine to Sit: Modified independent  Sit to Supine: Modified independent  Comment: on mat this session    Transfers  Sit to Stand: Modified independent  Stand to sit: Modified independent  Bed to Chair: Modified independent  Car Transfer: Modified independent  Comment: good safety and judgment with approach to chairs    Ambulation  Ambulation?: Yes  More Ambulation?: Yes  Ambulation 1  Surface: level tile;uneven;carpet;ramp  Device: Large [place]  Other Apparatus: Right (air cast)  Assistance: Modified Independent;Supervision  Quality of Gait: decreased RLE sance time, recurvatum of right knee needed to maintain right foot flat on floor and to achieve adequate knee stability, pelvis rotated to right  Distance: 50 feet in protected area; 150 feet in hallway  Comments: pt mildly

## 2021-12-29 NOTE — PROGRESS NOTES
Occupational Therapy  Facility/Department: [facility]  Daily Treatment Note  NAME: Theresa Erickson  : 1955  MRN: [id number]    Date of Service: 2021    Discharge Recommendations:  Continue to assess pending progress    Assessment: Pt required encouragement to complete bathing. Pt declined to participate in any activities in standing despite encouragement. Pt reports fatigue and pain in right leg. Activity Tolerance  Activity Tolerance: Patient Tolerated treatment well  Safety Devices  Safety Devices in place: Yes  Type of devices: All fall risk precautions in place       Patient Diagnosis(es): There were no encounter diagnoses. has a past medical history of Chronic right shoulder pain, Closed TBI (traumatic brain injury) ([facility] [address] Essential hypertension, Hemiparesis affecting dominant side as late effect of cerebrovascular accident (CVA) ([facility] [address] History of CVA (cerebrovascular accident), Hyperlipidemia, Hypoxemia requiring supplemental oxygen, Recurrent depression ([facility] [address] S/P patent foramen ovale closure, and Seizure ([facility] [address] has no past surgical history on file. Restrictions  Restrictions/Precautions  Restrictions/Precautions: Fall Risk,Seizure     Subjective  General  Chart Reviewed: Yes  Patient assessed for rehabilitation services?: Yes  Response to previous treatment: Patient with no complaints from previous session  Family / Caregiver Present: No  Referring Practitioner: Dr Rafita Landaverde  Diagnosis: Impaired mobility and activities of daily living dt late effects of CVA  Pain Assessment  Pain Assessment: Faces  Rai-Baker Pain Rating: Hurts a little bit  Pain Location: Leg  Pain Orientation: Left  Pain Frequency: Continuous  Pain Onset: On-going  Clinical Progression: Gradually improving  Pre Treatment Pain Screening  Pain at present: 3  Scale Used:  Faces  Intervention List: Patient able to continue with treatment;Patient declined any intervention  Comments / Details: Pt states \"a little\" when asked about pain. Pt points to his right leg. Pt reports that he recently had pain medication  Vital Signs  Patient Currently in Pain: Yes     Objective: Pt washed up while seated at sink as follows. ADL  Grooming: Independent (To complete oral care, hair care, hand hygiene, and wash face while seated at sink)  UE Bathing: Supervision;Verbal cueing (Pt washed under arms only. Pt initially declined, however, was agreeable after encourageent)  LE Bathing: Supervision;Verbal cueing (Pt washed ara areas only. Pt initially declined, however, was agreeable after encouragement)  UE Dressing: Supervision; Increased time to complete;Verbal cueing (To doff/don long sleevd shirt and jacket. Increased time and effort. VCs for initiation and sequencing)  LE Dressing: Supervision;Verbal cueing; Increased time to complete (To doff/don pants past hips only. VCs for initiation and sequencing)  Toileting: Setup;Supervision (Pt stood to use urinal secondary to urgency)    Shower Transfers  Shower Transfers: Not tested  [facility] Transfers Comments: Pt declined     Transfers  Sit to stand: Stand by assistance  Stand to sit: Stand by assistance     Cognition  Overall Cognitive Status: Exceptions  Arousal/Alertness: Appropriate responses to stimuli  Following Commands: Follows one step commands with repetition  Attention Span: Attends with cues to redirect  Memory: Appears intact  Safety Judgement: Decreased awareness of need for assistance  Problem Solving: Assistance required to generate solutions  Insights: Decreased awareness of deficits  Initiation: Requires cues for some  Sequencing: Requires cues for some  Cognition Comment: Comprehension: Sup., Expression:; Min A., Social Interaction: MI., Problem Solving: Sup., Memory: MI     Card Sorting/Sequencing:   Pt used bilateral hands to sort a large deck of cards by suit (hearts, diamonds, spades, and clubs).  Pt had Min difficulty sorting cards and made 1 error that he was able to correct after therapist cued him to check his work. Pt then sequenced each deck of cards from lowest to highest (2 - Ace). Pt had Min difficulty sequencing cards and made No errors. Pt completed activity to improve cognition for safe ADL/IADL completion     Upper Extremity Function  UE Strengthing:   Large Pegs/Marbles: Pt wore a 1# wrist weight on his left wrist to insert/remove large pegs into a large board and don/doff marbles atop pegs (x100 marbles, x100 pegs). Pt retrieved pegs and marbles from a low stool placed to the left of him. Pt had Min difficulty with activity and worked at a slow and steady pace without rest breaks. Pt dropped 3 marbles throughout. Pt completed activity to increase UE strength/endurance for improved ADL/IADL completion. Placement of objects out of reach to facilitate dynamic sitting with lateral flexion and trunk rotation. Plan  Plan  Times per week: 5-7  Plan weeks: 1.5-2 weeks  Current Treatment Recommendations: Balance Training,Functional Mobility Training,Endurance Training,Safety Education & Training,Patient/Caregiver Education & Training,Equipment Evaluation, Education, & procurement,Self-Care / ADL,Home Management Training,Cognitive/Perceptual Training,Neuromuscular Re-education  Plan Comment: continue OT POC    Goals  Long term goals  Time Frame for Long term goals : Within 1.5-2 weeks patient to demonstrate progress in the following areas listed below to achieve specific LTGs as stated in the initial evaluation  Long term goal 1: improve overall endurance  Long term goal 2: improve balance for ADLs and IADLs  Long term goal 3: Demo an understanding of tone reduction  Patient Goals   Patient goals : \"the fall. ..  I am sorry\"    Patient agreed to stated goal of improving his balance    Therapy Time   Individual Concurrent Group Co-treatment   Time In 1100         Time Out 1200         Minutes 60         ADL/IADL trainin minutes  Therapeutic activities: 20 minutes  Cognitive Retraining: 10 minutes    Electronically signed by PAULETTE Garza on 12/29/2021 at 12:14 PM    PAULETTE Garza

## 2021-12-29 NOTE — PROGRESS NOTES
[facility]  Facility/Department: [facility]  Speech Language Pathology   Treatment Note          Sarita Vasquez  1955  [id number]/[id number]        Rehab Dx/Hx: Encounter for rehabilitation [Z51.89]  Abnormality of gait and mobility [R26.9]    Precautions: falls    Medical Dx: Encounter for rehabilitation [Z51.89]  Abnormality of gait and mobility [R26.9]  Speech Dx: Aphasia    Date: 12/29/2021    Subjective:  Alert and Cooperative        Interventions used this date:  Expressive Language, Receptive Language and AAC    Objective/Assessment:  Patient progressing towards goals:  Goal 1: Pt will follow (2-3) step directions given orally with 80% accuracy with min cues to increase the pt's ability to follow directions provided by caregivers for safe follow through with ADLs. Pt followed 2 step directions with 0% accuracy, increasing to 80% accuracy with mod cues. Goal 2: Pt will answer mid to high level yes/no questions with 90% accuracy with min cues to assist the caregiver in obtaining important information regarding the patient's personal, medical, and safety needs. Pt answered mid level yes/no questions with 100% accuracy. Goal 3: Patient will demonstrate improved communication abilities with caregivers through the use of an AAC device (i.e. communciation board, Ipad, etc.) with min assist to improve meeting wants and needs. Pt answered personal [facility] questions verbally with 63% accuracy, increasing to 75% accuracy with min cues. Pt used alphabet board to spell answers with 63% accuracy, increasing to 100% accuracy with min-mod cues. Pt located icon named on '[employer]' AAC printed page with much difficulty. When icons were reduced to half the page pt continued to have difficulty. When 1/4 of page was shown, pt identified with increased time and mod cues.       Treatment/Activity Tolerance:  Patient tolerated treatment well    Plan:  Continue per POC    Pain Assessment:  Pre-Treatment  Pain assessment: 0-10  Pain level: 3  Intervention:  Patient reported acceptable level for treatment. Post-Treatment  Pain assessment: 0-10  Pain level: 3  Intervention:  Patient reported acceptable level for treatment. Patient/Caregiver Education:  Patient educated on session and progression towards goals. Patient stated verbal understanding of directions. Safety Devices: All fall risk precautions in place      [id number] [address] ([employer]):    [employer]  Ratin    AAC  Rating: 3        Therapy Time  SLP Individual Minutes  Time In: 1300  Time Out: 1330  Minutes: 30              Signature: Electronically signed by Salma Moe.  OSCAR Overton on 2021 at 3:32 PM

## 2021-12-29 NOTE — PROGRESS NOTES
Pt medicated at hs with roxicodone 5 mg po for 4/10 pain in right arm and right leg. Pt also requested Ambien 5 mg po for sleep and was given at 2157. Pt cooperative and pleasant. Pt is aphasic and delayed with his speech. Last BM was 12/27. Uses urinal at bedside. Right arm contracted with weakness. Pt tested covid negative on 12/27/21. Call light in reach and bed alarm on. Electronically signed by Vic Quick.  Raymond Stock on 12/29/2021 at 1:08 AM

## 2021-12-30 PROCEDURE — 6370000000 HC RX 637 (ALT 250 FOR IP): Performed by: NURSE PRACTITIONER

## 2021-12-30 PROCEDURE — 1180000000 HC REHAB R&B

## 2021-12-30 PROCEDURE — 97535 SELF CARE MNGMENT TRAINING: CPT

## 2021-12-30 PROCEDURE — 97116 GAIT TRAINING THERAPY: CPT

## 2021-12-30 PROCEDURE — 6370000000 HC RX 637 (ALT 250 FOR IP): Performed by: INTERNAL MEDICINE

## 2021-12-30 PROCEDURE — 6370000000 HC RX 637 (ALT 250 FOR IP): Performed by: PHYSICAL MEDICINE & REHABILITATION

## 2021-12-30 PROCEDURE — 97110 THERAPEUTIC EXERCISES: CPT

## 2021-12-30 PROCEDURE — 97129 THER IVNTJ 1ST 15 MIN: CPT

## 2021-12-30 PROCEDURE — 97112 NEUROMUSCULAR REEDUCATION: CPT

## 2021-12-30 PROCEDURE — 97530 THERAPEUTIC ACTIVITIES: CPT

## 2021-12-30 RX ADMIN — BACLOFEN 10 MG: 10 TABLET ORAL at 08:13

## 2021-12-30 RX ADMIN — LEVOTHYROXINE SODIUM 50 MCG: 0.05 TABLET ORAL at 08:13

## 2021-12-30 RX ADMIN — BACLOFEN 10 MG: 10 TABLET ORAL at 14:39

## 2021-12-30 RX ADMIN — LEVETIRACETAM 750 MG: 500 TABLET, FILM COATED ORAL at 08:12

## 2021-12-30 RX ADMIN — BACLOFEN 10 MG: 10 TABLET ORAL at 19:28

## 2021-12-30 RX ADMIN — ARIPIPRAZOLE 2 MG: 2 TABLET ORAL at 08:12

## 2021-12-30 RX ADMIN — RIVAROXABAN 20 MG: 20 TABLET, FILM COATED ORAL at 08:13

## 2021-12-30 RX ADMIN — ASPIRIN 81 MG: 81 TABLET, CHEWABLE ORAL at 08:13

## 2021-12-30 RX ADMIN — Medication 2000 UNITS: at 16:14

## 2021-12-30 RX ADMIN — Medication 100 MG: at 08:13

## 2021-12-30 RX ADMIN — OXYCODONE 5 MG: 5 TABLET ORAL at 08:16

## 2021-12-30 RX ADMIN — LEVETIRACETAM 750 MG: 500 TABLET, FILM COATED ORAL at 19:28

## 2021-12-30 RX ADMIN — DULOXETINE 60 MG: 60 CAPSULE, DELAYED RELEASE ORAL at 08:12

## 2021-12-30 RX ADMIN — OXYCODONE 5 MG: 5 TABLET ORAL at 19:31

## 2021-12-30 RX ADMIN — OXYCODONE 5 MG: 5 TABLET ORAL at 13:40

## 2021-12-30 ASSESSMENT — PAIN SCALES - GENERAL
PAINLEVEL_OUTOF10: 5
PAINLEVEL_OUTOF10: 4
PAINLEVEL_OUTOF10: 5
PAINLEVEL_OUTOF10: 5
PAINLEVEL_OUTOF10: 6

## 2021-12-30 ASSESSMENT — PAIN DESCRIPTION - ONSET: ONSET: ON-GOING

## 2021-12-30 ASSESSMENT — PAIN DESCRIPTION - PAIN TYPE: TYPE: CHRONIC PAIN

## 2021-12-30 ASSESSMENT — PAIN DESCRIPTION - LOCATION
LOCATION: LEG
LOCATION: LEG;ARM

## 2021-12-30 ASSESSMENT — PAIN DESCRIPTION - PROGRESSION: CLINICAL_PROGRESSION: NOT CHANGED

## 2021-12-30 ASSESSMENT — PAIN DESCRIPTION - ORIENTATION
ORIENTATION: RIGHT
ORIENTATION: LEFT

## 2021-12-30 ASSESSMENT — PAIN DESCRIPTION - DESCRIPTORS
DESCRIPTORS: ACHING;DISCOMFORT
DESCRIPTORS: ACHING

## 2021-12-30 ASSESSMENT — PAIN DESCRIPTION - FREQUENCY: FREQUENCY: CONTINUOUS

## 2021-12-30 NOTE — CARE COORDINATION
[address] [place] [address] NOTE  Room: R249/R249-01  Admit Date: 2021       Date: 2021  Patient Name: Hipolito Bang        MRN: [id number]    : 1955  (68 y.o.)  Gender: male        REHAB DIAGNOSIS:   Diagnosis: Impaired mobility and ADL's due to Late Effects CVA    CO MORBIDITIES:      Past Medical History:   Diagnosis Date    Chronic right shoulder pain 10/4/2017    Closed TBI (traumatic brain injury) ([facility] 2012    Essential hypertension 2017    Hemiparesis affecting dominant side as late effect of cerebrovascular accident (CVA) ([facility] 2018    History of CVA (cerebrovascular accident) 2017    Hyperlipidemia 2017    Hypoxemia requiring supplemental oxygen 10/4/2017    Recurrent depression ([facility] 2017    S/P patent foramen ovale closure 2017    Seizure ([facility] 2017     History reviewed. No pertinent surgical history.      Restrictions  Restrictions/Precautions: Fall Risk,Seizure  CASE MANAGEMENT    Social/Functional History  Social/Functional History  Lives With: Alone  Type of Home: House (in law suite on friend's home)  Home Layout: One level  Home Access: Stairs to enter without rails  Entrance Stairs - Number of Steps: 1 stoop  Bathroom Shower/Tub: Tub/Shower unit  Bathroom Equipment: Shower chair,Grab bars in [address] [employer]: Quad cane,Electric scooter,Wheelchair-manual,Reacher  ADL Assistance: Independent  Homemaking Assistance: Independent  Meal Prep Responsibility: Primary (Patient reported that he uses the microwave and he does not use the stovetop,oven or a crockpot)  Laundry Responsibility: Primary  Cleaning Responsibility: No (daughter cleans)  Bill Paying/Finance Responsibility: No  Shopping Responsibility: Secondary  Health Care Management: No (Patient has someone that fills a pill organizer)  Ambulation Assistance: Independent  Transfer Assistance: Independent  Active : No  Occupation: Retired  Leisure & Hobbies: Patient demonstrated by moving his arm in circles that he goes around the house  Additional Comments: Patient reported that he has 2 dogs that he is responsible for. Patient does not have to go outside with them to potty them per his report but patient had difficulty with describing the situation. Pts personal preferences: n/a    Pts assets/resources/support system: family local and supportive    COVERAGE INFORMATION:Payor: MEDICARE / Plan: MEDICARE PART A AND B / Product Type: *No Product type* /       NURSING  Weight: 174 lb 6.4 oz (79.1 kg) / Body mass index is 26.52 kg/m². ADULT DIET;  Regular    SpO2: 97 % (12/30/21 0614)  O2 Flow Rate (L/min): 0 L/min (12/23/21 4055)  No active isolations    Skin Issues: No    Pain Managed: Yes    Bladder continence: Yes    Bowel continence: Yes      Other:       PHYSICAL THERAPY  Bed mobility:  Supine to Sit: Modified independent (12/30/21 0929)  Sit to Supine: Modified independent (12/30/21 0929)  Transfers:  Sit to Stand: Modified independent (12/30/21 0929)  Bed to Chair: Modified independent (12/30/21 0929)  Gait:   Device: [employer] (12/30/21 2038)  Other Apparatus: Right (air cast) (12/29/21 1014)  Assistance: Modified Independent;Supervision (12/30/21 0931)  Distance: 10 feet and 50 feet in protected area; 150 feet in hallway (12/30/21 0931)  Quality of Gait: decreased RLE stance time, recurvatum of right knee needed to maintain right foot flat on floor and to achieve adequate knee stability, pelvis rotated to right (12/30/21 0931)  Comments: pt mildly apprehensive in white area due to unexpected distractions (12/30/21 0931)  Stairs:  # Steps : 12 (12/30/21 0931)  Curbs: 4\" (12/30/21 0931)  Rails: Left ascending (12/30/21 0931)  Assistance: Supervision (12/30/21 0931)  Comment: no cues required; supervision to ensure motor control consistency (12/30/21 0931)  W/C mobility:  Level of Assistance: Independent (12/28/21 1338)  Distance: 70 feet with 2 turns - limited by destination (12/28/21 1338)  Description/ Details: cues for safe environmentl maneuvering required (12/22/21 1044)  LTG:  Long term goal 1: indep and effecient bed mobility -met  Long term goal 2: indep sit to stand and bed transfers - met  Long term goal 3: indep gait with qc 50 feet in protected environment - met  Long term goal 4: supervision with curb step performance for home entry - met  Long term goal 5: 20/56 for villalba balance testing - met  PT Treatment Time:  1.5 hrs      OCCUPATIONAL THERAPY  Hand Dominance: Right (uses his left hand since previous CVA)  ADL  Feeding: Setup (12/23/21 1231)  Grooming: Independent (To complete oral care, hair care, hand hygiene, and wash face while seated at sink) (12/29/21 1144)  UE Bathing: Supervision;Verbal cueing (Pt washed under arms only. Pt initially declined, however, was agreeable after encourageent) (12/29/21 1144)  LE Bathing: Supervision;Verbal cueing (Pt washed ara areas only. Pt initially declined, however, was agreeable after encouragement) (12/29/21 1144)  UE Dressing: Supervision; Increased time to complete;Verbal cueing (To doff/don long sleevd shirt and jacket. Increased time and effort. VCs for initiation and sequencing) (12/29/21 1144)  LE Dressing: Supervision;Verbal cueing; Increased time to complete (To doff/don pants past hips only. VCs for initiation and sequencing) (12/29/21 1144)  Toileting: Setup;Supervision (Pt stood to use urinal secondary to urgency) (12/29/21 1144)  Additional Comments: assist for balance to stand for hygiene (12/28/21 0900)  Toilet Transfers  Toilet - Technique: Stand pivot (12/28/21 0913)  Equipment Used: Raised toilet seat with rails (12/28/21 0913)  Toilet Transfer: Contact guard assistance (12/28/21 0913)     Shower Transfers  Shower - Transfer From: Wheelchair (12/22/21 1311)  Shower - Transfer Type: To and From (12/22/21 1311)  Shower - Transfer To:  Shower seat with back (12/22/21 1311)  Shower - Technique: Stand pivot (12/22/21 1311)  Shower Transfers: Not tested (12/29/21 1139)  Shower Transfers Comments: Pt declined (12/29/21 1139)  LTG:  Eating  Assistance Needed: Setup or clean-up assistance  CARE Score: 5  Discharge Goal: Independent, Oral Hygiene  Assistance Needed: Setup or clean-up assistance  CARE Score: 5  Discharge Goal: Independent, Toileting Hygiene  Assistance Needed: Partial/moderate assistance  CARE Score: 3  Discharge Goal: Independent, Shower/Bathe Self  Assistance Needed: Partial/moderate assistance  CARE Score: 3  Discharge Goal: Independent  Upper Body Dressing  Assistance Needed: Supervision or touching assistance  CARE Score: 4  Discharge Goal: Independent, Lower Body Dressing  Assistance Needed: Partial/moderate assistance  CARE Score: 3  Discharge Goal: Independent, Putting On/Taking Off Footwear  Assistance Needed: Partial/moderate assistance  CARE Score: 3  Discharge Goal: Independent, Toilet Transfer  Assistance Needed: Partial/moderate assistance  CARE Score: 3  Discharge Goal: Independent  OT Treatment Time: 1.5 hrs      SPEECH THERAPY    Motor Speech: Exceptions to WFL (moderate speech apraxia)  Comprehension: Exceptions (min assist)  Verbal Expression: Exceptions to functional limits (moderate-severe aphasia)      Diet/Swallow:  Diet Solids Recommendation: Regular  Liquid Consistency Recommendation: Thin  Dysphagia Outcome Severity Scale: Level 5: Mild dysphagia- Distant supervision.  May need one diet consistency restricted    Compensatory Swallowing Strategies: Upright as possible for all oral intake,Small bites/sips  Therapeutic Interventions: Diet tolerance monitoring,Patient/Family education      LTG:  Long-term Goals  Timeframe for Long-term Goals: 2-3 weeks  Goal 1: Pt will improve his/her Expressive Language abilities to a superivised level with AAC  for expression of complex wants, needs, feelings/ideas, and medical/safety information. Goal 2: Pt will improve her/his Receptive Language abilities to a supervise to min assist level for comprehension of conversation and safety directions with familiar and unfamiliar communication partners. Long-term Goals  Timeframe for Long-term Goals: 1-2 weeks  Goal 1: Patient will tolerate LRD without overt s/s of aspiration    Additional Comments: Prior speech therapy as an outpatient 3 years ago. Rec: AAC evaluation, outpatient speech therapy. Pt in agreement. COGNITION  OT: Cognition Comment: Comprehension: Sup., Expression:; Min A., Social Interaction: MI., Problem Solving: Sup., Memory: MI  SP:Memory: Unable to assess  Problem Solving: Unable to assess    RECREATIONAL THERAPY  Attendance to recreational therapy programs:    []  Pet Therapy  [] Music Therapy  [] Art Therapy    [] Recreation Therapy Group [] Support Group           Patient social interaction (mood, participation): good      Patient strengths: independent prior    Patients goal: return home    Problems/Barriers: lives alone        1. Safety:          - Intervention / Plan:    [x]  falls protocol     [x]  PT/OT    [x]  SP        - Results:         2. Potential DME needs:         - Intervention / Plan:  [x]  PT/OT     [x]  Assess equipment needs/access       - Results:         3. Weakness:          - Intervention / Plan:  [x]  PT/OT      []  Other:         - Results:         4. Discharge planning needs:          - Intervention / Plan:  [x]  Weekly team conference      [x]  family training        - Results:         5.            - Intervention / Plan:          - Results:         6.            - Intervention / Plan:         - Results:         7.            - Intervention / Plan:         - Results:           Discharge Plan   Estimated Length of Stay: 14 days    Tentative Discharge date: 1/1/22      Anticipated Discharge Destination:  Home      Team recommendations:    1. Follow up Therapy :    PT  OT  SLP    2.

## 2021-12-30 NOTE — PROGRESS NOTES
Assessment complete. VSS. Roxicodone given this evening for HA. Rt arm is contracted. LBM 12/29/2021. Bed alarm on. Call light in reach.  Electronically signed by Timothy Villalobos RN on 12/30/2021 at 1:27 AM'

## 2021-12-30 NOTE — PROGRESS NOTES
Subjective: The patient complains of severe acute on chronic progressive fatigue and right-sided weakness secondary to previous CVA, recent fall with closed head injury aphasia, cognitive decline partially relieved by rest,medications, PT,  OT, history of residual cognitive, communication, right-sided weakness and seizure secondary to previous CVA unfortunately has had a recent closed head injury secondary to a fall with decline in functional status. He was admission admitted through the emergency room on 12/20/2021. He had fallen at home. Trauma work-up showed no acute traumatic fractures. Labs were significant for positive and elevated lactic acid level. He was prescribed IV fluid boluses. His toxicology screen was positive for cannabinoids. His urinary analysis showed ketones but no bacteria culture was not indicated. SARS-CoV-2 testing was negative. SLP and rest and exacerbated by recent illness. ROS x10: The patient also complains of severely impaired mobility and activities of daily living. Otherwise no new problems with vision, hearing, nose, mouth, throat, dermal, cardiovascular, GI, , pulmonary, musculoskeletal, psychiatric or neurological. See Rehab H&P on Rehab chart dated . Vital signs:  /68   Pulse 55   Temp 97.9 °F (36.6 °C) (Oral)   Resp 16   Wt 174 lb 6.4 oz (79.1 kg)   SpO2 97%   BMI 26.52 kg/m²   I/O:   PO/Intake:  fair PO intake, frequent constipation. Bowel/Bladder:  incontinent, assist with urinal, LBM 12/24  General:  Patient is well developed, adequately nourished, non-obese and     well kempt. HEENT:    PERRLA, hearing intact to loud voice, external inspection of ear     and nose benign. Inspection of lips, tongue and gums benign  Musculoskeletal: No significant change in strength or tone. All joints stable. Inspection and palpation of digits and nails show no clubbing,       cyanosis or inflammatory conditions.    Neuro/Psychiatric: Affect: flat but pleasant. Alert and oriented to person, place and     Situation with  mod cues. No significant change in deep tendon reflexes or     sensation  Lungs:  Diminished, CTA-B. Respiration effort is normal at rest.     Heart:   S1 = S2, RRR. No loud murmurs. Abdomen:  Soft, non-tender, no enlargement of liver or spleen. Extremities:  No significant lower extremity edema or tenderness. Upper extremity spasticity -right shoulder subluxation  Skin:   Intact to general survey, laceration and bruising left face and forehead    Rehabilitation:  Physical therapy:   Bed Mobility: Scooting: Stand by assistance    Transfers: Sit to Stand: Modified independent  Stand to sit: Modified independent  Bed to Chair: Modified independent, Ambulation 1  Surface: level tile,uneven,carpet  Device: [employer]  Other Apparatus: Right (air cast)  Assistance: Modified Independent,Supervision  Quality of Gait: decreased RLE stance time, recurvatum of right knee needed to maintain right foot flat on floor and to achieve adequate knee stability, pelvis rotated to right  Distance: 10 feet and 50 feet in protected area; 150 feet in hallway  Comments: pt mildly apprehensive in white area due to unexpected distractions, Stairs  # Steps : 12  Stairs Height: 6\"  Rails: Left ascending  Curbs: 4\"  Device:  (rail to simulate home rail)  Other Apparatus:  (rail simulated for home going)  Assistance: Supervision  Comment: no cues required; supervision to ensure motor control consistency    FIMS:  ,  , Assessment: Pt demonstrates continued indep in protected areas. He demonstrates deficits with RLE wieght bearing positioning with oncern for longterm right knee and ankle injury.  Pt safe to walk with current positioning for household and shorter distance however    Occupational therapy:    ,  , Assessment: Pt is a 77year old man from home who [facility] with the above deficits which impact his ability to perform ADLs and IADLs at his baseline. Pt. would benefit from continued OT to maximize independence and safety with ADL tasks. Speech therapy:        Lab/X-ray studies reviewed, analyzed and discussed with patient and staff:   No results found for this or any previous visit (from the past 24 hour(s)). CT HEAD  12/20/2021  Extra-axial spaces:  Normal. Intracranial hemorrhage:  None. Ventricular system: Ventricles mildly enlarged. Sulci mildly prominent. Ex vacuo dilatation, left lateral ventricle. Basal Cisterns:  Normal. Cerebral Parenchyma: Bilateral symmetric periventricular areas decreased attenuation. Geographic area of decreased attenuation, left frontal and left temporal lobes exerting no mass effect. Midline Shift:  None. Cerebellum:  Normal. Paranasal sinuses and mastoid air cells:  Normal. Visualized Orbits:  Normal.     Impression: Remote left frontal and left temporal lobe infarcts. Mild cerebral atrophy. Chronic ischemic white matter disease. CT CHEST 12/20/2021   . Chest: Lines, tubes, and devices:  None. Lung parenchyma and pleura: No consolidation. No suspicious pulmonary nodule. No pleural effusion. Central airways are patent. Mild bibasilar atelectasis. Thoracic inlet, heart, and mediastinum:  No lymphadenopathy in the axillary, mediastinal, or hilar regions. Mild atherosclerotic vascular thoracic aorta. The thoracic aorta and main pulmonary artery are normal in caliber. The cardiac chambers are normal in size. Status post foramen ovale closure. Mild coronary artery atherosclerotic calcifications are noted, although the study is not optimized for coronary assessment. No pericardial effusion or thickening. Thyroid gland is unremarkable. Esophagus is nondilated. Bones/Soft Tissues: Degenerative changes. Abdomen / Pelvis: Liver: No mass. Right hepatic lobe metallic density. Biliary: No bile duct dilation. Gallbladder is unremarkable. Spleen: No mass. No splenomegaly. Pancreas: No mass or duct dilation.  Adrenals: No mass. Kidneys: No mass, calculus or hydronephrosis. GI tract: No dilation or wall thickening. Large amount of colonic stool. Lymph nodes: No abdominal or pelvic lymphadenopathy. Mesentery/Peritoneum: No ascites or mass. Retroperitoneum: No mass. Vasculature: The celiac axis and SMA are patent. The portal vein and branches, splenic vein, SMV, and hepatic veins are patent. Arterial atherosclerotic disease without aneurysm. There is an IVC filter. Pelvis: No mass, ascites or fluid collection. Urinary bladder is unremarkable. Soft tissue/ bones: Grade I anterolisthesis of L5 on S1. Multilevel degenerative changes of the lumbar spine. No acute abnormality in the chest, abdomen and pelvis. Large amount of colonic stool. CT CERVICAL SPINE   12/20/2021: Loss of height, C5 and C6 with cranial caudal dimensions 1.2 cm, and 0.8 cm, respectively. Loss cervical lordosis. Remote 8mm anterolisthesis C4 on C5. Atlantooccipital articulation maintained. Atlantoaxial interval preserved. Neural foramina narrowing, left C4-5. Diffuse disc space narrowing C3-4, through T2-3, greatest at C4-5 through C6-7. No fractures, dislocations, bone lesions. Limited imaging lung apices without anomaly. Carotid arteries and soft tissues are without anomaly. No fracture. Marked diffuse degenerative change cervical spine. C4 anterolisthesis and loss of body height, C6 and C7, most likely secondary to degenerative change. CT ABDOMEN PELVIS   12/20/2021 No acute abnormality in the chest, abdomen and pelvis. Large amount of colonic stool. Previous extensive, complex labs, notes and diagnostics reviewed and analyzed. ALLERGIES:    Allergies as of 12/21/2021    (No Known Allergies)      (please also verify by checking MAR)      Recently, I evaluated this patient for periodic reassessment of medical and functional status.   The patient was discussed in detail at the treatment team meeting focusing on current medical issues, progress in therapies, social issues, psychological issues, barriers to progress and strategies to address these barriers, and discharge planning. See the hand written addendum to rehab progress note. The patient continues to be high risk for future disability and their medical and rehabilitation prognosis continue to be good and therefore, we will continue the patient's rehabilitation course as planned. The patient's tentative discharge date was set. Patient and family education was discussed. The patient was made aware of the team discussion regarding their progress. Discharge plans were discussed along with barriers to progress and strategies to address these barriers, patient encouraged to continue to discuss discharge plans with . Complex Physical Medicine & Rehab Issues Assess & Plan:   1. Severe abnormality of gait and mobility and impaired self-care and ADL's secondary to  Late effects of CVA Remote left frontal and left temporal lobe infarcts- . Functional and medical status reassessed regarding patients ability to participate in therapies and patient found to be able to participate in acute intensive comprehensive inpatient rehabilitation program including PT/OT to improve balance, ambulation, ADLs, and to improve the P/AROM. Therapeutic modifications regarding activities in therapies, place, amount of time per day and intensity of therapy made daily. In bed therapies or bedside therapies prn.   2. Bowel and Bladder dysfunction neurogenic bowel and bladder:  frequent toileting, ambulate to bathroom with assistance, check post void residuals. Check for C.difficile x1 if >2 loose stools in 24 hours, continue bowel & bladder program.  Monitor bowel and bladder function. Lactinex 2 PO every AC.   MOM prn, Brown Bomb prn, Glycerin suppository prn, enema prn.  3. Severe cervical and low back pain as well as generalized OA pain: reassess pain every shift and prior to and after each therapy session, give prn Tylenol and   Ultram versus OxyIR, modalities prn in therapy, masage, Lidoderm, K-pad prn. Consider scheduled AM pain meds. 4. Skin healing and breakdown risk:  continue pressure relief program.  Daily skin exams and reports from nursing. 5. Severe fatigue due to nutritional and hydration deficiency: Add and titrate vitamin B12 vitamin D and CoQ10 continue to monitor I&Os, calorie counts prn, dietary consult prn.  6. Acute episodic insomnia with situational adjustment disorder:  prn Ambien, monitor for day time sedation. 7. Falls risk elevated:  patient to use call light to get nursing assistance to get up, bed and chair alarm. 8. Elevated DVT risk: progressive activities in PT, continue prophylaxis LUCIANO hose, elevation and Xarelto. 9. Complex discharge planning: Discharge January 1, 2020 to home alone with help from his family who lives locally and home health care PT OT RN aide and social work. Until then continue weekly team meeting every  Thursday to assess progress towards goals, discuss and address social, psychological and medical comorbidities and to address difficulties they may be having progressing in therapy. Patient and family education is in progress. The patient is to follow-up with their family physician after discharge. Complex Active General Medical Issues that complicate care Assess & Plan:    1. History of CVA (cerebrovascular accident)-focus on mobility ADL and cognitive deficits  2. Recurrent depression-emotional support provided daily, vitamin B12, encourage participation in rehabilitation support group and recreational therapy, adjust/add medications (Abilify, Cymbalta, add co-Q10 vitamin B12) it appears the patient uses cannabinoids at home.   3.   Hyperlipidemia, coronary artery disease, cerebrovascular disease-continue blood signs every shift focusing on heart rate and blood pressure checks, consult hospitalist for backup medical and adjust/add medications (Xarelto). Monitor heart rate and blood pressure as well as medications effects on vital signs before during and after therapy. 4.   Hemiparesis affecting dominant side as late effect of cerebrovascular accident (CVA)   5. 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 Chronic pain syndrome,   DJD (degenerative joint disease), lumbar, DJD (degenerative joint disease), cervical-transition to lowest effective dose of pain medication use topicals when available lowest effective dose of Ultram especially given cannabinoid use and Cymbalta. 7.   Hypothyroidism-  Synthroid and titrate dosing. Follow vital signs, recheck TSH and thyroid studies as outpatient. 8.   Chronic deep vein thrombosis (DVT) of left popliteal vein-Xaralto  9. Bowel and bladder incontinence-check post void residuals add scheduled toileting  10.    Aphasia-consult speech and language pathology         Ankush Alonso D.O., PM&R     Attending    [phone]

## 2021-12-30 NOTE — PROGRESS NOTES
Occupational Therapy  Facility/Department: [facility]  Daily Treatment Note  NAME: Sabine Chen  : 1955  MRN: [id number]    Date of Service: 2021    Discharge Recommendations:  Continue to assess pending progress    Assessment  Activity Tolerance  Activity Tolerance: Patient Tolerated treatment well  Safety Devices  Safety Devices in place: Yes  Type of devices: All fall risk precautions in place       Patient Diagnosis(es): There were no encounter diagnoses. has a past medical history of Chronic right shoulder pain, Closed TBI (traumatic brain injury) ([facility] [address] Essential hypertension, Hemiparesis affecting dominant side as late effect of cerebrovascular accident (CVA) ([facility] [address] History of CVA (cerebrovascular accident), Hyperlipidemia, Hypoxemia requiring supplemental oxygen, Recurrent depression ([facility] [address] S/P patent foramen ovale closure, and Seizure ([facility] [address] has no past surgical history on file. Restrictions  Restrictions/Precautions  Restrictions/Precautions: Fall Risk,Seizure     Subjective  General  Chart Reviewed: Yes  Patient assessed for rehabilitation services?: Yes  Response to previous treatment: Patient with no complaints from previous session  Family / Caregiver Present: No  Referring Practitioner: Dr Bety Mcneil  Diagnosis: Impaired mobility and activities of daily living dt late effects of CVA  Pain Assessment  Pain Assessment: 0-10  Pain Level: 5  Pain Location: Leg  Pain Orientation: Left  Pain Descriptors: Aching  Pain Frequency: Continuous  Pain Onset: On-going  Clinical Progression: Not changed  Non-Pharmaceutical Pain Intervention(s): Rest;Declines  Response to Pain Intervention: Patient Satisfied  Pre Treatment Pain Screening  Pain at present: 5  Scale Used: Numeric Score  Intervention List: Patient able to continue with treatment;Patient declined any intervention  Comments / Details: Pt reports that he is unable to recall if he had pain medication.  Pt declines any at this time  Vital Signs  Patient Currently in Pain: Yes     Objective     Standing Balance  Time: Maximum of 5 minutes before requiring a seated rest break (x 2 trials)  Activity: Colored Blocks  Pt stood to to don/doff colored blocks onto vertical graded rods using his left hand. Pt retrieved blocks from a basket placed on a low chair to the left of him. Pt stood with F+ balance (Minimal disturbances in balance). Placement of objects out of reach to facilitate dynamic standing with weight shifting and trunk rotation. Pt completed task to increase standing tolerance for mgmt of pants during ADL completion. Transfers  Sit to stand: Supervision  Stand to sit: Supervision     Coordination  Fine Motor:   Small Pegs: Pt used his left hand to insert/remove small pegs into a small board. Pt inserted pegs according to color. Pt had Min difficulty with activity and worked at a slow and steady pace without rest breaks. Pt did not drop any pegs. Pt completed activity to improve hand fine motor coordination for mgmt of clothing fasteners/ADLcontainers in a timely manner. Problem soling incorporated into activity. Upper Extremity Function  UE Strengthing:   Rings on Rods: Pt wore a 2# wrist weight on his left wrist. Pt retrieved plastic rings that were spread out across the tabletop. Pt donned/doffed rings onto horizontal rods at graded heights and distances. Pt had Min/Mod difficulty with activity and worked at a slow and steady pace with rest breaks prn. Pt reports increased fatigue wearing 2# weight as opposed to 1# he has previously worn. Repetitive reaching at an above shoulder level to increase LUE strength for improved transfers. Cognition  Overall Cognitive Status: Exceptions  Arousal/Alertness: Appropriate responses to stimuli  Following Commands:  Follows multistep commands with increased time  Attention Span: Appears intact  Memory: Appears intact  Problem Solving: Assistance required to identify errors

## 2021-12-30 NOTE — PROGRESS NOTES
Subjective: The patient complains of severe acute on chronic progressive fatigue and right-sided weakness secondary to previous CVA, recent fall with closed head injury aphasia, cognitive decline partially relieved by rest,medications, PT,  OT, history of residual cognitive, communication, right-sided weakness and seizure secondary to previous CVA unfortunately has had a recent closed head injury secondary to a fall with decline in functional status. He was admission admitted through the emergency room on 12/20/2021. He had fallen at home. Trauma work-up showed no acute traumatic fractures. Labs were significant for positive and elevated lactic acid level. He was prescribed IV fluid boluses. His toxicology screen was positive for cannabinoids. His urinary analysis showed ketones but no bacteria culture was not indicated. SARS-CoV-2 testing was negative. SLP and rest and exacerbated by recent illness. ROS x10: The patient also complains of severely impaired mobility and activities of daily living. Otherwise no new problems with vision, hearing, nose, mouth, throat, dermal, cardiovascular, GI, , pulmonary, musculoskeletal, psychiatric or neurological. See Rehab H&P on Rehab chart dated . Vital signs:  /68   Pulse 55   Temp 97.9 °F (36.6 °C) (Oral)   Resp 16   Wt 174 lb 6.4 oz (79.1 kg)   SpO2 97%   BMI 26.52 kg/m²   I/O:   PO/Intake:  fair PO intake, frequent constipation. Bowel/Bladder:  incontinent, assist with urinal, LBM 12/24  General:  Patient is well developed, adequately nourished, non-obese and     well kempt. HEENT:    PERRLA, hearing intact to loud voice, external inspection of ear     and nose benign. Inspection of lips, tongue and gums benign  Musculoskeletal: No significant change in strength or tone. All joints stable. Inspection and palpation of digits and nails show no clubbing,       cyanosis or inflammatory conditions.    Neuro/Psychiatric: Affect: flat but pleasant. Alert and oriented to person, place and     Situation with  mod cues. No significant change in deep tendon reflexes or     sensation  Lungs:  Diminished, CTA-B. Respiration effort is normal at rest.     Heart:   S1 = S2, RRR. No loud murmurs. Abdomen:  Soft, non-tender, no enlargement of liver or spleen. Extremities:  No significant lower extremity edema or tenderness. Upper extremity spasticity -right shoulder subluxation  Skin:   Intact to general survey, laceration and bruising left face and forehead    Rehabilitation:  Physical therapy:   Bed Mobility: Scooting: Stand by assistance    Transfers: Sit to Stand: Modified independent  Stand to sit: Modified independent  Bed to Chair: Modified independent, Ambulation 1  Surface: level tile,uneven,carpet  Device: [email or web address]  Other Apparatus: Right (air cast)  Assistance: Modified Independent,Supervision  Quality of Gait: decreased RLE stance time, recurvatum of right knee needed to maintain right foot flat on floor and to achieve adequate knee stability, pelvis rotated to right  Distance: 10 feet and 50 feet in protected area; 150 feet in hallway  Comments: pt mildly apprehensive in white area due to unexpected distractions, Stairs  # Steps : 12  Stairs Height: 6\"  Rails: Left ascending  Curbs: 4\"  Device:  (rail to simulate home rail)  Other Apparatus:  (rail simulated for home going)  Assistance: Supervision  Comment: no cues required; supervision to ensure motor control consistency    FIMS:  ,  , Assessment: Pt demonstrates continued indep in protected areas. He demonstrates deficits with RLE wieght bearing positioning with oncern for longterm right knee and ankle injury.  Pt safe to walk with current positioning for household and shorter distance however    Occupational therapy:    ,  , Assessment: Pt is a 77year old man from home who presents to [facility] with the above deficits which impact his ability to perform ADLs and IADLs at his baseline. Pt. would benefit from continued OT to maximize independence and safety with ADL tasks. Speech therapy:        Lab/X-ray studies reviewed, analyzed and discussed with patient and staff:   No results found for this or any previous visit (from the past 24 hour(s)). CT HEAD  12/20/2021  Extra-axial spaces:  Normal. Intracranial hemorrhage:  None. Ventricular system: Ventricles mildly enlarged. Sulci mildly prominent. Ex vacuo dilatation, left lateral ventricle. Basal Cisterns:  Normal. Cerebral Parenchyma: Bilateral symmetric periventricular areas decreased attenuation. Geographic area of decreased attenuation, left frontal and left temporal lobes exerting no mass effect. Midline Shift:  None. Cerebellum:  Normal. Paranasal sinuses and mastoid air cells:  Normal. Visualized Orbits:  Normal.     Impression: Remote left frontal and left temporal lobe infarcts. Mild cerebral atrophy. Chronic ischemic white matter disease. CT CHEST 12/20/2021   . Chest: Lines, tubes, and devices:  None. Lung parenchyma and pleura: No consolidation. No suspicious pulmonary nodule. No pleural effusion. Central airways are patent. Mild bibasilar atelectasis. Thoracic inlet, heart, and mediastinum:  No lymphadenopathy in the axillary, mediastinal, or hilar regions. Mild atherosclerotic vascular thoracic aorta. The thoracic aorta and main pulmonary artery are normal in caliber. The cardiac chambers are normal in size. Status post foramen ovale closure. Mild coronary artery atherosclerotic calcifications are noted, although the study is not optimized for coronary assessment. No pericardial effusion or thickening. Thyroid gland is unremarkable. Esophagus is nondilated. Bones/Soft Tissues: Degenerative changes. Abdomen / Pelvis: Liver: No mass. Right hepatic lobe metallic density. Biliary: No bile duct dilation. Gallbladder is unremarkable. Spleen: No mass. No splenomegaly. Pancreas: No mass or duct dilation.  Adrenals: No mass. Kidneys: No mass, calculus or hydronephrosis. GI tract: No dilation or wall thickening. Large amount of colonic stool. Lymph nodes: No abdominal or pelvic lymphadenopathy. Mesentery/Peritoneum: No ascites or mass. Retroperitoneum: No mass. Vasculature: The celiac axis and SMA are patent. The portal vein and branches, splenic vein, SMV, and hepatic veins are patent. Arterial atherosclerotic disease without aneurysm. There is an IVC filter. Pelvis: No mass, ascites or fluid collection. Urinary bladder is unremarkable. Soft tissue/ bones: Grade I anterolisthesis of L5 on S1. Multilevel degenerative changes of the lumbar spine. No acute abnormality in the chest, abdomen and pelvis. Large amount of colonic stool. CT CERVICAL SPINE   12/20/2021: Loss of height, C5 and C6 with cranial caudal dimensions 1.2 cm, and 0.8 cm, respectively. Loss cervical lordosis. Remote 8mm anterolisthesis C4 on C5. Atlantooccipital articulation maintained. Atlantoaxial interval preserved. Neural foramina narrowing, left C4-5. Diffuse disc space narrowing C3-4, through T2-3, greatest at C4-5 through C6-7. No fractures, dislocations, bone lesions. Limited imaging lung apices without anomaly. Carotid arteries and soft tissues are without anomaly. No fracture. Marked diffuse degenerative change cervical spine. C4 anterolisthesis and loss of body height, C6 and C7, most likely secondary to degenerative change. CT ABDOMEN PELVIS   12/20/2021 No acute abnormality in the chest, abdomen and pelvis. Large amount of colonic stool. Previous extensive, complex labs, notes and diagnostics reviewed and analyzed. ALLERGIES:    Allergies as of 12/21/2021    (No Known Allergies)      (please also verify by checking MAR)      Recently, I evaluated this patient for periodic reassessment of medical and functional status.   The patient was discussed in detail at the treatment team meeting focusing on current medical issues, progress in therapies, social issues, psychological issues, barriers to progress and strategies to address these barriers, and discharge planning. See the hand written addendum to rehab progress note. The patient continues to be high risk for future disability and their medical and rehabilitation prognosis continue to be good and therefore, we will continue the patient's rehabilitation course as planned. The patient's tentative discharge date was set. Patient and family education was discussed. The patient was made aware of the team discussion regarding their progress. Discharge plans were discussed along with barriers to progress and strategies to address these barriers, patient encouraged to continue to discuss discharge plans with . Complex Physical Medicine & Rehab Issues Assess & Plan:   1. Severe abnormality of gait and mobility and impaired self-care and ADL's secondary to  Late effects of CVA Remote left frontal and left temporal lobe infarcts- . Functional and medical status reassessed regarding patients ability to participate in therapies and patient found to be able to participate in acute intensive comprehensive inpatient rehabilitation program including PT/OT to improve balance, ambulation, ADLs, and to improve the P/AROM. Therapeutic modifications regarding activities in therapies, place, amount of time per day and intensity of therapy made daily. In bed therapies or bedside therapies prn.   2. Bowel and Bladder dysfunction neurogenic bowel and bladder:  frequent toileting, ambulate to bathroom with assistance, check post void residuals. Check for C.difficile x1 if >2 loose stools in 24 hours, continue bowel & bladder program.  Monitor bowel and bladder function. Lactinex 2 PO every AC.   MOM prn, Brown Bomb prn, Glycerin suppository prn, enema prn.  3. Severe cervical and low back pain as well as generalized OA pain: reassess pain every shift and prior to and after each therapy session, give prn Tylenol and   Ultram versus OxyIR, modalities prn in therapy, masage, Lidoderm, K-pad prn. Consider scheduled AM pain meds. 4. Skin healing and breakdown risk:  continue pressure relief program.  Daily skin exams and reports from nursing. 5. Severe fatigue due to nutritional and hydration deficiency: Add and titrate vitamin B12 vitamin D and CoQ10 continue to monitor I&Os, calorie counts prn, dietary consult prn.  6. Acute episodic insomnia with situational adjustment disorder:  prn Ambien, monitor for day time sedation. 7. Falls risk elevated:  patient to use call light to get nursing assistance to get up, bed and chair alarm. 8. Elevated DVT risk: progressive activities in PT, continue prophylaxis LUCIANO hose, elevation and Xarelto. 9. Complex discharge planning: Discharge January 1, 2020 to home alone with help from his family who lives locally and home health care PT OT RN aide and social work. Until then continue weekly team meeting every  Thursday to assess progress towards goals, discuss and address social, psychological and medical comorbidities and to address difficulties they may be having progressing in therapy. Patient and family education is in progress. The patient is to follow-up with their family physician after discharge. Complex Active General Medical Issues that complicate care Assess & Plan:    1. History of CVA (cerebrovascular accident)-focus on mobility ADL and cognitive deficits  2. Recurrent depression-emotional support provided daily, vitamin B12, encourage participation in rehabilitation support group and recreational therapy, adjust/add medications (Abilify, Cymbalta, add co-Q10 vitamin B12) it appears the patient uses cannabinoids at home.   3.   Hyperlipidemia, coronary artery disease, cerebrovascular disease-continue blood signs every shift focusing on heart rate and blood pressure checks, consult hospitalist for backup medical and adjust/add medications (Xarelto). Monitor heart rate and blood pressure as well as medications effects on vital signs before during and after therapy. 4.   Hemiparesis affecting dominant side as late effect of cerebrovascular accident (CVA)   5. 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 Chronic pain syndrome,   DJD (degenerative joint disease), lumbar, DJD (degenerative joint disease), cervical-transition to lowest effective dose of pain medication use topicals when available lowest effective dose of Ultram especially given cannabinoid use and Cymbalta. 7.   Hypothyroidism-  Synthroid and titrate dosing. Follow vital signs, recheck TSH and thyroid studies as outpatient. 8.   Chronic deep vein thrombosis (DVT) of left popliteal vein-Xaralto  9. Bowel and bladder incontinence-check post void residuals add scheduled toileting  10.    Aphasia-consult speech and language pathology         Brittany Lauren D.O., PM&R     Attending    [address] [place], [place] [address]

## 2021-12-30 NOTE — PROGRESS NOTES
Physical Therapy Rehab Treatment Note  Facility/Department: [facility]  Room: [place] [address]       NAME: Qi Lacy  : 1955 (77 y.o.)  MRN: [id number]  CODE STATUS: Full Code    Date of Service: 2021       Restrictions:  Restrictions/Precautions: Fall Risk,Seizure       SUBJECTIVE:       Pre Treatment Pain Screening  Pain at present: 2  Intervention List: Patient able to continue with treatment  Comments / Details: right calf and arm - nursing notified of pain medication need    Post Treatment Pain Screening:as above       OBJECTIVE:               Neuromuscular Education  PNF: standing and ambulatory balance facilitation. Challenges included gait in varying directions, in room gait with moving furniture and open/closing doors included. LE motor control facilitation with challenges including manual facilitation of movement patterns and inhibition techniques fo tone management om supine and standing  Neuromuscular Comments: Pts RLE positioning at weight bearing requires sigificant recurvatum to attain foot flat for safe weight acceptance. Concern for right knee integrity noted however ligament integrity mildly lax only. Recurvatum position results in pelvis rotation to right with all weight bearing as well    Bed mobility  Rolling to Left: Modified independent  Rolling to Right: Modified independent  Supine to Sit: Modified independent  Sit to Supine: Modified independent  Comment: at bed level with no rails    Transfers  Sit to Stand: Modified independent  Stand to sit: Modified independent  Bed to Chair: Modified independent  Car Transfer: Modified independent  Comment: good safety and judgment with approach to chairs    Ambulation  Ambulation?: Yes  Ambulation 1  Surface: level tile;uneven;carpet;ramp  Device: Large [place]  Assistance: Modified Independent;Supervision  Quality of Gait: decreased RLE stance time, recurvatum of right knee needed to maintain right foot flat on floor and to achieve adequate knee stability, pelvis rotated to right  Distance: 10 feet and 50 feet in protected area; 150 feet in hallway  Comments: pt mildly apprehensive in white area due to unexpected distractions    Stairs/Curb  Stairs?: Yes  Stairs  # Steps : 12  Stairs Height: 6\"  Rails: Left ascending  Curbs: 4\"  Other Apparatus:  (rail simulated for home going)  Assistance: Supervision  Comment: no cues required; supervision to ensure motor control consistency    Pt demonstrated ability to propel w/c 200 feet indep in hallway and with 2 turns       Activity Tolerance  Activity Tolerance: Patient Tolerated treatment well          ASSESSMENT/PROGRESS TOWARDS GOALS:  Assessment: Pt demonstrates continued indep in protected areas. He demonstrates deficits with RLE wieght bearing positioning with oncern for longterm right knee and ankle injury. Pt safe to walk with current positioning for household and shorter distance however    Goals:  Long term goals  Long term goal 1: indep and effecient bed mobility -met  Long term goal 2: indep sit to stand and bed transfers - met  Long term goal 3: indep gait with qc 50 feet in protected environment - met  Long term goal 4: supervision with curb step performance for home entry - met  Long term goal 5:  for villalba balance testing - met    PLAN OF CARE/Safety:   Safety Devices  Type of devices: Chair alarm in place; Left in chair      Therapy Time:   Individual   Time In 0853   Time Out 0955   Minutes 62     Minutes:      Transfer/Bed mobility training:10      Gait trainin      Neuro re education:30        Carmella Dakin, PT, 21 at 11:33 AM

## 2021-12-30 NOTE — PROGRESS NOTES
Occupational Therapy  Facility/Department: [facility]  Daily Treatment Note  NAME: Dayanara Albright  : 1955  MRN: [id number]    Date of Service: 2021    Discharge Recommendations:  Continue to assess pending progress       Assessment      REQUIRES OT FOLLOW UP: Yes  Activity Tolerance  Activity Tolerance: Patient Tolerated treatment well  Safety Devices  Safety Devices in place: Yes  Type of devices: All fall risk precautions in place         Patient Diagnosis(es): There were no encounter diagnoses. has a past medical history of Chronic right shoulder pain, Closed TBI (traumatic brain injury) ([facility] [address] Essential hypertension, Hemiparesis affecting dominant side as late effect of cerebrovascular accident (CVA) ([facility] [address] History of CVA (cerebrovascular accident), Hyperlipidemia, Hypoxemia requiring supplemental oxygen, Recurrent depression ([facility] [address] S/P patent foramen ovale closure, and Seizure ([facility] [address] has no past surgical history on file. Restrictions  Restrictions/Precautions  Restrictions/Precautions: Fall Risk,Seizure  Subjective   General  Chart Reviewed: Yes  Patient assessed for rehabilitation services?: Yes  Response to previous treatment: Patient with no complaints from previous session  Family / Caregiver Present: No  Referring Practitioner: Dr Lizz Brown  Diagnosis: Impaired mobility and activities of daily living dt late effects of CVA  Subjective  Subjective: \"I am going home on the .\" Meaning .   Pain Assessment  Pain Assessment: 0-10  Pain Level: 5  Pain Type: Chronic pain  Pain Location: Leg;Arm  Pain Orientation: Right  Pain Descriptors: Aching;Discomfort  Pre Treatment Pain Screening  Pain at present: 5  Scale Used: Numeric Score  Intervention List: Patient able to continue with treatment  Vital Signs  Patient Currently in Pain: Yes   Orientation  Orientation  Overall Orientation Status: Within Functional Limits  Objective        While seated at tabletop, pt placed golf tees into wooden peg board using the L UE to improve fine motor coordination during self care. Pt demonstrates no difficulty to place golf tees into pegboard. Pt able to fill all 50 holes. Pt then placed marbles on top of each golf galina with min difficulty secondary to decreased coordination. Pt knocked marbles off of tees 25% of the time. Pt then removed marbles one at a time from tees and returned them to the container with no difficulty then removed golf tees in groups of 3 using in hand manipulation with min difficulty. While seated at tabletop, pt squeezed green gripper and blue gripper 30 times using the L UE to improve /pinch strength during self care. Pt completed with good endurance and fair+ activity tolerance. Pt tolerated well. Plan   Plan  Times per week: 5-7  Plan weeks: 1.5-2 weeks  Current Treatment Recommendations: Balance Training,Functional Mobility Training,Endurance Training,Safety Education & Training,Patient/Caregiver Education & Training,Equipment Evaluation, Education, & procurement,Self-Care / ADL,Home Management Training,Cognitive/Perceptual Training,Neuromuscular Re-education  Plan Comment: continue OT POC  G-Code     OutComes Score                                                  AM-PAC Score             Goals  Long term goals  Time Frame for Long term goals : Within 1.5-2 weeks patient to demonstrate progress in the following areas listed below to achieve specific LTGs as stated in the initial evaluation  Long term goal 1: improve overall endurance  Long term goal 2: improve balance for ADLs and IADLs  Long term goal 3: Demo an understanding of tone reduction  Patient Goals   Patient goals : \"the fall. ..  I am sorry\"    Patient agreed to stated goal of improving his balance       Therapy Time   Individual Concurrent Group Co-treatment   Time In 1330         Time Out 1400         Minutes 30           Therapeutic activities: 30 minutes     Yanique Lam, OT   Electronically signed by Enrique Clark OT on 12/30/2021 at 2:23 PM

## 2021-12-30 NOTE — PLAN OF CARE
Problem: Falls - Risk of:  Goal: Will remain free from falls  Description: Will remain free from falls  Outcome: Ongoing  Goal: Absence of physical injury  Description: Absence of physical injury  Outcome: Ongoing     Problem: HEMODYNAMIC STATUS  Goal: Patient has stable vital signs and fluid balance  Outcome: Ongoing     Problem: ACTIVITY INTOLERANCE/IMPAIRED MOBILITY  Goal: Mobility/activity is maintained at optimum level for patient  Outcome: Ongoing     Problem: COMMUNICATION IMPAIRMENT  Goal: Ability to express needs and understand communication  Outcome: Ongoing     Problem:  Activity:  Goal: Ability to avoid complications of mobility impairment will improve  Description: Ability to avoid complications of mobility impairment will improve  Outcome: Ongoing  Goal: Range of joint motion will improve  Description: Range of joint motion will improve  Outcome: Ongoing  Goal: Ability to ambulate will improve  Description: Ability to ambulate will improve  Outcome: Ongoing  Goal: Muscle strength will improve  Description: Muscle strength will improve  Outcome: Ongoing     Problem: Health Behavior:  Goal: Identification of resources available to assist in meeting health care needs will improve  Description: Identification of resources available to assist in meeting health care needs will improve  Outcome: Ongoing     Problem: Safety:  Goal: Ability to remain free from injury will improve  Description: Ability to remain free from injury will improve  Outcome: Ongoing     Problem: IP COMMUNICATION/DYSARTHRIA  Goal: LTG - patient will improve expressive language skills to allow for communication of wants and needs in daily activities  Outcome: Ongoing     Problem: IP SWALLOWING  Goal: LTG - patient will tolerate the least restrictive diet consistency to allow for safe consumption of daily meals  Outcome: Ongoing     Problem: Skin Integrity:  Goal: Will show no infection signs and symptoms  Description: Will show no infection signs and symptoms  Outcome: Ongoing  Goal: Absence of new skin breakdown  Description: Absence of new skin breakdown  Outcome: Ongoing     Problem: Pain:  Description: Pain management should include both nonpharmacologic and pharmacologic interventions.   Goal: Pain level will decrease  Description: Pain level will decrease  Outcome: Ongoing  Goal: Control of acute pain  Description: Control of acute pain  Outcome: Ongoing  Goal: Control of chronic pain  Description: Control of chronic pain  Outcome: Ongoing

## 2021-12-30 NOTE — PROGRESS NOTES
Physical Therapy Rehab Treatment Note  [facility]/Department: [facility]  [facility]       NAME: Hipolito Bang  : 1955 (77 y.o.)  MRN: [id number]  CODE STATUS: Full Code    Date of Service: 2021       Restrictions:  Restrictions/Precautions: Fall Risk,Seizure       SUBJECTIVE:       Pre Treatment Pain Screening  Pain at present: 2/10  Intervention List: Patient able to continue with treatment  Comments / Details: right calf and arm - no pain medication needed    Post Treatment Pain Screenin/10 as above       OBJECTIVE:               Neuromuscular Education  PNF: standing and ambulatory balance facilitation. Challenges included gait in varying directions, in room gait with moving furniture and open/closing doors included. LE motor control facilitation with challenges including manual facilitation of movement patterns and inhibition techniques fo tone management om supine and standing  Neuromuscular Comments: Pts RLE positioning at weight bearing requires sigificant recurvatum to attain foot flat for safe weight acceptance. Concern for right knee integrity noted however ligament integrity mildly lax only. Recurvatum position results in pelvis rotation to right with all weight bearing as well    Bed mobility  Rolling to Left: Modified independent  Rolling to Right: Modified independent  Supine to Sit: Modified independent  Sit to Supine: Modified independent  Comment: at bed level with no rails    Transfers  Sit to Stand: Modified independent  Stand to sit: Modified independent  Bed to Chair: Modified independent  Car Transfer: Modified independent  Comment: good safety and judgment with approach to chairs    Ambulation  Ambulation?: Yes  Ambulation 1  Surface: level tile;uneven;carpet  Device: Large Dann Laith  Other Apparatus: Right (air cast)  Assistance: Modified Independent;Supervision  Quality of Gait: decreased RLE stance time, recurvatum of right knee needed to maintain right foot flat on floor and to achieve adequate knee stability, pelvis rotated to right  Distance: 10 feet and 50 feet in protected area; 150 feet in hallway  Comments: pt mildly apprehensive in white area due to unexpected distractions    Stairs/Curb  Stairs?: Yes  Stairs  # Steps : 8(limited by time available)  Stairs Height: 6\"  Rails: Left ascending  Curbs: 4\"  Other Apparatus:  (rail simulated for home going)  Assistance: Supervision  Comment: no cues required; supervision to ensure motor control consistency         Activity Tolerance  Activity Tolerance: Patient Tolerated treatment well    Exercises  Comments: Pt instructed in seated and supine HEP with indep performance from illustrations provided     ASSESSMENT/PROGRESS TOWARDS GOALS:  Assessment: Pt demonstrates continued indep in protected areas. He demonstrates deficits with RLE wieght bearing positioning with oncern for longterm right knee and ankle injury. Pt safe to walk with current positioning for household and shorter distance however    Goals:  Long term goals  Long term goal 1: indep and effecient bed mobility -met  Long term goal 2: indep sit to stand and bed transfers - met  Long term goal 3: indep gait with qc 50 feet in protected environment - met  Long term goal 4: supervision with curb step performance for home entry - met  Long term goal 5:  for villalba balance testing - met    PLAN OF CARE/Safety:   Safety Devices  Type of devices: Chair alarm in place; Left in chair      Therapy Time:   Individual   Time In 0   Time Out 1600   Minutes 30     Minutes:      Transfer/Bed mobility trainin      Gait training:10     Therapeutic ex:15      Elenore Hatchet, PT, 21 at 4:33 PM

## 2021-12-31 PROCEDURE — 6370000000 HC RX 637 (ALT 250 FOR IP): Performed by: INTERNAL MEDICINE

## 2021-12-31 PROCEDURE — 97530 THERAPEUTIC ACTIVITIES: CPT

## 2021-12-31 PROCEDURE — 97535 SELF CARE MNGMENT TRAINING: CPT

## 2021-12-31 PROCEDURE — 6370000000 HC RX 637 (ALT 250 FOR IP): Performed by: NURSE PRACTITIONER

## 2021-12-31 PROCEDURE — 1180000000 HC REHAB R&B

## 2021-12-31 PROCEDURE — 99233 SBSQ HOSP IP/OBS HIGH 50: CPT | Performed by: PSYCHIATRY & NEUROLOGY

## 2021-12-31 PROCEDURE — 6370000000 HC RX 637 (ALT 250 FOR IP): Performed by: PHYSICAL MEDICINE & REHABILITATION

## 2021-12-31 PROCEDURE — 97110 THERAPEUTIC EXERCISES: CPT

## 2021-12-31 PROCEDURE — 97116 GAIT TRAINING THERAPY: CPT

## 2021-12-31 PROCEDURE — APPSS45 APP SPLIT SHARED TIME 31-45 MINUTES: Performed by: STUDENT IN AN ORGANIZED HEALTH CARE EDUCATION/TRAINING PROGRAM

## 2021-12-31 RX ORDER — LEVETIRACETAM 750 MG/1
750 TABLET ORAL 2 TIMES DAILY
Qty: 60 TABLET | Refills: 0 | Status: SHIPPED | OUTPATIENT
Start: 2021-12-31 | End: 2022-03-04 | Stop reason: SDUPTHER

## 2021-12-31 RX ORDER — BACLOFEN 10 MG/1
10 TABLET ORAL 3 TIMES DAILY
Qty: 120 TABLET | Refills: 0 | Status: SHIPPED | OUTPATIENT
Start: 2021-12-31 | End: 2022-03-21 | Stop reason: SDUPTHER

## 2021-12-31 RX ORDER — TIZANIDINE 4 MG/1
4 TABLET ORAL NIGHTLY PRN
Status: DISCONTINUED | OUTPATIENT
Start: 2021-12-31 | End: 2022-01-01 | Stop reason: HOSPADM

## 2021-12-31 RX ORDER — OXYCODONE HYDROCHLORIDE 5 MG/1
5 TABLET ORAL EVERY 4 HOURS PRN
Qty: 6 TABLET | Refills: 0 | Status: SHIPPED | OUTPATIENT
Start: 2021-12-31 | End: 2022-01-03

## 2021-12-31 RX ADMIN — LEVOTHYROXINE SODIUM 50 MCG: 0.05 TABLET ORAL at 08:10

## 2021-12-31 RX ADMIN — Medication: at 21:24

## 2021-12-31 RX ADMIN — OXYCODONE 5 MG: 5 TABLET ORAL at 11:58

## 2021-12-31 RX ADMIN — LEVETIRACETAM 750 MG: 500 TABLET, FILM COATED ORAL at 21:24

## 2021-12-31 RX ADMIN — ARIPIPRAZOLE 2 MG: 2 TABLET ORAL at 08:09

## 2021-12-31 RX ADMIN — LEVETIRACETAM 750 MG: 500 TABLET, FILM COATED ORAL at 08:09

## 2021-12-31 RX ADMIN — BACLOFEN 10 MG: 10 TABLET ORAL at 08:10

## 2021-12-31 RX ADMIN — RIVAROXABAN 20 MG: 20 TABLET, FILM COATED ORAL at 08:10

## 2021-12-31 RX ADMIN — BACLOFEN 10 MG: 10 TABLET ORAL at 21:24

## 2021-12-31 RX ADMIN — Medication 2000 UNITS: at 15:58

## 2021-12-31 RX ADMIN — DULOXETINE 60 MG: 60 CAPSULE, DELAYED RELEASE ORAL at 08:10

## 2021-12-31 RX ADMIN — Medication 100 MG: at 08:10

## 2021-12-31 RX ADMIN — ASPIRIN 81 MG: 81 TABLET, CHEWABLE ORAL at 08:09

## 2021-12-31 RX ADMIN — BACLOFEN 10 MG: 10 TABLET ORAL at 15:58

## 2021-12-31 ASSESSMENT — PAIN DESCRIPTION - PAIN TYPE: TYPE: CHRONIC PAIN

## 2021-12-31 ASSESSMENT — PAIN DESCRIPTION - LOCATION
LOCATION: ARM;FOOT
LOCATION: FOOT;ARM

## 2021-12-31 ASSESSMENT — PAIN SCALES - WONG BAKER: WONGBAKER_NUMERICALRESPONSE: 6

## 2021-12-31 ASSESSMENT — PAIN SCALES - GENERAL
PAINLEVEL_OUTOF10: 5
PAINLEVEL_OUTOF10: 3

## 2021-12-31 ASSESSMENT — PAIN DESCRIPTION - ORIENTATION
ORIENTATION: RIGHT
ORIENTATION: RIGHT

## 2021-12-31 NOTE — PROGRESS NOTES
Hospitalist Consult/Progress Note  12/31/2021 12:14 PM    Assessment and Plan:   1. Generalized weakness, Gait instability and Decreased Functional Status secondary to fall in the setting of late effects of CVA; HX TBI:  CVA 5 years prior. Chronic aphasia. Speaks in one word sentences. Follows commands. Continue ASA and xarelto. Fall precautions. PT OT to evaluate. Maximize nutrition status. Assessing if needs DME at home. SW on board. Neuro following. PRN baclofen for continued R sided pain and spasticity. Completed rehab plan. 2. HTN/HLD: BP stable. Continue current regime  3. Seizure disorder: Not on home order of keppra. I verified this medication via chart and [facility] and this was last picked up on the 12/17 and is a current prescription. Meds ordered. Seizure precautions. 4. Hypothyroidism: Continue levothyroxine  5. Bowel Regimen and GI PPx: stool softners PRN ordered with hold parameters for loose stools or diarrhea. On antiacid  6. Diet: ADULT DIET; Regular  7. Advance Directive: Full Code   8. Nutrition status: Supplemental Vitamins ordered. Dietitian assessment  9. Vaccinations: Immunization records reviewed. If has not received appropriate vaccinations, will order to be given prior to discharge. 10. DVT prophylaxis: ON xarelto  11. Discharge planning: SW on board. Likely discharge tomorrow. Med rec reviewed  12. High Risk Readmission Screening Tool Score Noted.      Additionally, the following hospital problems were addressed:  Principal Problem:    Impaired mobility and activities of daily living dt late effects of CVA  Active Problems:    History of CVA (cerebrovascular accident)    Recurrent depression ([address]    Hyperlipidemia    Hemiparesis affecting dominant side as late effect of cerebrovascular accident (CVA) ([address]    Closed TBI (traumatic brain injury) ([address]    Chronic pain syndrome    Hypothyroidism    Chronic deep vein thrombosis (DVT) of left popliteal vein ([address] Cognitive deficit due to old head injury    DJD (degenerative joint disease), lumbar    DJD (degenerative joint disease), cervical    Bowel and bladder incontinence    Aphasia    Abnormality of gait and mobility    Blood thinned due to long-term anticoagulant use-Xaralto    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 *      ** Total time spent reviewing medical records, evaluating patient, speaking with RN's and consultants where I was focused exclusively on this patient:  minutes. This time is excluding time spent performing procedures or significant events occurring earlier or later in the day requiring my attention and focus. Subjective:   Admit Date: 12/21/2021  PCP: MD Buck Coto a 77 y. o. male with a PMHx of depression, CVA with Right hemiplegia, TBI, seizures, HTN, and HLD presents to the ED after presumed fall from standing and being found down by brother just prior to arrival with altered mental status. Pateint aphasic from previous CVA.  Unknown LOC. Unknown headstrike (+) ASA. Last known well earlier this AM. Patient awake, in no distress. Per brother, patient baseline communication is one word responses, does not form sentences. Patient lives alone and ambulates with quad cane with difficult movement of right leg. Transferred to acute rehab for further therapies. No acute events overnight. Afebrile. No new complaints. Pt denies chest pain, SOB, N/V, fevers or chills. OOB in chair. Shaving. No new complaints. Discharge planned for tomorrow. Objective:     Vitals:    12/30/21 1911 12/30/21 1931 12/31/21 0627 12/31/21 0940   BP: 100/77  104/69    Pulse: 65  58 58   Resp:  16 16    Temp: 98.2 °F (36.8 °C)  97.7 °F (36.5 °C)    TempSrc:       SpO2: 96%  97%    Weight:         General appearance: No acute distress, Dentition intact. Has aphasia. Neurological: Alert, awake, aphasia.  R hemiplegia  Lungs: CTAB. No  exp wheezes, No  rales No retractions; No use of accessory muscles  Heart:  S1, S2 normal, RRR, no MRG appreciated  Abdomen: (+) BS, soft, non-tender; non distended no guarding or rigidity. Extremities:  no cyanosis, trace edema bilat lower exts, no calf tenderness bilaterally. Dry skin noted       Medications:      sodium chloride        levETIRAcetam  750 mg Oral BID    baclofen  10 mg Oral TID    Vitamin D  2,000 Units Oral Dinner    cyanocobalamin  1,000 mcg IntraMUSCular Weekly    coenzyme Q10  100 mg Oral Daily    lidocaine  3 patch TransDERmal Daily    sodium chloride flush  5-40 mL IntraVENous 2 times per day    ARIPiprazole  2 mg Oral Daily    aspirin  81 mg Oral Daily    DULoxetine  60 mg Oral Daily    levothyroxine  50 mcg Oral Daily    rivaroxaban  20 mg Oral Daily with breakfast    ammonium lactate   Topical Nightly       LABS Reviewed    IMAGING Reviewed    JULITA Lenz NP  Rounding Hospitalist    Additional work up or/and treatment plan may be added today or then after based on clinical progression. I am managing a portion of pt care. Some medical issues are handled by other specialists and Primary Rehabilitation provider. Additional work up and treatment should be done in out pt setting by pt PCP and other out pt providers.

## 2021-12-31 NOTE — PROGRESS NOTES
Assumed care of patient at [phone]. No changes to previously documented assessment. Patient resting in bed with no voiced complaints. Was medicated at bedtime with prn chris for 4/10 lower back pain. VSS. LBM 12/29. No distress noted. Call light within reach and bed alarm activated.   Electronically signed by Gladis Angel RN on 12/31/2021 at 12:41 AM

## 2021-12-31 NOTE — PROGRESS NOTES
[facility] Neurology Daily Progress Note  Name: Heath Grayson  Age: 77 y.o. Gender: male  CodeStatus: Full Code  Allergies: No Known Allergies    Chief Complaint:No chief complaint on file. Primary Care Provider: Sung Luque MD  InpatientTreatment Team: Treatment Team: Attending Provider: Bayron Aguirre DO; Consulting Physician: Michelle Baron APRN - NP; Consulting Physician: Nora Santos, PhD; Consulting Physician: Wendy Malin MD; Consulting Physician: Blas Pimentel MD; Patient Care Tech: Ye Stdodard; Registered Nurse: Barbara Zafar, GORDON; : Valentin Crabtree, GORDON; : Lacho Garcia MSW, LSW; Registered Nurse: Noemy Mtz RN  Admission Date: 12/21/2021      HPI   Pt seen and examined for neuro follow up for seizure and late effect CVA. Patient is currently alert and oriented. Continues on 750 mg of Keppra twice daily without any breakthrough seizure activity or dysphoric mood. Patient reports that the increase in baclofen has helped with his spasticity. Plan is for discharge today. Emphasized the importance of following up with neurology as he may be a good candidate for Botox if baclofen does not adequately treat spasticity. Patient continues to do well without any suggestion of seizures and continues to do well on Keppra. Baclofen is helping his spasticity and is actually improving  Vitals:    12/31/21 0940   BP:    Pulse: 58   Resp:    Temp:    SpO2:       Review of Systems   Constitutional: Negative for appetite change, fatigue and fever. HENT: Negative for hearing loss and trouble swallowing. Eyes: Negative for visual disturbance. Respiratory: Negative for cough, chest tightness, shortness of breath and wheezing. Cardiovascular: Negative for chest pain, palpitations and leg swelling. Gastrointestinal: Negative for abdominal distention, abdominal pain, nausea and vomiting. Musculoskeletal: Positive for gait problem.    Skin: Negative for color change and rash. Neurological: Positive for speech difficulty and weakness. Negative for dizziness, tremors, seizures, syncope, facial asymmetry, light-headedness, numbness and headaches. Psychiatric/Behavioral: Negative for agitation, confusion and hallucinations. The patient is not nervous/anxious. Physical Exam  Vitals and nursing note reviewed. Constitutional:       General: He is not in acute distress. Appearance: He is not ill-appearing or diaphoretic. HENT:      Head: Normocephalic and atraumatic. Eyes:      Extraocular Movements: Extraocular movements intact. Pupils: Pupils are equal, round, and reactive to light. Cardiovascular:      Rate and Rhythm: Normal rate and regular rhythm. Pulmonary:      Effort: Pulmonary effort is normal. No respiratory distress. Breath sounds: Normal breath sounds. Abdominal:      General: Bowel sounds are normal. There is no distension. Palpations: Abdomen is soft. Skin:     General: Skin is warm and dry. Neurological:      Mental Status: He is alert and oriented to person, place, and time. Cranial Nerves: No cranial nerve deficit. Motor: Weakness, atrophy and abnormal muscle tone present. No tremor or seizure activity.       Gait: Gait abnormal.               Medications:  Reviewed    Infusion Medications:    sodium chloride       Scheduled Medications:    levETIRAcetam  750 mg Oral BID    baclofen  10 mg Oral TID    Vitamin D  2,000 Units Oral Dinner    cyanocobalamin  1,000 mcg IntraMUSCular Weekly    coenzyme Q10  100 mg Oral Daily    lidocaine  3 patch TransDERmal Daily    sodium chloride flush  5-40 mL IntraVENous 2 times per day    ARIPiprazole  2 mg Oral Daily    aspirin  81 mg Oral Daily    DULoxetine  60 mg Oral Daily    levothyroxine  50 mcg Oral Daily    rivaroxaban  20 mg Oral Daily with breakfast    ammonium lactate   Topical Nightly     PRN Meds: baclofen, acetaminophen, bisacodyl, fleet, zolpidem, oxyCODONE, sodium chloride, ondansetron **OR** ondansetron, polyethylene glycol, sodium chloride flush    Labs:   No results for input(s): WBC, HGB, HCT, PLT in the last 72 hours. No results for input(s): NA, K, CL, CO2, BUN, CREATININE, CALCIUM, PHOS in the last 72 hours. Invalid input(s): MAGNES  No results for input(s): AST, ALT, BILIDIR, BILITOT, ALKPHOS in the last 72 hours. No results for input(s): INR in the last 72 hours. No results for input(s): Sonjia Lenore in the last 72 hours. Urinalysis:   Lab Results   Component Value Date    NITRU Negative 12/20/2021    WBCUA 3-5 12/20/2021    BACTERIA Negative 12/20/2021    RBCUA 0-2 12/20/2021    BLOODU TRACE 12/20/2021    SPECGRAV 1.010 12/20/2021    GLUCOSEU Negative 12/20/2021       Radiology:   Most recent    EEG No valid procedures specified. MRI of Brain No results found for this or any previous visit. No results found for this or any previous visit. MRA of the Head and Neck: No results found for this or any previous visit. No results found for this or any previous visit. No results found for this or any previous visit. CT of the Head: Results for orders placed during the hospital encounter of 12/20/21    [address]  CT Brain. Contrast medium:  without contrast.. History:  Fall at home. Aphasic from remote stroke. .    Technical factors: CT imaging of the brain was obtained and formatted as 5 mm contiguous axial images. 2.5 mm contiguous axial images were obtained through the osseous structures. Sagittal and coronal reconstruction obtained during postprocessing. Comparison:  None. Findings:    Extra-axial spaces:  Normal.    Intracranial hemorrhage:  None. Ventricular system: Ventricles mildly enlarged. Sulci mildly prominent. Ex vacuo dilatation, left lateral ventricle.     Basal Cisterns:  Normal.    Cerebral Parenchyma: Bilateral symmetric periventricular areas decreased attenuation. Geographic area of decreased attenuation, left frontal and left temporal lobes exerting no mass effect. Midline Shift:  None. Cerebellum:  Normal.    Paranasal sinuses and mastoid air cells:  Normal.    Visualized Orbits:  Normal.    Impression  Impression:    Remote left frontal and left temporal lobe infarcts. Mild cerebral atrophy. Chronic ischemic white matter disease. All CT scans at this facility use dose modulation, iterative reconstruction, and/or weight based dosing when appropriate to reduce radiation dose to as low as reasonably achievable. No results found for this or any previous visit. No results found for this or any previous visit. Carotid duplex: No results found for this or any previous visit. No results found for this or any previous visit. No results found for this or any previous visit. Echo No results found for this or any previous visit. 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 Stroke resulted in right hemiparesis and expressive aphasia. Hypercoagulable work-up at that time was negative. Patient currently continues on Xarelto 20 mg daily and aspirin 81 mg daily. Patient has increased tone on the right side with spasticity and clonus as late effects of his CVA. He has tried Botox prior, according to his brother, without any improvement. He is currently on baclofen 10 mg 4 times a day as needed. Brother reports that patient was taking this twice daily routinely at home. Will initiate patient on baclofen 10 mg 3 times daily here and monitor response. Patient also with history of seizure disorder prior to his stroke and has been on Keppra for many years. Previous seizures were in the setting of anxiety. Patient reports daily compliance with Keppra. Given patient's high risk for seizures due to his previous CVA location and we have concern that he might have suffered a seizure rather than a fall that brought him into the hospital.  There was evidence of white blood cell margination as well as elevated CK levels and lactic acid. At this time we will change Keppra to 750 mg twice daily. Will obtain EEG and MRI of the brain. We will discontinue tramadol as this can lower his seizure threshold. Further recommendations to follow pending the above work-up. Patient doing well today. Right-sided spasticity improving with increased baclofen dose. Tolerating without somnolence or side effects. No recurrent seizures. Continue increased dose of Keppra as ordered. We will continue to follow. Patient continuing to have right-sided pain and spasticity. Will add as needed dose of baclofen in addition to routine dosing. right side still painful, but will see what happens     EEG pending. Plan for discharge today. No breakthrough seizure activity or dysphoric mood with increase in Keppra. Patient reports improvement in spasticity with increase in baclofen. We will discharge patient on 10 mg of baclofen 3 times daily with plans to follow-up as an outpatient in 4 to 6 weeks. I have personally performed a face to face diagnostic evaluation on this patient, reviewed all data and investigations, and am the sole provider of all clinical decisions on the neurological status of this patient. Events noted. Patient has not any further seizures and will continue on Keppra. We will follow him as an outpatient for the same      [facility].  Ras Millan MD, Aniket Quinones, American Board of Psychiatry & Neurology  Board Certified in Vascular Neurology  Board Certified in Neuromuscular Medicine  Certified in Neurorehabilitation         Collaborating physicians: Dr Ras Millan and Dr Villatoro    Electronically signed by Radha Eckert PA-C on 12/31/2021 at 10:56 AM

## 2021-12-31 NOTE — PROGRESS NOTES
Subjective: The patient complains of severe acute on chronic progressive fatigue and right-sided weakness secondary to previous CVA, recent fall with closed head injury aphasia, cognitive decline partially relieved by rest,medications, PT,  OT, history of residual cognitive, communication, right-sided weakness and seizure secondary to previous CVA unfortunately has had a recent closed head injury secondary to a fall with decline in functional status. He was admission admitted through the emergency room on 12/20/2021. He had fallen at home. Trauma work-up showed no acute traumatic fractures. Labs were significant for positive and elevated lactic acid level. He was prescribed IV fluid boluses. His toxicology screen was positive for cannabinoids. His urinary analysis showed ketones but no bacteria culture was not indicated. SARS-CoV-2 testing was negative. SLP and rest and exacerbated by recent illness. ROS x10: The patient also complains of severely impaired mobility and activities of daily living. Otherwise no new problems with vision, hearing, nose, mouth, throat, dermal, cardiovascular, GI, , pulmonary, musculoskeletal, psychiatric or neurological. See Rehab H&P on Rehab chart dated . Vital signs:  /64   Pulse 62   Temp 97.7 °F (36.5 °C)   Resp 17   Wt 174 lb 6.4 oz (79.1 kg)   SpO2 95%   BMI 26.52 kg/m²   I/O:   PO/Intake:  fair PO intake, frequent constipation. Bowel/Bladder:  incontinent, assist with urinal, LBM 12/24  General:  Patient is well developed, adequately nourished, non-obese and     well kempt. HEENT:    PERRLA, hearing intact to loud voice, external inspection of ear     and nose benign. Inspection of lips, tongue and gums benign  Musculoskeletal: No significant change in strength or tone. All joints stable. Inspection and palpation of digits and nails show no clubbing,       cyanosis or inflammatory conditions.    Neuro/Psychiatric: Affect: flat but pleasant. Alert and oriented to person, place and     Situation with  mod cues. No significant change in deep tendon reflexes or     sensation  Lungs:  Diminished, CTA-B. Respiration effort is normal at rest.     Heart:   S1 = S2, RRR. No loud murmurs. Abdomen:  Soft, non-tender, no enlargement of liver or spleen. Extremities:  No significant lower extremity edema or tenderness. Upper extremity spasticity -right shoulder subluxation  Skin:   Intact to general survey, laceration and bruising left face and forehead    Rehabilitation:  Physical therapy:   Bed Mobility: Scooting: Supervision    Transfers: Sit to Stand: Modified independent  Stand to sit: Modified independent  Bed to Chair: Modified independent, Ambulation 1  Surface: level tile,carpet  Device: Plink Search  Other Apparatus: Right (air cast)  Assistance: Modified Independent,Supervision  Quality of Gait: R knee snapping into extension, RLE circumduction to clear groud, pelvis rotated to R, decreased RLE stance time, step to pattern, slow brenden. Distance: 110', 50'  Comments: rest breaks post gait, Stairs  # Steps : 4 (4 only d/t time constraints)  Stairs Height: 6\"  Rails: Left ascending  Curbs: 4\"  Device:  (rail to simulate home rail)  Other Apparatus:  (rail simulated for home going)  Assistance: Supervision  Comment: no cues required; supervision to ensure motor control consistency    FIMS:  ,  , Assessment: Pt demonstrates continued indep in protected areas. He demonstrates deficits with RLE wieght bearing positioning with oncern for longterm right knee and ankle injury. Pt safe to walk with current positioning for household and shorter distance however    Occupational therapy:    ,  , Assessment: Pt is a 77year old man from home who presents to [id number] with the above deficits which impact his ability to perform ADLs and IADLs at his baseline.  Pt. would benefit from continued OT to maximize independence and safety with ADL tasks. Speech therapy:        Lab/X-ray studies reviewed, analyzed and discussed with patient and staff:   No results found for this or any previous visit (from the past 24 hour(s)). CT HEAD  12/20/2021  Extra-axial spaces:  Normal. Intracranial hemorrhage:  None. Ventricular system: Ventricles mildly enlarged. Sulci mildly prominent. Ex vacuo dilatation, left lateral ventricle. Basal Cisterns:  Normal. Cerebral Parenchyma: Bilateral symmetric periventricular areas decreased attenuation. Geographic area of decreased attenuation, left frontal and left temporal lobes exerting no mass effect. Midline Shift:  None. Cerebellum:  Normal. Paranasal sinuses and mastoid air cells:  Normal. Visualized Orbits:  Normal.     Impression: Remote left frontal and left temporal lobe infarcts. Mild cerebral atrophy. Chronic ischemic white matter disease. CT CHEST 12/20/2021   . Chest: Lines, tubes, and devices:  None. Lung parenchyma and pleura: No consolidation. No suspicious pulmonary nodule. No pleural effusion. Central airways are patent. Mild bibasilar atelectasis. Thoracic inlet, heart, and mediastinum:  No lymphadenopathy in the axillary, mediastinal, or hilar regions. Mild atherosclerotic vascular thoracic aorta. The thoracic aorta and main pulmonary artery are normal in caliber. The cardiac chambers are normal in size. Status post foramen ovale closure. Mild coronary artery atherosclerotic calcifications are noted, although the study is not optimized for coronary assessment. No pericardial effusion or thickening. Thyroid gland is unremarkable. Esophagus is nondilated. Bones/Soft Tissues: Degenerative changes. Abdomen / Pelvis: Liver: No mass. Right hepatic lobe metallic density. Biliary: No bile duct dilation. Gallbladder is unremarkable. Spleen: No mass. No splenomegaly. Pancreas: No mass or duct dilation. Adrenals: No mass. Kidneys: No mass, calculus or hydronephrosis. GI tract: No dilation or wall thickening. Large amount of colonic stool. Lymph nodes: No abdominal or pelvic lymphadenopathy. Mesentery/Peritoneum: No ascites or mass. Retroperitoneum: No mass. Vasculature: The celiac axis and SMA are patent. The portal vein and branches, splenic vein, SMV, and hepatic veins are patent. Arterial atherosclerotic disease without aneurysm. There is an IVC filter. Pelvis: No mass, ascites or fluid collection. Urinary bladder is unremarkable. Soft tissue/ bones: Grade I anterolisthesis of L5 on S1. Multilevel degenerative changes of the lumbar spine. No acute abnormality in the chest, abdomen and pelvis. Large amount of colonic stool. CT CERVICAL SPINE   12/20/2021: Loss of height, C5 and C6 with cranial caudal dimensions 1.2 cm, and 0.8 cm, respectively. Loss cervical lordosis. Remote 8mm anterolisthesis C4 on C5. Atlantooccipital articulation maintained. Atlantoaxial interval preserved. Neural foramina narrowing, left C4-5. Diffuse disc space narrowing C3-4, through T2-3, greatest at C4-5 through C6-7. No fractures, dislocations, bone lesions. Limited imaging lung apices without anomaly. Carotid arteries and soft tissues are without anomaly. No fracture. Marked diffuse degenerative change cervical spine. C4 anterolisthesis and loss of body height, C6 and C7, most likely secondary to degenerative change. CT ABDOMEN PELVIS   12/20/2021 No acute abnormality in the chest, abdomen and pelvis. Large amount of colonic stool. Previous extensive, complex labs, notes and diagnostics reviewed and analyzed. ALLERGIES:    Allergies as of 12/21/2021    (No Known Allergies)      (please also verify by checking MAR)      Recently, I evaluated this patient for periodic reassessment of medical and functional status.   The patient was discussed in detail at the treatment team meeting focusing on current medical issues, progress in therapies, social issues, psychological issues, barriers to progress and strategies to address these barriers, and discharge planning. See the hand written addendum to rehab progress note. The patient continues to be high risk for future disability and their medical and rehabilitation prognosis continue to be good and therefore, we will continue the patient's rehabilitation course as planned. The patient's tentative discharge date was set. Patient and family education was discussed. The patient was made aware of the team discussion regarding their progress. Discharge plans were discussed along with barriers to progress and strategies to address these barriers, patient encouraged to continue to discuss discharge plans with . Complex Physical Medicine & Rehab Issues Assess & Plan:   1. Severe abnormality of gait and mobility and impaired self-care and ADL's secondary to  Late effects of CVA Remote left frontal and left temporal lobe infarcts- . Functional and medical status reassessed regarding patients ability to participate in therapies and patient found to be able to participate in acute intensive comprehensive inpatient rehabilitation program including PT/OT to improve balance, ambulation, ADLs, and to improve the P/AROM. Therapeutic modifications regarding activities in therapies, place, amount of time per day and intensity of therapy made daily. In bed therapies or bedside therapies prn.   2. Bowel and Bladder dysfunction neurogenic bowel and bladder:  frequent toileting, ambulate to bathroom with assistance, check post void residuals. Check for C.difficile x1 if >2 loose stools in 24 hours, continue bowel & bladder program.  Monitor bowel and bladder function. Lactinex 2 PO every AC.   MOM prn, Brown Bomb prn, Glycerin suppository prn, enema prn.  3. Severe cervical and low back pain as well as generalized OA pain: reassess pain every shift and prior to and after each therapy session, give prn Tylenol and   Ultram versus OxyIR, modalities prn in therapy, masage, Lidoderm, K-pad prn. Consider scheduled AM pain meds. 4. Skin healing and breakdown risk:  continue pressure relief program.  Daily skin exams and reports from nursing. 5. Severe fatigue due to nutritional and hydration deficiency: Add and titrate vitamin B12 vitamin D and CoQ10 continue to monitor I&Os, calorie counts prn, dietary consult prn.  6. Acute episodic insomnia with situational adjustment disorder:  prn Ambien, monitor for day time sedation. 7. Falls risk elevated:  patient to use call light to get nursing assistance to get up, bed and chair alarm. 8. Elevated DVT risk: progressive activities in PT, continue prophylaxis LUCIANO hose, elevation and Xarelto. 9. Complex discharge planning: Discharge January 1, 2020 to home alone with help from his family who lives locally and home health care PT OT RN aide and social work. Until then continue weekly team meeting every  Thursday to assess progress towards goals, discuss and address social, psychological and medical comorbidities and to address difficulties they may be having progressing in therapy. Patient and family education is in progress. The patient is to follow-up with their family physician after discharge. Complex Active General Medical Issues that complicate care Assess & Plan:    1. History of CVA (cerebrovascular accident)-focus on mobility ADL and cognitive deficits  2. Recurrent depression-emotional support provided daily, vitamin B12, encourage participation in rehabilitation support group and recreational therapy, adjust/add medications (Abilify, Cymbalta, add co-Q10 vitamin B12) it appears the patient uses cannabinoids at home. 3.   Hyperlipidemia, coronary artery disease, cerebrovascular disease-continue blood signs every shift focusing on heart rate and blood pressure checks, consult hospitalist for backup medical and adjust/add medications (Xarelto).   Monitor heart rate and blood pressure as well as medications effects on vital signs before during and after therapy. 4.   Hemiparesis affecting dominant side as late effect of cerebrovascular accident (CVA)   5. 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 Chronic pain syndrome,   DJD (degenerative joint disease), lumbar, DJD (degenerative joint disease), cervical-transition to lowest effective dose of pain medication use topicals when available lowest effective dose of Ultram especially given cannabinoid use and Cymbalta. 7.   Hypothyroidism-  Synthroid and titrate dosing. Follow vital signs, recheck TSH and thyroid studies as outpatient. 8.   Chronic deep vein thrombosis (DVT) of left popliteal vein-Xaralto  9. Bowel and bladder incontinence-check post void residuals add scheduled toileting  10.    Aphasia-consult speech and language pathology         Johana Grimm D.O., PM&R     Attending    [address] [facility]

## 2021-12-31 NOTE — PROGRESS NOTES
Subjective: The patient complains of severe acute on chronic progressive fatigue and right-sided weakness secondary to previous CVA, recent fall with closed head injury aphasia, cognitive decline partially relieved by rest,medications, PT,  OT, history of residual cognitive, communication, right-sided weakness and seizure secondary to previous CVA unfortunately has had a recent closed head injury secondary to a fall with decline in functional status. He was admission admitted through the emergency room on 12/20/2021. He had fallen at home. Trauma work-up showed no acute traumatic fractures. Labs were significant for positive and elevated lactic acid level. He was prescribed IV fluid boluses. His toxicology screen was positive for cannabinoids. His urinary analysis showed ketones but no bacteria culture was not indicated. SARS-CoV-2 testing was negative. SLP and rest and exacerbated by recent illness. ROS x10: The patient also complains of severely impaired mobility and activities of daily living. Otherwise no new problems with vision, hearing, nose, mouth, throat, dermal, cardiovascular, GI, , pulmonary, musculoskeletal, psychiatric or neurological. See Rehab H&P on Rehab chart dated . Vital signs:  /64   Pulse 62   Temp 97.7 °F (36.5 °C)   Resp 17   Wt 174 lb 6.4 oz (79.1 kg)   SpO2 95%   BMI 26.52 kg/m²   I/O:   PO/Intake:  fair PO intake, frequent constipation. Bowel/Bladder:  incontinent, assist with urinal, LBM 12/24  General:  Patient is well developed, adequately nourished, non-obese and     well kempt. HEENT:    PERRLA, hearing intact to loud voice, external inspection of ear     and nose benign. Inspection of lips, tongue and gums benign  Musculoskeletal: No significant change in strength or tone. All joints stable. Inspection and palpation of digits and nails show no clubbing,       cyanosis or inflammatory conditions.    Neuro/Psychiatric: Affect: flat but pleasant. Alert and oriented to person, place and     Situation with  mod cues. No significant change in deep tendon reflexes or     sensation  Lungs:  Diminished, CTA-B. Respiration effort is normal at rest.     Heart:   S1 = S2, RRR. No loud murmurs. Abdomen:  Soft, non-tender, no enlargement of liver or spleen. Extremities:  No significant lower extremity edema or tenderness. Upper extremity spasticity -right shoulder subluxation  Skin:   Intact to general survey, laceration and bruising left face and forehead    Rehabilitation:  Physical therapy:   Bed Mobility: Scooting: Supervision    Transfers: Sit to Stand: Modified independent  Stand to sit: Modified independent  Bed to Chair: Modified independent, Ambulation 1  Surface: level tile,carpet  Device: [employer]  Other Apparatus: Right (air cast)  Assistance: Modified Independent,Supervision  Quality of Gait: R knee snapping into extension, RLE circumduction to clear groud, pelvis rotated to R, decreased RLE stance time, step to pattern, slow brenden. Distance: 110', 50'  Comments: rest breaks post gait, Stairs  # Steps : 4 (4 only d/t time constraints)  Stairs Height: 6\"  Rails: Left ascending  Curbs: 4\"  Device:  (rail to simulate home rail)  Other Apparatus:  (rail simulated for home going)  Assistance: Supervision  Comment: no cues required; supervision to ensure motor control consistency    FIMS:  ,  , Assessment: Pt demonstrates continued indep in protected areas. He demonstrates deficits with RLE wieght bearing positioning with oncern for longterm right knee and ankle injury. Pt safe to walk with current positioning for household and shorter distance however    Occupational therapy:    ,  , Assessment: Pt is a 77year old man from home who presents to [facility] with the above deficits which impact his ability to perform ADLs and IADLs at his baseline.  Pt. would benefit from continued OT to maximize independence and safety with ADL tasks. Speech therapy:        Lab/X-ray studies reviewed, analyzed and discussed with patient and staff:   No results found for this or any previous visit (from the past 24 hour(s)). CT HEAD  12/20/2021  Extra-axial spaces:  Normal. Intracranial hemorrhage:  None. Ventricular system: Ventricles mildly enlarged. Sulci mildly prominent. Ex vacuo dilatation, left lateral ventricle. Basal Cisterns:  Normal. Cerebral Parenchyma: Bilateral symmetric periventricular areas decreased attenuation. Geographic area of decreased attenuation, left frontal and left temporal lobes exerting no mass effect. Midline Shift:  None. Cerebellum:  Normal. Paranasal sinuses and mastoid air cells:  Normal. Visualized Orbits:  Normal.     Impression: Remote left frontal and left temporal lobe infarcts. Mild cerebral atrophy. Chronic ischemic white matter disease. CT CHEST 12/20/2021   . Chest: Lines, tubes, and devices:  None. Lung parenchyma and pleura: No consolidation. No suspicious pulmonary nodule. No pleural effusion. Central airways are patent. Mild bibasilar atelectasis. Thoracic inlet, heart, and mediastinum:  No lymphadenopathy in the axillary, mediastinal, or hilar regions. Mild atherosclerotic vascular thoracic aorta. The thoracic aorta and main pulmonary artery are normal in caliber. The cardiac chambers are normal in size. Status post foramen ovale closure. Mild coronary artery atherosclerotic calcifications are noted, although the study is not optimized for coronary assessment. No pericardial effusion or thickening. Thyroid gland is unremarkable. Esophagus is nondilated. Bones/Soft Tissues: Degenerative changes. Abdomen / Pelvis: Liver: No mass. Right hepatic lobe metallic density. Biliary: No bile duct dilation. Gallbladder is unremarkable. Spleen: No mass. No splenomegaly. Pancreas: No mass or duct dilation. Adrenals: No mass. Kidneys: No mass, calculus or hydronephrosis. GI tract: No dilation or wall thickening. Large amount of colonic stool. Lymph nodes: No abdominal or pelvic lymphadenopathy. Mesentery/Peritoneum: No ascites or mass. Retroperitoneum: No mass. Vasculature: The celiac axis and SMA are patent. The portal vein and branches, splenic vein, SMV, and hepatic veins are patent. Arterial atherosclerotic disease without aneurysm. There is an IVC filter. Pelvis: No mass, ascites or fluid collection. Urinary bladder is unremarkable. Soft tissue/ bones: Grade I anterolisthesis of L5 on S1. Multilevel degenerative changes of the lumbar spine. No acute abnormality in the chest, abdomen and pelvis. Large amount of colonic stool. CT CERVICAL SPINE   12/20/2021: Loss of height, C5 and C6 with cranial caudal dimensions 1.2 cm, and 0.8 cm, respectively. Loss cervical lordosis. Remote 8mm anterolisthesis C4 on C5. Atlantooccipital articulation maintained. Atlantoaxial interval preserved. Neural foramina narrowing, left C4-5. Diffuse disc space narrowing C3-4, through T2-3, greatest at C4-5 through C6-7. No fractures, dislocations, bone lesions. Limited imaging lung apices without anomaly. Carotid arteries and soft tissues are without anomaly. No fracture. Marked diffuse degenerative change cervical spine. C4 anterolisthesis and loss of body height, C6 and C7, most likely secondary to degenerative change. CT ABDOMEN PELVIS   12/20/2021 No acute abnormality in the chest, abdomen and pelvis. Large amount of colonic stool. Previous extensive, complex labs, notes and diagnostics reviewed and analyzed. ALLERGIES:    Allergies as of 12/21/2021    (No Known Allergies)      (please also verify by checking MAR)      Recently, I evaluated this patient for periodic reassessment of medical and functional status.   The patient was discussed in detail at the treatment team meeting focusing on current medical issues, progress in therapies, social issues, psychological issues, barriers to progress and strategies to address these barriers, and discharge planning. See the hand written addendum to rehab progress note. The patient continues to be high risk for future disability and their medical and rehabilitation prognosis continue to be good and therefore, we will continue the patient's rehabilitation course as planned. The patient's tentative discharge date was set. Patient and family education was discussed. The patient was made aware of the team discussion regarding their progress. Discharge plans were discussed along with barriers to progress and strategies to address these barriers, patient encouraged to continue to discuss discharge plans with . Complex Physical Medicine & Rehab Issues Assess & Plan:   1. Severe abnormality of gait and mobility and impaired self-care and ADL's secondary to  Late effects of CVA Remote left frontal and left temporal lobe infarcts- . Functional and medical status reassessed regarding patients ability to participate in therapies and patient found to be able to participate in acute intensive comprehensive inpatient rehabilitation program including PT/OT to improve balance, ambulation, ADLs, and to improve the P/AROM. Therapeutic modifications regarding activities in therapies, place, amount of time per day and intensity of therapy made daily. In bed therapies or bedside therapies prn.   2. Bowel and Bladder dysfunction neurogenic bowel and bladder:  frequent toileting, ambulate to bathroom with assistance, check post void residuals. Check for C.difficile x1 if >2 loose stools in 24 hours, continue bowel & bladder program.  Monitor bowel and bladder function. Lactinex 2 PO every AC.   MOM prn, Brown Bomb prn, Glycerin suppository prn, enema prn.  3. Severe cervical and low back pain as well as generalized OA pain: reassess pain every shift and prior to and after each therapy session, give prn Tylenol and   Ultram versus OxyIR, modalities prn in therapy, masage, Lidoderm, K-pad prn. Consider scheduled AM pain meds. 4. Skin healing and breakdown risk:  continue pressure relief program.  Daily skin exams and reports from nursing. 5. Severe fatigue due to nutritional and hydration deficiency: Add and titrate vitamin B12 vitamin D and CoQ10 continue to monitor I&Os, calorie counts prn, dietary consult prn.  6. Acute episodic insomnia with situational adjustment disorder:  prn Ambien, monitor for day time sedation. 7. Falls risk elevated:  patient to use call light to get nursing assistance to get up, bed and chair alarm. 8. Elevated DVT risk: progressive activities in PT, continue prophylaxis LUCIANO hose, elevation and Xarelto. 9. Complex discharge planning: Discharge January 1, 2020 to home alone with help from his family who lives locally and home health care PT OT RN aide and social work. Until then continue weekly team meeting every  Thursday to assess progress towards goals, discuss and address social, psychological and medical comorbidities and to address difficulties they may be having progressing in therapy. Patient and family education is in progress. The patient is to follow-up with their family physician after discharge. Complex Active General Medical Issues that complicate care Assess & Plan:    1. History of CVA (cerebrovascular accident)-focus on mobility ADL and cognitive deficits  2. Recurrent depression-emotional support provided daily, vitamin B12, encourage participation in rehabilitation support group and recreational therapy, adjust/add medications (Abilify, Cymbalta, add co-Q10 vitamin B12) it appears the patient uses cannabinoids at home. 3.   Hyperlipidemia, coronary artery disease, cerebrovascular disease-continue blood signs every shift focusing on heart rate and blood pressure checks, consult hospitalist for backup medical and adjust/add medications (Xarelto).   Monitor heart rate and blood pressure as well as medications effects on vital signs before during and after therapy. 4.   Hemiparesis affecting dominant side as late effect of cerebrovascular accident (CVA)   5. 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 Chronic pain syndrome,   DJD (degenerative joint disease), lumbar, DJD (degenerative joint disease), cervical-transition to lowest effective dose of pain medication use topicals when available lowest effective dose of Ultram especially given cannabinoid use and Cymbalta. 7.   Hypothyroidism-  Synthroid and titrate dosing. Follow vital signs, recheck TSH and thyroid studies as outpatient. 8.   Chronic deep vein thrombosis (DVT) of left popliteal vein-Xaralto  9. Bowel and bladder incontinence-check post void residuals add scheduled toileting  10.    Aphasia-consult speech and language pathology         Regis Domingo D.O., PM&R     Attending    [address]

## 2021-12-31 NOTE — PROGRESS NOTES
Physical Therapy Rehab Treatment Note  Facility/Department: [facility]  Room: K025/G212-05       NAME: Maylin Murphy  : 1955 (77 y.o.)  MRN: [id number]  CODE STATUS: Full Code    Date of Service: 2021  Chart Reviewed: Yes  Patient assessed for rehabilitation services?: Yes  Family / Caregiver Present: No    Restrictions:  Restrictions/Precautions: Fall Risk,Seizure       SUBJECTIVE: Subjective: pt reports no concerns for home going. Pain Screening  Patient Currently in Pain: Yes  Pre Treatment Pain Screening  Pain at present: 4  Scale Used: Numeric Score  Intervention List: Patient able to continue with treatment    Post Treatment Pain Screening:  Pain Assessment  Pain Assessment: 0-10  Rai-Baker Pain Rating: Hurts even more  Pain Location: Arm; Foot  Pain Orientation: Right    OBJECTIVE:      Transfers  Sit to Stand: Modified independent  Stand to sit: Modified independent  Bed to Chair: Modified independent  Car Transfer: Modified independent  Comment: good safety and judgment with approach to chairs    Ambulation  Ambulation?: Yes  More Ambulation?: Yes  Ambulation 1  Surface: level tile;carpet  Device: [employer]  Other Apparatus: Right (air cast)  Quality of Gait: R knee snapping into extension, RLE circumduction to clear groud, pelvis rotated to R, decreased RLE stance time, step to pattern, slow brenden. Distance: 110', 50'  Comments: rest breaks post gait    Stairs/Curb  Stairs?: Yes  Stairs  # Steps : 4 (4 only d/t time constraints)  Stairs Height: 6\"  Rails: Left ascending  Assistance: Supervision  Comment: no cues required; supervision to ensure motor control consistency              Exercises  Hip Flexion: x10  Hip Abduction: x10 seated side kicks  Knee Long Arc Quad: x10  Ankle Pumps: x10 , heel raises x10     ASSESSMENT/PROGRESS TOWARDS GOALS:   At this time pt has met his goals and reports no concerns for discharge tomorrow.         Goals:  Long term goals  Long term goal 1: indep and effecient bed mobility -met  Long term goal 2: indep sit to stand and bed transfers - met  Long term goal 3: indep gait with qc 50 feet in protected environment - met  Long term goal 4: supervision with curb step performance for home entry - met  Long term goal 5:  for villalba balance testing - met    PLAN OF CARE/Safety:   Plan Comment: Cont per POC      Therapy Time:   Individual   Time In 1130   Time Out 1200   Minutes 30     Minutes:30      Transfer/Bed mobility trainin      Gait trainin      Neuro re education:0     Therapeutic ex:10      Nery Lucas PTA, 21 at 11:58 AM

## 2021-12-31 NOTE — PROGRESS NOTES
Occupational Therapy  Facility/Department: [facility]  Daily Treatment Note  NAME: Valentina Gooden  : 1955  MRN: [id number]    Date of Service: 2021    Discharge Recommendations:  Continue to assess pending progress  OT Equipment Recommendations  Other: Continue to assess    Assessment   Assessment: Pt is a 77year old man from home who presents to [facility] with the above deficits which impact his ability to perform ADLs and IADLs at his baseline. Pt. would benefit from continued OT to maximize independence and safety with ADL tasks. REQUIRES OT FOLLOW UP: Yes  Activity Tolerance  Activity Tolerance: Patient Tolerated treatment well  Safety Devices  Safety Devices in place: Yes  Type of devices: All fall risk precautions in place         Patient Diagnosis(es): There were no encounter diagnoses. has a past medical history of Chronic right shoulder pain, Closed TBI (traumatic brain injury) ([facility] [address] Essential hypertension, Hemiparesis affecting dominant side as late effect of cerebrovascular accident (CVA) ([facility] [address] History of CVA (cerebrovascular accident), Hyperlipidemia, Hypoxemia requiring supplemental oxygen, Recurrent depression ([facility] [address] S/P patent foramen ovale closure, and Seizure ([facility] [address] has no past surgical history on file. Restrictions  Restrictions/Precautions  Restrictions/Precautions: Fall Risk,Seizure  Subjective   General  Chart Reviewed: Yes  Patient assessed for rehabilitation services?: Yes  Response to previous treatment: Patient with no complaints from previous session  Family / Caregiver Present: No  Referring Practitioner: Dr Pauline Gamboa  Diagnosis: Impaired mobility and activities of daily living dt late effects of CVA  Subjective  Subjective: \"I'm going home tomorrow. \"  Pain Assessment  Pain Assessment: 0-10  Pain Level: 3  Pain Type: Chronic pain  Pain Location: Foot;Arm  Pain Orientation: Right  Pre Treatment Pain Screening  Pain at present: 3  Scale Used: Numeric Score  Intervention List: Patient able to continue with treatment  Vital Signs  Patient Currently in Pain: Yes   Orientation  Orientation  Overall Orientation Status: Within Functional Limits  Objective    ADL  Feeding: Setup  Grooming: Independent; Increased time to complete (Pt completed oral hygiene seated at sink)  UE Bathing: Modified independent ; Increased time to complete (Pt completed UB bathing while seated in w/c infront of sink)  LE Bathing: Supervision (Pt washed perinal area, and thighs. Pt however did not want to wash the rest of his legs or feet.)  UE Dressing: Increased time to complete;Modified independent  (Pt doffed/donned shirt and jacket with increased time.)  LE Dressing: Increased time to complete;Verbal cueing;Supervision (Pt only pulled pants down/up to thighs.)  Toileting: Unable to assess(comment) (Pt did not need to use bathroom at this time.)        Balance  Sitting Balance: Modified independent   Standing Balance: Stand by assistance  Functional Mobility  Functional Mobility Comments: Pt transferred from bed/w/c, w/c to toilet and back, w/c to shower chair and back. Toilet Transfers  Toilet - Technique: Stand pivot  Equipment Used: Standard toilet (grab bars)  Toilet Transfer: Stand by assistance  Shower Transfers  Shower - Transfer From: Wheelchair  Shower - Transfer Type: To and From  Shower - Transfer To:  Shower seat with back  Shower - Technique: Stand pivot  Shower Transfers: Supervision  Wheelchair [employer] Transfers  Wheelchair/Bed - Technique: Stand pivot  Equipment Used: Bed;Wheelchair  Level of Asssistance: Supervision  Bed mobility  Rolling to Left: Modified independent  Rolling to Right: Modified independent  Supine to Sit: Modified independent  Sit to Supine: Modified independent  Scooting: Supervision  Transfers  Stand Pivot Transfers: Supervision  Sit to stand: Supervision  Stand to sit: Supervision  Transfer Comments: Multiple trials; improved with multiple trials Cognition  Cognition Comment: Comprehension: Sup., Expression:; Min A., Social Interaction: MI., Problem Solving: Sup., Memory: MI                    Type of ROM/Therapeutic Exercise  Comment: Pt participated in L UE [address] activity for improvements with L FM during ADLs/IADLs. Pt engaged in placing two different sized wooden sticks x22 on dowel with rings on each. Pt required increased time to complete. Plan   Plan  Times per week: 5-7  Plan weeks: 1.5-2 weeks  Current Treatment Recommendations: Balance Training,Functional Mobility Training,Endurance Training,Safety Education & Training,Patient/Caregiver Education & Training,Equipment Evaluation, Education, & procurement,Self-Care / ADL,Home Management Training,Cognitive/Perceptual Training,Neuromuscular Re-education  Plan Comment: continue OT POC    Goals  Long term goals  Time Frame for Long term goals : Within 1.5-2 weeks patient to demonstrate progress in the following areas listed below to achieve specific LTGs as stated in the initial evaluation  Long term goal 1: improve overall endurance  Long term goal 2: improve balance for ADLs and IADLs  Long term goal 3: Demo an understanding of tone reduction  Patient Goals   Patient goals : \"the fall. ..  I am sorry\"    Patient agreed to stated goal of improving his balance       Therapy Time   Individual Concurrent Group Co-treatment   Time In 0800         Time Out 0900         Minutes 60              ADL/IADL trainin minutes  Therapeutic activities: 15 minutes    Roshan Alfred OT     Electronically signed by Roshan Alfred OT on 2021 at 9:49 AM

## 2022-01-01 VITALS
RESPIRATION RATE: 17 BRPM | HEART RATE: 62 BPM | WEIGHT: 174.4 LBS | DIASTOLIC BLOOD PRESSURE: 70 MMHG | SYSTOLIC BLOOD PRESSURE: 109 MMHG | BODY MASS INDEX: 26.52 KG/M2 | TEMPERATURE: 98.2 F | OXYGEN SATURATION: 98 %

## 2022-01-01 PROCEDURE — 97112 NEUROMUSCULAR REEDUCATION: CPT

## 2022-01-01 PROCEDURE — 97116 GAIT TRAINING THERAPY: CPT

## 2022-01-01 PROCEDURE — 6370000000 HC RX 637 (ALT 250 FOR IP): Performed by: PHYSICAL MEDICINE & REHABILITATION

## 2022-01-01 PROCEDURE — 6370000000 HC RX 637 (ALT 250 FOR IP): Performed by: INTERNAL MEDICINE

## 2022-01-01 PROCEDURE — 6370000000 HC RX 637 (ALT 250 FOR IP): Performed by: NURSE PRACTITIONER

## 2022-01-01 RX ADMIN — OXYCODONE 5 MG: 5 TABLET ORAL at 08:09

## 2022-01-01 RX ADMIN — LEVOTHYROXINE SODIUM 50 MCG: 0.05 TABLET ORAL at 08:06

## 2022-01-01 RX ADMIN — Medication 100 MG: at 08:06

## 2022-01-01 RX ADMIN — ASPIRIN 81 MG: 81 TABLET, CHEWABLE ORAL at 08:06

## 2022-01-01 RX ADMIN — RIVAROXABAN 20 MG: 20 TABLET, FILM COATED ORAL at 08:05

## 2022-01-01 RX ADMIN — BACLOFEN 10 MG: 10 TABLET ORAL at 08:06

## 2022-01-01 RX ADMIN — LEVETIRACETAM 750 MG: 500 TABLET, FILM COATED ORAL at 08:05

## 2022-01-01 RX ADMIN — ARIPIPRAZOLE 2 MG: 2 TABLET ORAL at 08:06

## 2022-01-01 RX ADMIN — DULOXETINE 60 MG: 60 CAPSULE, DELAYED RELEASE ORAL at 08:06

## 2022-01-01 ASSESSMENT — PAIN SCALES - GENERAL
PAINLEVEL_OUTOF10: 1
PAINLEVEL_OUTOF10: 4

## 2022-01-01 ASSESSMENT — PAIN SCALES - WONG BAKER
WONGBAKER_NUMERICALRESPONSE: 2
WONGBAKER_NUMERICALRESPONSE: 2

## 2022-01-01 NOTE — FLOWSHEET NOTE
Discharge instructions given to patient and brother. Verbalized an understanding. Script given to the brother. Awaiting transport.   Electronically signed by Lorita Saint, RN on 1/1/2022 at 12:49 PM

## 2022-01-01 NOTE — PROGRESS NOTES
Physical Therapy Rehab Treatment Note  Facility/Department: [facility]  Room: X976/H570-11       NAME: Glenda Hernandez  : 1955 (65 y.o.)  MRN: [id number]  CODE STATUS: Full Code    Date of Service: 2022  Chart Reviewed: Yes  Family / Caregiver Present: No    SUBJECTIVE: Subjective: Patient resting in bed. Agreeable to tx. Response To Previous Treatment: Patient with no complaints from previous session. Pain Screening  Patient Currently in Pain: Yes  Post Treatment Pain Screening:denies   Pain Assessment  Rai-Teague Pain Rating: Hurts a little bit    OBJECTIVE:   Neuromuscular Education  Neuromuscular Comments: Single stepping with manual stabilization to prevent R genu recurvatum, NDT sit to stand    Bed mobility  Rolling to Left: Modified independent  Rolling to Right: Modified independent  Supine to Sit: Modified independent  Sit to Supine: Modified independent  Scooting: Modified independent    Transfers  Sit to Stand: Modified independent  Stand to sit: Modified independent  Bed to Chair: Modified independent  Car Transfer: Modified independent;2 Person Assistance    Ambulation  Ambulation?: Yes  More Ambulation?: Yes  Ambulation 1  Surface: level tile;carpet  Device: [employer]  Other Apparatus: Right (air cast)  Quality of Gait: R knee snapping into hyperextension, RLE circumduction, decreased stance on RLE, inconsistent step length and speed  Distance: 125 feet    Other exercises  Other exercises 1: static standing balance eyes closed     ASSESSMENT/PROGRESS TOWARDS GOALS:  Assessment: Patient continues to complete functional mobility training at mod I level in protected areas. Session focused on balance/stepping activities to improve quality of gait.     Goals:  Long term goals  Long term goal 1: indep and effecient bed mobility -met  Long term goal 2: indep sit to stand and bed transfers - met  Long term goal 3: indep gait with qc 50 feet in protected environment - met  Long term goal 4: supervision with curb step performance for home entry - met  Long term goal 5: 20/56 for villalba balance testing - met    PLAN OF CARE/Safety: all safety precautions in place     Therapy Time:   Individual   Time In 0910   Time Out 0940   Minutes 30     Minutes:30      Gait training:15      Neuro re education:15        [facility], PTA, 01/01/22 at 9:50 AM

## 2022-01-02 NOTE — PROGRESS NOTES
Physical Therapy  Facility/Department: [facility]  Rehabilitation Discharge note    NAME: Yolanda Green  : 1955  MRN: [id number]    Date of discharge: 22    Subjective: Pt reports he is ready for return to home    Past Medical History:   Diagnosis Date    Chronic right shoulder pain 10/4/2017    Cl[facility]) [facility] [address] 2012    Essential hypertension 2017    Hemiparesis affecting dominant side as late effect of cerebrovascular accident (CVA) ([facility] [address] 2018    History of CVA (cerebrovascular accident) 2017    Hyperlipidemia 2017    Hypoxemia requiring supplemental oxygen 10/4/2017    Recurrent depression ([facility] [address] 2017    S/P patent foramen ovale closure 2017    [facility]     History reviewed. No pertinent surgical history.     Restrictions  Restrictions/Precautions  Restrictions/Precautions: Fall Risk,Seizure    Objective    Bed mobility  Rolling to Left: Modified independent  Rolling to Right: Modified independent  Supine to Sit: Modified independent  Sit to Supine: Modified independent  Scooting: Modified independent  Comment: at bed level with no rails    Transfers  Sit to Stand: Modified independent  Stand to sit: Modified independent  Bed to Chair: Modified independent  Car Transfer: Modified independent,2 Person Assistance  Comment: good safety and judgment with approach to chairs    Ambulation  Ambulation?: Yes  More Ambulation?: Yes  Ambulation 1  Surface: level tile,carpet  Device: Large Dann Laith  Other Apparatus: Right (air cast)  Assistance: Modified Independent(protected environment),Supervision busy areas)  Quality of Gait: R knee snapping into hyperextension, RLE circumduction, decreased stance on RLE, inconsistent step length and speed  Distance: able to ambulate 10 feet - 150 feet as circumstance dictates      Stairs  # Steps :  (4 only d/t time constraints) - able to perform 12 stairs routinely  Stairs Height: 6\"  Rails: Left ascending  Curbs: 4\"  Device:  (rail to simulate home rail)  Other Apparatus:  (rail simulated for home going)  Assistance: Supervision  Comment: no cues required; supervision to ensure motor control consistency    Wheelchair Activities  Propulsion: Yes  Propulsion 1  Propulsion: Manual  Level: Carpet  Method: LLE  Level of Assistance: Independent  Description/ Details: cues for safe environmentl maneuvering required  Distance: 200 ft      Outcomes Measures:  Villalba Balance Score: 20  Timed Up and Go: 39       Pt/ family education/training: Pt has been educated in safe mobility. He demonstrates good performance and follow thru    Assessment: Pt has made excellent gains. He has met goals established.       LTG established:  Long term goal 1: indep and effecient bed mobility -met  Long term goal 2: indep sit to stand and bed transfers - met  Long term goal 3: indep gait with qc 50 feet in protected environment - met  Long term goal 4: supervision with curb step performance for home entry - met  Long term goal 5: 20/56 for villalba balance testing - met    Discharge Plan:d/c to home with follow up PT recommended      Electronically signed by Lori Jane PT on 1/2/2022 at 12:18 PM

## 2022-01-03 ENCOUNTER — CARE COORDINATION (OUTPATIENT)
Dept: CASE MANAGEMENT | Age: 67
End: 2022-01-03

## 2022-01-03 NOTE — CARE COORDINATION
[facility] Transitions Initial Follow Up Call    Call within 2 business days of discharge: Yes    Patient: Opal Brown Patient : 1955   MRN: [id number]  Reason for Admission: Impaired mobility with ADL's d/t late effects of CVA  Discharge Date: 22 RARS: Readmission Risk Score: 11.2 ( )      Last Discharge [facility]       Complaint Diagnosis Description Type Department Provider    21  Impaired mobility and activities of daily living dt late effects of CVA . .. Admission (Discharged) [address] [facility],         Attempted to contact patient today 1/3/22 for TCM/hospital discharge follow up. Left message on home/mobile number requesting a return call back to CTN and provided contact information.        Saray Wasserman, APRN

## 2022-01-03 NOTE — PROGRESS NOTES
[facility]   Facility/Department: [facility]  Speech Language Pathology  Discharge Report        Patient: Sukumar Henry  : 1955    Date: 1/3/2022    SPEECH THERAPY    National Outcomes Measurement System (NOMS):  Initial Status:  Diet:  Regular solids with thin liquids  Swallowing:  Ratin  Spoken Language Comprehension:  Rating: 3  Spoken Language Expression:  Rating: 3  Motor Speech:  Rating: 3      National Outcomes Measurement System (NOMS):  Discharge Status:  Diet:  Diet Solids Recommendation: Regular  Liquid Consistency Recommendation: Thin  Dysphagia Outcome Severity Scale: Level 5: Mild dysphagia- Distant supervision. May need one diet consistency restricted    Compensatory Swallowing Strategies: Upright as possible for all oral intake,Small bites/sips  Therapeutic Interventions: Diet tolerance monitoring,Patient/Family education    Swallowing:  Ratin  Spoken Language Comprehension:  Ratin  AAC:  Rating: 3-4  Motor Speech:  Rating: 3       Long Term Goals:  Long-term Goals  Timeframe for Long-term Goals: 2-3 weeks  Goal 1: Pt will improve his/her Expressive Language abilities to a superivised level with AAC  for expression of complex wants, needs, feelings/ideas, and medical/safety information. Goal 2: Pt will improve her/his Receptive Language abilities to a supervise to min assist level for comprehension of conversation and safety directions with familiar and unfamiliar communication partners. Long-term Goals  Timeframe for Long-term Goals: 1-2 weeks  Goal 1: Patient will tolerate LRD without overt s/s of aspiration    Additional Comments: Prior speech therapy as an outpatient 3 years ago. Rec: AAC evaluation, outpatient speech therapy. Pt in agreement. 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  ST recommends OP ST to assess for Ipad communication system to improve abilities to communicate with caregivers. ST suspects patient receptive/expressive language skills are baseline. Functional Status at time of Discharge:    · Language: Patient demonstrates moderate and severe language deficits. · Motor Speech: Patient demonstrates moderate motor speech deficits.                                  Patient is discharged to Home               [x] Recommend continued speech therapy   [] Speech Therapy is no longer warranted      Signature:  Tammy Habermann, SLP, CCC-SLP, Date: 1/3/2022, Time: 10:24 AM

## 2022-01-04 ENCOUNTER — CARE COORDINATION (OUTPATIENT)
Dept: CASE MANAGEMENT | Age: 67
End: 2022-01-04

## 2022-01-04 NOTE — CARE COORDINATION
[facility] Trans[facility] Follow Up Call    2022    Patient: Hazel Diaz  Patient : 1955   MRN: [id number]  Reason for Admission: Impaired mobility and ADL's d/t late effects of CVA  Discharge Date: 22 RARS: Readmission Risk Score: 11.2 ( )    Spoke briefly with patient son Selina Stephenson) today 22 for TCM/hospital discharge follow up. Paris Singleton states patient is doing better since discharge and advises his weakness is back to baseline. States patient ambulates using a quad cane and admits patient is scheduled to start OP rehab this 22. Paris Singleton states he is at work and can no longer talk and is in agreement to CTN calling back tomorrow.       JULITA Lyons

## 2022-01-11 ENCOUNTER — HOSPITAL ENCOUNTER (OUTPATIENT)
Dept: OCCUPATIONAL THERAPY | Age: 67
Setting detail: THERAPIES SERIES
Discharge: HOME | End: 2022-01-11
Payer: MEDICARE

## 2022-01-11 ENCOUNTER — HOSPITAL ENCOUNTER (OUTPATIENT)
Dept: SPEECH THERAPY | Age: 67
Setting detail: THERAPIES SERIES
Discharge: HOME | End: 2022-01-11
Payer: MEDICARE

## 2022-01-11 PROCEDURE — 92523 SPEECH SOUND LANG COMPREHEN: CPT

## 2022-01-11 PROCEDURE — 97166 OT EVAL MOD COMPLEX 45 MIN: CPT

## 2022-01-11 NOTE — PROGRESS NOTES
[x][facility]        [][facility]       Outpatient Rehab Dept     [facility] [place] [address], [address] [facility], [address]     Phone: [phone]                   Phone: [phone]     Fax:  [phone]        Fax: [phone]      [facility] Outpatient  Speech Language Pathology  Speech Language/Cognitive Evaluation        NAME:Aramis Pinedo  : 1955 (68 y.o.)   MRN: [id number]  PATIENT DIAGNOSIS(ES): Aphasia [R47.01]  Other symptoms and signs involving cognitive functions and awareness [R41.89]  No chief complaint on file.     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    Impaired mobility and activities of daily living dt late effects of CVA 2021    Abnormality of gait and mobility 2021    Blood thinned due to long-term anticoagulant use-Xaralto 2021    Cognitive deficit due to old head injury 2021    DJD (degenerative joint disease), lumbar 2021    DJD (degenerative joint disease), cervical 2021    Bowel and bladder incontinence 2021    Aphasia 2021    Generalized weakness 2021    Hypothyroidism 2019    Chronic deep vein thrombosis (DVT) of left popliteal vein (HCC) 2019    Chronic pain syndrome 2018    Hemiparesis affecting dominant side as late effect of cerebrovascular accident (CVA) ([facility] [address] 2018    Chronic right shoulder pain 10/04/2017    Hypoxemia requiring supplemental oxygen 10/04/2017    History of CVA (cerebrovascular accident) 2017    Essential hypertension 2017    Recurrent depression ([address] 2017    Seizure ([facility] [address] 2017    S/P patent foramen ovale closure 2017    Hyperlipidemia 2017    [facility]     Past Medical History:   Diagnosis Date    Chronic right shoulder pain 10/4/2017    Closed TBI (traumatic brain injury) ([facility] [address] 12/28/2012    Essential hypertension 8/2/2017    Hemiparesis affecting dominant side as late effect of cerebrovascular accident ([facility]A) ([facility] 1/30/2018    History of CVA (cerebrovascular accident) 8/2/2017    Hyperlipidemia 8/2/2017    Hypoxemia requiring supplemental oxygen 10/4/2017    Recurrent depression [facility] 8/2/2017    S/P patent foramen ovale closure 8/2/2017    Seizure ([facility] 8/2/2017     No past surgical history on file.   No Known Allergies    Date of Onset: 8 years ago  Ordering Physician: Devonte Lara MD    Date of Evaluation: 1/11/2022   Evaluating Therapist: Angel Luis Spence SLP      SPEECH PATHOLOGY DIAGNOSIS:   Moderate receptive aphasia and mod-severe expressive aphasia and apraxia of speech    THERAPY RECOMMENDATIONS:   Therapy is recommended to improve:  Verbal expression  Auditory comprehension, AAC training                 MOTOR SPEECH    Oral Peripheral Examination   Adequate labial/lingual strength  Oral apraxia    Parameters of Speech Production  Respiration:    WFL    Articulation:    WFL    Resonance:    WFL    Quality:     WFL    Pitch:      WFL    Intensity:   WFL    Fluency:    Intact    Prosody   Intact      RECEPTIVE LANGUAGE    Comprehension of Yes/No Questions:             MTDDA subtest: 100% accuracy  Cueing Needed  Comment: repetition of questions    Process Simple Verbal Commands:   Incomplete  Comment    Process Intermediate Verbal Commands:   Unable    Process Complex Verbal Commands:   Unable    Comprehension of Conversation:     Incomplete    Comprehension of Single Words:    Inconsistent  Comment: pictures 100% acc, forms 33%, letters 83%, numbers 67%, colors 83% acc    Comprehension of Paragraphs:   Unable    EXPRESSIVE LANGUAGE   Automatics: Impaired  Comments: completed 1-10 with LEEANNE funez 8/10     Imitation:  Words:  Impaired  Comments: 67% acc    Sentences: Impaired  Comments: unable beyond first word    Naming:    Modality used:  Verbal    Confrontation Naming:   Impaired  Comments: 67% acc    Sentence Completion:   Impaired  Comments: 75% acc    Descriptive Naming:   Impaired  Comments: unable    Response Naming:   Impaired  Comments: unable    Divergent Naming:  Impaired  Comments: unable    Word Fluency  Impaired  Comments: unable    Convergent Naming:  Impaired  Comments: unable    Conversation:     Grammatical form: Disordered  Paraphasias: Yes  Paraphasias Observed:    Literal errors    READING AND WRITING ASSESSMENT  Reading  Oral Reading and Reading Comprehension   Symbol and Word Discrimination   Impaired    Word Recognition  Impaired  Comments: 33% acc with single words    Visual Scanning-Cancellation task  Functional    Comprehension of Oral Spelling  Impaired  Comments: unable     Word Picture Matching  Impaired  Comments: 90% acc Ind    Comprehension of Written Sentences  Not Tested    Comprehension of Written Directions  Not Tested    Comprehension of Written Paragraph  Not Tested    Writing  Name and Address  Not Tested    Recall of Written Symbols  Not Tested    Written Word Finding (Spelling to dictation and written confrontational naming)  Not Tested    Written Formation (write 3-5 sentences)  Not Tested    CLINICAL OBSERVATIONS NOTED DURING THE EVALUATION  Inconsistent Responses  Paraphasic Errors  Cueing Required  Comments:  Pt presents s/p CVA in 2012 that patient has had multiple trials of speech therapy in the past. Pt was recently hospitalized and attended acute inpatient rehab. Recommended for f/u for SGD. Pt p/w moderate receptive deficits and mod-severe apraxia of speech and expressive aphasia. Pt reports that he has an iPad at home that he has attempted to use in the past for AAC. Pt communicates with family with single words and use of picture text messages.  Pt would 61      Signature:  Electronically signed by OSCAR Reddy on 1/11/2022 at 3:20 PM      Dr. Denia Carroll,      The following patient has been evaluated for speech therapy services and for therapy to continue, insurance requires physician review of the treatment plan initially and every 30 days. Please review the attached evaluation and/or summary of the patient's plan of care, and verify that you agree therapy should continue by signing the attached document and sending it back to our office. If you have any questions or concerns, please don't hesitate to call.   Thank you for your referral.        Physician Signature:________________________________Date:__________________  By signing above, therapists plan is approved by physician

## 2022-01-11 NOTE — PROGRESS NOTES
[x] [address]:       [address] [place] [address].  [facility], Joss   Ph: [phone]   Fax: [phone] [] [address]:  [facility] [address] [facility], [address]   Ph: [phone]  Fax: [phone]       OCCUPATIONAL THERAPY EVALUATION     Evaluation Date:  1/11/2022       OT Individual Minutes  Time In: 1400  Time Out: 1504  Minutes: 64    OT Eval mod complexity 64 minutes for 1 unit, CPT 72656     Patient Name:Aramis David   Gender: male   YOB: 1955         MRN: [id number]     Physician: Referring Practitioner: Dr. Maurilio Foreman MD  Diagnosis: Diagnosis: Deficit in ADL Z78.9                    Referral Date:  12/31/21          Onset Date:  CVA 8 years ago, recent hospitalization 12/20/20221 for fall (per chart review)    PMH:  Patient Active Problem List   Diagnosis    History of CVA (cerebrovascular accident)    Essential hypertension    Recurrent depression ([address]    Seizure ([address]    S/P patent foramen ovale closure    Hyperlipidemia    Chronic right shoulder pain    Hypoxemia requiring supplemental oxygen    Hemiparesis affecting dominant side as late effect of cerebrovascular accident (CVA) ([address]    Closed TBI (traumatic brain injury) ([address]    Chronic pain syndrome    Hypothyroidism    Chronic deep vein thrombosis (DVT) of left popliteal vein ([address]    Generalized weakness    Cognitive deficit due to old head injury    DJD (degenerative joint disease), lumbar    DJD (degenerative joint disease), cervical    Bowel and bladder incontinence    Aphasia    Impaired mobility and activities of daily living dt late effects of CVA    Abnormality of gait and mobility    Blood thinned due to long-term anticoagulant use-Xaralto    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 8/2/2017    S/P patent foramen ovale closure 8/2/2017    Seizure ([facility] 8/2/2017     No past surgical history on file. No Known Allergies    Diagnostic imaging: Physician interpretation of imaging tests reviewed.     Medications:    Current Outpatient Medications:     levETIRAcetam (KEPPRA) 750 MG tablet, Take 1 tablet by mouth 2 times daily, Disp: 60 tablet, Rfl: 0    baclofen (LIORESAL) 10 MG tablet, Take 1 tablet by mouth 3 times daily, Disp: 120 tablet, Rfl: 0    glycerin 2 g suppository, Place 1 suppository rectally daily as needed for Constipation, Disp: 1 suppository, Rfl: 0    docusate sodium (COLACE) 100 MG capsule, Take 1 capsule by mouth 2 times daily, Disp: 60 capsule, Rfl: 0    traMADol (ULTRAM) 50 MG tablet, TAKE 1 TABLET BY MOUTH TWICE DAILY AS NEEDED FOR PAIN, Disp: 60 tablet, Rfl: 2    furosemide (LASIX) 20 MG tablet, TAKE 1 TABLET BY MOUTH DAILY AS NEEDED, Disp: 30 tablet, Rfl: 5    levothyroxine (SYNTHROID) 50 MCG tablet, Take 1 tablet by mouth daily, Disp: 30 tablet, Rfl: 5    potassium chloride (KLOR-CON M) 20 MEQ extended release tablet, Take 1 tablet by mouth daily, Disp: 30 tablet, Rfl: 2    DULoxetine (CYMBALTA) 60 MG extended release capsule, Take 1 capsule by mouth daily, Disp: 90 capsule, Rfl: 3    ARIPiprazole (ABILIFY) 2 MG tablet, Take 1 tablet by mouth daily, Disp: 30 tablet, Rfl: 5    rivaroxaban (XARELTO) 20 MG TABS tablet, Take 1 tablet by mouth daily (with breakfast), Disp: 30 tablet, Rfl: 5    Handicap Placard MISC, by Does not apply route Exp 5 yrs, Disp: 1 each, Rfl: 0    aspirin 81 MG chewable tablet, Take 81 mg by mouth, Disp: , Rfl:     Multiple Vitamin (MULTI VITAMIN PO), Take by mouth, Disp: , Rfl:     Visits Allowed/Insurance/Certification Information:   OT Visit Information  OT Insurance Information: Medicare ([employer] [place] secondary to cover 20% of part B)  Total # of Visits Approved:  (BMN)  Total # of Visits to Date: 1  Progress Note Counter: eval    Restrictions/Precautions None per patient report         English primary language: Yes    Transfer of pt care required: no     SUBJECTIVE FINDINGS     Support contact: Ty Byers (brother)        Pt lives: alone   Home:  single level   Entrance: 1 MELVA  Bathroom: walk in shower , grab bars in shower  and shower chair   DME: quad cane, reacher  and wheel chair manual     Pain:        Pain Location  Description Initial Rating  Current Rating  Improved by Worsened by   R arm and R leg Pt with aphasia  4-5/10 2-3/10 Rest Moving    Max pain at home during activities: 10/10  Lowest pain at home during activities/rest: 1-2/10    Action for pain:   Patient reports pain is at acceptable level for treatment. Prior Level of Functioning:    Pt reported IND with ADL's prior to incident. Work Status:  Pt on disability    Driving:no      Hobbies, Leisure, social activities: trains,      Previous OT treatments for this condition: yes pt on acute rehab unit for 9 days    History of Present Illness or Pain/ Chief complaint:  Pt with aphasia. Pt stated not sure reason to go to hospital. Pt was admitted for fall per chart review. Pt stated not sure if he had a fall. Pt with history of CVA with R side hemiparesis. Current Functional Limitations Per Patient Report:   Orientation:  pt wrote \"12\" down when asked for date. Communication: pt with aphasia   Hearing: No concerns   Perception: Not formally assessed    Vision:  pt wearing glassess              Feeding: No concerns reported, pt stated has AE  Grooming: No concerns   Bathing: Pt stated \"a little bit\" for washing UB and LB.  Pt stated difficult to wash L side of body. UE dressing: No concerns with shirt, \"a little bit\" of an issue with jacket/coat. LE dressing: No concerns  and Comments: pt with elastic shoe laces and slides on/off  Toileting: No concerns   Toilet transfer: No concerns   Tub/Shower transfer: No concerns     Cooking: Pt uses microwave for meals. Cleaning: hires cleaning service  Laundry: Cleaning service completes     Sleep: Pt reported difficulty sleeping. 6-7 hours typical    Medication management: No concerns     Patient goal for therapy: \"I don't know. \" Pt stated \"a little bit\" when asked if it was more difficult to move. OBSERVATION:   R shoulder depressed, 1/2 finger subluxation    OBJECTIVE FINDINGS    Hand Dominance: left        Upper Extremity Strength and Range of Motion      Right  Left    MMT A P Norm  A P MMT   Shoulder          Flexion NT/5 WFL NT 0-180 WFL NT 4/5   Abduction NT/5 WFL NT 0-180 WFL NT 4/5   Internal Rotation NT/5 WFL NT 0-80 WFL NT NT/5   External Rotation NT/5 WFL NT 0-60 WFL NT NT/5   Elbow          Flexion NT/5 WFL NT 0-150 WFL NT 4+/5   Extension NT/5 WFL -0 WFL NT 4+/5   Pronation NT/5 WFL NT 0-80 WFL NT 4+/5   Supination NT/5 WFL NT 0-80 WFL NT 4+/5   Wrist          Flexion NT/5 WFL NT 0-70 WFL NT 4+/5   Extension  NT/5 WFL NT 0-60 WFL NT 4+/5   Ulnar deviation NT/5 WFL NT 0-30 WFL NT NT/5   Radial Deviation  NT/5 WFL NT 0-20 WFL NT NT/5   Comments: Pt with tremors in L hand, pt stated typical. RUE with contracture. Pt with slight AROM at R elbow. 1/2 finger subluxation noted to R shoulder.         Hand Range of Motion      Right  Left   Normal  MP PIP DIP  MP PIP DIP    0-90 0-100 0-70  0-90 0-100 0-70    A P A P A P  A P A P A P   Index                Extension IMP NT IMP NT IMP NT  WFL NT WFL NT WFL NT   Flexion IMP NT IMP NT IMP NT  WFL NT WFL NT WFL NT   Middle                Extension IMP NT IMP NT IMP NT  WFL NT WFL NT WFL NT   Flexion IMP NT IMP NT IMP NT  WFL NT WFL NT [facility] strength   [] Decreased fine motor skills   [x] Increased pain    [x] Decreased ADL status   [] Decreased joint mobility   [x] Decreased knowledge of HEP  [] Decreased sensation    [] Other:      Complexity:         [] Low Complexity:   ¨ History: Brief history including review of medical records relating to the problem  ¨ Exam: 1-3 performance Deficits  ¨ Assistance/Modification: No assistance or modifications required to perform tasks. No comorbities affecting occupational performance  [x] Medium Complexity:   ¨ History: Expanded review of medical records and additional review of physical, cognitive, or psychosocial history related to current functional performance  ¨ Exam: 3-5 performance deficits  ¨ Assistance/Modification: Min/mod assistance or modifications required to perform tasks. May have comorbidities that affect occupational performance. [] High Complexity:   ¨ History: Extensive review of medical records and additional review of physical, cognitive, or psychosocial history related to current functional performance. ¨ Exam: 5 or more performance deficits  ¨ Assistance/Modification: Significant assistance or modifications required to perform tasks. Have comorbidities that affect occupational performance. Quick DASH  QuickDASH  Open a tight or new jar: Mild Difficulty  Do heavy household chores (e.g., wash walls, floors):  Francy Severance a shopping bag or briefcase: Mild Difficulty  Wash your back: Mild Difficulty  Use a knife to cut food: No Difficulty  Recreational activities in which you take some force or impact through your arm, shoulder, or hand (e.g., golf, hammering, tennis, etc.): Moderate Difficulty  During the past week, to what extent has your arm, shoulder or hand problem interfered with your normal social activities with family, friends, neighbors or groups?: Slightly  During the past week, were you limited in your work or other regular daily activities as a result of your arm, shoulder or hand problem?: Moderately Limited  Arm, shoulder or hand pain: Moderate  Tingling (pins and needles) in your arm, shoulder or hand: Moderate  During the past week, how much difficulty have you had sleeping because of the pain in your arm, shoulder or hand?: Moderate Difficulty  QuickDASH Total Score: 29  QuickDASH Disability/Symptom Score : 40.91 %  QUICKDASH Disability Index: 40-59%  QUICKDASH CMS Modifier: CK      Rehabilitation Potential:    [] Excellent [x] Good  [] Fair  [] Poor      PLAN OF CARE     To see patient for 2 x/week for 2-3 weeks  for the following treatment interventions:    [x] Evaluate & Treat [] Neuromuscular Re-education:     [x] Re-evaluation [] Tissue (stress) Loading Program    [x] Pain Management  [x] PROM/Stretching/AAROM/AROM    [] Edema Management  [x] Splinting    [] Wound Care/Scar Management  [] Desensitization    [x] ADL Training  [] Strengthening/Graded Therapeutic Activity    [] Tendon Repair Program  [] Coordination/Dexterity Training    [x] Instruction/Application of energy [x] Manual Techniques        conservation, work simplification [x] Instruction in HEP        joint protection, body mechanics [] Aquatic Therapy    [] Modalities []  Ultrasound           [] Infrared [] Hot/Cold Pack:          [] Paraffin   [] Other:   [] Electrical Stimulation [] Fluidotherapy     [x] SIDDIQUI able to work with pt   [x] D/C plan: Will assess pt after established visits to determine need for continued therapy. GOALS:     LTG 1 : Patient  will be IND with all recommended HEP's, adaptive strategies, and adaptive techniques. LTG 2 : Patient will be IND with donning orthotic device, maintenance, and schedule   LTG 3 : Patient will participate in therapeutic interventions to normalize muscle tone in RUE. LTG 4: Patient will report less sleep disturbances from pain. LTG 5: Patient will report pain 5/10 or less during functional activities.       Recommended clarifying PT referral for PT eval to address AFO. Pt with pre-fabricated air type orthosis for RLE. Pt stated strap not long enough and too tight. OT unable to modify device. Lanetta Collet, OTR/L 1/12/2022 10:27 AM    Falls Risk Assessment     Age: 0-59 = 0          60-69= 1            > 70= 2 History of Falls:   0  Falls  last 6 mo = 0    1  fall  Last  6 mo = 1   1-3 falls last 6 mo = 2 Medical History:   Parkinsons, CVA,HTN, vertigo, >4 meds, use of assistive device (1pt.for each)  Mental Status:  A & O x 3 = 0  Disoriented to person, place, or time = 2     [x]  INITIAL ASSESSMENT:                                                      Date: 1/12/2022                                                  Age:   1                                                        Falls: 2                                                          PMH: 4                                                          Mental: 2                                                       Total:  9                                                        *Patient 4 or younger:   Vestibular:     Signature: Lanetta Collet, OTR/L                                                      High Risk for Falls: >8  Intermediate Risk for Falls: 4-8   Low Risk for Falls: <4    * All pediatric patients (age 3 or younger) and vestibular patients will be considered high risk for falls- scoring does not need to be completed - treat as high risk. Interventions     Low Risk: Monitor for changes, reassess every three months. Intermediate Risk: Supervision for most activities, direct contact with new activities or equipment, reassess monthly. High Risk: Stand by assitance at all times, reassess monthly.

## 2022-01-11 NOTE — PROGRESS NOTES
[facility] [address] ([facility])  FUNCTIONAL COMMUNICATION MEASURES  ADULT    Patient: Jhon Hadley  : 1955  MRN: [id number]    Date: 2022         TYPE OF ENTRANCE:   Initial    SPOKEN LANGUAGE COMPREHENSION:   Understands simple messages/conversations related to routine daily activities in LOW demand situations (e.g., following simple directions): 50-75% of the time   Understands complex messages, as expected for chronological age, in LOW demand situations (e.g., story/lecture, sequencing of events, drawing appropriate conclusions, inferences, humor, subtle references)? 0-25% of the time   Understands simple messages/conversations related to routine daily activities in [address] demand situations (e.g., following simple directions)?: 50-75% of the time   Understands complex messages, as expected for chronological age, in [address] demand situations (e.g., story/lecture, sequencing of events, drawing appropriate conclusions, inferences, humor, subtle references)?: 0-25% of the time   Functions safely WITHOUT additional supervision/assistance (in excess of chronological age expectations) due to comprehension deficits? : 76-90% of the time   Participates in communication exchanges WITHOUT additional assistance from communication partner (no more than would be expected for chronological age)?   50-75% of the time      Electronically Signed by: Miriam Park, SLP, MA, CCC-SLP

## 2022-01-18 ENCOUNTER — HOSPITAL ENCOUNTER (OUTPATIENT)
Dept: SPEECH THERAPY | Age: 67
Setting detail: THERAPIES SERIES
Discharge: HOME | End: 2022-01-18
Payer: MEDICARE

## 2022-01-18 ENCOUNTER — HOSPITAL ENCOUNTER (OUTPATIENT)
Dept: OCCUPATIONAL THERAPY | Age: 67
Setting detail: THERAPIES SERIES
Discharge: HOME | End: 2022-01-18
Payer: MEDICARE

## 2022-01-18 PROCEDURE — 97140 MANUAL THERAPY 1/> REGIONS: CPT

## 2022-01-18 PROCEDURE — 97535 SELF CARE MNGMENT TRAINING: CPT

## 2022-01-18 PROCEDURE — 92507 TX SP LANG VOICE COMM INDIV: CPT

## 2022-01-18 PROCEDURE — 97110 THERAPEUTIC EXERCISES: CPT

## 2022-01-18 NOTE — PROGRESS NOTES
Occupational Therapy  Daily Note     Name: Arnulfo Oseguera  : 1955  MRN: [id number]  Diagnosis: Deficit in ADL Z78.9    Visit Information:   OT Insurance Information: Medicare ([employer] [place] secondary to cover 20% of part B)  Total # of Visits Approved:  (BMN)  Total # of Visits to Date: 2  Progress Note Counter: 2    Date: 2022  Time:   OT ADL training 30 minutes for 2 unit(s), CPT 96898  OT Manual therapy 30 minutes for 2 unit(s), CPT 71820       OT Individual Minutes  Time In: 0576    Referring Practitioner: Dr. Kalina Grande MD    Subjective:  \"I have trouble using the oven. \"      Pain rating:   Pre-treatment pain:    Pt denies pain    Action for pain:   Patient reports pain is at acceptable level for treatment. Pain after treatment:      2/10         Focus of treatment was on the following:   education/training and increase right passive ROM     Objective:    Treatment Activity:     Therapist educated pt on pre-fabricated vs customized splint for resting hand splint. Pt was educated on multiple designs and pt ultimately decided on a pre-fabricated splint d/t feeling comfortable. Therapist provided PROM to promote decreased tone to isrrael a pre-fabricated resting hand splint to allow pt to feel/understand the type of splints out there. Pt expressed that this felt good. Therapist provided patient with print out of \"SoftPro Functional Resting Hand Splint\" from [employer]. Therapist informed pt this was one of many designs that they could choose from online (any website, not specifically this one). Pt verbalized understanding and stated \"this looks good. \" Therapist informed pt he wants a splint that will provide wrist extension to promote increased movement in R UE. Therapist provided pt with information on [employer] [employer] for potential AFO.  Therapist informed pt that they were not approved for PT at [facility]) outpatient, therefore this potential company will be able to create/design an AFO. Therapist informed pt they may need a script from the doctor to order an AFO, therefore he should call his doctor before he contacts this company. Pt verbalized understanding. Pt verbalized that he has AE at home; long handled shoe sponge, reacher. Pt verbalized the hardest thing at home is the oven/stove, therefore he doesn't use it d/t pronation/supination of L wrist. Therapist suggested toaster oven instead of the oven d/t it being placed on the counter level, and less strain on the L hand to remove food. Pt verbalized understanding and stated \"that is a good idea. \"     Exercises:    Therapist provided PROM to R UE: shoulder flexion, elbow flexion/extension, wrist flexion/extension, and finger extension to promote increased ROM and normalize muscle tone in R UE. Pt tolerated wrist flexion/extension with minimal pain (2/10) reported. Pt was noted to display maximum pain in shoulder flexion PROM, therapist discontinued. Education/HEP: N/A     Discussed previous HEP: n/a    Comments:     Assessment:   Pt tolerated treatment well. Plan:   Continue POC    Goals:     Long term goals  Time Frame for Long term goals : 2 x/week for 2-3 weeks  Long term goal 1: Patient  will be IND with all recommended HEP's, adaptive strategies, and adaptive techniques. Long term goal 2: Patient will be IND with donning orthotic device, maintenance, and schedule  Long term goal 3: Patient will participate in therapeutic interventions to normalize muscle tone in RUE. Long term goal 4: Patient will report less sleep disturbances from pain. Long term goal 5: Patient will report pain 5/10 or less during functional activities.     Shaun Chi OT   1/18/2022  11:11 AM

## 2022-01-18 NOTE — PROGRESS NOTES
[id number] [facility] Outpatient  Speech Language Pathology  Adult Daily Note    Opal Brown  : 1955    Date: 2022    Visit Information:  SLP Insurance Information: Medicare  Total # of Visits Approved:  (medical necessity)  Total # of Visits to Date: 2  No Show: 0  Canceled Appointment: 0    Plan of care signed (Y/N):     Yes    Certification ESUCCO:-  Plan of Care Visit #2      Interventions used this date:  Receptive Language and Instruction in Compensatory Strategies    Subjective: Pt arrived on time and was cooperative with all tasks. Behavior:  Alert, Cooperative and Pleasant       Objective/Assessment:   Patient progressing towards goals:  1. SLP will educate and train pts caregiver regarding operation and maintenance of pts speech generating device so that assistance can be provided to pt in the event of a communication breakdown. 2. Pt will participate in further SGD evaluation and trials to determine most appropriate device for communication  SLP educated patient that she is currently filling out the device trial submission paperwork for [employer] online   3. Pt will complete ID of objects/pictures in a field of 6 with 80% acc min cues  Pt ID pictures of objects in a field of 6 with 16/18 acc mild cues and ID line drawings by function in a field of 6 with 6/9 acc Ind  4. Pt will complete single word ID tasks with 80% acc with min verbal cues  Sentence completion task with field of 3 single word choices pt had 80% acc with SLP reading initial two words of sentence  Word ID task with choosing picture with 70% acc Ind    Pain Assessment:  Initial Assessment:  Patient reported acceptable level for treatment. Right side 2/3    Re-assessment:  Patient reported acceptable level for treatment. Right side 2/3      Plan:  Continue with current goals    Patient/Caregiver Education:  Patient/Caregiver educated on session.   Patient/Caregiver stated verbal understanding of directions.     Time in: 1510   Time out:1600  Minutes seen: 48      Signature:   Electronically signed by OSCAR Abraham on 1/18/2022 at 4:19 PM

## 2022-01-20 ENCOUNTER — HOSPITAL ENCOUNTER (OUTPATIENT)
Dept: OCCUPATIONAL THERAPY | Age: 67
Setting detail: THERAPIES SERIES
Discharge: HOME | End: 2022-01-20
Payer: MEDICARE

## 2022-01-20 ENCOUNTER — CARE COORDINATION (OUTPATIENT)
Dept: CASE MANAGEMENT | Age: 67
End: 2022-01-20

## 2022-01-20 ENCOUNTER — HOSPITAL ENCOUNTER (OUTPATIENT)
Dept: SPEECH THERAPY | Age: 67
Setting detail: THERAPIES SERIES
Discharge: HOME | End: 2022-01-20
Payer: MEDICARE

## 2022-01-20 PROCEDURE — 97535 SELF CARE MNGMENT TRAINING: CPT

## 2022-01-20 PROCEDURE — 92507 TX SP LANG VOICE COMM INDIV: CPT

## 2022-01-20 PROCEDURE — 97112 NEUROMUSCULAR REEDUCATION: CPT

## 2022-01-20 NOTE — CARE COORDINATION
[facility] Transitions Follow Up Call    2022    Patient: Rahul Cortes  Patient : 1955   MRN: [id number]  Reason for Admission: Impaired mobility with ADL's d/t late effects of CVA  Discharge Date: 22 RARS: Readmission Risk Score: 11.2 ( )    Attempted to contact patient/son today 22 for TCM/hospital discharge (low risk) follow up sub call. Left message requesting a return call back to CTN and provided contact information.       Dorian Boucher, APRN

## 2022-01-20 NOTE — PROGRESS NOTES
Occupational Therapy  Daily Note     Name: Sarita Vasquez  : 1955  MRN: [id number]  Diagnosis: Deficit in ADL Z78.9    Visit Information:   OT Insurance Information: Medicare (Medical [place] secondary to cover 20% of part B)  Total # of Visits Approved:  (BMN)  Total # of Visits to Date: 3  Progress Note Counter: 3    Date: 2022  Time:   OT ADL training 52 minutes for 3 unit(s), CPT 11901  OT Neuromuscular 8 minutes for 1 unit(s), CPT [id number]    Referring Practitioner: Dr. Polo Jackson MD      Subjective:  \"I'm doing good, little bit pain. \"       Pain rating:   Pre-treatment pain:    2/10, pt reported pain on R side (arm and leg)    Action for pain:   Patient reports pain is at acceptable level for treatment. Pain after treatment:      2/10         Focus of treatment was on the following:   decreasing fascial/soft tissue restrictions, decreasing pain, education/training, IADL and increase right active and passive  ROM     Objective:    Treatment Activity:     Patient education:    Therapist discusses ADL and IADL activities with patient to determine if adaptive strategies or modalities would benefit patient. Pt reported that his sleep has not improved. Pt expressed he wakes up at least three times a night. Pt reports he gets a total of 8 hours of sleep a night, however it is spread out since he is waking up. Pt reports that when he wakes up, it takes a while to fall back asleep. Pt reported that pain of his R arm and leg has woken him up. Pt expressed constant pain at times when woken up during the night. Pt reported that he has experienced sleep troubles for a few years now. Pt reported that the pain is constant, and he sometimes uses heat, however it does not help. Therapist discussed different modalities such as heat pack and positioning to try to reduce pain. Pt verbalized understanding. Pt reported that he will use his teeth to open toothpaste caps and soda cans.  Therapist dicussed with patient to use R hand to provide stability for the can so he can open it with his L hand instead of using teeth. Therapist used soup can to simulate activity. Pt verbalized understanding reporting \"good idea. \"    Pt also mentioned that he has difficulties opening medicine bottles d/t the lock. Therapist retrieved x3 bottles with different lids and requested the pt open them. Pt reported the child lock was difficult. Therapist suggested next time patient needs a refill to ask the pharmacy for a \"non child resistant\" lock so patient is able to open the bottles with L hand. Pt expressed great interest in this idea and was agreeable it would be beneficial.     Pt expressed he ordered a pre-fabricated resting hand splint. Pt was unaware when splint would arrive. Therapist provided pt with pre-fabricated splint at clinic to practice donning/doffing. Pt required minimal/moderate assistance with finger placement on splint. Pt was noted to don straps with L hand slowly, but was able to complete with minimal assistance. Therapist educated pt on donning fingers into splint first, then strapping fingers on, and the rest of the straps. Pt verbalized that was a \"good idea. \"    Modality:     NMES at intensity 10 over 7 minutes. Therapist started at intensity of 12, pt expressed feeling pain, therapist lowered to 10. Pt expressed it was tolerable. Following 7 minutes, pt reported sharp pain in R leg (side of calf). Therapist discontinued. Moist heat 15 minutes of R forearm and hand to decrease pain. Pt reported feeling \"better\" following. Education/HEP: N/A     Discussed previous HEP: n/a, N/A    Comments:     Assessment:   Pt tolerated treatment well. Plan:   Continue POC    Goals:     Long term goals  Time Frame for Long term goals : 2 x/week for 2-3 weeks  Long term goal 1: Patient  will be IND with all recommended HEP's, adaptive strategies, and adaptive techniques.   Long term goal 2: Patient will be IND with donning orthotic device, maintenance, and schedule  Long term goal 3: Patient will participate in therapeutic interventions to normalize muscle tone in RUE. Long term goal 4: Patient will report less sleep disturbances from pain. Long term goal 5: Patient will report pain 5/10 or less during functional activities.     Jm Ann OT   1/20/2022  2:10 PM

## 2022-01-20 NOTE — PROGRESS NOTES
[facility]  Speech Language Pathology  Adult Daily Note    Dewayne Jose  : 1955    Date: 2022    Visit Information:  SLP Insurance Information: Medicare  Total # of Visits Approved:  (medical necessity)  Total # of Visits to Date: 3  No Show: 0  Canceled Appointment: 0    Plan of care signed (Y/N):     Yes    Certification PBUTJA:-  Plan of Care Visit #3      Interventions used this date:  Receptive Language and Instruction in Compensatory Strategies    Subjective: Pt arrived on time and was cooperative with all tasks. Behavior:  Alert, Cooperative and Pleasant       Objective/Assessment:   Patient progressing towards goals:  1. SLP will educate and train pts caregiver regarding operation and maintenance of pts speech generating device so that assistance can be provided to pt in the event of a communication breakdown. SLP informed patient that she had received f/u emails from Arvind notifying here that the trial was approved and were in the process of customization. SLP asked patient for primary medicare card and he said it was at home and would bring next session to copy. SLP informed patient that she would be emailing customization questions to emergency contact brother Carloscarissa itsDapper and pt verbalized understanding. 2. Pt will participate in further SGD evaluation and trials to determine most appropriate device for communication  SLP gave patient name of [employer] margy to download on iPad at home  3. Pt will complete ID of objects/pictures in a field of 6 with 80% acc min cues  Not addressed   4. Pt will complete single word ID tasks with 80% acc with min verbal cues  Single word ID 70% acc Ind and increased to 90% acc min cues  Describe picture with choice of 2 sentences 100% acc with increased time    Following 1-step verbal directions 90% acc with increased time     Pain Assessment:  Initial Assessment:  Patient denies pain. Re-assessment:  Patient denies pain. Plan:  Continue with current goals    Patient/Caregiver Education:  Patient/Caregiver educated on session. Patient/Caregiver stated verbal understanding of directions.     Time in: 1349   Time out:1430  Minutes seen: 41      Signature:  Electronically signed by OSCAR Moser on 1/20/2022 at 2:42 PM

## 2022-01-25 ENCOUNTER — HOSPITAL ENCOUNTER (OUTPATIENT)
Dept: OCCUPATIONAL THERAPY | Age: 67
Setting detail: THERAPIES SERIES
Discharge: HOME | End: 2022-01-25
Payer: MEDICARE

## 2022-01-25 ENCOUNTER — HOSPITAL ENCOUNTER (OUTPATIENT)
Dept: SPEECH THERAPY | Age: 67
Setting detail: THERAPIES SERIES
Discharge: HOME | End: 2022-01-25
Payer: MEDICARE

## 2022-01-25 PROCEDURE — 97535 SELF CARE MNGMENT TRAINING: CPT

## 2022-01-25 PROCEDURE — 97110 THERAPEUTIC EXERCISES: CPT

## 2022-01-25 PROCEDURE — 92507 TX SP LANG VOICE COMM INDIV: CPT

## 2022-01-25 NOTE — PROGRESS NOTES
[facility]  Speech Language Pathology  Adult Daily Note    Preet Ward  : 1955    Date: 2022    Visit Information:  SLP Insurance Information: Medicare  Total # of Visits Approved:  (medical necessity)  Total # of Visits to Date: 4  No Show: 0  Canceled Appointment: 0    Plan of care signed (Y/N):     Yes    Certification GTKVA-  Plan of Care Visit #4      Interventions used this date:  Receptive Language and Instruction in Compensatory Strategies    Subjective: Pt arrived on time and was cooperative with all tasks. Behavior:  Alert, Cooperative and Pleasant       Objective/Assessment:   Patient progressing towards goals:  1. SLP will educate and train pts caregiver regarding operation and maintenance of pts speech generating device so that assistance can be provided to pt in the event of a communication breakdown. SLP spoke with sister who said she would fill out the customization questions. Informed patient and sister SLP would be sending responses to [employer] along with copies of insurance cards. 2. Pt will participate in further SGD evaluation and trials to determine most appropriate device for communication  Pt ID icons in [employer] small talk apps using \"show me. Juanell Leyland Juanell Leyland \" phrase from a field of 4-5 with 83% acc Ind.  ID icons after situational questions in 1 [employer] conversational margy with 75% acc min cues   3. Pt will complete ID of objects/pictures in a field of 6 with 80% acc min cues    4. Pt will complete single word ID tasks with 80% acc with min verbal cues  Single word ID in a field of 4 with 80% acc Ind   Matched word to a field of 4 pictures with 100% acc Ind    Following 2-step mod complex verbal directions 30% acc Ind and increased to 80% acc with moderate repetition and cues    Pain Assessment:  Initial Assessment:  Patient denies pain. Re-assessment:  Patient denies pain.         Plan:  Continue with current goals    Patient/Caregiver Education:  Patient/Caregiver educated on session. Patient/Caregiver stated verbal understanding of directions.     Time in: 1300  Time out: 1400  Minutes seen: 60      Signature:  Electronically signed by OSCAR Traore on 1/25/2022 at 2:58 PM

## 2022-01-25 NOTE — PROGRESS NOTES
Occupational Therapy  Daily Note     Name: Sabine Chen  : 1955  MRN: [id number]  Diagnosis: Deficit in ADL Z78.9    Visit Information:   OT Insurance Information: Medicare (Medical [place] secondary to cover 20% of part B)  Total # of Visits Approved:  (BMN)  Total # of Visits to Date: 4  Progress Note Counter: 4    Date: 2022  Time:   OT ADL training 38 minutes for 3 unit(s), CPT 14785  OT Therapeutic exercises 15 minutes for 1 unit(s), CPT 52207       OT Individual Minutes  Time In: 0369  Time Out: [address]  Minutes: 48    Referring Practitioner: Dr. Gertrudis Shelby MD    Subjective:  \"I'm having a hard time cutting. \"     Pain rating:   Pre-treatment pain:    3/10    Action for pain:   Patient reports pain is at acceptable level for treatment. Pain after treatment:      3/10         Focus of treatment was on the following:   ADL, decreasing pain and increase right PROM passive  ROM     Objective:    Treatment Activity:    Modality:     Moist heat on R UE (forearm/hand) for 15 minutes while engaging in fine motor coordination task with L hand. Exercises:    Therapist provided PROM to R UE: shoulder flexion, elbow flexion/extension, wrist flexion/extension, and finger extension to promote increased ROM and normalize muscle tone in R UE. Pt tolerated wrist flexion/extension with minimal pain (2/10) reported. Education/HEP: N/A     Pt expressed that using knives and forks are challenging d/t unable to hold onto them. Therapist and pt looked up different adaptive knives and forks. Pt decided on an adaptive knife from '[employer],' as well as a utensil dougherty. Therapist printed off both examples for pt and family if they decide to purchase. Therapist expressed to brother and sister that not one is better than the other, this is what pt preferred. Discussed previous HEP: n/a, N/A    Comments:     Assessment:   Pt tolerated treatment well.     Plan:   Continue POC    Goals: Long term goals  Time Frame for Long term goals : 2 x/week for 2-3 weeks  Long term goal 1: Patient  will be IND with all recommended HEP's, adaptive strategies, and adaptive techniques. Long term goal 2: Patient will be IND with donning orthotic device, maintenance, and schedule  Long term goal 3: Patient will participate in therapeutic interventions to normalize muscle tone in RUE. Long term goal 4: Patient will report less sleep disturbances from pain. Long term goal 5: Patient will report pain 5/10 or less during functional activities.     Jm Ann, OT   1/25/2022  4:02 PM

## 2022-01-27 ENCOUNTER — HOSPITAL ENCOUNTER (OUTPATIENT)
Dept: OCCUPATIONAL THERAPY | Age: 67
Setting detail: THERAPIES SERIES
Discharge: HOME | End: 2022-01-27
Payer: MEDICARE

## 2022-01-27 ENCOUNTER — CARE COORDINATION (OUTPATIENT)
Dept: CASE MANAGEMENT | Age: 67
End: 2022-01-27

## 2022-01-27 ENCOUNTER — HOSPITAL ENCOUNTER (OUTPATIENT)
Dept: SPEECH THERAPY | Age: 67
Setting detail: THERAPIES SERIES
Discharge: HOME | End: 2022-01-27
Payer: MEDICARE

## 2022-01-27 PROCEDURE — 92507 TX SP LANG VOICE COMM INDIV: CPT

## 2022-01-27 PROCEDURE — 97140 MANUAL THERAPY 1/> REGIONS: CPT

## 2022-01-27 PROCEDURE — 97530 THERAPEUTIC ACTIVITIES: CPT

## 2022-01-27 NOTE — PROGRESS NOTES
[facility]  Speech Language Pathology  Adult Daily Note    Dayanara Albright  : 1955    Date: 2022    Visit Information:  SLP Insurance Information: Medicare  Total # of Visits Approved:  (medical necessity)  Total # of Visits to Date: 5  No Show: 0  Canceled Appointment: 0    Plan of care signed (Y/N):     Yes    Certification VUELJN:30-  Plan of Care Visit #5      Interventions used this date:  Receptive Language and Instruction in Compensatory Strategies    Subjective: Pt arrived on time and was cooperative with all tasks. Behavior:  Alert, Cooperative and Pleasant       Objective/Assessment:   Patient progressing towards goals:  1. SLP will educate and train pts caregiver regarding operation and maintenance of pts speech generating device so that assistance can be provided to pt in the event of a communication breakdown. Educated patient that information had been sent [employer] and SLP was waiting on confirmation email that trial device was being sent  2. Pt will participate in further SGD evaluation and trials to determine most appropriate device for communication  Pt manipulated language margy after direction from SLP with 1/4 acc Ind and increased to 4/4 after min cues  3. Pt will complete ID of objects/pictures in a field of 6 with 80% acc min cues  ID pictures in a field of 6 with 100% acc standby cues  4. Pt will complete single word ID tasks with 80% acc with min verbal cues  Matched sentence to pictures in a field of 4 with 58% acc Ind and increased to 75% acc min-mod cues    Followed 1-2 step mod complex directions with 78% acc mod verbal and written cues    Pain Assessment:  Initial Assessment:  Patient denies pain. Re-assessment:  Patient denies pain. Plan:  Continue with current goals    Patient/Caregiver Education:  Patient/Caregiver educated on session. Patient/Caregiver stated verbal understanding of directions.     Time in: 1345  Time out: 1430  Minutes seen: 39      Signature:  Electronically signed by OSCAR Carrizales on 1/27/2022 at 2:38 PM

## 2022-01-27 NOTE — PROGRESS NOTES
Occupational Therapy  Daily Note     Name: Pershing Cockayne  : 1955  MRN: [id number]  Diagnosis: Deficit in ADL Z78.9    Visit Information:   OT Insurance Information: Medicare (Medical [place] secondary to cover 20% of part B)  Total # of Visits Approved:  (BMN)  Total # of Visits to Date: 6  Progress Note Counter: 5    Date: 2022  OT Manual therapy 35 minutes for 2 unit(s), CPT 75326  OT Therapeutic activities 24 minutes for 2 unit(s), CPT 30624       OT Individual Minutes  Time In: 7494  Time Out: 3865  Minutes: 61    Referring Practitioner: Dr. Nel Arriola MD      Subjective:  Pt seen after ST.  present during session: n/a    Pain rating:   Pre-treatment pain:    3/10, R hand and foot    Action for pain:   Patient reports pain is at acceptable level for treatment. Pain after treatment:      unchanged         Focus of treatment was on the following:   decreasing fascial/soft tissue restrictions, decreasing pain, education/training and increase right UE passive  ROM     Objective:    Treatment Activity:     Pt stated pressure on RUE assists to decrease pain. Discussed using weighted blanket to assist with sleeping and decreasing pain. Pt receptive. Provided written hand out for sample 12 lb weighted blanket. Educated on PROM for shoulder flexion and elbow extension. Pt stated not completing at home as part of HEP, but completing shoulder shrugs and rows. Pt stated also WB into RUE for tone control and shoulder elevation/depression. Pt completed 5 reps WB into arm of WC and holding for 30 seconds. PROM x 10 reps for shoulder flexion and elbow extension. Assist using arm skate for horizontal ab/adduction. Pt reported increased pain in posterior distal arm, proximal elbow. Pt requested to stop d/t increased pain. Discussed progress with therapy. Pt feels he is at baseline, but not sure about d/c at this time. Pt waiting for orthosis to be delivered.        Quick DASH  QuickDASH  Open a tight or new jar: Mild Difficulty  Do heavy household chores (e.g., wash walls, floors): Severe Difficulty  Radha a shopping bag or briefcase: Mild Difficulty  Wash your back: Severe Difficulty  Use a knife to cut food: Mild Difficulty  Recreational activities in which you take some force or impact through your arm, shoulder, or hand (e.g., golf, hammering, tennis, etc.): Moderate Difficulty  During the past week, to what extent has your arm, shoulder or hand problem interfered with your normal social activities with family, friends, neighbors or groups?: Quite A Bit  During the past week, were you limited in your work or other regular daily activities as a result of your arm, shoulder or hand problem?: Slightly Limited  Arm, shoulder or hand pain: Moderate  Tingling (pins and needles) in your arm, shoulder or hand: Moderate  During the past week, how much difficulty have you had sleeping because of the pain in your arm, shoulder or hand?: Severe Difficulty  QuickDASH Total Score: 33  QuickDASH Disability/Symptom Score : 50 %  QUICKDASH Disability Index: 40-59%  QUICKDASH CMS Modifier: CK        Discussed previous HEP: yes, Patient verbally confirmed compliant with HEP's    Comments: Pt stated think the AE discussed last session was ordered. Pt stated has not obtained orthosis for UE, but was ordered. Pt stated no changes in sleep, reporting pain waking him up at times. Pt reported Max pain at home 3-4/10 during activities. Assessment:   Pt tolerated treatment fair. Pt reported increased pain with horizontal ab/adduction. Plan:   Continue POC    Goals:     Long term goals  Time Frame for Long term goals : 2 x/week for 2-3 weeks  Long term goal 1: Patient  will be IND with all recommended HEP's, adaptive strategies, and adaptive techniques.   Long term goal 2: Patient will be IND with donning orthotic device, maintenance, and schedule  Long term goal 3: Patient will participate in therapeutic interventions to normalize muscle tone in RUE. Long term goal 4: Patient will report less sleep disturbances from pain. Long term goal 5: Patient will report pain 5/10 or less during functional activities.     PORTIA Davis/L   1/27/2022  4:11 PM

## 2022-01-31 ENCOUNTER — OFFICE VISIT (OUTPATIENT)
Dept: FAMILY MEDICINE CLINIC | Age: 67
End: 2022-01-31
Payer: MEDICARE

## 2022-01-31 VITALS
TEMPERATURE: 97.3 F | BODY MASS INDEX: 26.94 KG/M2 | WEIGHT: 177.2 LBS | SYSTOLIC BLOOD PRESSURE: 118 MMHG | HEART RATE: 64 BPM | DIASTOLIC BLOOD PRESSURE: 70 MMHG | OXYGEN SATURATION: 97 %

## 2022-01-31 DIAGNOSIS — M21.371 RIGHT FOOT DROP: Primary | ICD-10-CM

## 2022-01-31 DIAGNOSIS — Z91.81 AT HIGH RISK FOR FALLS: ICD-10-CM

## 2022-01-31 DIAGNOSIS — F33.9 RECURRENT DEPRESSION (HCC): ICD-10-CM

## 2022-01-31 DIAGNOSIS — I69.351 HEMIPLEGIA AND HEMIPARESIS FOLLOWING CEREBRAL INFARCTION AFFECTING RIGHT DOMINANT SIDE (HCC): ICD-10-CM

## 2022-01-31 DIAGNOSIS — R60.0 BILATERAL LEG EDEMA: ICD-10-CM

## 2022-01-31 DIAGNOSIS — I82.532 CHRONIC DEEP VEIN THROMBOSIS (DVT) OF LEFT POPLITEAL VEIN (HCC): ICD-10-CM

## 2022-01-31 DIAGNOSIS — R56.9 CONVULSIONS, UNSPECIFIED CONVULSION TYPE (HCC): ICD-10-CM

## 2022-01-31 PROCEDURE — 1123F ACP DISCUSS/DSCN MKR DOCD: CPT | Performed by: FAMILY MEDICINE

## 2022-01-31 PROCEDURE — G8427 DOCREV CUR MEDS BY ELIG CLIN: HCPCS | Performed by: FAMILY MEDICINE

## 2022-01-31 PROCEDURE — 3017F COLORECTAL CA SCREEN DOC REV: CPT | Performed by: FAMILY MEDICINE

## 2022-01-31 PROCEDURE — G8484 FLU IMMUNIZE NO ADMIN: HCPCS | Performed by: FAMILY MEDICINE

## 2022-01-31 PROCEDURE — G8417 CALC BMI ABV UP PARAM F/U: HCPCS | Performed by: FAMILY MEDICINE

## 2022-01-31 PROCEDURE — 1111F DSCHRG MED/CURRENT MED MERGE: CPT | Performed by: FAMILY MEDICINE

## 2022-01-31 PROCEDURE — 1036F TOBACCO NON-USER: CPT | Performed by: FAMILY MEDICINE

## 2022-01-31 PROCEDURE — 4040F PNEUMOC VAC/ADMIN/RCVD: CPT | Performed by: FAMILY MEDICINE

## 2022-01-31 PROCEDURE — 99213 OFFICE O/P EST LOW 20 MIN: CPT | Performed by: FAMILY MEDICINE

## 2022-01-31 RX ORDER — MAG HYDROX/ALUMINUM HYD/SIMETH 400-400-40
SUSPENSION, ORAL (FINAL DOSE FORM) ORAL
COMMUNITY
Start: 2021-12-20

## 2022-01-31 RX ORDER — POTASSIUM CHLORIDE 20 MEQ/1
20 TABLET, EXTENDED RELEASE ORAL DAILY
Qty: 30 TABLET | Refills: 2 | Status: SHIPPED | OUTPATIENT
Start: 2022-01-31 | End: 2022-05-16 | Stop reason: SDUPTHER

## 2022-01-31 SDOH — ECONOMIC STABILITY: FOOD INSECURITY: WITHIN THE PAST 12 MONTHS, THE FOOD YOU BOUGHT JUST DIDN'T LAST AND YOU DIDN'T HAVE MONEY TO GET MORE.: NEVER TRUE

## 2022-01-31 SDOH — ECONOMIC STABILITY: FOOD INSECURITY: WITHIN THE PAST 12 MONTHS, YOU WORRIED THAT YOUR FOOD WOULD RUN OUT BEFORE YOU GOT MONEY TO BUY MORE.: NEVER TRUE

## 2022-01-31 ASSESSMENT — PATIENT HEALTH QUESTIONNAIRE - PHQ9
5. POOR APPETITE OR OVEREATING: 0
8. MOVING OR SPEAKING SO SLOWLY THAT OTHER PEOPLE COULD HAVE NOTICED. OR THE OPPOSITE, BEING SO FIGETY OR RESTLESS THAT YOU HAVE BEEN MOVING AROUND A LOT MORE THAN USUAL: 0
SUM OF ALL RESPONSES TO PHQ QUESTIONS 1-9: 3
10. IF YOU CHECKED OFF ANY PROBLEMS, HOW DIFFICULT HAVE THESE PROBLEMS MADE IT FOR YOU TO DO YOUR WORK, TAKE CARE OF THINGS AT HOME, OR GET ALONG WITH OTHER PEOPLE: 1
SUM OF ALL RESPONSES TO PHQ QUESTIONS 1-9: 3
4. FEELING TIRED OR HAVING LITTLE ENERGY: 1
6. FEELING BAD ABOUT YOURSELF - OR THAT YOU ARE A FAILURE OR HAVE LET YOURSELF OR YOUR FAMILY DOWN: 0
SUM OF ALL RESPONSES TO PHQ QUESTIONS 1-9: 3
SUM OF ALL RESPONSES TO PHQ9 QUESTIONS 1 & 2: 1
1. LITTLE INTEREST OR PLEASURE IN DOING THINGS: 0
9. THOUGHTS THAT YOU WOULD BE BETTER OFF DEAD, OR OF HURTING YOURSELF: 0
SUM OF ALL RESPONSES TO PHQ QUESTIONS 1-9: 3
2. FEELING DOWN, DEPRESSED OR HOPELESS: 1
7. TROUBLE CONCENTRATING ON THINGS, SUCH AS READING THE NEWSPAPER OR WATCHING TELEVISION: 0
3. TROUBLE FALLING OR STAYING ASLEEP: 1

## 2022-01-31 ASSESSMENT — SOCIAL DETERMINANTS OF HEALTH (SDOH): HOW HARD IS IT FOR YOU TO PAY FOR THE VERY BASICS LIKE FOOD, HOUSING, MEDICAL CARE, AND HEATING?: NOT HARD AT ALL

## 2022-01-31 ASSESSMENT — ENCOUNTER SYMPTOMS
ALLERGIC/IMMUNOLOGIC NEGATIVE: 1
SHORTNESS OF BREATH: 0
BLURRED VISION: 0
GASTROINTESTINAL NEGATIVE: 1
ORTHOPNEA: 0
RESPIRATORY NEGATIVE: 1
EYES NEGATIVE: 1

## 2022-01-31 NOTE — PROGRESS NOTES
On the basis of positive falls risk screening, assessment and plan is as follows: home safety tips provided. Brother (caregiver) is PT    Subjective  Yordan Hawk, 77 y.o. male presents today with:  Chief Complaint   Patient presents with    Follow-Up from Hospital     pt. brother states he would like to get him a AFO.      Foot Problem     Checkup/follow up chronic issues  Here with brother     Was in hospital and rehab floor last month     Fall with head injury     Has right foot drop  Fatigue since hospital stay    Really still uncertain as to what caused the problem     Speech and OT right now     Baclofen 10mg TID    keppra BID     Patient Active Problem List   Diagnosis    History of CVA (cerebrovascular accident)    Essential hypertension    Recurrent depression ([address]    Seizure ([address]    S/P patent foramen ovale closure    Hyperlipidemia    Chronic right shoulder pain    Hypoxemia requiring supplemental oxygen    Hemiparesis affecting dominant side as late effect of cerebrovascular accident (CVA) ([address]    Closed TBI (traumatic brain injury) ([address]    Chronic pain syndrome    Hypothyroidism    Chronic deep vein thrombosis (DVT) of left popliteal vein (HCC)    Generalized weakness    Cognitive deficit due to old head injury    DJD (degenerative joint disease), lumbar    DJD (degenerative joint disease), cervical    Bowel and bladder incontinence    Aphasia    Impaired mobility and activities of daily living dt late effects of CVA    Abnormality of gait and mobility    Blood thinned due to long-term anticoagulant use-Xaralto    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 The current episode started more than 1 year ago. The problem is unchanged. The problem is controlled. Associated symptoms include malaise/fatigue. Pertinent negatives include no anxiety, blurred vision, chest pain, orthopnea, palpitations, peripheral edema, PND, shortness of breath or sweats. Agents associated with hypertension include NSAIDs. Risk factors for coronary artery disease include dyslipidemia, male gender and sedentary lifestyle. Past treatments include lifestyle changes. The current treatment provides significant improvement. There are no compliance problems. Hypertensive end-organ damage includes CVA. Identifiable causes of hypertension include a hypertension causing med. There is no history of sleep apnea or a thyroid problem. Hyperlipidemia  This is a chronic problem. The current episode started more than 1 year ago. The problem is controlled. Recent lipid tests were reviewed and are low. Pertinent negatives include no chest pain or shortness of breath. Current antihyperlipidemic treatment includes diet change. The current treatment provides significant improvement of lipids. Compliance problems include adherence to exercise. Review of Systems   Constitutional: Positive for malaise/fatigue. HENT:        Left ear fullness   Eyes: Negative. Negative for blurred vision. Respiratory: Negative. Negative for shortness of breath. Cardiovascular: Negative. Negative for chest pain, palpitations, orthopnea and PND. Gastrointestinal: Negative. Endocrine: Negative. Genitourinary: Negative. Musculoskeletal: Positive for arthralgias and gait problem. Skin: Negative. Allergic/Immunologic: Negative. Neurological: Positive for weakness. Hematological: Negative. Psychiatric/Behavioral: Negative.           Past Medical History:   Diagnosis Date    Chronic right shoulder pain 10/4/2017    Closed TBI (traumatic brain injury) ([facility] 12/28/2012    Essential hypertension 8/2/2017    Hemiparesis affecting dominant side as late effect of cerebrovascular accident (CVA) ([facility] 1/30/2018    History of CVA (cerebrovascular accident) 8/2/2017    Hyperlipidemia 8/2/2017    Hypoxemia requiring supplemental oxygen 10/4/2017    Recurrent depression ([facility] 8/2/2017    S/P patent foramen ovale closure 8/2/2017    Seizure ([facility] 8/2/2017     History reviewed. No pertinent surgical history.   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[employer])    Brothnile Woodruff is a PTA at [address] [place] [address] Blvd: One level--Stairs to enter without rails- Number of Steps: 1 stoop    Bathroom Shower/Tub: Tub/Shower unit    [place] Equipment: Shower chair,Grab bars in [facility]: Quad cane,Electric scooter,Wheelchair-manual    ADL Assistance: Independent    Homemaking Assistance: Independent    Ambulation Assistance: Independent (QC, occasionally w/c in home, scooter in community)    Transfer Assistance: Independent    Active : No    Additional Comments: Family locally- information confirmed with pt. permission from brother via phone                 Social Determinants of Health     Financial Resource Strain: Low Risk     Difficulty of Paying Living Expenses: Not hard at all   Food Insecurity: No Food Insecurity    Worried About [address] [employer] in the Last Year: Never true   [address] in the Last Year: Never true   Transportation Needs:     Lack of Transportation (Medical): Not on file    Lack of Transportation (Non-Medical): Not on file   Physical Activity:     Days of Exercise per Week: Not on file    Minutes of Exercise per Session: Not on file   Stress:     Feeling of Stress : Not on file   Social Connections:     Frequency of Communication with Friends and Family: Not on file    Frequency of Social Gatherings with Friends and Family: Not on file    Attends Bahai Services: Not on file    Active Member of [address] [place] [address] [employer] or Organizations: Not on file    Attends Club or Organization Meetings: Not on file    Marital Status: Not on file   Intimate Partner Violence:     Fear of Current or Ex-Partner: Not on file    Emotionally Abused: Not on file    Physically Abused: Not on file    Sexually Abused: Not on file   Housing Stability:     Unable to Pay for Housing in the Last Year: Not on file    Number of [place] in the Last Year: Not on file    Unstable Housing in the Last Year: Not on file     History reviewed. No pertinent family history.   No Known Allergies  Current Outpatient Medications on File Prior to Visit   Medication Sig Dispense Refill    levETIRAcetam (KEPPRA) 750 MG tablet Take 1 tablet by mouth 2 times daily 60 tablet 0    baclofen (LIORESAL) 10 MG tablet Take 1 tablet by mouth 3 times daily 120 tablet 0    glycerin 2 g suppository Place 1 suppository rectally daily as needed for Constipation 1 suppository 0    traMADol (ULTRAM) 50 MG tablet TAKE 1 TABLET BY MOUTH TWICE DAILY AS NEEDED FOR PAIN 60 tablet 2    furosemide (LASIX) 20 MG tablet TAKE 1 TABLET BY MOUTH DAILY AS NEEDED 30 tablet 5    levothyroxine (SYNTHROID) 50 MCG tablet Take 1 tablet by mouth daily 30 tablet 5    potassium chloride (KLOR-CON M) 20 MEQ extended release tablet Take 1 tablet by mouth daily 30 tablet 2    DULoxetine (CYMBALTA) 60 MG extended release capsule Take 1 capsule by mouth daily 90 capsule 3    ARIPiprazole (ABILIFY) 2 MG tablet Take 1 tablet by mouth daily 30 tablet 5    Handicap Placard MISC by Does not apply route Exp 5 yrs 1 each 0    aspirin 81 MG chewable tablet Take 81 mg by mouth      glycerin 2 g suppository PLACE 1 SUPPOSITORY RECTALLY DAILY AS NEEDED FOR CONSTIPATION (Patient not taking: Reported on 1/31/2022)      Multiple Vitamin (MULTI VITAMIN PO) Take by mouth (Patient not taking: Reported on 1/31/2022)       No current facility-administered medications on file prior to visit. Objective    Vitals:    01/31/22 1000   BP: 118/70   Site: Right Upper Arm   Position: Sitting   Cuff Size: Medium Adult   Pulse: 64   Temp: 97.3 °F (36.3 °C)   TempSrc: Temporal   SpO2: 97%   Weight: 177 lb 3.2 oz (80.4 kg)     Physical Exam  Constitutional:       Appearance: Normal appearance. He is well-developed. HENT:      Head: Normocephalic and atraumatic. Right Ear: Tympanic membrane, ear canal and external ear normal. No middle ear effusion. Tympanic membrane is not injected. Left Ear: External ear normal.      Nose: Nose normal. No mucosal edema or rhinorrhea. Right Sinus: No maxillary sinus tenderness or frontal sinus tenderness. Left Sinus: No maxillary sinus tenderness or frontal sinus tenderness. Eyes:      General: Lids are normal.      Conjunctiva/sclera: Conjunctivae normal.      Pupils: Pupils are equal, round, and reactive to light. Neck:      Thyroid: No thyroid mass. Pulmonary:      Effort: Pulmonary effort is normal.      Breath sounds: No decreased breath sounds or rhonchi. Chest:      Chest wall: No deformity. Abdominal:      Palpations: Abdomen is not rigid. There is no hepatomegaly or splenomegaly. Hernia: No hernia is present. Musculoskeletal:      Right wrist: Deformity and tenderness present. Cervical back: Normal range of motion and neck supple. No rigidity. No muscular tenderness.       Thoracic back: Normal. Lumbar back: Normal.      Right hip: Decreased range of motion. Decreased strength. Right knee: Normal.      Left knee: Normal.      Comments: Decreased strength and atrophy noted of entire right side  Secondary spasticity present Right upper extremity  Trace edema right lower extremity noted underneath AFO. AFO not removed for exam.   Skin:     General: Skin is warm and dry. Findings: No rash. Neurological:      Mental Status: He is alert. Cranial Nerves: No cranial nerve deficit. Sensory: Sensory deficit (right hand and foot) present. Motor: Atrophy and abnormal muscle tone (spasticity right side) present. No tremor or seizure activity. Coordination: Coordination abnormal.      Gait: Gait abnormal.      Comments: Right hand dominant  Right foot drop   Requires cane and assist for ambulation  Scanning speech  Mild expressive aphasia   Psychiatric:         Speech: Speech is delayed (scanning). Speech is not slurred. Behavior: Behavior normal.         Thought Content: Thought content normal.         Judgment: Judgment normal.       Chronic leg edema          Assessment & Plan    Diagnosis Orders   1. Right foot drop     2. Hemiplegia and hemiparesis following cerebral infarction affecting right dominant side ([address]     3. Recurrent depression ([address]     4. Chronic deep vein thrombosis (DVT) of left popliteal vein (HCC)  rivaroxaban (XARELTO) 20 MG TABS tablet   5. Convulsions, unspecified convulsion type ([address]     6.  At high risk for falls       Continue current regimen    Needs new AFO  Previous old one uncomfortable and was disposed of   [de-identified] years old     Will call [facility]    Remains fully disabled and unable to work  This will be lifetime condition that will not improve       Alea Jamison MD

## 2022-02-01 ENCOUNTER — HOSPITAL ENCOUNTER (OUTPATIENT)
Dept: SPEECH THERAPY | Age: 67
Setting detail: THERAPIES SERIES
Discharge: HOME | End: 2022-02-01
Payer: MEDICARE

## 2022-02-01 ENCOUNTER — HOSPITAL ENCOUNTER (OUTPATIENT)
Dept: OCCUPATIONAL THERAPY | Age: 67
Setting detail: THERAPIES SERIES
Discharge: HOME | End: 2022-02-01
Payer: MEDICARE

## 2022-02-01 PROCEDURE — 95816 EEG AWAKE AND DROWSY: CPT

## 2022-02-01 NOTE — PROGRESS NOTES
Therapy                            Cancellation/No-show Note    Date: 2022  Patient Name: Pershing Cockayne    : 1955  (23 y.o.)     MRN: [id number]    Account #: [de-identified]            Comments:   Pt cancelled d/t conflicting appointment. For today's appointment patient:  [x]  Cancelled  []  Rescheduled appointment  []  No-show   []  Called pt to remind of next appointment     Reason given by patient:  []  Patient ill  []  Conflicting appointment  []  No transportation    []  Conflict with work  []  No reason given  []  Other:      [x] Pt has future appointments scheduled, no follow up needed  [] Pt requests to be on hold.     Reason:   If > 2 weeks please discuss with therapist.  [] Therapist to call pt for follow up     Signature: Gi Ponce OT 2022 7:55 AM

## 2022-02-01 NOTE — PROGRESS NOTES
Therapy                            Cancellation/No-show Note      Date:  2022  Patient Name:  Brooke Irving  :  1955   MRN:  [id number]          Visit Information:  SLP Insurance Information: Medicare  Total # of Visits Approved:  (medical necessity)  Total # of Visits to Date: 5  No Show: 0  Canceled Appointment: 1 CX for 2/3/22 appt    For today's appointment patient:  Cancelled    Reason given by patient:  Other: CX Weather. CX doesn't count against pt.     Follow-up needed:  Pt has future appointments scheduled, no follow up needed    Comments:       Signature: Electronically signed by OSCAR Abraham on 22 at 1:19 PM EST

## 2022-02-01 NOTE — PROGRESS NOTES
Therapy                            Cancellation/No-show Note      Date:  2022  Patient Name:  Jhon Hadley  :  1955   MRN:  [id number]          Visit Information:  SLP Insurance Information: Medicare  Total # of Visits Approved:  (medical necessity)  Total # of Visits to Date: 5  No Show: 0  Canceled Appointment: 1    For today's appointment patient:  Cancelled    Reason given by patient:  Conflicting appointment    Follow-up needed:  Pt has future appointments scheduled, no follow up needed    Comments:       Signature: Electronically signed by OSCAR Mejia on 22 at 8:16 AM EST

## 2022-02-01 NOTE — PROGRESS NOTES
Therapy                            Cancellation/No-show Note    Date: 2022  Patient Name: Glenda Hernandez    : 1955  (89 y.o.)     MRN: [id number]    Account #: [de-identified]            Comments:  Brother Sheldon Diaz cx apt d/t transportation issues. Will need to go on 30 day hold. Previously discussed with pt about d/c. Brother will discuss with pt and inform about d/c or if there was an area pt was still having difficulty with to continue therapy. For today's appointment patient:  [x]  Cancelled  []  Rescheduled appointment  []  No-show   []  Called pt to remind of next appointment     Reason given by patient:  []  Patient ill  []  Conflicting appointment  [x]  No transportation    []  Conflict with work  []  No reason given  []  Other:      [] Pt has future appointments scheduled, no follow up needed  [x] Pt requests to be on hold.     Reason: transportation issues    If > 2 weeks please discuss with therapist.  [] Therapist to call pt for follow up     Signature: SHANNEN Cope 2022 1:38 PM

## 2022-02-01 NOTE — PROCEDURES
[address]                      [address], [address] [facility]                          ELECTROENCEPHALOGRAM REPORT    PATIENT NAME: Yossi Long                    :        1955  MED REC NO:   [id number]                            ROOM:       R249  ACCOUNT NO:   [de-identified]                           ADMIT DATE: 2021  PROVIDER:     Katalina Villatoro MD    DATE OF EE2021    INDICATION FOR EEG:  Seizures. EEG BACKGROUND:  10 Hz better formed on the right, reactive to eye  opening, medium voltage. THETA:  4 to 6 Hz left temporal theta persistent throughout recording,  polymorphic and nonrhythmic without clear evaluation. DELTA:  2 to 3 Hz left temporal, frequent, intermixed with theta  polymorphic and nonrhythmic. HYPERVENTILATION:  Increased left temporal slowing. PHOTIC STIMULATION:  Some driving response seen at about 18 Hz, minimal.    SEIZURES OR SPIKES:  None. SLEEP:  None. EK beats per minute sinus rhythm with rare PVCs. CLINICAL INTERPRETATION:  The right hemisphere shows normal background. There is mild slowing in the left hemisphere background as well as focal  theta and delta in the left temporal region. This suggests functional  or structural abnormality in this region. No seizures or spikes were  seen.         Mona Kyle MD    D: 2022 18:47:55       T: 2022 20:10:24     [email or web address]  Job#: [id number]     Doc#: [id number]    CC:

## 2022-02-03 ENCOUNTER — HOSPITAL ENCOUNTER (OUTPATIENT)
Dept: SPEECH THERAPY | Age: 67
Setting detail: THERAPIES SERIES
Discharge: HOME | End: 2022-02-03
Payer: MEDICARE

## 2022-02-03 ENCOUNTER — HOSPITAL ENCOUNTER (OUTPATIENT)
Dept: OCCUPATIONAL THERAPY | Age: 67
Setting detail: THERAPIES SERIES
Discharge: HOME | End: 2022-02-03
Payer: MEDICARE

## 2022-02-03 ENCOUNTER — CARE COORDINATION (OUTPATIENT)
Dept: CASE MANAGEMENT | Age: 67
End: 2022-02-03

## 2022-02-03 NOTE — CARE COORDINATION
[address] Transitions Follow Up Call    2/3/2022    Patient: Theresa Erickson  Patient : 1955   MRN: [id number]  Reason for Admission: Impaired mobility and ADL's d/t late effects of CVA  Discharge Date: 22 RARS: Readmission Risk Score: 11.2 ( )    Care Transitions Follow Up Call    Needs to be reviewed by the provider   Additional needs identified to be addressed with provider: No  none             Method of communication with provider : none      Care Transition Nurse (CTN) contacted the family by telephone to follow up after admission on 22. Verified name and  with family as identifiers. Addressed changes since last contact: none  Discussed follow-up appointments. If no appointment was previously scheduled, appointment scheduling offered: Yes. Is follow up appointment scheduled within 7 days of discharge? No and CTN confirmed with patient son Sierra Mathews that patient completed a My Chart follow up visit with Dr. Paulie Casas (PCP) on 22. Advance Care Planning:   Does patient have an Advance Directive: reviewed and current. CTN reviewed discharge instructions, medical action plan and red flags with family and discussed any barriers to care and/or understanding of plan of care after discharge. Discussed appropriate site of care based on symptoms and resources available to patient including: PCP, Urgent care clinics and When to call 911. The family agrees to contact the PCP office for questions related to their healthcare. Patients top risk factors for readmission: functional physical ability  level of motivation  polypharmacy     No further follow-up call indicated based on severity of symptoms and risk factors.     Care Transitions Subsequent and Final Call    Subsequent and Final Calls  Do you have any ongoing symptoms?: No  Have your medications changed?: No  Do you have any questions related to your medications?: No  Do you currently have any active services?: Yes  Are you currently active with any services?: Outpatient/Community Services  Do you have any needs or concerns that I can assist you with?: No  Identified Barriers: None  Care Transitions Interventions  No Identified Needs  Other Interventions:         Spoke with patient son Karina Grayson) today 2/3/22 for final TCM/hospital discharge (low risk) follow up call. Blaze Kapoor [facility] patient continues to improve and denies any complaints at this time. [facility] patient continues to attend OP therapy and using his quad cane. [facility] patient uses his quad cane when ambulating. Denies any falls or seizure-like activity. [facility] patient completed My chart follow up with Dr. Jacquelyn Brown on 1/31/22. Denies any new meds or med changes. Denies any needs or concerns. Blaze Kapoor is in agreement to final call. CTN signing off.      Follow Up  Future Appointments   Date Time Provider Radha Doll   2/8/2022  2:00 PM Clarke Waters, [facility] [address] AM 1 [facility],[address] [place] [address]   2/8/2022  3:00 PM Rubi Castles, OTR/L [address] [facility] Floor   2/10/2022  1:45 PM Clarke Waters, [facility] Medical [facility] AM 1 [facility] Floor   2/10/2022  2:30 PM Rubi Castles, OTR/L 1000 [facility] Floor   2/15/2022  2:00 PM Clarke Waters, [facility] [address] AM 1 [facility],[address] [place] [address]   2/15/2022  3:00 PM Rubi Castles, OTR/L 1000 [facility] Floor   2/17/2022  1:45 PM OSCAR Bunn MLOZ AM 1 [facility],[address] [place] [address]   2/17/2022  2:30 PM Rubi Castshashi, OTR/L M[facility] AM OT [address] [place] [address]   2/22/2022  2:00 PM Rubi Castles, OTR/L 1000 [facility] Floor   2/22/2022  3:15 PM OSCAR Bunn [address] [place] AM 1 [facility],[address] [place] [address]   2/24/2022  1:45 PM OSCAR Bunn MLOZ AM [address]   2/24/2022  2:30 PM Rubibhargav Diaz, OTR/L MLOZ AM OT 10 [facility]     CTN provided contact information for future needs    JULITA Mansfield

## 2022-02-08 ENCOUNTER — HOSPITAL ENCOUNTER (OUTPATIENT)
Dept: SPEECH THERAPY | Age: 67
Setting detail: THERAPIES SERIES
Discharge: HOME | End: 2022-02-08
Payer: MEDICARE

## 2022-02-08 ENCOUNTER — APPOINTMENT (OUTPATIENT)
Dept: OCCUPATIONAL THERAPY | Age: 67
End: 2022-02-08
Payer: MEDICARE

## 2022-02-08 PROCEDURE — 92507 TX SP LANG VOICE COMM INDIV: CPT

## 2022-02-08 NOTE — PROGRESS NOTES
[facility]  Speech Language Pathology  Adult Daily Note    Oscar Clemens  : 1955    Date: 2022    Visit Information:  SLP Insurance Information: Medicare  Total # of Visits Approved:  (medical necessity)  Total # of Visits to Date: 6  No Show: 0  Canceled Appointment: 1    Plan of care signed (Y/N):     Yes    Certification PLVDFA:6/10/83-  Plan of Care Visit #6      Interventions used this date:  Receptive Language and Instruction in Compensatory Strategies    Subjective: Pt arrived on time and was cooperative with all tasks. Behavior:  Alert, Cooperative and Pleasant       Objective/Assessment:   Patient progressing towards goals:  1. SLP will educate and train pts caregiver regarding operation and maintenance of pts speech generating device so that assistance can be provided to pt in the event of a communication breakdown. Pt brought new SGD to session. Pt had been given the device on 22 after SLP received in mail. Pt was unable to come to sessions on  and 2/3 and reported he had been using device at home. Pt activated 2 folders when asked by SLP \"what have you been using on the device? \"  SLP educated pt on general use of device including: fast talk, type to text, camera and how to edit a button. SLP asked pt to take pictures of items at home and to start a list of things that he wants added or changed on the SGD. SLP informed family member of starting a list of wants/needs. 2. Pt will participate in further SGD evaluation and trials to determine most appropriate device for communication    3. Pt will complete ID of objects/pictures in a field of 6 with 80% acc min cues  Not addressed   4. Pt will complete single word ID tasks with 80% acc with min verbal cues  Not addressed     Pain Assessment:  Initial Assessment:  Patient denies pain. Re-assessment:  Patient denies pain.         Plan:  Continue with current goals    Patient/Caregiver Education:  Patient/Caregiver educated on session. Patient/Caregiver provided with home program: take pictures of items at home  Patient/Caregiver stated verbal understanding of directions.     Time in: 1015  Time out: 1100  Minutes seen: 39      Signature: Electronically signed by Fredy Gordon SLP on 2/8/2022 at 12:56 PM

## 2022-02-10 ENCOUNTER — HOSPITAL ENCOUNTER (OUTPATIENT)
Dept: OCCUPATIONAL THERAPY | Age: 67
Setting detail: THERAPIES SERIES
Discharge: HOME | End: 2022-02-10
Payer: MEDICARE

## 2022-02-10 ENCOUNTER — HOSPITAL ENCOUNTER (OUTPATIENT)
Dept: SPEECH THERAPY | Age: 67
Setting detail: THERAPIES SERIES
Discharge: HOME | End: 2022-02-10
Payer: MEDICARE

## 2022-02-10 PROCEDURE — 92507 TX SP LANG VOICE COMM INDIV: CPT

## 2022-02-10 NOTE — PROGRESS NOTES
OCCUPATIONAL THERAPY DISCHARGE SUMMARY     [x] [address]:     [facility]. [facility], [facility]  Ph: [phone]   Fax: [phone] [] [address]:  [facility] [address] [facility], [place], [address]   Ph: [phone]   Fax: [phone]       Date: 2/10/2022    To:Referring Practitioner: Dr. Lara Barry MD            From: Otilia Neely OTR/L  Patient: Jhon Hadley   : 1955  MRN: [id number]  Diagnosis:Diagnosis: Deficit in ADL Z78.9   Date of eval: 2021    Visit Information:   OT Insurance Information: Medicare (Medical [place] secondary to cover 20% of part B)  Total # of Visits Approved:  (BMN)  Total # of Visits to Date: 6  Canceled Appointment: 1  Progress Note Counter: 5                              On 2022, pt stated feels at baseline, but unsure of d/c from OT. Was waiting for orthosis to be delivered and check for proper fit. Pt with transportation issues and brother Joselin Henry asked for pt to be on hold on 2/3/2022. On 2022, Joselin Henry stated discussed with brother and agree to d/c at this time from OT. Pt reported or was observed on 22 the following:    Assessment:    Goals Current/Discharge status  Met   Long term goal 1: Patient  will be IND with all recommended HEP's, adaptive strategies, and adaptive techniques. Pt reported completing recommended HEP. Pt reported ordering AE, but waiting to items to be delivered. [] Met  [x] Partially Met  [] Not Met   Long term goal 2: Patient will be IND with donning orthotic device, maintenance, and schedule Pt ordered orthosis. Did not arrive prior to d/c.  [] Met  [x] Partially Met  [] Not Met   Long term goal 3: Patient will participate in therapeutic interventions to normalize muscle tone in RUE. Pt stated WB into RUE and completing PROM at home.   [] Met  [x] Partially Met  [] Not Met   Long term goal 4: Patient will report less sleep disturbances from pain. No changes reported, still waking from pain at times. [] Met  [] Partially Met  [x] Not Met   Long term goal 5: Patient will report pain 5/10 or less during functional activities. Max pain at home 3-4/10 during activities. [x] Met  [] Partially Met  [] Not Met       Functional assessment used: Quick Disabilities of the Arm, Shoulder and Hand (DASH)  Score on eval:   QuickDASH Total Score: 29  QuickDASH Disability/Symptom Score : 40.91 %  QUICKDASH Disability Index: 40-59%  QUICKDASH CMS Modifier: CK    Score at d/c (1/27/2022): QuickDASH Total Score: 33  QuickDASH Disability/Symptom Score : 50 %  QUICKDASH Disability Index: 40-59%  QUICKDASH CMS Modifier: CK    Plan: D/C from OT    Thank you for referral of this patient. Electronically signed by:   PORTIA Street/L 2/10/2022 1:35 PM

## 2022-02-10 NOTE — PROGRESS NOTES
TOM [id number] [address] (NOMS)  FUNCTIONAL COMMUNICATION MEASURES  ADULT    Patient: Jhon Hadley  : 1955  MRN: [id number]    Date: 2/10/2022         TYPE OF ENTRANCE:   Update      MULTI-MODAL FUNCTIONAL COMMUNICATION:    Conveys simple messages that are meaningful related to routine daily activities in LOW demand situations (e.g., request actions, request objects, initiate greetings): 26-49% of the time   Participates in short structured conversations that are meaningful in LOW demand situations (e.g., familiar and predictable conversations, sharing opinions, expressing like or dislike about the immediate environment): 26-49% of the time   Conveys complex messages that are meaningful in LOW demand situations (e.g., telling a story, discussing school/current events, describing things outside the immediate environment): 0-25% of the time   Conveys simple messages that are meaningful related to routine daily activities in [address] demand situations (e.g., request actions, request objects, initiate greetings): 0-25% of the time   Participates in short structured conversations that are meaningful in HIGH demand situations (e.g., familiar and predictable conversations, sharing opinions, expressing like or dislike about the immediate environment): 0-25% of the time   Conveys complex messages that are meaningful in HIGH demand situations (e.g., telling a story, discussing school/current events, describing things outside the immediate environment: 0-25% of the time   Participates in communication exchanges WITHOUT additional assistance from communication partner (no more than would be expected for chronological age): 26-49% of the time    Electronically Signed by: Miriam Park, SLP, MA, CCC-SLP

## 2022-02-10 NOTE — PROGRESS NOTES
[x][facility]    [][facility] [address] FELDER [facility]     [address] [place], [address] Cecily Mcintosh, [address]      Phone: [phone]     Phone: [phone]      Fax: [phone]     Fax: [phone]    ______________________________________________________________________                [facility] Out[facility]  Speech Language Pathology  Adult Progress Note                          Physician: Radha Lewis MD  From: Nicanor Schultz, SLP, MA,CCC-SLP   Patient: Brooke Irving       : 1955  Diagnosis: Aphasia    Date: 2/10/2022  Treatment Diagnosis: R47.01  Date of Evaluation: 22      Plan of Care/Treatment to date: Receptive Language Therapy, AAC Evaluation and AAC Therapy    Date range from 22 to 22. Subjective: Pt has attended 6/8 sessions in this POC. Pt and family have been involved in getting a trial SGD from [facility]. Pt is currently in the first week of a 3-4 week trial.     Progress toward Short-Term Goals:  1. SLP will educate and train pts caregiver regarding operation and maintenance of pts speech generating device so that assistance can be provided to pt in the event of a communication breakdown. Pt has received trial SGD from [facility]. Informal education with brother Shikha Chavez has occurred. 2. Pt will participate in further SGD evaluation and trials to determine most appropriate device for communication   Progress made, initiated trial device  3. Pt will complete ID of objects/pictures in a field of 6 with 80% acc min cues  Goal met  4.  Pt will complete single word ID tasks with 80% acc with min verbal cues  Goal met for single word ID, increase to phrase/sentence tasks    Updated Short-Term Goals:  1. SLP will educate and train pts caregiver regarding operation and maintenance of pts speech generating device so that assistance can be provided to pt in the event of a communication breakdown. 2. Pt will participate in further SGD evaluation and trials to determine most appropriate device for communication  3. Pt will talk about hobbies/interests in 4 out of 5 opportunities to increase social engagement. 4. Pt will communicate where they would like to go in the community to family and/or caregiver in 4 out of 5 opportunities to increase communication of functional wants/needs  5. Pt or family will make/edit an icon on SGD to demonstrate functional knowledge of device      Long-Term Goals:   1. Pt will use his augmentative and alternative communication system (i.e SGD) to effectively communicate his wants and needs to family and community members in 100% of opportunities across all environments with Mod I     Frequency/Duration of Treatment:   Days: 1 day/week  Length of Session:  45 minutes and 60 minutes  Weeks: 8 Weeks    Rehab Potential: Fair     Prognostic Factors: Motivation  Family/community support  Participation level  Previous level of function       Goal Status: Partially Achieved      Patient Status: Continue per initial Plan of Care    Discharge Plan: Re-assess continued need for skilled therapeutic services at the end of this plan of care. This patients condition is expected to improve within the treatment timeframe.     MODIFIED LOPEZ FALL RISK ASSESSMENT:    History of Falling (has patient fallen in the past 30 days?):    No (0 points)    Secondary Diagnosis (is there more than 1 medical diagnosis in patients medical history?):    Yes (15 points)    Ambulatory Aid:    Crutches, cane or walker (15 points)    Gait:    Weak - patient stops but lifts head and maintains balance, may require light touch of furniture (10 points)    Mental Status:    Oriented to own ability (0 points)      Total points = 40    Fall Risk Level: medium    0  24: Low Risk - implement low risk fall prevention interventions    25  44: Medium risk  45 and higher: High Risk    TOM NOMS: Updated      Electronically signed by:  Electronically signed by OSCAR Herrera on 2/10/2022 at 4:10 PM      If you have any questions or concerns, please don't hesitate to call.   Thank you for your referral.      Physician Signature:________________________________Date:__________________  By signing above, therapists plan is approved by physician

## 2022-02-10 NOTE — PROGRESS NOTES
[address] [facility] Outpatient  Speech Language Pathology  Adult Daily Note    Gordon Figueroa  : 1955    Date: 2/10/2022    Visit Information:  SLP Insurance Information: Medicare  Total # of Visits Approved:  (medical necessity)  Total # of Visits to Date: 7  No Show: 0  Canceled Appointment: 1    Plan of care signed (Y/N):     Yes    Certification WLRAZR:-  Plan of Care Visit #7      Interventions used this date:  AAC, Instruction in Compensatory Strategies and Caregiver education    Subjective: Pt arrived on time and was cooperative with all tasks. Family member Ash Ortega joined for session. Behavior:  Alert, Cooperative and Pleasant       Objective/Assessment:   Patient progressing towards goals:  1. SLP will educate and train pts caregiver regarding operation and maintenance of pts speech generating device so that assistance can be provided to pt in the event of a communication breakdown. Initial virtual training session with [employer] consultant Orlando Ortega this date. Francine gathered information from client and SLP for the trial. Trial end date was set for . Francine answered questions from Aracelis Shanks and Ash Ortega. Educated about virtual connections groups, how to back up device to the cloud, the use of Fast talk button. SLP, Aracelis Shanks and Ash Ortega generated some ideas for folders such as one for \"getting a haircut\", \"model trains\" and \"going to the grocery store\". 2. Pt will participate in further SGD evaluation and trials to determine most appropriate device for communication    3. Pt will complete ID of objects/pictures in a field of 6 with 80% acc min cues  Not addressed   4. Pt will complete single word ID tasks with 80% acc with min verbal cues  Not addressed     Pain Assessment:  Initial Assessment:  Patient denies pain. Re-assessment:  Patient denies pain.         Plan:  Continue with current goals    Patient/Caregiver Education:  Patient/Caregiver educated on session. Patient/Caregiver provided with home program: connect device to wifi, backup device, start list of ideas  Patient/Caregiver stated verbal understanding of directions.     Time in: 1340  Time out: 1440  Minutes seen: 60      Signature: Electronically signed by Fredy Gordon SLP on 2/10/2022 at 2:54 PM

## 2022-02-15 ENCOUNTER — APPOINTMENT (OUTPATIENT)
Dept: OCCUPATIONAL THERAPY | Age: 67
End: 2022-02-15
Payer: MEDICARE

## 2022-02-15 ENCOUNTER — HOSPITAL ENCOUNTER (OUTPATIENT)
Dept: SPEECH THERAPY | Age: 67
Setting detail: THERAPIES SERIES
Discharge: HOME | End: 2022-02-15
Payer: MEDICARE

## 2022-02-15 NOTE — PROGRESS NOTES
Therapy                            Cancellation/No-show Note      Date:  2/15/2022  Patient Name:  Dewayne Jose  :  1955   MRN:  [id number]          Visit Information:  SLP Insurance Information: Medicare  Total # of Visits Approved:  (medical necessity)  Total # of Visits to Date: 7  No Show: 0  Canceled Appointment: 2    For today's appointment patient:  Cancelled    Reason given by patient:  No transportation    Follow-up needed:  Pt has future appointments scheduled, no follow up needed    Comments:       Signature: Electronically signed by OSCAR Villar on 2/15/22 at 1:37 PM EST

## 2022-02-16 ENCOUNTER — TELEPHONE (OUTPATIENT)
Dept: FAMILY MEDICINE CLINIC | Age: 67
End: 2022-02-16

## 2022-02-16 NOTE — TELEPHONE ENCOUNTER
follow up phone call regarding possible PAP for Amalia Singh for Xarelto due to expense. . Calling brother Abdulaziz Augustaco as he told me to send PAP application to him to make sure that it was received and answer any questions. I left message for Abdulaziz Zavala to call me at [phone].

## 2022-02-17 ENCOUNTER — HOSPITAL ENCOUNTER (OUTPATIENT)
Dept: SPEECH THERAPY | Age: 67
Setting detail: THERAPIES SERIES
Discharge: HOME | End: 2022-02-17
Payer: MEDICARE

## 2022-02-17 ENCOUNTER — APPOINTMENT (OUTPATIENT)
Dept: OCCUPATIONAL THERAPY | Age: 67
End: 2022-02-17
Payer: MEDICARE

## 2022-02-17 PROCEDURE — 92507 TX SP LANG VOICE COMM INDIV: CPT

## 2022-02-17 NOTE — PROGRESS NOTES
Therapy                            Cancellation/No-show Note      Date:  2022  Patient Name:  Rahul Cortes  :  1955   MRN:  [id number]          Visit Information:  SLP Insurance Information: Medicare  Total # of Visits Approved:  (medical necessity)  Total # of Visits to Date: 8  No Show: 0  Canceled Appointment: 2    For today's appointment patient:  Cancelled    Reason given by patient:  Other: SLP PTO CX doesn't count against pt    Follow-up needed:  Pt has future appointments scheduled, no follow up needed    Comments:       Signature: Electronically signed by OSCAR Marquez on 22 at 4:18 PM EST

## 2022-02-17 NOTE — PROGRESS NOTES
[facility] Outpatient  Speech Language Pathology  Adult Daily Note    Adrianna Kussmaul  : 1955    Date: 2022    Visit Information:  SLP Insurance Information: Medicare  Total # of Visits Approved:  (medical necessity)  Total # of Visits to Date: 8  No Show: 0  Canceled Appointment: 2    Plan of care signed (Y/N):     Yes    Certification Period: 22-3/11/22  Plan of Care Visit #8      Interventions used this date:  AAC, Instruction in Compensatory Strategies and Caregiver education    Subjective: Pt arrived on time and was cooperative with all tasks. Family member Shanae Cardoso joined for session. SLP communicate that she would be gone on PTO next  and the session would be CX. Pt and family member verbalized understanding. Behavior:  Alert, Cooperative and Pleasant       Objective/Assessment:   Patient progressing towards goals:  1. SLP will educate and train pts caregiver regarding operation and maintenance of pts speech generating device so that assistance can be provided to pt in the event of a communication breakdown. Shanae Cardoso asked about using Zoom on the device or finding a way for him to talk to his sister Zachary Hagan over the phone. SLP said she would f/u with device rep about how the zoom function works. SLP gave idea of trying FaceTime on cell phone and then using device to communicate over the phone. 2. Pt will participate in further SGD evaluation and trials to determine most appropriate device for communication  Pt completed homework of connecting device to home wifi and then doing a backup of the device   Pt showed SLP that he had been using the mouth position videos at home and therapy exercises folder. Pt navigated from the internet browser back to the home screen with mild verbal cue  3. Pt will talk about hobbies/interests in 4 out of 5 opportunities to increase social engagement.    4. Pt will communicate where they would like to go in the community to family and/or caregiver in 4 out of 5 opportunities to increase communication of functional wants/needs  5. Pt or family will make/edit an icon on SGD to demonstrate functional knowledge of device  Pt found orange edit menu with 1 verbal cue and required visual cues for finding the edit page button and the add button. Further steps for adding an icon were demonstrated by SLP. Pt typed title for icon with single word written model. Pain Assessment:  Initial Assessment:  Patient denies pain. Re-assessment:  Patient denies pain. Plan:  Continue with current goals    Patient/Caregiver Education:  Patient/Caregiver educated on session. Patient/Caregiver provided with home program: add 1-2 icons to device   Patient/Caregiver stated verbal understanding of directions.     Time in: 1345  Time out: [address]  Minutes seen: 60      Signature: Electronically signed by OSCAR Tate on 2/17/2022 at 3:07 PM

## 2022-02-22 ENCOUNTER — APPOINTMENT (OUTPATIENT)
Dept: OCCUPATIONAL THERAPY | Age: 67
End: 2022-02-22
Payer: MEDICARE

## 2022-02-22 ENCOUNTER — HOSPITAL ENCOUNTER (OUTPATIENT)
Dept: SPEECH THERAPY | Age: 67
Setting detail: THERAPIES SERIES
Discharge: HOME | End: 2022-02-22
Payer: MEDICARE

## 2022-02-24 ENCOUNTER — APPOINTMENT (OUTPATIENT)
Dept: OCCUPATIONAL THERAPY | Age: 67
End: 2022-02-24
Payer: MEDICARE

## 2022-02-24 ENCOUNTER — HOSPITAL ENCOUNTER (OUTPATIENT)
Dept: SPEECH THERAPY | Age: 67
Setting detail: THERAPIES SERIES
Discharge: HOME | End: 2022-02-24
Payer: MEDICARE

## 2022-02-24 PROCEDURE — 92507 TX SP LANG VOICE COMM INDIV: CPT

## 2022-02-24 NOTE — PROGRESS NOTES
verbal step by step instructions from SLP. Pt typed name of icon with written model on white board. Pt choose picture from internet for icon after SLP typed in search bar. Pain Assessment:  Initial Assessment:  Patient denies pain. Re-assessment:  Patient denies pain. Plan:  Continue with current goals    Patient/Caregiver Education:  Patient/Caregiver educated on session. Patient/Caregiver provided with home program: provided with typed directions for making a new icon and instructed to back up to cloud any new icons made. Bring stylus pens to next session. Patient/Caregiver stated verbal understanding of directions.     Time in: 1345  Time out: [address] [place]   Minutes seen: 60      Signature: Electronically signed by OSCAR Quezada on 2/24/2022 at 2:54 PM

## 2022-03-01 ENCOUNTER — HOSPITAL ENCOUNTER (OUTPATIENT)
Dept: SPEECH THERAPY | Age: 67
Setting detail: THERAPIES SERIES
End: 2022-03-01
Payer: MEDICARE

## 2022-03-03 ENCOUNTER — PATIENT MESSAGE (OUTPATIENT)
Dept: FAMILY MEDICINE CLINIC | Age: 67
End: 2022-03-03

## 2022-03-03 ENCOUNTER — HOSPITAL ENCOUNTER (OUTPATIENT)
Dept: SPEECH THERAPY | Age: 67
Setting detail: THERAPIES SERIES
Discharge: HOME | End: 2022-03-03
Payer: MEDICARE

## 2022-03-03 PROCEDURE — 92507 TX SP LANG VOICE COMM INDIV: CPT

## 2022-03-03 NOTE — PROGRESS NOTES
[facility] Outpatient  Speech Language Pathology  Adult Daily Note    Gordon Figueroa  : 1955    Date: 3/3/2022    Visit Information:  SLP Insurance Information: Medicare  Total # of Visits Approved:  (medical necessity)  Total # of Visits to Date: 10  No Show: 0  Canceled Appointment: 2    Plan of care signed (Y/N):     Yes    Certification Period: 22-3/11/22  Plan of Care Visit #10      Interventions used this date:  AAC, Instruction in Compensatory Strategies and Caregiver education    Subjective: Pt arrived on time and was cooperative with all tasks. Family member Ash Ortega joined for session. Communicated that all paperwork had been turned in and was waiting on the next steps before permanent device was made. Behavior:  Alert, Cooperative and Pleasant       Objective/Assessment:   Patient progressing towards goals:  1. SLP will educate and train pts caregiver regarding operation and maintenance of pts speech generating device so that assistance can be provided to pt in the event of a communication breakdown. Pt asked about a way to carry the device when walking. SLP emailed the [employer] rep to see if they had carrying cases with straps. SLP explained the difference between Google chrome on the device and searching the internet for a picture for an icon. Pt verbalized understanding. Ash Chain asked about using the device in the community if they would bring him to get a haircut. SLP educated on making a folder and being able to use Independently with community members. Pt and Ash Ortega verbalized understanding. 2. Pt will participate in further SGD evaluation and trials to determine most appropriate device for communication    3. Pt will talk about hobbies/interests in 4 out of 5 opportunities to increase social engagement.      4. Pt will communicate where they would like to go in the community to family and/or caregiver in 4 out of 5 opportunities to increase communication of functional wants/needs  5. Pt or family will make/edit an icon on SGD to demonstrate functional knowledge of device  SLP added two new pages to the device home page \"grocery store\" and \"haircut\". Made a few icons in each folder. Pain Assessment:  Initial Assessment:  Patient denies pain. Re-assessment:  Patient denies pain. Plan:  Continue with current goals    Patient/Caregiver Education:  Patient/Caregiver educated on session. Patient/Caregiver provided with home program: Continue to add to folders if able and take pictures  Patient/Caregiver stated verbal understanding of directions.     Time in: 1345  Time out: 2505 [place]   Minutes seen: 60      Signature: Electronically signed by OSCAR Arboleda on 3/3/2022 at 2:51 PM

## 2022-03-04 RX ORDER — LEVETIRACETAM 750 MG/1
750 TABLET ORAL 2 TIMES DAILY
Qty: 60 TABLET | Refills: 0 | Status: SHIPPED | OUTPATIENT
Start: 2022-03-04 | End: 2022-05-09

## 2022-03-04 NOTE — TELEPHONE ENCOUNTER
From: Oscar Clemens  To: Dr. Sanna Izaguirre: 3/3/2022 7:29 PM EST  Subject: Levetiracetam 750 mg tablet    my brother Oscar Clemens,  55, currently is w/o this med, should he get a refill or wait?

## 2022-03-10 ENCOUNTER — HOSPITAL ENCOUNTER (OUTPATIENT)
Dept: SPEECH THERAPY | Age: 67
Setting detail: THERAPIES SERIES
Discharge: HOME | End: 2022-03-10
Payer: MEDICARE

## 2022-03-10 NOTE — PROGRESS NOTES
Therapy                            Cancellation/No-show Note      Date:  3/10/2022  Patient Name:  Benoit Ramirez  :  1955   MRN:  [id number]          Visit Information:  SLP Insurance Information: Medicare  Total # of Visits Approved:  (medical necessity)  Total # of Visits to Date: 10  No Show: 0  Canceled Appointment: 3    For today's appointment patient:  Cancelled    Reason given by patient:  Other: Pt CX said \"waiting for new device\"    Follow-up needed:  Pt has future appointments scheduled, no follow up needed    Comments:       Signature: Electronically signed by OSCAR Hnasen on 3/10/22 at 8:28 AM EST

## 2022-03-11 ENCOUNTER — TELEPHONE (OUTPATIENT)
Dept: FAMILY MEDICINE CLINIC | Age: 67
End: 2022-03-11

## 2022-03-11 NOTE — TELEPHONE ENCOUNTER
Need to addend note form 1/31/22 for insurance. For AFO needs what kind of brace, how will help with daily activities, length of time needs and dx.

## 2022-03-17 ENCOUNTER — HOSPITAL ENCOUNTER (OUTPATIENT)
Dept: SPEECH THERAPY | Age: 67
Setting detail: THERAPIES SERIES
Discharge: HOME | End: 2022-03-17
Payer: MEDICARE

## 2022-03-17 NOTE — PROGRESS NOTES
Therapy                            Cancellation/No-show Note      Date:  3/17/2022  Patient Name:  Rahul Cortes  :  1955   MRN:  [id number]          Visit Information:  SLP Insurance Information: Medicare  Total # of Visits Approved:  (medical necessity)  Total # of Visits to Date: 10  No Show: 0  Canceled Appointment: 4    For today's appointment patient:  Cancelled    Reason given by patient:  Other: Pt CX said \"waiting for new device\"    Follow-up needed:  Pt has future appointments scheduled, no follow up needed    Comments:       Signature: Electronically signed by OSCAR Marquez on 3/17/22 at 8:19 AM EDT

## 2022-03-21 RX ORDER — BACLOFEN 10 MG/1
10 TABLET ORAL 3 TIMES DAILY
Qty: 120 TABLET | Refills: 0 | Status: SHIPPED | OUTPATIENT
Start: 2022-03-21 | End: 2022-05-23

## 2022-03-22 NOTE — PROGRESS NOTES
[][facility] [address]      [][facility]re [facility]         [address], [address] [facility], [address]     Phone [phone]     Fax [phone]       [facility]  Speech Language Pathology  Adult Discharge Note    Tanna Carter  : 1955 [de-identified]77 y.o.)   MRN: [id number]  Patient Diagnosis: Aphasia [R47.01]  Other symptoms and signs involving cognitive functions and awareness [R41.89]  Referring Physician: Dr. Devonte Lara MD    Date of Evaluation: 22          Treatment Diagnosis: Aphasia  ICD10 tx dx code: R47.01      Today's Date: 3/22/2022  Treatment Dates:  From: 22   To: 3/24/22      TREATMENT ADMINISTERED:  Receptive Language Therapy, AAC Evaluation, AAC Therapy, Instruction in Compensatory Strategies and Caregiver Education      PROGRESS TO DATE:  1. SLP will educate and train pts caregiver regarding operation and maintenance of pts speech generating device so that assistance can be provided to pt in the event of a communication breakdown. Goal Met, Pt received permanent SGD from [facility] and family members and patient were given information and training on operation of device   2. Pt will participate in further SGD evaluation and trials to determine most appropriate device for communication   Goal Met  3. Pt will talk about hobbies/interests in 4 out of 5 opportunities to increase social engagement.    Goal met   4. Pt will communicate where they would like to go in the community to family and/or caregiver in 4 out of 5 opportunities to increase communication of functional wants/needs  Not targeted   5.  Pt or family will make/edit an icon on SGD to demonstrate functional knowledge of device  Progress made, not met prior to d/c      ACHIEVEMENT OF GOALS:  Achieved goals: 1-3    REASON FOR DISCHARGE: Other: Pt received permanent SGD from [facility] and brother reports they are navigating it at home and need no further skilled speech therapy services    DISCHARGE EDUCATION/RECOMMENDATIONS: None given at this time      Discharge NOMS: Discharged      Electronically signed by:  OSCAR Hale,M.A., CCC-SLP Date: 3/22/2022, 1:55 PM

## 2022-03-24 ENCOUNTER — HOSPITAL ENCOUNTER (OUTPATIENT)
Dept: SPEECH THERAPY | Age: 67
Setting detail: THERAPIES SERIES
Discharge: HOME | End: 2022-03-24
Payer: MEDICARE

## 2022-03-31 ENCOUNTER — HOSPITAL ENCOUNTER (OUTPATIENT)
Dept: SPEECH THERAPY | Age: 67
Setting detail: THERAPIES SERIES
End: 2022-03-31
Payer: MEDICARE

## 2022-04-10 DIAGNOSIS — M25.511 CHRONIC RIGHT SHOULDER PAIN: ICD-10-CM

## 2022-04-10 DIAGNOSIS — F33.9 RECURRENT DEPRESSION (HCC): ICD-10-CM

## 2022-04-10 DIAGNOSIS — G89.29 CHRONIC RIGHT SHOULDER PAIN: ICD-10-CM

## 2022-04-12 RX ORDER — ARIPIPRAZOLE 2 MG/1
2 TABLET ORAL DAILY
Qty: 30 TABLET | Refills: 5 | Status: SHIPPED | OUTPATIENT
Start: 2022-04-12 | End: 2022-10-10 | Stop reason: SDUPTHER

## 2022-04-12 RX ORDER — TRAMADOL HYDROCHLORIDE 50 MG/1
TABLET ORAL
Qty: 60 TABLET | Refills: 2 | Status: SHIPPED | OUTPATIENT
Start: 2022-04-12 | End: 2022-07-28

## 2022-04-12 NOTE — TELEPHONE ENCOUNTER
Future Appointments    This patient does not currently have any appointments scheduled.     Past Visits    Date Provider Specialty Visit Type Primary Dx   01/31/2022 Branden Burr MD Family Medicine Office Visit Right foot drop

## 2022-04-14 NOTE — DISCHARGE SUMMARY
[address] [facility] Course: The patient was admitted to the Rehabilitation Unit to address ADL and mobility deficits. The patient was enrolled in acute PT, OT program.  Weekly team meetings were held to assess functional progress toward their goals. The patient's medical issues were addressed. The patient progressed in the rehab program and is now ready for discharge. Refer to functional assessments summary report for detailed functional status. Greater than   25 35 minutes was spent on coordinating patients discharge including follow-up care, medications and patient/family education. Extended time needed because of the potential use of opiate medications are high risk medications and a high risk population individual.  Patient and family were instructed to use lowest effective dose of these medications and slowly titrate off over the next 2 to 4 weeks. They are not to combine opiates with sedatives. I reviewed her [facility] prescription monitoring service data sheets in hopes of eliminating polypharmacy and weaning to the lowest effective dose of pain medications and eliminating the concomitant use of benzodiazepines. I see   no medications of concern. I see   no habits of combining sedatives and narcotics. Subjective: The patient complains of severe acute on chronic progressive fatigue and right-sided weakness secondary to previous CVA, recent fall with closed head injury aphasia, cognitive decline partially relieved by rest,medications, PT,  OT, history of residual cognitive, communication, right-sided weakness and seizure secondary to previous CVA unfortunately has had a recent closed head injury secondary to a fall with decline in functional status. He was admission admitted through the emergency room on 12/20/2021. He had fallen at home. Trauma work-up showed no acute traumatic fractures. Labs were significant for positive and elevated lactic acid level.   He was prescribed IV fluid boluses. His toxicology screen was positive for cannabinoids. His urinary analysis showed ketones but no bacteria culture was not indicated. SARS-CoV-2 testing was negative. SLP and rest and exacerbated by recent illness. ROS x10: The patient also complains of severely impaired mobility and activities of daily living. Otherwise no new problems with vision, hearing, nose, mouth, throat, dermal, cardiovascular, GI, , pulmonary, musculoskeletal, psychiatric or neurological. See Rehab H&P on Rehab chart dated . Vital signs:  /70   Pulse 62   Temp 98.2 °F (36.8 °C) (Oral)   Resp 17   Wt 174 lb 6.4 oz (79.1 kg)   SpO2 98%   BMI 26.52 kg/m²   I/O:   PO/Intake:  fair PO intake, frequent constipation. Bowel/Bladder:  incontinent, assist with urinal, LBM 12/24  General:  Patient is well developed, adequately nourished, non-obese and     well kempt. HEENT:    PERRLA, hearing intact to loud voice, external inspection of ear     and nose benign. Inspection of lips, tongue and gums benign  Musculoskeletal: No significant change in strength or tone. All joints stable. Inspection and palpation of digits and nails show no clubbing,       cyanosis or inflammatory conditions. Neuro/Psychiatric: Affect: flat but pleasant. Alert and oriented to person, place and     Situation with  mod cues. No significant change in deep tendon reflexes or     sensation  Lungs:  Diminished, CTA-B. Respiration effort is normal at rest.     Heart:   S1 = S2, RRR. No loud murmurs. Abdomen:  Soft, non-tender, no enlargement of liver or spleen. Extremities:  No significant lower extremity edema or tenderness.  Upper extremity spasticity -right shoulder subluxation  Skin:   Intact to general survey, laceration and bruising left face and forehead    Rehabilitation:  Physical therapy:   Bed Mobility: Scooting: Modified independent    Transfers: Sit to Stand: Modified independent  Stand to sit: Modified independent  Bed to Chair: Modified independent, Ambulation 1  Surface: level tile,carpet  Device: Kandu  Other Apparatus: Right (air cast)  Assistance: Modified Independent,Supervision  Quality of Gait: R knee snapping into hyperextension, RLE circumduction, decreased stance on RLE, inconsistent step length and speed  Distance: 125 feet  Comments: rest breaks post gait, Stairs  # Steps :  (4 only d/t time constraints)  Stairs Height: 6\"  Rails: Left ascending  Curbs: 4\"  Device:  (rail to simulate home rail)  Other Apparatus:  (rail simulated for home going)  Assistance: Supervision  Comment: no cues required; supervision to ensure motor control consistency    FIMS:  ,  , Assessment: Patient continues to complete functional mobility training at mod I level in protected areas. Session focused on balance/stepping activities to improve quality of gait. Occupational therapy:    ,  , Assessment: Pt is a 77year old man from home who presents to [facility] with the above deficits which impact his ability to perform ADLs and IADLs at his baseline. Pt. would benefit from continued OT to maximize independence and safety with ADL tasks. Speech therapy:        Lab/X-ray studies reviewed, analyzed and discussed with patient and staff:   No results found for this or any previous visit (from the past 24 hour(s)). CT HEAD  12/20/2021  Extra-axial spaces:  Normal. Intracranial hemorrhage:  None. Ventricular system: Ventricles mildly enlarged. Sulci mildly prominent. Ex vacuo dilatation, left lateral ventricle. Basal Cisterns:  Normal. Cerebral Parenchyma: Bilateral symmetric periventricular areas decreased attenuation. Geographic area of decreased attenuation, left frontal and left temporal lobes exerting no mass effect. Midline Shift:  None. Cerebellum:  Normal. Paranasal sinuses and mastoid air cells:  Normal. Visualized Orbits:  Normal.     Impression: Remote left frontal and left temporal lobe infarcts.  Mild cerebral atrophy. Chronic ischemic white matter disease. CT CHEST 12/20/2021   . Chest: Lines, tubes, and devices:  None. Lung parenchyma and pleura: No consolidation. No suspicious pulmonary nodule. No pleural effusion. Central airways are patent. Mild bibasilar atelectasis. Thoracic inlet, heart, and mediastinum:  No lymphadenopathy in the axillary, mediastinal, or hilar regions. Mild atherosclerotic vascular thoracic aorta. The thoracic aorta and main pulmonary artery are normal in caliber. The cardiac chambers are normal in size. Status post foramen ovale closure. Mild coronary artery atherosclerotic calcifications are noted, although the study is not optimized for coronary assessment. No pericardial effusion or thickening. Thyroid gland is unremarkable. Esophagus is nondilated. Bones/Soft Tissues: Degenerative changes. Abdomen / Pelvis: Liver: No mass. Right hepatic lobe metallic density. Biliary: No bile duct dilation. Gallbladder is unremarkable. Spleen: No mass. No splenomegaly. Pancreas: No mass or duct dilation. Adrenals: No mass. Kidneys: No mass, calculus or hydronephrosis. GI tract: No dilation or wall thickening. Large amount of colonic stool. Lymph nodes: No abdominal or pelvic lymphadenopathy. Mesentery/Peritoneum: No ascites or mass. Retroperitoneum: No mass. Vasculature: The celiac axis and SMA are patent. The portal vein and branches, splenic vein, SMV, and hepatic veins are patent. Arterial atherosclerotic disease without aneurysm. There is an IVC filter. Pelvis: No mass, ascites or fluid collection. Urinary bladder is unremarkable. Soft tissue/ bones: Grade I anterolisthesis of L5 on S1. Multilevel degenerative changes of the lumbar spine. No acute abnormality in the chest, abdomen and pelvis. Large amount of colonic stool. CT CERVICAL SPINE   12/20/2021: Loss of height, C5 and C6 with cranial caudal dimensions 1.2 cm, and 0.8 cm, respectively. Loss cervical lordosis.  Remote 8mm anterolisthesis C4 on C5. Atlantooccipital articulation maintained. Atlantoaxial interval preserved. Neural foramina narrowing, left C4-5. Diffuse disc space narrowing C3-4, through T2-3, greatest at C4-5 through C6-7. No fractures, dislocations, bone lesions. Limited imaging lung apices without anomaly. Carotid arteries and soft tissues are without anomaly. No fracture. Marked diffuse degenerative change cervical spine. C4 anterolisthesis and loss of body height, C6 and C7, most likely secondary to degenerative change. CT ABDOMEN PELVIS   12/20/2021 No acute abnormality in the chest, abdomen and pelvis. Large amount of colonic stool. Previous extensive, complex labs, notes and diagnostics reviewed and analyzed. ALLERGIES:    Allergies as of 12/21/2021    (No Known Allergies)      (please also verify by checking MAR)      Recently, I evaluated this patient for periodic reassessment of medical and functional status. The patient was discussed in detail at the treatment team meeting focusing on current medical issues, progress in therapies, social issues, psychological issues, barriers to progress and strategies to address these barriers, and discharge planning. See the hand written addendum to rehab progress note. The patient continues to be high risk for future disability and their medical and rehabilitation prognosis continue to be good and therefore, we will continue the patient's rehabilitation course as planned. The patient's tentative discharge date was set. Patient and family education was discussed. The patient was made aware of the team discussion regarding their progress. Discharge plans were discussed along with barriers to progress and strategies to address these barriers, patient encouraged to continue to discuss discharge plans with . Complex Physical Medicine & Rehab Issues Assess & Plan:   1.  Severe abnormality of gait and mobility and impaired self-care and ADL's secondary to  Late effects of CVA Remote left frontal and left temporal lobe infarcts- . Functional and medical status reassessed regarding patients ability to participate in therapies and patient found to be able to participate in acute intensive comprehensive inpatient rehabilitation program including PT/OT to improve balance, ambulation, ADLs, and to improve the P/AROM. Therapeutic modifications regarding activities in therapies, place, amount of time per day and intensity of therapy made daily. In bed therapies or bedside therapies prn.   2. Bowel and Bladder dysfunction neurogenic bowel and bladder:  frequent toileting, ambulate to bathroom with assistance, check post void residuals. Check for C.difficile x1 if >2 loose stools in 24 hours, continue bowel & bladder program.  Monitor bowel and bladder function. Lactinex 2 PO every AC. MOM prn, Brown Bomb prn, Glycerin suppository prn, enema prn.  3. Severe cervical and low back pain as well as generalized OA pain: reassess pain every shift and prior to and after each therapy session, give prn Tylenol and   Ultram versus OxyIR, modalities prn in therapy, masage, Lidoderm, K-pad prn. Consider scheduled AM pain meds. 4. Skin healing and breakdown risk:  continue pressure relief program.  Daily skin exams and reports from nursing. 5. Severe fatigue due to nutritional and hydration deficiency: Add and titrate vitamin B12 vitamin D and CoQ10 continue to monitor I&Os, calorie counts prn, dietary consult prn.  6. Acute episodic insomnia with situational adjustment disorder:  prn Ambien, monitor for day time sedation. 7. Falls risk elevated:  patient to use call light to get nursing assistance to get up, bed and chair alarm. 8. Elevated DVT risk: progressive activities in PT, continue prophylaxis LUCIANO hose, elevation and Xarelto.   9. Complex discharge planning: Discharge January 1, 2020 to home alone with help from his family who lives locally and home health care PT OT RN aide and social work. Until then continue weekly team meeting every  Thursday to assess progress towards goals, discuss and address social, psychological and medical comorbidities and to address difficulties they may be having progressing in therapy. Patient and family education is in progress. The patient is to follow-up with their family physician after discharge. Complex Active General Medical Issues that complicate care Assess & Plan:    1. History of CVA (cerebrovascular accident)-focus on mobility ADL and cognitive deficits  2. Recurrent depression-emotional support provided daily, vitamin B12, encourage participation in rehabilitation support group and recreational therapy, adjust/add medications (Abilify, Cymbalta, add co-Q10 vitamin B12) it appears the patient uses cannabinoids at home. 3.   Hyperlipidemia, coronary artery disease, cerebrovascular disease-continue blood signs every shift focusing on heart rate and blood pressure checks, consult hospitalist for backup medical and adjust/add medications (Xarelto). Monitor heart rate and blood pressure as well as medications effects on vital signs before during and after therapy. 4.   Hemiparesis affecting dominant side as late effect of cerebrovascular accident (CVA)   5. 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 Chronic pain syndrome,   DJD (degenerative joint disease), lumbar, DJD (degenerative joint disease), cervical-transition to lowest effective dose of pain medication use topicals when available lowest effective dose of Ultram especially given cannabinoid use and Cymbalta. 7.   Hypothyroidism-  Synthroid and titrate dosing. Follow vital signs, recheck TSH and thyroid studies as outpatient. 8.   Chronic deep vein thrombosis (DVT) of left popliteal vein-Xaralto  9. Bowel and bladder incontinence-check post void residuals add scheduled toileting  10. Aphasia-consult speech and language pathology         Ja Rodriguez D.O., PM&R     Attending    [address]

## 2022-05-09 RX ORDER — LEVETIRACETAM 750 MG/1
TABLET ORAL
Qty: 60 TABLET | Refills: 0 | Status: SHIPPED | OUTPATIENT
Start: 2022-05-09 | End: 2022-07-11 | Stop reason: SDUPTHER

## 2022-05-16 DIAGNOSIS — R60.0 BILATERAL LEG EDEMA: ICD-10-CM

## 2022-05-18 RX ORDER — POTASSIUM CHLORIDE 20 MEQ/1
20 TABLET, EXTENDED RELEASE ORAL DAILY
Qty: 30 TABLET | Refills: 2 | Status: SHIPPED | OUTPATIENT
Start: 2022-05-18 | End: 2022-08-28 | Stop reason: SDUPTHER

## 2022-05-23 RX ORDER — BACLOFEN 10 MG/1
10 TABLET ORAL 3 TIMES DAILY
Qty: 120 TABLET | Refills: 0 | Status: SHIPPED | OUTPATIENT
Start: 2022-05-23 | End: 2022-08-02 | Stop reason: SDUPTHER

## 2022-05-29 DIAGNOSIS — R60.0 BILATERAL LEG EDEMA: ICD-10-CM

## 2022-05-29 NOTE — TELEPHONE ENCOUNTER
Future Appointments    This patient does not currently have any appointments scheduled.     Past Visits    Date Provider Specialty Visit Type Primary Dx   01/31/2022 Waldo Rueda MD Family Medicine Office Visit Right foot drop   11/12/2021 Waldo Rueda MD Family Medicine Office Visit Hypothyroidism, unspecified type   08/17/2021 Waldo Rueda MD Family Medicine Office Visit Screening PSA (prostate specific antigen)   12/30/2020 Waldo Rueda MD Family Medicine Office Visit Routine general medical examination at a health care facility   06/22/2020 Waldo Rueda MD Family Medicine Office Visit Bilateral leg

## 2022-05-31 RX ORDER — FUROSEMIDE 20 MG/1
20 TABLET ORAL DAILY
Qty: 30 TABLET | Refills: 5 | Status: SHIPPED | OUTPATIENT
Start: 2022-05-31

## 2022-06-06 DIAGNOSIS — E03.9 HYPOTHYROIDISM, UNSPECIFIED TYPE: ICD-10-CM

## 2022-06-06 RX ORDER — LEVOTHYROXINE SODIUM 0.05 MG/1
50 TABLET ORAL DAILY
Qty: 30 TABLET | Refills: 5 | Status: SHIPPED | OUTPATIENT
Start: 2022-06-06

## 2022-06-06 NOTE — TELEPHONE ENCOUNTER
Future Appointments    Encounter Information    Provider Department Appt Notes   6/20/2022 Nisreen Reed MD [facility] Primary Care General visit       Past Visits    Date Provider Specialty Visit Type Primary Dx   01/31/2022 Nisreen Reed MD Family Medicine Office Visit Right foot drop   11/12/2021

## 2022-06-17 SDOH — HEALTH STABILITY: PHYSICAL HEALTH: ON AVERAGE, HOW MANY DAYS PER WEEK DO YOU ENGAGE IN MODERATE TO STRENUOUS EXERCISE (LIKE A BRISK WALK)?: 0 DAYS

## 2022-06-17 SDOH — HEALTH STABILITY: PHYSICAL HEALTH: ON AVERAGE, HOW MANY MINUTES DO YOU ENGAGE IN EXERCISE AT THIS LEVEL?: 0 MIN

## 2022-06-17 ASSESSMENT — PATIENT HEALTH QUESTIONNAIRE - PHQ9
10. IF YOU CHECKED OFF ANY PROBLEMS, HOW DIFFICULT HAVE THESE PROBLEMS MADE IT FOR YOU TO DO YOUR WORK, TAKE CARE OF THINGS AT HOME, OR GET ALONG WITH OTHER PEOPLE: 1
1. LITTLE INTEREST OR PLEASURE IN DOING THINGS: 2
SUM OF ALL RESPONSES TO PHQ QUESTIONS 1-9: 7
9. THOUGHTS THAT YOU WOULD BE BETTER OFF DEAD, OR OF HURTING YOURSELF: 0
8. MOVING OR SPEAKING SO SLOWLY THAT OTHER PEOPLE COULD HAVE NOTICED. OR THE OPPOSITE, BEING SO FIGETY OR RESTLESS THAT YOU HAVE BEEN MOVING AROUND A LOT MORE THAN USUAL: 0
3. TROUBLE FALLING OR STAYING ASLEEP: 1
SUM OF ALL RESPONSES TO PHQ QUESTIONS 1-9: 7
4. FEELING TIRED OR HAVING LITTLE ENERGY: 1
SUM OF ALL RESPONSES TO PHQ9 QUESTIONS 1 & 2: 4
SUM OF ALL RESPONSES TO PHQ QUESTIONS 1-9: 7
SUM OF ALL RESPONSES TO PHQ QUESTIONS 1-9: 7
5. POOR APPETITE OR OVEREATING: 0
2. FEELING DOWN, DEPRESSED OR HOPELESS: 2
6. FEELING BAD ABOUT YOURSELF - OR THAT YOU ARE A FAILURE OR HAVE LET YOURSELF OR YOUR FAMILY DOWN: 1
7. TROUBLE CONCENTRATING ON THINGS, SUCH AS READING THE NEWSPAPER OR WATCHING TELEVISION: 0

## 2022-06-17 ASSESSMENT — LIFESTYLE VARIABLES
HOW MANY STANDARD DRINKS CONTAINING ALCOHOL DO YOU HAVE ON A TYPICAL DAY: PATIENT DECLINED
HOW MANY STANDARD DRINKS CONTAINING ALCOHOL DO YOU HAVE ON A TYPICAL DAY: 98
HOW OFTEN DO YOU HAVE A DRINK CONTAINING ALCOHOL: 1
HOW OFTEN DO YOU HAVE SIX OR MORE DRINKS ON ONE OCCASION: 1
HOW OFTEN DO YOU HAVE A DRINK CONTAINING ALCOHOL: NEVER

## 2022-06-20 ENCOUNTER — OFFICE VISIT (OUTPATIENT)
Dept: FAMILY MEDICINE CLINIC | Age: 67
End: 2022-06-20
Payer: MEDICARE

## 2022-06-20 VITALS
OXYGEN SATURATION: 92 % | HEART RATE: 68 BPM | SYSTOLIC BLOOD PRESSURE: 130 MMHG | DIASTOLIC BLOOD PRESSURE: 78 MMHG | TEMPERATURE: 98 F | BODY MASS INDEX: 27.37 KG/M2 | WEIGHT: 180.6 LBS | HEIGHT: 68 IN

## 2022-06-20 DIAGNOSIS — I69.351 HEMIPLEGIA AND HEMIPARESIS FOLLOWING CEREBRAL INFARCTION AFFECTING RIGHT DOMINANT SIDE (HCC): ICD-10-CM

## 2022-06-20 DIAGNOSIS — R73.9 HYPERGLYCEMIA: ICD-10-CM

## 2022-06-20 DIAGNOSIS — I82.532 CHRONIC DEEP VEIN THROMBOSIS (DVT) OF LEFT POPLITEAL VEIN (HCC): ICD-10-CM

## 2022-06-20 DIAGNOSIS — E03.9 HYPOTHYROIDISM, UNSPECIFIED TYPE: ICD-10-CM

## 2022-06-20 DIAGNOSIS — M21.371 RIGHT FOOT DROP: ICD-10-CM

## 2022-06-20 DIAGNOSIS — Z91.81 AT HIGH RISK FOR FALLS: ICD-10-CM

## 2022-06-20 DIAGNOSIS — R60.0 BILATERAL LEG EDEMA: ICD-10-CM

## 2022-06-20 DIAGNOSIS — M25.511 CHRONIC RIGHT SHOULDER PAIN: ICD-10-CM

## 2022-06-20 DIAGNOSIS — I63.032 CEREBROVASCULAR ACCIDENT (CVA) DUE TO THROMBOSIS OF LEFT CAROTID ARTERY (HCC): ICD-10-CM

## 2022-06-20 DIAGNOSIS — F33.9 RECURRENT DEPRESSION (HCC): ICD-10-CM

## 2022-06-20 DIAGNOSIS — I69.359 HEMIPLEGIA AFFECTING DOMINANT SIDE, POST-STROKE (HCC): ICD-10-CM

## 2022-06-20 DIAGNOSIS — G89.29 CHRONIC RIGHT SHOULDER PAIN: ICD-10-CM

## 2022-06-20 DIAGNOSIS — Z00.00 MEDICARE ANNUAL WELLNESS VISIT, SUBSEQUENT: Primary | ICD-10-CM

## 2022-06-20 LAB
HBA1C MFR BLD: 5.4 % (ref 4.8–5.9)
TSH SERPL DL<=0.05 MIU/L-ACNC: 2.31 UIU/ML (ref 0.44–3.86)

## 2022-06-20 PROCEDURE — 3017F COLORECTAL CA SCREEN DOC REV: CPT | Performed by: FAMILY MEDICINE

## 2022-06-20 PROCEDURE — 1123F ACP DISCUSS/DSCN MKR DOCD: CPT | Performed by: FAMILY MEDICINE

## 2022-06-20 PROCEDURE — G0439 PPPS, SUBSEQ VISIT: HCPCS | Performed by: FAMILY MEDICINE

## 2022-06-20 NOTE — PROGRESS NOTES
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  Recommended screening schedule for the next 5-10 years is provided to the patient in written form: see Patient Instructions/AVS.     Return for Medicare Annual Wellness Visit in 1 year. Subjective   Chief Complaint   Patient presents with    Medicare AWV         Patient's complete Health Risk Assessment and screening values have been reviewed and are found in Flowsheets. The following problems were reviewed today and where indicated follow up appointments were made and/or referrals ordered. Positive Risk Factor Screenings with Interventions:    Fall Risk:  Do you feel unsteady or are you worried about falling? : (!) yes  2 or more falls in past year?: (!) yes  Fall with injury in past year?: no     Fall Risk Interventions:    · Home safety tips provided  · has AFO    Cognitive:   Words recalled: 0 Words Recalled  Clock Drawing Test (CDT): Normal  Total Score Interpretation: Abnormal Mini-Cog    Cognitive Impairment Interventions:  · Patient with known memory issues due to CVA    Depression:  PHQ-2 Score: 4  PHQ-9 Total Score: 7    Severity:1-4 = minimal depression, 5-9 = mild depression, 10-14 = moderate depression, 15-19 = moderately severe depression, 20-27 = severe depression    Depression Interventions:  · Keppra/abilify/cymbalta      Drug Use Screening: DAST-10 Score: 0  DAST-10 Score Interpretation:  1-2: Low level - Monitor, re-assess at a later date; 3-5: Moderate level - Further Investigation; 6-8: Substantial level - Intensive Assessment; 9-10: Severe level - Intensive Assessment    Substance Use - Drug Use Interventions:  Nothing at this time         Opioid Risk: (Low risk score <55) Opioid risk score: 21    Patient is low risk for opioid use disorder or overdose. Last PDMP Estrella He as Reviewed:  Review User Review Instant Review Result   Chelsey Erazo 6/20/2022  3:18 PM Reviewed PDMP [1]     Last Controlled Substance Monitoring Documentation      Admission (Discharged) from 12/21/[facility] in [facility]   Periodic Controlled Substance Monitoring Possible medication side effects, risk of tolerance/dependence & alternative treatments discussed., No signs of potential drug abuse or diversion identified. , Assessed functional status., Obtaining appropriate analgesic effect of treatment. filed at 12/22/2021 0909   Acute Pain Prescriptions Prescription exceeds daily limit for a specific reason. See comments or note., Not required given exclusionary diagnoses. .., Severe pain not adequately treated with lower dose. filed at 12/22/2021 0909   Chronic Pain > 50 MEDD Re-evaluated the status of the patient's underlying condition causing pain. , Considered consultation with a specialist., Obtained or confirmed \"Consent for Opioid Use\" on file.  filed at 12/22/2021 0909          Health Habits/Nutrition:     Physical Activity: Inactive    Days of Exercise per Week: 0 days    Minutes of Exercise per Session: 0 min     Have you lost any weight without trying in the past 3 months?: No  Body mass index: (!) 27.46  Have you seen the dentist within the past year?: Yes    Health Habits/Nutrition Interventions:  · Nothing      Safety:  Do you have working smoke detectors?: Yes  Do you have any tripping hazards - loose or unsecured carpets or rugs?: (!) Yes  Do you have any tripping hazards - clutter in doorways, halls, or stairs?: (!) Yes  Do you have either shower bars, grab bars, non-slip mats or non-slip surfaces in your shower or bathtub?: Yes  Do all of your stairways have a railing or banister?: Not Applicable  Do you always fasten your seatbelt when you are in a car?: Yes    Safety Interventions:  · Has help, lives with brother     ADLs:  In the past 7 days, did you need help from others to perform any of the following everyday activities: Eating, dressing, grooming, bathing, toileting, or walking/balance?: No  In the past 7 days, did you need help from others to take care of any of the following: Laundry, housekeeping, banking/finances, shopping, telephone use, food preparation, transportation, or taking medications?: (!) Yes  Select all that apply: (!) Laundry,Housekeeping,Banking/Finances,Shopping,Transportation,Taking Medications    ADL Interventions:  · Patient declines any further evaluation/treatment for this issue          Objective   Vitals:    06/20/22 1446   BP: 130/78   Site: Left Upper Arm   Pulse: 68   Temp: 98 °F (36.7 °C)   SpO2: 92%   Weight: 180 lb 9.6 oz (81.9 kg)   Height: 5' 8\" (1.727 m)      Body mass index is 27.46 kg/m². Physical Exam:  /78 (Site: Left Upper Arm)   Pulse 68   Temp 98 °F (36.7 °C)   Ht 5' 8\" (1.727 m)   Wt 180 lb 9.6 oz (81.9 kg)   SpO2 92%   BMI 27.46 kg/m²     Gen: Well, NAD, Alert, Oriented x 3   HEENT: EOMI, eyes clear, MMM  Skin: without rash or jaundice  Neck: no significant lymphadenopathy or thyromegaly  Lungs: CTA B w/out Rales/Wheezes/Rhonchi, Good respiratory effort   Heart: RRR, S1S2, w/out M/R/G, non-displaced PMI   Ext: chronic BLE edema  Neuro: Neurovascularly intact w/ Sensory/Motor intact UE/LE Bilaterally. Uses EPC cuffs at home         No Known Allergies  Prior to Visit Medications    Medication Sig Taking?  Authorizing Provider   levothyroxine (SYNTHROID) 50 MCG tablet Take 1 tablet by mouth daily Yes Elijah Trimble MD   furosemide (LASIX) 20 MG tablet Take 1 tablet by mouth daily Yes Elijah Trimble MD   baclofen (LIORESAL) 10 MG tablet Take 1 tablet by mouth 3 times daily Yes Elijah Trimble MD   potassium chloride (KLOR-CON M) 20 MEQ extended release tablet Take 1 tablet by mouth daily Yes Elijah Trimble MD   levETIRAcetam (KEPPRA) 750 MG tablet TAKE 1 TABLET BY MOUTH TWICE DAILY Yes Elijah Trimble MD   ARIPiprazole (ABILIFY) 2 MG tablet TAKE 1 TABLET BY MOUTH DAILY Yes Elijah Trimble MD   traMADol (ULTRAM) 50 MG tablet TAKE 1 TABLET BY MOUTH TWICE DAILY AS NEEDED FOR PAIN Yes Elijah Trimble MD   glycerin 2 g suppository PLACE 1 SUPPOSITORY RECTALLY DAILY AS NEEDED FOR CONSTIPATION Yes Historical Provider, MD   rivaroxaban (XARELTO) 20 MG TABS tablet Take 1 tablet by mouth daily (with breakfast) Yes Elijah Trimble MD   glycerin 2 g suppository Place 1 suppository rectally daily as needed for Constipation Yes Renard Brown,    DULoxetine (CYMBALTA) 60 MG extended release capsule Take 1 capsule by mouth daily Yes Elijah Trimble MD   Handicap Placard MISC by Does not apply route Exp 5 yrs Yes Arun Conteh,    aspirin 81 MG chewable tablet Take 81 mg by mouth Yes Historical Provider, MD   Multiple Vitamin (MULTI VITAMIN PO) Take by mouth  Yes Historical Provider, MD Cook (Including outside providers/suppliers regularly involved in providing care):   Patient Care Team:  Elijah Trimble MD as PCP - General (Family Medicine)  Elijah Trimble MD as PCP - [facility] Empaneled Provider     Reviewed and updated this visit:  Tobacco  Allergies  Meds  Problems  Med Hx  Surg Hx  Soc Hx  Fam Hx

## 2022-07-12 RX ORDER — LEVETIRACETAM 750 MG/1
750 TABLET ORAL 2 TIMES DAILY
Qty: 180 TABLET | Refills: 0 | Status: SHIPPED | OUTPATIENT
Start: 2022-07-12 | End: 2022-10-10 | Stop reason: SDUPTHER

## 2022-07-27 DIAGNOSIS — G89.29 CHRONIC RIGHT SHOULDER PAIN: ICD-10-CM

## 2022-07-27 DIAGNOSIS — M25.511 CHRONIC RIGHT SHOULDER PAIN: ICD-10-CM

## 2022-07-28 RX ORDER — TRAMADOL HYDROCHLORIDE 50 MG/1
TABLET ORAL
Qty: 60 TABLET | Refills: 2 | Status: SHIPPED | OUTPATIENT
Start: 2022-07-28 | End: 2022-11-01 | Stop reason: SDUPTHER

## 2022-08-02 RX ORDER — BACLOFEN 10 MG/1
10 TABLET ORAL 3 TIMES DAILY
Qty: 120 TABLET | Refills: 0 | Status: SHIPPED | OUTPATIENT
Start: 2022-08-02 | End: 2022-10-02 | Stop reason: SDUPTHER

## 2022-08-15 DIAGNOSIS — I82.532 CHRONIC DEEP VEIN THROMBOSIS (DVT) OF LEFT POPLITEAL VEIN (HCC): ICD-10-CM

## 2022-08-28 DIAGNOSIS — R60.0 BILATERAL LEG EDEMA: ICD-10-CM

## 2022-08-29 RX ORDER — POTASSIUM CHLORIDE 20 MEQ/1
20 TABLET, EXTENDED RELEASE ORAL DAILY
Qty: 30 TABLET | Refills: 2 | Status: SHIPPED | OUTPATIENT
Start: 2022-08-29

## 2022-10-03 RX ORDER — BACLOFEN 10 MG/1
10 TABLET ORAL 3 TIMES DAILY
Qty: 120 TABLET | Refills: 0 | Status: SHIPPED | OUTPATIENT
Start: 2022-10-03

## 2022-10-03 NOTE — TELEPHONE ENCOUNTER
Future Appointments    This patient does not currently have any appointments scheduled. Past Visits    Date Provider Specialty Visit Type Primary Dx   06/20/2022 Mona Venegas MD Family Medicine Office Visit Medicare annual wellness visit, subsequent   01/31/2022 Mona Venegas MD Family Medicine Office Visit Right foot drop   11/12/2021 Mona Venegas MD Family Medicine Office Visit Hypothyroidism, unspecified type   08/17/2021 Mona Venegas MD Family Medicine Office Visit Screening PSA (prostate specific antigen)   12/30/2020 Mona Venegas MD Family Medicine Office Visit Routine general medical examination at a [facility]    [facility] Department does not match this Encounter Department:  Login Department: Lilia Nyhan PCP  Encounter Department: Booker aGllego  Encounter Provider: Albino Rebolledo     This information is used for printing, e-prescribing and last encounter SmartLinks. To modify the encounter department or provider, click the Change Encounter Details link below.   Change Encounter Details  Change Encounter Details

## 2022-10-10 DIAGNOSIS — F33.9 RECURRENT DEPRESSION (HCC): ICD-10-CM

## 2022-10-11 RX ORDER — LEVETIRACETAM 750 MG/1
750 TABLET ORAL 2 TIMES DAILY
Qty: 180 TABLET | Refills: 0 | Status: SHIPPED | OUTPATIENT
Start: 2022-10-11

## 2022-10-11 RX ORDER — ARIPIPRAZOLE 2 MG/1
2 TABLET ORAL DAILY
Qty: 30 TABLET | Refills: 5 | Status: SHIPPED | OUTPATIENT
Start: 2022-10-11

## 2022-10-11 NOTE — TELEPHONE ENCOUNTER
Class: Normal    Non-formulary    Last ordered: 3 months ago by Keli Herrera MD        To be filled at: [facility] [address] [facility], [address] [phone]          Medication Refill  (Newest Message First)  Sanaz Sánchez  Patient Medication Renewal Request Pool 23 hours ago (9:00 AM)     Adriel Munguia have been requested for the following medications:         ARIPiprazole (ABILIFY) 2 MG tablet [Dr. Keli Herrera MD]         levETIRAcetam (KEPPRA) 750 MG tablet [Dr. Keli Herrera MD]     Preferred pharmacy: [email or web address] [place], [address]        Outpatient Morphine Milligram Equivalents Per Day  Expand All  Collapse All  10/11/22 - 10/26/22  Unknown                        Calculation Information             10/27/22 and after  None  Future Appointments    This patient does not currently have any appointments scheduled.   Past Visits    Date Provider Specialty Visit Type Primary Dx   06/20/2022 Keli Herrera MD Family Medicine Office Visit Medicare annual wellness visit, subsequent   01/31/2022 Keli Herrera MD Family Medicine Office Visit Right foot drop   11/12/2021 Keli Herrera MD Family Medicine Office Visit Hypothyroidism, unspecified type   08/17/2021 Keli Herrera MD Family Medicine Office Visit Screening PSA (prostate specific antigen)   12/30/2020 Keli Herrera MD Family Medicine Office Visit Routine general medical examination at a health care facility

## 2022-10-31 ENCOUNTER — PATIENT MESSAGE (OUTPATIENT)
Dept: FAMILY MEDICINE CLINIC | Age: 67
End: 2022-10-31

## 2022-10-31 DIAGNOSIS — G89.29 CHRONIC RIGHT SHOULDER PAIN: ICD-10-CM

## 2022-10-31 DIAGNOSIS — F33.9 RECURRENT DEPRESSION (HCC): ICD-10-CM

## 2022-10-31 DIAGNOSIS — M25.511 CHRONIC RIGHT SHOULDER PAIN: ICD-10-CM

## 2022-10-31 RX ORDER — DULOXETIN HYDROCHLORIDE 60 MG/1
60 CAPSULE, DELAYED RELEASE ORAL DAILY
Qty: 90 CAPSULE | Refills: 0 | Status: SHIPPED | OUTPATIENT
Start: 2022-10-31

## 2022-10-31 NOTE — TELEPHONE ENCOUNTER
From: Leonor Chery  To: Dr. Willard Link: 10/31/2022 12:22 PM EDT  Subject: Tramadol    I need to re-order Tramadol HCL 50 mg for Leonor Chery, [birth year]. This med does not appear on his list of meds, even though he has been taking it regularly.  Please advise, thanks,  Draper Red

## 2022-11-01 RX ORDER — TRAMADOL HYDROCHLORIDE 50 MG/1
50 TABLET ORAL EVERY 8 HOURS PRN
Qty: 60 TABLET | Refills: 2 | Status: SHIPPED | OUTPATIENT
Start: 2022-11-01 | End: 2023-01-30

## 2022-12-05 ENCOUNTER — OFFICE VISIT (OUTPATIENT)
Dept: FAMILY MEDICINE CLINIC | Age: 67
End: 2022-12-05
Payer: MEDICARE

## 2022-12-05 VITALS
HEART RATE: 93 BPM | SYSTOLIC BLOOD PRESSURE: 124 MMHG | BODY MASS INDEX: 27.46 KG/M2 | TEMPERATURE: 97.7 F | DIASTOLIC BLOOD PRESSURE: 84 MMHG | HEIGHT: 68 IN | OXYGEN SATURATION: 99 %

## 2022-12-05 DIAGNOSIS — Z12.5 SCREENING PSA (PROSTATE SPECIFIC ANTIGEN): Primary | ICD-10-CM

## 2022-12-05 DIAGNOSIS — E03.9 HYPOTHYROIDISM, UNSPECIFIED TYPE: ICD-10-CM

## 2022-12-05 DIAGNOSIS — R60.0 BILATERAL LEG EDEMA: ICD-10-CM

## 2022-12-05 DIAGNOSIS — R73.9 HYPERGLYCEMIA: ICD-10-CM

## 2022-12-05 DIAGNOSIS — I82.532 CHRONIC DEEP VEIN THROMBOSIS (DVT) OF LEFT POPLITEAL VEIN (HCC): ICD-10-CM

## 2022-12-05 DIAGNOSIS — M21.371 RIGHT FOOT DROP: ICD-10-CM

## 2022-12-05 DIAGNOSIS — F43.22 ADJUSTMENT DISORDER WITH ANXIOUS MOOD: ICD-10-CM

## 2022-12-05 DIAGNOSIS — F33.9 RECURRENT DEPRESSION (HCC): ICD-10-CM

## 2022-12-05 DIAGNOSIS — I69.359 HEMIPLEGIA AFFECTING DOMINANT SIDE, POST-STROKE (HCC): ICD-10-CM

## 2022-12-05 DIAGNOSIS — I63.032 CEREBROVASCULAR ACCIDENT (CVA) DUE TO THROMBOSIS OF LEFT CAROTID ARTERY (HCC): ICD-10-CM

## 2022-12-05 DIAGNOSIS — R56.9 SEIZURE (HCC): ICD-10-CM

## 2022-12-05 PROCEDURE — 3017F COLORECTAL CA SCREEN DOC REV: CPT | Performed by: FAMILY MEDICINE

## 2022-12-05 PROCEDURE — G8417 CALC BMI ABV UP PARAM F/U: HCPCS | Performed by: FAMILY MEDICINE

## 2022-12-05 PROCEDURE — G8484 FLU IMMUNIZE NO ADMIN: HCPCS | Performed by: FAMILY MEDICINE

## 2022-12-05 PROCEDURE — 1123F ACP DISCUSS/DSCN MKR DOCD: CPT | Performed by: FAMILY MEDICINE

## 2022-12-05 PROCEDURE — 1036F TOBACCO NON-USER: CPT | Performed by: FAMILY MEDICINE

## 2022-12-05 PROCEDURE — G8427 DOCREV CUR MEDS BY ELIG CLIN: HCPCS | Performed by: FAMILY MEDICINE

## 2022-12-05 PROCEDURE — 99214 OFFICE O/P EST MOD 30 MIN: CPT | Performed by: FAMILY MEDICINE

## 2022-12-05 PROCEDURE — 3078F DIAST BP <80 MM HG: CPT | Performed by: FAMILY MEDICINE

## 2022-12-05 PROCEDURE — 3074F SYST BP LT 130 MM HG: CPT | Performed by: FAMILY MEDICINE

## 2022-12-05 RX ORDER — POTASSIUM CHLORIDE 20 MEQ/1
20 TABLET, EXTENDED RELEASE ORAL DAILY
Qty: 30 TABLET | Refills: 2 | Status: SHIPPED | OUTPATIENT
Start: 2022-12-05

## 2022-12-05 RX ORDER — BACLOFEN 10 MG/1
10 TABLET ORAL 3 TIMES DAILY
Qty: 120 TABLET | Refills: 0 | Status: SHIPPED | OUTPATIENT
Start: 2022-12-05

## 2022-12-05 RX ORDER — LEVOTHYROXINE SODIUM 0.05 MG/1
50 TABLET ORAL DAILY
Qty: 30 TABLET | Refills: 5 | Status: SHIPPED | OUTPATIENT
Start: 2022-12-05

## 2022-12-05 RX ORDER — ALPRAZOLAM 0.5 MG/1
0.5 TABLET ORAL 3 TIMES DAILY PRN
Qty: 15 TABLET | Refills: 0 | Status: SHIPPED | OUTPATIENT
Start: 2022-12-05 | End: 2022-12-10

## 2022-12-05 ASSESSMENT — ENCOUNTER SYMPTOMS
GASTROINTESTINAL NEGATIVE: 1
BLURRED VISION: 0
SHORTNESS OF BREATH: 0
ALLERGIC/IMMUNOLOGIC NEGATIVE: 1
ORTHOPNEA: 0
RESPIRATORY NEGATIVE: 1
EYES NEGATIVE: 1

## 2022-12-05 NOTE — PROGRESS NOTES
On the basis of positive falls risk screening, assessment and plan is as follows: home safety tips provided. Brother (caregiver) is PT    Subjective  Lord Escalera, 79 y.o. male presents today with:  Chief Complaint   Patient presents with    Hypothyroidism     Check up     Checkup/follow up chronic issues  Here with brother     Has right foot drop    Unfortunately need to put his cat down today  Upset  Brother would like something to help calm him down    Wt Readings from Last 3 Encounters:   06/20/22 180 lb 9.6 oz (81.9 kg)   01/31/22 177 lb 3.2 oz (80.4 kg)   12/24/21 174 lb 6.4 oz (79.1 kg)         Patient Active Problem List   Diagnosis    History of CVA (cerebrovascular accident)    Essential hypertension    Recurrent depression ([address]    Seizure ([address]    S/P patent foramen ovale closure    Hyperlipidemia    Chronic right shoulder pain    Hypoxemia requiring supplemental oxygen    Hemiparesis affecting dominant side as late effect of cerebrovascular accident (CVA) ([address]    Closed TBI (traumatic brain injury)    Chronic pain syndrome    Hypothyroidism    Chronic deep vein thrombosis (DVT) of left popliteal vein (HCC)    Generalized weakness    Cognitive deficit due to old head injury    DJD (degenerative joint disease), lumbar    DJD (degenerative joint disease), cervical    Bowel and bladder incontinence    Aphasia    Impaired mobility and activities of daily living dt late effects of CVA    Abnormality of gait and mobility    Blood thinned due to long-term anticoagulant use-Xaralto    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  The current episode started more than 1 year ago. The problem is unchanged. The problem is controlled. Associated symptoms include malaise/fatigue. Pertinent negatives include no anxiety, blurred vision, chest pain, orthopnea, palpitations, peripheral edema, PND, shortness of breath or sweats. Agents associated with hypertension include NSAIDs. Risk factors for coronary artery disease include dyslipidemia, male gender and sedentary lifestyle. Past treatments include lifestyle changes. The current treatment provides significant improvement. There are no compliance problems. Hypertensive end-organ damage includes CVA. Identifiable causes of hypertension include a hypertension causing med. There is no history of sleep apnea or a thyroid problem. Hyperlipidemia  This is a chronic problem. The current episode started more than 1 year ago. The problem is controlled. Recent lipid tests were reviewed and are low. Pertinent negatives include no chest pain or shortness of breath. Current antihyperlipidemic treatment includes diet change. The current treatment provides significant improvement of lipids. Compliance problems include adherence to exercise. Review of Systems   Constitutional:  Positive for malaise/fatigue. HENT:          Left ear fullness   Eyes: Negative. Negative for blurred vision. Respiratory: Negative. Negative for shortness of breath. Cardiovascular: Negative. Negative for chest pain, palpitations, orthopnea and PND. Gastrointestinal: Negative. Endocrine: Negative. Genitourinary: Negative. Musculoskeletal:  Positive for arthralgias and gait problem. Skin: Negative. Allergic/Immunologic: Negative. Neurological:  Positive for weakness. Hematological: Negative. Psychiatric/Behavioral: Negative.          Past Medical History:   Diagnosis Date    Chronic right shoulder pain 10/4/2017    Closed TBI (traumatic brain injury) 12/28/2012    Essential hypertension 8/2/2017    Hemiparesis affecting dominant side as late effect of cerebrovascular accident (CVA) ([facility] 1/30/2018    History of CVA (cerebrovascular accident) 8/2/2017    Hyperlipidemia 8/2/2017    Hypoxemia requiring supplemental oxygen 10/4/2017    Recurrent depression ([facility] 8/2/2017    S/P patent foramen ovale closure 8/2/2017    Seizure ([facility] 8/2/2017     History reviewed. No pertinent surgical history.   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 permission from brother via phone                 Social Determinants of Health     Financial Resource Strain: Low Risk     Difficulty of Paying Living Expenses: Not hard at all   Food Insecurity: No Food Insecurity    Worried About Running Out of Food in the Last Year: Never true    [address] in the Last Year: Never true   Transportation Needs: Not on file   Physical Activity: Inactive    Days of Exercise per Week: 0 days    Minutes of Exercise per Session: 0 min   Stress: Not on file   Social Connections: Not on file   Intimate Partner Violence: Not on file   Housing Stability: Not on file     History reviewed. No pertinent family history. No Known Allergies  Current Outpatient Medications on File Prior to Visit   Medication Sig Dispense Refill    traMADol (ULTRAM) 50 MG tablet Take 1 tablet by mouth every 8 hours as needed for Pain for up to 90 days. 60 tablet 2    DULoxetine (CYMBALTA) 60 MG extended release capsule Take 1 capsule by mouth daily 90 capsule 0    ARIPiprazole (ABILIFY) 2 MG tablet Take 1 tablet by mouth daily 30 tablet 5    levETIRAcetam (KEPPRA) 750 MG tablet Take 1 tablet by mouth 2 times daily 180 tablet 0    rivaroxaban (XARELTO) 20 MG TABS tablet Take 1 tablet by mouth daily (with breakfast) 30 tablet 5    furosemide (LASIX) 20 MG tablet Take 1 tablet by mouth daily 30 tablet 5    glycerin 2 g suppository PLACE 1 SUPPOSITORY RECTALLY DAILY AS NEEDED FOR CONSTIPATION      glycerin 2 g suppository Place 1 suppository rectally daily as needed for Constipation 1 suppository 0    Handicap Placard MISC by Does not apply route Exp 5 yrs 1 each 0    aspirin 81 MG chewable tablet Take 81 mg by mouth      Multiple Vitamin (MULTI VITAMIN PO) Take by mouth        No current facility-administered medications on file prior to visit. Objective    Vitals:    12/05/22 1136   BP: 124/84   Site: Left Upper Arm   Pulse: 93   Temp: 97.7 °F (36.5 °C)   SpO2: 99%   Height: 5' 8\" (1.727 m)     Physical Exam  Constitutional:       Appearance: Normal appearance. He is well-developed. HENT:      Head: Normocephalic and atraumatic. Right Ear: Tympanic membrane, ear canal and external ear normal. No middle ear effusion. Tympanic membrane is not injected. Left Ear: External ear normal.      Nose: Nose normal. No mucosal edema or rhinorrhea.       Right Sinus: No maxillary sinus tenderness or frontal sinus antigen)  PSA Screening      2. Hypothyroidism, unspecified type  levothyroxine (SYNTHROID) 50 MCG tablet    TSH      3. Bilateral leg edema  potassium chloride (KLOR-CON M) 20 MEQ extended release tablet    Comprehensive Metabolic Panel    CBC with Auto Differential      4. Adjustment disorder with anxious mood  ALPRAZolam (XANAX) 0.5 MG tablet      5. Cerebrovascular accident (CVA) due to thrombosis of left carotid artery ([address]        6. Chronic deep vein thrombosis (DVT) of left popliteal vein (HCC)        7. Recurrent depression ([address]        8. Hyperglycemia        9. Right foot drop        10. Hemiplegia affecting dominant side, post-stroke ([address]        11.  Seizure ([address]          Continue current regimen    Xanax prn     See orders        Remains fully disabled and unable to work  This will be lifetime condition that will not improve       Ryanne Verma MD

## 2022-12-11 DIAGNOSIS — R60.0 BILATERAL LEG EDEMA: ICD-10-CM

## 2022-12-12 RX ORDER — FUROSEMIDE 20 MG/1
20 TABLET ORAL DAILY
Qty: 30 TABLET | Refills: 5 | Status: SHIPPED | OUTPATIENT
Start: 2022-12-12

## 2023-02-05 DIAGNOSIS — F33.9 RECURRENT DEPRESSION (HCC): ICD-10-CM

## 2023-02-05 DIAGNOSIS — M25.511 CHRONIC RIGHT SHOULDER PAIN: ICD-10-CM

## 2023-02-05 DIAGNOSIS — G89.29 CHRONIC RIGHT SHOULDER PAIN: ICD-10-CM

## 2023-02-06 RX ORDER — TRAMADOL HYDROCHLORIDE 50 MG/1
TABLET ORAL
Qty: 60 TABLET | Refills: 0 | Status: SHIPPED | OUTPATIENT
Start: 2023-02-06 | End: 2023-03-08

## 2023-02-06 RX ORDER — DULOXETIN HYDROCHLORIDE 60 MG/1
60 CAPSULE, DELAYED RELEASE ORAL DAILY
Qty: 90 CAPSULE | Refills: 0 | Status: SHIPPED | OUTPATIENT
Start: 2023-02-06

## 2023-02-06 RX ORDER — BACLOFEN 10 MG/1
10 TABLET ORAL 3 TIMES DAILY
Qty: 120 TABLET | Refills: 0 | Status: SHIPPED | OUTPATIENT
Start: 2023-02-06

## 2023-02-06 NOTE — TELEPHONE ENCOUNTER
requesting medication refill. Please approve or deny this request.    Rx requested:  Requested Prescriptions     Pending Prescriptions Disp Refills    baclofen (LIORESAL) 10 MG tablet 120 tablet 0     Sig: Take 1 tablet by mouth 3 times daily         Last Office Visit:   12/5/2022      Next Visit Date:  No future appointments.

## 2023-02-06 NOTE — TELEPHONE ENCOUNTER
requesting medication refill. Please approve or deny this request.    Rx requested:  Requested Prescriptions     Pending Prescriptions Disp Refills    DULoxetine (CYMBALTA) 60 MG extended release capsule [Pharmacy Med Name: duloxetine 60 mg capsule,delayed release] 90 capsule 0     Sig: Take 1 capsule by mouth daily    traMADol (ULTRAM) 50 MG tablet [Pharmacy Med Name: tramadol 50 mg tablet] 60 tablet 0     Sig: TAKE 1 TABLET BY MOUTH EVERY 8 HOURS AS NEEDED FOR PAIN         Last Office Visit:   12/5/2022      Next Visit Date:  No future appointments.   [employer] message sent

## 2023-02-13 DIAGNOSIS — I82.532 CHRONIC DEEP VEIN THROMBOSIS (DVT) OF LEFT POPLITEAL VEIN (HCC): ICD-10-CM

## 2023-02-13 NOTE — TELEPHONE ENCOUNTER
Comments:     Last Office Visit (last PCP visit):   12/5/2022    Next Visit Date:  No future appointments. **If hasn't been seen in over a year OR hasn't followed up according to last diabetes/ADHD visit, make appointment for patient before sending refill to provider.     Rx requested:  Requested Prescriptions     Pending Prescriptions Disp Refills    rivaroxaban (XARELTO) 20 MG TABS tablet 30 tablet 5     Sig: Take 1 tablet by mouth daily (with breakfast)
Eyes:  No visual changes, eye pain or discharge.  ENMT:  No hearing changes, pain, discharge or infections. No neck pain or stiffness.  Cardiac:  No chest pain, SOB or edema. No chest pain with exertion.  Respiratory:  No cough or respiratory distress. No hemoptysis. No history of asthma or RAD.  GI:  +nausea, vomiting. no melena/dark stools.   :  No dysuria, frequency or burning.  MS:  No myalgia, muscle weakness, +flank pain.   Neuro:  No headache or weakness.  No LOC.  Skin:  No skin rash.   Endocrine: No history of thyroid disease or diabetes.

## 2023-03-05 DIAGNOSIS — E03.9 HYPOTHYROIDISM, UNSPECIFIED TYPE: ICD-10-CM

## 2023-03-05 DIAGNOSIS — R60.0 BILATERAL LEG EDEMA: ICD-10-CM

## 2023-03-05 DIAGNOSIS — M25.511 CHRONIC RIGHT SHOULDER PAIN: ICD-10-CM

## 2023-03-05 DIAGNOSIS — G89.29 CHRONIC RIGHT SHOULDER PAIN: ICD-10-CM

## 2023-03-05 NOTE — TELEPHONE ENCOUNTER
Comments: [employer] message sent for f/u soon    Last Office Visit (last PCP visit):   12/5/2022    Next Visit Date:  No future appointments. **If hasn't been seen in over a year OR hasn't followed up according to last diabetes/ADHD visit, make appointment for patient before sending refill to provider.     Rx requested:  Requested Prescriptions     Pending Prescriptions Disp Refills    levothyroxine (SYNTHROID) 50 MCG tablet 30 tablet 5     Sig: Take 1 tablet by mouth daily    potassium chloride (KLOR-CON M) 20 MEQ extended release tablet 30 tablet 2     Sig: Take 1 tablet by mouth daily    traMADol (ULTRAM) 50 MG tablet 60 tablet 0

## 2023-03-05 NOTE — TELEPHONE ENCOUNTER
Family sent [employer] message back and talked to pt brother asking if they wanted to do a VV and he states that the office is well aware of saul's condition.

## 2023-03-06 RX ORDER — TRAMADOL HYDROCHLORIDE 50 MG/1
50 TABLET ORAL 2 TIMES DAILY
Qty: 60 TABLET | Refills: 0 | Status: SHIPPED | OUTPATIENT
Start: 2023-03-06 | End: 2023-04-05

## 2023-03-06 RX ORDER — POTASSIUM CHLORIDE 20 MEQ/1
20 TABLET, EXTENDED RELEASE ORAL DAILY
Qty: 30 TABLET | Refills: 2 | Status: SHIPPED | OUTPATIENT
Start: 2023-03-06

## 2023-03-06 RX ORDER — LEVOTHYROXINE SODIUM 0.05 MG/1
50 TABLET ORAL DAILY
Qty: 30 TABLET | Refills: 5 | Status: SHIPPED | OUTPATIENT
Start: 2023-03-06

## 2023-03-23 ENCOUNTER — TELEMEDICINE (OUTPATIENT)
Dept: FAMILY MEDICINE CLINIC | Age: 68
End: 2023-03-23

## 2023-03-23 DIAGNOSIS — S06.9X9S CLOSED TRAUMATIC BRAIN INJURY WITH LOSS OF CONSCIOUSNESS, SEQUELA (HCC): Primary | ICD-10-CM

## 2023-03-23 DIAGNOSIS — G89.4 CHRONIC PAIN SYNDROME: ICD-10-CM

## 2023-03-23 DIAGNOSIS — I82.532 CHRONIC DEEP VEIN THROMBOSIS (DVT) OF LEFT POPLITEAL VEIN (HCC): ICD-10-CM

## 2023-03-23 DIAGNOSIS — F33.9 RECURRENT DEPRESSION (HCC): ICD-10-CM

## 2023-03-23 DIAGNOSIS — R56.9 SEIZURE (HCC): ICD-10-CM

## 2023-03-23 DIAGNOSIS — I69.359 HEMIPLEGIA AFFECTING DOMINANT SIDE, POST-STROKE (HCC): ICD-10-CM

## 2023-03-23 RX ORDER — PREGABALIN 50 MG/1
50 CAPSULE ORAL 3 TIMES DAILY
Qty: 90 CAPSULE | Refills: 5 | Status: SHIPPED | OUTPATIENT
Start: 2023-03-23 | End: 2023-09-19

## 2023-03-23 SDOH — ECONOMIC STABILITY: INCOME INSECURITY: HOW HARD IS IT FOR YOU TO PAY FOR THE VERY BASICS LIKE FOOD, HOUSING, MEDICAL CARE, AND HEATING?: NOT HARD AT ALL

## 2023-03-23 SDOH — ECONOMIC STABILITY: FOOD INSECURITY: WITHIN THE PAST 12 MONTHS, THE FOOD YOU BOUGHT JUST DIDN'T LAST AND YOU DIDN'T HAVE MONEY TO GET MORE.: NEVER TRUE

## 2023-03-23 SDOH — ECONOMIC STABILITY: HOUSING INSECURITY
IN THE LAST 12 MONTHS, WAS THERE A TIME WHEN YOU DID NOT HAVE A STEADY PLACE TO SLEEP OR SLEPT IN A SHELTER (INCLUDING NOW)?: NO

## 2023-03-23 SDOH — ECONOMIC STABILITY: FOOD INSECURITY: WITHIN THE PAST 12 MONTHS, YOU WORRIED THAT YOUR FOOD WOULD RUN OUT BEFORE YOU GOT MONEY TO BUY MORE.: NEVER TRUE

## 2023-03-23 ASSESSMENT — PATIENT HEALTH QUESTIONNAIRE - PHQ9
10. IF YOU CHECKED OFF ANY PROBLEMS, HOW DIFFICULT HAVE THESE PROBLEMS MADE IT FOR YOU TO DO YOUR WORK, TAKE CARE OF THINGS AT HOME, OR GET ALONG WITH OTHER PEOPLE: 1
8. MOVING OR SPEAKING SO SLOWLY THAT OTHER PEOPLE COULD HAVE NOTICED. OR THE OPPOSITE, BEING SO FIGETY OR RESTLESS THAT YOU HAVE BEEN MOVING AROUND A LOT MORE THAN USUAL: 0
2. FEELING DOWN, DEPRESSED OR HOPELESS: 0
SUM OF ALL RESPONSES TO PHQ9 QUESTIONS 1 & 2: 0
SUM OF ALL RESPONSES TO PHQ QUESTIONS 1-9: 4
4. FEELING TIRED OR HAVING LITTLE ENERGY: 2
6. FEELING BAD ABOUT YOURSELF - OR THAT YOU ARE A FAILURE OR HAVE LET YOURSELF OR YOUR FAMILY DOWN: 0
5. POOR APPETITE OR OVEREATING: 0
7. TROUBLE CONCENTRATING ON THINGS, SUCH AS READING THE NEWSPAPER OR WATCHING TELEVISION: 0
SUM OF ALL RESPONSES TO PHQ QUESTIONS 1-9: 4
3. TROUBLE FALLING OR STAYING ASLEEP: 2
9. THOUGHTS THAT YOU WOULD BE BETTER OFF DEAD, OR OF HURTING YOURSELF: 0
SUM OF ALL RESPONSES TO PHQ QUESTIONS 1-9: 4
1. LITTLE INTEREST OR PLEASURE IN DOING THINGS: 0
SUM OF ALL RESPONSES TO PHQ QUESTIONS 1-9: 4

## 2023-03-23 NOTE — PROGRESS NOTES
sequela ([facility]        2. Hemiplegia affecting dominant side, post-stroke ([address]        3. Recurrent depression ([address]        4. Chronic deep vein thrombosis (DVT) of left popliteal vein (HCC)        5. Seizure ([address]        6. Chronic pain syndrome  pregabalin (LYRICA) 50 MG capsule        Restart lyrica 50 TID    Continue other meds as previously     Total Time: minutes: 11-20 minutes    Brina Berry was evaluated through a synchronous (real-time) audio encounter. Patient identification was verified at the start of the visit. He (or guardian if applicable) is aware that this is a billable service, which includes applicable co-pays. This visit was conducted with the patient's (and/or legal guardian's) verbal consent. He has not had a related appointment within my department in the past 7 days or scheduled within the next 24 hours. The patient was located at Home: [address] [place] [address]. The provider was located at [facility] [address] [place] [address], [address] [place] [address], [address] [place] [address],[address]  [address]     Note: not billable if this call serves to triage the patient into an appointment for the relevant concern    Heidi Villanueva MD

## 2023-04-23 DIAGNOSIS — F33.9 RECURRENT DEPRESSION (HCC): ICD-10-CM

## 2023-04-24 RX ORDER — ARIPIPRAZOLE 2 MG/1
2 TABLET ORAL DAILY
Qty: 30 TABLET | Refills: 3 | Status: SHIPPED | OUTPATIENT
Start: 2023-04-24

## 2023-04-24 NOTE — TELEPHONE ENCOUNTER
Future Appointments    This patient does not currently have any appointments scheduled.   Past Visits    Date Provider Specialty Visit Type Primary Dx   03/23/2023 Steevn Amor MD Family Medicine Telemedicine Closed traumatic brain injury with loss of consciousness, sequela ([facility]

## 2023-05-08 DIAGNOSIS — F33.9 RECURRENT DEPRESSION (HCC): ICD-10-CM

## 2023-05-08 RX ORDER — DULOXETIN HYDROCHLORIDE 60 MG/1
60 CAPSULE, DELAYED RELEASE ORAL DAILY
Qty: 90 CAPSULE | Refills: 0 | Status: SHIPPED | OUTPATIENT
Start: 2023-05-08

## 2023-05-08 NOTE — TELEPHONE ENCOUNTER
Comments:     Last Office Visit (last PCP visit):   3/23/2023    Next Visit Date:  Future Appointments   Date Time Provider Radha Doll   6/26/2023  1:00 PM Lisseth King MD [facility] [place]       **If hasn't been seen in over a year OR hasn't followed up according to last diabetes/ADHD visit, make appointment for patient before sending refill to provider.     Rx requested:  Requested Prescriptions     Pending Prescriptions Disp Refills    DULoxetine (CYMBALTA) 60 MG extended release capsule 90 capsule 0     Sig: Take 1 capsule by mouth daily

## 2023-05-14 ENCOUNTER — PATIENT MESSAGE (OUTPATIENT)
Dept: FAMILY MEDICINE CLINIC | Age: 68
End: 2023-05-14

## 2023-05-14 DIAGNOSIS — G89.29 CHRONIC RIGHT SHOULDER PAIN: ICD-10-CM

## 2023-05-14 DIAGNOSIS — M25.511 CHRONIC RIGHT SHOULDER PAIN: ICD-10-CM

## 2023-05-15 RX ORDER — TRAMADOL HYDROCHLORIDE 50 MG/1
50 TABLET ORAL 2 TIMES DAILY
Qty: 60 TABLET | Refills: 0 | Status: SHIPPED | OUTPATIENT
Start: 2023-05-15 | End: 2023-06-14

## 2023-07-03 ENCOUNTER — OFFICE VISIT (OUTPATIENT)
Dept: FAMILY MEDICINE CLINIC | Age: 68
End: 2023-07-03
Payer: MEDICARE

## 2023-07-03 VITALS
DIASTOLIC BLOOD PRESSURE: 70 MMHG | HEIGHT: 68 IN | SYSTOLIC BLOOD PRESSURE: 132 MMHG | OXYGEN SATURATION: 96 % | HEART RATE: 65 BPM | BODY MASS INDEX: 27.46 KG/M2 | TEMPERATURE: 96.9 F

## 2023-07-03 DIAGNOSIS — M25.511 CHRONIC RIGHT SHOULDER PAIN: ICD-10-CM

## 2023-07-03 DIAGNOSIS — F33.9 RECURRENT DEPRESSION (HCC): ICD-10-CM

## 2023-07-03 DIAGNOSIS — G89.29 CHRONIC RIGHT SHOULDER PAIN: ICD-10-CM

## 2023-07-03 DIAGNOSIS — R56.9 SEIZURE (HCC): ICD-10-CM

## 2023-07-03 DIAGNOSIS — Z12.5 SCREENING PSA (PROSTATE SPECIFIC ANTIGEN): ICD-10-CM

## 2023-07-03 DIAGNOSIS — E03.9 HYPOTHYROIDISM, UNSPECIFIED TYPE: ICD-10-CM

## 2023-07-03 DIAGNOSIS — Z00.00 MEDICARE ANNUAL WELLNESS VISIT, SUBSEQUENT: Primary | ICD-10-CM

## 2023-07-03 DIAGNOSIS — F11.20 OPIOID DEPENDENCE WITH CURRENT USE (HCC): ICD-10-CM

## 2023-07-03 DIAGNOSIS — R60.0 BILATERAL LEG EDEMA: ICD-10-CM

## 2023-07-03 DIAGNOSIS — I82.532 CHRONIC DEEP VEIN THROMBOSIS (DVT) OF LEFT POPLITEAL VEIN (HCC): ICD-10-CM

## 2023-07-03 DIAGNOSIS — I69.359 HEMIPLEGIA AFFECTING DOMINANT SIDE, POST-STROKE (HCC): ICD-10-CM

## 2023-07-03 DIAGNOSIS — S06.9X9S CLOSED TRAUMATIC BRAIN INJURY WITH LOSS OF CONSCIOUSNESS, SEQUELA (HCC): ICD-10-CM

## 2023-07-03 DIAGNOSIS — G89.4 CHRONIC PAIN SYNDROME: ICD-10-CM

## 2023-07-03 LAB
ALBUMIN SERPL-MCNC: 4.7 G/DL (ref 3.5–4.6)
ALP SERPL-CCNC: 102 U/L (ref 35–104)
ALT SERPL-CCNC: 17 U/L (ref 0–41)
ANION GAP SERPL CALCULATED.3IONS-SCNC: 14 MEQ/L (ref 9–15)
AST SERPL-CCNC: 32 U/L (ref 0–40)
BASOPHILS # BLD: 0 K/UL (ref 0–0.2)
BASOPHILS NFR BLD: 0.6 %
BILIRUB SERPL-MCNC: 1.3 MG/DL (ref 0.2–0.7)
BUN SERPL-MCNC: 9 MG/DL (ref 8–23)
CALCIUM SERPL-MCNC: 10 MG/DL (ref 8.5–9.9)
CHLORIDE SERPL-SCNC: 101 MEQ/L (ref 95–107)
CO2 SERPL-SCNC: 25 MEQ/L (ref 20–31)
CREAT SERPL-MCNC: 1.09 MG/DL (ref 0.7–1.2)
EOSINOPHIL # BLD: 0.2 K/UL (ref 0–0.7)
EOSINOPHIL NFR BLD: 3.5 %
ERYTHROCYTE [DISTWIDTH] IN BLOOD BY AUTOMATED COUNT: 13.8 % (ref 11.5–14.5)
GLOBULIN SER CALC-MCNC: 3 G/DL (ref 2.3–3.5)
GLUCOSE SERPL-MCNC: 93 MG/DL (ref 70–99)
HCT VFR BLD AUTO: 45.5 % (ref 42–52)
HGB BLD-MCNC: 15.8 G/DL (ref 14–18)
LYMPHOCYTES # BLD: 1.9 K/UL (ref 1–4.8)
LYMPHOCYTES NFR BLD: 28.6 %
MCH RBC QN AUTO: 31.3 PG (ref 27–31.3)
MCHC RBC AUTO-ENTMCNC: 34.8 % (ref 33–37)
MCV RBC AUTO: 89.7 FL (ref 79–92.2)
MONOCYTES # BLD: 0.4 K/UL (ref 0.2–0.8)
MONOCYTES NFR BLD: 6 %
NEUTROPHILS # BLD: 4.1 K/UL (ref 1.4–6.5)
NEUTS SEG NFR BLD: 61.3 %
PLATELET # BLD AUTO: 168 K/UL (ref 130–400)
POTASSIUM SERPL-SCNC: 4.7 MEQ/L (ref 3.4–4.9)
PROT SERPL-MCNC: 7.7 G/DL (ref 6.3–8)
PSA SERPL-MCNC: 0.95 NG/ML (ref 0–4)
RBC # BLD AUTO: 5.07 M/UL (ref 4.7–6.1)
SODIUM SERPL-SCNC: 140 MEQ/L (ref 135–144)
TSH SERPL-MCNC: 1.1 UIU/ML (ref 0.44–3.86)
WBC # BLD AUTO: 6.7 K/UL (ref 4.8–10.8)

## 2023-07-03 PROCEDURE — 3075F SYST BP GE 130 - 139MM HG: CPT | Performed by: FAMILY MEDICINE

## 2023-07-03 PROCEDURE — G0439 PPPS, SUBSEQ VISIT: HCPCS | Performed by: FAMILY MEDICINE

## 2023-07-03 PROCEDURE — 3017F COLORECTAL CA SCREEN DOC REV: CPT | Performed by: FAMILY MEDICINE

## 2023-07-03 PROCEDURE — 3078F DIAST BP <80 MM HG: CPT | Performed by: FAMILY MEDICINE

## 2023-07-03 PROCEDURE — 1123F ACP DISCUSS/DSCN MKR DOCD: CPT | Performed by: FAMILY MEDICINE

## 2023-07-03 RX ORDER — PREGABALIN 75 MG/1
75 CAPSULE ORAL 3 TIMES DAILY
Qty: 90 CAPSULE | Refills: 5 | Status: SHIPPED | OUTPATIENT
Start: 2023-07-03 | End: 2023-12-30

## 2023-07-03 ASSESSMENT — PATIENT HEALTH QUESTIONNAIRE - PHQ9
SUM OF ALL RESPONSES TO PHQ QUESTIONS 1-9: 12
SUM OF ALL RESPONSES TO PHQ QUESTIONS 1-9: 12
9. THOUGHTS THAT YOU WOULD BE BETTER OFF DEAD, OR OF HURTING YOURSELF: 0
8. MOVING OR SPEAKING SO SLOWLY THAT OTHER PEOPLE COULD HAVE NOTICED. OR THE OPPOSITE, BEING SO FIGETY OR RESTLESS THAT YOU HAVE BEEN MOVING AROUND A LOT MORE THAN USUAL: 1
4. FEELING TIRED OR HAVING LITTLE ENERGY: 2
SUM OF ALL RESPONSES TO PHQ QUESTIONS 1-9: 12
1. LITTLE INTEREST OR PLEASURE IN DOING THINGS: 0
SUM OF ALL RESPONSES TO PHQ9 QUESTIONS 1 & 2: 3
5. POOR APPETITE OR OVEREATING: 2
2. FEELING DOWN, DEPRESSED OR HOPELESS: 3
7. TROUBLE CONCENTRATING ON THINGS, SUCH AS READING THE NEWSPAPER OR WATCHING TELEVISION: 2
6. FEELING BAD ABOUT YOURSELF - OR THAT YOU ARE A FAILURE OR HAVE LET YOURSELF OR YOUR FAMILY DOWN: 0
10. IF YOU CHECKED OFF ANY PROBLEMS, HOW DIFFICULT HAVE THESE PROBLEMS MADE IT FOR YOU TO DO YOUR WORK, TAKE CARE OF THINGS AT HOME, OR GET ALONG WITH OTHER PEOPLE: 2
SUM OF ALL RESPONSES TO PHQ QUESTIONS 1-9: 12
3. TROUBLE FALLING OR STAYING ASLEEP: 2

## 2023-07-03 ASSESSMENT — LIFESTYLE VARIABLES
HOW MANY STANDARD DRINKS CONTAINING ALCOHOL DO YOU HAVE ON A TYPICAL DAY: PATIENT DOES NOT DRINK
HOW OFTEN DO YOU HAVE A DRINK CONTAINING ALCOHOL: NEVER

## 2023-07-03 NOTE — PROGRESS NOTES
Medicare Annual Wellness Visit    Lynn Black is here for Medicare AWV    Assessment & Plan   Medicare annual wellness visit, subsequent  Bilateral leg edema  Opioid dependence with current use (HCC)  Recurrent depression (HCC)  Chronic right shoulder pain  Hemiplegia affecting dominant side, post-stroke (HCC)  Chronic deep vein thrombosis (DVT) of left popliteal vein (HCC)  Seizure (HCC)  Chronic pain syndrome  -     pregabalin (LYRICA) 75 MG capsule; Take 1 capsule by mouth 3 times daily for 180 days. Max Daily Amount: 225 mg, Disp-90 capsule, R-5Normal  Closed traumatic brain injury with loss of consciousness, sequela (HCC)  Hypothyroidism, unspecified type  Recommendations for Preventive Services Due: see orders and patient instructions/AVS.  Recommended screening schedule for the next 5-10 years is provided to the patient in written form: see Patient Instructions/AVS.     No follow-ups on file. Subjective   Chief Complaint   Patient presents with    Medicare AWV       Patient's complete Health Risk Assessment and screening values have been reviewed and are found in Flowsheets. The following problems were reviewed today and where indicated follow up appointments were made and/or referrals ordered. Positive Risk Factor Screenings with Interventions:    Fall Risk:  Do you feel unsteady or are you worried about falling? : (!) yes  2 or more falls in past year?: no  Fall with injury in past year?: no     Interventions:    See AVS for additional education material  See A/P for plan and any pertinent orders    Cognitive:    Words recalled: 0 Words Recalled   Clock Drawing Test (CDT): (!) Abnormal   Total Score: (!) 0   Total Score Interpretation: Abnormal Mini-Cog      Interventions:  Known TBI patient     Depression:  PHQ-2 Score: 3  PHQ-9 Total Score: 12    Interpretation:   1-4 = minimal  5-9 = mild  10-14 = moderate  15-19 = moderately severe  20-27 = severe  Interventions:  See AVS for additional

## 2023-07-17 ENCOUNTER — PATIENT MESSAGE (OUTPATIENT)
Dept: FAMILY MEDICINE CLINIC | Age: 68
End: 2023-07-17

## 2023-07-17 DIAGNOSIS — M25.511 CHRONIC RIGHT SHOULDER PAIN: Primary | ICD-10-CM

## 2023-07-17 DIAGNOSIS — G89.29 CHRONIC RIGHT SHOULDER PAIN: Primary | ICD-10-CM

## 2023-07-17 DIAGNOSIS — E03.9 HYPOTHYROIDISM, UNSPECIFIED TYPE: ICD-10-CM

## 2023-07-17 RX ORDER — LEVOTHYROXINE SODIUM 0.05 MG/1
50 TABLET ORAL DAILY
Qty: 30 TABLET | Refills: 5 | Status: SHIPPED | OUTPATIENT
Start: 2023-07-17

## 2023-07-17 RX ORDER — LEVETIRACETAM 750 MG/1
750 TABLET ORAL 2 TIMES DAILY
Qty: 180 TABLET | Refills: 0 | Status: SHIPPED | OUTPATIENT
Start: 2023-07-17

## 2023-07-17 RX ORDER — LEVOTHYROXINE SODIUM 0.05 MG/1
50 TABLET ORAL DAILY
Qty: 30 TABLET | Refills: 5 | Status: CANCELLED | OUTPATIENT
Start: 2023-07-17

## 2023-07-17 RX ORDER — TRAMADOL HYDROCHLORIDE 50 MG/1
TABLET ORAL
Qty: 60 TABLET | Refills: 0 | Status: SHIPPED | OUTPATIENT
Start: 2023-07-17 | End: 2023-08-16

## 2023-07-17 NOTE — TELEPHONE ENCOUNTER
Refill sent to pharmacy. Keep appointment as scheduled.  Thank you    OARRs reviewed and appropriate

## 2023-07-17 NOTE — TELEPHONE ENCOUNTER
Comments: sending [facility] appt reminder  Can you please fill for Dr Keena Reyes Visit (last PCP visit):   7/3/2023    Next Visit Date:  No future appointments. **If hasn't been seen in over a year OR hasn't followed up according to last diabetes/ADHD visit, make appointment for patient before sending refill to provider.     Rx requested:  Requested Prescriptions     Pending Prescriptions Disp Refills    levETIRAcetam (KEPPRA) 750 MG tablet 180 tablet 0     Sig: Take 1 tablet by mouth 2 times daily    levothyroxine (SYNTHROID) 50 MCG tablet 30 tablet 5     Sig: Take 1 tablet by mouth daily

## 2023-07-17 NOTE — TELEPHONE ENCOUNTER
Comments: sending [employer] appt reminder for Dec    Can you please fill for Dr Ned Ng Visit (last PCP visit):   7/3/2023    Next Visit Date:  No future appointments. **If hasn't been seen in over a year OR hasn't followed up according to last diabetes/ADHD visit, make appointment for patient before sending refill to provider.     Rx requested:  Requested Prescriptions      No prescriptions requested or ordered in this encounter

## 2023-07-17 NOTE — TELEPHONE ENCOUNTER
From: Devan Sanabria  To: Dr. Marii Espitia: 2023 11:18 AM EDT  Subject: tramadol    Tramadol is not listed on Rajeev's medication page in my chart. He is running low on tramadol and would like a refill. He currently takes 2 x 1oo mg tablets per day.   Thnx

## 2023-08-08 DIAGNOSIS — F33.9 RECURRENT DEPRESSION (HCC): ICD-10-CM

## 2023-08-08 RX ORDER — DULOXETIN HYDROCHLORIDE 60 MG/1
60 CAPSULE, DELAYED RELEASE ORAL DAILY
Qty: 90 CAPSULE | Refills: 0 | Status: SHIPPED | OUTPATIENT
Start: 2023-08-08

## 2023-08-08 NOTE — TELEPHONE ENCOUNTER
Future Appointments    This patient does not currently have any appointments scheduled.   Past Visits    Date Provider Specialty Visit Type Primary Dx   07/03/2023 Rashmi Jaimes MD Family Medicine Office Visit Medicare annual wellness visit, subsequent   03/23/2023 Rashmi Jaimes MD Family Medicine Telemedicine Closed traumatic brain injury with loss of consciousness, sequela [facility])   12/05/2022 Rashmi Jaimes MD Family Medicine Office Visit Screening PSA (prostate specific antigen)

## 2023-08-15 RX ORDER — BACLOFEN 10 MG/1
10 TABLET ORAL 3 TIMES DAILY
Qty: 120 TABLET | Refills: 0 | Status: SHIPPED | OUTPATIENT
Start: 2023-08-15

## 2023-08-15 NOTE — TELEPHONE ENCOUNTER
Comments: lm to make future f/u     Last Office Visit (last PCP visit):   7/3/2023    Next Visit Date:  No future appointments. **If hasn't been seen in over a year OR hasn't followed up according to last diabetes/ADHD visit, make appointment for patient before sending refill to provider.     Rx requested:  Requested Prescriptions     Pending Prescriptions Disp Refills    baclofen (LIORESAL) 10 MG tablet 120 tablet 0     Sig: Take 1 tablet by mouth 3 times daily

## 2023-08-20 DIAGNOSIS — I82.532 CHRONIC DEEP VEIN THROMBOSIS (DVT) OF LEFT POPLITEAL VEIN (HCC): ICD-10-CM

## 2023-08-27 ENCOUNTER — PATIENT MESSAGE (OUTPATIENT)
Dept: FAMILY MEDICINE CLINIC | Age: 68
End: 2023-08-27

## 2023-08-27 DIAGNOSIS — G89.29 CHRONIC RIGHT SHOULDER PAIN: ICD-10-CM

## 2023-08-27 DIAGNOSIS — M25.511 CHRONIC RIGHT SHOULDER PAIN: ICD-10-CM

## 2023-08-28 RX ORDER — TRAMADOL HYDROCHLORIDE 50 MG/1
TABLET ORAL
Qty: 60 TABLET | Refills: 0 | Status: SHIPPED | OUTPATIENT
Start: 2023-08-28 | End: 2023-09-27

## 2023-08-28 NOTE — TELEPHONE ENCOUNTER
From: Aura Mixon  To: Dr. Phyllis Mejía  Sent: 8/27/2023 6:59 AM EDT  Subject: Tramadol    Please refill prescription for Jojo Mcgowan, Tramadol HCL 50 mg tablet, thank you.

## 2023-09-03 DIAGNOSIS — F33.9 RECURRENT DEPRESSION (HCC): ICD-10-CM

## 2023-09-04 DIAGNOSIS — F33.9 RECURRENT DEPRESSION (HCC): ICD-10-CM

## 2023-09-05 RX ORDER — ARIPIPRAZOLE 2 MG/1
2 TABLET ORAL DAILY
Qty: 30 TABLET | Refills: 3 | Status: SHIPPED | OUTPATIENT
Start: 2023-09-05

## 2023-09-05 NOTE — TELEPHONE ENCOUNTER
Comments: lori sent    Last Office Visit (last PCP visit):   7/3/2023    Next Visit Date:  No future appointments. **If hasn't been seen in over a year OR hasn't followed up according to last diabetes/ADHD visit, make appointment for patient before sending refill to provider.     Rx requested:  Requested Prescriptions     Pending Prescriptions Disp Refills    ARIPiprazole (ABILIFY) 2 MG tablet 30 tablet 3     Sig: Take 1 tablet by mouth daily

## 2023-09-05 NOTE — TELEPHONE ENCOUNTER
Comments: lori already sent    Last Office Visit (last PCP visit):   7/3/2023    Next Visit Date:  No future appointments. **If hasn't been seen in over a year OR hasn't followed up according to last diabetes/ADHD visit, make appointment for patient before sending refill to provider.     Rx requested:  Requested Prescriptions     Pending Prescriptions Disp Refills    ARIPiprazole (ABILIFY) 2 MG tablet [Pharmacy Med Name: aripiprazole 2 mg tablet] 30 tablet 3     Sig: TAKE 1 TABLET BY MOUTH DAILY

## 2023-09-18 DIAGNOSIS — E03.9 HYPOTHYROIDISM, UNSPECIFIED TYPE: ICD-10-CM

## 2023-09-19 RX ORDER — LEVOTHYROXINE SODIUM 0.05 MG/1
50 TABLET ORAL DAILY
Qty: 30 TABLET | Refills: 5 | Status: SHIPPED | OUTPATIENT
Start: 2023-09-19

## 2023-09-19 NOTE — TELEPHONE ENCOUNTER
Comments:     Last Office Visit (last PCP visit):   Visit date not found    Next Visit Date:  Future Appointments   Date Time Provider [address]   10/16/2023 11:15 AM Glen Tucker MD [facility] [place]       **If hasn't been seen in over a year OR hasn't followed up according to last diabetes/ADHD visit, make appointment for patient before sending refill to provider.     Rx requested:  Requested Prescriptions     Pending Prescriptions Disp Refills    levothyroxine (SYNTHROID) 50 MCG tablet 30 tablet 5     Sig: Take 1 tablet by mouth daily

## 2023-10-03 ENCOUNTER — PATIENT MESSAGE (OUTPATIENT)
Dept: FAMILY MEDICINE CLINIC | Age: 68
End: 2023-10-03

## 2023-10-03 DIAGNOSIS — G89.29 CHRONIC RIGHT SHOULDER PAIN: ICD-10-CM

## 2023-10-03 DIAGNOSIS — M25.511 CHRONIC RIGHT SHOULDER PAIN: ICD-10-CM

## 2023-10-03 NOTE — TELEPHONE ENCOUNTER
From: Jose Louis  To: Dr. Yecenia Sanders  Sent: 10/3/2023 1:56 PM EDT  Subject: Tramadol    Please refill prescription of Tramadol for gita Delarosa Vaughn Cullens

## 2023-10-04 RX ORDER — TRAMADOL HYDROCHLORIDE 50 MG/1
TABLET ORAL
Qty: 60 TABLET | Refills: 0 | Status: SHIPPED | OUTPATIENT
Start: 2023-10-04 | End: 2023-11-03

## 2023-10-15 RX ORDER — BACLOFEN 10 MG/1
10 TABLET ORAL 3 TIMES DAILY
Qty: 120 TABLET | Refills: 0 | Status: CANCELLED | OUTPATIENT
Start: 2023-10-15

## 2023-10-16 ENCOUNTER — TELEMEDICINE (OUTPATIENT)
Dept: FAMILY MEDICINE CLINIC | Age: 68
End: 2023-10-16
Payer: MEDICARE

## 2023-10-16 DIAGNOSIS — M25.511 CHRONIC RIGHT SHOULDER PAIN: Primary | ICD-10-CM

## 2023-10-16 DIAGNOSIS — R60.0 BILATERAL LEG EDEMA: ICD-10-CM

## 2023-10-16 DIAGNOSIS — R56.9 SEIZURE (HCC): ICD-10-CM

## 2023-10-16 DIAGNOSIS — G89.29 CHRONIC RIGHT SHOULDER PAIN: Primary | ICD-10-CM

## 2023-10-16 DIAGNOSIS — F33.9 RECURRENT DEPRESSION (HCC): ICD-10-CM

## 2023-10-16 DIAGNOSIS — F11.20 OPIOID DEPENDENCE WITH CURRENT USE (HCC): ICD-10-CM

## 2023-10-16 DIAGNOSIS — I69.359 HEMIPLEGIA AFFECTING DOMINANT SIDE, POST-STROKE (HCC): ICD-10-CM

## 2023-10-16 DIAGNOSIS — I82.532 CHRONIC DEEP VEIN THROMBOSIS (DVT) OF LEFT POPLITEAL VEIN (HCC): ICD-10-CM

## 2023-10-16 DIAGNOSIS — E03.9 HYPOTHYROIDISM, UNSPECIFIED TYPE: ICD-10-CM

## 2023-10-16 DIAGNOSIS — G89.4 CHRONIC PAIN SYNDROME: ICD-10-CM

## 2023-10-16 PROCEDURE — 3017F COLORECTAL CA SCREEN DOC REV: CPT | Performed by: FAMILY MEDICINE

## 2023-10-16 PROCEDURE — 1123F ACP DISCUSS/DSCN MKR DOCD: CPT | Performed by: FAMILY MEDICINE

## 2023-10-16 PROCEDURE — G8427 DOCREV CUR MEDS BY ELIG CLIN: HCPCS | Performed by: FAMILY MEDICINE

## 2023-10-16 PROCEDURE — 99213 OFFICE O/P EST LOW 20 MIN: CPT | Performed by: FAMILY MEDICINE

## 2023-10-16 RX ORDER — BACLOFEN 10 MG/1
10 TABLET ORAL 3 TIMES DAILY
Qty: 90 TABLET | Refills: 5 | Status: SHIPPED | OUTPATIENT
Start: 2023-10-16

## 2023-10-16 NOTE — TELEPHONE ENCOUNTER
Comments:     Last Office Visit (last PCP visit):   7/3/2023    Next Visit Date:  Future Appointments   Date Time Provider [address]   10/16/2023 11:15 AM Farhad Stone MD [place] [facility] [place]       **If hasn't been seen in over a year OR hasn't followed up according to last diabetes/ADHD visit, make appointment for patient before sending refill to provider.     Rx requested:  Requested Prescriptions     Pending Prescriptions Disp Refills    baclofen (LIORESAL) 10 MG tablet 120 tablet 0     Sig: Take 1 tablet by mouth 3 times daily

## 2023-10-16 NOTE — PROGRESS NOTES
10/16/2023    TELEHEALTH EVALUATION -- Audio/Visual    HPI:    Charlotte Davis (:  1955) has requested an audio/video evaluation for the following concern(s):    Chief Complaint   Patient presents with    Annual Exam     Check up       Checkup for medication monitoring    Will have covid, flu, pneumonia at [facility] tomorrow     Tramadol chronically for pain  BID    Baclofen for spasm     Review of Systems    Otherwise physically feels healthy without sob/palpitations/chest pain/f/c/n/v/weight gain/weight loss/abd pain      Prior to Visit Medications    Medication Sig Taking? Authorizing Provider   baclofen (LIORESAL) 10 MG tablet Take 1 tablet by mouth 3 times daily Yes Mary Kay Nevarez MD   traMADol (ULTRAM) 50 MG tablet Take 1 tablet by mouth every morning AND 1 tablet nightly. Do all this for 30 days. Reduce doses taken as pain becomes manageable. . Max Daily Amount: 100 mg. Yes Mary Kay Nevarez MD   levothyroxine (SYNTHROID) 50 MCG tablet Take 1 tablet by mouth daily Yes Mary Kay Nevarez MD   ARIPiprazole (ABILIFY) 2 MG tablet Take 1 tablet by mouth daily Yes Mary Kay Nevarez MD   rivaroxaban (XARELTO) 20 MG TABS tablet Take 1 tablet by mouth daily (with breakfast) Yes Mary Kay Nevarez MD   DULoxetine (CYMBALTA) 60 MG extended release capsule Take 1 capsule by mouth daily Yes Mary Kay Nevarez MD   levETIRAcetam (KEPPRA) 750 MG tablet Take 1 tablet by mouth 2 times daily Yes Merlinda Look, DO   pregabalin (LYRICA) 75 MG capsule Take 1 capsule by mouth 3 times daily for 180 days.  Max Daily Amount: 225 mg Yes Mary Kay Nevarez MD   furosemide (LASIX) 20 MG tablet Take 1 tablet by mouth daily Yes Mary Kay Nevarez MD   potassium chloride (KLOR-CON M) 20 MEQ extended release tablet Take 1 tablet by mouth daily Yes Mary Kay Nevarez MD   glycerin 2 g suppository Place 1 suppository rectally daily as needed for Constipation Yes Jacques Brown, DO   Handicap Placard MISC by Does not apply route Exp 5 yrs Yes Vickii Quails, DO

## 2023-10-23 DIAGNOSIS — R60.0 BILATERAL LEG EDEMA: ICD-10-CM

## 2023-10-23 RX ORDER — POTASSIUM CHLORIDE 20 MEQ/1
20 TABLET, EXTENDED RELEASE ORAL DAILY
Qty: 30 TABLET | Refills: 2 | Status: SHIPPED | OUTPATIENT
Start: 2023-10-23

## 2023-10-23 NOTE — TELEPHONE ENCOUNTER
Future Appointments    This patient does not currently have any appointments scheduled.   Past Visits    Date Provider Specialty Visit Type Primary Dx   10/16/2023 Shen Pierce MD Family Medicine Telemedicine Chronic right shoulder pain   07/03/2023 Shen Pierce MD Family Medicine Office Visit Medicare annual wellness visit, subsequent   03/23/2023 Shen Pierce MD Family Medicine Telemedicine Closed traumatic brain injury with loss of consciousness, sequela ([address])

## 2023-10-26 ENCOUNTER — PATIENT MESSAGE (OUTPATIENT)
Dept: FAMILY MEDICINE CLINIC | Age: 68
End: 2023-10-26

## 2023-10-26 NOTE — TELEPHONE ENCOUNTER
From: Aysha Friend  To: Dr. Ceballos Mood: 10/26/2023 10:50 AM EDT  Subject: Placard     Can you please issue a prescription so I can renew Aramis's [facility] placard.  Thanks,  Olvin Soto

## 2023-11-06 ENCOUNTER — PATIENT MESSAGE (OUTPATIENT)
Dept: FAMILY MEDICINE CLINIC | Age: 68
End: 2023-11-06

## 2023-11-06 DIAGNOSIS — M25.511 CHRONIC RIGHT SHOULDER PAIN: ICD-10-CM

## 2023-11-06 DIAGNOSIS — G89.29 CHRONIC RIGHT SHOULDER PAIN: ICD-10-CM

## 2023-11-06 RX ORDER — TRAMADOL HYDROCHLORIDE 50 MG/1
TABLET ORAL
Qty: 60 TABLET | Refills: 0 | Status: SHIPPED | OUTPATIENT
Start: 2023-11-06 | End: 2023-12-06

## 2023-11-06 NOTE — TELEPHONE ENCOUNTER
From: Jose Guadalupe Crowell  To: Dr. César Horvath: 11/6/2023 8:42 AM EST  Subject: Tramadol    Please call in a script for Jose Eason

## 2023-11-20 DIAGNOSIS — F33.9 RECURRENT DEPRESSION (HCC): ICD-10-CM

## 2023-11-20 RX ORDER — DULOXETIN HYDROCHLORIDE 60 MG/1
60 CAPSULE, DELAYED RELEASE ORAL DAILY
Qty: 90 CAPSULE | Refills: 0 | Status: SHIPPED | OUTPATIENT
Start: 2023-11-20

## 2023-11-20 NOTE — TELEPHONE ENCOUNTER
Comments:     Last Office Visit (last PCP visit):   10/16/2023    Next Visit Date:  No future appointments. **If hasn't been seen in over a year OR hasn't followed up according to last diabetes/ADHD visit, make appointment for patient before sending refill to provider.     Rx requested:  Requested Prescriptions     Pending Prescriptions Disp Refills    DULoxetine (CYMBALTA) 60 MG extended release capsule 90 capsule 0     Sig: Take 1 capsule by mouth daily

## 2023-12-25 DIAGNOSIS — R60.0 BILATERAL LEG EDEMA: ICD-10-CM

## 2023-12-26 RX ORDER — FUROSEMIDE 20 MG/1
20 TABLET ORAL DAILY
Qty: 30 TABLET | Refills: 5 | Status: SHIPPED | OUTPATIENT
Start: 2023-12-26

## 2023-12-26 NOTE — TELEPHONE ENCOUNTER
Last Office Visit (last PCP visit):   10/16/2023    Next Visit Date:  No future appointments. **If hasn't been seen in over a year OR hasn't followed up according to last diabetes/ADHD visit, make appointment for patient before sending refill to provider.     Rx requested:  Requested Prescriptions     Pending Prescriptions Disp Refills    furosemide (LASIX) 20 MG tablet 30 tablet 5     Sig: Take 1 tablet by mouth daily

## 2024-01-24 DIAGNOSIS — R60.0 BILATERAL LEG EDEMA: ICD-10-CM

## 2024-01-24 RX ORDER — POTASSIUM CHLORIDE 20 MEQ/1
20 TABLET, EXTENDED RELEASE ORAL DAILY
Qty: 30 TABLET | Refills: 2 | Status: SHIPPED | OUTPATIENT
Start: 2024-01-24

## 2024-01-24 RX ORDER — LEVETIRACETAM 750 MG/1
750 TABLET ORAL 2 TIMES DAILY
Qty: 180 TABLET | Refills: 0 | Status: SHIPPED | OUTPATIENT
Start: 2024-01-24

## 2024-01-24 NOTE — TELEPHONE ENCOUNTER
Comments:  LVMTCB and make appt    Last Office Visit (last PCP visit):   10/16/2023    Next Visit Date:  No future appointments.    **If hasn't been seen in over a year OR hasn't followed up according to last diabetes/ADHD visit, make appointment for patient before sending refill to provider.    Rx requested:  Requested Prescriptions     Pending Prescriptions Disp Refills    levETIRAcetam (KEPPRA) 750 MG tablet [Pharmacy Med Name: levetiracetam 750 mg tablet] 180 tablet 0     Sig: TAKE 1 TABLET BY MOUTH TWICE DAILY

## 2024-01-24 NOTE — TELEPHONE ENCOUNTER
Comments:     Last Office Visit (last PCP visit):   10/16/2023    Next Visit Date:  Future Appointments   Date Time Provider Department Center   1/30/2024  9:15 AM Syd Maher MD [facility]       **If hasn't been seen in over a year OR hasn't followed up according to last diabetes/ADHD visit, make appointment for patient before sending refill to provider.    Rx requested:  Requested Prescriptions     Pending Prescriptions Disp Refills    potassium chloride (KLOR-CON M) 20 MEQ extended release tablet [Pharmacy Med Name: potassium chloride ER 20 mEq tablet,extended release(part/cryst)] 30 tablet 2     Sig: TAKE 1 TABLET BY MOUTH DAILY

## 2024-01-30 ENCOUNTER — TELEMEDICINE (OUTPATIENT)
Dept: FAMILY MEDICINE CLINIC | Age: 69
End: 2024-01-30
Payer: MEDICARE

## 2024-01-30 DIAGNOSIS — F11.20 OPIOID DEPENDENCE WITH CURRENT USE (HCC): ICD-10-CM

## 2024-01-30 DIAGNOSIS — I69.359 HEMIPLEGIA AFFECTING DOMINANT SIDE, POST-STROKE (HCC): ICD-10-CM

## 2024-01-30 DIAGNOSIS — R56.9 SEIZURE (HCC): ICD-10-CM

## 2024-01-30 DIAGNOSIS — I82.532 CHRONIC DEEP VEIN THROMBOSIS (DVT) OF LEFT POPLITEAL VEIN (HCC): ICD-10-CM

## 2024-01-30 DIAGNOSIS — F33.9 RECURRENT DEPRESSION (HCC): ICD-10-CM

## 2024-01-30 PROCEDURE — G8427 DOCREV CUR MEDS BY ELIG CLIN: HCPCS | Performed by: FAMILY MEDICINE

## 2024-01-30 PROCEDURE — 1123F ACP DISCUSS/DSCN MKR DOCD: CPT | Performed by: FAMILY MEDICINE

## 2024-01-30 PROCEDURE — 99213 OFFICE O/P EST LOW 20 MIN: CPT | Performed by: FAMILY MEDICINE

## 2024-01-30 PROCEDURE — 3017F COLORECTAL CA SCREEN DOC REV: CPT | Performed by: FAMILY MEDICINE

## 2024-01-30 ASSESSMENT — PATIENT HEALTH QUESTIONNAIRE - PHQ9
4. FEELING TIRED OR HAVING LITTLE ENERGY: 0
SUM OF ALL RESPONSES TO PHQ QUESTIONS 1-9: 0
5. POOR APPETITE OR OVEREATING: 0
8. MOVING OR SPEAKING SO SLOWLY THAT OTHER PEOPLE COULD HAVE NOTICED. OR THE OPPOSITE, BEING SO FIGETY OR RESTLESS THAT YOU HAVE BEEN MOVING AROUND A LOT MORE THAN USUAL: 0
SUM OF ALL RESPONSES TO PHQ QUESTIONS 1-9: 0
1. LITTLE INTEREST OR PLEASURE IN DOING THINGS: 0
SUM OF ALL RESPONSES TO PHQ QUESTIONS 1-9: 0
9. THOUGHTS THAT YOU WOULD BE BETTER OFF DEAD, OR OF HURTING YOURSELF: 0
SUM OF ALL RESPONSES TO PHQ9 QUESTIONS 1 & 2: 0
10. IF YOU CHECKED OFF ANY PROBLEMS, HOW DIFFICULT HAVE THESE PROBLEMS MADE IT FOR YOU TO DO YOUR WORK, TAKE CARE OF THINGS AT HOME, OR GET ALONG WITH OTHER PEOPLE: 0
3. TROUBLE FALLING OR STAYING ASLEEP: 0
SUM OF ALL RESPONSES TO PHQ QUESTIONS 1-9: 0
2. FEELING DOWN, DEPRESSED OR HOPELESS: 0
7. TROUBLE CONCENTRATING ON THINGS, SUCH AS READING THE NEWSPAPER OR WATCHING TELEVISION: 0
6. FEELING BAD ABOUT YOURSELF - OR THAT YOU ARE A FAILURE OR HAVE LET YOURSELF OR YOUR FAMILY DOWN: 0

## 2024-01-30 NOTE — PROGRESS NOTES
2024    TELEHEALTH EVALUATION -- Audio/Visual    HPI:    Aramis David (:  1955) has requested an audio/video evaluation for the following concern(s):    Chief Complaint   Patient presents with    Annual Exam     Check up       Checkup for medication monitoring    Had  covid, flu, pneumonia   Tramadol chronically for pain  1-2/day     Baclofen for spasm     Review of Systems    Otherwise physically feels healthy without sob/palpitations/chest pain/f/c/n/v/weight gain/weight loss/abd pain      Prior to Visit Medications    Medication Sig Taking? Authorizing Provider   potassium chloride (KLOR-CON M) 20 MEQ extended release tablet TAKE 1 TABLET BY MOUTH DAILY Yes Syd Maher MD   levETIRAcetam (KEPPRA) 750 MG tablet TAKE 1 TABLET BY MOUTH TWICE DAILY Yes Syd Maher MD   furosemide (LASIX) 20 MG tablet Take 1 tablet by mouth daily Yes Syd Maher MD   DULoxetine (CYMBALTA) 60 MG extended release capsule Take 1 capsule by mouth daily Yes Syd Maher MD   Handicap Cuco MISC by Does not apply route Exp 5 years Yes Syd Maher MD   baclofen (LIORESAL) 10 MG tablet Take 1 tablet by mouth 3 times daily Yes Syd Maher MD   levothyroxine (SYNTHROID) 50 MCG tablet Take 1 tablet by mouth daily Yes Syd Maher MD   ARIPiprazole (ABILIFY) 2 MG tablet Take 1 tablet by mouth daily Yes Syd Maher MD   rivaroxaban (XARELTO) 20 MG TABS tablet Take 1 tablet by mouth daily (with breakfast) Yes Syd Maher MD   glycerin 2 g suppository Place 1 suppository rectally daily as needed for Constipation Yes Harris Brown DO   Handicap Placard MISC by Does not apply route Exp 5 yrs Yes Emmanuel Mabry DO   aspirin 81 MG chewable tablet Take 1 tablet by mouth Yes Galo Lepe MD   Multiple Vitamin (MULTI VITAMIN PO) Take by mouth  Yes Galo Lepe MD   pregabalin (LYRICA) 75 MG capsule Take 1 capsule by mouth 3 times daily for 180 days. Max Daily Amount: 225 mg  Syd Maher MD

## 2024-02-12 DIAGNOSIS — F33.9 RECURRENT DEPRESSION (HCC): ICD-10-CM

## 2024-02-13 RX ORDER — ARIPIPRAZOLE 2 MG/1
2 TABLET ORAL DAILY
Qty: 30 TABLET | Refills: 3 | Status: SHIPPED | OUTPATIENT
Start: 2024-02-13

## 2024-02-19 ENCOUNTER — PATIENT MESSAGE (OUTPATIENT)
Dept: FAMILY MEDICINE CLINIC | Age: 69
End: 2024-02-19

## 2024-02-19 DIAGNOSIS — E03.9 HYPOTHYROIDISM, UNSPECIFIED TYPE: ICD-10-CM

## 2024-02-19 DIAGNOSIS — F33.9 RECURRENT DEPRESSION (HCC): ICD-10-CM

## 2024-02-19 DIAGNOSIS — I82.532 CHRONIC DEEP VEIN THROMBOSIS (DVT) OF LEFT POPLITEAL VEIN (HCC): ICD-10-CM

## 2024-02-19 DIAGNOSIS — R60.0 BILATERAL LEG EDEMA: ICD-10-CM

## 2024-02-19 DIAGNOSIS — G89.4 CHRONIC PAIN SYNDROME: ICD-10-CM

## 2024-02-19 RX ORDER — POTASSIUM CHLORIDE 20 MEQ/1
20 TABLET, EXTENDED RELEASE ORAL DAILY
Qty: 30 TABLET | Refills: 2 | Status: SHIPPED | OUTPATIENT
Start: 2024-02-19

## 2024-02-19 RX ORDER — DULOXETIN HYDROCHLORIDE 60 MG/1
60 CAPSULE, DELAYED RELEASE ORAL DAILY
Qty: 90 CAPSULE | Refills: 0 | Status: SHIPPED | OUTPATIENT
Start: 2024-02-19

## 2024-02-19 RX ORDER — FUROSEMIDE 20 MG/1
20 TABLET ORAL DAILY
Qty: 30 TABLET | Refills: 5 | Status: SHIPPED | OUTPATIENT
Start: 2024-02-19

## 2024-02-19 RX ORDER — DULOXETIN HYDROCHLORIDE 60 MG/1
60 CAPSULE, DELAYED RELEASE ORAL DAILY
Qty: 90 CAPSULE | Refills: 0 | Status: CANCELLED | OUTPATIENT
Start: 2024-02-19

## 2024-02-19 RX ORDER — LEVOTHYROXINE SODIUM 0.05 MG/1
50 TABLET ORAL DAILY
Qty: 30 TABLET | Refills: 5 | Status: SHIPPED | OUTPATIENT
Start: 2024-02-19

## 2024-02-19 RX ORDER — BACLOFEN 10 MG/1
10 TABLET ORAL 3 TIMES DAILY
Qty: 90 TABLET | Refills: 5 | Status: SHIPPED | OUTPATIENT
Start: 2024-02-19

## 2024-02-19 RX ORDER — PREGABALIN 75 MG/1
75 CAPSULE ORAL 3 TIMES DAILY
Qty: 90 CAPSULE | Refills: 5 | Status: SHIPPED | OUTPATIENT
Start: 2024-02-19 | End: 2024-08-17

## 2024-02-19 RX ORDER — LEVETIRACETAM 750 MG/1
750 TABLET ORAL 2 TIMES DAILY
Qty: 180 TABLET | Refills: 0 | Status: SHIPPED | OUTPATIENT
Start: 2024-02-19

## 2024-02-19 RX ORDER — ARIPIPRAZOLE 2 MG/1
2 TABLET ORAL DAILY
Qty: 30 TABLET | Refills: 3 | Status: SHIPPED | OUTPATIENT
Start: 2024-02-19

## 2024-02-19 NOTE — TELEPHONE ENCOUNTER
From: Aramis David  To: Dr. Syd Maher  Sent: 2/19/2024 8:25 AM EST  Subject: Tramadol     Please refill script for Katrina Self

## 2024-04-08 ENCOUNTER — PATIENT MESSAGE (OUTPATIENT)
Dept: FAMILY MEDICINE CLINIC | Age: 69
End: 2024-04-08

## 2024-04-08 DIAGNOSIS — M25.511 CHRONIC RIGHT SHOULDER PAIN: ICD-10-CM

## 2024-04-08 DIAGNOSIS — G89.29 CHRONIC RIGHT SHOULDER PAIN: ICD-10-CM

## 2024-04-09 RX ORDER — TRAMADOL HYDROCHLORIDE 50 MG/1
TABLET ORAL
Qty: 60 TABLET | Refills: 0 | Status: SHIPPED | OUTPATIENT
Start: 2024-04-09 | End: 2024-05-09

## 2024-04-09 NOTE — TELEPHONE ENCOUNTER
From: Aramis David  To: Dr. Syd Maher  Sent: 4/8/2024 8:19 AM EDT  Subject: Tramadol     Please call in script for Aramis David

## 2024-05-21 DIAGNOSIS — F33.9 RECURRENT DEPRESSION (HCC): ICD-10-CM

## 2024-05-21 RX ORDER — DULOXETIN HYDROCHLORIDE 60 MG/1
60 CAPSULE, DELAYED RELEASE ORAL DAILY
Qty: 90 CAPSULE | Refills: 0 | Status: SHIPPED | OUTPATIENT
Start: 2024-05-21

## 2024-05-21 NOTE — TELEPHONE ENCOUNTER
Comments: lori sent    Last Office Visit (last PCP visit):   1/30/2024    Next Visit Date:  No future appointments.    **If hasn't been seen in over a year OR hasn't followed up according to last diabetes/ADHD visit, make appointment for patient before sending refill to provider.    Rx requested:  Requested Prescriptions     Pending Prescriptions Disp Refills    DULoxetine (CYMBALTA) 60 MG extended release capsule 90 capsule 0     Sig: Take 1 capsule by mouth daily

## 2024-05-27 DIAGNOSIS — G89.29 CHRONIC RIGHT SHOULDER PAIN: ICD-10-CM

## 2024-05-27 DIAGNOSIS — R60.0 BILATERAL LEG EDEMA: ICD-10-CM

## 2024-05-27 DIAGNOSIS — M25.511 CHRONIC RIGHT SHOULDER PAIN: ICD-10-CM

## 2024-05-28 RX ORDER — POTASSIUM CHLORIDE 20 MEQ/1
20 TABLET, EXTENDED RELEASE ORAL DAILY
Qty: 30 TABLET | Refills: 0 | Status: SHIPPED | OUTPATIENT
Start: 2024-05-28

## 2024-05-28 RX ORDER — TRAMADOL HYDROCHLORIDE 50 MG/1
50 TABLET ORAL EVERY 6 HOURS PRN
Qty: 60 TABLET | Refills: 0 | Status: SHIPPED | OUTPATIENT
Start: 2024-05-28 | End: 2024-06-27

## 2024-05-28 NOTE — TELEPHONE ENCOUNTER
Comments: lori sent    Last Office Visit (last PCP visit):   1/30/2024    Next Visit Date:  No future appointments.    **If hasn't been seen in over a year OR hasn't followed up according to last diabetes/ADHD visit, make appointment for patient before sending refill to provider.    Rx requested:  Requested Prescriptions     Pending Prescriptions Disp Refills    traMADol (ULTRAM) 50 MG tablet [Pharmacy Med Name: tramadol 50 mg tablet] 60 tablet 0     Sig: TAKE 1 TABLET BY MOUTH EVERY MORNING and ONE TABLET NIGHTLY FOR 30 DAYS    potassium chloride (KLOR-CON M) 20 MEQ extended release tablet [Pharmacy Med Name: potassium chloride ER 20 mEq tablet,extended release(part/cryst)] 30 tablet 0     Sig: Take 1 tablet by mouth daily

## 2024-06-29 DIAGNOSIS — R60.0 BILATERAL LEG EDEMA: ICD-10-CM

## 2024-07-01 ENCOUNTER — OFFICE VISIT (OUTPATIENT)
Dept: FAMILY MEDICINE CLINIC | Age: 69
End: 2024-07-01

## 2024-07-01 VITALS
BODY MASS INDEX: 27.46 KG/M2 | HEART RATE: 70 BPM | OXYGEN SATURATION: 96 % | DIASTOLIC BLOOD PRESSURE: 70 MMHG | SYSTOLIC BLOOD PRESSURE: 132 MMHG | TEMPERATURE: 97.9 F | HEIGHT: 68 IN

## 2024-07-01 DIAGNOSIS — F11.20 OPIOID DEPENDENCE WITH CURRENT USE (HCC): ICD-10-CM

## 2024-07-01 DIAGNOSIS — R73.9 HYPERGLYCEMIA: ICD-10-CM

## 2024-07-01 DIAGNOSIS — Z12.5 SCREENING PSA (PROSTATE SPECIFIC ANTIGEN): ICD-10-CM

## 2024-07-01 DIAGNOSIS — I82.532 CHRONIC DEEP VEIN THROMBOSIS (DVT) OF LEFT POPLITEAL VEIN (HCC): ICD-10-CM

## 2024-07-01 DIAGNOSIS — E03.9 HYPOTHYROIDISM, UNSPECIFIED TYPE: Primary | ICD-10-CM

## 2024-07-01 DIAGNOSIS — F33.9 RECURRENT DEPRESSION (HCC): ICD-10-CM

## 2024-07-01 DIAGNOSIS — R56.9 SEIZURE (HCC): ICD-10-CM

## 2024-07-01 DIAGNOSIS — I63.032 CEREBROVASCULAR ACCIDENT (CVA) DUE TO THROMBOSIS OF LEFT CAROTID ARTERY (HCC): ICD-10-CM

## 2024-07-01 DIAGNOSIS — Z13.220 SCREENING, LIPID: ICD-10-CM

## 2024-07-01 DIAGNOSIS — M25.511 CHRONIC RIGHT SHOULDER PAIN: ICD-10-CM

## 2024-07-01 DIAGNOSIS — S06.9XAA: ICD-10-CM

## 2024-07-01 DIAGNOSIS — E03.9 HYPOTHYROIDISM, UNSPECIFIED TYPE: ICD-10-CM

## 2024-07-01 DIAGNOSIS — G89.29 CHRONIC RIGHT SHOULDER PAIN: ICD-10-CM

## 2024-07-01 DIAGNOSIS — S06.9XAD: ICD-10-CM

## 2024-07-01 LAB
ALBUMIN SERPL-MCNC: 4.5 G/DL (ref 3.5–4.6)
ALP SERPL-CCNC: 96 U/L (ref 35–104)
ALT SERPL-CCNC: 16 U/L (ref 0–41)
ANION GAP SERPL CALCULATED.3IONS-SCNC: 10 MEQ/L (ref 9–15)
AST SERPL-CCNC: 26 U/L (ref 0–40)
BILIRUB SERPL-MCNC: 1 MG/DL (ref 0.2–0.7)
BUN SERPL-MCNC: 9 MG/DL (ref 8–23)
CALCIUM SERPL-MCNC: 9.5 MG/DL (ref 8.5–9.9)
CHLORIDE SERPL-SCNC: 100 MEQ/L (ref 95–107)
CHOLEST SERPL-MCNC: 177 MG/DL (ref 0–199)
CO2 SERPL-SCNC: 28 MEQ/L (ref 20–31)
CREAT SERPL-MCNC: 1.12 MG/DL (ref 0.7–1.2)
ERYTHROCYTE [DISTWIDTH] IN BLOOD BY AUTOMATED COUNT: 13.4 % (ref 11.5–14.5)
GLOBULIN SER CALC-MCNC: 2.6 G/DL (ref 2.3–3.5)
GLUCOSE SERPL-MCNC: 90 MG/DL (ref 70–99)
HCT VFR BLD AUTO: 43.2 % (ref 42–52)
HDLC SERPL-MCNC: 58 MG/DL (ref 40–59)
HGB BLD-MCNC: 14.5 G/DL (ref 14–18)
LDLC SERPL CALC-MCNC: 110 MG/DL (ref 0–129)
MCH RBC QN AUTO: 30.5 PG (ref 27–31.3)
MCHC RBC AUTO-ENTMCNC: 33.6 % (ref 33–37)
MCV RBC AUTO: 90.8 FL (ref 79–92.2)
PLATELET # BLD AUTO: 171 K/UL (ref 130–400)
POTASSIUM SERPL-SCNC: 4.5 MEQ/L (ref 3.4–4.9)
PROT SERPL-MCNC: 7.1 G/DL (ref 6.3–8)
PSA SERPL-MCNC: 1.41 NG/ML (ref 0–4)
RBC # BLD AUTO: 4.76 M/UL (ref 4.7–6.1)
SODIUM SERPL-SCNC: 138 MEQ/L (ref 135–144)
T4 FREE SERPL-MCNC: 1.43 NG/DL (ref 0.84–1.68)
TRIGL SERPL-MCNC: 46 MG/DL (ref 0–150)
TSH SERPL-MCNC: 2.67 UIU/ML (ref 0.44–3.86)
WBC # BLD AUTO: 5.5 K/UL (ref 4.8–10.8)

## 2024-07-01 RX ORDER — POTASSIUM CHLORIDE 20 MEQ/1
20 TABLET, EXTENDED RELEASE ORAL DAILY
Qty: 30 TABLET | Refills: 0 | Status: SHIPPED | OUTPATIENT
Start: 2024-07-01

## 2024-07-01 SDOH — ECONOMIC STABILITY: FOOD INSECURITY: WITHIN THE PAST 12 MONTHS, YOU WORRIED THAT YOUR FOOD WOULD RUN OUT BEFORE YOU GOT MONEY TO BUY MORE.: NEVER TRUE

## 2024-07-01 SDOH — ECONOMIC STABILITY: FOOD INSECURITY: WITHIN THE PAST 12 MONTHS, THE FOOD YOU BOUGHT JUST DIDN'T LAST AND YOU DIDN'T HAVE MONEY TO GET MORE.: NEVER TRUE

## 2024-07-01 SDOH — ECONOMIC STABILITY: INCOME INSECURITY: HOW HARD IS IT FOR YOU TO PAY FOR THE VERY BASICS LIKE FOOD, HOUSING, MEDICAL CARE, AND HEATING?: NOT VERY HARD

## 2024-07-01 ASSESSMENT — ENCOUNTER SYMPTOMS
GASTROINTESTINAL NEGATIVE: 1
BLURRED VISION: 0
RESPIRATORY NEGATIVE: 1
SHORTNESS OF BREATH: 0
ALLERGIC/IMMUNOLOGIC NEGATIVE: 1
ORTHOPNEA: 0
EYES NEGATIVE: 1

## 2024-07-01 NOTE — PROGRESS NOTES
problem is controlled. Associated symptoms include malaise/fatigue. Pertinent negatives include no anxiety, blurred vision, chest pain, orthopnea, palpitations, peripheral edema, PND, shortness of breath or sweats. Agents associated with hypertension include NSAIDs. Risk factors for coronary artery disease include dyslipidemia, male gender and sedentary lifestyle. Past treatments include lifestyle changes. The current treatment provides significant improvement. There are no compliance problems.  Hypertensive end-organ damage includes CVA. Identifiable causes of hypertension include a hypertension causing med. There is no history of sleep apnea or a thyroid problem.   Hyperlipidemia  This is a chronic problem. The current episode started more than 1 year ago. The problem is controlled. Recent lipid tests were reviewed and are low. Pertinent negatives include no chest pain or shortness of breath. Current antihyperlipidemic treatment includes diet change. The current treatment provides significant improvement of lipids. Compliance problems include adherence to exercise.        Review of Systems   Constitutional:  Positive for malaise/fatigue.   HENT:          Left ear fullness   Eyes: Negative.  Negative for blurred vision.   Respiratory: Negative.  Negative for shortness of breath.    Cardiovascular: Negative.  Negative for chest pain, palpitations, orthopnea and PND.   Gastrointestinal: Negative.    Endocrine: Negative.    Genitourinary: Negative.    Musculoskeletal:  Positive for arthralgias and gait problem.   Skin: Negative.    Allergic/Immunologic: Negative.    Neurological:  Positive for weakness.   Hematological: Negative.    Psychiatric/Behavioral: Negative.           Past Medical History:   Diagnosis Date    Chronic right shoulder pain 10/4/2017    Closed TBI (traumatic brain injury) (HCC) 12/28/2012    Essential hypertension 8/2/2017    Hemiparesis affecting dominant side as late effect of cerebrovascular

## 2024-07-02 LAB
ESTIMATED AVERAGE GLUCOSE: 103 MG/DL
HBA1C MFR BLD: 5.2 % (ref 4–6)

## 2024-07-14 ENCOUNTER — PATIENT MESSAGE (OUTPATIENT)
Dept: FAMILY MEDICINE CLINIC | Age: 69
End: 2024-07-14

## 2024-07-14 DIAGNOSIS — G89.29 CHRONIC RIGHT SHOULDER PAIN: ICD-10-CM

## 2024-07-14 DIAGNOSIS — M25.511 CHRONIC RIGHT SHOULDER PAIN: ICD-10-CM

## 2024-07-15 RX ORDER — TRAMADOL HYDROCHLORIDE 50 MG/1
50 TABLET ORAL EVERY 6 HOURS PRN
Qty: 60 TABLET | Refills: 0 | Status: SHIPPED | OUTPATIENT
Start: 2024-07-15 | End: 2024-08-14

## 2024-07-15 NOTE — TELEPHONE ENCOUNTER
From: Aramis David  To: Dr. Syd Maher  Sent: 7/14/2024 9:20 AM EDT  Subject: Tramadol    Please call in a script, Tramadol, for Aramis, I give him one, sometimes two a day. Thnx.

## 2024-07-29 RX ORDER — LEVETIRACETAM 750 MG/1
750 TABLET ORAL 2 TIMES DAILY
Qty: 180 TABLET | Refills: 0 | Status: SHIPPED | OUTPATIENT
Start: 2024-07-29

## 2024-07-29 NOTE — TELEPHONE ENCOUNTER
Comments:     Last Office Visit (last PCP visit):   7/1/2024    Next Visit Date:  No future appointments.    **If hasn't been seen in over a year OR hasn't followed up according to last diabetes/ADHD visit, make appointment for patient before sending refill to provider.    Rx requested:  Requested Prescriptions     Pending Prescriptions Disp Refills    levETIRAcetam (KEPPRA) 750 MG tablet 180 tablet 0     Sig: Take 1 tablet by mouth 2 times daily

## 2024-08-05 DIAGNOSIS — R60.0 BILATERAL LEG EDEMA: ICD-10-CM

## 2024-08-05 RX ORDER — POTASSIUM CHLORIDE 20 MEQ/1
20 TABLET, EXTENDED RELEASE ORAL DAILY
Qty: 30 TABLET | Refills: 0 | Status: SHIPPED | OUTPATIENT
Start: 2024-08-05

## 2024-08-18 DIAGNOSIS — E03.9 HYPOTHYROIDISM, UNSPECIFIED TYPE: ICD-10-CM

## 2024-08-18 DIAGNOSIS — I82.532 CHRONIC DEEP VEIN THROMBOSIS (DVT) OF LEFT POPLITEAL VEIN (HCC): ICD-10-CM

## 2024-08-18 DIAGNOSIS — R60.0 BILATERAL LEG EDEMA: ICD-10-CM

## 2024-08-20 RX ORDER — FUROSEMIDE 20 MG/1
20 TABLET ORAL DAILY
Qty: 30 TABLET | Refills: 5 | Status: SHIPPED | OUTPATIENT
Start: 2024-08-20

## 2024-08-20 RX ORDER — LEVOTHYROXINE SODIUM 0.05 MG/1
50 TABLET ORAL DAILY
Qty: 30 TABLET | Refills: 5 | Status: SHIPPED | OUTPATIENT
Start: 2024-08-20

## 2024-08-20 RX ORDER — RIVAROXABAN 20 MG/1
20 TABLET, FILM COATED ORAL
Qty: 30 TABLET | Refills: 5 | Status: SHIPPED | OUTPATIENT
Start: 2024-08-20

## 2024-08-25 DIAGNOSIS — E03.9 HYPOTHYROIDISM, UNSPECIFIED TYPE: ICD-10-CM

## 2024-08-25 DIAGNOSIS — F33.9 RECURRENT DEPRESSION (HCC): ICD-10-CM

## 2024-08-25 DIAGNOSIS — I82.532 CHRONIC DEEP VEIN THROMBOSIS (DVT) OF LEFT POPLITEAL VEIN (HCC): ICD-10-CM

## 2024-08-26 RX ORDER — LEVOTHYROXINE SODIUM 50 UG/1
50 TABLET ORAL DAILY
Qty: 30 TABLET | Refills: 5 | Status: SHIPPED | OUTPATIENT
Start: 2024-08-26

## 2024-08-26 RX ORDER — DULOXETIN HYDROCHLORIDE 60 MG/1
60 CAPSULE, DELAYED RELEASE ORAL DAILY
Qty: 90 CAPSULE | Refills: 0 | Status: SHIPPED | OUTPATIENT
Start: 2024-08-26

## 2024-08-26 NOTE — TELEPHONE ENCOUNTER
Comments: sent [employer] message to make 6mo follow up    Last Office Visit (last PCP visit):   7/1/2024    Next Visit Date:  No future appointments.    **If hasn't been seen in over a year OR hasn't followed up according to last diabetes/ADHD visit, make appointment for patient before sending refill to provider.    Rx requested:  Requested Prescriptions     Pending Prescriptions Disp Refills    DULoxetine (CYMBALTA) 60 MG extended release capsule 90 capsule 0     Sig: Take 1 capsule by mouth daily    levothyroxine (SYNTHROID) 50 MCG tablet 30 tablet 5     Sig: Take 1 tablet by mouth daily    rivaroxaban (XARELTO) 20 MG TABS tablet 30 tablet 5     Sig: Take 1 tablet by mouth daily (with breakfast)

## 2024-09-08 DIAGNOSIS — R60.0 BILATERAL LEG EDEMA: ICD-10-CM

## 2024-09-09 ENCOUNTER — PATIENT MESSAGE (OUTPATIENT)
Dept: FAMILY MEDICINE CLINIC | Age: 69
End: 2024-09-09

## 2024-09-09 DIAGNOSIS — G89.29 CHRONIC RIGHT SHOULDER PAIN: ICD-10-CM

## 2024-09-09 DIAGNOSIS — M25.511 CHRONIC RIGHT SHOULDER PAIN: ICD-10-CM

## 2024-09-09 RX ORDER — POTASSIUM CHLORIDE 1500 MG/1
20 TABLET, EXTENDED RELEASE ORAL DAILY
Qty: 30 TABLET | Refills: 0 | Status: SHIPPED | OUTPATIENT
Start: 2024-09-09

## 2024-09-10 RX ORDER — TRAMADOL HYDROCHLORIDE 50 MG/1
50 TABLET ORAL EVERY 6 HOURS PRN
Qty: 60 TABLET | Refills: 0 | Status: SHIPPED | OUTPATIENT
Start: 2024-09-10 | End: 2024-10-10

## 2024-09-16 DIAGNOSIS — F33.9 RECURRENT DEPRESSION (HCC): ICD-10-CM

## 2024-09-16 RX ORDER — ARIPIPRAZOLE 2 MG/1
2 TABLET ORAL DAILY
Qty: 30 TABLET | Refills: 3 | Status: SHIPPED | OUTPATIENT
Start: 2024-09-16

## 2024-10-07 DIAGNOSIS — R60.0 BILATERAL LEG EDEMA: ICD-10-CM

## 2024-10-07 RX ORDER — POTASSIUM CHLORIDE 1500 MG/1
20 TABLET, EXTENDED RELEASE ORAL DAILY
Qty: 30 TABLET | Refills: 0 | Status: SHIPPED | OUTPATIENT
Start: 2024-10-07

## 2024-10-07 NOTE — TELEPHONE ENCOUNTER
Comments:     Last Office Visit (last PCP visit):   7/1/2024    Next Visit Date:  No future appointments.    **If hasn't been seen in over a year OR hasn't followed up according to last diabetes/ADHD visit, make appointment for patient before sending refill to provider.    Rx requested:  Requested Prescriptions     Pending Prescriptions Disp Refills    potassium chloride (KLOR-CON M) 20 MEQ extended release tablet 30 tablet 0     Sig: Take 1 tablet by mouth daily

## 2024-10-22 ENCOUNTER — HOSPITAL ENCOUNTER (INPATIENT)
Age: 69
LOS: 5 days | Discharge: NURSING, REHAB OR LONG-TERM CARE | DRG: 100 | End: 2024-10-27
Attending: INTERNAL MEDICINE | Admitting: INTERNAL MEDICINE
Payer: MEDICARE

## 2024-10-22 ENCOUNTER — APPOINTMENT (OUTPATIENT)
Dept: GENERAL RADIOLOGY | Age: 69
DRG: 100 | End: 2024-10-22
Payer: MEDICARE

## 2024-10-22 ENCOUNTER — APPOINTMENT (OUTPATIENT)
Dept: CT IMAGING | Age: 69
DRG: 100 | End: 2024-10-22
Payer: MEDICARE

## 2024-10-22 DIAGNOSIS — N17.9 AKI (ACUTE KIDNEY INJURY) (HCC): ICD-10-CM

## 2024-10-22 DIAGNOSIS — R79.89 ELEVATED TROPONIN: ICD-10-CM

## 2024-10-22 DIAGNOSIS — I63.9 CEREBROVASCULAR ACCIDENT (CVA), UNSPECIFIED MECHANISM (HCC): Primary | ICD-10-CM

## 2024-10-22 PROBLEM — R41.82 ALTERED MENTAL STATUS: Status: ACTIVE | Noted: 2024-10-22

## 2024-10-22 LAB
ALBUMIN SERPL-MCNC: 4.1 G/DL (ref 3.5–4.6)
ALP SERPL-CCNC: 100 U/L (ref 35–104)
ALT SERPL-CCNC: 22 U/L (ref 0–41)
ANION GAP SERPL CALCULATED.3IONS-SCNC: 12 MEQ/L (ref 9–15)
AST SERPL-CCNC: 45 U/L (ref 0–40)
B PARAP IS1001 DNA NPH QL NAA+NON-PROBE: NOT DETECTED
B PERT.PT PRMT NPH QL NAA+NON-PROBE: NOT DETECTED
BASOPHILS # BLD: 0 K/UL (ref 0–0.2)
BASOPHILS NFR BLD: 0.1 %
BILIRUB SERPL-MCNC: 1.6 MG/DL (ref 0.2–0.7)
BUN SERPL-MCNC: 21 MG/DL (ref 8–23)
C PNEUM DNA NPH QL NAA+NON-PROBE: NOT DETECTED
CALCIUM SERPL-MCNC: 9.3 MG/DL (ref 8.5–9.9)
CHLORIDE SERPL-SCNC: 103 MEQ/L (ref 95–107)
CO2 SERPL-SCNC: 22 MEQ/L (ref 20–31)
CREAT SERPL-MCNC: 2.08 MG/DL (ref 0.7–1.2)
CRP SERPL HS-MCNC: 29.8 MG/L (ref 0–5)
EKG ATRIAL RATE: 76 BPM
EKG P AXIS: 58 DEGREES
EKG P-R INTERVAL: 156 MS
EKG Q-T INTERVAL: 422 MS
EKG QRS DURATION: 84 MS
EKG QTC CALCULATION (BAZETT): 474 MS
EKG R AXIS: 44 DEGREES
EKG T AXIS: 59 DEGREES
EKG VENTRICULAR RATE: 76 BPM
EOSINOPHIL # BLD: 0 K/UL (ref 0–0.7)
EOSINOPHIL NFR BLD: 0.1 %
ERYTHROCYTE [DISTWIDTH] IN BLOOD BY AUTOMATED COUNT: 13.8 % (ref 11.5–14.5)
FLUAV RNA NPH QL NAA+NON-PROBE: NOT DETECTED
FLUBV RNA NPH QL NAA+NON-PROBE: NOT DETECTED
GLOBULIN SER CALC-MCNC: 3 G/DL (ref 2.3–3.5)
GLUCOSE BLD-MCNC: 133 MG/DL (ref 70–99)
GLUCOSE SERPL-MCNC: 117 MG/DL (ref 70–99)
HADV DNA NPH QL NAA+NON-PROBE: NOT DETECTED
HCOV 229E RNA NPH QL NAA+NON-PROBE: NOT DETECTED
HCOV HKU1 RNA NPH QL NAA+NON-PROBE: NOT DETECTED
HCOV NL63 RNA NPH QL NAA+NON-PROBE: NOT DETECTED
HCOV OC43 RNA NPH QL NAA+NON-PROBE: NOT DETECTED
HCT VFR BLD AUTO: 46.7 % (ref 42–52)
HGB BLD-MCNC: 15.8 G/DL (ref 14–18)
HMPV RNA NPH QL NAA+NON-PROBE: NOT DETECTED
HPIV1 RNA NPH QL NAA+NON-PROBE: NOT DETECTED
HPIV2 RNA NPH QL NAA+NON-PROBE: NOT DETECTED
HPIV3 RNA NPH QL NAA+NON-PROBE: NOT DETECTED
HPIV4 RNA NPH QL NAA+NON-PROBE: NOT DETECTED
INR PPP: 2.2
LACTIC ACID, SEPSIS: 1.6 MMOL/L (ref 0.5–1.9)
LACTIC ACID, SEPSIS: 2.3 MMOL/L (ref 0.5–1.9)
LYMPHOCYTES # BLD: 0.7 K/UL (ref 1–4.8)
LYMPHOCYTES NFR BLD: 4.1 %
M PNEUMO DNA NPH QL NAA+NON-PROBE: NOT DETECTED
MCH RBC QN AUTO: 30 PG (ref 27–31.3)
MCHC RBC AUTO-ENTMCNC: 33.8 % (ref 33–37)
MCV RBC AUTO: 88.8 FL (ref 79–92.2)
MONOCYTES # BLD: 0.9 K/UL (ref 0.2–0.8)
MONOCYTES NFR BLD: 5 %
NEUTROPHILS # BLD: 16 K/UL (ref 1.4–6.5)
NEUTS SEG NFR BLD: 89 %
PERFORMED ON: ABNORMAL
PERFORMED ON: ABNORMAL
PLATELET # BLD AUTO: 183 K/UL (ref 130–400)
POC CREATININE: 2.3 MG/DL (ref 0.8–1.3)
POC SAMPLE TYPE: ABNORMAL
POTASSIUM SERPL-SCNC: 4.2 MEQ/L (ref 3.4–4.9)
PROCALCITONIN SERPL IA-MCNC: 1.78 NG/ML (ref 0–0.15)
PROT SERPL-MCNC: 7.1 G/DL (ref 6.3–8)
PROTHROMBIN TIME: 24.1 SEC (ref 12.3–14.9)
RBC # BLD AUTO: 5.26 M/UL (ref 4.7–6.1)
RSV RNA NPH QL NAA+NON-PROBE: NOT DETECTED
RV+EV RNA NPH QL NAA+NON-PROBE: NOT DETECTED
SARS-COV-2 RNA NPH QL NAA+NON-PROBE: NOT DETECTED
SODIUM SERPL-SCNC: 137 MEQ/L (ref 135–144)
TROPONIN, HIGH SENSITIVITY: 182 NG/L (ref 0–19)
TROPONIN, HIGH SENSITIVITY: 185 NG/L (ref 0–19)
TROPONIN, HIGH SENSITIVITY: 198 NG/L (ref 0–19)
WBC # BLD AUTO: 17.9 K/UL (ref 4.8–10.8)

## 2024-10-22 PROCEDURE — 93010 ELECTROCARDIOGRAM REPORT: CPT | Performed by: INTERNAL MEDICINE

## 2024-10-22 PROCEDURE — 36415 COLL VENOUS BLD VENIPUNCTURE: CPT

## 2024-10-22 PROCEDURE — 2580000003 HC RX 258: Performed by: INTERNAL MEDICINE

## 2024-10-22 PROCEDURE — 85025 COMPLETE CBC W/AUTO DIFF WBC: CPT

## 2024-10-22 PROCEDURE — 6370000000 HC RX 637 (ALT 250 FOR IP): Performed by: INTERNAL MEDICINE

## 2024-10-22 PROCEDURE — 99285 EMERGENCY DEPT VISIT HI MDM: CPT

## 2024-10-22 PROCEDURE — 71045 X-RAY EXAM CHEST 1 VIEW: CPT

## 2024-10-22 PROCEDURE — 87040 BLOOD CULTURE FOR BACTERIA: CPT

## 2024-10-22 PROCEDURE — 85610 PROTHROMBIN TIME: CPT

## 2024-10-22 PROCEDURE — 6360000002 HC RX W HCPCS: Performed by: PHYSICIAN ASSISTANT

## 2024-10-22 PROCEDURE — 2580000003 HC RX 258: Performed by: PHYSICIAN ASSISTANT

## 2024-10-22 PROCEDURE — 84484 ASSAY OF TROPONIN QUANT: CPT

## 2024-10-22 PROCEDURE — 84145 PROCALCITONIN (PCT): CPT

## 2024-10-22 PROCEDURE — 83605 ASSAY OF LACTIC ACID: CPT

## 2024-10-22 PROCEDURE — 86140 C-REACTIVE PROTEIN: CPT

## 2024-10-22 PROCEDURE — 0202U NFCT DS 22 TRGT SARS-COV-2: CPT

## 2024-10-22 PROCEDURE — 93005 ELECTROCARDIOGRAM TRACING: CPT | Performed by: PHYSICIAN ASSISTANT

## 2024-10-22 PROCEDURE — 80053 COMPREHEN METABOLIC PANEL: CPT

## 2024-10-22 PROCEDURE — 1210000000 HC MED SURG R&B

## 2024-10-22 PROCEDURE — 51701 INSERT BLADDER CATHETER: CPT

## 2024-10-22 PROCEDURE — 70450 CT HEAD/BRAIN W/O DYE: CPT

## 2024-10-22 PROCEDURE — 6370000000 HC RX 637 (ALT 250 FOR IP): Performed by: PHYSICIAN ASSISTANT

## 2024-10-22 PROCEDURE — 6360000002 HC RX W HCPCS: Performed by: INTERNAL MEDICINE

## 2024-10-22 RX ORDER — ASPIRIN 81 MG/1
81 TABLET, CHEWABLE ORAL DAILY
Status: DISCONTINUED | OUTPATIENT
Start: 2024-10-23 | End: 2024-10-27 | Stop reason: HOSPADM

## 2024-10-22 RX ORDER — SODIUM CHLORIDE 0.9 % (FLUSH) 0.9 %
5-40 SYRINGE (ML) INJECTION PRN
Status: DISCONTINUED | OUTPATIENT
Start: 2024-10-22 | End: 2024-10-27 | Stop reason: HOSPADM

## 2024-10-22 RX ORDER — ACETAMINOPHEN 650 MG/1
650 SUPPOSITORY RECTAL EVERY 6 HOURS PRN
Status: DISCONTINUED | OUTPATIENT
Start: 2024-10-22 | End: 2024-10-27 | Stop reason: HOSPADM

## 2024-10-22 RX ORDER — SODIUM CHLORIDE, SODIUM LACTATE, POTASSIUM CHLORIDE, CALCIUM CHLORIDE 600; 310; 30; 20 MG/100ML; MG/100ML; MG/100ML; MG/100ML
INJECTION, SOLUTION INTRAVENOUS CONTINUOUS
Status: DISCONTINUED | OUTPATIENT
Start: 2024-10-22 | End: 2024-10-24

## 2024-10-22 RX ORDER — ASPIRIN 325 MG
325 TABLET ORAL ONCE
Status: COMPLETED | OUTPATIENT
Start: 2024-10-22 | End: 2024-10-22

## 2024-10-22 RX ORDER — VANCOMYCIN 2 G/400ML
25 INJECTION, SOLUTION INTRAVENOUS ONCE
Status: COMPLETED | OUTPATIENT
Start: 2024-10-22 | End: 2024-10-22

## 2024-10-22 RX ORDER — SODIUM CHLORIDE 9 MG/ML
INJECTION, SOLUTION INTRAVENOUS PRN
Status: DISCONTINUED | OUTPATIENT
Start: 2024-10-22 | End: 2024-10-27 | Stop reason: HOSPADM

## 2024-10-22 RX ORDER — ACETAMINOPHEN 325 MG/1
650 TABLET ORAL EVERY 6 HOURS PRN
Status: DISCONTINUED | OUTPATIENT
Start: 2024-10-22 | End: 2024-10-27 | Stop reason: HOSPADM

## 2024-10-22 RX ORDER — TRAMADOL HYDROCHLORIDE 50 MG/1
50 TABLET ORAL EVERY 8 HOURS PRN
Status: ON HOLD | COMMUNITY
End: 2024-10-27 | Stop reason: HOSPADM

## 2024-10-22 RX ORDER — 0.9 % SODIUM CHLORIDE 0.9 %
1000 INTRAVENOUS SOLUTION INTRAVENOUS ONCE
Status: COMPLETED | OUTPATIENT
Start: 2024-10-22 | End: 2024-10-22

## 2024-10-22 RX ORDER — SODIUM CHLORIDE 0.9 % (FLUSH) 0.9 %
5-40 SYRINGE (ML) INJECTION EVERY 12 HOURS SCHEDULED
Status: DISCONTINUED | OUTPATIENT
Start: 2024-10-22 | End: 2024-10-27 | Stop reason: HOSPADM

## 2024-10-22 RX ADMIN — SODIUM CHLORIDE, POTASSIUM CHLORIDE, SODIUM LACTATE AND CALCIUM CHLORIDE: 600; 310; 30; 20 INJECTION, SOLUTION INTRAVENOUS at 20:07

## 2024-10-22 RX ADMIN — ASPIRIN 325 MG: 325 TABLET ORAL at 18:01

## 2024-10-22 RX ADMIN — SODIUM CHLORIDE 1000 ML: 9 INJECTION, SOLUTION INTRAVENOUS at 18:02

## 2024-10-22 RX ADMIN — CEFTRIAXONE SODIUM 1000 MG: 1 INJECTION, POWDER, FOR SOLUTION INTRAMUSCULAR; INTRAVENOUS at 18:05

## 2024-10-22 RX ADMIN — LEVETIRACETAM 750 MG: 500 TABLET, FILM COATED ORAL at 20:36

## 2024-10-22 RX ADMIN — VANCOMYCIN 2000 MG: 2 INJECTION, SOLUTION INTRAVENOUS at 20:11

## 2024-10-22 ASSESSMENT — PAIN - FUNCTIONAL ASSESSMENT: PAIN_FUNCTIONAL_ASSESSMENT: NONE - DENIES PAIN

## 2024-10-22 NOTE — H&P
Hospital Medicine  History and Physical    Patient:  Aramis David  MRN: [id number]    CHIEF COMPLAINT:    Chief Complaint   Patient presents with    Altered Mental Status     LKW a few days ago       History Obtained From:  Patient, EMR  Primary Care Physician: Syd Maher MD    HISTORY OF PRESENT ILLNESS:   The patient is a 68 y.o. male with PMH of left MCA stroke with residual right sided weakness and aphasia, traumatic ICH s/p fall, s/p PFO closure, DVT/PE s/p IVC filter, seizure disorder, depression, who presented with the above CC. Patient was found to be confused at home today by his brother, stable HD and afebrile on arrival to ED, labs showed leukocytosis, PHAM, elevated troponins and elevated CRP/Procalcitonin, ECG, CXR, CT head and respiratory panel were negative, blood cultures obtained, unable to get urine sample per ED report, ASA, IVFs and Rocephin given, admitted for further management.    Past Medical History:      Diagnosis Date    Chronic right shoulder pain 10/4/2017    Closed TBI (traumatic brain injury) 12/28/2012    Essential hypertension 8/2/2017    Hemiparesis affecting dominant side as late effect of cerebrovascular accident (CVA) ([facility]) 1/30/2018    History of CVA (cerebrovascular accident) 8/2/2017    Hyperlipidemia 8/2/2017    Hypoxemia requiring supplemental oxygen 10/4/2017    Recurrent depression ([facility]) 8/2/2017    S/P patent foramen ovale closure 8/2/2017    Seizure ([facility]) 8/2/2017       Past Surgical History:  No past surgical history on file.    Medications Prior to Admission:    Prior to Admission medications    Medication Sig Start Date End Date Taking? Authorizing Provider   potassium chloride (KLOR-CON M) 20 MEQ extended release tablet Take 1 tablet by mouth daily 10/7/24   Syd Maher MD   ARIPiprazole (ABILIFY) 2 MG tablet TAKE 1 TABLET BY MOUTH DAILY 9/16/24   Syd Maher MD   DULoxetine (CYMBALTA) 60 MG extended release capsule Take 1 capsule by mouth daily 8/26/24

## 2024-10-22 NOTE — ED TRIAGE NOTES
AMS. Last known well 10/18/2024. Right side deficits from previous stroke. Per EMS, pt was 89% RA on arrival and was placed on 2L NC, , 115/72. Per family pt is normally alert to self. Pt not alert to self at this time. Pt soiled upon arrival. Was cleaned and placed in gown.

## 2024-10-22 NOTE — ED PROVIDER NOTES
EKG's are interpreted by the Emergency Department Physician who either signs or Co-signs this chart in the absence of a cardiologist.    EKG shows normal sinus rhythm 76 bpm there is no acute ST segment abnormality no ventricular ectopy, QTc 474 ms    RADIOLOGY:   Non-plain film images such as CT, Ultrasound and MRI are read by the radiologist. Plain radiographic images are visualized and preliminarily interpreted by the emergency physician with the below findings:        Interpretation per the Radiologist below, if available at the time of this note:    XR CHEST PORTABLE   Final Result   No acute cardiopulmonary disease         CT HEAD WO CONTRAST   Final Result   There is evidence of prior infarct is the left frontal and temporal lobes.   No acute hemorrhage is seen.  Recommend MRI or CTA evaluate for evolving   stroke.               ED BEDSIDE ULTRASOUND:   Performed by ED Physician - none    LABS:  Labs Reviewed   CBC WITH AUTO DIFFERENTIAL - Abnormal; Notable for the following components:       Result Value    WBC 17.9 (*)     Neutrophils Absolute 16.0 (*)     Lymphocytes Absolute 0.7 (*)     Monocytes Absolute 0.9 (*)     All other components within normal limits   COMPREHENSIVE METABOLIC PANEL W/ REFLEX TO MG FOR LOW K - Abnormal; Notable for the following components:    Glucose 117 (*)     Creatinine 2.08 (*)     Est, Glom Filt Rate 33.9 (*)     Total Bilirubin 1.6 (*)     AST 45 (*)     All other components within normal limits   PROTIME-INR - Abnormal; Notable for the following components:    Protime 24.1 (*)     All other components within normal limits   TROPONIN - Abnormal; Notable for the following components:    Troponin, High Sensitivity 182 (*)     All other components within normal limits    Narrative:     CALL  [facility]  ED tel. [phone],  Chemistry, TROP results called to and read back by ARIADNE WILLIAM RN AT  , 10/22/2024 12:37, by OSKAR   URINALYSIS WITH REFLEX TO CULTURE - Abnormal;

## 2024-10-23 PROBLEM — I63.9 CEREBROVASCULAR ACCIDENT (CVA) (HCC): Status: ACTIVE | Noted: 2024-10-23

## 2024-10-23 LAB
ALBUMIN SERPL-MCNC: 3.5 G/DL (ref 3.5–4.6)
ALP SERPL-CCNC: 76 U/L (ref 35–104)
ALT SERPL-CCNC: 22 U/L (ref 0–41)
ANION GAP SERPL CALCULATED.3IONS-SCNC: 14 MEQ/L (ref 9–15)
ANION GAP SERPL CALCULATED.3IONS-SCNC: 16 MEQ/L (ref 9–15)
AST SERPL-CCNC: 66 U/L (ref 0–40)
BACTERIA URNS QL MICRO: NEGATIVE /HPF
BASOPHILS # BLD: 0 K/UL (ref 0–0.2)
BASOPHILS NFR BLD: 0.1 %
BILIRUB DIRECT SERPL-MCNC: <0.2 MG/DL (ref 0–0.4)
BILIRUB INDIRECT SERPL-MCNC: ABNORMAL MG/DL (ref 0–0.6)
BILIRUB SERPL-MCNC: 1.4 MG/DL (ref 0.2–0.7)
BILIRUB UR QL STRIP: NEGATIVE
BUN SERPL-MCNC: 24 MG/DL (ref 8–23)
BUN SERPL-MCNC: 25 MG/DL (ref 8–23)
CALCIUM SERPL-MCNC: 8.6 MG/DL (ref 8.5–9.9)
CALCIUM SERPL-MCNC: 8.9 MG/DL (ref 8.5–9.9)
CHLORIDE SERPL-SCNC: 105 MEQ/L (ref 95–107)
CHLORIDE SERPL-SCNC: 106 MEQ/L (ref 95–107)
CK SERPL-CCNC: 2718 U/L (ref 0–190)
CLARITY UR: CLEAR
CO2 SERPL-SCNC: 14 MEQ/L (ref 20–31)
CO2 SERPL-SCNC: 16 MEQ/L (ref 20–31)
COLOR UR: YELLOW
CREAT SERPL-MCNC: 1.22 MG/DL (ref 0.7–1.2)
CREAT SERPL-MCNC: 1.41 MG/DL (ref 0.7–1.2)
CRP SERPL HS-MCNC: 15.3 MG/L (ref 0–5)
EOSINOPHIL # BLD: 0 K/UL (ref 0–0.7)
EOSINOPHIL NFR BLD: 0 %
EPI CELLS #/AREA URNS AUTO: ABNORMAL /HPF (ref 0–5)
ERYTHROCYTE [DISTWIDTH] IN BLOOD BY AUTOMATED COUNT: 13.4 % (ref 11.5–14.5)
GLUCOSE SERPL-MCNC: 119 MG/DL (ref 70–99)
GLUCOSE SERPL-MCNC: 94 MG/DL (ref 70–99)
GLUCOSE UR STRIP-MCNC: NEGATIVE MG/DL
HCT VFR BLD AUTO: 37.8 % (ref 42–52)
HGB BLD-MCNC: 13.1 G/DL (ref 14–18)
HGB UR QL STRIP: ABNORMAL
HYALINE CASTS #/AREA URNS AUTO: ABNORMAL /HPF (ref 0–5)
KETONES UR STRIP-MCNC: 15 MG/DL
LEUKOCYTE ESTERASE UR QL STRIP: NEGATIVE
LYMPHOCYTES # BLD: 1.2 K/UL (ref 1–4.8)
LYMPHOCYTES NFR BLD: 7 %
MAGNESIUM SERPL-MCNC: 2.4 MG/DL (ref 1.7–2.4)
MCH RBC QN AUTO: 30.3 PG (ref 27–31.3)
MCHC RBC AUTO-ENTMCNC: 34.7 % (ref 33–37)
MCV RBC AUTO: 87.3 FL (ref 79–92.2)
MONOCYTES # BLD: 1.1 K/UL (ref 0.2–0.8)
MONOCYTES NFR BLD: 6.1 %
NEUTROPHILS # BLD: 15.1 K/UL (ref 1.4–6.5)
NEUTS SEG NFR BLD: 85.8 %
NITRITE UR QL STRIP: NEGATIVE
PH UR STRIP: 5 [PH] (ref 5–9)
PHOSPHATE SERPL-MCNC: 3.1 MG/DL (ref 2.3–4.8)
PLATELET # BLD AUTO: 142 K/UL (ref 130–400)
POTASSIUM SERPL-SCNC: 4.1 MEQ/L (ref 3.4–4.9)
POTASSIUM SERPL-SCNC: 5.5 MEQ/L (ref 3.4–4.9)
PROCALCITONIN SERPL IA-MCNC: 1.1 NG/ML (ref 0–0.15)
PROT SERPL-MCNC: 5.8 G/DL (ref 6.3–8)
PROT UR STRIP-MCNC: NEGATIVE MG/DL
RBC # BLD AUTO: 4.33 M/UL (ref 4.7–6.1)
RBC #/AREA URNS AUTO: ABNORMAL /HPF (ref 0–5)
SODIUM SERPL-SCNC: 135 MEQ/L (ref 135–144)
SODIUM SERPL-SCNC: 136 MEQ/L (ref 135–144)
SP GR UR STRIP: 1.02 (ref 1–1.03)
TSH SERPL-MCNC: 1.75 UIU/ML (ref 0.44–3.86)
URINE REFLEX TO CULTURE: ABNORMAL
UROBILINOGEN UR STRIP-ACNC: 0.2 E.U./DL
VANCOMYCIN SERPL-MCNC: 9.2 UG/ML (ref 10–40)
WBC # BLD AUTO: 17.6 K/UL (ref 4.8–10.8)
WBC #/AREA URNS AUTO: ABNORMAL /HPF (ref 0–5)

## 2024-10-23 PROCEDURE — 80202 ASSAY OF VANCOMYCIN: CPT

## 2024-10-23 PROCEDURE — 80048 BASIC METABOLIC PNL TOTAL CA: CPT

## 2024-10-23 PROCEDURE — 82550 ASSAY OF CK (CPK): CPT

## 2024-10-23 PROCEDURE — 92610 EVALUATE SWALLOWING FUNCTION: CPT

## 2024-10-23 PROCEDURE — 85025 COMPLETE CBC W/AUTO DIFF WBC: CPT

## 2024-10-23 PROCEDURE — 36415 COLL VENOUS BLD VENIPUNCTURE: CPT

## 2024-10-23 PROCEDURE — 2580000003 HC RX 258: Performed by: INTERNAL MEDICINE

## 2024-10-23 PROCEDURE — 81001 URINALYSIS AUTO W/SCOPE: CPT

## 2024-10-23 PROCEDURE — 84100 ASSAY OF PHOSPHORUS: CPT

## 2024-10-23 PROCEDURE — 92523 SPEECH SOUND LANG COMPREHEN: CPT

## 2024-10-23 PROCEDURE — 6370000000 HC RX 637 (ALT 250 FOR IP): Performed by: NURSE PRACTITIONER

## 2024-10-23 PROCEDURE — 6370000000 HC RX 637 (ALT 250 FOR IP): Performed by: INTERNAL MEDICINE

## 2024-10-23 PROCEDURE — 97166 OT EVAL MOD COMPLEX 45 MIN: CPT

## 2024-10-23 PROCEDURE — 83735 ASSAY OF MAGNESIUM: CPT

## 2024-10-23 PROCEDURE — 1210000000 HC MED SURG R&B

## 2024-10-23 PROCEDURE — APPSS45 APP SPLIT SHARED TIME 31-45 MINUTES: Performed by: NURSE PRACTITIONER

## 2024-10-23 PROCEDURE — 84443 ASSAY THYROID STIM HORMONE: CPT

## 2024-10-23 PROCEDURE — 6360000002 HC RX W HCPCS: Performed by: INTERNAL MEDICINE

## 2024-10-23 PROCEDURE — 99232 SBSQ HOSP IP/OBS MODERATE 35: CPT | Performed by: PSYCHIATRY & NEUROLOGY

## 2024-10-23 PROCEDURE — 97162 PT EVAL MOD COMPLEX 30 MIN: CPT

## 2024-10-23 PROCEDURE — 99223 1ST HOSP IP/OBS HIGH 75: CPT | Performed by: INTERNAL MEDICINE

## 2024-10-23 PROCEDURE — 84145 PROCALCITONIN (PCT): CPT

## 2024-10-23 PROCEDURE — 80076 HEPATIC FUNCTION PANEL: CPT

## 2024-10-23 PROCEDURE — 86140 C-REACTIVE PROTEIN: CPT

## 2024-10-23 RX ORDER — BACLOFEN 10 MG/1
10 TABLET ORAL 3 TIMES DAILY
Status: DISCONTINUED | OUTPATIENT
Start: 2024-10-23 | End: 2024-10-27 | Stop reason: HOSPADM

## 2024-10-23 RX ORDER — METOPROLOL SUCCINATE 25 MG/1
25 TABLET, EXTENDED RELEASE ORAL DAILY
Status: DISCONTINUED | OUTPATIENT
Start: 2024-10-23 | End: 2024-10-25

## 2024-10-23 RX ORDER — ARIPIPRAZOLE 2 MG/1
2 TABLET ORAL DAILY
Status: DISCONTINUED | OUTPATIENT
Start: 2024-10-23 | End: 2024-10-27 | Stop reason: HOSPADM

## 2024-10-23 RX ORDER — ATORVASTATIN CALCIUM 80 MG/1
80 TABLET, FILM COATED ORAL NIGHTLY
Status: DISCONTINUED | OUTPATIENT
Start: 2024-10-23 | End: 2024-10-27 | Stop reason: HOSPADM

## 2024-10-23 RX ORDER — LEVOTHYROXINE SODIUM 50 UG/1
50 TABLET ORAL DAILY
Status: DISCONTINUED | OUTPATIENT
Start: 2024-10-24 | End: 2024-10-27 | Stop reason: HOSPADM

## 2024-10-23 RX ORDER — VANCOMYCIN 1.75 G/350ML
1250 INJECTION, SOLUTION INTRAVENOUS ONCE
Status: COMPLETED | OUTPATIENT
Start: 2024-10-23 | End: 2024-10-24

## 2024-10-23 RX ORDER — DULOXETIN HYDROCHLORIDE 60 MG/1
60 CAPSULE, DELAYED RELEASE ORAL DAILY
Status: DISCONTINUED | OUTPATIENT
Start: 2024-10-23 | End: 2024-10-27 | Stop reason: HOSPADM

## 2024-10-23 RX ADMIN — BACLOFEN 10 MG: 10 TABLET ORAL at 15:43

## 2024-10-23 RX ADMIN — WATER 2000 MG: 1 INJECTION INTRAMUSCULAR; INTRAVENOUS; SUBCUTANEOUS at 05:44

## 2024-10-23 RX ADMIN — SODIUM CHLORIDE, PRESERVATIVE FREE 10 ML: 5 INJECTION INTRAVENOUS at 08:58

## 2024-10-23 RX ADMIN — BACLOFEN 10 MG: 10 TABLET ORAL at 20:48

## 2024-10-23 RX ADMIN — VANCOMYCIN 1250 MG: 1.75 INJECTION, SOLUTION INTRAVENOUS at 23:44

## 2024-10-23 RX ADMIN — ASPIRIN 81 MG: 81 TABLET, CHEWABLE ORAL at 08:58

## 2024-10-23 RX ADMIN — SODIUM CHLORIDE, POTASSIUM CHLORIDE, SODIUM LACTATE AND CALCIUM CHLORIDE: 600; 310; 30; 20 INJECTION, SOLUTION INTRAVENOUS at 12:15

## 2024-10-23 RX ADMIN — RIVAROXABAN 20 MG: 20 TABLET, FILM COATED ORAL at 08:57

## 2024-10-23 RX ADMIN — SODIUM CHLORIDE, POTASSIUM CHLORIDE, SODIUM LACTATE AND CALCIUM CHLORIDE: 600; 310; 30; 20 INJECTION, SOLUTION INTRAVENOUS at 04:53

## 2024-10-23 RX ADMIN — SODIUM CHLORIDE, POTASSIUM CHLORIDE, SODIUM LACTATE AND CALCIUM CHLORIDE: 600; 310; 30; 20 INJECTION, SOLUTION INTRAVENOUS at 19:29

## 2024-10-23 RX ADMIN — ATORVASTATIN CALCIUM 80 MG: 80 TABLET, FILM COATED ORAL at 20:48

## 2024-10-23 RX ADMIN — METOPROLOL SUCCINATE 25 MG: 25 TABLET, FILM COATED, EXTENDED RELEASE ORAL at 12:15

## 2024-10-23 RX ADMIN — DULOXETINE HYDROCHLORIDE 60 MG: 60 CAPSULE, DELAYED RELEASE ORAL at 15:43

## 2024-10-23 RX ADMIN — WATER 2000 MG: 1 INJECTION INTRAMUSCULAR; INTRAVENOUS; SUBCUTANEOUS at 18:05

## 2024-10-23 RX ADMIN — LEVETIRACETAM 1250 MG: 500 TABLET, FILM COATED ORAL at 20:48

## 2024-10-23 RX ADMIN — LEVETIRACETAM 750 MG: 500 TABLET, FILM COATED ORAL at 08:57

## 2024-10-23 ASSESSMENT — PAIN SCALES - GENERAL
PAINLEVEL_OUTOF10: 0
PAINLEVEL_OUTOF10: 0

## 2024-10-23 NOTE — CONSULTS
Physical Medicine & Rehabilitation  Consult Note      Admitting Physician: Tristan Myers MD    Primary Care Provider: Syd Maher MD     Reason for Consult:  Asses rehab needs, promote physical and mental function, analyze level of care to determine rehab needs, improve ability to actively participate in the rehabilitation process, and decrease likelihood of re-admit to the hospital after discharge.      History of Present Illness:    Aramis David is a 68 y.o. male admitted to [facility] on 10/22/2024.     Patient was admitted through the ER on 10/22/2024 with seizure and mental status change.  He had elevated troponins and was diagnosed with a non-ST MI cardiology was consulted.    He was admitted under the care of the hospitalist with neurology and cardiology consult.  Neurology his prescribed Keppra.  Tramadol was DC'd as it lowers the seizure threshold.    He continues to have right-sided weakness and residual aphasia from previous CVA 2021.  He has a remote history of PFO.  He had a closure procedure performed for a percutaneous septal occluder.  He continues on Xarelto.    He had an elevated white count -likely UTI he is on IV Rocephin and Vanco.  UA was positive but --chest x-ray and respiratory panel are negative.  Blood cultures are pending        Altered Mental Status  This is a new problem. The current episode started in the past 7 days. The problem has been gradually improving. Associated symptoms include anorexia, arthralgias, fatigue, headaches, myalgias, numbness and weakness. Pertinent negatives include no chest pain, chills, congestion, diaphoresis, fever, joint swelling or nausea.   Fall  The accident occurred 12 to 24 hours ago. Associated symptoms include headaches and numbness. Pertinent negatives include no fever or nausea.         I reviewed recent nursing notes discussed care with acute care providers, \" AHSAN Luke of trop of 197.8 and perfectserved Dr. Myers of this as

## 2024-10-23 NOTE — CONSULTS
[facility] Consult Note  Name: Aramis David  Age: 68 y.o.  Gender: male  CodeStatus: Full Code  Allergies: No Known Allergies    Chief Complaint:Altered Mental Status (LKW a few days ago)    Primary Care Provider: Syd Maher MD  InpatientTreatment Team: Treatment Team:   Tristan Myers MD Patel, Dhruv R, MD Cho, Donald I, MD Garcia, Jesus, Amena Cedillo, Justine Medina RN  Admission Date: 10/22/2024      HPI   Consulting provider: Pradip Alvarado for confusion  Pt seen and examined for neurology consult.  Patient is a 68-year-old male with past medical history of depression, hyperlipidemia, hypertension, TBI, large left MCA M1 and M2 thrombus, PE and bilateral DVTs secondary to PFO in 2012 and underwent PFO closure in 2013, CVA resulted in right hemiparesis and expressive aphasia, right sided spasticity and clonus, seizure disorder prior to stroke who presented to [facility] emergency room for altered mental status.  Patient was found by his brother who went to check on him.  He was confused, staring off, not answering questions.  Last known well was on 10/20/2024.  Vital signs in the emergency room are 111/79, 78, 15, 98.7, 99%.  EKG showed normal sinus rhythm at 76 bpm.  CT of the head with evidence of prior infarct in the left frontal and temporal lobes.  No acute findings.  Initial lab testing remarkable for white blood cells 17.9, creatinine of 2.08, high-sensitivity troponin of 185, CRP of 29.8, procalcitonin of 1.78, lactate of 2.3, CK of 2718    Patient is currently alert, no acute distress, cooperative.  Brother at bedside.  Patient has ongoing expressive aphasia.  Has right-sided weakness at baseline.  Denies headache.  Afebrile.  No seizure activity overnight.    Patient seen and examined.  Patient continued to have significant right-sided weakness spasticity.  Last well-known was 20th October.  CT only showed evidence of a frontal temporal lobe infarcts  Vitals:    10/23/24 0737

## 2024-10-23 NOTE — DISCHARGE INSTR - COC
transferring to Rehab): {Prognosis:8647088887}    Recommended Labs or Other Treatments After Discharge: ***    Physician Certification: I certify the above information and transfer of Aramis David  is necessary for the continuing treatment of the diagnosis listed and that he requires {Admit to Appropriate Level of Care:40257} for {GREATER/LESS:125038779} 30 days.     Update Admission H&P: {CHP DME Changes in HandP:283444522}    PHYSICIAN SIGNATURE:  {Esignature:454721071}

## 2024-10-23 NOTE — CONSULTS
Inpatient consult to Cardiology  Consult performed by: Amari Quiroz MD  Consult ordered by: Tristan Myers MD          Patient Name: Aramis David  Admit Date: 10/22/2024 11:10 AM  MR #: [id number]  : 1955    Attending Physician: Tristan Myers MD  Reason for consult: Elevated Troponin    History of Presenting Illness:      Aramis David is a 68 y.o. male on hospital day 1 with a history of .   History Obtained From:  patient, electronic medical record    Pt presents to ER with acute MS changes per his Brother.   Brother, Karthikeyan, is in the room. Pt has significant Expressive aphagia but he is currently alert and oriented.     He has extensive Right sided Hemiparesis from prior CVA.  Prior DVT and PE.  S/p IVC Filter  Due to recurrent paradoxical Emboli pt received Amplatz #25 PFO Occluder at [facility].     We are asked to evaluate due to elevated HS Troponin. 182>185>182.  Pt is clear that he has not experienced CP nor SOB. He is able to ambulate minimal w Rolator.     ECG SR 76 no acute changes    History:      EKG:  Past Medical History:   Diagnosis Date    Chronic right shoulder pain 10/4/2017    Closed TBI (traumatic brain injury) 2012    Essential hypertension 2017    Hemiparesis affecting dominant side as late effect of cerebrovascular accident (CVA) ([facility]) 2018    History of CVA (cerebrovascular accident) 2017    Hyperlipidemia 2017    Hypoxemia requiring supplemental oxygen 10/4/2017    Recurrent depression ([facility]) 2017    S/P patent foramen ovale closure 2017    Seizure ([facility]) 2017     History reviewed. No pertinent surgical history.    Family History  No family history on file.  [] Unable to obtain due to ventilated and/ or neurologic status    Social History     Socioeconomic History    Marital status:      Spouse name: Not on file    Number of children: Not on file    Years of education: Not on file    Highest education level: Not on file   Occupational

## 2024-10-23 NOTE — ACP (ADVANCE CARE PLANNING)
Advance Care Planning     Advance Care Planning Activator (Inpatient)  Conversation Note      Date of ACP Conversation: 10/22/2024     Conversation Conducted with: Patient with Decision Making Capacity    ACP Activator: Felisha Freedman RN        Health Care Decision Maker:     Current Designated Health Care Decision Maker:     Primary Decision Maker: Nikhil David - Brother/Sister - [phone]    Secondary Decision Maker: Ivette Magallon - Brother/Sister - [phone]

## 2024-10-23 NOTE — PLAN OF CARE
See OT evaluation for all goals and OT POC. Electronically signed by Gracie Tomlinson OTR/L on 10/23/2024 at 1:48 PM

## 2024-10-24 PROBLEM — E87.5 HYPERKALEMIA: Status: ACTIVE | Noted: 2024-10-24

## 2024-10-24 PROBLEM — N30.00 ACUTE CYSTITIS: Status: ACTIVE | Noted: 2024-10-24

## 2024-10-24 LAB
ALBUMIN SERPL-MCNC: 3.2 G/DL (ref 3.5–4.6)
ALP SERPL-CCNC: 61 U/L (ref 35–104)
ALT SERPL-CCNC: 33 U/L (ref 0–41)
ANION GAP SERPL CALCULATED.3IONS-SCNC: 9 MEQ/L (ref 9–15)
AST SERPL-CCNC: 106 U/L (ref 0–40)
BASOPHILS # BLD: 0 K/UL (ref 0–0.2)
BASOPHILS NFR BLD: 0 %
BILIRUB DIRECT SERPL-MCNC: 0.4 MG/DL (ref 0–0.4)
BILIRUB INDIRECT SERPL-MCNC: 1.2 MG/DL (ref 0–0.6)
BILIRUB SERPL-MCNC: 1.6 MG/DL (ref 0.2–0.7)
BUN SERPL-MCNC: 19 MG/DL (ref 8–23)
CALCIUM SERPL-MCNC: 8.3 MG/DL (ref 8.5–9.9)
CHLORIDE SERPL-SCNC: 105 MEQ/L (ref 95–107)
CK SERPL-CCNC: 8150 U/L (ref 0–190)
CK SERPL-CCNC: 8667 U/L (ref 0–190)
CO2 SERPL-SCNC: 20 MEQ/L (ref 20–31)
CREAT SERPL-MCNC: 1.11 MG/DL (ref 0.7–1.2)
CRP SERPL HS-MCNC: 5.4 MG/L (ref 0–5)
EOSINOPHIL # BLD: 0 K/UL (ref 0–0.7)
EOSINOPHIL NFR BLD: 0.1 %
ERYTHROCYTE [DISTWIDTH] IN BLOOD BY AUTOMATED COUNT: 13.2 % (ref 11.5–14.5)
GLUCOSE SERPL-MCNC: 94 MG/DL (ref 70–99)
HCT VFR BLD AUTO: 35.9 % (ref 42–52)
HGB BLD-MCNC: 12.7 G/DL (ref 14–18)
LYMPHOCYTES # BLD: 1.4 K/UL (ref 1–4.8)
LYMPHOCYTES NFR BLD: 12.5 %
MAGNESIUM SERPL-MCNC: 2.1 MG/DL (ref 1.7–2.4)
MCH RBC QN AUTO: 31.2 PG (ref 27–31.3)
MCHC RBC AUTO-ENTMCNC: 35.4 % (ref 33–37)
MCV RBC AUTO: 88.2 FL (ref 79–92.2)
MONOCYTES # BLD: 1 K/UL (ref 0.2–0.8)
MONOCYTES NFR BLD: 8.5 %
NEUTROPHILS # BLD: 8.9 K/UL (ref 1.4–6.5)
NEUTS SEG NFR BLD: 78.2 %
PHOSPHATE SERPL-MCNC: 2.2 MG/DL (ref 2.3–4.8)
PLATELET # BLD AUTO: 131 K/UL (ref 130–400)
POTASSIUM SERPL-SCNC: 3.7 MEQ/L (ref 3.4–4.9)
PROCALCITONIN SERPL IA-MCNC: 0.5 NG/ML (ref 0–0.15)
PROT SERPL-MCNC: 5.2 G/DL (ref 6.3–8)
RBC # BLD AUTO: 4.07 M/UL (ref 4.7–6.1)
SODIUM SERPL-SCNC: 134 MEQ/L (ref 135–144)
VANCOMYCIN TROUGH SERPL-MCNC: 14.9 UG/ML (ref 10–20)
WBC # BLD AUTO: 11.4 K/UL (ref 4.8–10.8)

## 2024-10-24 PROCEDURE — 84100 ASSAY OF PHOSPHORUS: CPT

## 2024-10-24 PROCEDURE — 6370000000 HC RX 637 (ALT 250 FOR IP): Performed by: INTERNAL MEDICINE

## 2024-10-24 PROCEDURE — 6360000002 HC RX W HCPCS: Performed by: INTERNAL MEDICINE

## 2024-10-24 PROCEDURE — 1210000000 HC MED SURG R&B

## 2024-10-24 PROCEDURE — 80202 ASSAY OF VANCOMYCIN: CPT

## 2024-10-24 PROCEDURE — 86140 C-REACTIVE PROTEIN: CPT

## 2024-10-24 PROCEDURE — 95816 EEG AWAKE AND DROWSY: CPT

## 2024-10-24 PROCEDURE — 97535 SELF CARE MNGMENT TRAINING: CPT

## 2024-10-24 PROCEDURE — 85025 COMPLETE CBC W/AUTO DIFF WBC: CPT

## 2024-10-24 PROCEDURE — 99213 OFFICE O/P EST LOW 20 MIN: CPT

## 2024-10-24 PROCEDURE — 84145 PROCALCITONIN (PCT): CPT

## 2024-10-24 PROCEDURE — 83735 ASSAY OF MAGNESIUM: CPT

## 2024-10-24 PROCEDURE — 82550 ASSAY OF CK (CPK): CPT

## 2024-10-24 PROCEDURE — 99221 1ST HOSP IP/OBS SF/LOW 40: CPT | Performed by: PHYSICAL MEDICINE & REHABILITATION

## 2024-10-24 PROCEDURE — 6370000000 HC RX 637 (ALT 250 FOR IP): Performed by: NURSE PRACTITIONER

## 2024-10-24 PROCEDURE — 99232 SBSQ HOSP IP/OBS MODERATE 35: CPT | Performed by: PSYCHIATRY & NEUROLOGY

## 2024-10-24 PROCEDURE — APPSS15 APP SPLIT SHARED TIME 0-15 MINUTES: Performed by: NURSE PRACTITIONER

## 2024-10-24 PROCEDURE — 2580000003 HC RX 258: Performed by: INTERNAL MEDICINE

## 2024-10-24 PROCEDURE — 99233 SBSQ HOSP IP/OBS HIGH 50: CPT | Performed by: INTERNAL MEDICINE

## 2024-10-24 PROCEDURE — 36415 COLL VENOUS BLD VENIPUNCTURE: CPT

## 2024-10-24 PROCEDURE — 80048 BASIC METABOLIC PNL TOTAL CA: CPT

## 2024-10-24 PROCEDURE — 80076 HEPATIC FUNCTION PANEL: CPT

## 2024-10-24 RX ORDER — VANCOMYCIN 1.5 G/300ML
1500 INJECTION, SOLUTION INTRAVENOUS EVERY 24 HOURS
Status: DISCONTINUED | OUTPATIENT
Start: 2024-10-24 | End: 2024-10-26

## 2024-10-24 RX ORDER — SODIUM CHLORIDE, SODIUM LACTATE, POTASSIUM CHLORIDE, CALCIUM CHLORIDE 600; 310; 30; 20 MG/100ML; MG/100ML; MG/100ML; MG/100ML
INJECTION, SOLUTION INTRAVENOUS CONTINUOUS
Status: DISCONTINUED | OUTPATIENT
Start: 2024-10-24 | End: 2024-10-27 | Stop reason: HOSPADM

## 2024-10-24 RX ADMIN — VANCOMYCIN 1500 MG: 1.5 INJECTION, SOLUTION INTRAVENOUS at 22:56

## 2024-10-24 RX ADMIN — ACETAMINOPHEN 325MG 650 MG: 325 TABLET ORAL at 14:24

## 2024-10-24 RX ADMIN — SODIUM CHLORIDE, POTASSIUM CHLORIDE, SODIUM LACTATE AND CALCIUM CHLORIDE: 600; 310; 30; 20 INJECTION, SOLUTION INTRAVENOUS at 10:39

## 2024-10-24 RX ADMIN — BACLOFEN 10 MG: 10 TABLET ORAL at 14:25

## 2024-10-24 RX ADMIN — WATER 2000 MG: 1 INJECTION INTRAMUSCULAR; INTRAVENOUS; SUBCUTANEOUS at 06:55

## 2024-10-24 RX ADMIN — ASPIRIN 81 MG: 81 TABLET, CHEWABLE ORAL at 08:51

## 2024-10-24 RX ADMIN — DULOXETINE HYDROCHLORIDE 60 MG: 60 CAPSULE, DELAYED RELEASE ORAL at 08:51

## 2024-10-24 RX ADMIN — WATER 2000 MG: 1 INJECTION INTRAMUSCULAR; INTRAVENOUS; SUBCUTANEOUS at 19:54

## 2024-10-24 RX ADMIN — BACLOFEN 10 MG: 10 TABLET ORAL at 08:50

## 2024-10-24 RX ADMIN — LEVETIRACETAM 1250 MG: 500 TABLET, FILM COATED ORAL at 20:38

## 2024-10-24 RX ADMIN — LEVETIRACETAM 1250 MG: 500 TABLET, FILM COATED ORAL at 08:50

## 2024-10-24 RX ADMIN — BACLOFEN 10 MG: 10 TABLET ORAL at 20:38

## 2024-10-24 RX ADMIN — LEVOTHYROXINE SODIUM 50 MCG: 0.05 TABLET ORAL at 08:50

## 2024-10-24 RX ADMIN — RIVAROXABAN 20 MG: 20 TABLET, FILM COATED ORAL at 08:51

## 2024-10-24 RX ADMIN — SODIUM CHLORIDE, POTASSIUM CHLORIDE, SODIUM LACTATE AND CALCIUM CHLORIDE: 600; 310; 30; 20 INJECTION, SOLUTION INTRAVENOUS at 17:47

## 2024-10-24 ASSESSMENT — PAIN SCALES - GENERAL
PAINLEVEL_OUTOF10: 2
PAINLEVEL_OUTOF10: 0

## 2024-10-24 ASSESSMENT — PAIN DESCRIPTION - LOCATION: LOCATION: ARM

## 2024-10-24 ASSESSMENT — PAIN DESCRIPTION - ORIENTATION: ORIENTATION: RIGHT

## 2024-10-24 ASSESSMENT — PAIN DESCRIPTION - DESCRIPTORS: DESCRIPTORS: ACHING

## 2024-10-24 NOTE — PLAN OF CARE
Problem: Discharge Planning  Goal: Discharge to home or other facility with appropriate resources  10/24/2024 1220 by Jossie José RN  Outcome: Progressing  10/24/2024 0345 by Ashlee Mojica RN  Outcome: Progressing  Flowsheets (Taken 10/23/2024 2050)  Discharge to home or other facility with appropriate resources: Identify barriers to discharge with patient and caregiver     Problem: Skin/Tissue Integrity  Goal: Absence of new skin breakdown  Description: 1.  Monitor for areas of redness and/or skin breakdown  2.  Assess vascular access sites hourly  3.  Every 4-6 hours minimum:  Change oxygen saturation probe site  4.  Every 4-6 hours:  If on nasal continuous positive airway pressure, respiratory therapy assess nares and determine need for appliance change or resting period.  10/24/2024 1220 by Jossie José RN  Outcome: Progressing  10/24/2024 0345 by Ashlee Mojica RN  Outcome: Progressing     Problem: Safety - Adult  Goal: Free from fall injury  10/24/2024 1220 by Jossie José RN  Outcome: Progressing  10/24/2024 0345 by Ashlee Mojica RN  Outcome: Progressing     Problem: Chronic Conditions and Co-morbidities  Goal: Patient's chronic conditions and co-morbidity symptoms are monitored and maintained or improved  10/24/2024 1220 by Jossie José RN  Outcome: Progressing  10/24/2024 0345 by Ashlee Mojica RN  Outcome: Progressing  Flowsheets (Taken 10/23/2024 2050)  Care Plan - Patient's Chronic Conditions and Co-Morbidity Symptoms are Monitored and Maintained or Improved: Monitor and assess patient's chronic conditions and comorbid symptoms for stability, deterioration, or improvement

## 2024-10-24 NOTE — DISCHARGE INSTRUCTIONS
- Notify neurologist when starting new medications as anti-seizure meds can interact with prescription and nonprescription medicines which may cause increased side effects or decreased efficacy  - Avoid alcohol or illicit drug use as these can lower seizure threshold  - Take seizure medicine exactly as prescribed  - Inform us of any side effects of medications  - Do not stop seizure medication or change dose unless directed to by health care professional  - No driving until seizure free for 3 months. Patient was advised to check with local DMV for out of state or out of country driving law. Patient was also reminded that DMV may conduct periodic medical update and/or driving test.   - Please keep us updated if you are planning to have children, as some epilepsy medications can affect a developing fetus.   - No swimming alone (including pool, lake, river and sea) and we encourage not to take bath without close supervision due to the risk of drowning.   - Do not engage in any activities that may cause serious injury if you experience reduce alertness such as operating heavy machinery, cooking, using sharp objects. If you need to conduct these activities, it is advisable to have proper supervision. You may want to consider buying power tools or motorized equipments which have safety switch that will stop the machine if you release the handle.   - Please avoid activities that carry high risk of getting head trauma such as contact sports (rugby, boxing, etc).  -  Please wear appropriate protective equipment when engaging in sports activities.  -  Fall precautions  - There is Risk of sudden death in epilepsy: very rare condition of usually nocturnal death, likely related to asphyxiation.  - Call with any questions or concerns

## 2024-10-24 NOTE — PLAN OF CARE
Problem: Discharge Planning  Goal: Discharge to home or other facility with appropriate resources  Outcome: Progressing  Flowsheets (Taken 10/23/2024 2050)  Discharge to home or other facility with appropriate resources: Identify barriers to discharge with patient and caregiver     Problem: Skin/Tissue Integrity  Goal: Absence of new skin breakdown  Description: 1.  Monitor for areas of redness and/or skin breakdown  2.  Assess vascular access sites hourly  3.  Every 4-6 hours minimum:  Change oxygen saturation probe site  4.  Every 4-6 hours:  If on nasal continuous positive airway pressure, respiratory therapy assess nares and determine need for appliance change or resting period.  Outcome: Progressing     Problem: Occupational Therapy - Adult  Goal: By Discharge: Performs self-care activities at highest level of function for planned discharge setting.  See evaluation for individualized goals.  10/23/2024 1348 by Gracie Tomlinson, OTR/L  Outcome: Progressing     Problem: SLP Adult - Impaired Swallowing  Goal: By Discharge: Advance to least restrictive diet without signs or symptoms of aspiration for planned discharge setting.  See evaluation for individualized goals.  10/23/2024 1508 by Aniyah Gallardo, SLP  Outcome: Completed  10/23/2024 1508 by Aniyah Gallardo, SLP  Outcome: Progressing

## 2024-10-25 LAB
ALBUMIN SERPL-MCNC: 3.4 G/DL (ref 3.5–4.6)
ALP SERPL-CCNC: 61 U/L (ref 35–104)
ALT SERPL-CCNC: 47 U/L (ref 0–41)
ANION GAP SERPL CALCULATED.3IONS-SCNC: 13 MEQ/L (ref 9–15)
AST SERPL-CCNC: 125 U/L (ref 0–40)
BASOPHILS # BLD: 0 K/UL (ref 0–0.2)
BASOPHILS NFR BLD: 0.1 %
BILIRUB DIRECT SERPL-MCNC: 0.3 MG/DL (ref 0–0.4)
BILIRUB INDIRECT SERPL-MCNC: 1 MG/DL (ref 0–0.6)
BILIRUB SERPL-MCNC: 1.3 MG/DL (ref 0.2–0.7)
BUN SERPL-MCNC: 13 MG/DL (ref 8–23)
CALCIUM SERPL-MCNC: 8.3 MG/DL (ref 8.5–9.9)
CHLORIDE SERPL-SCNC: 109 MEQ/L (ref 95–107)
CK SERPL-CCNC: 6900 U/L (ref 0–190)
CK SERPL-CCNC: 8550 U/L (ref 0–190)
CO2 SERPL-SCNC: 20 MEQ/L (ref 20–31)
CREAT SERPL-MCNC: 0.99 MG/DL (ref 0.7–1.2)
CRP SERPL HS-MCNC: <3 MG/L (ref 0–5)
EOSINOPHIL # BLD: 0 K/UL (ref 0–0.7)
EOSINOPHIL NFR BLD: 0.2 %
ERYTHROCYTE [DISTWIDTH] IN BLOOD BY AUTOMATED COUNT: 13.2 % (ref 11.5–14.5)
GLUCOSE SERPL-MCNC: 95 MG/DL (ref 70–99)
HCT VFR BLD AUTO: 37 % (ref 42–52)
HGB BLD-MCNC: 13 G/DL (ref 14–18)
LYMPHOCYTES # BLD: 1.9 K/UL (ref 1–4.8)
LYMPHOCYTES NFR BLD: 22.1 %
MAGNESIUM SERPL-MCNC: 1.9 MG/DL (ref 1.7–2.4)
MCH RBC QN AUTO: 30.3 PG (ref 27–31.3)
MCHC RBC AUTO-ENTMCNC: 35.1 % (ref 33–37)
MCV RBC AUTO: 86.2 FL (ref 79–92.2)
MONOCYTES # BLD: 0.8 K/UL (ref 0.2–0.8)
MONOCYTES NFR BLD: 9.5 %
NEUTROPHILS # BLD: 5.8 K/UL (ref 1.4–6.5)
NEUTS SEG NFR BLD: 67.5 %
PHOSPHATE SERPL-MCNC: 2.2 MG/DL (ref 2.3–4.8)
PLATELET # BLD AUTO: 122 K/UL (ref 130–400)
POTASSIUM SERPL-SCNC: 3.7 MEQ/L (ref 3.4–4.9)
PROCALCITONIN SERPL IA-MCNC: 0.23 NG/ML (ref 0–0.15)
PROT SERPL-MCNC: 5.4 G/DL (ref 6.3–8)
RBC # BLD AUTO: 4.29 M/UL (ref 4.7–6.1)
SODIUM SERPL-SCNC: 142 MEQ/L (ref 135–144)
WBC # BLD AUTO: 8.6 K/UL (ref 4.8–10.8)

## 2024-10-25 PROCEDURE — 82550 ASSAY OF CK (CPK): CPT

## 2024-10-25 PROCEDURE — 36415 COLL VENOUS BLD VENIPUNCTURE: CPT

## 2024-10-25 PROCEDURE — 82085 ASSAY OF ALDOLASE: CPT

## 2024-10-25 PROCEDURE — 92507 TX SP LANG VOICE COMM INDIV: CPT

## 2024-10-25 PROCEDURE — 99232 SBSQ HOSP IP/OBS MODERATE 35: CPT | Performed by: PSYCHIATRY & NEUROLOGY

## 2024-10-25 PROCEDURE — 86140 C-REACTIVE PROTEIN: CPT

## 2024-10-25 PROCEDURE — 80048 BASIC METABOLIC PNL TOTAL CA: CPT

## 2024-10-25 PROCEDURE — 6370000000 HC RX 637 (ALT 250 FOR IP): Performed by: INTERNAL MEDICINE

## 2024-10-25 PROCEDURE — APPSS30 APP SPLIT SHARED TIME 16-30 MINUTES: Performed by: NURSE PRACTITIONER

## 2024-10-25 PROCEDURE — 97112 NEUROMUSCULAR REEDUCATION: CPT

## 2024-10-25 PROCEDURE — 95816 EEG AWAKE AND DROWSY: CPT | Performed by: PSYCHIATRY & NEUROLOGY

## 2024-10-25 PROCEDURE — 2580000003 HC RX 258: Performed by: INTERNAL MEDICINE

## 2024-10-25 PROCEDURE — 85025 COMPLETE CBC W/AUTO DIFF WBC: CPT

## 2024-10-25 PROCEDURE — 99233 SBSQ HOSP IP/OBS HIGH 50: CPT | Performed by: INTERNAL MEDICINE

## 2024-10-25 PROCEDURE — 80076 HEPATIC FUNCTION PANEL: CPT

## 2024-10-25 PROCEDURE — 84100 ASSAY OF PHOSPHORUS: CPT

## 2024-10-25 PROCEDURE — 6360000002 HC RX W HCPCS: Performed by: INTERNAL MEDICINE

## 2024-10-25 PROCEDURE — 83735 ASSAY OF MAGNESIUM: CPT

## 2024-10-25 PROCEDURE — 6370000000 HC RX 637 (ALT 250 FOR IP): Performed by: NURSE PRACTITIONER

## 2024-10-25 PROCEDURE — 84145 PROCALCITONIN (PCT): CPT

## 2024-10-25 PROCEDURE — 97535 SELF CARE MNGMENT TRAINING: CPT

## 2024-10-25 PROCEDURE — 99232 SBSQ HOSP IP/OBS MODERATE 35: CPT | Performed by: PHYSICAL MEDICINE & REHABILITATION

## 2024-10-25 PROCEDURE — 1210000000 HC MED SURG R&B

## 2024-10-25 RX ORDER — METOPROLOL SUCCINATE 25 MG/1
12.5 TABLET, EXTENDED RELEASE ORAL DAILY
Status: DISCONTINUED | OUTPATIENT
Start: 2024-10-26 | End: 2024-10-27 | Stop reason: HOSPADM

## 2024-10-25 RX ADMIN — ASPIRIN 81 MG: 81 TABLET, CHEWABLE ORAL at 08:11

## 2024-10-25 RX ADMIN — SODIUM CHLORIDE, POTASSIUM CHLORIDE, SODIUM LACTATE AND CALCIUM CHLORIDE: 600; 310; 30; 20 INJECTION, SOLUTION INTRAVENOUS at 14:59

## 2024-10-25 RX ADMIN — SODIUM CHLORIDE, POTASSIUM CHLORIDE, SODIUM LACTATE AND CALCIUM CHLORIDE: 600; 310; 30; 20 INJECTION, SOLUTION INTRAVENOUS at 08:21

## 2024-10-25 RX ADMIN — SODIUM CHLORIDE, POTASSIUM CHLORIDE, SODIUM LACTATE AND CALCIUM CHLORIDE: 600; 310; 30; 20 INJECTION, SOLUTION INTRAVENOUS at 23:30

## 2024-10-25 RX ADMIN — LEVETIRACETAM 1250 MG: 500 TABLET, FILM COATED ORAL at 08:10

## 2024-10-25 RX ADMIN — RIVAROXABAN 20 MG: 20 TABLET, FILM COATED ORAL at 08:11

## 2024-10-25 RX ADMIN — BACLOFEN 10 MG: 10 TABLET ORAL at 20:40

## 2024-10-25 RX ADMIN — VANCOMYCIN 1500 MG: 1.5 INJECTION, SOLUTION INTRAVENOUS at 23:31

## 2024-10-25 RX ADMIN — SODIUM CHLORIDE, POTASSIUM CHLORIDE, SODIUM LACTATE AND CALCIUM CHLORIDE: 600; 310; 30; 20 INJECTION, SOLUTION INTRAVENOUS at 01:36

## 2024-10-25 RX ADMIN — LEVETIRACETAM 1250 MG: 500 TABLET, FILM COATED ORAL at 20:40

## 2024-10-25 RX ADMIN — BACLOFEN 10 MG: 10 TABLET ORAL at 14:49

## 2024-10-25 RX ADMIN — METOPROLOL SUCCINATE 25 MG: 25 TABLET, FILM COATED, EXTENDED RELEASE ORAL at 08:20

## 2024-10-25 RX ADMIN — WATER 2000 MG: 1 INJECTION INTRAMUSCULAR; INTRAVENOUS; SUBCUTANEOUS at 05:36

## 2024-10-25 RX ADMIN — DULOXETINE HYDROCHLORIDE 60 MG: 60 CAPSULE, DELAYED RELEASE ORAL at 08:11

## 2024-10-25 RX ADMIN — BACLOFEN 10 MG: 10 TABLET ORAL at 08:11

## 2024-10-25 RX ADMIN — LEVOTHYROXINE SODIUM 50 MCG: 0.05 TABLET ORAL at 08:11

## 2024-10-25 RX ADMIN — WATER 2000 MG: 1 INJECTION INTRAMUSCULAR; INTRAVENOUS; SUBCUTANEOUS at 17:02

## 2024-10-25 NOTE — PLAN OF CARE
Problem: Discharge Planning  Goal: Discharge to home or other facility with appropriate resources  10/25/2024 0923 by Jossie José RN  Outcome: Progressing  10/25/2024 0026 by Oriana Van RN  Outcome: Progressing     Problem: Skin/Tissue Integrity  Goal: Absence of new skin breakdown  Description: 1.  Monitor for areas of redness and/or skin breakdown  2.  Assess vascular access sites hourly  3.  Every 4-6 hours minimum:  Change oxygen saturation probe site  4.  Every 4-6 hours:  If on nasal continuous positive airway pressure, respiratory therapy assess nares and determine need for appliance change or resting period.  10/25/2024 0923 by Jossie José RN  Outcome: Progressing  10/25/2024 0026 by Oriana Van RN  Outcome: Progressing     Problem: Safety - Adult  Goal: Free from fall injury  10/25/2024 0923 by Jossie José RN  Outcome: Progressing  10/25/2024 0026 by Oriana Van RN  Outcome: Progressing     Problem: Chronic Conditions and Co-morbidities  Goal: Patient's chronic conditions and co-morbidity symptoms are monitored and maintained or improved  10/25/2024 0923 by Jossie José RN  Outcome: Progressing  10/25/2024 0026 by Oriana Van RN  Outcome: Progressing     Problem: Pain  Goal: Verbalizes/displays adequate comfort level or baseline comfort level  10/25/2024 0923 by Jossie José RN  Outcome: Progressing  10/25/2024 0026 by Oriana Van, RN  Outcome: Progressing

## 2024-10-25 NOTE — PROCEDURES
[facility]                   [address] [place] [address]                          ELECTROENCEPHALOGRAM      PATIENT NAME: CRYSTAL WASHINGTON              : 1955  MED REC NO: [id number]                        ROOM: [id number]  ACCOUNT NO: [id number]                       ADMIT DATE: 10/22/2024  PROVIDER: German Tierney MD      MEDICATIONS:  Tramadol, Klor-Con, Abilify, Cymbalta, Keppra, Xarelto, vancomycin, Lipitor, and Synthroid.    DESCRIPTION:  This is a spontaneous 20-channel EEG recording for this patient with questionable seizures.  The background rhythm of the EEG shows a well-regulated 8 to 9 Hz posterior dominant rhythm of alpha.  Subtle eye flutter was noted without any notable seizure activity.  The record remains symmetrical throughout otherwise.  High movements continue with the eyelid artifacts only.  Photic stimulation does not reveal photic responses.  Hyperventilation is not performed.    IMPRESSION:  This is a normal well-regulated electroencephalogram recording with eyelid artifacts.  Clinical correlation recommended.          GERMAN TIERNEY MD      D:  10/25/2024 12:14:16     T:  10/25/2024 12:45:21     RICARDO/ANGELO  Job #:  [id number]     Doc#:  [id number]

## 2024-10-25 NOTE — PLAN OF CARE
Problem: Discharge Planning  Goal: Discharge to home or other facility with appropriate resources  10/25/2024 0026 by Oriana Van RN  Outcome: Progressing  10/24/2024 1220 by Jossie José RN  Outcome: Progressing     Problem: Skin/Tissue Integrity  Goal: Absence of new skin breakdown  Description: 1.  Monitor for areas of redness and/or skin breakdown  2.  Assess vascular access sites hourly  3.  Every 4-6 hours minimum:  Change oxygen saturation probe site  4.  Every 4-6 hours:  If on nasal continuous positive airway pressure, respiratory therapy assess nares and determine need for appliance change or resting period.  10/25/2024 0026 by Oriana Van, RN  Outcome: Progressing  10/24/2024 1220 by Jossie José RN  Outcome: Progressing     Problem: Safety - Adult  Goal: Free from fall injury  10/25/2024 0026 by Oriana Van RN  Outcome: Progressing  10/24/2024 1220 by Jossie José RN  Outcome: Progressing     Problem: Chronic Conditions and Co-morbidities  Goal: Patient's chronic conditions and co-morbidity symptoms are monitored and maintained or improved  10/25/2024 0026 by Oriana Van, RN  Outcome: Progressing  10/24/2024 1220 by Jossie José RN  Outcome: Progressing     Problem: Pain  Goal: Verbalizes/displays adequate comfort level or baseline comfort level  Outcome: Progressing

## 2024-10-26 LAB
ALBUMIN SERPL-MCNC: 3.7 G/DL (ref 3.5–4.6)
ALDOLASE SERPL-CCNC: 64.1 U/L (ref 1.2–7.6)
ALP SERPL-CCNC: 70 U/L (ref 35–104)
ALT SERPL-CCNC: 55 U/L (ref 0–41)
ANION GAP SERPL CALCULATED.3IONS-SCNC: 12 MEQ/L (ref 9–15)
AST SERPL-CCNC: 122 U/L (ref 0–40)
BASOPHILS # BLD: 0 K/UL (ref 0–0.2)
BASOPHILS NFR BLD: 0.2 %
BILIRUB DIRECT SERPL-MCNC: 0.4 MG/DL (ref 0–0.4)
BILIRUB INDIRECT SERPL-MCNC: 1 MG/DL (ref 0–0.6)
BILIRUB SERPL-MCNC: 1.4 MG/DL (ref 0.2–0.7)
BUN SERPL-MCNC: 9 MG/DL (ref 8–23)
CALCIUM SERPL-MCNC: 8.6 MG/DL (ref 8.5–9.9)
CHLORIDE SERPL-SCNC: 103 MEQ/L (ref 95–107)
CK SERPL-CCNC: 6306 U/L (ref 0–190)
CO2 SERPL-SCNC: 25 MEQ/L (ref 20–31)
CREAT SERPL-MCNC: 0.93 MG/DL (ref 0.7–1.2)
EOSINOPHIL # BLD: 0 K/UL (ref 0–0.7)
EOSINOPHIL NFR BLD: 0.3 %
ERYTHROCYTE [DISTWIDTH] IN BLOOD BY AUTOMATED COUNT: 13 % (ref 11.5–14.5)
GLUCOSE SERPL-MCNC: 95 MG/DL (ref 70–99)
HCT VFR BLD AUTO: 41 % (ref 42–52)
HGB BLD-MCNC: 15.1 G/DL (ref 14–18)
LYMPHOCYTES # BLD: 1.7 K/UL (ref 1–4.8)
LYMPHOCYTES NFR BLD: 16.4 %
MAGNESIUM SERPL-MCNC: 1.8 MG/DL (ref 1.7–2.4)
MCH RBC QN AUTO: 31.4 PG (ref 27–31.3)
MCHC RBC AUTO-ENTMCNC: 36.8 % (ref 33–37)
MCV RBC AUTO: 85.2 FL (ref 79–92.2)
MONOCYTES # BLD: 0.9 K/UL (ref 0.2–0.8)
MONOCYTES NFR BLD: 8.2 %
NEUTROPHILS # BLD: 7.9 K/UL (ref 1.4–6.5)
NEUTS SEG NFR BLD: 74.6 %
PHOSPHATE SERPL-MCNC: 2.3 MG/DL (ref 2.3–4.8)
PLATELET # BLD AUTO: 135 K/UL (ref 130–400)
POTASSIUM SERPL-SCNC: 2.9 MEQ/L (ref 3.4–4.9)
PROT SERPL-MCNC: 5.9 G/DL (ref 6.3–8)
RBC # BLD AUTO: 4.81 M/UL (ref 4.7–6.1)
SODIUM SERPL-SCNC: 140 MEQ/L (ref 135–144)
VANCOMYCIN SERPL-MCNC: 24.4 UG/ML (ref 10–40)
WBC # BLD AUTO: 10.5 K/UL (ref 4.8–10.8)

## 2024-10-26 PROCEDURE — 36415 COLL VENOUS BLD VENIPUNCTURE: CPT

## 2024-10-26 PROCEDURE — 84100 ASSAY OF PHOSPHORUS: CPT

## 2024-10-26 PROCEDURE — 6370000000 HC RX 637 (ALT 250 FOR IP): Performed by: NURSE PRACTITIONER

## 2024-10-26 PROCEDURE — 83735 ASSAY OF MAGNESIUM: CPT

## 2024-10-26 PROCEDURE — 85025 COMPLETE CBC W/AUTO DIFF WBC: CPT

## 2024-10-26 PROCEDURE — 2580000003 HC RX 258: Performed by: INTERNAL MEDICINE

## 2024-10-26 PROCEDURE — 6360000002 HC RX W HCPCS: Performed by: INTERNAL MEDICINE

## 2024-10-26 PROCEDURE — 80076 HEPATIC FUNCTION PANEL: CPT

## 2024-10-26 PROCEDURE — 82550 ASSAY OF CK (CPK): CPT

## 2024-10-26 PROCEDURE — 80202 ASSAY OF VANCOMYCIN: CPT

## 2024-10-26 PROCEDURE — 99232 SBSQ HOSP IP/OBS MODERATE 35: CPT | Performed by: PSYCHIATRY & NEUROLOGY

## 2024-10-26 PROCEDURE — 6370000000 HC RX 637 (ALT 250 FOR IP): Performed by: INTERNAL MEDICINE

## 2024-10-26 PROCEDURE — 99232 SBSQ HOSP IP/OBS MODERATE 35: CPT | Performed by: PHYSICAL MEDICINE & REHABILITATION

## 2024-10-26 PROCEDURE — 80048 BASIC METABOLIC PNL TOTAL CA: CPT

## 2024-10-26 PROCEDURE — 1210000000 HC MED SURG R&B

## 2024-10-26 RX ORDER — POTASSIUM CHLORIDE 1500 MG/1
40 TABLET, EXTENDED RELEASE ORAL 3 TIMES DAILY
Status: COMPLETED | OUTPATIENT
Start: 2024-10-26 | End: 2024-10-26

## 2024-10-26 RX ADMIN — POTASSIUM CHLORIDE 40 MEQ: 1500 TABLET, EXTENDED RELEASE ORAL at 19:11

## 2024-10-26 RX ADMIN — LEVOTHYROXINE SODIUM 50 MCG: 0.05 TABLET ORAL at 09:50

## 2024-10-26 RX ADMIN — LEVETIRACETAM 1250 MG: 500 TABLET, FILM COATED ORAL at 21:23

## 2024-10-26 RX ADMIN — POTASSIUM CHLORIDE 40 MEQ: 1500 TABLET, EXTENDED RELEASE ORAL at 13:58

## 2024-10-26 RX ADMIN — DULOXETINE HYDROCHLORIDE 60 MG: 60 CAPSULE, DELAYED RELEASE ORAL at 09:50

## 2024-10-26 RX ADMIN — BACLOFEN 10 MG: 10 TABLET ORAL at 09:50

## 2024-10-26 RX ADMIN — LEVETIRACETAM 1250 MG: 500 TABLET, FILM COATED ORAL at 09:49

## 2024-10-26 RX ADMIN — POTASSIUM CHLORIDE 40 MEQ: 1500 TABLET, EXTENDED RELEASE ORAL at 22:59

## 2024-10-26 RX ADMIN — METOPROLOL SUCCINATE 12.5 MG: 25 TABLET, EXTENDED RELEASE ORAL at 09:50

## 2024-10-26 RX ADMIN — ASPIRIN 81 MG: 81 TABLET, CHEWABLE ORAL at 09:50

## 2024-10-26 RX ADMIN — BACLOFEN 10 MG: 10 TABLET ORAL at 13:58

## 2024-10-26 RX ADMIN — RIVAROXABAN 20 MG: 20 TABLET, FILM COATED ORAL at 09:50

## 2024-10-26 RX ADMIN — SODIUM CHLORIDE, POTASSIUM CHLORIDE, SODIUM LACTATE AND CALCIUM CHLORIDE: 600; 310; 30; 20 INJECTION, SOLUTION INTRAVENOUS at 09:56

## 2024-10-26 RX ADMIN — WATER 2000 MG: 1 INJECTION INTRAMUSCULAR; INTRAVENOUS; SUBCUTANEOUS at 05:04

## 2024-10-26 RX ADMIN — BACLOFEN 10 MG: 10 TABLET ORAL at 21:23

## 2024-10-26 RX ADMIN — SODIUM CHLORIDE, POTASSIUM CHLORIDE, SODIUM LACTATE AND CALCIUM CHLORIDE: 600; 310; 30; 20 INJECTION, SOLUTION INTRAVENOUS at 22:59

## 2024-10-26 ASSESSMENT — PAIN SCALES - GENERAL: PAINLEVEL_OUTOF10: 0

## 2024-10-27 ENCOUNTER — HOSPITAL ENCOUNTER (INPATIENT)
Age: 69
LOS: 13 days | Discharge: NURSING, REHAB OR LONG-TERM CARE | End: 2024-11-09
Attending: PHYSICAL MEDICINE & REHABILITATION | Admitting: PHYSICAL MEDICINE & REHABILITATION
Payer: MEDICARE

## 2024-10-27 VITALS
TEMPERATURE: 98.6 F | RESPIRATION RATE: 16 BRPM | BODY MASS INDEX: 26.31 KG/M2 | HEIGHT: 68 IN | OXYGEN SATURATION: 97 % | SYSTOLIC BLOOD PRESSURE: 149 MMHG | WEIGHT: 173.6 LBS | HEART RATE: 60 BPM | DIASTOLIC BLOOD PRESSURE: 81 MMHG

## 2024-10-27 DIAGNOSIS — I63.81 CEREBROVASCULAR ACCIDENT (CVA) DUE TO OCCLUSION OF SMALL ARTERY (HCC): Primary | ICD-10-CM

## 2024-10-27 LAB
ALBUMIN SERPL-MCNC: 3.5 G/DL (ref 3.5–4.6)
ALP SERPL-CCNC: 71 U/L (ref 35–104)
ALT SERPL-CCNC: 49 U/L (ref 0–41)
ANION GAP SERPL CALCULATED.3IONS-SCNC: 7 MEQ/L (ref 9–15)
AST SERPL-CCNC: 76 U/L (ref 0–40)
BACTERIA BLD CULT ORG #2: NORMAL
BACTERIA BLD CULT: NORMAL
BASOPHILS # BLD: 0 K/UL (ref 0–0.2)
BASOPHILS NFR BLD: 0.2 %
BILIRUB DIRECT SERPL-MCNC: 0.3 MG/DL (ref 0–0.4)
BILIRUB INDIRECT SERPL-MCNC: 1.1 MG/DL (ref 0–0.6)
BILIRUB SERPL-MCNC: 1.4 MG/DL (ref 0.2–0.7)
BUN SERPL-MCNC: 9 MG/DL (ref 8–23)
CALCIUM SERPL-MCNC: 8.4 MG/DL (ref 8.5–9.9)
CHLORIDE SERPL-SCNC: 106 MEQ/L (ref 95–107)
CK SERPL-CCNC: 2752 U/L (ref 0–190)
CO2 SERPL-SCNC: 26 MEQ/L (ref 20–31)
CREAT SERPL-MCNC: 0.85 MG/DL (ref 0.7–1.2)
EOSINOPHIL # BLD: 0.1 K/UL (ref 0–0.7)
EOSINOPHIL NFR BLD: 0.5 %
ERYTHROCYTE [DISTWIDTH] IN BLOOD BY AUTOMATED COUNT: 13.1 % (ref 11.5–14.5)
GLUCOSE SERPL-MCNC: 104 MG/DL (ref 70–99)
HCT VFR BLD AUTO: 39.7 % (ref 42–52)
HGB BLD-MCNC: 14.1 G/DL (ref 14–18)
LYMPHOCYTES # BLD: 2 K/UL (ref 1–4.8)
LYMPHOCYTES NFR BLD: 16.6 %
MAGNESIUM SERPL-MCNC: 1.7 MG/DL (ref 1.7–2.4)
MCH RBC QN AUTO: 30.6 PG (ref 27–31.3)
MCHC RBC AUTO-ENTMCNC: 35.5 % (ref 33–37)
MCV RBC AUTO: 86.1 FL (ref 79–92.2)
MONOCYTES # BLD: 0.9 K/UL (ref 0.2–0.8)
MONOCYTES NFR BLD: 7.1 %
NEUTROPHILS # BLD: 9.1 K/UL (ref 1.4–6.5)
NEUTS SEG NFR BLD: 75.1 %
PHOSPHATE SERPL-MCNC: 2.3 MG/DL (ref 2.3–4.8)
PLATELET # BLD AUTO: 153 K/UL (ref 130–400)
POTASSIUM SERPL-SCNC: 3.7 MEQ/L (ref 3.4–4.9)
PROT SERPL-MCNC: 5.5 G/DL (ref 6.3–8)
RBC # BLD AUTO: 4.61 M/UL (ref 4.7–6.1)
SODIUM SERPL-SCNC: 139 MEQ/L (ref 135–144)
WBC # BLD AUTO: 12.2 K/UL (ref 4.8–10.8)

## 2024-10-27 PROCEDURE — 2580000003 HC RX 258: Performed by: INTERNAL MEDICINE

## 2024-10-27 PROCEDURE — 6370000000 HC RX 637 (ALT 250 FOR IP): Performed by: NURSE PRACTITIONER

## 2024-10-27 PROCEDURE — 1180000000 HC REHAB R&B

## 2024-10-27 PROCEDURE — 80076 HEPATIC FUNCTION PANEL: CPT

## 2024-10-27 PROCEDURE — 6370000000 HC RX 637 (ALT 250 FOR IP): Performed by: INTERNAL MEDICINE

## 2024-10-27 PROCEDURE — 99232 SBSQ HOSP IP/OBS MODERATE 35: CPT | Performed by: INTERNAL MEDICINE

## 2024-10-27 PROCEDURE — 84100 ASSAY OF PHOSPHORUS: CPT

## 2024-10-27 PROCEDURE — 99232 SBSQ HOSP IP/OBS MODERATE 35: CPT | Performed by: PSYCHIATRY & NEUROLOGY

## 2024-10-27 PROCEDURE — 82550 ASSAY OF CK (CPK): CPT

## 2024-10-27 PROCEDURE — 97535 SELF CARE MNGMENT TRAINING: CPT

## 2024-10-27 PROCEDURE — 85025 COMPLETE CBC W/AUTO DIFF WBC: CPT

## 2024-10-27 PROCEDURE — 80048 BASIC METABOLIC PNL TOTAL CA: CPT

## 2024-10-27 PROCEDURE — 36415 COLL VENOUS BLD VENIPUNCTURE: CPT

## 2024-10-27 PROCEDURE — 83735 ASSAY OF MAGNESIUM: CPT

## 2024-10-27 RX ORDER — BACLOFEN 10 MG/1
10 TABLET ORAL 3 TIMES DAILY
Status: CANCELLED | OUTPATIENT
Start: 2024-10-27

## 2024-10-27 RX ORDER — ARIPIPRAZOLE 2 MG/1
2 TABLET ORAL DAILY
Status: CANCELLED | OUTPATIENT
Start: 2024-10-28

## 2024-10-27 RX ORDER — LEVOTHYROXINE SODIUM 50 UG/1
50 TABLET ORAL DAILY
Status: DISCONTINUED | OUTPATIENT
Start: 2024-10-28 | End: 2024-11-09 | Stop reason: HOSPADM

## 2024-10-27 RX ORDER — LEVETIRACETAM 500 MG/1
1250 TABLET ORAL 2 TIMES DAILY
Status: DISCONTINUED | OUTPATIENT
Start: 2024-10-27 | End: 2024-11-09 | Stop reason: HOSPADM

## 2024-10-27 RX ORDER — DULOXETIN HYDROCHLORIDE 60 MG/1
60 CAPSULE, DELAYED RELEASE ORAL DAILY
Status: CANCELLED | OUTPATIENT
Start: 2024-10-28

## 2024-10-27 RX ORDER — METOPROLOL SUCCINATE 25 MG/1
12.5 TABLET, EXTENDED RELEASE ORAL DAILY
Status: CANCELLED | OUTPATIENT
Start: 2024-10-28

## 2024-10-27 RX ORDER — ACETAMINOPHEN 325 MG/1
650 TABLET ORAL EVERY 4 HOURS PRN
Status: DISCONTINUED | OUTPATIENT
Start: 2024-10-27 | End: 2024-11-09 | Stop reason: HOSPADM

## 2024-10-27 RX ORDER — BISACODYL 10 MG
10 SUPPOSITORY, RECTAL RECTAL DAILY PRN
Status: DISCONTINUED | OUTPATIENT
Start: 2024-10-27 | End: 2024-11-09 | Stop reason: HOSPADM

## 2024-10-27 RX ORDER — ARIPIPRAZOLE 2 MG/1
2 TABLET ORAL DAILY
Status: DISCONTINUED | OUTPATIENT
Start: 2024-10-28 | End: 2024-11-09 | Stop reason: HOSPADM

## 2024-10-27 RX ORDER — ACETAMINOPHEN 325 MG/1
650 TABLET ORAL EVERY 6 HOURS PRN
Status: CANCELLED | OUTPATIENT
Start: 2024-10-27

## 2024-10-27 RX ORDER — DULOXETIN HYDROCHLORIDE 60 MG/1
60 CAPSULE, DELAYED RELEASE ORAL DAILY
Status: DISCONTINUED | OUTPATIENT
Start: 2024-10-28 | End: 2024-11-09 | Stop reason: HOSPADM

## 2024-10-27 RX ORDER — ACETAMINOPHEN 650 MG/1
650 SUPPOSITORY RECTAL EVERY 6 HOURS PRN
Status: CANCELLED | OUTPATIENT
Start: 2024-10-27

## 2024-10-27 RX ORDER — METOPROLOL SUCCINATE 25 MG/1
12.5 TABLET, EXTENDED RELEASE ORAL DAILY
Status: DISCONTINUED | OUTPATIENT
Start: 2024-10-28 | End: 2024-11-09 | Stop reason: HOSPADM

## 2024-10-27 RX ORDER — ACETAMINOPHEN 650 MG/1
650 SUPPOSITORY RECTAL EVERY 6 HOURS PRN
Status: DISCONTINUED | OUTPATIENT
Start: 2024-10-27 | End: 2024-10-28

## 2024-10-27 RX ORDER — BACLOFEN 10 MG/1
10 TABLET ORAL 3 TIMES DAILY
Status: DISCONTINUED | OUTPATIENT
Start: 2024-10-27 | End: 2024-11-09 | Stop reason: HOSPADM

## 2024-10-27 RX ORDER — LEVOTHYROXINE SODIUM 50 UG/1
50 TABLET ORAL DAILY
Status: CANCELLED | OUTPATIENT
Start: 2024-10-28

## 2024-10-27 RX ORDER — ACETAMINOPHEN 325 MG/1
650 TABLET ORAL EVERY 6 HOURS PRN
Status: DISCONTINUED | OUTPATIENT
Start: 2024-10-27 | End: 2024-10-28

## 2024-10-27 RX ORDER — SODIUM PHOSPHATE, DIBASIC AND SODIUM PHOSPHATE, MONOBASIC 7; 19 G/230ML; G/230ML
1 ENEMA RECTAL DAILY PRN
Status: DISCONTINUED | OUTPATIENT
Start: 2024-10-27 | End: 2024-11-09 | Stop reason: HOSPADM

## 2024-10-27 RX ORDER — ASPIRIN 81 MG/1
81 TABLET, CHEWABLE ORAL DAILY
Status: CANCELLED | OUTPATIENT
Start: 2024-10-28

## 2024-10-27 RX ORDER — ASPIRIN 81 MG/1
81 TABLET, CHEWABLE ORAL DAILY
Status: DISCONTINUED | OUTPATIENT
Start: 2024-10-28 | End: 2024-11-09 | Stop reason: HOSPADM

## 2024-10-27 RX ADMIN — LEVETIRACETAM 1250 MG: 500 TABLET, FILM COATED ORAL at 21:00

## 2024-10-27 RX ADMIN — DULOXETINE HYDROCHLORIDE 60 MG: 60 CAPSULE, DELAYED RELEASE ORAL at 08:06

## 2024-10-27 RX ADMIN — RIVAROXABAN 20 MG: 20 TABLET, FILM COATED ORAL at 08:07

## 2024-10-27 RX ADMIN — SODIUM CHLORIDE, PRESERVATIVE FREE 10 ML: 5 INJECTION INTRAVENOUS at 08:07

## 2024-10-27 RX ADMIN — LEVETIRACETAM 1250 MG: 500 TABLET, FILM COATED ORAL at 08:07

## 2024-10-27 RX ADMIN — ASPIRIN 81 MG: 81 TABLET, CHEWABLE ORAL at 08:07

## 2024-10-27 RX ADMIN — ARIPIPRAZOLE 2 MG: 2 TABLET ORAL at 08:06

## 2024-10-27 RX ADMIN — BACLOFEN 10 MG: 10 TABLET ORAL at 21:00

## 2024-10-27 RX ADMIN — BACLOFEN 10 MG: 10 TABLET ORAL at 14:44

## 2024-10-27 RX ADMIN — SODIUM CHLORIDE, POTASSIUM CHLORIDE, SODIUM LACTATE AND CALCIUM CHLORIDE: 600; 310; 30; 20 INJECTION, SOLUTION INTRAVENOUS at 05:45

## 2024-10-27 RX ADMIN — LEVOTHYROXINE SODIUM 50 MCG: 0.05 TABLET ORAL at 08:07

## 2024-10-27 RX ADMIN — BACLOFEN 10 MG: 10 TABLET ORAL at 08:06

## 2024-10-27 RX ADMIN — METOPROLOL SUCCINATE 12.5 MG: 25 TABLET, EXTENDED RELEASE ORAL at 08:06

## 2024-10-27 ASSESSMENT — PAIN SCALES - GENERAL
PAINLEVEL_OUTOF10: 0
PAINLEVEL_OUTOF10: 0

## 2024-10-27 NOTE — PLAN OF CARE
Problem: Discharge Planning  Goal: Discharge to home or other facility with appropriate resources  Recent Flowsheet Documentation  Taken 10/27/2024 1601 by Shira Pereyra, RN  Discharge to home or other facility with appropriate resources:   Arrange for needed discharge resources and transportation as appropriate   Identify barriers to discharge with patient and caregiver   Identify discharge learning needs (meds, wound care, etc)     Problem: Safety - Adult  Goal: Free from fall injury  Recent Flowsheet Documentation  Taken 10/27/2024 1554 by Shira Pereyra, RN  Free From Fall Injury: Instruct family/caregiver on patient safety     Problem: Skin/Tissue Integrity  Goal: Absence of new skin breakdown  Description: 1.  Monitor for areas of redness and/or skin breakdown  2.  Assess vascular access sites hourly  3.  Every 4-6 hours minimum:  Change oxygen saturation probe site  4.  Every 4-6 hours:  If on nasal continuous positive airway pressure, respiratory therapy assess nares and determine need for appliance change or resting period.  Outcome: Progressing

## 2024-10-27 NOTE — PROGRESS NOTES
68 year old male admitted to rehab with dx impaired mobility secondary to encephalopathy, late effects CVA.   Pt. 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   Pt. Oriented to the unit. Up on his bed watching the [employer] football game.   Pt. Brother Nikhil David was called and update to the room move. He will be in to see pt. Tomorrow. Nikhil stated that he had brought all the POA in a while ago as he is the POA.   Call light within reach. Assessment complete.

## 2024-10-27 NOTE — FLOWSHEET NOTE
Report given to Dona on rehab patient being transferred to room 233    1304 IV removed. Patient being sent to rehab with glasses, watch, necklaces x2 and life alert, bag of clothing and no shoes. Tele monitor sent to [facility]

## 2024-10-27 NOTE — H&P
HISTORY & PHYSICAL       DATE OF ADMISSION:  10/27/2024    DATE OF SERVICE:  10/27/24    Subjective:    Aramis David, 68 y.o. male presents today with:     CHIEF COMPLAINT:  Weakness      HPI  Admit Date: 10/22/2024 11:10 AM  Inpatient Rehab Referral Date: 10/23/2024  Narrative of hospital course/history of present illness: 68 y.o. male patient who presented to ER via EMS 10/22 with altered mental status (confusion, staring off and now answering questions.) Patient with PMHx of CVA with right sided deficits. No acute findings on CT of head.  Initial lab testing remarkable for white blood cells 17.9, creatinine of 2.08, high-sensitivity troponin of 185, CRP of 29.8, procalcitonin of 1.78, lactate of 2.3, CK of 2718. Though ECG showing SR and no acute changes, troponins elevated. Patient admitted for additional work up and evaluation with consults from neurology and cardiology.     Internal Medicine:  Altered mental status, leukocytosis, elevated CRP/Procalcitonin  - likely due to breakthrough seizure, r/o possible infection/sepsis  - CXR, CT head, U/a and respiratory panel were negative  - clinically improving  - continue IV antibiotics empirically  - resumed Keppra  - follow blood cultures   - followed by neurology     PHAM   - improving with IVFs  - monitor renal function and u/o closely     Rhabdomyolysis  - continue IVFs  - monitor CK     Elevated troponins   - in the setting of rhabdomyolysis  - ECG was negative per report  - conservative therapy by cardiology      Hx of left MCA stroke   - continue ASA, Lipitor  - PT/OT     DVT/PE s/p IVC filter  - continue Xarelto     Chronic pain with opiate dependence  - on Tramadol and Pregabalin per OARRS review  - needs to avoid taking tramadol due to increased seizure risk        Diet: ADULT DIET; Regular     Code Status: Full Code        Disposition - acute rehab     Neuro:  Assessment/Plan:  Patient is a 68-year-old male with past medical history of depression,

## 2024-10-27 NOTE — CARE COORDINATION
Case Management Assessment  Initial Evaluation    Date/Time of Evaluation: 10/22/2024 10:44 PM  Assessment Completed by: Felisha Freedman RN    If patient is discharged prior to next notation, then this note serves as note for discharge by case management.    Patient Name: Aramis David                   YOB: 1955  Diagnosis: Altered mental status [R41.82]  Elevated troponin [R79.89]  PHAM (acute kidney injury) (HCC) [N17.9]  Cerebrovascular accident (CVA), unspecified mechanism (HCC) [I63.9]                   Date / Time: 10/22/2024 11:10 AM    Patient Admission Status: Inpatient   Readmission Risk (Low < 19, Mod (19-27), High > 27): Readmission Risk Score: 13    Current PCP: Syd Maher MD  PCP verified by CM? Yes    Chart Reviewed: Yes      History Provided by: Child/Family  Patient Orientation: Alert and Oriented, Person    Patient Cognition: Short Term Memory Deficit    Hospitalization in the last 30 days (Readmission):  No    If yes, Readmission Assessment in  Navigator will be completed.    Advance Directives:      Code Status: Full Code   Patient's Primary Decision Maker is: Legal Next of Kin    Primary Decision Maker: Nikhil David - Brother/Sister - [phone]    Secondary Decision Maker: Ivette Magallon - Brother/Sister - [phone]    Discharge Planning:    Patient lives with: (P) Alone Type of Home: (P) House  Primary Care Giver: Self  Patient Support Systems include: Family Members   Current Financial resources: (P) Medicare  Current community resources: (P) None  Current services prior to admission: (P) Durable Medical Equipment            Current DME: (P) Walker, Wheelchair, Shower Chair            Type of Home Care services:  (P) None    ADLS  Prior functional level: Independent in ADLs/IADLs  Current functional level: Other (see comment) (pt came in soiled. had not been taking care of himself)    PT AM-PAC:   /24  OT AM-PAC:   /24    Family can provide assistance at DC: (P) 
DC PLAN REMAINS [facility] REHAB WHEN MED. CLEARED, CK LEVEL REMAINS ELEVATED- NOT MEDICALLY CLEARED FOR DC AT THIS TIME.   
Inpatient Rehab referral received. Met with patient and explained [facility] Inpatient Rehab program and requirements, including 3 hours of intense therapy daily, anticipated length of stay and goal of discharge to home. All questions answered and patient verbalized understanding. Freedom of choice provided and patient requests admit to [facility] if appropriate. PM&R consult pending.  Patient from home and has a supportive brother Karthikeyan. Aramis looks forward to doing rehab to regain strength and independence.   Electronically signed by Cristin Turner RN on 10/23/24 at 2:45 PM EDT    
MAGALIW SPOKE WITH THE PT AND HIS BROTHER NELLY WHO IS VERY INVOLVED.  PT LIVES AT HOME ALONE AND THE GOAL WOULD BE TO GET HIM BACK HOME.  NELLY AND THE PT ARE AGREEABLE TO THE ACUTE REHAB HERE AT [facility].  REFERRAL CALLED TO [facility].  PT NOT MEDICALLY STABLE FOR TRANSFER TODAY.    
Pt's brother Karthikeyan who is his poa was taken to the family room and his provider was notified.  
QUALITY ROUND COMPLETE WITH CARE TEAM. PLAN IS FOR EEG TODAY. D/C REMAINS [facility] PENDING EVAL/ACCEPTANCE.   
SPOKE W/OLGA CM AND NURSE. REHAB IS ABLE TO TAKE PT TODAY AND WILL GO TO .   
acetaminophen    Allergies:  No Known Allergies      Most Recent Vitals, Height and Weight  /69   Pulse 55   Temp 98.2 °F (36.8 °C) (Oral)   Resp 16   Ht 1.727 m (5' 8\")   Wt 81.6 kg (180 lb)   SpO2 99%   BMI 27.37 kg/m²     Weight Bearing Restrictions: None       Current Diet Order: ADULT DIET; Regular    Skin: intact/ generalized bruising/ at risk for breakdown   Wound Care Documentation:          Lungs:   Respiratory  Respiratory Pattern: Regular  Breath Sounds  Respiratory Pattern: Regular  Breath Sounds Bilateral: Clear  Right Upper Lobe: Clear  Right Middle Lobe: Clear  Right Lower Lobe: Clear  Left Upper Lobe: Clear  Left Lower Lobe: Clear      Cognition and Behavior:  Language Preference (if other than English):      Alertness/Behavior  Neuro (WDL): Within Defined Limits  Level of Consciousness: Alert (0)  History of Falling: No Cognition Comment: Pt has baseline aphasia from previous CVA    Short Term Memory Deficits     History of Falling: No    Safety          Prior Level of Function and Living Arrangements:  Social/Functional History  Lives With: Alone, Other (comment)  Type of Home: House  Home Layout: One level  Home Access: Stairs to enter with rails  Entrance Stairs - Number of Steps: 2  Bathroom Shower/Tub: Tub/Shower unit  Bathroom Toilet: Standard  Bathroom Equipment: Tub transfer bench, Hand-held shower, Grab bars in shower  Bathroom Accessibility: Accessible  Home Equipment: Grab bars, Rollator, Wheelchair - Manual  Receives Help From: Family  ADL Assistance: Independent  Homemaking Assistance: Independent  Homemaking Responsibilities: Yes  Ambulation Assistance: Independent  Transfer Assistance: Independent  Active : No  Patient's  Info: Brother  Occupation: Unemployed  Additional Comments: Microwave cooking only, family brings other meals at times. PLOF and home set up information provided by pt's brother. Pt with significant aphasia.  Living Arrangements: 
liquids, Upright as possible for all oral intake, Eat/Feed slowly, Small bites/sips           Current Conditions Requiring Inpatient Rehabilitation  Bowel/Bladder Dysfunction: Yes  Intervention Required = Frequent toileting, Bowel program, and Check post void residual  Risk for Medical/Clinical Complications = high  Skin Healing/Breakdown Risk: Yes  Intervention Required = Side to side turns  Risk for Medical/Clinical Complications = moderate  Nutrition/Hydration Deficiency: Yes  Intervention Required = Monitor I&Os, Check Labs, and Dietary Eval  Risk for Medical/Clinical Complications = moderate  Medical Comorbidities: Yes  Intervention Required = DVT risk, Renal disease, and HTN, PFO, hx prior CVA  Risk for Medical/Clinical Complications = high    Rehab/Skilled Needs:   3 hours of Intensive Acute Rehab therapy daily, 5 days/week for a total of 900 minutes  PT Treatment Time:  1.5 hrs/day  OT Treatment Time: 1.5 hrs/day  SLP Treatment Time: 0.5 hrs/day  Rehabilitation Nursing   Case management/Social work  Dietitian/Nutrition  PICC/IV Medications    Cultural needs:     TBA  Funding needs:   Potential Assistance Purchasing Medications: No     Expected Level of Improvement with Rehab  Assist for ADL Contact Guard / Minimal Assistance  Assist for Transfers Supervision / Standby Assist  Assist for Gait Supervision / Standby Assist    Patient's willingness to participate: Yes  Patient's ability to tolerate proposed care: Yes  Patient/Family Goals of Rehab (in patient's/family's own words): patient with word finding difficulty but did shake head \"yes\" when asked if wanting to get stronger to be able to return home.     Anticipated Discharge Plan:  Home with   Home Health, RN PT OT Aide to be determined    Barriers to Discharge:  Home entry accessibility  Equipment needs  Caregiver availability  Resource availability    Rehab evaluation plan: Recommend Acute Inpatient Rehab Facility  [facility]

## 2024-10-27 NOTE — PLAN OF CARE
Problem: Skin/Tissue Integrity  Goal: Absence of new skin breakdown  Description: 1.  Monitor for areas of redness and/or skin breakdown  2.  Assess vascular access sites hourly  3.  Every 4-6 hours minimum:  Change oxygen saturation probe site  4.  Every 4-6 hours:  If on nasal continuous positive airway pressure, respiratory therapy assess nares and determine need for appliance change or resting period.  10/27/2024 1115 by Lisa Brito RN  Outcome: Progressing  10/27/2024 0427 by Chandni Sheth RN  Outcome: Progressing     Problem: Safety - Adult  Goal: Free from fall injury  10/27/2024 1115 by Lisa Brito RN  Outcome: Progressing  10/27/2024 0427 by Chandni Sheth RN  Outcome: Progressing

## 2024-10-27 NOTE — DISCHARGE SUMMARY
70% of time was spent focused exclusively on this patient. Time was taken to review chart, discuss plans with consultants, reconciling medications, discussing plan answering questions with patient.     Signed:  CATY VALLADARES MD  10/27/2024, 12:32 PM  ----------------------------------------------------------------------------------------------------------------------    Aramis David,     Please return to ER or call 911 if you develop any significant signs or symptoms.     I may not have addressed all of your medical illnesses or the abnormal blood work or imaging therefore please ask your PCP Syd Maher MD and other out patient specialists and providers  to obtain [facility] entirely to follow up on all of the abnormal labs, imaging and findings that I have and have not addressed during your hospitalization.      Discharging you from the hospital does not mean that your medical care ends here and now. You may still need additional work up, investigation, monitoring, and treatment to be handled from this point on by outside providers including your PCP, Syd Maher MD , Specialists and other healthcare providers.      Please review your list of discharge medications prior to resuming medications you might still have at home, as the medications you need to be taking, dosages or how often you must take them may have changed. For medication questions, contact your retail pharmacy and your PCP, Syd Maher MD .     ** I STRONGLY RECOMMEND that you follow up with Syd Maher MD within 3 to 5 days for a post hospitalization evaluation. This specific office visit is covered by your insurance, and is not the same as your annual doctor visit/ check up. This office visit is important, as it may prevent need for repeat and/or future hospitalizations.**    Your medical team at [facility] appreciates the opportunity to work with you to get well!    Sincerely,  CATY VALLADARES MD  
37.3

## 2024-10-28 PROBLEM — Z71.89 GOALS OF CARE, COUNSELING/DISCUSSION: Status: ACTIVE | Noted: 2024-10-28

## 2024-10-28 PROBLEM — Z51.5 PALLIATIVE CARE ENCOUNTER: Status: ACTIVE | Noted: 2024-10-28

## 2024-10-28 PROBLEM — Z71.89 ADVANCED CARE PLANNING/COUNSELING DISCUSSION: Status: ACTIVE | Noted: 2024-10-28

## 2024-10-28 PROBLEM — Z78.9 FULL CODE STATUS: Status: ACTIVE | Noted: 2024-10-28

## 2024-10-28 LAB
ALBUMIN SERPL-MCNC: 3.2 G/DL (ref 3.5–4.6)
ANION GAP SERPL CALCULATED.3IONS-SCNC: 11 MEQ/L (ref 9–15)
BASOPHILS # BLD: 0 K/UL (ref 0–0.2)
BASOPHILS NFR BLD: 0.3 %
BUN SERPL-MCNC: 10 MG/DL (ref 8–23)
CALCIUM SERPL-MCNC: 8.1 MG/DL (ref 8.5–9.9)
CHLORIDE SERPL-SCNC: 107 MEQ/L (ref 95–107)
CK SERPL-CCNC: 1143 U/L (ref 0–190)
CO2 SERPL-SCNC: 20 MEQ/L (ref 20–31)
CREAT SERPL-MCNC: 0.78 MG/DL (ref 0.7–1.2)
EOSINOPHIL # BLD: 0.2 K/UL (ref 0–0.7)
EOSINOPHIL NFR BLD: 1.3 %
ERYTHROCYTE [DISTWIDTH] IN BLOOD BY AUTOMATED COUNT: 13.3 % (ref 11.5–14.5)
GLUCOSE SERPL-MCNC: 95 MG/DL (ref 70–99)
HCT VFR BLD AUTO: 35.5 % (ref 42–52)
HGB BLD-MCNC: 12.8 G/DL (ref 14–18)
LYMPHOCYTES # BLD: 2.6 K/UL (ref 1–4.8)
LYMPHOCYTES NFR BLD: 23.2 %
MCH RBC QN AUTO: 30.7 PG (ref 27–31.3)
MCHC RBC AUTO-ENTMCNC: 36.1 % (ref 33–37)
MCV RBC AUTO: 85.1 FL (ref 79–92.2)
MONOCYTES # BLD: 1 K/UL (ref 0.2–0.8)
MONOCYTES NFR BLD: 8.5 %
NEUTROPHILS # BLD: 7.5 K/UL (ref 1.4–6.5)
NEUTS SEG NFR BLD: 65.9 %
PHOSPHATE SERPL-MCNC: 3 MG/DL (ref 2.3–4.8)
PLATELET # BLD AUTO: 151 K/UL (ref 130–400)
POTASSIUM SERPL-SCNC: 3.8 MEQ/L (ref 3.4–4.9)
RBC # BLD AUTO: 4.17 M/UL (ref 4.7–6.1)
SODIUM SERPL-SCNC: 138 MEQ/L (ref 135–144)
WBC # BLD AUTO: 11.4 K/UL (ref 4.8–10.8)

## 2024-10-28 PROCEDURE — 97535 SELF CARE MNGMENT TRAINING: CPT

## 2024-10-28 PROCEDURE — 6370000000 HC RX 637 (ALT 250 FOR IP): Performed by: INTERNAL MEDICINE

## 2024-10-28 PROCEDURE — 99233 SBSQ HOSP IP/OBS HIGH 50: CPT | Performed by: PHYSICAL MEDICINE & REHABILITATION

## 2024-10-28 PROCEDURE — 97110 THERAPEUTIC EXERCISES: CPT

## 2024-10-28 PROCEDURE — 97112 NEUROMUSCULAR REEDUCATION: CPT

## 2024-10-28 PROCEDURE — 99223 1ST HOSP IP/OBS HIGH 75: CPT

## 2024-10-28 PROCEDURE — 97163 PT EVAL HIGH COMPLEX 45 MIN: CPT

## 2024-10-28 PROCEDURE — 92523 SPEECH SOUND LANG COMPREHEN: CPT

## 2024-10-28 PROCEDURE — 97166 OT EVAL MOD COMPLEX 45 MIN: CPT

## 2024-10-28 PROCEDURE — 80069 RENAL FUNCTION PANEL: CPT

## 2024-10-28 PROCEDURE — 82550 ASSAY OF CK (CPK): CPT

## 2024-10-28 PROCEDURE — 1180000000 HC REHAB R&B

## 2024-10-28 PROCEDURE — 97530 THERAPEUTIC ACTIVITIES: CPT

## 2024-10-28 PROCEDURE — 6370000000 HC RX 637 (ALT 250 FOR IP): Performed by: PHYSICAL MEDICINE & REHABILITATION

## 2024-10-28 PROCEDURE — 6360000002 HC RX W HCPCS: Performed by: PHYSICAL MEDICINE & REHABILITATION

## 2024-10-28 PROCEDURE — 36415 COLL VENOUS BLD VENIPUNCTURE: CPT

## 2024-10-28 PROCEDURE — 85025 COMPLETE CBC W/AUTO DIFF WBC: CPT

## 2024-10-28 RX ORDER — LIDOCAINE 4 G/G
3 PATCH TOPICAL DAILY
Status: DISCONTINUED | OUTPATIENT
Start: 2024-10-28 | End: 2024-11-09 | Stop reason: HOSPADM

## 2024-10-28 RX ORDER — VITAMIN B COMPLEX
2000 TABLET ORAL
Status: DISCONTINUED | OUTPATIENT
Start: 2024-10-28 | End: 2024-11-09 | Stop reason: HOSPADM

## 2024-10-28 RX ORDER — ACETAMINOPHEN 500 MG
500 TABLET ORAL 3 TIMES DAILY
Status: DISCONTINUED | OUTPATIENT
Start: 2024-10-28 | End: 2024-11-09 | Stop reason: HOSPADM

## 2024-10-28 RX ORDER — CYANOCOBALAMIN 1000 UG/ML
1000 INJECTION, SOLUTION INTRAMUSCULAR; SUBCUTANEOUS WEEKLY
Status: DISCONTINUED | OUTPATIENT
Start: 2024-10-28 | End: 2024-11-09 | Stop reason: HOSPADM

## 2024-10-28 RX ORDER — ATORVASTATIN CALCIUM 80 MG/1
80 TABLET, FILM COATED ORAL NIGHTLY
Status: DISCONTINUED | OUTPATIENT
Start: 2024-10-28 | End: 2024-11-09 | Stop reason: HOSPADM

## 2024-10-28 RX ORDER — UBIDECARENONE 100 MG
100 CAPSULE ORAL DAILY
Status: DISCONTINUED | OUTPATIENT
Start: 2024-10-28 | End: 2024-11-09 | Stop reason: HOSPADM

## 2024-10-28 RX ADMIN — BACLOFEN 10 MG: 10 TABLET ORAL at 14:41

## 2024-10-28 RX ADMIN — Medication 100 MG: at 08:43

## 2024-10-28 RX ADMIN — ACETAMINOPHEN 500 MG: 500 TABLET ORAL at 21:12

## 2024-10-28 RX ADMIN — CYANOCOBALAMIN 1000 MCG: 1000 INJECTION, SOLUTION INTRAMUSCULAR; SUBCUTANEOUS at 08:45

## 2024-10-28 RX ADMIN — Medication 2000 UNITS: at 16:59

## 2024-10-28 RX ADMIN — ATORVASTATIN CALCIUM 80 MG: 80 TABLET, FILM COATED ORAL at 21:12

## 2024-10-28 RX ADMIN — LEVETIRACETAM 1250 MG: 500 TABLET, FILM COATED ORAL at 21:12

## 2024-10-28 RX ADMIN — ACETAMINOPHEN 500 MG: 500 TABLET ORAL at 14:41

## 2024-10-28 RX ADMIN — BACLOFEN 10 MG: 10 TABLET ORAL at 08:41

## 2024-10-28 RX ADMIN — ARIPIPRAZOLE 2 MG: 2 TABLET ORAL at 08:43

## 2024-10-28 RX ADMIN — BACLOFEN 10 MG: 10 TABLET ORAL at 21:12

## 2024-10-28 RX ADMIN — ASPIRIN 81 MG: 81 TABLET, CHEWABLE ORAL at 08:41

## 2024-10-28 RX ADMIN — LEVETIRACETAM 1250 MG: 500 TABLET, FILM COATED ORAL at 08:41

## 2024-10-28 RX ADMIN — LEVOTHYROXINE SODIUM 50 MCG: 0.05 TABLET ORAL at 08:41

## 2024-10-28 RX ADMIN — RIVAROXABAN 20 MG: 20 TABLET, FILM COATED ORAL at 08:42

## 2024-10-28 RX ADMIN — DULOXETINE HYDROCHLORIDE 60 MG: 60 CAPSULE, DELAYED RELEASE ORAL at 08:43

## 2024-10-28 RX ADMIN — METOPROLOL SUCCINATE 12.5 MG: 25 TABLET, FILM COATED, EXTENDED RELEASE ORAL at 08:42

## 2024-10-28 ASSESSMENT — PAIN SCALES - GENERAL: PAINLEVEL_OUTOF10: 0

## 2024-10-28 NOTE — PROGRESS NOTES
OCCUPATIONAL THERAPY  INPATIENT REHAB TREATMENT NOTE  [facility]      NAME: Aramis David  : 1955 (68 y.o.)  MRN: [id number]  CODE STATUS: Full Code  Room: [facility]/R2-01    Date of Service: 10/28/2024    Referring Physician: Alicia Milner DO  Rehab Diagnosis: Impaired mobility and ADL's d/t new onset mental status change possible seizure disorder triggered by UTI    Hospital course:   Comments: 68 y.o. male patient who presented to ER via EMS 10/22 with altered mental status (confusion, staring off and now answering questions.) Patient with PMHx of CVA with right sided deficits. No acute findings on CT of head.  Initial lab testing remarkable for white blood cells 17.9, creatinine of 2.08, high-sensitivity troponin of 185, CRP of 29.8, procalcitonin of 1.78, lactate of 2.3, CK of 2718. Though ECG showing SR and no acute changes, troponins elevated. Patient admitted for additional work up and evaluation with consults from neurology and cardiology.    Restrictions  Restrictions/Precautions  Restrictions/Precautions: Fall Risk          Patient's date of birth confirmed: Yes    SAFETY:  Safety Devices  Safety Devices in place: Yes  Type of devices: All fall risk precautions in place    SUBJECTIVE:     Pt shakes head no to pain     Pain at start of treatment: No    Pain at end of treatment: No    OBJECTIVE:     ADL    Upper Extremity Dressing  Assistance Level: Dependent  Skilled Clinical Factors: pt noted to be sweating- pt with thick coat donned over R arm and wrapped around his back. Pt assisted to removing jacket for comfort       Roll Left  Assistance Level: Maximum assistance  Roll Right  Assistance Level: Minimal assistance     While in bed with HOB raised, challenged strength, activity tolerance, ROM, and flexibility for improved ADL performance.    Challenged pt through usage of 2# weight to complete LUE exercises. 1 sets x 10 reps completed. Exercises focused on all UE joints and planes of

## 2024-10-28 NOTE — CARE COORDINATION
Case Management Initial Assessment        NAME:  Aramis David  ROOM: R233/R233-01  :  1955  DATE: 10/28/2024        Social Functional:  Social/Functional History  Lives With: Alone;Other (comment) (lives in a \"in law suite\" (renting from the landlord who lives on the property, brother lives two min away); had a cat but dtr took to [place] with her (Karthikeyan hoping she will keep it))  Type of Home: House  Home Layout: One level  Home Access: Stairs to enter with rails  Entrance Stairs - Number of Steps: 1 + 1  Entrance Stairs - Rails: Both  Bathroom Shower/Tub: Tub/Shower unit;Curtain  Bathroom Toilet: Standard  Bathroom Equipment: Tub transfer bench;Hand-held shower;Grab bars in shower  Bathroom Accessibility: Walker accessible  Home Equipment: Rollator;Wheelchair - Manual;Hospital bed;Lift chair (sleeps in lift chair, has a handheld urinal that he uses at night)  Receives Help From: Family  ADL Assistance: Independent  Homemaking Assistance: Independent  Homemaking Responsibilities: Yes  Meal Prep Responsibility:  (mostly microwaved meals or family would take him out, they also would bring him food)  Laundry Responsibility: No ( completes)  Cleaning Responsibility: No ( completes)  Bill Paying/Finance Responsibility: No (brothnile Napier completes)  Shopping Responsibility: No (brother and sister complete)  Health Care Management:  (dler Karthikeyan completes, uses a pill organizer and pt self admins)  Ambulation Assistance: Independent (Rollator at all times, ambulated about 50ft before fatiguing)  Transfer Assistance: Independent  Active : No  Patient's  Info: Brother  Mode of Transportation: Truck;Car (brother stated that pt has been able to get in and out of both)  Education: Masters in Anatomy and Physiology, Masters in Ultrasounds and Sonograms  Occupation: Retired (had a  over or take him out. Karthikeyan and his sister complete shopping. Transportation is provided by family with a car or truck. Pt has a cat that his dtr has in [place] currently, Karthikeyan is hopeful that she will keep the cat with her. Karthikeyan did say that he is able to assist pt as needed at discharge as he lives only 2 minutes away and is retired. However, Karthikeyan had mentioned at the end of the assessment that pt may need to go to an AL at discharge.             Electronically signed by:    ABHAY Cruz, JENNIFER,   10/28/2024, 9:14 AM

## 2024-10-28 NOTE — PROGRESS NOTES
Facility/Department: [facility]  Rehabilitation Initial Assessment: Physical Therapy  Room: R233R233-01    NAME: Aramis David  : 1955  MRN: [id number]    Date of Service: 10/28/2024    Rehab Diagnosis(es): Impaired mobility and ADL's due to new onset mental status change possible seizure disorder triggered by UTI  Patient Active Problem List    Diagnosis Date Noted    Hyperkalemia 10/24/2024    Acute cystitis 10/24/2024    Cerebrovascular accident (CVA) ([facility]) 10/23/2024    Altered mental status 10/22/2024    Opioid dependence with current use ([facility]) 2023    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    Impaired mobility and activities of daily living dt late effects of CVA 2021    Abnormality of gait and mobility 2021    Blood thinned due to long-term anticoagulant use-Xaralto 2021    Cognitive deficit due to old head injury 2021    DJD (degenerative joint disease), lumbar 2021    DJD (degenerative joint disease), cervical 2021    Bowel and bladder incontinence 2021    Aphasia 2021    Generalized weakness 2021    Hypothyroidism 2019    Chronic deep vein thrombosis (DVT) of left popliteal vein ([facility]) 2019    Chronic pain syndrome 2018    Hemiparesis affecting dominant side as late effect of cerebrovascular accident (CVA) ([facility]) 2018    Chronic right shoulder pain 10/04/2017    Hypoxemia requiring supplemental oxygen 10/04/2017    History of CVA (cerebrovascular accident) 2017    Essential hypertension 2017    Recurrent depression ([facility]) 2017    Seizure ([facility]) 2017    S/P patent foramen ovale closure 2017    Hyperlipidemia 2017    Closed TBI (traumatic brain injury) 2012       Past Medical History:   Diagnosis Date    Chronic right shoulder pain 10/04/2017    Closed TBI (traumatic brain injury) 2012    Essential hypertension 2017

## 2024-10-28 NOTE — PROGRESS NOTES
Subjective:   The patient complains of severe acute on chronic progressive fatigue and right-sided spasticity, right weakness, cervical and thoracic and lumbar pain partially relieved by rest, medications, PT,  OT,   SLP and rest and exacerbated by recent   non-ST MI.  Patient was admitted through the ER on 10/22/2024 with seizure and mental status change.  He had elevated troponins and was diagnosed with a non-ST MI cardiology was consulted.     He was admitted under the care of the hospitalist with neurology and cardiology consult.  Neurology his prescribed Keppra.  Tramadol was DC'd as it lowers the seizure threshold.   He continues to have right-sided weakness and residual aphasia from previous CVA 2021.  He has a remote history of PFO.  He had a closure procedure performed for a percutaneous septal occluder.  He continues on Xarelto.     He had an elevated white count -likely UTI he is on IV Rocephin and Vanco.  UA was positive but --chest x-ray and respiratory panel are negative.  Blood cultures are pending    I am concerned about patient’s medical complexities and barriers to advancing in rehab goals including avoiding repeat stroke and heart attack.        I discussed current functional, rehabilitation, medical status with other rehabilitation providers including nursing and case management.  According to recent nursing note, \" resting in bed at this time watching a football game with no complaints of pain or discomfort. Patient is able to follow all commands. Right arm and hand are contracted and weak. Patient is able to lift right arm off bed and has a weak right hand grasp. Patients right foot is out-toeing with weak plantar flexion and dorsi flexion. Patient is able to state his name and birthday only. Patient has slurred speech and expressive aphasia noted. However patient could state words clear at times. Patient was able to state that the month is currently October after hints and cues. Patient was  cervical and lumbar pain- DJD (degenerative joint disease), lumbar,  DJD (degenerative joint disease), cervical as well as generalized OA pain: reassess pain every shift and prior to and after each therapy session, give prn Tylenol and scheduled Tylenol, modalities prn in therapy, Lidoderm, K-pad prn.  Consider low-dose Norco versus Oxy IR no more tramadol due to seizure risk.  Skin healing and breakdown risk:  continue pressure relief program.  Daily skin exams and reports from nursing.  Severe fatigue due to nutritional and hydration deficiency: Add vitamin B12 vitamin D and CoQ10 continue to monitor I&O’s, calorie counts prn, dietary consult prn.  Add healthy HS snack.  Acute episodic insomnia with situational adjustment disorder:  consider prn low dose Ambien, monitor for day time sedation.  Add HS \"Tuck In\"  Falls risk elevated:  patient to use call light to get nursing assistance to get up, bed and chair alarm.  Elevated DVT risk: progressive activities in PT, continue prophylaxis LUCIANO hose, elevation and Xarelto.  Complex discharge planning:  DC-11/9/24 home alone with [facility].  Until then continue weekly team meeting  every Monday to re-assess progress towards goals, discuss and address social, psychological and medical comorbidities and to address difficulties they may be having progressing in therapy.  Patient and family education is in progress.  The patient is to follow-up with their family physician after discharge.        Complex Active General Medical Issues that complicate care Assess & Plan:         History of CVA (cerebrovascular accident)-control blood pressure monitor for diabetes consult neurology    Essential hypertension-Acute rehab to monitor heart rate and rhythm with the option of telemetry and the effects of chronotropic medication with respect to increasing physical activity and exercise in PT, OT, ADLs with medication titration to lowest effective dosing.  Continue blood signs every shift

## 2024-10-28 NOTE — PROGRESS NOTES
BLEs except for feet IND. Assist to bathe ara areas and B feet. (10/28/24 0314)  UE Dressing: Maximum assistance (10/28/24 0314)  UE Dressing Skilled Clinical Factors: assist to thread RUE through sleeve of long-sleeved shirt, pull shirt over head and down back. Pt threaded LUE through sleeve IND. (10/28/24 0314)  LE Dressing: Maximum assistance (10/28/24 0314)  LE Dressing Skilled Clinical Factors: Assist to thread BLEs through pant legs, pull pants up over hips in bed, and change out soiled brief for new brief. (10/28/24 0314)  Toileting: Dependent/Total (10/28/24 0314)  Toileting Skilled Clinical Factors: Pt urinated w/ control into male purewick. Pt incontinent of bowel and unaware that he had had a BM in his brief. Assist to clean posterior ara area following BM. (10/28/24 0314)  Functional Mobility Skilled Clinical Factors: not assessed- all ADLs completed at bed level (10/28/24 0314)  Additional Comments: Pt is limited by decreased cognition, fatigue, RUE hypertonia, and increased pain. (10/28/24 0314)  Skin Care: Soap and water;Lotion;Moisture barrier (10/28/24 0314)  Toilet Transfers  Toilet Transfers Comments: unsafe to attempt (10/28/24 1154)    Plans for addressing ADL deficits affecting: Focus on sequencing and energy conservation.                     SPEECH THERAPY/SLP     Comprehension: Exceptions  Verbal Expression: Exceptions to functional limits    Plans for cognitive and communication issues affecting addressing:   Pt and Family training goals-  teach home tecnology options like [place]alka use and home assistive devices       Diet/Swallow:                           COGNITION  OT: Cognition Comment: Pt has baseline aphasia from previous CVA; therefore, difficult to accurately determine cognition @FLOWTIME(304  SP:        Social History     Socioeconomic History    Marital status:      Spouse name: Not on file    Number of children: Not on file    Years of education: Not on file    Highest  Independent  SP:Long Term Goals  Time Frame for Long Term Goals: 2-3 weeks  Goal 1: Patient will improve Receptive Language abilities to increase comprehension of conversation and safety directions with familiar and unfamiliar communication partners.  Goal 2: Pt will use his augmentative and alternative communication system (i.e SGD, PECS, etc/) to effectively communicate his/her wants and needs to family, school, and community members in 70% of opportunities across all environments independently, within 1-2 weeks.              From a cognitive standpoint they will need:        24 hr vs daily transitioning to supervision  -->progress to occasional             Significant problems/ barriers to functional progress include: Pt is at a high risk for functional loss,      []  Acute infection/UTI    []  Low BP's     []  COPD flare-up   []  Uncontrolled blood sugar     []  Progressive anemia     [x]  poor endurance    []  Severe pain     []  Impaired mental status    []  Urinary incontinence    []  Bowel incontinence           Plan to correct barriers to functional progress:   Add scheduled rest breaks, CoQ 10 and Vit B 12, control pain by using ice Lidoderm rest and massage as well as pain medications prior to therapy.  Spread therapy of 15 hours out over a 7 day window to accommodate rest breaks and medical interventions.  Patient seems to be making fair to good response to these interventions.         Based on a comprehensive evaluation of the above, the individualized therapy and Discharge plan will be:    -Times stated are an average that will be varied based on the patient's daily need.        PT   1 1/2  hrs/day 5-7 days per wk      OT   1 1/2 hrs per day 5-7 days per wk     ST  1/2    hrs /day 3-5 days per week       Estimated LOS   1-2 week(s)    -Overall functional prognosis:     [x]  Good    []  Fair    []  Poor     -Medical Prognosis:   [x]  Good    []  Fair    []  Poor    This patient was made aware of the

## 2024-10-28 NOTE — CARE COORDINATION
[facility]  INPATIENT REHABILITATION  TEAM CONFERENCE NOTE  Room: R233/R233-01  Admit Date: 10/27/2024       Date: 10/28/2024  Patient Name: Aramis David        MRN: [id number]    : 1955  (68 y.o.)  Gender: male        REHAB DIAGNOSIS:   Diagnosis: Impaired mobility and ADL's due to new onset mental status change possible seizure disorder triggered by UTI    CO MORBIDITIES:      Past Medical History:   Diagnosis Date    Chronic right shoulder pain 10/04/2017    Closed TBI (traumatic brain injury) 2012    Essential hypertension 2017    Hemiparesis affecting dominant side as late effect of cerebrovascular accident (CVA) ([facility]) 2018    History of CVA (cerebrovascular accident) 2017    Hyperlipidemia 2017    Hypoxemia requiring supplemental oxygen 10/04/2017    Lumbosacral disc disease     Osteoarthritis     Recurrent depression ([facility]) 2017    S/P patent foramen ovale closure 2017    Seizure ([facility]) 2017     No past surgical history on file.  Chart Reviewed: Yes  Family / Caregiver Present: Yes  Diagnosis: Impaired mobility and ADL's due to new onset mental status change possible seizure disorder triggered by UTI  Restrictions  Restrictions/Precautions: Up as Tolerated, Fall Risk  CASE MANAGEMENT    Social/Functional History  Social/Functional History  Lives With: Alone, Other (comment) (lives in a \"in law suite\" (renting from the landlord who lives on the property, brother lives two min away); had a cat but dtr took to [place] with her (Karthikeyan hoping she will keep it))  Type of Home: House  Home Layout: One level  Home Access: Stairs to enter with rails  Entrance Stairs - Number of Steps: 1 + 1  Entrance Stairs - Rails: Both  Bathroom Shower/Tub: Tub/Shower unit, Curtain  Bathroom Toilet: Standard  Bathroom Equipment: Tub transfer bench, Hand-held shower, Grab bars in shower  Bathroom Accessibility: Walker accessible  Home Equipment: Rollator,  completed at bed level (10/28/24 0314)  Additional Comments: Pt is limited by decreased cognition, fatigue, RUE hypertonia, and increased pain. (10/28/24 0314)  Skin Care: Soap and water;Lotion;Moisture barrier (10/28/24 0314)             CURRENT SELF CARE:           LTG:  Eating:Discharge Goal: Independent  Oral Hygiene:Discharge Goal: Independent  Shower/Bathe self: Discharge Goal: Supervision or touching assistance  Upper body dressing:Discharge Goal: Independent  Lower body dressing:Discharge Goal: Independent  Putting on taking off footwear:Discharge Goal: Independent   Toileting: Discharge Goal: Independent  Toilet transfer:Discharge Goal: Independent  OT Treatment Time: 1.5 hrs      SPEECH THERAPY       Comprehension: Exceptions  Verbal Expression: Exceptions to functional limits      Diet/Swallow:                       LTG:  Long Term Goals  Time Frame for Long Term Goals: 2-3 weeks  Goal 1: Patient will improve Receptive Language abilities to increase comprehension of conversation and safety directions with familiar and unfamiliar communication partners.  Goal 2: Pt will use his augmentative and alternative communication system (i.e SGD, PECS, etc/) to effectively communicate his/her wants and needs to family, school, and community members in 70% of opportunities across all environments independently, within 1-2 weeks.             COGNITION  OT: Cognition  Overall Cognitive Status: Exceptions (10/28/24 1141)  Arousal/Alertness: Delayed responses to stimuli (response speed improved throughout eval) (10/28/24 1141)  Following Commands: Inconsistently follows commands (10/28/24 1141)  Attention Span: Attends with cues to redirect (10/28/24 1141)  Memory: Decreased recall of recent events;Decreased short term memory (10/28/24 1141)  Safety Judgement:  (unable to assess) (10/28/24 1141)  Problem Solving: Assistance required to generate solutions;Assistance required to identify errors made;Assistance required to

## 2024-10-28 NOTE — FLOWSHEET NOTE
Patient is resting in bed at this time watching a football game with no complaints of pain or discomfort. Patient is able to follow all commands. Right arm and hand are contracted and weak. Patient is able to lift right arm off bed and has a weak right hand grasp. Patients right foot is out-toeing with weak plantar flexion and dorsi flexion. Patient is able to state his name and birthday only. Patient has slurred speech and expressive aphasia noted. However patient could state words clear at times. Patient was able to state that the month is currently October after hints and cues. Patient was unable to verbalize current hospital or current year. Patient has depends in place with external male catheter due to incontinence. Patient took medications whole one at a time with sips of water without difficulty. Patient had a popsicle for an HS snack. Patient verbalized no further needs at this time. Bed alarm engaged and call light within reach. Dr Weiss in room at this time to round on patient.

## 2024-10-28 NOTE — PROGRESS NOTES
Physical Therapy Rehab Treatment Note  Facility/Department: [facility]  [facility]       NAME: Aramis David  : 1955 (68 y.o.)  MRN: [id number]  CODE STATUS: Full Code    Date of Service: 10/28/2024     Restrictions:  Restrictions/Precautions: Up as Tolerated, Fall Risk       SUBJECTIVE:   Subjective: \"I'm cold\"    Pain  Pain: Denies pre and post session pain.      OBJECTIVE:         Bed Mobility  Additional Factors: Verbal cues;Increased time to complete;Head of bed flat;Head of bed raised;With handrails  Roll Left  Assistance Level: Minimal assistance  Skilled Clinical Factors: uses bedrail to assist, assist to complete a full roll and for LUE placement during movement  Roll Right  Assistance Level: Minimal assistance  Skilled Clinical Factors: assist at LEs to complete full roll  Sit to Supine  Assistance Level: Moderate assistance  Skilled Clinical Factors: assist at LEs, pt able to initiate movement but required assistance to complete  Supine to Sit  Assistance Level: Moderate assistance  Skilled Clinical Factors: assist at trunk and BLEs, vc's throughout for technique with fair carry over  Scooting  Assistance Level: Stand by assist  Skilled Clinical Factors: pt attempts to scoot to HOB without assistance, difficulty pushing through LEs    Transfers  Surface: To bed;From bed  Additional Factors: Verbal cues;Increased time to complete  Device:  (face to face with therapist)  Sit to Stand  Assistance Level: Maximum assistance;Requires x 2 assistance  Skilled Clinical Factors: heavy lifting assist required, unable to achieve full stand due to fatigue this afternoon. 2nd person SBA for safety and to assist with pulling up pants  Stand to Sit  Assistance Level: Maximum assistance  Skilled Clinical Factors: assist to improve eccentric lowering              PT Exercises  A/AROM Exercises: supine: AP, QS, GS, heel slides, SLR, hip abd x10ea cole - vc's to improve technique for max benefit  Static  Sitting Balance Exercises: sitting EOB with focus on upright posture x1min - occasional min-modA to prevent post LOB      Activity Tolerance  Activity Tolerance: Patient limited by fatigue;Patient limited by endurance       ASSESSMENT/PROGRESS TOWARDS GOALS:   Assessment  Assessment: Pt very fatigued this afternoon, unable to progress OOB activity secondary to poor sitting tolerance when sitting EOB. Once returned to bed, focused on supine core and LE strengthening activities. Multiple reps of rolling completed with vc's to improve awareness of RUE and to utilize bedrail to assist with completing full roll. Pt required frequent rest breaks throughout therex as well vc's to improve body mechanics for max benefit. Continue to progress pt as able.  Activity Tolerance: Patient limited by fatigue;Patient limited by endurance    Goals:  Long Term Goals  Long Term Goal 1: Pt to complete bed mobility with SBA  Long Term Goal 2: Pt to complete transfers with SBA  Long Term Goal 3: Pt to ambulate 25-50ft with appropriate device and SBA  Long Term Goal 4: indep w/c mobility 50 feet  Long Term Goal 5: Pt able to maintain sitting and perform scooting tasks along EOB with SBA  Additional Goals?: Yes  Long term goal 6: pt to require min assist for curb step to allow for home entry    PLAN OF CARE/Safety:   Safety Devices  Type of Devices: All fall risk precautions in place;Bed alarm in place;Left in bed;Call light within reach      Therapy Time:   Individual   Time In 1336   Time Out 1400   Minutes 24    Missed 6 minutes of scheduled session due to pt care at beginning of tx.     Minutes: 24  Transfer/Bed mobility training: 10  Gait trainin  Neuro re education: 4  Therapeutic ex: 10      POLLY ZAMORA PTA, 10/28/24 at 4:44 PM

## 2024-10-28 NOTE — PLAN OF CARE
Problem: Chronic Conditions and Co-morbidities  Goal: Patient's chronic conditions and co-morbidity symptoms are monitored and maintained or improved  Outcome: Progressing     Problem: Discharge Planning  Goal: Discharge to home or other facility with appropriate resources  Outcome: Progressing  Flowsheets (Taken 10/27/2024 1601 by Shira Pereyra, RN)  Discharge to home or other facility with appropriate resources:   Arrange for needed discharge resources and transportation as appropriate   Identify barriers to discharge with patient and caregiver   Identify discharge learning needs (meds, wound care, etc)     Problem: Safety - Adult  Goal: Free from fall injury  Outcome: Progressing  Flowsheets (Taken 10/27/2024 1554 by Shira Pereyra, RN)  Free From Fall Injury: Instruct family/caregiver on patient safety     Problem: Skin/Tissue Integrity  Goal: Absence of new skin breakdown  Description: 1.  Monitor for areas of redness and/or skin breakdown  2.  Assess vascular access sites hourly  3.  Every 4-6 hours minimum:  Change oxygen saturation probe site  4.  Every 4-6 hours:  If on nasal continuous positive airway pressure, respiratory therapy assess nares and determine need for appliance change or resting period.  10/27/2024 2335 by Marilyn Quinones RN  Outcome: Progressing  10/27/2024 1623 by Shira Pereyra, RN  Outcome: Progressing     Problem: Neurosensory - Adult  Goal: Achieves stable or improved neurological status  Outcome: Progressing  Goal: Absence of seizures  Outcome: Progressing  Goal: Remains free of injury related to seizures activity  Outcome: Progressing  Goal: Achieves maximal functionality and self care  Outcome: Progressing     Problem: Skin/Tissue Integrity - Adult  Goal: Skin integrity remains intact  Outcome: Progressing  Flowsheets (Taken 10/27/2024 1549 by Shira Pereyra, RN)  Skin Integrity Remains Intact: Monitor for areas of redness and/or skin breakdown     Problem:

## 2024-10-28 NOTE — PROGRESS NOTES
Comprehensive Nutrition Assessment    Type and Reason for Visit:  Initial    Nutrition Recommendations/Plan:   Modify Current Diet (Suggest Cardiac diet)     Malnutrition Assessment:  Malnutrition Status:  No malnutrition (10/28/24 1354)      Nutrition Assessment:    Pt appears stable from a nutritional standpoint, with verbalized good appetite/intake, currently and pta, with no nutritional complaints. To continue to follow.    Nutrition Related Findings:    PMH-htn, hld (lipitor), CVA (aphasia), TBI, Seizures, hypothyroid (synthroid); admitted s/p MI, Seizure . Labs/meds reviewed. Trace BLE edema noted. Wound Type: None       Current Nutrition Intake & Therapies:    Average Meal Intake: %     ADULT DIET; Regular    Anthropometric Measures:  Height: 172.7 cm (5' 8\")  Ideal Body Weight (IBW): 154 lbs (70 kg)    Admission Body Weight: 78.8 kg (173 lb 12 oz)  Current BMI (kg/m2):  26.4  Usual Body Weight: 81.6 kg (180 lb) (6/2022 ? source)                       BMI Categories: Overweight (BMI 25.0-29.9)      Nutrition Diagnosis:   No nutrition diagnosis at this time     Nutrition Interventions:   Food and/or Nutrient Delivery: Modify Current Diet (Suggest Cardiac diet)  Nutrition Education/Counseling: Education not indicated  Coordination of Nutrition Care: Continue to monitor while inpatient       Goals:     Goals: PO intake 75% or greater       Nutrition Monitoring and Evaluation:      Food/Nutrient Intake Outcomes: Food and Nutrient Intake  Physical Signs/Symptoms Outcomes: Weight, Biochemical Data, Meal Time Behavior, Fluid Status or Edema    Discharge Planning:    No discharge needs at this time     Justine Richmond, RD, LD

## 2024-10-28 NOTE — PROGRESS NOTES
Facility/Department: [facility]  Rehabilitation Initial Assessment: Occupational Therapy  [facility]    NAME: Aramis David  : 1955  MRN: [id number]    Date of Service: 10/28/2024    Rehab Diagnosis(es): Impaired mobility and ADL's d/t new onset mental status change possible seizure disorder triggered by UTI  Patient Active Problem List    Diagnosis Date Noted    Palliative care encounter 10/28/2024    Goals of care, counseling/discussion 10/28/2024    Advanced care planning/counseling discussion 10/28/2024    Full code status 10/28/2024    Hyperkalemia 10/24/2024    Acute cystitis 10/24/2024    Cerebrovascular accident (CVA) ([facility]) 10/23/2024    Altered mental status 10/22/2024    Opioid dependence with current use ([facility]) 2023    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    Impaired mobility and activities of daily living dt late effects of CVA 2021    Abnormality of gait and mobility 2021    Blood thinned due to long-term anticoagulant use-Xaralto 2021    Cognitive deficit due to old head injury 2021    DJD (degenerative joint disease), lumbar 2021    DJD (degenerative joint disease), cervical 2021    Bowel and bladder incontinence 2021    Aphasia 2021    Generalized weakness 2021    Hypothyroidism 2019    Chronic deep vein thrombosis (DVT) of left popliteal vein ([facility]) 2019    Chronic pain syndrome 2018    Hemiparesis affecting dominant side as late effect of cerebrovascular accident (CVA) ([facility]) 2018    Chronic right shoulder pain 10/04/2017    Hypoxemia requiring supplemental oxygen 10/04/2017    History of CVA (cerebrovascular accident) 2017    Essential hypertension 2017    Recurrent depression ([facility]) 2017    Seizure ([facility]) 2017    S/P patent foramen ovale closure 2017    Hyperlipidemia 2017    Closed TBI (traumatic brain injury) 2012  AROM: Flexion contracture. Pain w/ passive mobility of digits      Strength:  LUE Strength  Gross LUE Strength: WFL  RUE Strength  RUE Strength Comment: pt unable to actively flex R shoulder to 90* to assess    Quality of Movement:  Tone RUE  RUE Tone: Hypertonic  Tone Description: significant contracture of R hand which prohibits active mobility.  Tone LUE  LUE Tone: Normotonic    Sensation:  Sensation  Overall Sensation Status:  (unable to assess d/t pt w/ aphasia)    Hand Dominance  Hand Dominance: Right    Safety:  Safety Devices  Type of Devices: Bed alarm in place;Call light within reach;Left in bed    Assessment:  Activity Tolerance  Activity Tolerance: Treatment limited secondary to decreased cognition;Patient limited by fatigue    Assessment  Performance deficits / Impairments: Decreased functional mobility ;Decreased ADL status;Decreased ROM;Decreased strength;Decreased safe awareness;Decreased cognition;Decreased endurance;Decreased balance;Decreased posture;Decreased coordination;Decreased fine motor control  Assessment: Pt is a 68-year-old male presenting to [facility] from home with altered mental status with potential seizure-like activity. Pt has a history of CVA w/ residual deficits of R-sided hypertonia. Pt is limited by R-sided hypertonia and other deficits listed and would benefit from continued OT to address these deficits to maximize safety and independence w/ ADL completion.  Prognosis: Fair  Decision Making: Medium Complexity  History: moderately complex chart review  Exam: 11 performance deficits  Assistance / Modification: Dependent/Total  Discharge Recommendations: Continue to assess pending progress  Plan  REQUIRES OT FOLLOW-UP: Yes    Equipment needed:  OT Equipment Recommendations  Other: Continue to assess    OT Education:  Education Given To: Patient  Education Provided: Role of Therapy, Plan of Care, ADL Function  Education Method: Verbal  Barriers to Learning: Cognition  Education

## 2024-10-28 NOTE — CONSULTS
encounter: Explained the role of palliative care in treating patient. Discussed symptom management related to chronic disease/condition. Provided emotional support and active listening. Patient understands and is agreeable to current plan.  Patient's family will consider adding palliative care in the outpatient setting but not agreeable during conversation at this time.  Palliative care will continue to tentatively follow-up patient is on the inpatient rehab floor.    Hospice: Hospice care does not currently align with patient's goals of care per conversation with patient's family.    Advance Care Planning:   Goals of Care Discussion: Advance Care Planning      Palliative Medicine Provider (MD/NP)  Advance Care Planning (ACP) Conversation      Date of Conversation: 10/28/24  The patient and/or authorized decision maker consented to a voluntary Advance Care Planning conversation.   Individuals present for the conversation:   Patient and Sister Ivette    Legal Healthcare Agent(s):    Primary Decision Maker: Nikhil David - Brother/Sister - [phone]    Secondary Decision Maker: SheritaIvette - Brother/Sister - [phone]    ACP documents available in EMR prior to discussion:  -Power of  for Healthcare    Primary Palliative Diagnosis(es):  Acute on chronic CVA with residual deficit   MI  Breakthrough seizure  Advance care planning discussion  Goals of care discussion  Palliative care encounter    Conversation Summary:  -Discussed goals of care with patient. Explained in extensive detail nuances between full code, DNR CCA and DNR CC. Patient has made the decision to remain FULL CODE.  -Patient has previously elected brother Karthikeyan primary and sister Ivette secondary as HCPOA, copy currently on file.  -Disease process and goals of treatment were discussed in basic terms. Aramis's goal is to optimize available comfort care measures and continue with current plan of care. We discussed the palliative care

## 2024-10-28 NOTE — PROGRESS NOTES
[facility]   Facility/Department: [facility]  Speech Language Pathology  Initial Speech/Language/Cognitive Assessment    NAME:Aramis David  : 1955 (68 y.o.)   [x]   confirmed    MRN: [id number]  ROOM: [place] [id number]-  ADMISSION DATE: 10/27/2024  PATIENT DIAGNOSIS(ES): Impaired mobility and activities of daily living [Z74.09, Z78.9]  No chief complaint on file.    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    Impaired mobility and activities of daily living dt late effects of CVA 2021    Abnormality of gait and mobility 2021    Blood thinned due to long-term anticoagulant use-Xaralto 2021    Cognitive deficit due to old head injury 2021    DJD (degenerative joint disease), lumbar 2021    DJD (degenerative joint disease), cervical 2021    Bowel and bladder incontinence 2021    Aphasia 2021    Generalized weakness 2021    Hypothyroidism 2019    Chronic deep vein thrombosis (DVT) of left popliteal vein ([facility]) 2019    Chronic pain syndrome 2018    Hemiparesis affecting dominant side as late effect of cerebrovascular accident (CVA) ([facility]) 2018    Chronic right shoulder pain 10/04/2017    Hypoxemia requiring supplemental oxygen 10/04/2017    History of CVA (cerebrovascular accident) 2017    Essential hypertension 2017    Recurrent depression ([facility]) 2017    Seizure ([facility]) 2017    S/P patent foramen ovale closure 2017    Hyperlipidemia 2017    Closed TBI (traumatic brain injury) 2012     Past Medical History:   Diagnosis Date    Chronic right shoulder pain 10/04/2017    Closed TBI (traumatic brain injury)  weeks  Frequency of Treatment: 2-4x/week    Prognosis  Speech Therapy Prognosis  Prognosis: Fair  Prognosis Considerations: Previous Level of Function;Severity of Impairments;Family/Community Support    Education  Patient Education: Role of SLP, purpose of eval, and results.  Patient Education Response: Needs reinforcement;Demonstrated understanding    Treatment/Goals  Short Term Goals  Time Frame for Short Term Goals: 1-2 weeks  Goal 1: Pt will answer simple WH and yes/no questions using their communication board/AAC with min assist in 90% of opportunities to increase his/her ability to effectively communicate their wants and needs.  Goal 2: Patient will identify objects/pictures within a field 2-4 with min cues with 90% accuracy.  Goal 3: Patient will follow 1-2 step verbal directions with mincues with 90% accuracy.  Goal 4: Pt will use his picture system to request a recurrence using # icons (more, again, etc.) with min to mod in 50% of opportunities to increase is/her ability to effectively communicate their wants and needs.  Goal 5: Pt will locate the target icon given a verbal prompt (i.e., \"show me the ___.\", point to the ___.\", \"where is the ___.\") on their picture system with min to mod assist in50% of opportunities to increase his/her ability to effectively communicate their wants and needs, within 1-2 weeks.  Goal 6: Pt will comment, question, or request using their picture system with min to mod in 50% of opportunities to increase his/her ability to effectively communicate their wants and needs, within 1-2 weeks  Long Term Goals  Time Frame for Long Term Goals: 2-3 weeks  Goal 1: Patient will improve Receptive Language abilities to increase comprehension of conversation and safety directions with familiar and unfamiliar communication partners.  Goal 2: Pt will use his augmentative and alternative communication system (i.e SGD, PECS, etc/) to effectively communicate his/her wants and needs to family,

## 2024-10-28 NOTE — CONSULTS
oz)   SpO2 98%   BMI 26.41 kg/m²   General:  Awake, alert, not in distress. Appears to be stated age.  HEENT: Atraumatic, normocephalic. PERRLA. EOM intact. Pink conjunctiva, anicteric sclera. Pink and moist oral mucosa. No lymphadenopathy. Trachea midline. Thyroid non palpable. Neck supple. No carotid bruit. No JVD.  Chest: Bilateal air entry, Clear to auscultation, no wheezing, rhonchi or rales.  Cardiovascular: RRR, S1S2, no murmur, click, rub or gallop. No lower extremity edema. Pedal and posterior tibialis 2+.   Abdomen: Soft, non tender to palpation. Active bowel sounds x 4 quadrants.  Musculoskeletal: R sided chronic weakness   Integumentary: Pink, warm and dry. Free from rash or lesions.  CNS: disoriented, has retrograde amnesia       Review of Labs and Diagnostic Testing:    CBC:   Recent Labs     10/26/24  0502 10/27/24  0527 10/28/24  0540   WBC 10.5 12.2* 11.4*   HGB 15.1 14.1 12.8*    153 151     BMP:    Recent Labs     10/26/24  0502 10/27/24  0527 10/28/24  0540    139 138   K 2.9* 3.7 3.8    106 107   CO2 25 26 20   BUN 9 9 10   CREATININE 0.93 0.85 0.78   GLUCOSE 95 104* 95     Calcium:  Recent Labs     10/28/24  0540   CALCIUM 8.1*     Ionized Calcium:Invalid input(s): \"IONCA\"  Magnesium:  Recent Labs     10/27/24  0527   MG 1.7     Phosphorus:  Recent Labs     10/28/24  0540   PHOS 3.0     BNP:No results for input(s): \"BNP\" in the last 72 hours.  Glucose:No results for input(s): \"POCGLU\" in the last 72 hours.  HgbA1C: No results for input(s): \"LABA1C\" in the last 72 hours.  INR: No results for input(s): \"INR\" in the last 72 hours.  Hepatic:   Recent Labs     10/27/24  0527   ALKPHOS 71   ALT 49*   AST 76*   BILITOT 1.4*   BILIDIR 0.3     Amylase and Lipase:No results for input(s): \"LACTA\", \"AMYLASE\" in the last 72 hours.  Lactic Acid: No results for input(s): \"LACTA\" in the last 72 hours.  Troponin:   Recent Labs     10/26/24  0502 10/27/24  0527 10/28/24  0540   CKTOTAL  6,306* 2,752* 1,143*     BNP: No results for input(s): \"BNP\" in the last 72 hours.  Lipids: No results for input(s): \"CHOL\", \"TRIG\", \"HDL\", \"LDL\" in the last 72 hours.    Invalid input(s): \"LDLCALC\"  ABGs: No results found for: \"PH\", \"PCO2\", \"PO2\", \"HCO3\", \"O2SAT\"         Plan:  Possible seizure on admission- [facility] neurology [facility] Tramadol were DC  History CVA/R sided weakness- chronic, on ASA  History PE/DVT-full anticoagulation  Hypothyroidism- synthroid, TSH done  NSTEMI- B blocker/xarelto, appreciate cardiology recommendations  Elevated CK= trending down, will restart lipitor, follow up with CK later  Medically stable for acute rehab admission at [facility]     Thank you  for allowing me to participate in this patient's care.      Electronically signed by Oj Mora MD on 10/28/2024 at 9:59 AM    Consulting Hospitalist

## 2024-10-29 PROCEDURE — 97535 SELF CARE MNGMENT TRAINING: CPT

## 2024-10-29 PROCEDURE — 92507 TX SP LANG VOICE COMM INDIV: CPT

## 2024-10-29 PROCEDURE — 1180000000 HC REHAB R&B

## 2024-10-29 PROCEDURE — 97542 WHEELCHAIR MNGMENT TRAINING: CPT

## 2024-10-29 PROCEDURE — 6370000000 HC RX 637 (ALT 250 FOR IP): Performed by: PHYSICAL MEDICINE & REHABILITATION

## 2024-10-29 PROCEDURE — 6370000000 HC RX 637 (ALT 250 FOR IP): Performed by: INTERNAL MEDICINE

## 2024-10-29 PROCEDURE — 99232 SBSQ HOSP IP/OBS MODERATE 35: CPT | Performed by: PHYSICAL MEDICINE & REHABILITATION

## 2024-10-29 PROCEDURE — 97110 THERAPEUTIC EXERCISES: CPT

## 2024-10-29 RX ADMIN — LEVOTHYROXINE SODIUM 50 MCG: 0.05 TABLET ORAL at 08:31

## 2024-10-29 RX ADMIN — BACLOFEN 10 MG: 10 TABLET ORAL at 13:53

## 2024-10-29 RX ADMIN — BACLOFEN 10 MG: 10 TABLET ORAL at 08:31

## 2024-10-29 RX ADMIN — ACETAMINOPHEN 500 MG: 500 TABLET ORAL at 08:31

## 2024-10-29 RX ADMIN — DULOXETINE HYDROCHLORIDE 60 MG: 60 CAPSULE, DELAYED RELEASE ORAL at 08:31

## 2024-10-29 RX ADMIN — ATORVASTATIN CALCIUM 80 MG: 80 TABLET, FILM COATED ORAL at 20:36

## 2024-10-29 RX ADMIN — ARIPIPRAZOLE 2 MG: 2 TABLET ORAL at 08:31

## 2024-10-29 RX ADMIN — METOPROLOL SUCCINATE 12.5 MG: 25 TABLET, FILM COATED, EXTENDED RELEASE ORAL at 08:32

## 2024-10-29 RX ADMIN — LEVETIRACETAM 1250 MG: 500 TABLET, FILM COATED ORAL at 20:36

## 2024-10-29 RX ADMIN — ASPIRIN 81 MG: 81 TABLET, CHEWABLE ORAL at 08:31

## 2024-10-29 RX ADMIN — Medication 100 MG: at 08:31

## 2024-10-29 RX ADMIN — LEVETIRACETAM 1250 MG: 500 TABLET, FILM COATED ORAL at 08:31

## 2024-10-29 RX ADMIN — ACETAMINOPHEN 500 MG: 500 TABLET ORAL at 20:34

## 2024-10-29 RX ADMIN — BACLOFEN 10 MG: 10 TABLET ORAL at 20:36

## 2024-10-29 RX ADMIN — RIVAROXABAN 20 MG: 20 TABLET, FILM COATED ORAL at 08:30

## 2024-10-29 RX ADMIN — Medication 2000 UNITS: at 17:35

## 2024-10-29 RX ADMIN — ACETAMINOPHEN 500 MG: 500 TABLET ORAL at 13:53

## 2024-10-29 ASSESSMENT — PAIN DESCRIPTION - LOCATION: LOCATION: GENERALIZED

## 2024-10-29 ASSESSMENT — PAIN DESCRIPTION - DESCRIPTORS: DESCRIPTORS: ACHING

## 2024-10-29 ASSESSMENT — PAIN SCALES - GENERAL
PAINLEVEL_OUTOF10: 5
PAINLEVEL_OUTOF10: 2
PAINLEVEL_OUTOF10: 4

## 2024-10-29 NOTE — PLAN OF CARE
Problem: Chronic Conditions and Co-morbidities  Goal: Patient's chronic conditions and co-morbidity symptoms are monitored and maintained or improved  10/29/2024 1403 by Zita Fatima RN  Outcome: Progressing     Problem: Discharge Planning  Goal: Discharge to home or other facility with appropriate resources  10/29/2024 1403 by Zita Fatima RN  Outcome: Progressing     Problem: Safety - Adult  Goal: Free from fall injury  10/29/2024 1403 by Zita Fatima RN  Outcome: Progressing     Problem: Skin/Tissue Integrity  Goal: Absence of new skin breakdown  Description: 1.  Monitor for areas of redness and/or skin breakdown  2.  Assess vascular access sites hourly  3.  Every 4-6 hours minimum:  Change oxygen saturation probe site  4.  Every 4-6 hours:  If on nasal continuous positive airway pressure, respiratory therapy assess nares and determine need for appliance change or resting period.  10/29/2024 1403 by Zita Fatima RN  Outcome: Progressing     Problem: Neurosensory - Adult  Goal: Achieves stable or improved neurological status  10/29/2024 1403 by Zita Fatima RN  Outcome: Progressing

## 2024-10-29 NOTE — PROGRESS NOTES
times daily.  His history of alcoholism and will be will give further consideration to low-dose opioids for pain as he is no longer able to take the Ultram due to seizure risk.  Skin healing and breakdown risk:  continue pressure relief program.  Daily skin exams and reports from nursing.  Severe fatigue due to nutritional and hydration deficiency: Add vitamin B12 vitamin D and CoQ10 continue to monitor I&O’s, calorie counts prn, dietary consult prn.  Add healthy HS snack.  Acute episodic insomnia with situational adjustment disorder:  consider prn low dose Ambien, monitor for day time sedation.  Add HS \"Tuck In\"  Falls risk elevated:  patient to use call light to get nursing assistance to get up, bed and chair alarm.  Elevated DVT risk: progressive activities in PT, continue prophylaxis LUCIANO hose, elevation and Xarelto.  Complex discharge planning:  DC-11/9/24 home alone with [facility].  Until then continue weekly team meeting  every Monday to re-assess progress towards goals, discuss and address social, psychological and medical comorbidities and to address difficulties they may be having progressing in therapy.  Patient and family education is in progress.  The patient is to follow-up with their family physician after discharge.        Complex Active General Medical Issues that complicate care Assess & Plan:         History of CVA (cerebrovascular accident)-control blood pressure monitor for diabetes consult neurology    Essential hypertension-Acute rehab to monitor heart rate and rhythm with the option of telemetry and the effects of chronotropic medication with respect to increasing physical activity and exercise in PT, OT, ADLs with medication titration to lowest effective dosing.  Continue blood signs every shift focusing on heart rate, rhythm and blood pressure checks with orthostatic checks-monitoring the effect of exercise, therapy and posture.  Consult hospitalist for backup medical and adjust/add medications.   Monitor heart rate and blood pressure as well as medications effects on vital signs before during and after therapy with especial focus on preventing orthostasis and falls risk.    Recurrent depression -emotional support provided daily, vitamin B12, encourage participation in rehabilitation support group and recreational therapy, adjust/add medications.    S/P patent foramen ovale closure    Hyperlipidemia    Hypoxemia requiring supplemental oxygen-Acute rehab for endurance traing with Pulse Ox to monitoring oxygen saturation and heart rate with O2 titration to lowest effective dose. Pulse oximeter checks to shift and at HS to dose and titrate oxygen and aerosol treatments monitor for nocturnal hypoxemia, monitor vital signs, oxygen prn.  Focus on energy conservation.    Hemiparesis affecting dominant side as late effect of cerebrovascular accident (CVA)      Hypothyroidism-  Synthroid and titrate dosing.  Follow vital signs, recheck TSH and thyroid studies as outpatient.    Chronic deep vein thrombosis (DVT) of left popliteal vein     Aphasia-SLP    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    Opioid dependence with current use (HCC)    Hyperkalemia-recheck BMP consider limiting potassium in diet          Focus on coordinating dc plans with patient family and care givers and order necessary equipment.        Alicia Milner D.O., PM&R     Attending    [phone]   [facility]

## 2024-10-29 NOTE — CARE COORDINATION
JAYJAY called and spoke with Nikhil about the team meeting. Nikhil was updated on pt's progress and goals. JAYJAY spoke with pt and went over goals and progress. Electronically signed by Mary Prado RN on 10/29/2024 at 5:18 PM     Nikhil stated we would be out of town Thursday through Sunday. Gave me Addie Astudillo's( sig other) phone  number if we would need anything. #[phone]. Electronically signed by Mary Prado RN on 10/29/2024 at 5:21 PM

## 2024-10-29 NOTE — PROGRESS NOTES
[facility]  Facility/Department: [facility]  Speech Language Pathology   Treatment Note          Aramis David  1955  [id number]  [x]   confirmed    Date: 10/29/2024      Restrictions/Precautions: Fall Risk     ADULT DIET; Regular     Respiratory Status:   RA  No active isolations    Rehab Diagnosis: Impaired mobility and ADL's due to new onset mental status change possible seizure disorder triggered by UTI     Subjective:  Alert and Cooperative        Interventions used this date:  Expressive Language and Receptive Language    Objective/Assessment:  Patient progressing towards goals:  Short Term Goals  Time Frame for Short Term Goals: 1-2 weeks  Goal 1: Pt will answer simple WH and yes/no questions using their communication board/AAC with min assist in 90% of opportunities to increase his/her ability to effectively communicate their wants and needs. Not addressed    Goal 2: Patient will identify objects/pictures within a field 2-4 with min cues with 90% accuracy. Patient ID pictures on the Ipad, with a choice of 2, I with 10% accuracy, with moderate descriptive cues 90% accuracy.     Goal 3: Patient will follow 1-2 step verbal directions with mincues with 90% accuracy. Not addressed    Goal 4: Pt will use his picture system to request a recurrence using # icons (more, again, etc.) with min to mod in 50% of opportunities to increase is/her ability to effectively communicate their wants and needs. Patient and ST reviewed patient's AAC device including previous setup icons from OP ST in . ST asked patient to ID a specific icon in a field of 3, which patient I did with 25% accuracy.    ST reviewed the icons to navigate through to find the \"call 911\" icon. Patient was able to locate icon on own after the review.     ST added patient's family members and personal info on to the Lever Ipad.     ST provided patient with encouragement to utilize AAC device.     Goal 5: Pt will locate the target icon

## 2024-10-29 NOTE — PROGRESS NOTES
[facility] Neurology Daily Progress Note  Name: Aramis David  Age: 68 y.o.  Gender: male  CodeStatus: Full Code  Allergies: No Known Allergies    Chief Complaint:Altered Mental Status (LKW a few days ago)    Primary Care Provider: Syd Maher MD  InpatientTreatment Team: Treatment Team:   Tristan Myers MD Patel, Dhruv R, Amari Soliman MD Garcia, Jesus, Alicia Kang DO Smith, Camryn, RN Vance, Kelly, RN  Admission Date: 10/22/2024      HPI   Pt seen and examined for neuro follow up.  Patient alert and oriented, no acute distress, cooperative.  Family at bedside.  Ongoing expressive aphasia which is his baseline.  Has right-sided weakness at baseline.  Denies headache.  No seizure activity overnight.  Afebrile.  Patient seen and examined events as noted above.    Vitals:    10/24/24 0851   BP:    Pulse: 53   Resp:    Temp:    SpO2:         Review of Systems   Constitutional:  Negative for fatigue and fever.   HENT:  Negative for hearing loss and trouble swallowing.    Eyes:  Negative for visual disturbance.   Respiratory:  Negative for cough, chest tightness, shortness of breath and wheezing.    Cardiovascular:  Negative for chest pain and palpitations.   Gastrointestinal:  Negative for nausea and vomiting.   Genitourinary:  Negative for difficulty urinating.   Musculoskeletal:  Positive for gait problem.   Skin:  Negative for color change and rash.   Neurological:  Positive for speech difficulty and weakness. Negative for dizziness, tremors, seizures, syncope, facial asymmetry, light-headedness, numbness and headaches.   Psychiatric/Behavioral:  Positive for confusion. Negative for agitation and hallucinations. The patient is not nervous/anxious.          Physical Exam  Vitals and nursing note reviewed.   Constitutional:       General: He is not in acute distress.     Appearance: He is not diaphoretic.   HENT:      Head: Normocephalic and atraumatic.   Eyes:      Extraocular Movements: 
  [facility] Neurology Daily Progress Note  Name: Aramis David  Age: 68 y.o.  Gender: male  CodeStatus: Full Code  Allergies: No Known Allergies    Chief Complaint:Altered Mental Status (LKW a few days ago)    Primary Care Provider: Syd Maher MD  InpatientTreatment Team: Treatment Team:   Tristan Myers MD Patel, Dhruv R, MD Cho, Donald I, MD Scullin, Heather, DO Bennett, Kathryn A, RN Brock, GORDON Orellana Teresa L, RN Degroh, GORDON Oates, Aristides CONNER RN  Admission Date: 10/22/2024      Altered Mental Status  Associated symptoms include weakness. Pertinent negatives include no chest pain, coughing, fatigue, fever, headaches, nausea, numbness, rash or vomiting.      10/26/2024:  Pt seen and examined for neuro follow up.  Patient alert and oriented, no acute distress, cooperative.  Ongoing expressive aphasia which is his baseline.  Has right-sided weakness at baseline.  Denies headache.  No seizure activity overnight.  Afebrile.      Events as noted above    10/27/2024:    Patient seen and examined for neurology follow-up he is alert and oriented no acute distress still has residual right-sided weakness no significant expressive aphasia noted.  Patient denies any seizures overnight has been seizure-free since admission.  Likely discharge today to rehab bed.    Vitals:    10/27/24 0745   BP: (!) 149/81   Pulse: 60   Resp: 16   Temp: 98.6 °F (37 °C)   SpO2: 97%        Review of Systems   Constitutional:  Negative for fatigue and fever.   HENT:  Negative for hearing loss and trouble swallowing.    Eyes:  Negative for visual disturbance.   Respiratory:  Negative for cough, chest tightness, shortness of breath and wheezing.    Cardiovascular:  Negative for chest pain and palpitations.   Gastrointestinal:  Negative for nausea and vomiting.   Genitourinary:  Negative for difficulty urinating.   Musculoskeletal:  Positive for gait problem.   Skin:  Negative for color change and rash.   Neurological:  
  PROGRESS NOTE    Patient Name: Aramis David  Admit Date: 10/22/2024 11:10 AM  MR #: [id number]  : 1955    Attending Physician: Tristan Myers MD  Reason for consult: Elevated Troponin    History of Presenting Illness:      Aramis David is a 68 y.o. male on hospital day 3 with a history of .   History Obtained From:  patient, electronic medical record    Pt presents to ER with acute MS changes per his Brother.   Brother, Karthikeyan, is in the room. Pt has significant Expressive aphagia but he is currently alert and oriented.     He has extensive Right sided Hemiparesis from prior CVA.  Prior DVT and PE.  S/p IVC Filter  Due to recurrent paradoxical Emboli pt received Amplatz #25 PFO Occluder at [facility].     We are asked to evaluate due to elevated HS Troponin. 182>185>182.  Pt is clear that he has not experienced CP nor SOB. He is able to ambulate minimal w Rolator.     ECG SR 76 no acute changes      10/24/24 Tele SR 58 sitting in chair. Daughter in room. Speech is much better. No cp no sob    10/25/24 TELE 50s on no O2 sats 100%  History:      EKG:  Past Medical History:   Diagnosis Date    Chronic right shoulder pain 10/04/2017    Closed TBI (traumatic brain injury) 2012    Essential hypertension 2017    Hemiparesis affecting dominant side as late effect of cerebrovascular accident (CVA) ([facility]) 2018    History of CVA (cerebrovascular accident) 2017    Hyperlipidemia 2017    Hypoxemia requiring supplemental oxygen 10/04/2017    Osteoarthritis     Recurrent depression (HCC) 2017    S/P patent foramen ovale closure 2017    Seizure ([facility]) 2017     History reviewed. No pertinent surgical history.    Family History  History reviewed. No pertinent family history.  [] Unable to obtain due to ventilated and/ or neurologic status    Social History     Socioeconomic History    Marital status:      Spouse name: Not on file    Number of children: Not on file    Years 
  PROGRESS NOTE    Patient Name: Aramis David  Admit Date: 10/22/2024 11:10 AM  MR #: [id number]  : 1955    Attending Physician: Tristan Myers MD  Reason for consult: Elevated Troponin    History of Presenting Illness:      Aramis David is a 68 y.o. male on hospital day 2 with a history of .   History Obtained From:  patient, electronic medical record    Pt presents to ER with acute MS changes per his Brother.   Brother, Karthikeyan, is in the room. Pt has significant Expressive aphagia but he is currently alert and oriented.     He has extensive Right sided Hemiparesis from prior CVA.  Prior DVT and PE.  S/p IVC Filter  Due to recurrent paradoxical Emboli pt received Amplatz #25 PFO Occluder at [facility].     We are asked to evaluate due to elevated HS Troponin. 182>185>182.  Pt is clear that he has not experienced CP nor SOB. He is able to ambulate minimal w Rolator.     ECG SR 76 no acute changes      10/24/24 Tele SR 58 sitting in chair. Daughter in room. Speech is much better. No cp no sob  History:      EKG:  Past Medical History:   Diagnosis Date    Chronic right shoulder pain 10/04/2017    Closed TBI (traumatic brain injury) 2012    Essential hypertension 2017    Hemiparesis affecting dominant side as late effect of cerebrovascular accident (CVA) ([facility]) 2018    History of CVA (cerebrovascular accident) 2017    Hyperlipidemia 2017    Hypoxemia requiring supplemental oxygen 10/04/2017    Osteoarthritis     Recurrent depression ([facility]) 2017    S/P patent foramen ovale closure 2017    Seizure ([facility]) 2017     History reviewed. No pertinent surgical history.    Family History  History reviewed. No pertinent family history.  [] Unable to obtain due to ventilated and/ or neurologic status    Social History     Socioeconomic History    Marital status:      Spouse name: Not on file    Number of children: Not on file    Years of education: Not on file    Highest 
  [facility] Neurology Daily Progress Note  Name: Aramis David  Age: 68 y.o.  Gender: male  CodeStatus: Full Code  Allergies: No Known Allergies    Chief Complaint:Altered Mental Status (LKW a few days ago)    Primary Care Provider: Syd Maher MD  InpatientTreatment Team: Treatment Team:   Tristan Myers MD Patel, Dhruv R, Amari Soliman MD Scullin, Heather, DO Bennett, Trcay HARO, GORDON Nolen, GORDON Orellana, Naheed HOOVER, GORDON Escobar, GORDON Granger Ebony R  Admission Date: 10/22/2024      Altered Mental Status  Associated symptoms include weakness. Pertinent negatives include no chest pain, coughing, fatigue, fever, headaches, nausea, numbness, rash or vomiting.      10/26/2024:  Pt seen and examined for neuro follow up.  Patient alert and oriented, no acute distress, cooperative.  Ongoing expressive aphasia which is his baseline.  Has right-sided weakness at baseline.  Denies headache.  No seizure activity overnight.  Afebrile.      Events as noted above    Vitals:    10/26/24 0146   BP: (!) 150/77   Pulse: 54   Resp: 18   Temp: 99.1 °F (37.3 °C)   SpO2: 100%        Review of Systems   Constitutional:  Negative for fatigue and fever.   HENT:  Negative for hearing loss and trouble swallowing.    Eyes:  Negative for visual disturbance.   Respiratory:  Negative for cough, chest tightness, shortness of breath and wheezing.    Cardiovascular:  Negative for chest pain and palpitations.   Gastrointestinal:  Negative for nausea and vomiting.   Genitourinary:  Negative for difficulty urinating.   Musculoskeletal:  Positive for gait problem.   Skin:  Negative for color change and rash.   Neurological:  Positive for speech difficulty and weakness. Negative for dizziness, tremors, seizures, syncope, facial asymmetry, light-headedness, numbness and headaches.   Psychiatric/Behavioral:  Positive for confusion. Negative for agitation and hallucinations. The patient is not nervous/anxious.          Physical 
  Pharmacy Note  Vancomycin Consult    Aramis David is a 68 y.o. male started on Vancomycin for Sepsis (CNS origin); consult received from Dr. Myers to manage therapy. Also receiving the following antibiotics: rocephin.    Altered mental status [R41.82]  Elevated troponin [R79.89]  PHAM (acute kidney injury) (HCC) [N17.9]  Cerebrovascular accident (CVA), unspecified mechanism (HCC) [I63.9]  Allergies: Patient has no known allergies.    Cultures  Recent Labs     10/22/24  1548   COVID19 Not Detected     Height: 172.7 cm (5' 8\"), Weight - Scale: 81.6 kg (180 lb), Body mass index is 27.37 kg/m².     MRSA Nasal swab:N/a, does not meet criteria    Recent Labs     10/22/24  1159   CREATININE 2.08*   Estimated Creatinine Clearance: 33 mL/min (A) (based on SCr of 2.08 mg/dL (H)).  .      Assessment/Plan:  Will initiate Vancomycin with a one time loading dose of 2000 mg x1. Given bump in scr from baseline will draw level 24 hours following dose to assess continued dosing. Timing of future trough levels may be adjusted based on culture results, renal function, and clinical response.    Thank you for the consult.  Will continue to follow.  Ashlee Navarro, STANLEY PharmD    
  Pharmacy Vancomycin Consult     Vancomycin Day: 2  Current Dosing: dosing per levels    Temp max: 98.4 *F    Recent Labs     10/23/24  0509 10/23/24  1341   BUN 25* 24*       Recent Labs     10/23/24  0509 10/23/24  1341   CREATININE 1.41* 1.22*       Recent Labs     10/22/24  1159 10/23/24  0509   WBC 17.9* 17.6*         Intake/Output Summary (Last 24 hours) at 10/24/2024 0051  Last data filed at 10/23/2024 1806  Gross per 24 hour   Intake 3262.72 ml   Output 700 ml   Net 2562.72 ml       Ht Readings from Last 1 Encounters:   10/22/24 1.727 m (5' 8\")        Wt Readings from Last 1 Encounters:   10/22/24 81.6 kg (180 lb)         Body mass index is 27.37 kg/m².    Estimated Creatinine Clearance: 56 mL/min (A) (based on SCr of 1.22 mg/dL (H)).    Trough:   Component  Ref Range & Units 10/23/24 2018   Vancomycin Rm  10.0 - 40.0 ug/mL 9.2 Low        Assessment/Plan:  Subtherapeutic vancomycin trough. Improving renal function. Ordered Vancomycin 1,250 mg IV x1 with repeat vancomycin trough in 12 hours. Due to improved renal function, dosing interval is likely to be decreased from every 24 hours to every 12 hours. Can confirm and continue to monitor with morning labs and afternoon trough.    Cristin Hampton, BrendonD [facility] 10/24/2024 12:55 AM      
  Physician Progress Note      PATIENT:               CRYSTAL WASHINGTON  CSN #:                  [id number]  :                       1955  ADMIT DATE:       10/22/2024 11:10 AM  DISCH DATE:  RESPONDING  PROVIDER #:        Tristan VALLADARES MD          QUERY TEXT:    Patient admitted with breakthrough seizure, PHAM, rule out infection. Acute   cystitis noted per cardiology. UA was negative for bacteria and leukocyte   esterase. If possible, please document in progress notes and discharge summary   if you are evaluating and /or treating any of the following:    The medical record reflects the following:  Risk Factors: 67 yo male with PHAM, h/o CVA, ICH, seizure d/o  Clinical Indicators: UA showed neg leukocyte esterase and bacteria, WBC 6-9,   trace ketones and blood.. No urine culture.  Treatment: Rocephin, UA    Thank you,  Tiesha Arias   Clinical Documentation Improvement Specialist  W: [phone]  Options provided:  -- Acute cystitis ruled out  -- Other - I will add my own diagnosis  -- Disagree - Not applicable / Not valid  -- Disagree - Clinically unable to determine / Unknown  -- Refer to Clinical Documentation Reviewer    PROVIDER RESPONSE TEXT:    After study, acute cystitis ruled out.    Query created by: Dewey Arias on 10/25/2024 1:22 PM      Electronically signed by:  Tristan VALLADARES MD 10/25/2024 2:38 PM          
Hospitalist Progress Note      PCP: Syd Maher MD    Date of Admission: 10/22/2024    Chief Complaint:  no acute events, afebrile, stable HD, on RA    Medications:  Reviewed    Infusion Medications    lactated ringers IV soln 150 mL/hr at 10/24/24 1039    sodium chloride       Scheduled Medications    vancomycin  1,500 mg IntraVENous Q24H    [Held by provider] atorvastatin  80 mg Oral Nightly    metoprolol succinate  25 mg Oral Daily    [Held by provider] ARIPiprazole  2 mg Oral Daily    baclofen  10 mg Oral TID    DULoxetine  60 mg Oral Daily    levothyroxine  50 mcg Oral Daily    levETIRAcetam  1,250 mg Oral BID    aspirin  81 mg Oral Daily    rivaroxaban  20 mg Oral Daily with breakfast    sodium chloride flush  5-40 mL IntraVENous 2 times per day    cefTRIAXone (ROCEPHIN) 2,000 mg in sterile water 20 mL IV syringe  2,000 mg IntraVENous Q12H    vancomycin (VANCOCIN) intermittent dosing (placeholder)   Other RX Placeholder     PRN Meds: sodium chloride flush, sodium chloride, acetaminophen **OR** acetaminophen      Intake/Output Summary (Last 24 hours) at 10/24/2024 1300  Last data filed at 10/24/2024 0656  Gross per 24 hour   Intake 240 ml   Output 440 ml   Net -200 ml       Exam:    /69   Pulse 53   Temp 98.2 °F (36.8 °C) (Oral)   Resp 16   Ht 1.727 m (5' 8\")   Wt 81.6 kg (180 lb)   SpO2 99%   BMI 27.37 kg/m²     General appearance: awake, cooperative  Lungs: clear to auscultation bilaterally  Heart: S1/S2,RRR  Abdomen: soft, active BS  Extremities:  no edema, right sided hemiparesis with chronic contracture of right hand / foot present      Labs:   Recent Labs     10/22/24  1159 10/23/24  0509 10/24/24  0625   WBC 17.9* 17.6* 11.4*   HGB 15.8 13.1* 12.7*   HCT 46.7 37.8* 35.9*    142 131     Recent Labs     10/23/24  0509 10/23/24  1341 10/24/24  0625    136 134*   K 5.5* 4.1 3.7    106 105   CO2 14* 16* 20   BUN 25* 24* 19   CREATININE 1.41* 1.22* 1.11   CALCIUM 8.6 8.9 8.3* 
Hospitalist Progress Note      PCP: Syd Maher MD    Date of Admission: 10/22/2024    Chief Complaint:  no acute events, afebrile, stable HD, on RA    Medications:  Reviewed    Infusion Medications    lactated ringers IV soln 150 mL/hr at 10/25/24 0821    sodium chloride       Scheduled Medications    [START ON 10/26/2024] metoprolol succinate  12.5 mg Oral Daily    vancomycin  1,500 mg IntraVENous Q24H    [Held by provider] atorvastatin  80 mg Oral Nightly    [Held by provider] ARIPiprazole  2 mg Oral Daily    baclofen  10 mg Oral TID    DULoxetine  60 mg Oral Daily    levothyroxine  50 mcg Oral Daily    levETIRAcetam  1,250 mg Oral BID    aspirin  81 mg Oral Daily    rivaroxaban  20 mg Oral Daily with breakfast    sodium chloride flush  5-40 mL IntraVENous 2 times per day    cefTRIAXone (ROCEPHIN) 2,000 mg in sterile water 20 mL IV syringe  2,000 mg IntraVENous Q12H    vancomycin (VANCOCIN) intermittent dosing (placeholder)   Other RX Placeholder     PRN Meds: sodium chloride flush, sodium chloride, acetaminophen **OR** acetaminophen      Intake/Output Summary (Last 24 hours) at 10/25/2024 1138  Last data filed at 10/25/2024 0600  Gross per 24 hour   Intake 540 ml   Output 650 ml   Net -110 ml       Exam:    /71   Pulse 52   Temp 97.7 °F (36.5 °C) (Oral)   Resp 18   Ht 1.727 m (5' 8\")   Wt 76.2 kg (168 lb)   SpO2 100%   BMI 25.54 kg/m²     General appearance: awake, cooperative  Lungs: clear to auscultation bilaterally  Heart: S1/S2,RRR  Abdomen: soft, active BS  Extremities:  no edema, right sided hemiparesis with chronic contracture of right hand / foot present      Labs:   Recent Labs     10/23/24  0509 10/24/24  0625 10/25/24  0500   WBC 17.6* 11.4* 8.6   HGB 13.1* 12.7* 13.0*   HCT 37.8* 35.9* 37.0*    131 122*     Recent Labs     10/23/24  0509 10/23/24  1341 10/24/24  0625 10/25/24  0500    136 134* 142   K 5.5* 4.1 3.7 3.7    106 105 109*   CO2 14* 16* 20 20   BUN 25* 24* 
Hospitalist Progress Note      PCP: Syd Maher MD    Date of Admission: 10/22/2024    Chief Complaint:  no acute events, afebrile, stable HD, on RA    Medications:  Reviewed    Infusion Medications    lactated ringers IV soln 150 mL/hr at 10/26/24 0956    sodium chloride       Scheduled Medications    potassium chloride  40 mEq Oral TID    metoprolol succinate  12.5 mg Oral Daily    vancomycin  1,500 mg IntraVENous Q24H    [Held by provider] atorvastatin  80 mg Oral Nightly    ARIPiprazole  2 mg Oral Daily    baclofen  10 mg Oral TID    DULoxetine  60 mg Oral Daily    levothyroxine  50 mcg Oral Daily    levETIRAcetam  1,250 mg Oral BID    aspirin  81 mg Oral Daily    rivaroxaban  20 mg Oral Daily with breakfast    sodium chloride flush  5-40 mL IntraVENous 2 times per day    cefTRIAXone (ROCEPHIN) 2,000 mg in sterile water 20 mL IV syringe  2,000 mg IntraVENous Q12H    vancomycin (VANCOCIN) intermittent dosing (placeholder)   Other RX Placeholder     PRN Meds: sodium chloride flush, sodium chloride, acetaminophen **OR** acetaminophen      Intake/Output Summary (Last 24 hours) at 10/26/2024 1558  Last data filed at 10/26/2024 1507  Gross per 24 hour   Intake 5202.77 ml   Output 950 ml   Net 4252.77 ml       Exam:    /77   Pulse 58   Temp 98.8 °F (37.1 °C) (Oral)   Resp 22   Ht 1.727 m (5' 8\")   Wt 76.4 kg (168 lb 6.4 oz)   SpO2 99%   BMI 25.61 kg/m²     General appearance: awake, cooperative  Lungs: clear to auscultation bilaterally  Heart: S1/S2,RRR  Abdomen: soft, active BS  Extremities:  no edema, right sided hemiparesis with chronic contracture of right hand / foot present      Labs:   Recent Labs     10/24/24  0625 10/25/24  0500 10/26/24  0502   WBC 11.4* 8.6 10.5   HGB 12.7* 13.0* 15.1   HCT 35.9* 37.0* 41.0*    122* 135     Recent Labs     10/24/24  0625 10/25/24  0500 10/26/24  0502   * 142 140   K 3.7 3.7 2.9*    109* 103   CO2 20 20 25   BUN 19 13 9   CREATININE 1.11 0.99 
[facility]   Facility/Department: [facility] [place] [facility] ORTHO TELE  Speech Language Pathology  Treatment Note      Aramis David  1955  [id number]  [x]   confirmed      Date: 10/25/2024    Altered mental status [R41.82]  Elevated troponin [R79.89]  PHAM (acute kidney injury) (HCC) [N17.9]  Cerebrovascular accident (CVA), unspecified mechanism (HCC) [I63.9]    Restrictions/Precautions: Up as Tolerated, Fall Risk    ADULT DIET; Regular     Respiratory Status:  room air  No active isolations    Subjective:  Alert and Cooperative        Interventions used this date:  Receptive Language and Instruction in Compensatory Strategies      Objective/Assessment:  Patient progressing towards goals:  Short Term Goals  Time Frame for Short Term Goals: 1-2 weeks  Goal 1: Pt will answer simple questions using their communication board/AAC with min assist in 90% of opportunities to increase his/her ability to effectively communicate their wants and needs.  Goal 2: Patient will identify objects/pictures within a field of 6-8 with min cues with 90% accuracy.  Pt ID pictures of common functional objects in a field of 6 with 75% acc Ind and increased to 100% acc min verbal cues  Goal 3: Patient will follow 1 step verbal/written directions with mincues with 90% accuracy.  Pt followed simple 1-step directions with 40% acc Ind and increased to 90% acc with a visual model from patient    Treatment/Activity Tolerance:  Patient tolerated treatment well    Plan:  Continue per POC    Pain Assessment:  Patient does not c/o pain.    Pain Re-assessment:  Patient does not c/o pain.    Patient/Caregiver Education:  Patient educated on session and progression towards goals.  Patient stated verbal understanding of directions.    Safety Devices:  All fall risk precautions in place    Therapy Time  SLP Individual Minutes  Time In: 1109  Time Out: 1125  Minutes: 16            Signature: Electronically signed by OSCAR Neely on 10/25/2024 at 
[facility]   Facility/Department: [address] ORTHO TELE  Speech Language Pathology  Initial Speech/Language/Cognitive Assessment    NAME:Aramis David  : 1955 (68 y.o.)   [x]   confirmed    MRN: [id number]  ROOM: [place] [address]  ADMISSION DATE: 10/22/2024  PATIENT DIAGNOSIS(ES): Altered mental status [R41.82]  Elevated troponin [R79.89]  PHAM (acute kidney injury) ([facility]) [N17.9]  Cerebrovascular accident (CVA), unspecified mechanism ([facility]) [I63.9]  Chief Complaint   Patient presents with    Altered Mental Status     LKW a few days ago     Patient Active Problem List    Diagnosis Date Noted    Cerebrovascular accident (CVA) ([facility]) 10/23/2024    Altered mental status 10/22/2024    Opioid dependence with current use ([facility]) 2023    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    Impaired mobility and activities of daily living dt late effects of CVA 2021    Abnormality of gait and mobility 2021    Blood thinned due to long-term anticoagulant use-Xaralto 2021    Cognitive deficit due to old head injury 2021    DJD (degenerative joint disease), lumbar 2021    DJD (degenerative joint disease), cervical 2021    Bowel and bladder incontinence 2021    Aphasia 2021    Generalized weakness 2021    Hypothyroidism 2019    Chronic deep vein thrombosis (DVT) of left popliteal vein ([facility]) 2019    Chronic pain syndrome 2018    Hemiparesis affecting dominant side as late effect of cerebrovascular accident (CVA) ([facility]) 2018    Chronic right shoulder pain 10/04/2017    Hypoxemia requiring supplemental oxygen 10/04/2017    History of CVA (cerebrovascular accident) 2017    Essential hypertension 2017    Recurrent depression ([facility]) 2017    Seizure ([facility]) 2017    S/P patent foramen ovale closure 2017    Hyperlipidemia 2017    Closed TBI (traumatic brain injury) 2012     Past Medical 
Physical Therapy  Facility/Department: [facility] MED SURG W286/W286-01  Physical Therapy Discharge      NAME: Aramis David    : 1955 (68 y.o.)  MRN: [id number]    Account: [id number]  Gender: male      Patient has been discharged from acute care hospital. DC patient from current PT program.      Electronically signed by Kimberlee Kong PT on 10/28/24 at 11:16 AM EDT    
Physical Therapy Med Surg Daily Treatment Note  Facility/Department: [address] ORTHO TELE  Room: [place] [id number]       NAME: Aramis David  : 1955 (68 y.o.)  MRN: [id number]  CODE STATUS: Full Code    Date of Service: 10/25/2024    Patient Diagnosis(es): Altered mental status [R41.82]  Elevated troponin [R79.89]  PHAM (acute kidney injury) ([facility]) [N17.9]  Cerebrovascular accident (CVA), unspecified mechanism (Former[facility]) [I63.9]   Chief Complaint   Patient presents with    Altered Mental Status     LKW a few days ago     Patient Active Problem List    Diagnosis Date Noted    Hyperkalemia 10/24/2024    Acute cystitis 10/24/2024    Cerebrovascular accident (CVA) ([facility]) 10/23/2024    Altered mental status 10/22/2024    Opioid dependence with current use ([facility]) 2023    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    Impaired mobility and activities of daily living dt late effects of CVA 2021    Abnormality of gait and mobility 2021    Blood thinned due to long-term anticoagulant use-Xaralto 2021    Cognitive deficit due to old head injury 2021    DJD (degenerative joint disease), lumbar 2021    DJD (degenerative joint disease), cervical 2021    Bowel and bladder incontinence 2021    Aphasia 2021    Generalized weakness 2021    Hypothyroidism 2019    Chronic deep vein thrombosis (DVT) of left popliteal vein ([facility]) 2019    Chronic pain syndrome 2018    Hemiparesis affecting dominant side as late effect of cerebrovascular accident (CVA) ([facility]) 2018    Chronic right shoulder pain 10/04/2017    Hypoxemia requiring supplemental oxygen 10/04/2017    History of CVA (cerebrovascular accident) 2017    Essential hypertension 2017    Recurrent depression ([facility]) 2017    Seizure ([facility]) 2017    S/P patent foramen ovale closure 2017    Hyperlipidemia 2017    Closed TBI (traumatic brain injury) 
Subjective:  The patient complains of severe acute on chronic progressive fatigue and right-sided weakness and spasticity partially relieved by rest, PT, OT and meds and exacerbated by exertion and recent illnesses including non-ST MI with UTI.    Patient was admitted through the ER on 10/22/2024 with seizure and mental status change.  He had elevated troponins and was diagnosed with a non-ST MI cardiology was consulted.   He was admitted under the care of the hospitalist with neurology and cardiology consult.  Neurology his prescribed Keppra.  Tramadol was DC'd as it lowers the seizure threshold.     He continues to have right-sided weakness and residual aphasia from previous CVA 2021.  He has a remote history of PFO.  He had a closure procedure performed for a percutaneous septal occluder.  He continues on Xarelto.     He had an elevated white count -likely UTI he is on IV Rocephin and Vanco.  UA was positive but --chest x-ray and respiratory panel are negative.  Blood cultures done.    I am concerned about patient’s medical complexities including:  Principal Problem:    Altered mental status  Active Problems:    History of CVA (cerebrovascular accident)    Essential hypertension    Cerebrovascular accident (CVA) (HCC)    Hyperkalemia    Acute cystitis  Resolved Problems:    * No resolved hospital problems. *      .    Controlled Substance Monitoring:    Acute and Chronic Pain Monitoring:   RX Monitoring Acute Pain Prescriptions Periodic Controlled Substance Monitoring   10/23/2024   3:12 PM Prescription exceeds daily limit for a specific reason. See comments or note.;Severe pain not adequately treated with lower dose.;Not required given exclusionary diagnoses... Possible medication side effects, risk of tolerance/dependence & alternative treatments discussed.;No signs of potential drug abuse or diversion identified.;Assessed functional status (ability to engage in work or other purposeful activities, the pain 
[facility]   Pharmacy Pharmacokinetic Monitoring Service - Vancomycin    Consulting Provider: Dr. Myers  Indication: sepsis CNS  Target Concentration: Goal AUC/ANNIKA 400-600 mg*hr/L  Day of Therapy: 4  Additional Antimicrobials: ceftriaxone    Pertinent Laboratory Values:   Wt Readings from Last 1 Encounters:   10/25/24 76.2 kg (168 lb)     Temp Readings from Last 1 Encounters:   10/25/24 97.7 °F (36.5 °C) (Oral)     Estimated Creatinine Clearance: 69 mL/min (based on SCr of 0.99 mg/dL).  Recent Labs     10/24/24  0625 10/25/24  0500   CREATININE 1.11 0.99   BUN 19 13   WBC 11.4* 8.6     Procalcitonin: 0.50    Pertinent Cultures:  Culture Date Source Results   10/22/24 blood   No Growth to date.  Any change in status will be called.      MRSA Nasal Swab: N/A. Non-respiratory infection.    Plan:  Continue current regimen  Repeat vancomycin concentration ordered 10/26 @ 0600  Pharmacy will continue to monitor patient and adjust therapy as indicated    Thank you for the consult,  Nai Mcgregor RPH  10/25/2024 1:59 PM     
[facility]   Pharmacy Pharmacokinetic Monitoring Service - Vancomycin    Consulting Provider: Dr Myers   Indication: sepsis CNS  Target Concentration: Goal AUC/ANNIKA 400-600 mg*hr/L  Day of Therapy: 5  Additional Antimicrobials: ceftriaxone    Pertinent Laboratory Values:   Wt Readings from Last 1 Encounters:   10/26/24 76.4 kg (168 lb 6.4 oz)     Temp Readings from Last 1 Encounters:   10/26/24 99.1 °F (37.3 °C) (Oral)     Estimated Creatinine Clearance: 74 mL/min (based on SCr of 0.93 mg/dL).  Recent Labs     10/25/24  0500 10/26/24  0502   CREATININE 0.99 0.93   BUN 13 9   WBC 8.6 10.5     Procalcitonin: 0.50    Pertinent Cultures:  Culture Date Source Results   10/22 blood No growth to date   MRSA Nasal Swab: N/A. Non-respiratory infection.    Recent vancomycin administrations                     vancomycin (VANCOCIN) 1500 mg in 300 mL IVPB (mg) 1,500 mg New Bag 10/25/24 2331     1,500 mg New Bag 10/24/24 2256    vancomycin (VANCOCIN) 1250 mg in 250 mL IVPB (mg) 1,250 mg New Bag 10/23/24 2344                    Assessment:  Date/Time Current Dose Concentration Timing of Concentration (h) AUC   10/26/24  1500 mg q 24 24.4 random 446   Note: Serum concentrations collected for AUC dosing may appear elevated if collected in close proximity to the dose administered, this is not necessarily an indication of toxicity    Plan:  Current dosing regimen is therapeutic  Continue current dose  Repeat vancomycin concentration ordered for 10/28 @ 0600   Pharmacy will continue to monitor patient and adjust therapy as indicated    Thank you for the consult,  Kelli Andujar RPH  10/26/2024 6:51 AM    
[facility]   Pharmacy Pharmacokinetic Monitoring Service - Vancomycin    Consulting Provider: Dr. Novoa   Indication: sepsis CNS  Target Concentration: Goal AUC/ANNIKA 400-600 mg*hr/L  Day of Therapy: 3  Additional Antimicrobials: ceftriaxone    Pertinent Laboratory Values:   Wt Readings from Last 1 Encounters:   10/22/24 81.6 kg (180 lb)     Temp Readings from Last 1 Encounters:   10/24/24 98.2 °F (36.8 °C) (Oral)     Estimated Creatinine Clearance: 62 mL/min (based on SCr of 1.11 mg/dL).  Recent Labs     10/23/24  0509 10/23/24  1341 10/24/24  0625   CREATININE 1.41* 1.22* 1.11   BUN 25* 24* 19   WBC 17.6*  --  11.4*     Procalcitonin: 0.50    Pertinent Cultures:  Culture Date Source Results   10/22/24 blood   No Growth to date.  Any change in status will be called.      MRSA Nasal Swab: N/A. Non-respiratory infection.    Recent vancomycin administrations                     vancomycin (VANCOCIN) 1250 mg in 250 mL IVPB (mg) 1,250 mg New Bag 10/23/24 2344    vancomycin (VANCOCIN) 2000 mg in 400 mL IVPB (mg) 2,000 mg New Bag 10/22/24 2011                    Assessment:  Date/Time Current Dose Concentration Timing of Concentration (h) AUC   10/24 Intermittent due to rapid improvement in renal function. Last dose 1250 mg 14.9 ~12 hours post dose 417   Note: Serum concentrations collected for AUC dosing may appear elevated if collected in close proximity to the dose administered, this is not necessarily an indication of toxicity    Plan:  Patient renal function has improved significantly. Switching to AUC guided dosing  Current dosing regimen is therapeutic, but borderline subtherapeutic   Start vancomycin 1500 mg Q24H  Repeat vancomycin concentration ordered 10/26 @ 0600  Pharmacy will continue to monitor patient and adjust therapy as indicated    Thank you for the consult,  Nai Mcgregor RPH  10/24/2024 12:37 PM   
Wound Ostomy Continence Nurse  Consult Note       NAME:  Aramis David  MEDICAL RECORD NUMBER:  [id number]  AGE: 68 y.o.   GENDER: male  : 1955  TODAY'S DATE:  10/24/2024    Subjective   Reason for [facility] Nurse Evaluation and Assessment: right elbow      Aramis David is a 68 y.o. male referred by:   [] Physician  [x] Nursing  [] Other:     Wound Identification:  Wound Type: skin tear  Contributing Factors: shear force    Wound History: Patient admitted to [facility] on 10/22/2024. Wound ostomy evaluation placed on 10/24/2024 for skin tear to right elbow.  Current Wound Care Treatment:  Recommending 1) continue pressure injury prevention interventions 2) foam to right elbow - change every third day     Patient Goal of Care:  [x] Wound Healing  [] Odor Control  [] Palliative Care  [] Pain Control   [] Other:         PAST MEDICAL HISTORY        Diagnosis Date    Chronic right shoulder pain 10/04/2017    Closed TBI (traumatic brain injury) 2012    Essential hypertension 2017    Hemiparesis affecting dominant side as late effect of cerebrovascular accident (CVA) ([facility]) 2018    History of CVA (cerebrovascular accident) 2017    Hyperlipidemia 2017    Hypoxemia requiring supplemental oxygen 10/04/2017    Osteoarthritis     Recurrent depression ([facility]) 2017    S/P patent foramen ovale closure 2017    Seizure ([facility]) 2017       PAST SURGICAL HISTORY    History reviewed. No pertinent surgical history.    FAMILY HISTORY    History reviewed. No pertinent family history.    SOCIAL HISTORY    Social History     Tobacco Use    Smoking status: Never    Smokeless tobacco: Never       ALLERGIES    No Known Allergies    MEDICATIONS    No current facility-administered medications on file prior to encounter.     Current Outpatient Medications on File Prior to Encounter   Medication Sig Dispense Refill    traMADol (ULTRAM) 50 MG tablet Take 1 tablet by mouth every 8 hours as 
  CKTOTAL 2,718*       Urinalysis:      Lab Results   Component Value Date/Time    NITRU Negative 10/23/2024 04:27 AM    WBCUA 6-9 10/23/2024 04:27 AM    BACTERIA Negative 10/23/2024 04:27 AM    RBCUA  10/23/2024 04:27 AM    BLOODU LARGE 10/23/2024 04:27 AM    GLUCOSEU Negative 10/23/2024 04:27 AM       Radiology:  XR CHEST PORTABLE   Final Result   No acute cardiopulmonary disease         CT HEAD WO CONTRAST   Final Result   There is evidence of prior infarct is the left frontal and temporal lobes.   No acute hemorrhage is seen.  Recommend MRI or CTA evaluate for evolving   stroke.                 Assessment/Plan:    68 y.o. male with PMH of left MCA stroke with residual right sided weakness and expressive aphasia, traumatic ICH s/p fall, s/p PFO closure, DVT/PE s/p IVC filter, seizure disorder, depression, chronic pain with opiate dependence, who presented with:     Altered mental status, leukocytosis, elevated CRP/Procalcitonin  - likely due to breakthrough seizure, r/o possible infection/sepsis  - CXR, CT head, U/a and respiratory panel were negative  - clinically improving  - continue IV antibiotics empirically  - resumed Keppra  - follow blood cultures   - followed by neurology     PHAM   - improving with IVFs  - monitor renal function and u/o closely    Rhabdomyolysis  - continue IVFs  - monitor CK     Elevated troponins   - in the setting of rhabdomyolysis  - ECG was negative per report  - conservative therapy by cardiology      Hx of left MCA stroke   - continue ASA, Lipitor  - PT/OT     DVT/PE s/p IVC filter  - continue Xarelto     Chronic pain with opiate dependence  - on Tramadol and Pregabalin per [place] review  - needs to avoid taking tramadol due to increased seizure risk      Diet: ADULT DIET; Regular    Code Status: Full Code      Disposition - acute rehab        Electronically signed by CATY VALLADARES MD on 10/23/2024 at 12:59 PM  
  Diagnosis Date    Chronic right shoulder pain 10/4/2017    Closed TBI (traumatic brain injury) 12/28/2012    Essential hypertension 8/2/2017    Hemiparesis affecting dominant side as late effect of cerebrovascular accident (CVA) (HCC) 1/30/2018    History of CVA (cerebrovascular accident) 8/2/2017    Hyperlipidemia 8/2/2017    Hypoxemia requiring supplemental oxygen 10/4/2017    Recurrent depression (HCC) 8/2/2017    S/P patent foramen ovale closure 8/2/2017    Seizure ([facility]) 8/2/2017     History reviewed. No pertinent surgical history.  No Known Allergies    DATE ONSET: 10/22/24    Date of Evaluation: 10/23/2024   Evaluating Therapist: Aniyah Gallardo SLP    Dysphagia Diagnosis  Dysphagia Diagnosis: Swallow function appears WFL;No clinical indicators of dysphagia  Dysphagia Impression : No clinical indicators of oral dysphagia or pharyngeal dysphagia. Patient's prognosis for diet tolerance is good.  Per the results of this assessment, recommend patient to resume baseline diet of regular solids and thin liquids.    Recommended Diet     Diet Solids Recommendation: Regular  Liquid Consistency Recommendation: Thin  Recommended Form of Meds: PO  Compensatory Swallowing Strategies : Alternate solids and liquids;Upright as possible for all oral intake;Eat/Feed slowly;Small bites/sips           Reason for Referral  Aramis David was referred for a bedside swallow evaluation to assess the efficiency of his swallow function, identify signs and symptoms of aspiration, identify risk factors, and make recommendations regarding safe dietary consistencies, effective compensatory strategies, and safe eating environment.    General  Chart Reviewed: Yes  Comments: Pt admitted with AMS. Concerns for CVA vs seizure. Pt with hx of CVAs, expressive/receptive aphasia, R side weakness. Pt was d/c from  with SGD.  Behavior/Cognition: Alert;Cooperative  Respiratory Status: Room air  O2 Device: None (Room air)  Communication 
12/28/2012        Past Medical History:   Diagnosis Date    Chronic right shoulder pain 10/04/2017    Closed TBI (traumatic brain injury) 12/28/2012    Essential hypertension 08/02/2017    Hemiparesis affecting dominant side as late effect of cerebrovascular accident (CVA) ([facility]) 01/30/2018    History of CVA (cerebrovascular accident) 08/02/2017    Hyperlipidemia 08/02/2017    Hypoxemia requiring supplemental oxygen 10/04/2017    Osteoarthritis     Recurrent depression ([facility]) 08/02/2017    S/P patent foramen ovale closure 08/02/2017    Seizure ([facility]) 08/02/2017     History reviewed. No pertinent surgical history.    Chart Reviewed: Yes  Patient assessed for rehabilitation services?: Yes  Family / Caregiver Present: Yes  Diagnosis: Altered mental status    Restrictions:  Restrictions/Precautions: Up as Tolerated;Fall Risk    SUBJECTIVE:   Subjective: Patient resting in bed. Agreeable to tx.  Response To Previous Treatment: Patient with no complaints from previous session.    Pain  Patient c/o R LE pain in standing position. Unable to rate pain level.     OBJECTIVE:     Bed mobility  Rolling to Left: Moderate assistance  Supine to Sit: Moderate assistance;Maximum assistance  Scooting: Moderate assistance  Bed Mobility Comments: HOB flat. Patient requires verbal cues for sequencing.    Transfers  Sit to Stand: Moderate Assistance  Stand to Sit: Moderate Assistance  Bed to Chair: Moderate assistance;2 Person Assistance (face to face without AD)  Comment: Patient completes STS x4 from bed with and without ww . He demonstrates a posterior lean upon standing. Poor ability to WB through R LE due to spasticity.    Ambulation  Comments: NT due to safety concerns    ASSESSMENT   Body Structures, Functions, Activity Limitations Requiring Skilled Therapeutic Intervention: Decreased functional mobility ;Decreased ADL status;Decreased ROM;Decreased body mechanics;Decreased strength;Decreased safe awareness;Decreased 
MI)  - Imaging evidence of new loss of viable myocardium or new regional wall   motion abnormality in a pattern consistent with an ischemic etiology (Types 1   - 5 MI)    Thank you,  Tiesha Arias   Clinical Documentation Improvement Specialist  W: [phone]  Options provided:  -- NSTEMI ruled out after study and non-ischemic myocardial injury due to   rhabdomyolysis confirmed  -- NSTEMI ruled out after study and demand ischemia due to rhabdomyolysis   confirmed  -- NSTEMI present as evidenced by, Please document indicators of myocardial   infarction.  -- Other - I will add my own diagnosis  -- Disagree - Not applicable / Not valid  -- Disagree - Clinically unable to determine / Unknown  -- Refer to Clinical Documentation Reviewer    PROVIDER RESPONSE TEXT:    This patient has NSTEMI as evidenced by [employer]    Query created by: Dewey Arias on 10/29/2024 8:48 AM      Electronically signed by:  Amari Quiroz MD 10/29/2024 11:16 AM          
Limits    Observation:  Observation/Palpation  Posture: Fair  Observation: No acute distress noted. Pt pleasant and motivated. Noted expressive aphasia. Increased tone R hemibody    Cognition Status:  Cognition  Cognition Comment: Pt has baseline aphasia from previous CVA    Perception Status:  Perception  Overall Perceptual Status: WFL    Vision and Hearing Status:  Vision  Vision Exceptions: Wears glasses at all times  Hearing  Hearing: Within functional limits   Vision - Basic Assessment  Prior Vision: Wears glasses all the time  Visual History: No significant visual history  Patient Visual Report: No visual complaint reported.  Visual Field Cut: No  Oculo Motor Control: WNL    GROSS ASSESSMENT AROM/PROM:  AROM:  (LUE, RUE in flexor tone)       ROM:   LUE AROM (degrees)  LUE AROM : WFL  Left Hand AROM (degrees)  Left Hand AROM: WFL  RUE AROM (degrees)  RUE General AROM: Minimal AROM, flexor tone  Right Hand AROM (degrees)  Right Hand General AROM: Flexion contracture    UE STRENGTH:  Strength: Generally decreased, functional    UE COORDINATION:  Coordination: Generally decreased, functional    UE TONE:  Tone: Abnormal (Increased tone R hemibody)    UE SENSATION:  Sensation: Intact    Hand Dominance:  Hand Dominance  Hand Dominance: Right    ADL Status:  ADL  Feeding: Setup  Grooming: Minimal assistance  UE Bathing: Moderate assistance  LE Bathing: Maximum assistance  UE Dressing: Moderate assistance  LE Dressing: Maximum assistance  Toileting: Maximum assistance  Additional Comments: Simulated ADLs as above. Limited d/t pain and fatigue, weakness and decreased balance.  Toilet Transfers  Toilet Transfer: Unable to assess  Toilet Transfers Comments: Anticipate mod A    Functional Mobility:    Transfers  Sit to stand: Minimal assistance, Moderate assistance  Stand to sit: Minimal assistance, Moderate assistance    Patient pivoted only to bedside chair  with Handheld assist at Min A level. Difficulty weight 
Recommendations:  Continue to assess pending progress, Therapy recommended at discharge         Goals  Long Term Goals  Long Term Goal 1: Pt to complete bed mobility with SBA  Long Term Goal 2: Pt to complete transfers with SBA  Long Term Goal 3: Pt to ambulate 25-50ft with LRD and SBA    PLAN    General Plan: 1 time a day 3-6 times a week  Safety Devices  Type of Devices: All fall risk precautions in place, Bed alarm in place, Call light within reach, Left in bed     AMPAC (6 CLICK) BASIC MOBILITY  AM-PAC Inpatient Mobility Raw Score : 10     Therapy Time   Individual   Time In 0848   Time Out 0903   Minutes 15     Timed Code Treatment Minutes: 15 Minutes   Trans/bed mob 15    Tereza Valerio PTA, 10/27/24 at 9:16 AM       Electronically signed by Tereza Valerio PTA on 10/27/2024 at 9:16 AM    Definitions for assistance levels  Independent = pt does not require any physical supervision or assistance from another person for activity completion. Device may be needed.  Stand by assistance = pt requires verbal cues or instructions from another person, close to but not touching, to perform the activity  Minimal assistance= pt performs 75% or more of the activity; assistance is required to complete the activity  Moderate assistance= pt performs 50% of the activity; assistance is required to complete the activity  Maximal assistance = pt performs 25% of the activity; assistance is required to complete the activity  Dependent = pt requires total physical assistance to accomplish the task  
today        Electronically signed by CATY VALLADARES MD on 10/27/2024 at 12:27 PM  
microangiopathy..  There is encephalomalacia seen in  the left frontal and temporal lobes there is ex vacuo dilatation of the left  lateral ventricle..  The basal cisterns are patent.  There is no midline  shift.    ORBITS: The visualized portion of the orbits demonstrate no acute abnormality.    SINUSES: The visualized paranasal sinuses and mastoid air cells demonstrate  no acute abnormality.    SOFT TISSUES/SKULL:  No acute abnormality of the visualized skull or soft  tissues.    Impression  There is evidence of prior infarct is the left frontal and temporal lobes.  No acute hemorrhage is seen.  Recommend MRI or CTA evaluate for evolving  stroke.  No results found for this or any previous visit.  No results found for this or any previous visit.      Carotid duplex: No results found for this or any previous visit.  No results found for this or any previous visit.  No results found for this or any previous visit.      Echo No results found for this or any previous visit.            Assessment/Plan:  10/23/2024:  Patient is a 68-year-old male with past medical history of depression, hyperlipidemia, hypertension, TBI, large left MCA M1 and M2 thrombus, PE and bilateral DVTs secondary to PFO in 2012 and underwent PFO closure in 2013, CVA resulted in right hemiparesis and expressive aphasia, right sided spasticity and clonus, seizure disorder prior to stroke who presented to [facility] emergency room for altered mental status.  Patient was found by his brother who went to check on him.  He was confused, staring off, not answering questions.  Last known well was on 10/20/2024.  Vital signs in the emergency room are 111/79, 78, 15, 98.7, 99%.  EKG showed normal sinus rhythm at 76 bpm.  CT of the head with evidence of prior infarct in the left frontal and temporal lobes.  No acute findings.  Initial lab testing remarkable for white blood cells 17.9, creatinine of 2.08, high-sensitivity troponin of 185, CRP of 29.8, 
mobility ;Decreased ADL status;Decreased ROM;Decreased body mechanics;Decreased strength;Decreased safe awareness;Decreased cognition;Decreased endurance;Decreased balance;Decreased coordination  Decision Making: Medium Complexity  History: High  Exam: Med  Clinical Presentation: Med    Therapy Prognosis: Good  Barriers to Learning: none         DISCHARGE RECOMMENDATIONS:  Discharge Recommendations: Continue to assess pending progress, Therapy recommended at discharge    Assessment: Continued PT indicated to progress mobility and facilitate DC at highest level of indep and safety. Pt with increased falls risk. Previously indep with basic mobility, however now requiring considerable assist to complete. Rec therapy stay prior to DC home.  Requires PT Follow-Up: Yes       PLAN OF CARE:  Physical Therapy Plan  General Plan: 1 time a day 3-6 times a week  Current Treatment Recommendations: Strengthening, ROM, Balance training, Functional mobility training, Transfer training, ADL/Self-care training, Endurance training, Gait training, Stair training, Neuromuscular re-education, Home exercise program, Safety education & training, Patient/Caregiver education & training, Equipment evaluation, education, & procurement, Modalities, Positioning, Manual    Safety Devices  Type of Devices: All fall risk precautions in place    Goals:  Long Term Goals  Long Term Goal 1: Pt to complete bed mobility with SBA  Long Term Goal 2: Pt to complete transfers with SBA  Long Term Goal 3: Pt to ambulate 25-50ft with LRD and SBA    AMPAC (6 CLICK) BASIC MOBILITY  AM-PAC Inpatient Mobility Raw Score : 10     Therapy Time:   Individual   Time In 1110   Time Out 1126   Minutes 16           Kimberlee Kong PT, 10/23/24 at 1:31 PM         Definitions for assistance levels  Independent = pt does not require any physical supervision or assistance from another person for activity completion. Device may be needed.  Stand by assistance = pt requires verbal 
Pulmonary vascularity is within normal limits.     No acute cardiopulmonary disease     CT HEAD WO CONTRAST    Result Date: 10/22/2024  EXAMINATION: CT OF THE HEAD WITHOUT CONTRAST  10/22/2024 12:14 pm TECHNIQUE: CT of the head was performed without the administration of intravenous contrast. Automated exposure control, iterative reconstruction, and/or weight based adjustment of the mA/kV was utilized to reduce the radiation dose to as low as reasonably achievable.  Radiation dose: 486.73 mGy-cm COMPARISON: December 20, 2021 HISTORY: ORDERING SYSTEM PROVIDED HISTORY: Stroke TECHNOLOGIST PROVIDED HISTORY: Has a \"code stroke\" or \"stroke alert\" been called?->Yes Reason for exam:->Stroke Decision Support Exception - unselect if not a suspected or confirmed emergency medical condition->Emergency Medical Condition (MA) What reading provider will be dictating this exam?->CRC FINDINGS: BRAIN/VENTRICLES: There is no acute intracranial hemorrhage, mass effect or midline shift.  Hypodensities are seen in the periventricular white matter consistent with chronic microangiopathy..  There is encephalomalacia seen in the left frontal and temporal lobes there is ex vacuo dilatation of the left lateral ventricle..  The basal cisterns are patent.  There is no midline shift. ORBITS: The visualized portion of the orbits demonstrate no acute abnormality. SINUSES: The visualized paranasal sinuses and mastoid air cells demonstrate no acute abnormality. SOFT TISSUES/SKULL:  No acute abnormality of the visualized skull or soft tissues.     There is evidence of prior infarct is the left frontal and temporal lobes. No acute hemorrhage is seen.  Recommend MRI or CTA evaluate for evolving stroke.       Active Hospital Problems    Diagnosis Date Noted    Hyperkalemia [E87.5] 10/24/2024     Priority: Low    Acute cystitis [N30.00] 10/24/2024     Priority: Low    Cerebrovascular accident (CVA) (HCC) [I63.9] 10/23/2024     Priority: Low    Altered 
constipation   and Bladder dysfunction   overactive, neurogenic bladder:  frequent toileting, ambulate to bathroom with assistance, check post void residuals.  Check for C.difficile x1 if >2 loose stools in 24 hours, continue bowel & bladder program.  Monitor for UTI symptoms including lethargy and confusion    Moderate back pain and generalized OA pain: reassess pain every shift and prior to and after each therapy session, give prn Tylenol   and consider scheduled Tylenol, modalities prn in therapy, consider Lidoderm, K-pad prn.     Skin healing    breakdown   risk:  continue pressure relief program.  Daily skin exams and reports from nursing.    Severe fatigue due to immobility and nutritional deficits: monitoring for dysphagia   Add vitamin B12 vitamin D and CoQ10 titrate dosing and add protein supplementation with low carb content.    Complex discharge planning:   Discussed with care team-last 24 hour events noted.  I will continue to follow along and reassess functional and medical status as we strive to improve patient's functional and medical outcomes progressing to the most efficient and lowest level of care.       Complex Active General Medical Issues that complicate care:     1. Principal Problem:    Altered mental status  Active Problems:    History of CVA (cerebrovascular accident)    Essential hypertension    Cerebrovascular accident (CVA) (HCC)    Hyperkalemia    Acute cystitis  Resolved Problems:    * No resolved hospital problems. *          Events and functional changes in the past 24 hours reviewed improvements in functional status are encouraging       Focus of today's plan-  to acute rehab today      Alicia Milner D.O., FAAPMR  PM&R     Attending    [phone]   [facility]

## 2024-10-29 NOTE — PLAN OF CARE
Problem: Chronic Conditions and Co-morbidities  Goal: Patient's chronic conditions and co-morbidity symptoms are monitored and maintained or improved  Outcome: Progressing     Problem: Discharge Planning  Goal: Discharge to home or other facility with appropriate resources  Outcome: Progressing     Problem: Safety - Adult  Goal: Free from fall injury  Outcome: Progressing     Problem: Skin/Tissue Integrity  Goal: Absence of new skin breakdown  Description: 1.  Monitor for areas of redness and/or skin breakdown  2.  Assess vascular access sites hourly  3.  Every 4-6 hours minimum:  Change oxygen saturation probe site  4.  Every 4-6 hours:  If on nasal continuous positive airway pressure, respiratory therapy assess nares and determine need for appliance change or resting period.  Outcome: Progressing     Problem: SLP Adult - Impaired Communication  Goal: By Discharge: Demonstrates communication skills at highest level of function for planned discharge setting.  See evaluation for individualized goals.  10/28/2024 1150 by Madina Valdes, SLP  Outcome: Progressing     Problem: Neurosensory - Adult  Goal: Achieves stable or improved neurological status  Outcome: Progressing  Goal: Absence of seizures  Outcome: Progressing  Goal: Remains free of injury related to seizures activity  Outcome: Progressing     Problem: Skin/Tissue Integrity - Adult  Goal: Skin integrity remains intact  Outcome: Progressing     Problem: Musculoskeletal - Adult  Goal: Return mobility to safest level of function  Outcome: Progressing     Problem: Gastrointestinal - Adult  Goal: Minimal or absence of nausea and vomiting  Outcome: Progressing  Goal: Maintains or returns to baseline bowel function  Outcome: Progressing  Goal: Maintains adequate nutritional intake  Outcome: Progressing

## 2024-10-29 NOTE — PROGRESS NOTES
OCCUPATIONAL THERAPY  INPATIENT REHAB TREATMENT NOTE  [facility]      NAME: Aramis David  : 1955 (68 y.o.)  MRN: [id number]  CODE STATUS: Full Code  [facility]    Date of Service: 10/29/2024    Referring Physician: Alicia Milner DO  Rehab Diagnosis: Impaired mobility and ADL's d/t new onset mental status change possible seizure disorder triggered by UTI    Hospital course:   Comments: 68 y.o. male patient who presented to ER via EMS 10/22 with altered mental status (confusion, staring off and now answering questions.) Patient with PMHx of CVA with right sided deficits. No acute findings on CT of head.  Initial lab testing remarkable for white blood cells 17.9, creatinine of 2.08, high-sensitivity troponin of 185, CRP of 29.8, procalcitonin of 1.78, lactate of 2.3, CK of 2718. Though ECG showing SR and no acute changes, troponins elevated. Patient admitted for additional work up and evaluation with consults from neurology and cardiology.      Restrictions  Restrictions/Precautions  Restrictions/Precautions: Fall Risk       Patient's date of birth confirmed: Yes    SAFETY:  Safety Devices  Safety Devices in place: Yes  Type of devices: All fall risk precautions in place      SUBJECTIVE: \"Thank You.\"     Pain   Pain at start of treatment: No    Pain at end of treatment: No      COGNITION:  Overall Cognitive Status: Exceptions  Arousal/Alertness: Appropriate responses to stimuli  Following Commands: Follows one step commands with repetition;Follows one step commands with increased time  Attention Span: Attends with cues to redirect  Insights: Decreased awareness of deficits  Initiation: Requires cues for some  Sequencing: Requires cues for some  Cognition Comment: Pt has baseline aphasia from previous CVA      Pt's current cognitive status is:  Comprehension: SBA  Expression: Min A  Social Interaction: Min A  Problem Solving: Mod A  Memory: SBA      OBJECTIVE: Pt in bed resting upon arrival

## 2024-10-29 NOTE — FLOWSHEET NOTE
Patient resting in bed at this time watching a football game with no complaints of pain or discomfort. Patient took evening medications whole one at a time with sips of water without difficulty. Patient had a cookie and milk for an HS snack. Patient required max assistance to undress and take his shirt/pants/socks off. Placed clean gown on patient. Patient is incontinent of both bowel and bladder. Cleaned patient with ara-wipes and applied zinc to redness on coccyx. New external male catheter placed with depends for patient incontinent episodes. Elevated BLE and right arm on a pillow and repositioned patient in bed. Patient verbalized no further needs at this time. Bed alarm engaged and call light within reach.

## 2024-10-29 NOTE — PROGRESS NOTES
OCCUPATIONAL THERAPY  INPATIENT REHAB TREATMENT NOTE  [facility]      NAME: Aramis David  : 1955 (68 y.o.)  MRN: [id number]  CODE STATUS: Full Code  Room: R233/R233-01    Date of Service: 10/29/2024    Referring Physician: Alicia Milner DO  Rehab Diagnosis: Impaired mobility and ADL's d/t new onset mental status change possible seizure disorder triggered by UTI    Hospital course:   Comments: 68 y.o. male patient who presented to ER via EMS 10/22 with altered mental status (confusion, staring off and now answering questions.) Patient with PMHx of CVA with right sided deficits. No acute findings on CT of head.  Initial lab testing remarkable for white blood cells 17.9, creatinine of 2.08, high-sensitivity troponin of 185, CRP of 29.8, procalcitonin of 1.78, lactate of 2.3, CK of 2718. Though ECG showing SR and no acute changes, troponins elevated. Patient admitted for additional work up and evaluation with consults from neurology and cardiology.      Restrictions  Restrictions/Precautions  Restrictions/Precautions: Fall Risk                 Patient's date of birth confirmed: Pt verbally unable, verified via wrist band    SAFETY:  Safety Devices  Safety Devices in place: Yes  Type of devices: All fall risk precautions in place    SUBJECTIVE:  Subjective  Subjective: \"Yes\"    Pain at start of treatment: No    Pain at end of treatment: No      COGNITION:   Patient was able to follow directions well     OBJECTIVE:    Feeding  Assistance Level: Supervision;Set-up  Skilled Clinical Factors: Patient appeared to have eaten his mashed potatoes. His meat loaf was not eaten. Asked patient if he wanted to eat the meat loaf and he agreed. Meatloaf was cut up for patient and he was able to feed himself the meatloaf and cut up pineapple. Patient was able to let therapist know his desires about adding sugar to the iced tea.     Patient and therapist looked at his communication device with regards to his leisure

## 2024-10-29 NOTE — PROGRESS NOTES
Occupational Therapy Get up and Go Note            Date: 10/29/2024  Patient Name: Aramis David        MRN: [id number]    Account #: [id number]  : 1955  (68 y.o.)    Subjective:  Patient states:  that sounds good (coffee placed in cup with lid to decrease spillage)    Pain:  No complaints of pain    Objective:  ADL:  Feeding:  setup to manage coffee cup with lid. Cream/sugar added to cup. Some difficulty with regular cup d/t slight tremor in the L arm. Pt had already finished eating all food    HOB raised to functional position    Treatment consisted of:   [x] ADL Training  [] Strengthening   [] Transfer Training    [] DME Education  [] HEP   [] Patient Education  [] Other:    Safety:  Safety Devices  Safety Devices in place:   Type of devices: All fall risk precautions in place    Therapy Time:   Individual Group Co-Treat   Time In 0717       Time Out 0725         Minutes 8           ADL/IADL trainin minutes     Electronically signed by:    Gonzalo Macedo OT    10/29/2024, 8:00 AM

## 2024-10-29 NOTE — PROGRESS NOTES
Physical Therapy Rehab Treatment Note  Facility/Department: [facility]  [facility]       NAME: Aramis David  : 1955 (68 y.o.)  MRN: [id number]  CODE STATUS: Full Code    Date of Service: 10/29/2024       Restrictions:  Restrictions/Precautions: Fall Risk       SUBJECTIVE:   Subjective: \"I'm cold\"    Pain  Pain: Denies pre and post session pain.      OBJECTIVE:     Bed Mobility  Overall Assistance Level: Maximum Assistance  Additional Factors: Verbal cues;Increased time to complete;Head of bed flat;Without handrails  Roll Left  Assistance Level: Maximum assistance  Skilled Clinical Factors: increased assist required d/t fatigue  Roll Right  Assistance Level: Maximum assistance  Skilled Clinical Factors: increased assist required d/t fatigue  Sit to Supine  Assistance Level: Maximum assistance  Skilled Clinical Factors: assist at LEs, pt able to initiate movement but required assistance to complete, increased assist required d/t fatigue  Scooting  Assistance Level: Maximum assistance  Skilled Clinical Factors: pt attempts to scoot to HOB without assistance, difficulty pushing through LEs    Transfers  Surface: Wheelchair;To bed  Additional Factors: Verbal cues;Increased time to complete  Bed To/From Chair  Technique: Stand pivot  Assistance Level: Maximum assistance  Skilled Clinical Factors: face to face SPT completed wc to bed, vc's for technique and sequencing, pt mildly impulsive, unable to complete without MaxA    Wheelchair  Surface: Level surface;Uneven surface  Device: Standard wheelchair  Additional Factors: Verbal cues;Hand placement cues;One handrail;Increased time to complete  Assistance Required to Manage Parts: All wheelchair parts  Assistance Level for Propulsion: Minimal assistance  Propulsion Method: Left upper extremity;Left lower extremity  Propulsion Quality: Slow velocity;Short strokes;Decreased fluidity  Propulsion Distance: 65'  Skilled Clinical Factors: vc's for technique and

## 2024-10-29 NOTE — PROGRESS NOTES
Physical Therapy Rehab Treatment Note  Facility/Department: [facility]  [facility]       NAME: Aramis David  : 1955 (68 y.o.)  MRN: [id number]  CODE STATUS: Full Code    Date of Service: 10/29/2024       Restrictions:  Restrictions/Precautions: Fall Risk       SUBJECTIVE:   Subjective: \"I am tired\"    Pain  Pain: Denies pre and post session pain.      OBJECTIVE:     Bed Mobility  Overall Assistance Level: Maximum Assistance  Additional Factors: Verbal cues;Increased time to complete;Head of bed flat;Without handrails  Roll Left  Assistance Level: Moderate assistance  Skilled Clinical Factors: increased assist required d/t fatigue, completed x 3 with focus on repetition  Roll Right  Assistance Level: Moderate assistance  Skilled Clinical Factors: increased assist required d/t fatigue, completed x 3 with focus on repetition    PT Exercises  A/AROM Exercises: supine: AP, QS, GS, heel slides, SLR, hip abd x10ea cole - vc's to improve technique for max benefit    Activity Tolerance  Activity Tolerance: Patient limited by fatigue;Patient limited by endurance    ASSESSMENT/PROGRESS TOWARDS GOALS:   Assessment  Assessment: Pt continues to be very fatigued and lethargic this date, adamantly declines OOB activity this PM. Agreeable to supine therex with encouragement, but demos poor tolerance and requests to discontinue activities d/t fatigue.  Activity Tolerance: Patient limited by fatigue;Patient limited by endurance    Goals:  Long Term Goals  Long Term Goal 1: Pt to complete bed mobility with SBA  Long Term Goal 2: Pt to complete transfers with SBA  Long Term Goal 3: Pt to ambulate 25-50ft with appropriate device and SBA  Long Term Goal 4: indep w/c mobility 50 feet  Long Term Goal 5: Pt able to maintain sitting and perform scooting tasks along EOB with SBA  Additional Goals?: Yes  Long term goal 6: pt to require min assist for curb step to allow for home entry    PLAN OF CARE/Safety:   Safety

## 2024-10-30 PROCEDURE — 97110 THERAPEUTIC EXERCISES: CPT

## 2024-10-30 PROCEDURE — 97530 THERAPEUTIC ACTIVITIES: CPT

## 2024-10-30 PROCEDURE — 99232 SBSQ HOSP IP/OBS MODERATE 35: CPT

## 2024-10-30 PROCEDURE — 97535 SELF CARE MNGMENT TRAINING: CPT

## 2024-10-30 PROCEDURE — 99232 SBSQ HOSP IP/OBS MODERATE 35: CPT | Performed by: PHYSICAL MEDICINE & REHABILITATION

## 2024-10-30 PROCEDURE — 92507 TX SP LANG VOICE COMM INDIV: CPT

## 2024-10-30 PROCEDURE — 1180000000 HC REHAB R&B

## 2024-10-30 PROCEDURE — 6370000000 HC RX 637 (ALT 250 FOR IP): Performed by: INTERNAL MEDICINE

## 2024-10-30 PROCEDURE — 6370000000 HC RX 637 (ALT 250 FOR IP): Performed by: PHYSICAL MEDICINE & REHABILITATION

## 2024-10-30 PROCEDURE — 97140 MANUAL THERAPY 1/> REGIONS: CPT

## 2024-10-30 PROCEDURE — 94150 VITAL CAPACITY TEST: CPT

## 2024-10-30 RX ORDER — OXYMETAZOLINE HYDROCHLORIDE 0.05 G/100ML
2 SPRAY NASAL 2 TIMES DAILY
Status: DISPENSED | OUTPATIENT
Start: 2024-10-30 | End: 2024-11-02

## 2024-10-30 RX ADMIN — RIVAROXABAN 20 MG: 20 TABLET, FILM COATED ORAL at 09:03

## 2024-10-30 RX ADMIN — BACLOFEN 10 MG: 10 TABLET ORAL at 09:05

## 2024-10-30 RX ADMIN — LEVOTHYROXINE SODIUM 50 MCG: 0.05 TABLET ORAL at 09:03

## 2024-10-30 RX ADMIN — LEVETIRACETAM 1250 MG: 500 TABLET, FILM COATED ORAL at 19:50

## 2024-10-30 RX ADMIN — LEVETIRACETAM 1250 MG: 500 TABLET, FILM COATED ORAL at 09:03

## 2024-10-30 RX ADMIN — BACLOFEN 10 MG: 10 TABLET ORAL at 13:43

## 2024-10-30 RX ADMIN — METOPROLOL SUCCINATE 12.5 MG: 25 TABLET, FILM COATED, EXTENDED RELEASE ORAL at 09:04

## 2024-10-30 RX ADMIN — ATORVASTATIN CALCIUM 80 MG: 80 TABLET, FILM COATED ORAL at 19:51

## 2024-10-30 RX ADMIN — ACETAMINOPHEN 500 MG: 500 TABLET ORAL at 19:53

## 2024-10-30 RX ADMIN — ACETAMINOPHEN 500 MG: 500 TABLET ORAL at 13:46

## 2024-10-30 RX ADMIN — Medication 2000 UNITS: at 17:32

## 2024-10-30 RX ADMIN — BACLOFEN 10 MG: 10 TABLET ORAL at 19:51

## 2024-10-30 RX ADMIN — ARIPIPRAZOLE 2 MG: 2 TABLET ORAL at 09:03

## 2024-10-30 RX ADMIN — Medication 100 MG: at 09:05

## 2024-10-30 RX ADMIN — DULOXETINE HYDROCHLORIDE 60 MG: 60 CAPSULE, DELAYED RELEASE ORAL at 09:03

## 2024-10-30 RX ADMIN — ASPIRIN 81 MG: 81 TABLET, CHEWABLE ORAL at 09:05

## 2024-10-30 RX ADMIN — ACETAMINOPHEN 500 MG: 500 TABLET ORAL at 09:04

## 2024-10-30 ASSESSMENT — PAIN SCALES - GENERAL
PAINLEVEL_OUTOF10: 2
PAINLEVEL_OUTOF10: 0
PAINLEVEL_OUTOF10: 1
PAINLEVEL_OUTOF10: 1

## 2024-10-30 ASSESSMENT — PAIN DESCRIPTION - DESCRIPTORS
DESCRIPTORS: ACHING
DESCRIPTORS: ACHING

## 2024-10-30 ASSESSMENT — PAIN - FUNCTIONAL ASSESSMENT
PAIN_FUNCTIONAL_ASSESSMENT: ACTIVITIES ARE NOT PREVENTED
PAIN_FUNCTIONAL_ASSESSMENT: ACTIVITIES ARE NOT PREVENTED

## 2024-10-30 ASSESSMENT — PAIN DESCRIPTION - ORIENTATION
ORIENTATION: RIGHT
ORIENTATION: RIGHT

## 2024-10-30 ASSESSMENT — PAIN DESCRIPTION - LOCATION
LOCATION: SHOULDER
LOCATION: SHOULDER

## 2024-10-30 ASSESSMENT — PAIN SCALES - WONG BAKER: WONGBAKER_NUMERICALRESPONSE: HURTS A LITTLE BIT

## 2024-10-30 NOTE — PROGRESS NOTES
OCCUPATIONAL THERAPY  INPATIENT REHAB TREATMENT NOTE  [facility]      NAME: Aramis David  : 1955 (68 y.o.)  MRN: [id number]  CODE STATUS: Full Code  Room: 33/R233-01    Date of Service: 10/30/2024    Referring Physician: Alicia Milner DO  Rehab Diagnosis: Impaired mobility and ADL's d/t new onset mental status change possible seizure disorder triggered by UTI    Hospital course:   Comments: 68 y.o. male patient who presented to ER via EMS 10/22 with altered mental status (confusion, staring off and now answering questions.) Patient with PMHx of CVA with right sided deficits. No acute findings on CT of head.  Initial lab testing remarkable for white blood cells 17.9, creatinine of 2.08, high-sensitivity troponin of 185, CRP of 29.8, procalcitonin of 1.78, lactate of 2.3, CK of 2718. Though ECG showing SR and no acute changes, troponins elevated. Patient admitted for additional work up and evaluation with consults from neurology and cardiology.      Restrictions  Restrictions/Precautions  Restrictions/Precautions: Fall Risk                 Patient's date of birth confirmed: Yes    SAFETY:  Safety Devices  Safety Devices in place: Yes  Type of devices: All fall risk precautions in place    SUBJECTIVE:  Subjective  Subjective: Patient indicated that therapist needed to stop shaving him because the razor was pulling    Pain at start of treatment: No    Pain at end of treatment: No    When asked if he was in pain he declined pain but indicated that he was     COGNITION:     Baseline for patient     OBJECTIVE:         Grooming/Oral Hygiene  Skilled Clinical Factors: Patient was seen at bed level with shaving completed for patient with safety razors. Safety razors used were pulling at patient's thick beard and after half of shaving was performed patient declined to have therapist continue. Patient is currently unable to use his personal electric razor due to thickness of the beard.     Offered to

## 2024-10-30 NOTE — PROGRESS NOTES
Physical Therapy Rehab Treatment Note  Facility/Department: [facility]  Room: [place] [address]       NAME: Aramis David  : 1955 (68 y.o.)  MRN: [id number]  CODE STATUS: Full Code    Date of Service: 10/30/2024       Restrictions:  Restrictions/Precautions: Fall Risk       SUBJECTIVE:   Subjective: \"No\"    Pain  Pain: \"a little bit\"- unable to be rate, RN medicated      OBJECTIVE:   Bed Mobility  Overall Assistance Level: Maximum Assistance  Additional Factors: Verbal cues;Increased time to complete;Head of bed flat;Without handrails  Roll Left  Assistance Level: Minimal assistance  Skilled Clinical Factors: increased assist required d/t fatigue  Roll Right  Assistance Level: Minimal assistance  Skilled Clinical Factors: increased assist required d/t fatigue  Sit to Supine  Assistance Level: Maximum assistance  Skilled Clinical Factors: assist at LEs, pt able to initiate movement but required assistance to complete, increased assist required d/t fatigue  Supine to Sit  Assistance Level: Moderate assistance  Skilled Clinical Factors: assist at trunk and BLEs, vc's throughout for technique with fair carry over  Scooting  Assistance Level: Maximum assistance  Skilled Clinical Factors: pt attempts to scoot to HOB without assistance, difficulty pushing through LEs    PT Exercises  A/AROM Exercises: supine: AP, QS, GS, heel slides, SLR, hip abd x10ea cole - vc's to improve technique for max benefit  Static Sitting Balance Exercises: sitting EOB with focus on upright posture x5min - occasional min-modA to prevent post LOB  Dynamic Sitting Balance Exercises: seated lateral core rocking at EOB with focus on maintaining balance ~5 mins     Activity Tolerance  Activity Tolerance: Patient limited by fatigue;Patient limited by endurance    ASSESSMENT/PROGRESS TOWARDS GOALS:   Assessment  Assessment: Pt continues to be limited by increased fatigue and poor tolerance to tasks, unsafe to attempt OOB activity this AM d/t poor

## 2024-10-30 NOTE — PROGRESS NOTES
Palliative Care Progress Note  Patient: Aramis David  Gender: male  YOB: 1955  Unit/Bed: R233/R233-01  CodeStatus: Full Code  Inpatient Treatment Team: Treatment Team:   Alicia Milner DO Patel, MD Morgan Green Mark, MD Baker, Shira Trejo, RN  Patricia Aranda, MSW, LSW  Vitaly, Marylou MATA, OTR/L  Gonzalo Macedo, KURT Duran, Ankush Hess, PAULETTE  Aramis Hook  Admit Date:  10/27/2024    Chief Complaint: Weakness    History of Presenting Illness:      Aramis David is a 68 y.o. male on hospital day 3 with a history of traumatic brain injury (closed), left CVA with residual right-sided deficit, depression, DVT S/P IVC filter, S/PE patent foramen ovale closure, seizure, HTN, HLD, supplemental oxygen.    Patient was brought to the emergency room with altered mental status. Patient was admitted in the setting of altered mental status, PHAM and weakness.    Palliative care was consulted by Dr. Agueda Charles MD for goals of care.     Patient was previously living at home in a mother-in-law suite and was found by family with concern and brought into emergency room/further evaluation.  Patient was found/diagnosed to have altered mental status, CVA and weakness.  Patient was previously independent at home with rollator and other DME.    Upon entering the room I find the patient A&O to baseline with sister Ivette at bedside.  Patient's previous functional status is up independently with residual right-sided deficit per conversation with patient's sister. Patient usually smokes marijuana for pain per conversation.  Patient denies any unintentionalweight loss or decreased appetite during visit.  Patient has had functional decline since previous stroke approximately 12 years ago per conversation.  Reviewed patient's current goals of care, advance care planning documentation and benefits of palliative care in the outpatient setting.    Most recent notable labs: Calcium 8.1, total CK  living or skilled nursing facility for additional therapy depending on patient's progression on current inpatient rehab.    Resuscitation Status:  Full Code    Outcomes / Completed Documentation:  An explanation of advance directives and their importance was provided and the following forms completed:    -No new documents completed.    If new document completed, original was provided to patient and/or family member.    Copy was placed for scanning into the [facility] EMR.      I spent 7 minutes providing separately identifiable ACP services with the patient and/or surrogate decision maker in a voluntary, in-person conversation discussing the patient's wishes and goals as detailed in the above note.       JULITA Crawley CNP        10/30/24:  Alert and oriented, reports increased fatigue, he is sleeping well at night despite day time increased fatigue.   Worse fatigue with transferring. Patient transfers at home independently, lives in mother in law suite with family.   Denies pain. Cont. to hold tramadol.   Attempted to reach SUKI Fletcher with neurology. Left VM.   Patient is agreeable to palliative care in outpatient setting.   Palliative care will sign off and follow in outpatient setting.           I have discussed/reviewed the patient's case with nursing staff.    -Patient is currently being treated for multiple medical conditions by other providers  Severe abnormality of gait and mobility secondary to late effects of CVA with weakness  Altered mental status  PHAM  Elevated troponins  Hx left MCA  DVT S/P IVC filter  Seizures    MDM: High    Electronically signed by JULITA Crawley CNP on 10/30/2024 at 11:17 AM    Collaborating physician: Dr. Yeh     Please note this report is partially produced by using speech recognition hardware.  It may contain errors related to the system, including grammar, punctuation and spelling as well as words and phrases that may seem inaccurate.  For any questions or

## 2024-10-30 NOTE — FLOWSHEET NOTE
Patient resting in bed watching a baseball game with no complaints of pain or discomfort. Patient took evening medications one at a time with sips of water without difficulty. Patient declined and HS snack. Patient was incontinent of both bowel and bladder this evening. [facility] New depends and external male catheter in place. Patient verbalized no further needs at this time. Bed alarm engaged and call light within reach.

## 2024-10-30 NOTE — PROGRESS NOTES
Physical Therapy Rehab Treatment Note  Facility/Department: [facility]  [facility]       NAME: Aramis David  : 1955 (68 y.o.)  MRN: [id number]  CODE STATUS: Full Code    Date of Service: 10/30/2024       Restrictions:  Restrictions/Precautions: Fall Risk       SUBJECTIVE:   Subjective: \"Rajeev David\"    Pain  Pain: \"a little bit\"- unable to be rate, RN medicated      OBJECTIVE:     Bed Mobility  Overall Assistance Level: Maximum Assistance  Additional Factors: Verbal cues;Increased time to complete;Head of bed flat;Without handrails  Roll Left  Assistance Level: Minimal assistance  Skilled Clinical Factors: increased assist required d/t fatigue, completed x 3 during hygeine management  Roll Right  Assistance Level: Minimal assistance  Skilled Clinical Factors: increased assist required d/t fatigue, completed x 3 during hygeine management  Sit to Supine  Assistance Level: Maximum assistance  Skilled Clinical Factors: assist at LEs, pt able to initiate movement but required assistance to complete, increased assist required d/t fatigue  Supine to Sit  Assistance Level: Moderate assistance  Skilled Clinical Factors: assist at trunk and BLEs, vc's throughout for technique with fair carry over  Scooting  Assistance Level: Maximum assistance  Skilled Clinical Factors: pt attempts to scoot to HOB without assistance, difficulty pushing through LEs    PT Exercises  A/AROM Exercises: supine: AP, QS, GS, heel slides, SLR, hip abd x10ea cole - vc's to improve technique for max benefit  Static Sitting Balance Exercises: sitting EOB with focus on upright posture x1min - occasional min-modA to prevent post LOB     Activity Tolerance  Activity Tolerance: Patient limited by fatigue;Patient limited by endurance    ASSESSMENT/PROGRESS TOWARDS GOALS:   Assessment  Assessment: Pt continues to be limited by increased fatigue and poor tolerance to tasks, unsafe to attempt OOB activity this AM d/t poor seated balance. Pt

## 2024-10-30 NOTE — PROGRESS NOTES
Subjective:   The patient complains of severe acute on chronic progressive fatigue and right-sided spasticity, right weakness, cervical and thoracic and lumbar pain partially relieved by rest, medications, PT,  OT,   SLP and rest and exacerbated by recent non-ST MI.  Patient was admitted through the ER on 10/22/2024 with seizure and mental status change.  He had elevated troponins and was diagnosed with a non-ST MI cardiology was consulted.     He was admitted under the care of the hospitalist with neurology and cardiology consult.  Neurology his prescribed Keppra.  Tramadol was DC'd as it lowers the seizure threshold.   He continues to have right-sided weakness and residual aphasia from previous CVA 2021.  He has a remote history of PFO.  He had a closure procedure performed for a percutaneous septal occluder.  He continues on Xarelto.     He had an elevated white count -likely UTI he is on IV Rocephin and Vanco.  UA was positive but --chest x-ray and respiratory panel are negative.  Blood cultures were done.    I am concerned about patient’s medical complexities and barriers to advancing in rehab goals including avoiding repeat stroke and heart attack.        I discussed current functional, rehabilitation, medical status with other rehabilitation providers including nursing and case management.  According to recent nursing note, \"resting in bed watching a baseball game with no complaints of pain or discomfort. Patient took evening medications one at a time with sips of water without difficulty. Patient declined and HS snack. Patient was incontinent of both bowel and bladder this evening. Patient cleaned up at shift change with Shannan LPN assistance. New depends and external male catheter in place. Patient verbalized no further needs at this time. Bed alarm engaged and call light within reach\".    He seems somewhat sedated with the Keppra.  He seems a little tachypneic it seems positional we will reposition and I

## 2024-10-30 NOTE — PROGRESS NOTES
OCCUPATIONAL THERAPY  INPATIENT REHAB TREATMENT NOTE  [facility]      NAME: Aramis David  : 1955 (68 y.o.)  MRN: [id number]  CODE STATUS: Full Code  [facility]    Date of Service: 10/30/2024    Referring Physician: Alicia Milner DO  Rehab Diagnosis: Impaired mobility and ADL's d/t new onset mental status change possible seizure disorder triggered by UTI    Hospital course:   Comments: 68 y.o. male patient who presented to ER via EMS 10/22 with altered mental status (confusion, staring off and now answering questions.) Patient with PMHx of CVA with right sided deficits. No acute findings on CT of head.  Initial lab testing remarkable for white blood cells 17.9, creatinine of 2.08, high-sensitivity troponin of 185, CRP of 29.8, procalcitonin of 1.78, lactate of 2.3, CK of 2718. Though ECG showing SR and no acute changes, troponins elevated. Patient admitted for additional work up and evaluation with consults from neurology and cardiology.      Restrictions  Restrictions/Precautions  Restrictions/Precautions: Fall Risk              Patient's date of birth confirmed: Yes    SAFETY:  Safety Devices  Safety Devices in place: Yes  Type of devices: All fall risk precautions in place    SUBJECTIVE:  Subjective  Subjective: \"Pretty good\" when asked if he slept okay last night    Pain at start of treatment: No    Pain at end of treatment: No        COGNITION:  Cognition  Overall Cognitive Status: Exceptions  Arousal/Alertness: Inconsistent responses to stimuli  Following Commands: Follows one step commands with repetition;Follows one step commands with increased time  Attention Span: Attends with cues to redirect  Problem Solving: Assistance required to generate solutions;Assistance required to identify errors made;Assistance required to implement solutions  Insights: Decreased awareness of deficits  Initiation: Requires cues for some  Sequencing: Requires cues for some  Cognition Comment: Pt has

## 2024-10-30 NOTE — PLAN OF CARE
Problem: Chronic Conditions and Co-morbidities  Goal: Patient's chronic conditions and co-morbidity symptoms are monitored and maintained or improved  Outcome: Not Progressing     Problem: Discharge Planning  Goal: Discharge to home or other facility with appropriate resources  Outcome: Not Progressing     Problem: Safety - Adult  Goal: Free from fall injury  Outcome: Not Progressing     Problem: Skin/Tissue Integrity  Goal: Absence of new skin breakdown  Description: 1.  Monitor for areas of redness and/or skin breakdown  2.  Assess vascular access sites hourly  3.  Every 4-6 hours minimum:  Change oxygen saturation probe site  4.  Every 4-6 hours:  If on nasal continuous positive airway pressure, respiratory therapy assess nares and determine need for appliance change or resting period.  Outcome: Not Progressing     Problem: Neurosensory - Adult  Goal: Achieves stable or improved neurological status  Outcome: Not Progressing  Flowsheets (Taken 10/30/2024 0910)  Achieves stable or improved neurological status: Assess for and report changes in neurological status  Goal: Absence of seizures  Outcome: Not Progressing  Flowsheets (Taken 10/30/2024 0910)  Absence of seizures: Administer anticonvulsants as ordered  Goal: Remains free of injury related to seizures activity  Outcome: Not Progressing  Goal: Achieves maximal functionality and self care  Outcome: Not Progressing     Problem: Skin/Tissue Integrity - Adult  Goal: Skin integrity remains intact  Outcome: Not Progressing     Problem: Musculoskeletal - Adult  Goal: Return mobility to safest level of function  Outcome: Not Progressing  Goal: Maintain proper alignment of affected body part  Outcome: Not Progressing  Goal: Return ADL status to a safe level of function  Outcome: Not Progressing     Problem: Gastrointestinal - Adult  Goal: Minimal or absence of nausea and vomiting  Outcome: Not Progressing  Goal: Maintains or returns to baseline bowel  function  Outcome: Not Progressing  Goal: Maintains adequate nutritional intake  Outcome: Not Progressing     Problem: Pain  Goal: Verbalizes/displays adequate comfort level or baseline comfort level  Outcome: Not Progressing

## 2024-10-30 NOTE — PROGRESS NOTES
Hospitalist Progress Note      PCP: Syd Maher MD    Date of Admission: 10/27/2024    Chief Complaint: weakness    Subjective: patient awake/alert     Medications:  Reviewed    Infusion Medications   Scheduled Medications    oxymetazoline  2 spray Each Nostril BID    Vitamin D  2,000 Units Oral Dinner    cyanocobalamin  1,000 mcg IntraMUSCular Weekly    coenzyme Q10  100 mg Oral Daily    lidocaine  3 patch TransDERmal Daily    atorvastatin  80 mg Oral Nightly    acetaminophen  500 mg Oral TID    ARIPiprazole  2 mg Oral Daily    aspirin  81 mg Oral Daily    baclofen  10 mg Oral TID    DULoxetine  60 mg Oral Daily    levETIRAcetam  1,250 mg Oral BID    levothyroxine  50 mcg Oral Daily    metoprolol succinate  12.5 mg Oral Daily    rivaroxaban  20 mg Oral Daily with breakfast     PRN Meds: camphor-menthol-methyl salicylate, acetaminophen, bisacodyl, sodium phosphate      Intake/Output Summary (Last 24 hours) at 10/30/2024 1341  Last data filed at 10/30/2024 0730  Gross per 24 hour   Intake --   Output 550 ml   Net -550 ml       Exam:    /73   Pulse 65   Temp 99.1 °F (37.3 °C) (Oral)   Resp 17   Ht 1.727 m (5' 8\")   Wt 78.8 kg (173 lb 11.2 oz)   SpO2 96%   BMI 26.41 kg/m²     General appearance: No apparent distress, appears stated age and cooperative.  HEENT: Pupils equal, round, and reactive to light. Conjunctivae/corneas clear.  Neck: Supple, with full range of motion. No jugular venous distention. Trachea midline.  Respiratory:  Normal respiratory effort. Clear to auscultation, bilaterally without Rales/Wheezes/Rhonchi.  Cardiovascular: Regular rate and rhythm with normal S1/S2 without murmurs, rubs or gallops.  Abdomen: Soft, non-tender, non-distended with normal bowel sounds.  Musculoskeletal: No clubbing, cyanosis or edema bilaterally.  Full range of motion without deformity.  Skin: Skin color, texture, turgor normal.  No rashes or lesions.  Neurologic:  Neurovascularly intact without any  focal sensory/motor deficits. Cranial nerves: II-XII intact, grossly non-focal.  Psychiatric: partially Alert and oriented, thought content appropriate, normal insight  Capillary Refill: Brisk,< 3 seconds   Peripheral Pulses: +2 palpable, equal bilaterally       Labs:   Recent Labs     10/28/24  0540   WBC 11.4*   HGB 12.8*   HCT 35.5*        Recent Labs     10/28/24  0540      K 3.8      CO2 20   BUN 10   CREATININE 0.78   CALCIUM 8.1*   PHOS 3.0     No results for input(s): \"AST\", \"ALT\", \"BILIDIR\", \"BILITOT\", \"ALKPHOS\" in the last 72 hours.  No results for input(s): \"INR\" in the last 72 hours.  Recent Labs     10/28/24  0540   CKTOTAL 1,143*       Urinalysis:      Lab Results   Component Value Date/Time    NITRU Negative 10/23/2024 04:27 AM    WBCUA 6-9 10/23/2024 04:27 AM    BACTERIA Negative 10/23/2024 04:27 AM    RBCUA  10/23/2024 04:27 AM    BLOODU LARGE 10/23/2024 04:27 AM    GLUCOSEU Negative 10/23/2024 04:27 AM       Radiology:  No orders to display           Assessment/Plan:    Active Hospital Problems    Diagnosis Date Noted    Palliative care encounter [Z51.5] 10/28/2024    Goals of care, counseling/discussion [Z71.89] 10/28/2024    Advanced care planning/counseling discussion [Z71.89] 10/28/2024    Full code status [Z78.9] 10/28/2024    Hyperkalemia [E87.5] 10/24/2024    Opioid dependence with current use (HCC) [F11.20] 07/03/2023    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 [I69.398, R25.2] 12/23/2021    Impaired mobility and activities of daily living dt late effects of CVA [Z74.09, Z78.9] 12/22/2021    DJD (degenerative joint disease), cervical [M47.812] 12/21/2021    DJD (degenerative joint disease), lumbar [M47.816] 12/21/2021    Aphasia [R47.01] 12/21/2021    Generalized weakness [R53.1] 12/20/2021    Hypothyroidism [E03.9] 03/26/2019    Chronic deep vein thrombosis (DVT) of left popliteal vein (HCC) [I82.532] 03/26/2019    Hemiparesis affecting

## 2024-10-30 NOTE — PROGRESS NOTES
[facility]  Facility/Department: [facility] RE[facility]  Speech Language Pathology   Treatment Note          Aramis David  1955  R233/R233-01  [x]   confirmed    Date: 10/30/2024      Restrictions/Precautions: Fall Risk     ADULT DIET; Regular     Respiratory Status:   RA  No active isolations    Rehab Diagnosis: Impaired mobility and ADL's due to new onset mental status change possible seizure disorder triggered by UTI     Subjective:  Alert and Cooperative        Interventions used this date:  Expressive Language and Receptive Language    Objective/Assessment:  Patient progressing towards goals:  Short Term Goals  Time Frame for Short Term Goals: 1-2 weeks  Goal 1: Pt will answer simple WH and yes/no questions using their communication board/AAC with min assist in 90% of opportunities to increase his/her ability to effectively communicate their wants and needs. Patient answered low level yes/no questions related to self/environment, pointing to written yes/no, I with 80% accuracy.    Goal 2: Patient will identify objects/pictures within a field 2-4 with min cues with 90% accuracy. Not addressed    Goal 3: Patient will follow 1-2 step verbal directions with mincues with 90% accuracy.  Patient followed verbal 1 step directions I with 60% accuracy, with models 100% accuracy.    Goal 4: Pt will use his picture system to request a recurrence using # icons (more, again, etc.) with min to mod in 50% of opportunities to increase is/her ability to effectively communicate their wants and needs. Not addressed    Goal 5: Pt will locate the target icon given a verbal prompt (i.e., \"show me the ___.\", point to the ___.\", \"where is the ___.\") on their picture system with min to mod assist in50% of opportunities to increase his/her ability to effectively communicate their wants and needs, within 1-2 weeks. Patient ID icons in a field of 3 I with 70% accuracy, with cueing to focus on initial sound of word 85% accuracy.    Goal 6:

## 2024-10-30 NOTE — PROGRESS NOTES
Assumed care of patient at 0330.  Electronically signed by Ashlee Mojica RN on 10/30/2024 at 4:25 AM

## 2024-10-31 LAB — CK SERPL-CCNC: 204 U/L (ref 0–190)

## 2024-10-31 PROCEDURE — 97542 WHEELCHAIR MNGMENT TRAINING: CPT

## 2024-10-31 PROCEDURE — 1180000000 HC REHAB R&B

## 2024-10-31 PROCEDURE — 82550 ASSAY OF CK (CPK): CPT

## 2024-10-31 PROCEDURE — 2580000003 HC RX 258: Performed by: INTERNAL MEDICINE

## 2024-10-31 PROCEDURE — 97110 THERAPEUTIC EXERCISES: CPT

## 2024-10-31 PROCEDURE — 97535 SELF CARE MNGMENT TRAINING: CPT

## 2024-10-31 PROCEDURE — 36415 COLL VENOUS BLD VENIPUNCTURE: CPT

## 2024-10-31 PROCEDURE — 6370000000 HC RX 637 (ALT 250 FOR IP): Performed by: INTERNAL MEDICINE

## 2024-10-31 PROCEDURE — 6370000000 HC RX 637 (ALT 250 FOR IP): Performed by: PHYSICAL MEDICINE & REHABILITATION

## 2024-10-31 PROCEDURE — 99232 SBSQ HOSP IP/OBS MODERATE 35: CPT | Performed by: PHYSICAL MEDICINE & REHABILITATION

## 2024-10-31 RX ORDER — 0.9 % SODIUM CHLORIDE 0.9 %
500 INTRAVENOUS SOLUTION INTRAVENOUS ONCE
Status: COMPLETED | OUTPATIENT
Start: 2024-10-31 | End: 2024-10-31

## 2024-10-31 RX ADMIN — SODIUM CHLORIDE 500 ML: 9 INJECTION, SOLUTION INTRAVENOUS at 11:56

## 2024-10-31 RX ADMIN — BACLOFEN 10 MG: 10 TABLET ORAL at 20:21

## 2024-10-31 RX ADMIN — LEVETIRACETAM 1250 MG: 500 TABLET, FILM COATED ORAL at 09:33

## 2024-10-31 RX ADMIN — ACETAMINOPHEN 500 MG: 500 TABLET ORAL at 20:21

## 2024-10-31 RX ADMIN — DULOXETINE HYDROCHLORIDE 60 MG: 60 CAPSULE, DELAYED RELEASE ORAL at 09:33

## 2024-10-31 RX ADMIN — ATORVASTATIN CALCIUM 80 MG: 80 TABLET, FILM COATED ORAL at 20:21

## 2024-10-31 RX ADMIN — LEVOTHYROXINE SODIUM 50 MCG: 0.05 TABLET ORAL at 06:31

## 2024-10-31 RX ADMIN — BACLOFEN 10 MG: 10 TABLET ORAL at 14:06

## 2024-10-31 RX ADMIN — ARIPIPRAZOLE 2 MG: 2 TABLET ORAL at 09:33

## 2024-10-31 RX ADMIN — Medication 100 MG: at 09:33

## 2024-10-31 RX ADMIN — ACETAMINOPHEN 500 MG: 500 TABLET ORAL at 14:06

## 2024-10-31 RX ADMIN — ASPIRIN 81 MG: 81 TABLET, CHEWABLE ORAL at 09:34

## 2024-10-31 RX ADMIN — Medication 2000 UNITS: at 17:23

## 2024-10-31 RX ADMIN — ACETAMINOPHEN 500 MG: 500 TABLET ORAL at 09:34

## 2024-10-31 RX ADMIN — RIVAROXABAN 20 MG: 20 TABLET, FILM COATED ORAL at 08:04

## 2024-10-31 RX ADMIN — LEVETIRACETAM 1250 MG: 500 TABLET, FILM COATED ORAL at 20:21

## 2024-10-31 RX ADMIN — BACLOFEN 10 MG: 10 TABLET ORAL at 09:33

## 2024-10-31 ASSESSMENT — PAIN DESCRIPTION - DESCRIPTORS
DESCRIPTORS: ACHING
DESCRIPTORS: ACHING
DESCRIPTORS: ACHING;DISCOMFORT

## 2024-10-31 ASSESSMENT — PAIN DESCRIPTION - LOCATION: LOCATION: ARM

## 2024-10-31 ASSESSMENT — PAIN DESCRIPTION - ORIENTATION: ORIENTATION: RIGHT

## 2024-10-31 ASSESSMENT — PAIN SCALES - GENERAL
PAINLEVEL_OUTOF10: 3
PAINLEVEL_OUTOF10: 0
PAINLEVEL_OUTOF10: 4

## 2024-10-31 NOTE — FLOWSHEET NOTE
Patient is resting in bed at this time with no complaints of pain or discomfort. Patient took evening medications whole one at a time with sips of water without difficulty. Patient declined an HS snack. Patients depends changed and external male catheter placed per Rosa Isela PCA. Patient repositioned in bed for comfort. Patient is now watching the [employer] game and verbalized no further needs at this time. Bed alarm engaged and call light within reach.

## 2024-10-31 NOTE — PROGRESS NOTES
[facility]  Occupational Therapy      NAME:  Aramis David  ROOM: R233/R233-01  :  1955  DATE: 10/31/2024    Attempted to see Aramis David on this date at 1542 for   []  Initial Evaluation   []  Scheduled therapy    [x]  Make-up therapy   []  Group therapy   []  Family training    Patient was unable to be seen due to:   [] Off unit for testing/procedure   [x] Patient declines. Pt resting in bed sleeping. Pt easily stimulated but reports that he is too fatigued to participate in makeup minutes at this time    [] Therapy on hold due to     [] Nursing deferred due to    [] Other:      Electronically signed by Gonzalo Macedo OT on 10/31/24 at 3:44 PM EDT

## 2024-10-31 NOTE — PROGRESS NOTES
Occupational Therapy Get up and Go Note            Date: 10/31/2024  Patient Name: Aramis David        MRN: [id number]    Account #: [id number]  : 1955  (68 y.o.)      Subjective:  Patient states:  \"Not really.\" - when asked if he liked oatmeal  Pain:  Pain at start of treatment: No    Pain at end of treatment: No    Objective:  ADL:  Feeding:  Setup  Grooming:  Setup with facial wipes  UE Self -Care:  N/A  LE Self-Care:  Dependent  Toileting:  Incontinent BM - patient also has external eubanks in place - increased time for hygiene and changing of hospital protective brief 2° bed linens also soiled requiring a full change  Bed Mobility: MIN A to roll - Dependent x 2 people to scoot to [facility] - SBA for positioning in midline of bed - increased time for bed mobility    Treatment consisted of:   [x] ADL Training  [] Strengthening   [] Transfer Training    [] DME Education  [] HEP   [] Patient Education  [] Other:    Safety:  Safety Devices  Safety Devices in place:   Type of devices: All fall risk precautions in place      Therapy Time:   Individual Group Co-Treat   Time In 0716       Time Out 0741         Minutes 25             ADL/IADL trainin minutes         Electronically signed by:    PAULETTE Villatoro    10/31/2024, 7:43 AM

## 2024-10-31 NOTE — FLOWSHEET NOTE
IR to patient's room for U/S guided IV - successful placement of #20 to left AC. Patient tolerated well. Electronically signed by Justine Koch RN on 10/31/2024 at 11:56 AM

## 2024-10-31 NOTE — PROGRESS NOTES
Physical Therapy Rehab Treatment Note  Facility/Department: [facility]  Room: [place] [address]       NAME: Aramis David  : 1955 (68 y.o.)  MRN: [id number]  CODE STATUS: Full Code    Date of Service: 10/31/2024       Restrictions:  Restrictions/Precautions: Fall Risk       SUBJECTIVE:   Subjective: \"I know\"    Pain  Pain: \"a little bit\"- unable to be rate, RN medicated      OBJECTIVE:     Bed Mobility  Overall Assistance Level: Moderate Assistance  Additional Factors: Verbal cues;Increased time to complete;Head of bed flat;Without handrails  Roll Left  Assistance Level: Minimal assistance  Skilled Clinical Factors: increased assist required d/t fatigue  Roll Right  Assistance Level: Minimal assistance  Skilled Clinical Factors: increased assist required d/t fatigue  Sit to Supine  Assistance Level: Moderate assistance  Skilled Clinical Factors: assist at trunk and RLE, pt able to initiate movement but required assistance to complete, increased assist required d/t fatigue  Supine to Sit  Assistance Level: Moderate assistance  Skilled Clinical Factors: assist at trunk and BLEs, vc's throughout for technique with fair carry over  Scooting  Assistance Level: Maximum assistance  Skilled Clinical Factors: pt attempts to scoot to HOB without assistance, difficulty pushing through LEs    Transfers  Surface: From bed;Wheelchair;Standard toilet  Additional Factors: Verbal cues;Increased time to complete  Sit to Stand  Assistance Level: Maximum assistance;Requires x 2 assistance  Skilled Clinical Factors: heavy lifting assist required, unable to achieve full stand due to fatigue this afternoon. 2nd person SBA for safety and to assist with pulling up pants  Stand to Sit  Assistance Level: Maximum assistance  Skilled Clinical Factors: assist to improve eccentric lowering  Bed To/From Chair  Technique: Stand pivot  Assistance Level: Independent  Skilled Clinical Factors: face to face SPT completed wc to bed, vc's for

## 2024-10-31 NOTE — PROGRESS NOTES
OCCUPATIONAL THERAPY  INPATIENT REHAB TREATMENT NOTE  [facility]      NAME: Aramis David  : 1955 (68 y.o.)  MRN: [id number]  CODE STATUS: Full Code  Room: R233/R233-01    Date of Service: 10/31/2024    Referring Physician: Alicia Milner DO  Rehab Diagnosis: Impaired mobility and ADL's d/t new onset mental status change possible seizure disorder triggered by UTI    Hospital course:   Comments: 68 y.o. male patient who presented to ER via EMS 10/22 with altered mental status (confusion, staring off and now answering questions.) Patient with PMHx of CVA with right sided deficits. No acute findings on CT of head.  Initial lab testing remarkable for white blood cells 17.9, creatinine of 2.08, high-sensitivity troponin of 185, CRP of 29.8, procalcitonin of 1.78, lactate of 2.3, CK of 2718. Though ECG showing SR and no acute changes, troponins elevated. Patient admitted for additional work up and evaluation with consults from neurology and cardiology.      Restrictions  Restrictions/Precautions  Restrictions/Precautions: Fall Risk                 Patient's date of birth confirmed: Yes    SAFETY:  Safety Devices  Safety Devices in place: Yes  Type of devices: All fall risk precautions in place    SUBJECTIVE:  Subjective  Subjective: Patient was able to indicate that he needed to urinate and urinal was placed    Pain at start of treatment: Yes: Pt. unable to rate pain-      Pain at end of treatment: Yes: Pt. unable to rate pain-      Location: right shoulder  Description: Patient unable to describe the pain  Nursing notified: No  Nurse: Dona RN was notified that patient was incontinent of a large loose BM   Intervention: None    COGNITION:     Patient was able to follow commands      OBJECTIVE:     Patient was seen for bed bath     Grooming/Oral Hygiene  Skilled Clinical Factors: Patient was able to comb his hair and wash his face and right hand but required assistance to wash his left hand. Oral care

## 2024-10-31 NOTE — PROGRESS NOTES
Subjective:   The patient complains of severe acute on chronic progressive fatigue and right-sided spasticity, right weakness, cervical and thoracic and lumbar pain partially relieved by rest, medications, PT,  OT,   SLP and rest and exacerbated by recent non-ST MI.  Patient was admitted through the ER on 10/22/2024 with seizure and mental status change.  He had elevated troponins and was diagnosed with a non-ST MI cardiology was consulted.     He was admitted under the care of the hospitalist with neurology and cardiology consult.  Neurology his prescribed Keppra.  Tramadol was DC'd as it lowers the seizure threshold.   He continues to have right-sided weakness and residual aphasia from previous CVA 2021.  He has a remote history of PFO.  He had a closure procedure performed for a percutaneous septal occluder.  He continues on Xarelto.     He had an elevated white count -likely UTI he is on IV Rocephin and Vanco.  UA was positive but --chest x-ray and respiratory panel are negative.  Blood cultures were done.    I am concerned about patient’s medical complexities and barriers to advancing in rehab goals including avoiding repeat stroke and heart attack.        I discussed current functional, rehabilitation, medical status with other rehabilitation providers including nursing and case management.  According to recent nursing note, \"resting in bed at this time with no complaints of pain or discomfort. Patient took evening medications whole one at a time with sips of water without difficulty. Patient declined an HS snack. Patients depends changed and external male catheter placed per Rosa Isela PCA. Patient repositioned in bed for comfort. Patient is now watching the [employer] baseball game and verbalized no further needs at this time. Bed alarm engaged and call light within reach \".       ROS x10:  The patient also complains of severely impaired mobility and activities of daily living.  Otherwise no new problems with  lowering seizure threshold    Hyperkalemia-recheck BMP consider limiting potassium in diet  Stuffy nose and risk for atelectasis-Afrin nasal spray and incentive spirometer        Focus on reassessing rehab goals and coordinating therapy and medical self care issues.          Alicia Milner D.O., PM&R     Attending    [phone]   [facility]

## 2024-10-31 NOTE — PROGRESS NOTES
OCCUPATIONAL THERAPY  INPATIENT REHAB TREATMENT NOTE  [facility]      NAME: Aramis David  : 1955 (68 y.o.)  MRN: [id number]  CODE STATUS: Full Code  Room: R233/R233-01    Date of Service: 10/31/2024    Referring Physician: Alicia Milner DO  Rehab Diagnosis: Impaired mobility and ADL's d/t new onset mental status change possible seizure disorder triggered by UTI    Hospital course:   Comments: 68 y.o. male patient who presented to ER via EMS 10/22 with altered mental status (confusion, staring off and now answering questions.) Patient with PMHx of CVA with right sided deficits. No acute findings on CT of head.  Initial lab testing remarkable for white blood cells 17.9, creatinine of 2.08, high-sensitivity troponin of 185, CRP of 29.8, procalcitonin of 1.78, lactate of 2.3, CK of 2718. Though ECG showing SR and no acute changes, troponins elevated. Patient admitted for additional work up and evaluation with consults from neurology and cardiology.      Restrictions  Restrictions/Precautions  Restrictions/Precautions: Fall Risk                 Patient's date of birth confirmed: Yes    SAFETY:  Safety Devices  Safety Devices in place: Yes  Type of devices: All fall risk precautions in place    SUBJECTIVE:  Subjective  Subjective: \"I can't\"    Pain at start of treatment: Yes: Pt. unable to rate pain-      Pain at end of treatment: Yes: Pt. unable to rate pain-      Patient inconsistently indicates pain    COGNITION:  Patient is able to make needs known    OBJECTIVE:    Toileting  Skilled Clinical Factors: Patient reported that he needed to go to the bathroom. Patient was taken into the bathroom at wheelchair level and positioned to transfer to the toilet with assist of OT and RN. Patient then declined getting out of the wheelchair. Urinal was positioned for patient and he held it in place. patient required extended time to initiate stream of urine. Patient was noted to have a soiled undergarment but

## 2024-10-31 NOTE — PROGRESS NOTES
Pt. Noted with BP of 88/57. Heart rate of 63. I held the toprol XL 12.5, and updated the physician. Pt. Encouraged to drink more water. He has limited interest in drinking.water, prefers coffee.

## 2024-10-31 NOTE — PROGRESS NOTES
Physical Therapy Rehab Treatment Note  Facility/Department: [facility]  Room: [place] [address]       NAME: Aramis David  : 1955 (68 y.o.)  MRN: [id number]  CODE STATUS: Full Code    Date of Service: 10/31/2024       Restrictions:  Restrictions/Precautions: Fall Risk       SUBJECTIVE:   Subjective: \"I did what you do...sorta\"    Pain  Pain: \"a little bit\"- unable to be rate, RN medicated      OBJECTIVE:   Bed Mobility  Overall Assistance Level: Moderate Assistance  Additional Factors: Verbal cues;Increased time to complete;Head of bed flat;Without handrails  Roll Left  Assistance Level: Minimal assistance  Skilled Clinical Factors: increased assist required d/t fatigue  Roll Right  Assistance Level: Minimal assistance  Skilled Clinical Factors: increased assist required d/t fatigue  Sit to Supine  Assistance Level: Moderate assistance  Skilled Clinical Factors: assist at trunk and RLE, pt able to initiate movement but required assistance to complete, increased assist required d/t fatigue  Supine to Sit  Assistance Level: Moderate assistance  Skilled Clinical Factors: assist at trunk and BLEs, vc's throughout for technique with fair carry over  Scooting  Assistance Level: Maximum assistance  Skilled Clinical Factors: pt attempts to scoot to HOB without assistance, difficulty pushing through LEs    PT Exercises  Static Sitting Balance Exercises: sitting EOB with focus on upright posture x10min - occasional min-modA to prevent post LOB  Dynamic Sitting Balance Exercises: seated lateral core rocking at EOB with focus on maintaining balance ~5 mins     Activity Tolerance  Activity Tolerance: Patient limited by fatigue;Patient limited by endurance    ASSESSMENT/PROGRESS TOWARDS GOALS:   Assessment  Assessment: Pt more agreeable to sit up at EOB this date, but still declines transfer to  this date despite education and encouragement. Improved participation with EOB activities, able to maintain balance seated EOB with

## 2024-10-31 NOTE — PLAN OF CARE
Problem: Chronic Conditions and Co-morbidities  Goal: Patient's chronic conditions and co-morbidity symptoms are monitored and maintained or improved  10/30/2024 2308 by Marilyn Quinones RN  Outcome: Progressing  10/30/2024 1430 by Shira Pereyra RN  Outcome: Not Progressing     Problem: Discharge Planning  Goal: Discharge to home or other facility with appropriate resources  10/30/2024 2308 by Marilyn Quinones RN  Outcome: Progressing  10/30/2024 1430 by Shira Pereyra RN  Outcome: Not Progressing     Problem: Safety - Adult  Goal: Free from fall injury  10/30/2024 2308 by Marilyn Quinones RN  Outcome: Progressing  10/30/2024 1430 by Shira Pereyra RN  Outcome: Not Progressing     Problem: Skin/Tissue Integrity  Goal: Absence of new skin breakdown  Description: 1.  Monitor for areas of redness and/or skin breakdown  2.  Assess vascular access sites hourly  3.  Every 4-6 hours minimum:  Change oxygen saturation probe site  4.  Every 4-6 hours:  If on nasal continuous positive airway pressure, respiratory therapy assess nares and determine need for appliance change or resting period.  10/30/2024 2308 by Marilyn Quinones RN  Outcome: Progressing  10/30/2024 1430 by Shira Pereyra RN  Outcome: Not Progressing     Problem: Neurosensory - Adult  Goal: Achieves stable or improved neurological status  10/30/2024 2308 by Marilyn Quinones RN  Outcome: Progressing  10/30/2024 1430 by Shira Pereyra RN  Outcome: Not Progressing  Flowsheets (Taken 10/30/2024 0910)  Achieves stable or improved neurological status: Assess for and report changes in neurological status  Goal: Absence of seizures  10/30/2024 2308 by Marilyn Quinones RN  Outcome: Progressing  10/30/2024 1430 by Shira Pereyra RN  Outcome: Not Progressing  Flowsheets (Taken 10/30/2024 0910)  Absence of seizures: Administer anticonvulsants as ordered  Goal: Remains free of injury related to seizures activity  10/30/2024  2308 by Marilyn Quinones RN  Outcome: Progressing  10/30/2024 1430 by Shira Pereyra RN  Outcome: Not Progressing  Goal: Achieves maximal functionality and self care  10/30/2024 2308 by Marilyn Quinones RN  Outcome: Progressing  10/30/2024 1430 by Shira Pereyra RN  Outcome: Not Progressing     Problem: Skin/Tissue Integrity - Adult  Goal: Skin integrity remains intact  10/30/2024 2308 by Marilyn Quinones RN  Outcome: Progressing  10/30/2024 1430 by Shira Pereyra RN  Outcome: Not Progressing     Problem: Musculoskeletal - Adult  Goal: Return mobility to safest level of function  10/30/2024 2308 by Marilyn Quinones RN  Outcome: Progressing  10/30/2024 1430 by Shira Pereyra RN  Outcome: Not Progressing  Goal: Maintain proper alignment of affected body part  10/30/2024 2308 by Marilyn Quinones RN  Outcome: Progressing  10/30/2024 1430 by Shira Pereyra RN  Outcome: Not Progressing  Goal: Return ADL status to a safe level of function  10/30/2024 2308 by Marilyn Quinones RN  Outcome: Progressing  10/30/2024 1430 by Shira Pereyra RN  Outcome: Not Progressing     Problem: Gastrointestinal - Adult  Goal: Minimal or absence of nausea and vomiting  10/30/2024 2308 by Marilyn Quinones RN  Outcome: Progressing  10/30/2024 1430 by Shira Pereyra RN  Outcome: Not Progressing  Goal: Maintains or returns to baseline bowel function  10/30/2024 2308 by Marilyn Quinones RN  Outcome: Progressing  10/30/2024 1430 by Shira Pereyra RN  Outcome: Not Progressing  Goal: Maintains adequate nutritional intake  10/30/2024 2308 by Marilyn Quinones RN  Outcome: Progressing  10/30/2024 1430 by Shira Pereyra RN  Outcome: Not Progressing     Problem: Pain  Goal: Verbalizes/displays adequate comfort level or baseline comfort level  10/30/2024 2308 by Priestas, Marilyn C, RN  Outcome: Progressing  10/30/2024 1430 by Shira Pereyra RN  Outcome: Not Progressing

## 2024-11-01 LAB
ANION GAP SERPL CALCULATED.3IONS-SCNC: 10 MEQ/L (ref 9–15)
BACTERIA URNS QL MICRO: NEGATIVE /HPF
BASOPHILS # BLD: 0.1 K/UL (ref 0–0.2)
BASOPHILS NFR BLD: 0.3 %
BILIRUB UR QL STRIP: NEGATIVE
BUN SERPL-MCNC: 15 MG/DL (ref 8–23)
CALCIUM SERPL-MCNC: 8 MG/DL (ref 8.5–9.9)
CHLORIDE SERPL-SCNC: 109 MEQ/L (ref 95–107)
CLARITY UR: CLEAR
CO2 SERPL-SCNC: 20 MEQ/L (ref 20–31)
COLOR UR: ABNORMAL
CREAT SERPL-MCNC: 0.71 MG/DL (ref 0.7–1.2)
EOSINOPHIL # BLD: 0.1 K/UL (ref 0–0.7)
EOSINOPHIL NFR BLD: 0.7 %
EPI CELLS #/AREA URNS AUTO: NORMAL /HPF (ref 0–5)
ERYTHROCYTE [DISTWIDTH] IN BLOOD BY AUTOMATED COUNT: 14 % (ref 11.5–14.5)
GLUCOSE SERPL-MCNC: 102 MG/DL (ref 70–99)
GLUCOSE UR STRIP-MCNC: NEGATIVE MG/DL
HCT VFR BLD AUTO: 35.6 % (ref 42–52)
HGB BLD-MCNC: 12.8 G/DL (ref 14–18)
HGB UR QL STRIP: NEGATIVE
HYALINE CASTS #/AREA URNS AUTO: NORMAL /HPF (ref 0–5)
KETONES UR STRIP-MCNC: ABNORMAL MG/DL
LEUKOCYTE ESTERASE UR QL STRIP: NEGATIVE
LYMPHOCYTES # BLD: 1.4 K/UL (ref 1–4.8)
LYMPHOCYTES NFR BLD: 7.3 %
MCH RBC QN AUTO: 31.4 PG (ref 27–31.3)
MCHC RBC AUTO-ENTMCNC: 36 % (ref 33–37)
MCV RBC AUTO: 87.3 FL (ref 79–92.2)
MONOCYTES # BLD: 0.9 K/UL (ref 0.2–0.8)
MONOCYTES NFR BLD: 4.9 %
NEUTROPHILS # BLD: 16.4 K/UL (ref 1.4–6.5)
NEUTS SEG NFR BLD: 85.7 %
NITRITE UR QL STRIP: NEGATIVE
PH UR STRIP: 6 [PH] (ref 5–9)
PLATELET # BLD AUTO: 183 K/UL (ref 130–400)
POTASSIUM SERPL-SCNC: 3 MEQ/L (ref 3.4–4.9)
PROT UR STRIP-MCNC: 30 MG/DL
RBC # BLD AUTO: 4.08 M/UL (ref 4.7–6.1)
RBC #/AREA URNS HPF: NORMAL /HPF (ref 0–2)
SODIUM SERPL-SCNC: 139 MEQ/L (ref 135–144)
SP GR UR STRIP: 1.04 (ref 1–1.03)
URINE REFLEX TO CULTURE: ABNORMAL
UROBILINOGEN UR STRIP-ACNC: 1 E.U./DL
WBC # BLD AUTO: 19.2 K/UL (ref 4.8–10.8)
WBC #/AREA URNS AUTO: NORMAL /HPF (ref 0–5)

## 2024-11-01 PROCEDURE — 6370000000 HC RX 637 (ALT 250 FOR IP): Performed by: INTERNAL MEDICINE

## 2024-11-01 PROCEDURE — 97140 MANUAL THERAPY 1/> REGIONS: CPT

## 2024-11-01 PROCEDURE — 85025 COMPLETE CBC W/AUTO DIFF WBC: CPT

## 2024-11-01 PROCEDURE — 97112 NEUROMUSCULAR REEDUCATION: CPT

## 2024-11-01 PROCEDURE — 97110 THERAPEUTIC EXERCISES: CPT

## 2024-11-01 PROCEDURE — 36415 COLL VENOUS BLD VENIPUNCTURE: CPT

## 2024-11-01 PROCEDURE — 6370000000 HC RX 637 (ALT 250 FOR IP): Performed by: PHYSICAL MEDICINE & REHABILITATION

## 2024-11-01 PROCEDURE — 97535 SELF CARE MNGMENT TRAINING: CPT

## 2024-11-01 PROCEDURE — 97542 WHEELCHAIR MNGMENT TRAINING: CPT

## 2024-11-01 PROCEDURE — 99233 SBSQ HOSP IP/OBS HIGH 50: CPT | Performed by: PSYCHIATRY & NEUROLOGY

## 2024-11-01 PROCEDURE — 97530 THERAPEUTIC ACTIVITIES: CPT

## 2024-11-01 PROCEDURE — 80048 BASIC METABOLIC PNL TOTAL CA: CPT

## 2024-11-01 PROCEDURE — 1180000000 HC REHAB R&B

## 2024-11-01 PROCEDURE — 99233 SBSQ HOSP IP/OBS HIGH 50: CPT | Performed by: PHYSICAL MEDICINE & REHABILITATION

## 2024-11-01 PROCEDURE — 81001 URINALYSIS AUTO W/SCOPE: CPT

## 2024-11-01 RX ORDER — POTASSIUM CHLORIDE 1500 MG/1
20 TABLET, EXTENDED RELEASE ORAL DAILY
Status: DISCONTINUED | OUTPATIENT
Start: 2024-11-02 | End: 2024-11-04

## 2024-11-01 RX ORDER — POTASSIUM CHLORIDE 1500 MG/1
40 TABLET, EXTENDED RELEASE ORAL ONCE
Status: COMPLETED | OUTPATIENT
Start: 2024-11-01 | End: 2024-11-01

## 2024-11-01 RX ADMIN — METOPROLOL SUCCINATE 12.5 MG: 25 TABLET, FILM COATED, EXTENDED RELEASE ORAL at 09:56

## 2024-11-01 RX ADMIN — BACLOFEN 10 MG: 10 TABLET ORAL at 20:42

## 2024-11-01 RX ADMIN — BACLOFEN 10 MG: 10 TABLET ORAL at 09:56

## 2024-11-01 RX ADMIN — ARIPIPRAZOLE 2 MG: 2 TABLET ORAL at 09:57

## 2024-11-01 RX ADMIN — LEVETIRACETAM 1250 MG: 500 TABLET, FILM COATED ORAL at 20:42

## 2024-11-01 RX ADMIN — ACETAMINOPHEN 500 MG: 500 TABLET ORAL at 20:42

## 2024-11-01 RX ADMIN — RIVAROXABAN 20 MG: 20 TABLET, FILM COATED ORAL at 09:57

## 2024-11-01 RX ADMIN — DULOXETINE HYDROCHLORIDE 60 MG: 60 CAPSULE, DELAYED RELEASE ORAL at 09:56

## 2024-11-01 RX ADMIN — LEVETIRACETAM 1250 MG: 500 TABLET, FILM COATED ORAL at 09:55

## 2024-11-01 RX ADMIN — LEVOTHYROXINE SODIUM 50 MCG: 0.05 TABLET ORAL at 06:44

## 2024-11-01 RX ADMIN — Medication 2000 UNITS: at 17:51

## 2024-11-01 RX ADMIN — ATORVASTATIN CALCIUM 80 MG: 80 TABLET, FILM COATED ORAL at 20:42

## 2024-11-01 RX ADMIN — ASPIRIN 81 MG: 81 TABLET, CHEWABLE ORAL at 09:57

## 2024-11-01 RX ADMIN — POTASSIUM CHLORIDE 40 MEQ: 1500 TABLET, EXTENDED RELEASE ORAL at 06:43

## 2024-11-01 RX ADMIN — ACETAMINOPHEN 500 MG: 500 TABLET ORAL at 09:56

## 2024-11-01 RX ADMIN — Medication 100 MG: at 09:56

## 2024-11-01 RX ADMIN — BACLOFEN 10 MG: 10 TABLET ORAL at 14:04

## 2024-11-01 RX ADMIN — ACETAMINOPHEN 500 MG: 500 TABLET ORAL at 14:04

## 2024-11-01 ASSESSMENT — PAIN SCALES - GENERAL
PAINLEVEL_OUTOF10: 2
PAINLEVEL_OUTOF10: 0

## 2024-11-01 ASSESSMENT — PAIN DESCRIPTION - LOCATION: LOCATION: ARM

## 2024-11-01 NOTE — PLAN OF CARE
Problem: Chronic Conditions and Co-morbidities  Goal: Patient's chronic conditions and co-morbidity symptoms are monitored and maintained or improved  11/1/2024 0048 by Adri Malone RN  Outcome: Progressing     Problem: Discharge Planning  Goal: Discharge to home or other facility with appropriate resources  11/1/2024 0048 by Adri Malone RN  Outcome: Progressing     Problem: Safety - Adult  Goal: Free from fall injury  11/1/2024 0048 by Adri Malone RN  Outcome: Progressing     Problem: Skin/Tissue Integrity  Goal: Absence of new skin breakdown  Description: 1.  Monitor for areas of redness and/or skin breakdown  2.  Assess vascular access sites hourly  3.  Every 4-6 hours minimum:  Change oxygen saturation probe site  4.  Every 4-6 hours:  If on nasal continuous positive airway pressure, respiratory therapy assess nares and determine need for appliance change or resting period.  11/1/2024 0048 by Adri Malone, RN  Outcome: Progressing

## 2024-11-01 NOTE — PROGRESS NOTES
Hospitalist Progress Note      PCP: Syd Maher MD    Date of Admission: 10/27/2024    Chief Complaint: weakness    Subjective: patient in chair, eating lunch     Medications:  Reviewed    Infusion Medications   Scheduled Medications    oxymetazoline  2 spray Each Nostril BID    Vitamin D  2,000 Units Oral Dinner    cyanocobalamin  1,000 mcg IntraMUSCular Weekly    coenzyme Q10  100 mg Oral Daily    lidocaine  3 patch TransDERmal Daily    atorvastatin  80 mg Oral Nightly    acetaminophen  500 mg Oral TID    ARIPiprazole  2 mg Oral Daily    aspirin  81 mg Oral Daily    baclofen  10 mg Oral TID    DULoxetine  60 mg Oral Daily    levETIRAcetam  1,250 mg Oral BID    levothyroxine  50 mcg Oral Daily    metoprolol succinate  12.5 mg Oral Daily    rivaroxaban  20 mg Oral Daily with breakfast     PRN Meds: camphor-menthol-methyl salicylate, acetaminophen, bisacodyl, sodium phosphate      Intake/Output Summary (Last 24 hours) at 11/1/2024 1240  Last data filed at 10/31/2024 2344  Gross per 24 hour   Intake 522.78 ml   Output --   Net 522.78 ml       Exam:    /65   Pulse 62   Temp 98.1 °F (36.7 °C) (Oral)   Resp 17   Ht 1.727 m (5' 8\")   Wt 79.1 kg (174 lb 4.8 oz)   SpO2 98%   BMI 26.50 kg/m²     General appearance: No apparent distress, appears stated age and cooperative.  HEENT: Pupils equal, round, and reactive to light. Conjunctivae/corneas clear.  Neck: Supple, with full range of motion. No jugular venous distention. Trachea midline.  Respiratory:  Normal respiratory effort. Clear to auscultation, bilaterally without Rales/Wheezes/Rhonchi.  Cardiovascular: Regular rate and rhythm with normal S1/S2 without murmurs, rubs or gallops.  Abdomen: Soft, non-tender, non-distended with normal bowel sounds.  Musculoskeletal: R sided weakness   Skin: Skin color, texture, turgor normal.  No rashes or lesions.  Neurologic:  Neurovascularly intact without any focal sensory/motor deficits. Cranial nerves: II-XII

## 2024-11-01 NOTE — PROGRESS NOTES
OCCUPATIONAL THERAPY  INPATIENT REHAB TREATMENT NOTE  [facility]      NAME: Aramis David  : 1955 (68 y.o.)  MRN: [id number]  CODE STATUS: Full Code  Room: [facility]/R2-01    Date of Service: 2024    Referring Physician: Alicia Milner DO  Rehab Diagnosis: Impaired mobility and ADL's d/t new onset mental status change possible seizure disorder triggered by UTI    Hospital course:   Comments: 68 y.o. male patient who presented to ER via EMS 10/22 with altered mental status (confusion, staring off and now answering questions.) Patient with PMHx of CVA with right sided deficits. No acute findings on CT of head.  Initial lab testing remarkable for white blood cells 17.9, creatinine of 2.08, high-sensitivity troponin of 185, CRP of 29.8, procalcitonin of 1.78, lactate of 2.3, CK of 2718. Though ECG showing SR and no acute changes, troponins elevated. Patient admitted for additional work up and evaluation with consults from neurology and cardiology.      Restrictions  Restrictions/Precautions  Restrictions/Precautions: Fall Risk                 Patient's date of birth confirmed: Yes    SAFETY:  Safety Devices  Safety Devices in place: Yes  Type of devices: All fall risk precautions in place    SUBJECTIVE:  Subjective  Subjective: \"pee\"    Pain at start of treatment: Yes: 2/10    Pain at end of treatment: Yes: 2-3/10    Location: right shoulder  Description: Patient is unable to report  Nursing notified: Declined  Nurse: N/A  Intervention: Yoko Doss RN was notified that there was a small amount of blood on the wipe when posterior ara care was completed    COGNITION:     Good for the session        OBJECTIVE:     Patient reported that he needed to urinate. Patient was assisted to the bathroom and then pants and undergarment were opened until a urinal could be placed. Patient had small amount of urine in the urinal but absorbant undergarment was saturated. Patient was transferred to the

## 2024-11-01 NOTE — PROGRESS NOTES
Physical Therapy Rehab Treatment Note  [facility]  [facility]       NAME: Aramis David  : 1955 (68 y.o.)  MRN: [id number]  CODE STATUS: Full Code    Date of Service: 2024       Restrictions:  Restrictions/Precautions: Fall Risk       SUBJECTIVE:   Subjective: \"I am tired\"    Pain  Pain: 3/10 R sided pain via Rai Teague Faces- medicated.      OBJECTIVE:     Bed Mobility  Overall Assistance Level: Moderate Assistance  Additional Factors: Verbal cues;Increased time to complete;Head of bed flat;Without handrails  Roll Left  Assistance Level: Minimal assistance  Skilled Clinical Factors: increased assist required d/t fatigue  Roll Right  Assistance Level: Minimal assistance  Skilled Clinical Factors: improved technique and facilitation this session  Sit to Supine  Assistance Level: Moderate assistance  Skilled Clinical Factors: assist at trunk and RLE, pt able to initiate movement but required assistance to complete, increased assist required d/t fatigue  Supine to Sit  Assistance Level: Moderate assistance  Skilled Clinical Factors: assist at trunk and BLEs, vc's throughout for technique with fair carry over  Scooting  Assistance Level: Moderate assistance  Skilled Clinical Factors: scovoting towards EOB    Transfers  Surface: From bed;Wheelchair  Additional Factors: Verbal cues;Increased time to complete  Bed To/From Chair  Technique: Stand pivot  Assistance Level: Maximum assistance  Skilled Clinical Factors: face to face SPT completed wc to bed, vc's for technique and sequencing, pt mildly impulsive, unable to complete without MaxA  Stand Pivot  Assistance Level: Maximum assistance  Skilled Clinical Factors: face to face SPT completed bed to wc, vc's for technique and sequencing, pt mildly impulsive, unable to complete without MaxA    Wheelchair  Surface: Level surface;Uneven surface  Device: Standard wheelchair  Additional Factors: Verbal cues;Hand placement cues;One  handrail;Increased time to complete  Assistance Required to Manage Parts: All wheelchair parts  Assistance Level for Propulsion: Stand by assist  Propulsion Method: Left upper extremity;Left lower extremity  Propulsion Quality: Slow velocity;Short strokes;Decreased fluidity  Propulsion Distance: 150'  Skilled Clinical Factors: vc's for technique and sequencing,  pt with good ability to complete initially but quickly fatigues and ability to complete deteriorates. increased rest breaks. able to complete without physical assist this session.    PT Exercises  A/AROM Exercises: seated AP/LAQ/Marches x10 ea AAROM and focus on quad control and stability  Dynamic Sitting Balance Exercises: seated push/pull core activity with focus on core stability in seated.  Dynamic Standing Balance Exercises: Standing with weight shifting and postural control; STS x2 with longest stand time 2 min 15 sec. Noted RLE fatigue     Activity Tolerance  Activity Tolerance: Patient limited by fatigue;Patient limited by endurance    ASSESSMENT/PROGRESS TOWARDS GOALS:   Assessment  Assessment: Pt with improved participation and effort this date, less encouragement required to get pt to transfer to  and able to complete STS transfers in // bars this date. Pt less resistive, still easily fatigued but rest breaks taken as needed.  Activity Tolerance: Patient limited by fatigue;Patient limited by endurance    Goals:  Long Term Goals  Long Term Goal 1: Pt to complete bed mobility with SBA  Long Term Goal 2: Pt to complete transfers with SBA  Long Term Goal 3: Pt to ambulate 25-50ft with appropriate device and SBA  Long Term Goal 4: indep w/c mobility 50 feet  Long Term Goal 5: Pt able to maintain sitting and perform scooting tasks along EOB with SBA  Additional Goals?: Yes  Long term goal 6: pt to require min assist for curb step to allow for home entry    PLAN OF CARE/Safety:   Safety Devices  Type of Devices: All fall risk precautions in place;Bed

## 2024-11-01 NOTE — PROGRESS NOTES
Physical Therapy Rehab Treatment Note  Facility/Department: [facility]  [facility]       NAME: Aramis David  : 1955 (68 y.o.)  MRN: [id number]  CODE STATUS: Full Code    Date of Service: 2024       Restrictions:  Restrictions/Precautions: Fall Risk       SUBJECTIVE:   Subjective: \"I am good\"    Pain  Pain: 3/10 R sided pain via Rai Teague Faces- medicated.      OBJECTIVE:   Bed Mobility  Overall Assistance Level: Moderate Assistance  Additional Factors: Verbal cues;Increased time to complete;Head of bed flat;Without handrails  Roll Right  Assistance Level: Minimal assistance  Skilled Clinical Factors: improved technique and facilitation this session  Supine to Sit  Assistance Level: Moderate assistance  Skilled Clinical Factors: assist at trunk and BLEs, vc's throughout for technique with fair carry over  Scooting  Assistance Level: Moderate assistance  Skilled Clinical Factors: scovoting towards EOB    Transfers  Surface: Wheelchair  Additional Factors: Verbal cues;Increased time to complete  Device:  (// bars)  Sit to Stand  Assistance Level: Moderate assistance  Skilled Clinical Factors: face to face transfer, RLE blocked at knee and ankle for stability,  completed x 2 this date with improved ability to obtain upright posture  Stand to Sit  Assistance Level: Moderate assistance  Skilled Clinical Factors: assist to improve eccentric lowering  Stand Pivot  Assistance Level: Maximum assistance  Skilled Clinical Factors: face to face SPT completed bed to wc, vc's for technique and sequencing, pt mildly impulsive, unable to complete without MaxA    PT Exercises  Dynamic Standing Balance Exercises: Standing with weight shifting and postural control; STS x2 with longest stand time 2 min 15 sec. Noted RLE fatigue     Activity Tolerance  Activity Tolerance: Patient limited by fatigue;Patient limited by endurance    ASSESSMENT/PROGRESS TOWARDS GOALS:   Assessment  Assessment: Pt with improved

## 2024-11-01 NOTE — PROGRESS NOTES
[facility] Neurology Daily Progress Note  Name: Aramis David  Age: 68 y.o.  Gender: male  CodeStatus: Full Code  Allergies: No Known Allergies    Chief Complaint:No chief complaint on file.    Primary Care Provider: Syd Maher MD  InpatientTreatment Team: Treatment Team:   Alicia Milner DO Patel, Dhruv R, MD Kaplan, Mark, MD Baker, Selam Ontiveros, RN  Patricia Aranda, MSW, LSW  Christi, Sagrario HOOVER, Aramis Faustin, Marylou MATA, OTR/L  Admission Date: 10/27/2024      HPI   Pt seen and examined for neuro follow up.  Patient alert and oriented, no acute distress, cooperative.  Ongoing expressive aphasia which is his baseline.  Has right-sided weakness at baseline.  Denies headache.  No seizure activity overnight.  Afebrile.      Events as noted above    11/1  Patient seen and examined.  Patient transferred to the medical and follow their.  Patient was admitted with a large stroke in the left MCA a 1 in MCA 2 territory with a thrombus patient has bilateral DVTs secondary to PFO and had PFO closure.  Patient sustained significant weakness on the right side.  Patient was found by his brother and last known 2 days prior to admit    Rehabitation evaluations noted    Vitals:    11/01/24 0730   BP: 116/65   Pulse: 62   Resp: 17   Temp: 98.1 °F (36.7 °C)   SpO2: 98%        Review of Systems   Constitutional:  Negative for fatigue and fever.   HENT:  Negative for ear pain, hearing loss, tinnitus and trouble swallowing.    Eyes:  Negative for photophobia and visual disturbance.   Respiratory:  Negative for cough, choking, chest tightness, shortness of breath and wheezing.    Cardiovascular:  Negative for chest pain and palpitations.   Gastrointestinal:  Negative for nausea and vomiting.   Genitourinary:  Negative for difficulty urinating.   Musculoskeletal:  Positive for gait problem. Negative for back pain, joint swelling, myalgias, neck pain and neck stiffness.   Skin:  Negative for color change and  68-year-old male presenting to [facility] from home with altered mental status with potential seizure-like activity. Pt has a history of CVA w/ residual deficits of R-sided hypertonia. Pt is limited by R-sided hypertonia and other deficits listed and would benefit from continued OT to address these deficits to maximize safety and independence w/ ADL completion.    Speech therapy: FIMS:     Rehabilitation:  Physical therapy: FIMS:  Bed Mobility: Scooting: Moderate assistance    Transfers: Sit to Stand: Maximum Assistance (pt able to maintain standing for 10-20 sec with posterior LOB noted - heavy lean to the left present - poor weight acceptance on RLE)  Stand to Sit: Maximum Assistance  Bed to Chair: Maximum assistance (weight shift assistance to complete turn required with transfer only to left attempted), Ambulation  Assistance: Unable to assess  Comments: Pt too fatgiued to attempt gait. He was able to bear weight on RLE to perform pivot transfer,      FIMS:  ,  , Assessment: Pt demonstrates difficulties as listed. He is requiring assistance at a greater level than his baseline at this time. Pt is in need of continued PT    Occupational therapy: FIMS:   ,  , Assessment: Pt is a 68-year-old male presenting to [facility] from home with altered mental status with potential seizure-like activity. Pt has a history of CVA w/ residual deficits of R-sided hypertonia. Pt is limited by R-sided hypertonia and other deficits listed and would benefit from continued OT to address these deficits to maximize safety and independence w/ ADL completion.    Speech therapy: FIMS:         Medications:  Reviewed    Infusion Medications:       Scheduled Medications:    [START ON 11/2/2024] potassium chloride  20 mEq Oral Daily    oxymetazoline  2 spray Each Nostril BID    Vitamin D  2,000 Units Oral Dinner    cyanocobalamin  1,000 mcg IntraMUSCular Weekly    coenzyme Q10  100 mg Oral Daily    lidocaine  3 patch TransDERmal Daily    atorvastatin

## 2024-11-01 NOTE — PROGRESS NOTES
hydration deficiency: Add vitamin B12 vitamin D and CoQ10 continue to monitor I&O’s, calorie counts prn, dietary consult prn.  Add healthy HS snack.  Acute episodic insomnia with situational adjustment disorder:  consider prn low dose Ambien, monitor for day time sedation.  Add HS \"Tuck In\"  Falls risk elevated:  patient to use call light to get nursing assistance to get up, bed and chair alarm.  Elevated DVT risk: progressive activities in PT, continue prophylaxis LUCIANO hose, elevation and Xarelto.  Complex discharge planning:  DC-11/9/24 home alone with [facility].  Until then continue weekly team meeting  every Monday to re-assess progress towards goals, discuss and address social, psychological and medical comorbidities and to address difficulties they may be having progressing in therapy.  Patient and family education is in progress.  The patient is to follow-up with their family physician after discharge.        Complex Active General Medical Issues that complicate care Assess & Plan:         History of CVA (cerebrovascular accident)-control blood pressure monitor for diabetes consult neurology     Hyperlipidemia Essential hypertension,  S/P patent foramen ovale closure-Acute rehab to monitor heart rate and rhythm with the option of telemetry and the effects of chronotropic medication with respect to increasing physical activity and exercise in PT, OT, ADLs with medication titration to lowest effective dosing.  Continue blood signs every shift focusing on heart rate, rhythm and blood pressure checks with orthostatic checks-monitoring the effect of exercise, therapy and posture.  Consult hospitalist for backup medical and adjust/add medications.  Monitor heart rate and blood pressure as well as medications effects on vital signs before during and after therapy with especial focus on preventing orthostasis and falls risk.    Recurrent depression -emotional support provided daily, vitamin B12, encourage participation in  rehabilitation support group and recreational therapy, adjust/add medications.    Hypoxemia requiring supplemental oxygen-Acute rehab for endurance traing with Pulse Ox to monitoring oxygen saturation and heart rate with O2 titration to lowest effective dose. Pulse oximeter checks to shift and at HS to dose and titrate oxygen and aerosol treatments monitor for nocturnal hypoxemia, monitor vital signs, oxygen prn.  Focus on energy conservation.    Hemiparesis affecting dominant side as late effect of cerebrovascular accident (CVA)      Hypothyroidism-  Synthroid and titrate dosing.  Follow vital signs, recheck TSH and thyroid studies as outpatient.    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          Alicia Milner D.O., PM&R     Attending    [phone]   [facility]

## 2024-11-01 NOTE — FLOWSHEET NOTE
Patient is resting in bed at this time with no complaints of pain or discomfort. HS care provided by this RN. Patient is max assist to take off clothing and put hospital gown on. Patient is incontinent of small BM and urine. Patient cleaned up with ara wipes and zinc applied to coccyx. External male catheter in place with depends. Repositioned patient in bed with several pillows for comfort. Patient took evening medications whole one at a time with sips of water without difficulty. Patient declined an HS snack. Patient verbalized no further needs at this time. Bed alarm engaged, seizure precautions remain in place and call light within reach.

## 2024-11-01 NOTE — PROGRESS NOTES
OCCUPATIONAL THERAPY  INPATIENT REHAB TREATMENT NOTE  [facility]      NAME: Aramis David  : 1955 (68 y.o.)  MRN: [id number]  CODE STATUS: Full Code  Room: [facility]/R233-01    Date of Service: 2024    Referring Physician: Alicia Milner DO  Rehab Diagnosis: Impaired mobility and ADL's d/t new onset mental status change possible seizure disorder triggered by UTI    Hospital course:   Comments: 68 y.o. male patient who presented to ER via EMS 10/22 with altered mental status (confusion, staring off and now answering questions.) Patient with PMHx of CVA with right sided deficits. No acute findings on CT of head.  Initial lab testing remarkable for white blood cells 17.9, creatinine of 2.08, high-sensitivity troponin of 185, CRP of 29.8, procalcitonin of 1.78, lactate of 2.3, CK of 2718. Though ECG showing SR and no acute changes, troponins elevated. Patient admitted for additional work up and evaluation with consults from neurology and cardiology.      Restrictions  Restrictions/Precautions  Restrictions/Precautions: Fall Risk                 Patient's date of birth confirmed: Yes    SAFETY:  Safety Devices  Safety Devices in place: Yes  Type of devices: All fall risk precautions in place    SUBJECTIVE:  Subjective  Subjective: \"I can't\"    Pain at start of treatment: Yes: 2/10    Pain at end of treatment: Yes: 2/10    Location: right shoulder     Discussed frequent urination with very small amounts, during every session, with GORDON Doss.     COGNITION:     WFL for the session    OBJECTIVE:     Offered to transfer to the mat table to change the seating position. Patient declined reporting that he was too fatigued to work in the therapy department. Patient was transitioned back to his room and was transferred to the bed with assist of 2 needed for the transfer due to high level of fatigue. Patient then completed AROM with his left UE while in semi reclined in partial side lying on his right side.  Patient had minimal difficulty understanding the exercises     Toileting  Assistance Level: Dependent  Skilled Clinical Factors: Assisted with taking pants and undergarment down enough for patient to get his penis in the urinal and then patient was able to hold the urinal in place. patient was successful with urinating about 25cc of urine.      Equipment recommendations:  None this session      ASSESSMENT:  Assessment: Patient had increased participation on this date although he continues to require a large amount of assistance for all transfers and areas of ADLs  Activity Tolerance: Patient limited by fatigue;Patient limited by endurance      PLAN OF CARE:  Strengthening, ROM, Balance training, Functional mobility training, Endurance training, Patient/Caregiver education & training, Wheelchair mobility training, Safety education & training, Pain management, Cognitive reorientation, Equipment evaluation, education, & procurement, Positioning, Self-Care / ADL, Cognitive/Perceptual training, Coordination training  continue with OT plan of care    Patient goals : Pt unable to state a goal at this time.  Time Frame for Long Term Goals : Within 2 weeks, pt to demonstrate progress in the following areas listed below to acheive specific LTGs as stated in the initial evaluation.  Long Term Goal 1: Increase strength for ADL and functional mobility independence  Long Term Goal 2: Increase endurance for ADL and functional mobility independence  Long Term Goal 3: Improve RUE mobility for increased ADL and functional mobility independence  Long Term Goal 4: Improve cognition for increased safety and independce for ADLs and functional mobility        Therapy Time:   Individual Group Co-Treat   Time In 1330       Time Out 1400         Minutes 30                   ADL/IADL trainin minutes  Therapeutic activities: 10 minutes     Electronically signed by:    PORTIA Maldonado/L,   2024, 2:28 PM

## 2024-11-01 NOTE — FLOWSHEET NOTE
Patient's potassium 3.0. Dr. Upton notified. WBC 19.2 up from 11.4. Electronically signed by Adri Malone RN on 11/1/2024 at 6:03 AM    Patient had a large BM which he was incontinent of. Incontinent and ara care completed, with full linen change. Electronically signed by Adri Malone RN on 11/1/2024 at 6:40 AM

## 2024-11-01 NOTE — PROGRESS NOTES
Occupational Therapy Get up and Go Note            Date: 2024  Patient Name: Aramis David        MRN: [id number]    Account #: [id number]  : 1955  (68 y.o.)      Subjective:  Patient states:  \"yes.\"  Pain:  Pain at start of treatment: No    Pain at end of treatment: No    Objective:  ADL:  Feeding:  set up  pt almost done eating upon arrival and provided positioning of bed.  Demo ability to take lid off fruit, ate a couple pcs with his fingers.  Therapist assisted with s/u of coffee/opening milk container and placed straw in milk.  Washed coffee cup for better  d/t pt demo difficulty with /minimal shaking of hand when attempting to drink from styrofoam cup  Could not find lid to personal cup.    Treatment consisted of:   [x] ADL Training  [] Strengthening   [] Transfer Training    [] DME Education  [] HEP   [] Patient Education  [] Other:    Safety:  Safety Devices  Safety Devices in place:   Type of devices: All fall risk precautions in place      Therapy Time:   Individual Group Co-Treat   Time In 0748       Time Out 0801         Minutes 13             ADL/IADL trainin minutes         Electronically signed by:    PAULETTE Duncan    2024, 8:02 AM

## 2024-11-02 ENCOUNTER — APPOINTMENT (OUTPATIENT)
Dept: ULTRASOUND IMAGING | Age: 69
End: 2024-11-02
Attending: PHYSICAL MEDICINE & REHABILITATION
Payer: MEDICARE

## 2024-11-02 LAB
ERYTHROCYTE [DISTWIDTH] IN BLOOD BY AUTOMATED COUNT: 14.3 % (ref 11.5–14.5)
HCT VFR BLD AUTO: 36.9 % (ref 42–52)
HGB BLD-MCNC: 12.9 G/DL (ref 14–18)
MCH RBC QN AUTO: 30.6 PG (ref 27–31.3)
MCHC RBC AUTO-ENTMCNC: 35 % (ref 33–37)
MCV RBC AUTO: 87.6 FL (ref 79–92.2)
PLATELET # BLD AUTO: 197 K/UL (ref 130–400)
RBC # BLD AUTO: 4.21 M/UL (ref 4.7–6.1)
WBC # BLD AUTO: 14.8 K/UL (ref 4.8–10.8)

## 2024-11-02 PROCEDURE — 6370000000 HC RX 637 (ALT 250 FOR IP): Performed by: PHYSICAL MEDICINE & REHABILITATION

## 2024-11-02 PROCEDURE — 93970 EXTREMITY STUDY: CPT

## 2024-11-02 PROCEDURE — 97535 SELF CARE MNGMENT TRAINING: CPT

## 2024-11-02 PROCEDURE — 85027 COMPLETE CBC AUTOMATED: CPT

## 2024-11-02 PROCEDURE — 6370000000 HC RX 637 (ALT 250 FOR IP): Performed by: INTERNAL MEDICINE

## 2024-11-02 PROCEDURE — 36415 COLL VENOUS BLD VENIPUNCTURE: CPT

## 2024-11-02 PROCEDURE — 1180000000 HC REHAB R&B

## 2024-11-02 RX ADMIN — METOPROLOL SUCCINATE 12.5 MG: 25 TABLET, FILM COATED, EXTENDED RELEASE ORAL at 10:17

## 2024-11-02 RX ADMIN — RIVAROXABAN 20 MG: 20 TABLET, FILM COATED ORAL at 10:25

## 2024-11-02 RX ADMIN — POTASSIUM CHLORIDE 20 MEQ: 1500 TABLET, EXTENDED RELEASE ORAL at 10:18

## 2024-11-02 RX ADMIN — BACLOFEN 10 MG: 10 TABLET ORAL at 20:14

## 2024-11-02 RX ADMIN — BACLOFEN 10 MG: 10 TABLET ORAL at 10:17

## 2024-11-02 RX ADMIN — ATORVASTATIN CALCIUM 80 MG: 80 TABLET, FILM COATED ORAL at 20:14

## 2024-11-02 RX ADMIN — LEVOTHYROXINE SODIUM 50 MCG: 0.05 TABLET ORAL at 05:01

## 2024-11-02 RX ADMIN — ACETAMINOPHEN 500 MG: 500 TABLET ORAL at 10:18

## 2024-11-02 RX ADMIN — LEVETIRACETAM 1250 MG: 500 TABLET, FILM COATED ORAL at 20:15

## 2024-11-02 RX ADMIN — BACLOFEN 10 MG: 10 TABLET ORAL at 14:29

## 2024-11-02 RX ADMIN — DULOXETINE HYDROCHLORIDE 60 MG: 60 CAPSULE, DELAYED RELEASE ORAL at 10:17

## 2024-11-02 RX ADMIN — ARIPIPRAZOLE 2 MG: 2 TABLET ORAL at 10:17

## 2024-11-02 RX ADMIN — Medication 100 MG: at 10:17

## 2024-11-02 RX ADMIN — ASPIRIN 81 MG: 81 TABLET, CHEWABLE ORAL at 10:18

## 2024-11-02 RX ADMIN — ACETAMINOPHEN 500 MG: 500 TABLET ORAL at 20:14

## 2024-11-02 RX ADMIN — Medication 2000 UNITS: at 20:14

## 2024-11-02 RX ADMIN — ACETAMINOPHEN 500 MG: 500 TABLET ORAL at 14:29

## 2024-11-02 RX ADMIN — LEVETIRACETAM 1250 MG: 500 TABLET, FILM COATED ORAL at 10:17

## 2024-11-02 ASSESSMENT — PAIN SCALES - GENERAL: PAINLEVEL_OUTOF10: 0

## 2024-11-02 NOTE — PROGRESS NOTES
Subjective:   The patient complains of severe acute on chronic progressive fatigue and right-sided spasticity, right weakness, cervical and thoracic and lumbar pain partially relieved by rest, medications, PT,  OT,   SLP and rest and exacerbated by recent non-ST MI.  Patient was admitted through the ER on 10/22/2024 with seizure and mental status change.  He had elevated troponins and was diagnosed with a non-ST MI cardiology was consulted.   Marilyn Quinones, RN  Registered Nurse  Nursing     Flowsheet Note     Signed     Date of Service: 11/1/2024  8:50 PM     Signed         Patient in bed with incontinent episode of stool. Cleaned patient with ara-wipes and applied generous amount of zinc to coccyx. Placed external male catheter and depends on patient due to incontinent episodes. Patient took evening medications whole one at a time with sips of water without difficulty. Patient declined an HS snack. Patient repositioned on right side with pillows in place for comfort. Patient verbalized no further needs at this time. Bed alarm engaged and call light within reach.                  He was admitted under the care of the hospitalist with neurology and cardiology consult.  Neurology his prescribed Keppra.  Tramadol was DC'd as it lowers the seizure threshold.   He continues to have right-sided weakness and residual aphasia from previous CVA 2021.  He has a remote history of PFO.  He had a closure procedure performed for a percutaneous septal occluder.  He continues on Xarelto.     He had an elevated white count -likely UTI he is on IV Rocephin and Vanco.  UA was positive but --chest x-ray and respiratory panel are negative.  Blood cultures were done.    I am concerned about patient’s medical complexities and barriers to advancing in rehab goals including avoiding repeat stroke and heart attack.        I discussed current functional, rehabilitation, medical status with other rehabilitation providers including  depression -emotional support provided daily, vitamin B12, encourage participation in rehabilitation support group and recreational therapy, adjust/add medications.    Hypoxemia requiring supplemental oxygen-Acute rehab for endurance traing with Pulse Ox to monitoring oxygen saturation and heart rate with O2 titration to lowest effective dose. Pulse oximeter checks to shift and at HS to dose and titrate oxygen and aerosol treatments monitor for nocturnal hypoxemia, monitor vital signs, oxygen prn.  Focus on energy conservation.    Hemiparesis affecting dominant side as late effect of cerebrovascular accident (CVA)      Hypothyroidism-  Synthroid and titrate dosing.  Follow vital signs, recheck TSH and thyroid studies as outpatient.    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      UBALDO Milner D.O., PM&R     Attending    [phone]   [facility]

## 2024-11-02 NOTE — PLAN OF CARE
Problem: Chronic Conditions and Co-morbidities  Goal: Patient's chronic conditions and co-morbidity symptoms are monitored and maintained or improved  Outcome: Progressing     Problem: Discharge Planning  Goal: Discharge to home or other facility with appropriate resources  Outcome: Progressing     Problem: Safety - Adult  Goal: Free from fall injury  Outcome: Progressing     Problem: Neurosensory - Adult  Goal: Achieves stable or improved neurological status  Outcome: Progressing  Goal: Absence of seizures  Outcome: Progressing  Goal: Remains free of injury related to seizures activity  Outcome: Progressing  Goal: Achieves maximal functionality and self care  Outcome: Progressing     Problem: Musculoskeletal - Adult  Goal: Return mobility to safest level of function  Outcome: Progressing  Goal: Maintain proper alignment of affected body part  Outcome: Progressing  Goal: Return ADL status to a safe level of function  Outcome: Progressing     Problem: Gastrointestinal - Adult  Goal: Minimal or absence of nausea and vomiting  Outcome: Progressing  Goal: Maintains or returns to baseline bowel function  Outcome: Progressing  Goal: Maintains adequate nutritional intake  Outcome: Progressing     Problem: Pain  Goal: Verbalizes/displays adequate comfort level or baseline comfort level  Outcome: Progressing

## 2024-11-02 NOTE — PROGRESS NOTES
Physical Therapy Rehab Treatment Note  Facility/Department: [facility]  [facility]       NAME: Aramis David  : 1955 (68 y.o.)  MRN: [id number]  CODE STATUS: Full Code    Date of Service: 2024       Restrictions:  Restrictions/Precautions: Fall Risk       SUBJECTIVE:        Pain  Pain: 0/10 pre and post session      OBJECTIVE:             Bed mobility  Rolling to Left: Minimal assistance  Rolling to Right: Stand by assistance  Supine to Sit: Minimal assistance  Sit to Supine: Minimal assistance  Scooting: Moderate assistance;Maximal assistance                             PT Exercises  A/AROM Exercises: Supine: HS, ABD, Bridging.  R ankle ROM/calf stretches.                        ASSESSMENT/PROGRESS TOWARDS GOALS:   Body Structures, Functions, Activity Limitations Requiring Skilled Therapeutic Intervention: Decreased functional mobility ;Decreased ADL status;Decreased ROM;Decreased body mechanics;Decreased strength;Decreased safe awareness;Decreased cognition;Decreased endurance;Decreased balance;Decreased coordination;Decreased posture  Assessment: Pt treatment focused on bed mobility and seated balance.  Pt with improved rolling and bed mobility over-all, but still needing cues and assist.  Concerned with right ankle stability, if standing.  Would consider an air cast, and consulted supervising PT regarding this.  Pt with poor ability to motor plan when exercising.  Assessment  Discharge Recommendations: Continue to assess pending progress    Goals:  Long Term Goals  Long Term Goal 1: Pt to complete bed mobility with SBA  Long Term Goal 2: Pt to complete transfers with SBA  Long Term Goal 3: Pt to ambulate 25-50ft with appropriate device and SBA  Long Term Goal 4: indep w/c mobility 50 feet  Long Term Goal 5: Pt able to maintain sitting and perform scooting tasks along EOB with SBA  Additional Goals?: Yes  Long term goal 6: pt to require min assist for curb step to allow for home entry    PLAN

## 2024-11-02 NOTE — FLOWSHEET NOTE
Patient is alert, oriented and cooperative. Denies pain, needs or complaints at this time. Call light within reach.

## 2024-11-03 LAB
BASOPHILS # BLD: 0 K/UL (ref 0–0.2)
BASOPHILS NFR BLD: 0.3 %
BURR CELLS: ABNORMAL
EOSINOPHIL # BLD: 0.1 K/UL (ref 0–0.7)
EOSINOPHIL NFR BLD: 0.5 %
ERYTHROCYTE [DISTWIDTH] IN BLOOD BY AUTOMATED COUNT: 14.1 % (ref 11.5–14.5)
HCT VFR BLD AUTO: 36.7 % (ref 42–52)
HGB BLD-MCNC: 12.7 G/DL (ref 14–18)
LACTOFERRIN, FECAL: POSITIVE
LYMPHOCYTES # BLD: 1.1 K/UL (ref 1–4.8)
LYMPHOCYTES NFR BLD: 9.4 %
MCH RBC QN AUTO: 30.2 PG (ref 27–31.3)
MCHC RBC AUTO-ENTMCNC: 34.6 % (ref 33–37)
MCV RBC AUTO: 87.2 FL (ref 79–92.2)
MONOCYTES # BLD: 1.6 K/UL (ref 0.2–0.8)
MONOCYTES NFR BLD: 14.2 %
NEUTROPHILS # BLD: 8.6 K/UL (ref 1.4–6.5)
NEUTS SEG NFR BLD: 74.9 %
PLATELET # BLD AUTO: 220 K/UL (ref 130–400)
PLATELET BLD QL SMEAR: ADEQUATE
POIKILOCYTOSIS BLD QL SMEAR: ABNORMAL
RBC # BLD AUTO: 4.21 M/UL (ref 4.7–6.1)
WBC # BLD AUTO: 11.5 K/UL (ref 4.8–10.8)

## 2024-11-03 PROCEDURE — 87449 NOS EACH ORGANISM AG IA: CPT

## 2024-11-03 PROCEDURE — 87507 IADNA-DNA/RNA PROBE TQ 12-25: CPT

## 2024-11-03 PROCEDURE — 83630 LACTOFERRIN FECAL (QUAL): CPT

## 2024-11-03 PROCEDURE — 87324 CLOSTRIDIUM AG IA: CPT

## 2024-11-03 PROCEDURE — 1180000000 HC REHAB R&B

## 2024-11-03 PROCEDURE — 6370000000 HC RX 637 (ALT 250 FOR IP): Performed by: INTERNAL MEDICINE

## 2024-11-03 PROCEDURE — 99222 1ST HOSP IP/OBS MODERATE 55: CPT | Performed by: INTERNAL MEDICINE

## 2024-11-03 PROCEDURE — 87493 C DIFF AMPLIFIED PROBE: CPT

## 2024-11-03 PROCEDURE — 85025 COMPLETE CBC W/AUTO DIFF WBC: CPT

## 2024-11-03 PROCEDURE — 97535 SELF CARE MNGMENT TRAINING: CPT

## 2024-11-03 PROCEDURE — 36415 COLL VENOUS BLD VENIPUNCTURE: CPT

## 2024-11-03 PROCEDURE — 6370000000 HC RX 637 (ALT 250 FOR IP): Performed by: PHYSICAL MEDICINE & REHABILITATION

## 2024-11-03 PROCEDURE — 97542 WHEELCHAIR MNGMENT TRAINING: CPT

## 2024-11-03 RX ORDER — DICYCLOMINE HCL 20 MG
20 TABLET ORAL
Status: DISCONTINUED | OUTPATIENT
Start: 2024-11-03 | End: 2024-11-09 | Stop reason: HOSPADM

## 2024-11-03 RX ORDER — METRONIDAZOLE 500 MG/1
500 TABLET ORAL EVERY 8 HOURS SCHEDULED
Status: DISCONTINUED | OUTPATIENT
Start: 2024-11-03 | End: 2024-11-04

## 2024-11-03 RX ORDER — METRONIDAZOLE 500 MG/1
500 TABLET ORAL ONCE
Status: COMPLETED | OUTPATIENT
Start: 2024-11-03 | End: 2024-11-03

## 2024-11-03 RX ADMIN — ATORVASTATIN CALCIUM 80 MG: 80 TABLET, FILM COATED ORAL at 22:08

## 2024-11-03 RX ADMIN — ASPIRIN 81 MG: 81 TABLET, CHEWABLE ORAL at 09:19

## 2024-11-03 RX ADMIN — LEVOTHYROXINE SODIUM 50 MCG: 0.05 TABLET ORAL at 05:06

## 2024-11-03 RX ADMIN — Medication 100 MG: at 09:19

## 2024-11-03 RX ADMIN — ARIPIPRAZOLE 2 MG: 2 TABLET ORAL at 09:35

## 2024-11-03 RX ADMIN — BACLOFEN 10 MG: 10 TABLET ORAL at 22:07

## 2024-11-03 RX ADMIN — METRONIDAZOLE 500 MG: 500 TABLET ORAL at 11:18

## 2024-11-03 RX ADMIN — METOPROLOL SUCCINATE 12.5 MG: 25 TABLET, FILM COATED, EXTENDED RELEASE ORAL at 09:19

## 2024-11-03 RX ADMIN — METRONIDAZOLE 500 MG: 500 TABLET ORAL at 22:07

## 2024-11-03 RX ADMIN — RIVAROXABAN 20 MG: 20 TABLET, FILM COATED ORAL at 09:20

## 2024-11-03 RX ADMIN — LEVETIRACETAM 1250 MG: 500 TABLET, FILM COATED ORAL at 09:20

## 2024-11-03 RX ADMIN — ACETAMINOPHEN 500 MG: 500 TABLET ORAL at 22:08

## 2024-11-03 RX ADMIN — BACLOFEN 10 MG: 10 TABLET ORAL at 09:20

## 2024-11-03 RX ADMIN — DULOXETINE HYDROCHLORIDE 60 MG: 60 CAPSULE, DELAYED RELEASE ORAL at 09:35

## 2024-11-03 RX ADMIN — Medication 2000 UNITS: at 17:00

## 2024-11-03 RX ADMIN — LEVETIRACETAM 1250 MG: 500 TABLET, FILM COATED ORAL at 22:07

## 2024-11-03 RX ADMIN — POTASSIUM CHLORIDE 20 MEQ: 1500 TABLET, EXTENDED RELEASE ORAL at 09:19

## 2024-11-03 RX ADMIN — BACLOFEN 10 MG: 10 TABLET ORAL at 14:22

## 2024-11-03 RX ADMIN — ACETAMINOPHEN 500 MG: 500 TABLET ORAL at 14:22

## 2024-11-03 RX ADMIN — ACETAMINOPHEN 500 MG: 500 TABLET ORAL at 09:19

## 2024-11-03 RX ADMIN — DICYCLOMINE HYDROCHLORIDE 20 MG: 20 TABLET ORAL at 22:07

## 2024-11-03 RX ADMIN — ACETAMINOPHEN 325MG 650 MG: 325 TABLET ORAL at 05:06

## 2024-11-03 ASSESSMENT — PAIN DESCRIPTION - DESCRIPTORS
DESCRIPTORS: ACHING
DESCRIPTORS: ACHING;CRAMPING

## 2024-11-03 ASSESSMENT — PAIN DESCRIPTION - LOCATION
LOCATION: ABDOMEN
LOCATION: ABDOMEN

## 2024-11-03 ASSESSMENT — PAIN DESCRIPTION - ORIENTATION
ORIENTATION: MID;LOWER
ORIENTATION: MID;LOWER

## 2024-11-03 ASSESSMENT — PAIN SCALES - GENERAL
PAINLEVEL_OUTOF10: 2
PAINLEVEL_OUTOF10: 7

## 2024-11-03 NOTE — FLOWSHEET NOTE
Message sent to Dr. Weiss for patients complaints of abdominal pain and several loose/watery stools in the last 24 hours. Awaiting response.

## 2024-11-03 NOTE — FLOWSHEET NOTE
Patient resting in bed with some complaints of an upset stomach. Patient had a few incontinent episodes of loose BM's this evening. Patient took evening medications whole one at a time with sips of water without difficulty. Patient declined an HS snack this evening. Patient repositioned in bed and heels elevated on pillow. Patient has seizure precautions in place. Patient depends changed and generous amounts of zinc applied to coccyx and scrotum. Patient has external catheter on due to urinary incontinence. Patient verbalized no further needs at this time. Bed alarm engaged and call light within reach.

## 2024-11-03 NOTE — CONSULTS
acute abnormality. SINUSES: The visualized paranasal sinuses and mastoid air cells demonstrate no acute abnormality. SOFT TISSUES/SKULL:  No acute abnormality of the visualized skull or soft tissues.     There is evidence of prior infarct is the left frontal and temporal lobes. No acute hemorrhage is seen.  Recommend MRI or CTA evaluate for evolving stroke.        Impression:   68 y.o. male admitted initially admitted with altered mental status, non-STEMI and concern for a seizure.  Patient with longstanding right sided hemiparesis/aphasia from previous stroke in 2021.  GI consulted for new onset of diarrhea over the last 24 hours with lower abdominal cramping.  Patient currently in rehab unit.  White cell count noted to be significantly elevated 48 hours ago, has significantly improved over the last 24 hours and down to 11.5 after patient was started on Flagyl over the last 24 hours.  Stool studies show positive fecal leukocytes, stool C. difficile studies pending.  Clinical history suggestive of infectious diarrhea.  Etiology unclear, studies are pending.  Plan:   -Recommend completing stool studies, recommend adding gastrointestinal panel in addition to C. difficile studies.  -In addition to continuing Flagyl pending further studies, recommend symptomatic management with Bentyl 10 mg po every 6 hour as needed for abdominal cramping  -Monitor Clinical course  Comments:     Thank you for allowing us to participate in the care of this patient.     Will continue to follow.    Please call if questions or concerns arise.    Electronically signed by Jerome Land MD on 11/3/2024 at 12:25 PM    Please note this report has been partially produced using speech recognition software and may cause contain errors related to that system including grammar, punctuation and spelling as well as words and phrases that may seem inappropriate. If there are questions or concerns please feel free to contact me to clarify.

## 2024-11-03 NOTE — FLOWSHEET NOTE
Patient alert, cooperative oriented/disoriented at times. Enteric isolation initiated to rule out c-diff. Isolation explained to patient with verbalization of understanding. Patient incontinent of several watery stools. Also complains of abdominal cramping. Denies any further needs or complaints at this time. Call light within reach.

## 2024-11-03 NOTE — PROGRESS NOTES
Physical Therapy Rehab Treatment Note  [facility]  [facility]       NAME: Aramis David  : 1955 (68 y.o.)  MRN: [id number]  CODE STATUS: Full Code    Date of Service: 11/3/2024       Restrictions:  Restrictions/Precautions: Fall Risk       SUBJECTIVE:   Subjective: Nurse reports patient is being ruled out for Cdiff at this time. Patient reports increased fatigue.    Pain  Pain: 2/10 Rt UE pre session, 5/10 post      OBJECTIVE:             Bed mobility  Sit to Supine: Minimal assistance  Scooting: Minimal assistance  Bed Mobility Comments: Bed flat with increased time to complete; use of HR    Transfers  Sit to Stand: Moderate Assistance  Stand to Sit: Moderate Assistance  Bed to Chair: Maximum assistance              Wheelchair Activities  Propulsion: Yes  Propulsion 1  Propulsion: Manual  Level: Level Tile  Method: LUE;LLE  Level of Assistance: Stand by assistance  Description/ Details: Slow to complete with increased effort  Distance: 25ft         PT Exercises  Static Standing Balance Exercises: Static standing 1 UE support: 1min, 45'' x2                        ASSESSMENT/PROGRESS TOWARDS GOALS:   Body Structures, Functions, Activity Limitations Requiring Skilled Therapeutic Intervention: Decreased functional mobility ;Decreased ADL status;Decreased ROM;Decreased body mechanics;Decreased strength;Decreased safe awareness;Decreased cognition;Decreased endurance;Decreased balance;Decreased coordination;Decreased posture  Assessment: Patient easily fatigued this date with standing activities. Mod A to complete sit to stands from W/C, increased assistance to complete pivot from bed to W/C. Patient requires increased time to complete W/C mobility, VCs for Lt UE use and to improve overall technique.    Goals:  Long Term Goals  Long Term Goal 1: Pt to complete bed mobility with SBA  Long Term Goal 2: Pt to complete transfers with SBA  Long Term Goal 3: Pt to ambulate 25-50ft with

## 2024-11-03 NOTE — PLAN OF CARE
part  Outcome: Progressing  Goal: Return ADL status to a safe level of function  Outcome: Progressing     Problem: Gastrointestinal - Adult  Goal: Minimal or absence of nausea and vomiting  Outcome: Progressing  Goal: Maintains or returns to baseline bowel function  Outcome: Progressing  Goal: Maintains adequate nutritional intake  Outcome: Progressing     Problem: Pain  Goal: Verbalizes/displays adequate comfort level or baseline comfort level  Outcome: Progressing

## 2024-11-03 NOTE — PROGRESS NOTES
Hospitalist Progress Note      PCP: Syd Maher MD    Date of Admission: 10/27/2024    Chief Complaint: diarrhea    Subjective: patient eating lunch    Medications:  Reviewed    Infusion Medications   Scheduled Medications    metroNIDAZOLE  500 mg Oral 3 times per day    potassium chloride  20 mEq Oral Daily    Vitamin D  2,000 Units Oral Dinner    cyanocobalamin  1,000 mcg IntraMUSCular Weekly    coenzyme Q10  100 mg Oral Daily    lidocaine  3 patch TransDERmal Daily    atorvastatin  80 mg Oral Nightly    acetaminophen  500 mg Oral TID    ARIPiprazole  2 mg Oral Daily    aspirin  81 mg Oral Daily    baclofen  10 mg Oral TID    DULoxetine  60 mg Oral Daily    levETIRAcetam  1,250 mg Oral BID    levothyroxine  50 mcg Oral Daily    metoprolol succinate  12.5 mg Oral Daily    rivaroxaban  20 mg Oral Daily with breakfast     PRN Meds: camphor-menthol-methyl salicylate, acetaminophen, bisacodyl, sodium phosphate    No intake or output data in the 24 hours ending 11/03/24 1316    Exam:    /64   Pulse 66   Temp 98.6 °F (37 °C)   Resp 16   Ht 1.727 m (5' 8\")   Wt 79.1 kg (174 lb 4.8 oz)   SpO2 96%   BMI 26.50 kg/m²     General appearance: No apparent distress, appears stated age and cooperative.  HEENT: Pupils equal, round, and reactive to light. Conjunctivae/corneas clear.  Neck: Supple, with full range of motion. No jugular venous distention. Trachea midline.  Respiratory:  Normal respiratory effort. Clear to auscultation, bilaterally without Rales/Wheezes/Rhonchi.  Cardiovascular: Regular rate and rhythm with normal S1/S2 without murmurs, rubs or gallops.  Abdomen: Soft, non-tender, non-distended with normal bowel sounds.  Musculoskeletal:R sided weakness   Skin: Skin color, texture, turgor normal.  No rashes or lesions.  Neurologic:  Neurovascularly intact without any focal sensory/motor deficits. Cranial nerves: II-XII intact, grossly non-focal.  Psychiatric: Alert and oriented,    Capillary Refill:

## 2024-11-03 NOTE — PROGRESS NOTES
Subjective:   The patient complains of severe acute on chronic progressive fatigue and right-sided spasticity, right weakness, cervical and thoracic and lumbar pain partially relieved by rest, medications, PT,  OT,   SLP and rest and exacerbated by recent non-ST MI.  Patient was admitted through the ER on 10/22/2024 with seizure and mental status change.  He had elevated troponins and was diagnosed with a non-ST MI cardiology was consulted.    Marilyn Quinones, RN  Registered Nurse  Nursing     Flowsheet Note     Signed     Date of Service: 11/3/2024  5:43 AM     Signed         Message sent to Dr. Weiss for patients complaints of abdominal pain and several loose/watery stools in the last 24 hours. Awaiting response.                      He was admitted under the care of the hospitalist with neurology and cardiology consult.  Neurology his prescribed Keppra.  Tramadol was DC'd as it lowers the seizure threshold.   He continues to have right-sided weakness and residual aphasia from previous CVA 2021.  He has a remote history of PFO.  He had a closure procedure performed for a percutaneous septal occluder.  He continues on Xarelto.     He had an elevated white count -likely UTI he is on IV Rocephin and Vanco.  UA was positive but --chest x-ray and respiratory panel are negative.  Blood cultures were done.    I am concerned about patient’s medical complexities and barriers to advancing in rehab goals including avoiding repeat stroke and heart attack.        I discussed current functional, rehabilitation, medical status with other rehabilitation providers including nursing and case management.  According to recent nursing note, \" potassium 3.0. Dr. Upton notified. WBC 19.2 up from 11.4.   Patient had a large BM which he was incontinent of. Incontinent and ara care completed, with full linen change\".     I am concerned about his hypokalemia and elevated white count however clinically he looks good.    ROS x10:   group and recreational therapy, adjust/add medications.    Hypoxemia requiring supplemental oxygen-Acute rehab for endurance traing with Pulse Ox to monitoring oxygen saturation and heart rate with O2 titration to lowest effective dose. Pulse oximeter checks to shift and at HS to dose and titrate oxygen and aerosol treatments monitor for nocturnal hypoxemia, monitor vital signs, oxygen prn.  Focus on energy conservation.    Hemiparesis affecting dominant side as late effect of cerebrovascular accident (CVA)      Hypothyroidism-  Synthroid and titrate dosing.  Follow vital signs, recheck TSH and thyroid studies as outpatient.    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  Cdiff pending  Flagyl started   Gi input appreciated    UBALDO Milner D.O., PM&R     Attending    [phone]   [facility]

## 2024-11-03 NOTE — PLAN OF CARE
Problem: Chronic Conditions and Co-morbidities  Goal: Patient's chronic conditions and co-morbidity symptoms are monitored and maintained or improved  11/3/2024 0057 by Marilyn Quinones RN  Outcome: Progressing  11/2/2024 1311 by Ida Finley RN  Outcome: Progressing     Problem: Discharge Planning  Goal: Discharge to home or other facility with appropriate resources  11/3/2024 0057 by Marilyn Quinones RN  Outcome: Progressing  11/2/2024 1311 by Ida Finley RN  Outcome: Progressing     Problem: Safety - Adult  Goal: Free from fall injury  11/3/2024 0057 by Marilyn Quinones RN  Outcome: Progressing  11/2/2024 1311 by Ida Finley RN  Outcome: Progressing     Problem: Skin/Tissue Integrity  Goal: Absence of new skin breakdown  Description: 1.  Monitor for areas of redness and/or skin breakdown  2.  Assess vascular access sites hourly  3.  Every 4-6 hours minimum:  Change oxygen saturation probe site  4.  Every 4-6 hours:  If on nasal continuous positive airway pressure, respiratory therapy assess nares and determine need for appliance change or resting period.  11/2/2024 1311 by Ida Finley RN  Outcome: Progressing     Problem: Neurosensory - Adult  Goal: Achieves stable or improved neurological status  11/2/2024 1311 by Ida Finley RN  Outcome: Progressing  Goal: Absence of seizures  11/3/2024 0057 by Marilyn Quinones RN  Outcome: Progressing  11/2/2024 1311 by Ida Finley RN  Outcome: Progressing  Goal: Remains free of injury related to seizures activity  11/3/2024 0057 by Marilyn Quinones RN  Outcome: Progressing  11/2/2024 1311 by Ida Finley RN  Outcome: Progressing  Goal: Achieves maximal functionality and self care  11/2/2024 1311 by Ida Finley RN  Outcome: Progressing     Problem: Skin/Tissue Integrity - Adult  Goal: Skin integrity remains intact  11/2/2024 1311 by Ida Finley

## 2024-11-04 PROBLEM — R19.7 DIARRHEA OF PRESUMED INFECTIOUS ORIGIN: Status: ACTIVE | Noted: 2024-11-04

## 2024-11-04 LAB
ADV 40+41 DNA STL QL NAA+NON-PROBE: NOT DETECTED
ANION GAP SERPL CALCULATED.3IONS-SCNC: 10 MEQ/L (ref 9–15)
BASOPHILS # BLD: 0 K/UL (ref 0–0.2)
BASOPHILS NFR BLD: 0.5 %
BUN SERPL-MCNC: 10 MG/DL (ref 8–23)
C CAYETANENSIS DNA STL QL NAA+NON-PROBE: NOT DETECTED
C COLI+JEJ+UPSA DNA STL QL NAA+NON-PROBE: NOT DETECTED
C DIFF TOX A+B STL QL IA: NORMAL
C DIFF TOX GENS STL QL NAA+PROBE: ABNORMAL
CALCIUM SERPL-MCNC: 8 MG/DL (ref 8.5–9.9)
CHLORIDE SERPL-SCNC: 106 MEQ/L (ref 95–107)
CO2 SERPL-SCNC: 20 MEQ/L (ref 20–31)
CREAT SERPL-MCNC: 0.71 MG/DL (ref 0.7–1.2)
CRYPTOSP DNA STL QL NAA+NON-PROBE: NOT DETECTED
E HISTOLYT DNA STL QL NAA+NON-PROBE: NOT DETECTED
EAEC PAA PLAS AGGR+AATA ST NAA+NON-PRB: NOT DETECTED
EC STX1+STX2 GENES STL QL NAA+NON-PROBE: NOT DETECTED
EOSINOPHIL # BLD: 0.1 K/UL (ref 0–0.7)
EOSINOPHIL NFR BLD: 1.2 %
EPEC EAE GENE STL QL NAA+NON-PROBE: NOT DETECTED
ERYTHROCYTE [DISTWIDTH] IN BLOOD BY AUTOMATED COUNT: 14.1 % (ref 11.5–14.5)
ETEC LTA+ST1A+ST1B TOX ST NAA+NON-PROBE: NOT DETECTED
G LAMBLIA DNA STL QL NAA+NON-PROBE: NOT DETECTED
GI PATH DNA+RNA PNL STL NAA+NON-PROBE: NOT DETECTED
GLUCOSE SERPL-MCNC: 96 MG/DL (ref 70–99)
HCT VFR BLD AUTO: 34.8 % (ref 42–52)
HGB BLD-MCNC: 12.3 G/DL (ref 14–18)
LYMPHOCYTES # BLD: 1.4 K/UL (ref 1–4.8)
LYMPHOCYTES NFR BLD: 16.8 %
MCH RBC QN AUTO: 30.4 PG (ref 27–31.3)
MCHC RBC AUTO-ENTMCNC: 35.3 % (ref 33–37)
MCV RBC AUTO: 85.9 FL (ref 79–92.2)
MONOCYTES # BLD: 1 K/UL (ref 0.2–0.8)
MONOCYTES NFR BLD: 12.5 %
NEUTROPHILS # BLD: 5.6 K/UL (ref 1.4–6.5)
NEUTS SEG NFR BLD: 68.5 %
NOROVIRUS GI+II RNA STL QL NAA+NON-PROBE: NOT DETECTED
P SHIGELLOIDES DNA STL QL NAA+NON-PROBE: NOT DETECTED
PLATELET # BLD AUTO: 221 K/UL (ref 130–400)
POTASSIUM SERPL-SCNC: 2.9 MEQ/L (ref 3.4–4.9)
RBC # BLD AUTO: 4.05 M/UL (ref 4.7–6.1)
RVA RNA STL QL NAA+NON-PROBE: NOT DETECTED
S ENT+BONG DNA STL QL NAA+NON-PROBE: NOT DETECTED
SAPO I+II+IV+V RNA STL QL NAA+NON-PROBE: NOT DETECTED
SHIGELLA SP+EIEC IPAH ST NAA+NON-PROBE: NOT DETECTED
SODIUM SERPL-SCNC: 136 MEQ/L (ref 135–144)
V CHOL+PARA+VUL DNA STL QL NAA+NON-PROBE: NOT DETECTED
V CHOLERAE DNA STL QL NAA+NON-PROBE: NOT DETECTED
WBC # BLD AUTO: 8.2 K/UL (ref 4.8–10.8)
Y ENTEROCOL DNA STL QL NAA+NON-PROBE: NOT DETECTED

## 2024-11-04 PROCEDURE — 97116 GAIT TRAINING THERAPY: CPT

## 2024-11-04 PROCEDURE — 6370000000 HC RX 637 (ALT 250 FOR IP): Performed by: INTERNAL MEDICINE

## 2024-11-04 PROCEDURE — 97110 THERAPEUTIC EXERCISES: CPT

## 2024-11-04 PROCEDURE — 97112 NEUROMUSCULAR REEDUCATION: CPT

## 2024-11-04 PROCEDURE — 80048 BASIC METABOLIC PNL TOTAL CA: CPT

## 2024-11-04 PROCEDURE — 85025 COMPLETE CBC W/AUTO DIFF WBC: CPT

## 2024-11-04 PROCEDURE — 36415 COLL VENOUS BLD VENIPUNCTURE: CPT

## 2024-11-04 PROCEDURE — 6360000002 HC RX W HCPCS: Performed by: PHYSICAL MEDICINE & REHABILITATION

## 2024-11-04 PROCEDURE — 97535 SELF CARE MNGMENT TRAINING: CPT

## 2024-11-04 PROCEDURE — 1180000000 HC REHAB R&B

## 2024-11-04 PROCEDURE — 99233 SBSQ HOSP IP/OBS HIGH 50: CPT | Performed by: PHYSICAL MEDICINE & REHABILITATION

## 2024-11-04 PROCEDURE — 6370000000 HC RX 637 (ALT 250 FOR IP): Performed by: PHYSICAL MEDICINE & REHABILITATION

## 2024-11-04 PROCEDURE — 99231 SBSQ HOSP IP/OBS SF/LOW 25: CPT | Performed by: INTERNAL MEDICINE

## 2024-11-04 RX ORDER — HYDROCORTISONE 25 MG/G
CREAM TOPICAL 4 TIMES DAILY
Status: DISCONTINUED | OUTPATIENT
Start: 2024-11-04 | End: 2024-11-09 | Stop reason: HOSPADM

## 2024-11-04 RX ORDER — POTASSIUM CHLORIDE 1500 MG/1
40 TABLET, EXTENDED RELEASE ORAL 2 TIMES DAILY
Status: DISCONTINUED | OUTPATIENT
Start: 2024-11-04 | End: 2024-11-09 | Stop reason: HOSPADM

## 2024-11-04 RX ADMIN — POTASSIUM CHLORIDE 40 MEQ: 1500 TABLET, EXTENDED RELEASE ORAL at 22:57

## 2024-11-04 RX ADMIN — RIVAROXABAN 20 MG: 20 TABLET, FILM COATED ORAL at 08:22

## 2024-11-04 RX ADMIN — HYDROCORTISONE: 25 CREAM TOPICAL at 17:44

## 2024-11-04 RX ADMIN — HYDROCORTISONE: 25 CREAM TOPICAL at 22:59

## 2024-11-04 RX ADMIN — ACETAMINOPHEN 500 MG: 500 TABLET ORAL at 22:57

## 2024-11-04 RX ADMIN — POTASSIUM CHLORIDE 40 MEQ: 1500 TABLET, EXTENDED RELEASE ORAL at 08:13

## 2024-11-04 RX ADMIN — ARIPIPRAZOLE 2 MG: 2 TABLET ORAL at 08:16

## 2024-11-04 RX ADMIN — LEVETIRACETAM 1250 MG: 500 TABLET, FILM COATED ORAL at 08:15

## 2024-11-04 RX ADMIN — DICYCLOMINE HYDROCHLORIDE 20 MG: 20 TABLET ORAL at 17:44

## 2024-11-04 RX ADMIN — DICYCLOMINE HYDROCHLORIDE 20 MG: 20 TABLET ORAL at 10:48

## 2024-11-04 RX ADMIN — Medication 2000 UNITS: at 17:44

## 2024-11-04 RX ADMIN — ASPIRIN 81 MG: 81 TABLET, CHEWABLE ORAL at 08:16

## 2024-11-04 RX ADMIN — Medication 125 MG: at 17:51

## 2024-11-04 RX ADMIN — DICYCLOMINE HYDROCHLORIDE 20 MG: 20 TABLET ORAL at 22:58

## 2024-11-04 RX ADMIN — BACLOFEN 10 MG: 10 TABLET ORAL at 08:15

## 2024-11-04 RX ADMIN — ATORVASTATIN CALCIUM 80 MG: 80 TABLET, FILM COATED ORAL at 22:57

## 2024-11-04 RX ADMIN — METRONIDAZOLE 500 MG: 500 TABLET ORAL at 05:44

## 2024-11-04 RX ADMIN — METOPROLOL SUCCINATE 12.5 MG: 25 TABLET, FILM COATED, EXTENDED RELEASE ORAL at 08:16

## 2024-11-04 RX ADMIN — ACETAMINOPHEN 500 MG: 500 TABLET ORAL at 08:16

## 2024-11-04 RX ADMIN — LEVETIRACETAM 1250 MG: 500 TABLET, FILM COATED ORAL at 22:57

## 2024-11-04 RX ADMIN — BACLOFEN 10 MG: 10 TABLET ORAL at 22:57

## 2024-11-04 RX ADMIN — Medication 125 MG: at 13:38

## 2024-11-04 RX ADMIN — BACLOFEN 10 MG: 10 TABLET ORAL at 13:34

## 2024-11-04 RX ADMIN — DULOXETINE HYDROCHLORIDE 60 MG: 60 CAPSULE, DELAYED RELEASE ORAL at 08:16

## 2024-11-04 RX ADMIN — Medication 125 MG: at 22:56

## 2024-11-04 RX ADMIN — ACETAMINOPHEN 500 MG: 500 TABLET ORAL at 13:34

## 2024-11-04 RX ADMIN — LEVOTHYROXINE SODIUM 50 MCG: 0.05 TABLET ORAL at 05:44

## 2024-11-04 RX ADMIN — Medication 100 MG: at 08:16

## 2024-11-04 RX ADMIN — DICYCLOMINE HYDROCHLORIDE 20 MG: 20 TABLET ORAL at 08:15

## 2024-11-04 RX ADMIN — CYANOCOBALAMIN 1000 MCG: 1000 INJECTION, SOLUTION INTRAMUSCULAR; SUBCUTANEOUS at 08:16

## 2024-11-04 ASSESSMENT — PAIN DESCRIPTION - DESCRIPTORS
DESCRIPTORS: ACHING
DESCRIPTORS: ACHING
DESCRIPTORS: CRAMPING

## 2024-11-04 ASSESSMENT — PAIN DESCRIPTION - ORIENTATION
ORIENTATION: MID
ORIENTATION: MID
ORIENTATION: RIGHT;LEFT

## 2024-11-04 ASSESSMENT — PAIN SCALES - GENERAL
PAINLEVEL_OUTOF10: 3
PAINLEVEL_OUTOF10: 2
PAINLEVEL_OUTOF10: 3

## 2024-11-04 ASSESSMENT — PAIN DESCRIPTION - LOCATION
LOCATION: ABDOMEN

## 2024-11-04 NOTE — PROGRESS NOTES
OCCUPATIONAL THERAPY  INPATIENT REHAB TREATMENT NOTE  [facility]      NAME: Aramis David  : 1955 (68 y.o.)  MRN: [id number]  CODE STATUS: Full Code  Room: [facility] [place]/R2-01    Date of Service: 2024    Referring Physician: Alicia Milner DO  Rehab Diagnosis: Impaired mobility and ADL's d/t new onset mental status change possible seizure disorder triggered by UTI    Hospital course:   Comments: 68 y.o. male patient who presented to ER via EMS 10/22 with altered mental status (confusion, staring off and now answering questions.) Patient with PMHx of CVA with right sided deficits. No acute findings on CT of head.  Initial lab testing remarkable for white blood cells 17.9, creatinine of 2.08, high-sensitivity troponin of 185, CRP of 29.8, procalcitonin of 1.78, lactate of 2.3, CK of 2718. Though ECG showing SR and no acute changes, troponins elevated. Patient admitted for additional work up and evaluation with consults from neurology and cardiology.      Restrictions  Restrictions/Precautions  Restrictions/Precautions: Fall Risk                 Patient's date of birth confirmed: Yes    SAFETY:  Safety Devices  Safety Devices in place: Yes  Type of devices: All fall risk precautions in place    SUBJECTIVE:       Pain at start of treatment: Yes: 5/10    Pain at end of treatment: Yes: 5/10    Location: right arm  Description: Patient did not describe  Nursing notified: Declined  Nurse: N/A  Intervention: Repositioned    COGNITION:     WFL for the session      OBJECTIVE:      Grooming/Oral Hygiene  Skilled Clinical Factors: Patient used an electric razor that his sister brought in for him to complete shaving on part of his face. Patient's sister is going to bring him a safety razor that he can be shaved with for  shave         Roll Right  Assistance Level: Supervision  Sit to Supine  Assistance Level: Minimal assistance  Skilled Clinical Factors: assist to get right leg into the

## 2024-11-04 NOTE — PROGRESS NOTES
INDIVIDUALIZED OVERALL REHAB PLAN OF CARE  ADDENDUM TO REHAB PROGRESS NOTE-for audit purposes must also refer to this day's clinical note and combine the information      Date: 2024  Patient Name: Aramis David   Room: R233/R233-01    MRN: [id number]    : 1955  (68 y.o.)  Gender: male       Today 2024 during weekly team meeting, I reviewed the patient Aramis David in detail with the therapists and nurses involved in patient's care gathering complex physiatric data regarding current medical issues, progress in therapies, factors limiting progress, social issues, psychological issues, ongoing therapeutic plans and discharge planning.    Legend:  I= independent Im =Modified independent  S=Supervised SB=stand by HUMPHREY=set up CG=contact windy Min= minimal Mod=Moderate Max=maximal Max of 2 =maximal assist of 2 people      CURRENT FUNCTIONAL STATUS:    Patient was admitted through the ER on 10/22/2024 with seizure and mental status change.  He had elevated troponins and was diagnosed with a non-ST MI cardiology was consulted.     He was admitted under the care of the hospitalist with neurology and cardiology consult.  Neurology his prescribed Keppra.  Tramadol was DC'd as it lowers the seizure threshold.     He continues to have right-sided weakness and residual aphasia from previous CVA .  He has a remote history of PFO.  He had a closure procedure performed for a percutaneous septal occluder.  He continues on Xarelto.     He had an elevated white count -likely UTI he is on IV Rocephin and Vanco.  UA was positive but --chest x-ray and respiratory panel are negative.  Blood cultures are pending    NURSING ISSUES:  Critical potassium called to Dr. Mora. Waiting for orders.  Patient has slurred speech and expressive aphasia noted. However patient could state words clear at times. Patient was able to state that the month is currently October after hints and cues. Patient was unable to verbalize current  Exercise per Week: 2 days     Minutes of Exercise per Session: 10 min   Stress: Not on file   Social Connections: Not on file   Intimate Partner Violence: Not on file   Housing Stability: Low Risk  (10/27/2024)    Housing Stability Vital Sign     Unable to Pay for Housing in the Last Year: No     Number of Times Moved in the Last Year: 0     Homeless in the Last Year: No           THERAPY, MEDICAL AND NURSING COORDINATION:    [x]  Pain medication before therapies     [x]  Check orthostatic BP and monitor heart rate and medications effects with therapy      [x]  Ambulate to the bathroom in room    [x]  Add scheduled rest beaks     [x]  In room therapies as needed      Discharge date set for:              ASAP to skilled--not able to go home      Home with:   ALONE  with help from   brother            And:     Home Health Care:     [x]  PT    [x]  OT    []  ST   [x]  Aide       [x]  RN                    Equipment:  WW,  wrist splint      At D/C their function is goaled at:   PT:Long Term Goal 1: Pt to complete bed mobility with SBA  Long Term Goal 2: Pt to complete transfers with SBA  Long Term Goal 3: Pt to ambulate 25-50ft with appropriate device and SBA  Long Term Goal 4: indep w/c mobility 50 feet  Long Term Goal 5: Pt able to maintain sitting and perform scooting tasks along EOB with SBA  OT:Eating  Assistance Needed: Partial/moderate assistance  CARE Score: 3  Discharge Goal: Independent, Oral Hygiene  Assistance Needed: Partial/moderate assistance  CARE Score: 3  Discharge Goal: Independent, Toileting Hygiene  Assistance needed: Dependent  CARE Score: 1  Discharge Goal: Independent, Shower/Bathe Self  Assistance Needed: Substantial/maximal assistance  CARE Score: 2  Discharge Goal: Supervision or touching assistance  Upper Body Dressing  Assistance Needed: Substantial/maximal assistance  CARE Score: 2  Discharge Goal: Independent, Lower Body Dressing  Assistance Needed: Substantial/maximal assistance  CARE

## 2024-11-04 NOTE — PROGRESS NOTES
Gastroenterology Progress Note    Aramis David is a 68 y.o. male patient.  Hospitalization Day:8    Chief C/O: Diarrhea    SUBJECTIVE: Patient denies any further episodes of diarrhea today.  Patient tolerating diet without difficulty.  Denies nausea, vomiting nor abdominal pain.  He has not taken Bentyl, as he denies any abdominal cramping.    ROS:  Gastrointestinal ROS: no abdominal pain, change in bowel habits, or black or bloody stools    Physical    VITALS:  /64   Pulse 72   Temp 98.4 °F (36.9 °C) (Oral)   Resp 17   Ht 1.727 m (5' 8\")   Wt 75.3 kg (166 lb 1.6 oz)   SpO2 97%   BMI 25.26 kg/m²   TEMPERATURE:  Current - Temp: 98.4 °F (36.9 °C); Max - Temp  Av.5 °F (36.9 °C)  Min: 98.4 °F (36.9 °C)  Max: 98.6 °F (37 °C)    General - No acute distress  Eyes - no Icterus, no pallor  Cardiovascular - RRR, no murmur  Lungs - Clear to auscultation bilaterally  Abdomen - non-distended,  non-tender, no organomegaly, Bowel sounds normal  Extremities - no edema  Skin - warm and dry  Neuro: no asterixis     Data    Data Review:    Recent Labs     24  0542 24  0522 24  0534   WBC 14.8* 11.5* 8.2   HGB 12.9* 12.7* 12.3*   HCT 36.9* 36.7* 34.8*   MCV 87.6 87.2 85.9    220 221     Recent Labs     24  0534      K 2.9*      CO2 20   BUN 10   CREATININE 0.71     No results for input(s): \"AST\", \"ALT\", \"BILIDIR\", \"BILITOT\", \"ALKPHOS\" in the last 72 hours.    Invalid input(s): \"ALB\"  No results for input(s): \"LIPASE\", \"AMYLASE\" in the last 72 hours.  No results for input(s): \"PROTIME\", \"INR\" in the last 72 hours.    Radiology Review:  reviewed    ASSESSMENT:  68 y.o. male admitted initially admitted with altered mental status, non-STEMI and concern for a seizure.  Patient with longstanding right sided hemiparesis/aphasia from previous stroke in .  GI consulted for new onset of diarrhea over the last 24 hours with lower abdominal cramping.  Patient currently in rehab

## 2024-11-04 NOTE — CARE COORDINATION
protocol     [x]  PT/OT    [x]  SP        - Results:         2. Potential DME needs:         - Intervention / Plan:  [x]  PT/OT     [x]  Assess equipment needs/access       - Results:         3.  Weakness:          - Intervention / Plan:  [x]  PT/OT      []  Other:         - Results:         4.  Discharge planning needs:          - Intervention / Plan:  [x]  Weekly team conference      [x]  family training        - Results:         5.            - Intervention / Plan:          - Results:         6.            - Intervention / Plan:         - Results:         7.            - Intervention / Plan:         - Results:           Discharge Plan   Estimated Length of Stay: 23 days    Tentative Discharge date: 11/9/24      Anticipated Discharge Destination:  Home vs SNF      Team recommendations:    1. Follow up Therapy :    PT  OT  SLP  RN  Social Work  HH Aide    2. Home Health    Other:     Equipment needed at Discharge: Other: TBD      Team Members Present at Conference:    Physician: Dr. Alicia Milner  RN: Dona Pereyra, RN  Physical Therapist: Joyce Olivo, PT  Occupational Therapist: Nayely Haider, OTR  Speech Therapist: Madina Valdes, SLP  Other: Mary Prado RN/CM  Electronically signed by Mary Prado RN on 11/4/2024 at 4:40 PM

## 2024-11-04 NOTE — PROGRESS NOTES
Facility/Department: [facility] REHAB  Rehabilitation Goal update: Physical Therapy  Room: [facility]/R233-        Pts goals updated as listed below:  Long Term Goal 1: Pt to complete bed mobility with min assist  Long Term Goal 2: Pt to complete bed transfers with mod assist +1  Long Term Goal 3: Pt to ambulate 10ft with appropriate device and mod assist +2 at hemirail  Long Term Goal 4: mod assist w/c mobility 50 feet  Long Term Goal 5: Pt able to maintain sitting and perform scooting tasks along EOB with min- mod assist

## 2024-11-04 NOTE — FLOWSHEET NOTE
Patient resting in bed with complaints of abdominal discomfort rated 7/10. Patient medicated with scheduled Bentyl 20 MG PO and Tylenol 500 MG PO for pain. Patient took evening medications whole one at a time with sips of water without difficulty. Patient declined an HS snack. Patient repositioned on his left side with several pillows in place for comfort. Patient verbalized no further needs at this time. Bed alarm engaged and call light within reach. Contact precautions in place for C-diff Rule Out.

## 2024-11-04 NOTE — FLOWSHEET NOTE
Patient had an incontinent episode of stool. This nurse in room to assist Ida LEYVA to clean patient up. Stool sample obtained from brief and sent to lab per orders to be tested for C.difficile toxin and Gastrointestinal Panel by DNA per Ida LEYVA. Patients stool continues to be loose with mucous noted. Patients coccyx and scrotum is red and excoriated. Generous amount of zinc applied to coccyx and ara-area. Patient has external male purewick in place to protect the skin from further breakdown.

## 2024-11-04 NOTE — PROGRESS NOTES
Physical Therapy Rehab Treatment Note  [facility]  [facility]       NAME: Aramis David  : 1955 (68 y.o.)  MRN: [id number]  CODE STATUS: Full Code    Date of Service: 2024       Restrictions:  Restrictions/Precautions: Fall Risk       SUBJECTIVE:   Subjective: \"A little better\"    Pain  Pain: 6/10 R sided pain via Rai Teague Faces- medicated.      OBJECTIVE:     Bed Mobility  Overall Assistance Level: Moderate Assistance  Additional Factors: Verbal cues;Increased time to complete;Head of bed flat;Without handrails  Roll Left  Assistance Level: Minimal assistance  Skilled Clinical Factors: increased assist required d/t fatigue, completed x5 during hygeine management  Roll Right  Skilled Clinical Factors: improved technique and facilitation this session, completed x5 during hygeine management  Sit to Supine  Assistance Level: Moderate assistance  Skilled Clinical Factors: assist at trunk and RLE, pt able to initiate movement but required assistance to complete, increased assist required d/t fatigue  Supine to Sit  Assistance Level: Moderate assistance  Skilled Clinical Factors: assist at trunk and BLEs, vc's throughout for technique with fair carry over  Scooting  Assistance Level: Moderate assistance  Skilled Clinical Factors: scovoting towards EOB    Transfers  Surface: From bed;Wheelchair  Additional Factors: Verbal cues;Increased time to complete  Device:  (// bars)  Sit to Stand  Assistance Level: Minimal assistance  Skilled Clinical Factors: face to face transfer, RLE blocked at knee and ankle for stability,  completed x 2 this date with improved ability to obtain upright posture  Stand to Sit  Assistance Level: Moderate assistance  Skilled Clinical Factors: assist to improve eccentric lowering  Stand Pivot  Assistance Level: Moderate assistance  Skilled Clinical Factors: face to face SPT completed bed to wc, vc's for technique and sequencing, pt mildly impulsive,

## 2024-11-04 NOTE — PROGRESS NOTES
OCCUPATIONAL THERAPY  INPATIENT REHAB TREATMENT NOTE  [facility]      NAME: Aramis David  : 1955 (68 y.o.)  MRN: [id number]  CODE STATUS: Full Code  [facility]    Date of Service: 2024    Referring Physician: Alicia Milner DO  Rehab Diagnosis: Impaired mobility and ADL's d/t new onset mental status change possible seizure disorder triggered by UTI    Hospital course:   Comments: 68 y.o. male patient who presented to ER via EMS 10/22 with altered mental status (confusion, staring off and now answering questions.) Patient with PMHx of CVA with right sided deficits. No acute findings on CT of head.  Initial lab testing remarkable for white blood cells 17.9, creatinine of 2.08, high-sensitivity troponin of 185, CRP of 29.8, procalcitonin of 1.78, lactate of 2.3, CK of 2718. Though ECG showing SR and no acute changes, troponins elevated. Patient admitted for additional work up and evaluation with consults from neurology and cardiology.      Restrictions  Restrictions/Precautions  Restrictions/Precautions: Fall Risk                 Patient's date of birth confirmed: Yes    SAFETY:  Safety Devices  Safety Devices in place: Yes  Type of devices: All fall risk precautions in place    SUBJECTIVE:   Patient reported the support under his arm felt good    Pain at start of treatment: Yes: 5/10    Pain at end of treatment: Yes: 5/10    Location: right arm  Description: none provided  Nursing notified: Declined  Nurse: N/A  Intervention: Other: place pillow under the UE for support     COGNITION:     WFL for the session    OBJECTIVE:     Patient was seen in his room due to isolation for C diff.     Feeding  Assistance Level: Set-up  Skilled Clinical Factors: Patient was eating a burger, fries and milkshake that his sister brought in with no assistance needed  Grooming/Oral Hygiene  Skilled Clinical Factors: Patient was shaved at a dependent level with a safety razor     Patient doffed and donned

## 2024-11-04 NOTE — PROGRESS NOTES
Physical Therapy Rehab Treatment Note  [facility]  [facility]       NAME: Aramis David  : 1955 (68 y.o.)  MRN: [id number]  CODE STATUS: Full Code    Date of Service: 2024       Restrictions:  Restrictions/Precautions: Fall Risk       SUBJECTIVE:   Subjective: agreeable to tx in room.    Pain  Pain: 5/10 right sided pain. pt held up left hand to represent #5/10 pain      OBJECTIVE:   Orientation  Overall Orientation Status: Impaired  Orientation Level: Oriented to person;Oriented to situation  Cognition  Overall Cognitive Status: Exceptions  Arousal/Alertness: Inconsistent responses to stimuli  Following Commands: Follows one step commands with repetition;Follows one step commands with increased time  Attention Span: Attends with cues to redirect  Insights: Decreased awareness of deficits  Initiation: Requires cues for some  Sequencing: Requires cues for some         Sit to Supine  Assistance Level: Stand by assist    Transfers  Surface: Wheelchair  Additional Factors: Verbal cues;Increased time to complete  Stand Pivot  Assistance Level: Moderate assistance                   Neuromuscular Education  Neuromuscular Comments: seated trunk flexion/extension, lateral weight shifts. manual resistance to left side.    PT Exercises  Exercise Treatment: stretches to bilateral lower extremies in seated position.                        ASSESSMENT/PROGRESS TOWARDS GOALS: pt improving with his bed mobility getting into bed. Pt requested to get into bed at tx end by pointing at bed. Pt states he worked at [employer] in [place] and has a daughter named Valencia.        Goals:  Long Term Goals  Long Term Goal 1: Pt to complete bed mobility with min assist  Long Term Goal 2: Pt to complete bed transfers with mod assist +1  Long Term Goal 3: Pt to ambulate 10ft with appropriate device and mod assist +2 at hemirail  Long Term Goal 4: mod assist w/c mobility 50 feet  Long Term Goal 5: Pt able to  maintain sitting and perform scooting tasks along EOB with min- mod assist  Additional Goals?: Yes  Long term goal 6: pt to require min assist for curb step to allow for home entry    PLAN OF CARE/Safety: ongoing          Therapy Time:   Individual   Time In 1400   Time Out 1430   Minutes 30      Minutes:30  Transfer/Bed mobility training:10  Gait trainin  Neuro re education:10  Therapeutic ex:10      Araseli Gutierres PTA, 24 at 4:40 PM

## 2024-11-04 NOTE — PROGRESS NOTES
Critical potassium called to Dr. Mora. Waiting for orders. New orders for 40 meq of potassium given.   Pt. Positive for C-diff. Physician updated.

## 2024-11-04 NOTE — PLAN OF CARE
Problem: Skin/Tissue Integrity - Adult  Goal: Skin integrity remains intact  11/4/2024 0023 by Marilyn uQinones RN  Outcome: Not Progressing  Flowsheets (Taken 11/4/2024 0023)  Skin Integrity Remains Intact: Monitor for areas of redness and/or skin breakdown  Problem: Chronic Conditions and Co-morbidities  Goal: Patient's chronic conditions and co-morbidity symptoms are monitored and maintained or improved  11/3/2024 1142 by Ida Finley RN  Outcome: Progressing     Problem: Discharge Planning  Goal: Discharge to home or other facility with appropriate resources  11/3/2024 1142 by Ida Finley RN  Outcome: Progressing  Problem: Safety - Adult  Goal: Free from fall injury  11/3/2024 1142 by Ida Finley RN  Outcome: Progressing     Problem: Neurosensory - Adult  Goal: Achieves stable or improved neurological status  11/4/2024 0023 by Marilyn Quinones RN  Outcome: Progressing  11/3/2024 1142 by Ida Finley RN  Outcome: Progressing     Problem: Neurosensory - Adult  Goal: Absence of seizures  11/4/2024 0023 by Marilyn Quinones RN  Outcome: Progressing  11/3/2024 1142 by Ida Finley RN  Outcome: Progressing     Problem: Neurosensory - Adult  Goal: Remains free of injury related to seizures activity  11/4/2024 0023 by Marilyn Quinones RN  Outcome: Progressing  11/3/2024 1142 by Ida Finley RN  Outcome: Progressing     Problem: Gastrointestinal - Adult  Goal: Minimal or absence of nausea and vomiting  11/4/2024 0023 by Marilyn Quinones RN  Outcome: Progressing  11/3/2024 1142 by Ida Finley RN  Outcome: Progressing     Problem: Gastrointestinal - Adult  Goal: Maintains adequate nutritional intake  11/4/2024 0023 by Marilyn Quinones RN  Outcome: Progressing  11/3/2024 1142 by Ida Finley RN  Outcome: Progressing           Problem: Gastrointestinal - Adult  Goal: Maintains or returns to baseline bowel

## 2024-11-04 NOTE — PLAN OF CARE
Problem: Skin/Tissue Integrity - Adult  Goal: Skin integrity remains intact  11/4/2024 0023 by Marilyn Quinones, RN  Outcome: Not Progressing  Flowsheets (Taken 11/4/2024 0023)  Skin Integrity Remains Intact: Monitor for areas of redness and/or skin breakdown     Problem: Gastrointestinal - Adult  Goal: Maintains or returns to baseline bowel function  Recent Flowsheet Documentation  Taken 11/4/2024 1135 by Shira Pereyra RN  Maintains or returns to baseline bowel function:   Encourage oral fluids to ensure adequate hydration   Assess bowel function   Administer ordered medications as needed   Encourage mobilization and activity  11/4/2024 0023 by Marilyn Quinones RN  Outcome: Not Progressing  Flowsheets (Taken 11/4/2024 0023)  Maintains or returns to baseline bowel function:   Assess bowel function   Encourage oral fluids to ensure adequate hydration   Administer ordered medications as needed     Problem: Pain  Goal: Verbalizes/displays adequate comfort level or baseline comfort level  11/4/2024 0023 by Marilyn Quinones RN  Outcome: Not Progressing  Flowsheets (Taken 11/4/2024 0023)  Verbalizes/displays adequate comfort level or baseline comfort level:   Encourage patient to monitor pain and request assistance   Assess pain using appropriate pain scale   Implement non-pharmacological measures as appropriate and evaluate response

## 2024-11-05 PROBLEM — G40.909 SEIZURE DISORDER (HCC): Status: ACTIVE | Noted: 2024-11-05

## 2024-11-05 LAB
ANION GAP SERPL CALCULATED.3IONS-SCNC: 10 MEQ/L (ref 9–15)
BUN SERPL-MCNC: 12 MG/DL (ref 8–23)
CALCIUM SERPL-MCNC: 7.9 MG/DL (ref 8.5–9.9)
CHLORIDE SERPL-SCNC: 107 MEQ/L (ref 95–107)
CO2 SERPL-SCNC: 20 MEQ/L (ref 20–31)
CREAT SERPL-MCNC: 0.75 MG/DL (ref 0.7–1.2)
GLUCOSE SERPL-MCNC: 106 MG/DL (ref 70–99)
POTASSIUM SERPL-SCNC: 3.5 MEQ/L (ref 3.4–4.9)
SODIUM SERPL-SCNC: 137 MEQ/L (ref 135–144)

## 2024-11-05 PROCEDURE — 1180000000 HC REHAB R&B

## 2024-11-05 PROCEDURE — 99231 SBSQ HOSP IP/OBS SF/LOW 25: CPT | Performed by: NURSE PRACTITIONER

## 2024-11-05 PROCEDURE — 99232 SBSQ HOSP IP/OBS MODERATE 35: CPT | Performed by: INTERNAL MEDICINE

## 2024-11-05 PROCEDURE — 97535 SELF CARE MNGMENT TRAINING: CPT

## 2024-11-05 PROCEDURE — 36415 COLL VENOUS BLD VENIPUNCTURE: CPT

## 2024-11-05 PROCEDURE — 6370000000 HC RX 637 (ALT 250 FOR IP): Performed by: INTERNAL MEDICINE

## 2024-11-05 PROCEDURE — 6370000000 HC RX 637 (ALT 250 FOR IP): Performed by: PHYSICAL MEDICINE & REHABILITATION

## 2024-11-05 PROCEDURE — 80048 BASIC METABOLIC PNL TOTAL CA: CPT

## 2024-11-05 PROCEDURE — 99232 SBSQ HOSP IP/OBS MODERATE 35: CPT | Performed by: PHYSICAL MEDICINE & REHABILITATION

## 2024-11-05 PROCEDURE — 97110 THERAPEUTIC EXERCISES: CPT

## 2024-11-05 PROCEDURE — 97530 THERAPEUTIC ACTIVITIES: CPT

## 2024-11-05 RX ADMIN — LEVOTHYROXINE SODIUM 50 MCG: 0.05 TABLET ORAL at 06:30

## 2024-11-05 RX ADMIN — DICYCLOMINE HYDROCHLORIDE 20 MG: 20 TABLET ORAL at 17:34

## 2024-11-05 RX ADMIN — ACETAMINOPHEN 500 MG: 500 TABLET ORAL at 14:50

## 2024-11-05 RX ADMIN — LEVETIRACETAM 1250 MG: 500 TABLET, FILM COATED ORAL at 08:18

## 2024-11-05 RX ADMIN — BACLOFEN 10 MG: 10 TABLET ORAL at 08:19

## 2024-11-05 RX ADMIN — Medication 125 MG: at 11:59

## 2024-11-05 RX ADMIN — Medication 2000 UNITS: at 17:34

## 2024-11-05 RX ADMIN — DICYCLOMINE HYDROCHLORIDE 20 MG: 20 TABLET ORAL at 07:08

## 2024-11-05 RX ADMIN — DULOXETINE HYDROCHLORIDE 60 MG: 60 CAPSULE, DELAYED RELEASE ORAL at 08:20

## 2024-11-05 RX ADMIN — POTASSIUM CHLORIDE 40 MEQ: 1500 TABLET, EXTENDED RELEASE ORAL at 21:33

## 2024-11-05 RX ADMIN — ATORVASTATIN CALCIUM 80 MG: 80 TABLET, FILM COATED ORAL at 21:34

## 2024-11-05 RX ADMIN — HYDROCORTISONE: 25 CREAM TOPICAL at 21:35

## 2024-11-05 RX ADMIN — Medication 125 MG: at 17:35

## 2024-11-05 RX ADMIN — DICYCLOMINE HYDROCHLORIDE 20 MG: 20 TABLET ORAL at 21:34

## 2024-11-05 RX ADMIN — LEVETIRACETAM 1250 MG: 500 TABLET, FILM COATED ORAL at 21:34

## 2024-11-05 RX ADMIN — ARIPIPRAZOLE 2 MG: 2 TABLET ORAL at 08:18

## 2024-11-05 RX ADMIN — Medication 100 MG: at 08:19

## 2024-11-05 RX ADMIN — HYDROCORTISONE: 25 CREAM TOPICAL at 08:15

## 2024-11-05 RX ADMIN — BACLOFEN 10 MG: 10 TABLET ORAL at 14:51

## 2024-11-05 RX ADMIN — POTASSIUM CHLORIDE 40 MEQ: 1500 TABLET, EXTENDED RELEASE ORAL at 08:20

## 2024-11-05 RX ADMIN — RIVAROXABAN 20 MG: 20 TABLET, FILM COATED ORAL at 08:19

## 2024-11-05 RX ADMIN — ACETAMINOPHEN 500 MG: 500 TABLET ORAL at 21:34

## 2024-11-05 RX ADMIN — ASPIRIN 81 MG: 81 TABLET, CHEWABLE ORAL at 08:19

## 2024-11-05 RX ADMIN — BACLOFEN 10 MG: 10 TABLET ORAL at 21:33

## 2024-11-05 RX ADMIN — ACETAMINOPHEN 500 MG: 500 TABLET ORAL at 08:19

## 2024-11-05 RX ADMIN — Medication 125 MG: at 06:30

## 2024-11-05 RX ADMIN — HYDROCORTISONE: 25 CREAM TOPICAL at 15:25

## 2024-11-05 RX ADMIN — DICYCLOMINE HYDROCHLORIDE 20 MG: 20 TABLET ORAL at 11:59

## 2024-11-05 ASSESSMENT — PAIN DESCRIPTION - ORIENTATION
ORIENTATION: RIGHT
ORIENTATION: MID
ORIENTATION: RIGHT

## 2024-11-05 ASSESSMENT — ENCOUNTER SYMPTOMS
SHORTNESS OF BREATH: 0
CHEST TIGHTNESS: 0
TROUBLE SWALLOWING: 0
PHOTOPHOBIA: 0
NAUSEA: 0
VOMITING: 0
WHEEZING: 0
COUGH: 0
COLOR CHANGE: 0
BACK PAIN: 0
CHOKING: 0

## 2024-11-05 ASSESSMENT — PAIN DESCRIPTION - LOCATION
LOCATION: ABDOMEN
LOCATION: ARM
LOCATION: ARM

## 2024-11-05 ASSESSMENT — PAIN SCALES - GENERAL
PAINLEVEL_OUTOF10: 5
PAINLEVEL_OUTOF10: 0
PAINLEVEL_OUTOF10: 3
PAINLEVEL_OUTOF10: 2

## 2024-11-05 ASSESSMENT — PAIN DESCRIPTION - DESCRIPTORS
DESCRIPTORS: ACHING
DESCRIPTORS: THROBBING

## 2024-11-05 NOTE — PROGRESS NOTES
OCCUPATIONAL THERAPY  INPATIENT REHAB TREATMENT NOTE  [facility]      NAME: Aramis David  : 1955 (68 y.o.)  MRN: [id number]  CODE STATUS: Full Code  Room: [facility]/R2-01    Date of Service: 2024    Referring Physician: Alicia Milner DO  Rehab Diagnosis: Impaired mobility and ADL's d/t new onset mental status change possible seizure disorder triggered by UTI    Hospital course:   Comments: 68 y.o. male patient who presented to ER via EMS 10/22 with altered mental status (confusion, staring off and now answering questions.) Patient with PMHx of CVA with right sided deficits. No acute findings on CT of head.  Initial lab testing remarkable for white blood cells 17.9, creatinine of 2.08, high-sensitivity troponin of 185, CRP of 29.8, procalcitonin of 1.78, lactate of 2.3, CK of 2718. Though ECG showing SR and no acute changes, troponins elevated. Patient admitted for additional work up and evaluation with consults from neurology and cardiology.      Restrictions  Restrictions/Precautions  Restrictions/Precautions: Fall Risk                 Patient's date of birth confirmed: Yes    SAFETY:  Safety Devices  Safety Devices in place: Yes  Type of devices: All fall risk precautions in place    SUBJECTIVE:   Brother reported that patient appears to be talking more than he was PTA.     Pain at start of treatment: Yes: 3/10    Pain at end of treatment: Yes: 3/10    Location: right arm  Description: pain  Nursing notified: No  Nurse: N/A  Intervention: Repositioned    COGNITION:     WFL at baseline    OBJECTIVE:     Patient requested to do bed level ADL vs going into the bathroom. Patient did sit on the side of the bed to complete tasks.     Grooming/Oral Hygiene  Assistance Level: Supervision  Skilled Clinical Factors: combing his hair, washing his face and hands, shaving with an electric razor and brushing his teeth while seated on the side of the bed  Upper Extremity Bathing  Assistance Level: Minimal  assistance  Lower Extremity Bathing  Assistance Level: Moderate assistance  Upper Extremity Dressing  Assistance Level: Moderate assistance  Lower Extremity Dressing  Assistance Level: Maximum assistance  Putting On/Taking Off Footwear  Assistance Level: Moderate assistance  Skilled Clinical Factors: Patient was able to don right sock and shoe but needed assistance for left sock and shoe  Toileting  Skilled Clinical Factors: Patient used a urinal with supervision and set up assistance while seated on the side of the bed     Patient remained sitting on the edge of the bed with supervision and played tic tac toe on his tablet/communication device.       Education:  Education  Education Given To: Patient  Education Provided Comments: Patient was educated in standing up as straight as he can when performing standing tasks  Education Method: Verbal  Barriers to Learning: None  Education Outcome: Continued education needed    Equipment recommendations:  OT Equipment Recommendations  Other: none addressed during this session      ASSESSMENT:  Assessment: Patient had increased tolerance of sitting and standing on this date which increased self care independence. Patient does still require hands on assistance for aspects of most areas of self care. Patient continues to benefit from OT at this time      PLAN OF CARE:  Strengthening, ROM, Balance training, Functional mobility training, Endurance training, Patient/Caregiver education & training, Wheelchair mobility training, Safety education & training, Pain management, Cognitive reorientation, Equipment evaluation, education, & procurement, Positioning, Self-Care / ADL, Cognitive/Perceptual training, Coordination training  continue with OT plan of care    Patient goals : Pt unable to state a goal at this time.  Time Frame for Long Term Goals : Within 2 weeks, pt to demonstrate progress in the following areas listed below to acheive specific LTGs as stated in the initial

## 2024-11-05 NOTE — PROGRESS NOTES
Physical Therapy Rehab Treatment Note  [facility]       NAME: Aramis David  : 1955 (68 y.o.)  MRN: [id number]  CODE STATUS: Full Code    Date of Service: 2024       Restrictions:  Restrictions/Precautions: Fall Risk       SUBJECTIVE:   Subjective: \"A little better\"    Pain  Pain: 4/10 right sided pain. pt held up left hand to represent #4/10 pain      OBJECTIVE:     Bed Mobility  Overall Assistance Level: Moderate Assistance  Additional Factors: Verbal cues;Increased time to complete;Head of bed flat;Without handrails  Roll Left  Assistance Level: Minimal assistance  Skilled Clinical Factors: increased assist required d/t fatigue  Roll Right  Assistance Level: Minimal assistance  Skilled Clinical Factors: improved technique and facilitation this session  Sit to Supine  Assistance Level: Minimal assistance  Skilled Clinical Factors: assist at RLE, pt able to initiate movement but required assistance to complete, increased assist required d/t fatigue  Supine to Sit  Assistance Level: Moderate assistance  Skilled Clinical Factors: assist at trunk and BLEs, vc's throughout for technique with fair carry over  Scooting  Assistance Level: Moderate assistance  Skilled Clinical Factors: scovoting towards EOB    Transfers  Surface: From bed;To bed  Additional Factors: Verbal cues;Increased time to complete  Device:  (jose rail)  Sit to Stand  Assistance Level: Minimal assistance;Stand by assist  Skilled Clinical Factors: face to face transfer, RLE blocked at knee and ankle for stability,  completed x 5 this date with improved ability to obtain upright posture, able to complete with SBA by end of session  Stand to Sit  Assistance Level: Minimal assistance  Skilled Clinical Factors: assist to improve eccentric lowering    PT Exercises  Static Sitting Balance Exercises: sitting EOB with focus on upright posture x10min - occasional min-modA to prevent post LOB  Static

## 2024-11-05 NOTE — PLAN OF CARE
Problem: Chronic Conditions and Co-morbidities  Goal: Patient's chronic conditions and co-morbidity symptoms are monitored and maintained or improved  11/5/2024 0828 by Bonita Perez RN  Outcome: Progressing  11/5/2024 0530 by Marilyn Quinones RN  Outcome: Progressing     Problem: Discharge Planning  Goal: Discharge to home or other facility with appropriate resources  11/5/2024 0828 by Bonita Perez RN  Outcome: Progressing  11/5/2024 0530 by Marilyn Quinones RN  Outcome: Progressing     Problem: Safety - Adult  Goal: Free from fall injury  11/5/2024 0828 by Bonita Perez RN  Outcome: Progressing  11/5/2024 0530 by Marilyn Quinones RN  Outcome: Progressing     Problem: Skin/Tissue Integrity  Goal: Absence of new skin breakdown  Description: 1.  Monitor for areas of redness and/or skin breakdown  2.  Assess vascular access sites hourly  3.  Every 4-6 hours minimum:  Change oxygen saturation probe site  4.  Every 4-6 hours:  If on nasal continuous positive airway pressure, respiratory therapy assess nares and determine need for appliance change or resting period.  Outcome: Progressing     Problem: Neurosensory - Adult  Goal: Achieves stable or improved neurological status  11/5/2024 0828 by Bonita Perez RN  Outcome: Progressing  11/5/2024 0530 by Marilyn Quinones RN  Outcome: Progressing  Goal: Absence of seizures  11/5/2024 0828 by Bonita Perez RN  Outcome: Progressing  11/5/2024 0530 by Marilyn Quinones RN  Outcome: Progressing  Goal: Remains free of injury related to seizures activity  11/5/2024 0828 by Bonita Perez RN  Outcome: Progressing  11/5/2024 0530 by Marilyn Quinones RN  Outcome: Progressing  Goal: Achieves maximal functionality and self care  Outcome: Progressing     Problem: Skin/Tissue Integrity - Adult  Goal: Skin integrity remains intact  Outcome: Progressing     Problem: Musculoskeletal - Adult  Goal: Return mobility to safest level of  function  Outcome: Progressing  Goal: Maintain proper alignment of affected body part  Outcome: Progressing  Goal: Return ADL status to a safe level of function  Outcome: Progressing     Problem: Gastrointestinal - Adult  Goal: Minimal or absence of nausea and vomiting  Outcome: Progressing  Goal: Maintains or returns to baseline bowel function  Outcome: Progressing  Goal: Maintains adequate nutritional intake  11/5/2024 0828 by Bonita Perez RN  Outcome: Progressing  11/5/2024 0530 by Marilyn Quinones RN  Outcome: Progressing     Problem: Pain  Goal: Verbalizes/displays adequate comfort level or baseline comfort level  11/5/2024 0828 by Bonita Perez RN  Outcome: Progressing  11/5/2024 0530 by Marilyn Quinones, RN  Outcome: Progressing

## 2024-11-05 NOTE — PROGRESS NOTES
OCCUPATIONAL THERAPY  INPATIENT REHAB TREATMENT NOTE  [facility]      NAME: Aramis David  : 1955 (68 y.o.)  MRN: [id number]  CODE STATUS: Full Code  Room: [facility]    Date of Service: 2024    Referring Physician: Alicia Milner DO  Rehab Diagnosis: Impaired mobility and ADL's d/t new onset mental status change possible seizure disorder triggered by UTI    Hospital course:   Comments: 68 y.o. male patient who presented to ER via EMS 10/22 with altered mental status (confusion, staring off and now answering questions.) Patient with PMHx of CVA with right sided deficits. No acute findings on CT of head.  Initial lab testing remarkable for white blood cells 17.9, creatinine of 2.08, high-sensitivity troponin of 185, CRP of 29.8, procalcitonin of 1.78, lactate of 2.3, CK of 2718. Though ECG showing SR and no acute changes, troponins elevated. Patient admitted for additional work up and evaluation with consults from neurology and cardiology.      Restrictions  Restrictions/Precautions  Restrictions/Precautions: Fall Risk       Patient's date of birth confirmed: Yes    SAFETY:  Safety Devices  Safety Devices in place: Yes  Type of devices: All fall risk precautions in place    SUBJECTIVE: \"Better\" pt stated regarding ongoing loose stools.     Pain  Pain at start of treatment: Yes: 3/10    Pain at end of treatment: Yes: 3/10    Location: Right Arm  Nursing notified: Declined  Intervention: None        OBJECTIVE:    Upper Extremity Function  UE Strengthing:  BUE HEP (can exercises): Pt used a 1# dumbbell to engage in UE exercises using Left Arm Only:   Elbow Flexion/Extension: 2 sets x 10 reps   Chest Press: 2 sets x 10 reps   Shoulder Flexion: 2 sets x 10 reps   Shoulder Abduction/Adduction: 2 sets x 10 reps  Horizontal Abduction: 2 sets x 10 reps   Supination/Pronation: 2 sets x 10 reps  Wrist Flexion: 2 sets x 10 reps   Wrist Extension: 2 sets x 10 reps   Pt tolerated well with rest breaks  prn. Pt completed exercises to increase LUE strength and endurance for improved transfers. Educated pt in exercise technique and provided cues to maintain.        Pennies: Pt used his left hand to pick pennies up from various locations across the tabletop and insert them into a coin slot in a small plastic container. Pt had Min difficulty with activity and took increased time picking up pennies from the tabletop. Pt completed task to improve hand fine motor coordination for mgmt of clothing fasteners/ADL containers in a timely manner.        ASSESSMENT: Pt would benefit from continued Occupational Therapy to increase strength, activity tolerance, Moody with functional transfers, and Moody with ADL/IADL completion.    Activity Tolerance: Patient tolerated treatment well      PLAN OF CARE:  Strengthening, ROM, Balance training, Functional mobility training, Endurance training, Patient/Caregiver education & training, Wheelchair mobility training, Safety education & training, Pain management, Cognitive reorientation, Equipment evaluation, education, & procurement, Positioning, Self-Care / ADL, Cognitive/Perceptual training, Coordination training  continue with OT plan of care    Patient goals : Pt unable to state a goal at this time.  Time Frame for Long Term Goals : Within 2 weeks, pt to demonstrate progress in the following areas listed below to acheive specific LTGs as stated in the initial evaluation.  Long Term Goal 1: Increase strength for ADL and functional mobility independence  Long Term Goal 2: Increase endurance for ADL and functional mobility independence  Long Term Goal 3: Improve RUE mobility for increased ADL and functional mobility independence  Long Term Goal 4: Improve cognition for increased safety and independce for ADLs and functional mobility      Therapy Time:   Individual Group Co-Treat   Time In 1530       Time Out 1600         Minutes 30         Therapeutic activities: 30 minutes

## 2024-11-05 NOTE — CARE COORDINATION
LSW met with pt and discussed discharge, pt is in agreement with SNF placement at this time. LSW called pt's brother Karthikeyan and discussed the discharge plan. SNF coverage was also discussed and questions were answered. Karthikeyan has toured [facility] and noted that he likes that they offer all levels such as skilled, LTC, and AL. He would like a list of additional facilities to review, LSW will meet with him and pt tomorrow at 8:30AM. Electronically signed by ABHAY Cruz, JENNIFER on 11/5/2024 at 4:33 PM

## 2024-11-05 NOTE — PLAN OF CARE
Problem: Chronic Conditions and Co-morbidities  Goal: Patient's chronic conditions and co-morbidity symptoms are monitored and maintained or improved  Outcome: Progressing     Problem: Discharge Planning  Goal: Discharge to home or other facility with appropriate resources  Outcome: Progressing     Problem: Safety - Adult  Goal: Free from fall injury  Outcome: Progressing     Problem: Neurosensory - Adult  Goal: Achieves stable or improved neurological status  Outcome: Progressing  Goal: Absence of seizures  Outcome: Progressing  Goal: Remains free of injury related to seizures activity  Outcome: Progressing     Problem: Gastrointestinal - Adult  Goal: Maintains adequate nutritional intake  Outcome: Progressing     Problem: Pain  Goal: Verbalizes/displays adequate comfort level or baseline comfort level  Outcome: Progressing

## 2024-11-05 NOTE — PROGRESS NOTES
Gastroenterology Progress Note    Aramis David is a 68 y.o. male patient.  Hospitalization Day:9    Chief C/O: Diarrhea    SUBJECTIVE: Patient reports 3-4 episodes of loose stool today.  Patient denies any abdominal cramping.  Patient tolerating regular diet without difficulty.    ROS:  Gastrointestinal ROS: no abdominal pain, change in bowel habits, or black or bloody stools    Physical    VITALS:  BP 97/62   Pulse 66   Temp 98.4 °F (36.9 °C) (Oral)   Resp 18   Ht 1.727 m (5' 8\")   Wt 75.3 kg (166 lb 1.6 oz)   SpO2 96%   BMI 25.26 kg/m²   TEMPERATURE:  Current - Temp: 98.4 °F (36.9 °C); Max - Temp  Av °F (36.7 °C)  Min: 97.3 °F (36.3 °C)  Max: 98.4 °F (36.9 °C)    General - No acute distress  Eyes - no Icterus, no pallor  Cardiovascular - RRR, no murmur  Lungs - Clear to auscultation bilaterally  Abdomen - non-distended,  non-tender, no organomegaly, Bowel sounds normal  Extremities - no edema  Skin - warm and dry  Neuro: no asterixis     Data    Data Review:    Recent Labs     24  0522 24  0534   WBC 11.5* 8.2   HGB 12.7* 12.3*   HCT 36.7* 34.8*   MCV 87.2 85.9    221     Recent Labs     24  0534 24  0511    137   K 2.9* 3.5    107   CO2 20 20   BUN 10 12   CREATININE 0.71 0.75     No results for input(s): \"AST\", \"ALT\", \"BILIDIR\", \"BILITOT\", \"ALKPHOS\" in the last 72 hours.    Invalid input(s): \"ALB\"  No results for input(s): \"LIPASE\", \"AMYLASE\" in the last 72 hours.  No results for input(s): \"PROTIME\", \"INR\" in the last 72 hours.    Radiology Review:  reviewed    ASSESSMENT:  68 y.o. male admitted initially admitted with altered mental status, non-STEMI and concern for a seizure.  Patient with longstanding right sided hemiparesis/aphasia from previous stroke in .  GI consulted for new onset of diarrhea over the last 24 hours with lower abdominal cramping.  Patient currently in rehab unit.  White cell count noted to be significantly elevated 48 hours

## 2024-11-05 NOTE — PROGRESS NOTES
Subjective:   The patient complains of severe acute on chronic progressive fatigue and right-sided spasticity, right weakness, cervical and thoracic and lumbar pain partially relieved by rest, medications, PT,  OT,   SLP and rest and exacerbated by recent non-ST MI.  Patient was admitted through the ER on 10/22/2024 with seizure and mental status change.  He had elevated troponins and was diagnosed with a non-ST MI cardiology was consulted.     He was admitted under the care of the hospitalist with neurology and cardiology consult.  Neurology his prescribed Keppra.  Tramadol was DC'd as it lowers the seizure threshold.   He continues to have right-sided weakness and residual aphasia from previous CVA 2021.  He has a remote history of PFO.  He had a closure procedure performed for a percutaneous septal occluder.  He continues on Xarelto.     He had an elevated white count -likely UTI he is on IV Rocephin and Vanco.  UA was positive but --chest x-ray and respiratory panel are negative.  Blood cultures were done.    I am concerned about patient’s medical complexities and barriers to advancing in rehab goals including avoiding repeat stroke and heart attack.        I discussed current functional, rehabilitation, medical status with other rehabilitation providers including nursing and case management.  According to recent nursing note, \"cleaned up after incontinent episode of loose mucous stool. Zinc applied to red excoriated areas on coccyx and perineum. New depends in place. Patient repositioned in bed for comfort with several pillows in place. Patient complains of mild abdominal cramping rated 2/10. Patient medicated with scheduled Tylenol 500 MG PO and Bentyl 20 MG PO for abdominal cramping. Patient took evening medications whole one at a time and Vancocin oral solution with sips of water without difficulty. Patient declined an HS snack. Enteric Precautions for positive CDIFF and seizure precautions remain in place.  nasal spray and incentive spirometer  Hypokalemia-supplement potassium recheck  Syncope colitis leukocytosis-recheck stat UA monitor clinically recheck CBC-continue to radiators.      Focus on coordinating dc plans with patient family and care givers and order necessary equipment.        Alicia Milner D.O., PM&R     Attending    [phone]   [facility]

## 2024-11-05 NOTE — PROGRESS NOTES
Physical Therapy Rehab Treatment Note  [facility]  [facility]       NAME: Aramis David  : 1955 (68 y.o.)  MRN: [id number]  CODE STATUS: Full Code    Date of Service: 2024       Restrictions:  Restrictions/Precautions: Fall Risk       SUBJECTIVE:   Subjective: \"A little better\"    Pain  Pain: 4/10 right sided pain. pt held up left hand to represent #4/10 pain      OBJECTIVE:   Bed Mobility  Overall Assistance Level: Moderate Assistance  Additional Factors: Verbal cues;Increased time to complete;Head of bed flat;Without handrails  Roll Left  Assistance Level: Minimal assistance  Skilled Clinical Factors: increased assist required d/t fatigue  Roll Right  Assistance Level: Minimal assistance  Skilled Clinical Factors: improved technique and facilitation this session  Sit to Supine  Assistance Level: Minimal assistance  Skilled Clinical Factors: assist at RLE, pt able to initiate movement but required assistance to complete, increased assist required d/t fatigue  Supine to Sit  Assistance Level: Moderate assistance  Skilled Clinical Factors: assist at trunk and BLEs, vc's throughout for technique with fair carry over  Scooting  Assistance Level: Moderate assistance  Skilled Clinical Factors: scovoting towards EOB    Transfers  Surface: From bed;To bed  Additional Factors: Verbal cues;Increased time to complete  Device:  (jose rail)  Sit to Stand  Assistance Level: Minimal assistance;Stand by assist  Skilled Clinical Factors: face to face transfer, RLE blocked at knee and ankle for stability,  completed x 5 this date with improved ability to obtain upright posture, able to complete with SBA by end of session  Stand to Sit  Assistance Level: Minimal assistance  Skilled Clinical Factors: assist to improve eccentric lowering    PT Exercises  Exercise Treatment: stretches to bilateral lower extremies in seated position.  A/AROM Exercises: seated AP/LAQ/Marches/Hip Abd/Hip Add x15

## 2024-11-05 NOTE — FLOWSHEET NOTE
Patient cleaned up after incontinent episode of loose mucous stool. Zinc applied to red excoriated areas on coccyx and perineum. New depends in place. Patient repositioned in bed for comfort with several pillows in place. Patient complains of mild abdominal cramping rated 2/10. Patient medicated with scheduled Tylenol 500 MG PO and Bentyl 20 MG PO for abdominal cramping. Patient took evening medications whole one at a time and Vancocin oral solution with sips of water without difficulty. Patient declined an HS snack. Enteric Precautions for positive CDIFF and seizure precautions remain in place. Patient verbalized no further needs at this time. Bed alarm engaged and call light within reach.   Never smoker

## 2024-11-05 NOTE — PROGRESS NOTES
\"BILIDIR\", \"BILITOT\", \"ALKPHOS\" in the last 72 hours.    No results for input(s): \"INR\" in the last 72 hours.    No results for input(s): \"CKTOTAL\", \"TROPONINI\" in the last 72 hours.      Urinalysis:   Lab Results   Component Value Date/Time    NITRU Negative 11/01/2024 04:59 PM    WBCUA 3-5 11/01/2024 04:59 PM    BACTERIA Negative 11/01/2024 04:59 PM    RBCUA 0-2 11/01/2024 04:59 PM    BLOODU Negative 11/01/2024 04:59 PM    GLUCOSEU Negative 11/01/2024 04:59 PM       Radiology:   Most recent    EEG No valid procedures specified.    MRI of Brain No results found for this or any previous visit.  No results found for this or any previous visit.                            MRA of the Head and Neck: No results found for this or any previous visit.  No results found for this or any previous visit.  No results found for this or any previous visit.                            CT of the Head: Results for orders placed during the hospital encounter of 10/22/24    CT HEAD WO CONTRAST    Narrative  EXAMINATION:  CT OF THE HEAD WITHOUT CONTRAST  10/22/2024 12:14 pm    TECHNIQUE:  CT of the head was performed without the administration of intravenous  contrast. Automated exposure control, iterative reconstruction, and/or weight  based adjustment of the mA/kV was utilized to reduce the radiation dose to as  low as reasonably achievable.  Radiation dose: 486.73 mGy-cm    COMPARISON:  December 20, 2021    HISTORY:  ORDERING SYSTEM PROVIDED HISTORY: Stroke  TECHNOLOGIST PROVIDED HISTORY:  Has a \"code stroke\" or \"stroke alert\" been called?->Yes  Reason for exam:->Stroke  Decision Support Exception - unselect if not a suspected or confirmed  emergency medical condition->Emergency Medical Condition (MA)  What reading provider will be dictating this exam?->CRC    FINDINGS:  BRAIN/VENTRICLES: There is no acute intracranial hemorrhage, mass effect or  midline shift.  Hypodensities are seen in the periventricular white matter  consistent

## 2024-11-06 PROBLEM — A49.8 CLOSTRIDIOIDES DIFFICILE INFECTION: Status: ACTIVE | Noted: 2024-11-06

## 2024-11-06 PROCEDURE — 97535 SELF CARE MNGMENT TRAINING: CPT

## 2024-11-06 PROCEDURE — 1180000000 HC REHAB R&B

## 2024-11-06 PROCEDURE — 6370000000 HC RX 637 (ALT 250 FOR IP): Performed by: INTERNAL MEDICINE

## 2024-11-06 PROCEDURE — 97110 THERAPEUTIC EXERCISES: CPT

## 2024-11-06 PROCEDURE — 97542 WHEELCHAIR MNGMENT TRAINING: CPT

## 2024-11-06 PROCEDURE — 97140 MANUAL THERAPY 1/> REGIONS: CPT

## 2024-11-06 PROCEDURE — 97530 THERAPEUTIC ACTIVITIES: CPT

## 2024-11-06 PROCEDURE — 6370000000 HC RX 637 (ALT 250 FOR IP): Performed by: PHYSICAL MEDICINE & REHABILITATION

## 2024-11-06 PROCEDURE — 97130 THER IVNTJ EA ADDL 15 MIN: CPT

## 2024-11-06 PROCEDURE — 99232 SBSQ HOSP IP/OBS MODERATE 35: CPT | Performed by: PHYSICAL MEDICINE & REHABILITATION

## 2024-11-06 PROCEDURE — 97129 THER IVNTJ 1ST 15 MIN: CPT

## 2024-11-06 RX ADMIN — Medication 125 MG: at 23:20

## 2024-11-06 RX ADMIN — POTASSIUM CHLORIDE 40 MEQ: 1500 TABLET, EXTENDED RELEASE ORAL at 08:31

## 2024-11-06 RX ADMIN — BACLOFEN 10 MG: 10 TABLET ORAL at 08:29

## 2024-11-06 RX ADMIN — BACLOFEN 10 MG: 10 TABLET ORAL at 14:05

## 2024-11-06 RX ADMIN — DICYCLOMINE HYDROCHLORIDE 20 MG: 20 TABLET ORAL at 23:20

## 2024-11-06 RX ADMIN — Medication 125 MG: at 00:36

## 2024-11-06 RX ADMIN — BACLOFEN 10 MG: 10 TABLET ORAL at 23:18

## 2024-11-06 RX ADMIN — METOPROLOL SUCCINATE 12.5 MG: 25 TABLET, FILM COATED, EXTENDED RELEASE ORAL at 08:31

## 2024-11-06 RX ADMIN — Medication: at 11:50

## 2024-11-06 RX ADMIN — LEVETIRACETAM 1250 MG: 500 TABLET, FILM COATED ORAL at 23:18

## 2024-11-06 RX ADMIN — HYDROCORTISONE: 25 CREAM TOPICAL at 08:38

## 2024-11-06 RX ADMIN — LEVETIRACETAM 1250 MG: 500 TABLET, FILM COATED ORAL at 08:29

## 2024-11-06 RX ADMIN — Medication 125 MG: at 11:51

## 2024-11-06 RX ADMIN — Medication 125 MG: at 05:51

## 2024-11-06 RX ADMIN — ACETAMINOPHEN 500 MG: 500 TABLET ORAL at 14:06

## 2024-11-06 RX ADMIN — HYDROCORTISONE: 25 CREAM TOPICAL at 23:21

## 2024-11-06 RX ADMIN — RIVAROXABAN 20 MG: 20 TABLET, FILM COATED ORAL at 08:33

## 2024-11-06 RX ADMIN — DICYCLOMINE HYDROCHLORIDE 20 MG: 20 TABLET ORAL at 17:06

## 2024-11-06 RX ADMIN — ACETAMINOPHEN 500 MG: 500 TABLET ORAL at 08:29

## 2024-11-06 RX ADMIN — ASPIRIN 81 MG: 81 TABLET, CHEWABLE ORAL at 08:31

## 2024-11-06 RX ADMIN — LEVOTHYROXINE SODIUM 50 MCG: 0.05 TABLET ORAL at 05:51

## 2024-11-06 RX ADMIN — DICYCLOMINE HYDROCHLORIDE 20 MG: 20 TABLET ORAL at 05:51

## 2024-11-06 RX ADMIN — POTASSIUM CHLORIDE 40 MEQ: 1500 TABLET, EXTENDED RELEASE ORAL at 23:19

## 2024-11-06 RX ADMIN — Medication 2000 UNITS: at 17:06

## 2024-11-06 RX ADMIN — ATORVASTATIN CALCIUM 80 MG: 80 TABLET, FILM COATED ORAL at 23:24

## 2024-11-06 RX ADMIN — Medication 100 MG: at 08:29

## 2024-11-06 RX ADMIN — ACETAMINOPHEN 500 MG: 500 TABLET ORAL at 23:19

## 2024-11-06 RX ADMIN — Medication 125 MG: at 17:07

## 2024-11-06 RX ADMIN — DULOXETINE HYDROCHLORIDE 60 MG: 60 CAPSULE, DELAYED RELEASE ORAL at 08:29

## 2024-11-06 RX ADMIN — DICYCLOMINE HYDROCHLORIDE 20 MG: 20 TABLET ORAL at 11:51

## 2024-11-06 RX ADMIN — ARIPIPRAZOLE 2 MG: 2 TABLET ORAL at 08:29

## 2024-11-06 ASSESSMENT — PAIN SCALES - GENERAL
PAINLEVEL_OUTOF10: 2
PAINLEVEL_OUTOF10: 0
PAINLEVEL_OUTOF10: 4

## 2024-11-06 ASSESSMENT — PAIN DESCRIPTION - LOCATION
LOCATION: ARM
LOCATION: ARM

## 2024-11-06 ASSESSMENT — PAIN DESCRIPTION - DESCRIPTORS: DESCRIPTORS: DISCOMFORT;ACHING

## 2024-11-06 ASSESSMENT — PAIN DESCRIPTION - ORIENTATION
ORIENTATION: RIGHT
ORIENTATION: RIGHT

## 2024-11-06 ASSESSMENT — PAIN SCALES - WONG BAKER: WONGBAKER_NUMERICALRESPONSE: HURTS A LITTLE BIT

## 2024-11-06 NOTE — CARE COORDINATION
LSW met with pt and Karthikeyan (brother/POA) earlier this morning at 8:30AM to discuss the discharge plan. Karthikeyan stated that he toured [facility] and was pleased. LSW provided a list of facilities as requested, but by the end of the conversation Karthikeyan stated that they would like to proceed with [facility]. LSW discussed discharge planned for 11/9 and it was decided that pt would transport via ambulette. LSW called Angelica from [facility] and provided the referral for [facility]. Electronically signed by ABHAY Cruz, LSW on 11/6/2024 at 11:04 AM

## 2024-11-06 NOTE — PROGRESS NOTES
Comprehensive Nutrition Assessment    Type and Reason for Visit:  Reassess    Nutrition Recommendations/Plan:   Modify Current Diet (Suggest Cardiac diet)     Malnutrition Assessment:  Malnutrition Status:  No malnutrition (10/28/24 1354)      Nutrition Assessment:    Pt reports 'fair' appetite/intake at meals, with noted apparent weight loss. To provide high calorie supplement bid with meals for additional calories and protein. To continue to follow.    Nutrition Related Findings:    PMH-htn, hld (lipitor), CVA (aphasia), TBI, Seizures, hypothyroid (synthroid); admitted s/p MI, Seizure. Labs/meds reviewed. Trace BLE edema noted. Wound Type: None       Current Nutrition Intake & Therapies:    Average Meal Intake: ~50-75% (per pt)     ADULT DIET; Regular    Anthropometric Measures:  Height: 172.7 cm (5' 8\")  Ideal Body Weight (IBW): 154 lbs (70 kg)    Admission Body Weight: 78.8 kg (173 lb 12 oz)  Current Body Weight: 75.3 kg (166 lb 0.1 oz) (11/04); 168lb (11/6) Weight Source: Bed scale  Current BMI (kg/m2): 25.2  Usual Body Weight: 81.6 kg (180 lb) (6/2022 ? source)                          BMI Categories: Overweight (BMI 25.0-29.9)    Nutrition Diagnosis:   Unintended weight loss related to inadequate oral intake as evidenced by weight loss     Nutrition Interventions:   Food and/or Nutrient Delivery: Modify Current Diet (Suggest Cardiac diet)  Nutrition Education/Counseling: Education not indicated  Coordination of Nutrition Care: Continue to monitor while inpatient       Goals:  Goals: PO intake 75% or greater          Nutrition Monitoring and Evaluation:      Food/Nutrient Intake Outcomes: Food and Nutrient Intake  Physical Signs/Symptoms Outcomes: Weight, Biochemical Data, Meal Time Behavior, Fluid Status or Edema    Discharge Planning:    No discharge needs at this time     Justine Richmond, RD, LD

## 2024-11-06 NOTE — PROGRESS NOTES
Hospitalist Progress Note      PCP: Syd Maher MD    Date of Admission: 10/27/2024    Chief Complaint: weakness    Subjective: patient awake/alert     Medications:  Reviewed    Infusion Medications   Scheduled Medications    potassium chloride  40 mEq Oral BID    vancomycin  125 mg Oral 4 times per day    hydrocortisone   Rectal 4x daily    dicyclomine  20 mg Oral 4x Daily AC & HS    Vitamin D  2,000 Units Oral Dinner    cyanocobalamin  1,000 mcg IntraMUSCular Weekly    coenzyme Q10  100 mg Oral Daily    lidocaine  3 patch TransDERmal Daily    atorvastatin  80 mg Oral Nightly    acetaminophen  500 mg Oral TID    ARIPiprazole  2 mg Oral Daily    aspirin  81 mg Oral Daily    baclofen  10 mg Oral TID    DULoxetine  60 mg Oral Daily    levETIRAcetam  1,250 mg Oral BID    levothyroxine  50 mcg Oral Daily    metoprolol succinate  12.5 mg Oral Daily    rivaroxaban  20 mg Oral Daily with breakfast     PRN Meds: camphor-menthol-methyl salicylate, acetaminophen, bisacodyl, sodium phosphate      Intake/Output Summary (Last 24 hours) at 11/6/2024 1334  Last data filed at 11/6/2024 0142  Gross per 24 hour   Intake 700 ml   Output 800 ml   Net -100 ml       Exam:    /80   Pulse 92   Temp 98.4 °F (36.9 °C) (Oral)   Resp 17   Ht 1.727 m (5' 8\")   Wt 75.3 kg (166 lb 1.6 oz)   SpO2 100%   BMI 25.26 kg/m²     General appearance: No apparent distress, appears stated age and cooperative.  HEENT: Pupils equal, round, and reactive to light. Conjunctivae/corneas clear.  Neck: Supple, with full range of motion. No jugular venous distention. Trachea midline.  Respiratory:  Normal respiratory effort. Clear to auscultation, bilaterally without Rales/Wheezes/Rhonchi.  Cardiovascular: Regular rate and rhythm with normal S1/S2 without murmurs, rubs or gallops.  Abdomen: Soft, non-tender, non-distended with normal bowel sounds.  Musculoskeletal: R hemiparesis   Skin: Skin color, texture, turgor normal.  No rashes or

## 2024-11-06 NOTE — FLOWSHEET NOTE
Patient removed purewick and requested to leave off. Patient used urinal. Assisted patient with ara-care and applied zinc to coccyx. Emptied 300 cc yellow urine from urinal. Patient repositioned in bed with several pillows in place.Bed alarm engaged and call light within reach.

## 2024-11-06 NOTE — PROGRESS NOTES
Subjective:   The patient complains of severe acute on chronic progressive fatigue and right-sided spasticity, right weakness, cervical and thoracic and lumbar pain partially relieved by rest, medications, PT,  OT,   SLP and rest and exacerbated by recent non-ST MI.  Patient was admitted through the ER on 10/22/2024 with seizure and mental status change.  He had elevated troponins and was diagnosed with a non-ST MI cardiology was consulted.     He was admitted under the care of the hospitalist with neurology and cardiology consult.  Neurology his prescribed Keppra.  Tramadol was DC'd as it lowers the seizure threshold.   He continues to have right-sided weakness and residual aphasia from previous CVA 2021.  He has a remote history of PFO.  He had a closure procedure performed for a percutaneous septal occluder.  He continues on Xarelto.     He had an elevated white count -likely UTI he is on IV Rocephin and Vanco.  UA was positive but --chest x-ray and respiratory panel are negative.  Blood cultures were done.  He subsequently developed C. difficile colitis for which she was treated with p.o. vancomycin    I am concerned about patient’s medical complexities and barriers to advancing in rehab goals including avoiding repeat stroke and heart attack.        I discussed current functional, rehabilitation, medical status with other rehabilitation providers including nursing and case management.  According to recent nursing note, \"removed purewick and requested to leave off. Patient used urinal. Assisted patient with ara-care and applied zinc to coccyx. Emptied 300 cc yellow urine from urinal. Patient repositioned in bed with several pillows in place.Bed alarm engaged and call light within reach \".    His stools are lessening on the p.o. vancomycin    ROS x10:  The patient also complains of severely impaired mobility and activities of daily living.  Otherwise no new problems with vision, hearing, nose, mouth, throat,  and titrate dosing.  Follow vital signs, recheck TSH and thyroid studies as outpatient.    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      Review and simplify inpatient and outpatient medications and dosing times.  Transition to self medication program if able.          Alicia Milner D.O., PM&R     Attending    [phone]   [facility]

## 2024-11-06 NOTE — FLOWSHEET NOTE
Patient resting in bed with complaints of abdominal cramping rated 2/10. Patient medicated with scheduled Tylenol 500 MG PO and Bentyl 20 MG PO for abdominal discomfort. Patient took evening medications whole one at a time with sips of water without difficulty. Patient requested saltine crackers and ginger ale for an HS snack. Patient has depends in place with external catheter. Patient verbalized no further needs at this time. Enteric Precautions and Seizure Precautions remain in place. Bed alarm engaged and call light within reach.

## 2024-11-06 NOTE — PLAN OF CARE
Problem: Chronic Conditions and Co-morbidities  Goal: Patient's chronic conditions and co-morbidity symptoms are monitored and maintained or improved  Outcome: Progressing     Problem: Discharge Planning  Goal: Discharge to home or other facility with appropriate resources  Outcome: Progressing     Problem: Safety - Adult  Goal: Free from fall injury  Outcome: Progressing     Problem: Neurosensory - Adult  Goal: Achieves stable or improved neurological status  Outcome: Progressing  Goal: Absence of seizures  Outcome: Progressing  Goal: Remains free of injury related to seizures activity  Outcome: Progressing  Goal: Achieves maximal functionality and self care  Outcome: Progressing     Problem: Musculoskeletal - Adult  Goal: Return mobility to safest level of function  Outcome: Progressing  Goal: Maintain proper alignment of affected body part  Outcome: Progressing  Goal: Return ADL status to a safe level of function  Outcome: Progressing     Problem: Gastrointestinal - Adult  Goal: Minimal or absence of nausea and vomiting  Outcome: Progressing  Goal: Maintains or returns to baseline bowel function  Outcome: Progressing  Goal: Maintains adequate nutritional intake  Outcome: Progressing     Problem: Pain  Goal: Verbalizes/displays adequate comfort level or baseline comfort level  Outcome: Progressing     Problem: Skin/Tissue Integrity - Adult  Goal: Skin integrity remains intact  Outcome: Not Progressing  Flowsheets (Taken 11/5/2024 8466)  Skin Integrity Remains Intact: Monitor for areas of redness and/or skin breakdown

## 2024-11-06 NOTE — PROGRESS NOTES
OCCUPATIONAL THERAPY  INPATIENT REHAB TREATMENT NOTE  [facility]      NAME: Aramis David  : 1955 (68 y.o.)  MRN: [id number]  CODE STATUS: Full Code  [facility]    Date of Service: 2024    Referring Physician: Alicia Milner DO  Rehab Diagnosis: Impaired mobility and ADL's d/t new onset mental status change possible seizure disorder triggered by UTI    Hospital course:   Comments: 68 y.o. male patient who presented to ER via EMS 10/22 with altered mental status (confusion, staring off and now answering questions.) Patient with PMHx of CVA with right sided deficits. No acute findings on CT of head.  Initial lab testing remarkable for white blood cells 17.9, creatinine of 2.08, high-sensitivity troponin of 185, CRP of 29.8, procalcitonin of 1.78, lactate of 2.3, CK of 2718. Though ECG showing SR and no acute changes, troponins elevated. Patient admitted for additional work up and evaluation with consults from neurology and cardiology.      Restrictions  Restrictions/Precautions  Restrictions/Precautions: Fall Risk        Patient's date of birth confirmed: Yes    SAFETY:       SUBJECTIVE:  Subjective  Subjective: \"I'm going to rest\" - pt states mid-task d/t pain experienced in wrist    Pain at start of treatment: Yes: /10    Pain at end of treatment: Yes: 3/10    Location: R wrist  Description: ache, shooting at times requiring pt to stop task  Nursing notified: Yes  Nurse: Emily  Intervention: Repositioned    COGNITION:  Orientation  Overall Orientation Status: Impaired  Orientation Level: Oriented to person;Oriented to situation  Cognition  Overall Cognitive Status: Exceptions  Arousal/Alertness: Inconsistent responses to stimuli  Following Commands: Follows one step commands with repetition;Follows one step commands with increased time  Attention Span: Attends with cues to redirect  Insights: Decreased awareness of deficits  Initiation: Requires cues for some  Sequencing: Requires  cues for some        OBJECTIVE:         Putting On/Taking Off Footwear  Skilled Clinical Factors: pt able to doff B shoes while sitting EOB with SUP         Sit to Supine  Assistance Level: Minimal assistance  Skilled Clinical Factors: A for RLE into bed  Bed To/From Chair  Technique: Stand pivot  Assistance Level: Moderate assistance  Skilled Clinical Factors: mod A face to face transfer       ROM  Prolonged stretch/AAROM to RUE on this date to increase joint mobility and decrease risk of further contracture. Pt with significant effort and trace movement with elbow flexion/extension, shoulder flexion, external/internal rotation - pt without ability to assist with finger extension/flexion, forearm supination/pronation, and shoulder extension. Pt often compensates with rising chest to assist with movements.         Sorting cards  Engaged pt in sorting cards on this date to increase cognitive function, visual scanning, expressive communication, L hand coordination, and LUE strength/endurance with 1# wrist weight added. Therapist encouraged pt to verbalize suit to increase expressive communication. Pt sorts cards with 100% accuracy and expresses suit with 75% accuracy - able to correct suit when prompted of error. Therapist places visual aid of each suit written out on post-it note in front of each pile of cards to assist with expressive communication. Pt frustrated at times, easy to redirect.      ASSESSMENT:  Activity Tolerance: Patient tolerated treatment well      PLAN OF CARE:  Strengthening, ROM, Balance training, Functional mobility training, Endurance training, Patient/Caregiver education & training, Wheelchair mobility training, Safety education & training, Pain management, Cognitive reorientation, Equipment evaluation, education, & procurement, Positioning, Self-Care / ADL, Cognitive/Perceptual training, Coordination training  continue with OT plan of care    Patient goals : Pt unable to state a goal at this

## 2024-11-06 NOTE — PROGRESS NOTES
OCCUPATIONAL THERAPY  INPATIENT REHAB TREATMENT NOTE  [facility]      NAME: Aramis David  : 1955 (68 y.o.)  MRN: [id number]  CODE STATUS: Full Code  Room: [facility]/R233-01    Date of Service: 2024    Referring Physician: Alicia Milner DO  Rehab Diagnosis: Impaired mobility and ADL's d/t new onset mental status change possible seizure disorder triggered by UTI    Hospital course:   Comments: 68 y.o. male patient who presented to ER via EMS 10/22 with altered mental status (confusion, staring off and now answering questions.) Patient with PMHx of CVA with right sided deficits. No acute findings on CT of head.  Initial lab testing remarkable for white blood cells 17.9, creatinine of 2.08, high-sensitivity troponin of 185, CRP of 29.8, procalcitonin of 1.78, lactate of 2.3, CK of 2718. Though ECG showing SR and no acute changes, troponins elevated. Patient admitted for additional work up and evaluation with consults from neurology and cardiology.      Restrictions  Restrictions/Precautions  Restrictions/Precautions: Fall Risk       Patient's date of birth confirmed: Yes    SAFETY:  Safety Devices  Safety Devices in place: Yes  Type of devices: All fall risk precautions in place    SUBJECTIVE:    Pain  Pain at start of treatment: No    Pain at end of treatment: Yes: 3/10    Location: [facility]  Nursing notified: Declined  Intervention: None        OBJECTIVE:    UE Function   Rings on vertical Rods: Pt wore a 1# wrist weight on his LUE and used his left hand to don/doff rubber rings onto their corresponding vertical graded kym. Pt had Min difficulty with activity and worked at a slow and steady pace with rest breaks pen. Repetitive reaching at and above shoulder level to increase BUE strength and endurance for improved transfers. Problem solving incorporated into activity.          ASSESSMENT:  Activity Tolerance: Patient tolerated treatment well      PLAN OF CARE:  Strengthening, ROM, Balance training,

## 2024-11-06 NOTE — PLAN OF CARE
Problem: Chronic Conditions and Co-morbidities  Goal: Patient's chronic conditions and co-morbidity symptoms are monitored and maintained or improved  Outcome: Progressing     Problem: Discharge Planning  Goal: Discharge to home or other facility with appropriate resources  Outcome: Progressing     Problem: Safety - Adult  Goal: Free from fall injury  Outcome: Progressing     Problem: Skin/Tissue Integrity  Goal: Absence of new skin breakdown  Description: 1.  Monitor for areas of redness and/or skin breakdown  2.  Assess vascular access sites hourly  3.  Every 4-6 hours minimum:  Change oxygen saturation probe site  4.  Every 4-6 hours:  If on nasal continuous positive airway pressure, respiratory therapy assess nares and determine need for appliance change or resting period.  Outcome: Progressing     Problem: Neurosensory - Adult  Goal: Achieves stable or improved neurological status  Outcome: Progressing  Goal: Absence of seizures  Outcome: Progressing  Goal: Remains free of injury related to seizures activity  Outcome: Progressing  Goal: Achieves maximal functionality and self care  Outcome: Progressing     Problem: Skin/Tissue Integrity - Adult  Goal: Skin integrity remains intact  Outcome: Progressing     Problem: Musculoskeletal - Adult  Goal: Return mobility to safest level of function  Outcome: Progressing  Goal: Maintain proper alignment of affected body part  Outcome: Progressing  Goal: Return ADL status to a safe level of function  Outcome: Progressing     Problem: Gastrointestinal - Adult  Goal: Minimal or absence of nausea and vomiting  Outcome: Progressing  Goal: Maintains or returns to baseline bowel function  Outcome: Progressing  Goal: Maintains adequate nutritional intake  Outcome: Progressing     Problem: Pain  Goal: Verbalizes/displays adequate comfort level or baseline comfort level  Outcome: Progressing

## 2024-11-06 NOTE — PROGRESS NOTES
assistance  Skilled Clinical Factors: face to face SPT completed bed to wc, vc's for technique and sequencing, pt mildly impulsive, improved ability to transfer LUE during transitions with cues, less resistive this date    Wheelchair  Surface: Level surface;Uneven surface  Device: Standard wheelchair  Additional Factors: Verbal cues;Hand placement cues;One handrail;Increased time to complete  Assistance Required to Manage Parts: All wheelchair parts  Assistance Level for Propulsion: Stand by assist  Propulsion Method: Left upper extremity;Left lower extremity  Propulsion Quality: Slow velocity;Short strokes;Decreased fluidity  Propulsion Distance: 200'  Skilled Clinical Factors: vc's for technique and sequencing,  pt with good ability to complete initially but quickly fatigues and ability to complete deteriorates. increased rest breaks. able to complete without physical assist this session.    PT Exercises  A/AROM Exercises: seated AP/LAQ/Marches/Hip Abd/Hip Add x15 BLE  Dynamic Sitting Balance Exercises: seated lateral core rocking at EOB with focus on maintaining balance ~5 mins, seated push/pull core activity with focus on core stability in seated.     Activity Tolerance  Activity Tolerance: Patient tolerated treatment well    ASSESSMENT/PROGRESS TOWARDS GOALS:   Assessment  Assessment: Pt with good tolerance to session this date, overall improvements noted in bed mobility as well as transfers and WC mobility. Pt more talkative and improved carryover noted with cueing this date. More participatory throughout session.  Activity Tolerance: Patient tolerated treatment well    Goals:  Long Term Goals  Long Term Goal 1: Pt to complete bed mobility with min assist  Long Term Goal 2: Pt to complete bed transfers with mod assist +1  Long Term Goal 3: Pt to ambulate 10ft with appropriate device and mod assist +2 at hemirail  Long Term Goal 4: mod assist w/c mobility 50 feet  Long Term Goal 5: Pt able to maintain sitting  and perform scooting tasks along EOB with min- mod assist  Additional Goals?: Yes  Long term goal 6: pt to require min assist for curb step to allow for home entry    PLAN OF CARE/Safety:   Safety Devices  Type of Devices: All fall risk precautions in place;Chair alarm in place;Left in chair      Therapy Time:   Individual   Time In 930   Time Out 1030   Minutes 60      Minutes: 60  Transfer/Bed mobility trainin  Gait training:  WC: 15  Therapeutic ex: 15      Nasir Armijo PTA, 24 at 12:05 PM

## 2024-11-07 LAB
ANION GAP SERPL CALCULATED.3IONS-SCNC: 8 MEQ/L (ref 9–15)
BASOPHILS # BLD: 0 K/UL (ref 0–0.2)
BASOPHILS NFR BLD: 0.5 %
BUN SERPL-MCNC: 9 MG/DL (ref 8–23)
CALCIUM SERPL-MCNC: 8.4 MG/DL (ref 8.5–9.9)
CHLORIDE SERPL-SCNC: 109 MEQ/L (ref 95–107)
CO2 SERPL-SCNC: 22 MEQ/L (ref 20–31)
CREAT SERPL-MCNC: 0.76 MG/DL (ref 0.7–1.2)
EOSINOPHIL # BLD: 0.1 K/UL (ref 0–0.7)
EOSINOPHIL NFR BLD: 1.5 %
ERYTHROCYTE [DISTWIDTH] IN BLOOD BY AUTOMATED COUNT: 14.5 % (ref 11.5–14.5)
GLUCOSE SERPL-MCNC: 97 MG/DL (ref 70–99)
HCT VFR BLD AUTO: 33.3 % (ref 42–52)
HGB BLD-MCNC: 11.5 G/DL (ref 14–18)
LYMPHOCYTES # BLD: 2.3 K/UL (ref 1–4.8)
LYMPHOCYTES NFR BLD: 38.6 %
MCH RBC QN AUTO: 30.6 PG (ref 27–31.3)
MCHC RBC AUTO-ENTMCNC: 34.5 % (ref 33–37)
MCV RBC AUTO: 88.6 FL (ref 79–92.2)
MONOCYTES # BLD: 0.4 K/UL (ref 0.2–0.8)
MONOCYTES NFR BLD: 6.7 %
NEUTROPHILS # BLD: 3 K/UL (ref 1.4–6.5)
NEUTS SEG NFR BLD: 51.3 %
PLATELET # BLD AUTO: 267 K/UL (ref 130–400)
POTASSIUM SERPL-SCNC: 4.7 MEQ/L (ref 3.4–4.9)
RBC # BLD AUTO: 3.76 M/UL (ref 4.7–6.1)
SODIUM SERPL-SCNC: 139 MEQ/L (ref 135–144)
WBC # BLD AUTO: 5.9 K/UL (ref 4.8–10.8)

## 2024-11-07 PROCEDURE — 80048 BASIC METABOLIC PNL TOTAL CA: CPT

## 2024-11-07 PROCEDURE — 36415 COLL VENOUS BLD VENIPUNCTURE: CPT

## 2024-11-07 PROCEDURE — 99232 SBSQ HOSP IP/OBS MODERATE 35: CPT | Performed by: PHYSICAL MEDICINE & REHABILITATION

## 2024-11-07 PROCEDURE — 6370000000 HC RX 637 (ALT 250 FOR IP): Performed by: PHYSICAL MEDICINE & REHABILITATION

## 2024-11-07 PROCEDURE — 1180000000 HC REHAB R&B

## 2024-11-07 PROCEDURE — 92507 TX SP LANG VOICE COMM INDIV: CPT

## 2024-11-07 PROCEDURE — 97530 THERAPEUTIC ACTIVITIES: CPT

## 2024-11-07 PROCEDURE — 97110 THERAPEUTIC EXERCISES: CPT

## 2024-11-07 PROCEDURE — 97535 SELF CARE MNGMENT TRAINING: CPT

## 2024-11-07 PROCEDURE — 6370000000 HC RX 637 (ALT 250 FOR IP): Performed by: INTERNAL MEDICINE

## 2024-11-07 PROCEDURE — 85025 COMPLETE CBC W/AUTO DIFF WBC: CPT

## 2024-11-07 PROCEDURE — 97542 WHEELCHAIR MNGMENT TRAINING: CPT

## 2024-11-07 RX ADMIN — ARIPIPRAZOLE 2 MG: 2 TABLET ORAL at 08:23

## 2024-11-07 RX ADMIN — Medication 125 MG: at 12:34

## 2024-11-07 RX ADMIN — DICYCLOMINE HYDROCHLORIDE 20 MG: 20 TABLET ORAL at 12:34

## 2024-11-07 RX ADMIN — BACLOFEN 10 MG: 10 TABLET ORAL at 08:22

## 2024-11-07 RX ADMIN — Medication 100 MG: at 08:23

## 2024-11-07 RX ADMIN — BACLOFEN 10 MG: 10 TABLET ORAL at 22:52

## 2024-11-07 RX ADMIN — Medication 2000 UNITS: at 16:56

## 2024-11-07 RX ADMIN — POTASSIUM CHLORIDE 40 MEQ: 1500 TABLET, EXTENDED RELEASE ORAL at 08:21

## 2024-11-07 RX ADMIN — DICYCLOMINE HYDROCHLORIDE 20 MG: 20 TABLET ORAL at 16:56

## 2024-11-07 RX ADMIN — Medication: at 14:17

## 2024-11-07 RX ADMIN — LEVETIRACETAM 1250 MG: 500 TABLET, FILM COATED ORAL at 22:52

## 2024-11-07 RX ADMIN — METOPROLOL SUCCINATE 12.5 MG: 25 TABLET, FILM COATED, EXTENDED RELEASE ORAL at 08:21

## 2024-11-07 RX ADMIN — DULOXETINE HYDROCHLORIDE 60 MG: 60 CAPSULE, DELAYED RELEASE ORAL at 08:23

## 2024-11-07 RX ADMIN — DICYCLOMINE HYDROCHLORIDE 20 MG: 20 TABLET ORAL at 06:11

## 2024-11-07 RX ADMIN — ACETAMINOPHEN 500 MG: 500 TABLET ORAL at 14:16

## 2024-11-07 RX ADMIN — POTASSIUM CHLORIDE 40 MEQ: 1500 TABLET, EXTENDED RELEASE ORAL at 22:52

## 2024-11-07 RX ADMIN — BACLOFEN 10 MG: 10 TABLET ORAL at 14:15

## 2024-11-07 RX ADMIN — ACETAMINOPHEN 500 MG: 500 TABLET ORAL at 22:51

## 2024-11-07 RX ADMIN — ATORVASTATIN CALCIUM 80 MG: 80 TABLET, FILM COATED ORAL at 22:51

## 2024-11-07 RX ADMIN — Medication 125 MG: at 22:53

## 2024-11-07 RX ADMIN — LEVOTHYROXINE SODIUM 50 MCG: 0.05 TABLET ORAL at 06:11

## 2024-11-07 RX ADMIN — LEVETIRACETAM 1250 MG: 500 TABLET, FILM COATED ORAL at 08:23

## 2024-11-07 RX ADMIN — Medication 125 MG: at 06:11

## 2024-11-07 RX ADMIN — ACETAMINOPHEN 500 MG: 500 TABLET ORAL at 08:23

## 2024-11-07 RX ADMIN — HYDROCORTISONE: 25 CREAM TOPICAL at 22:53

## 2024-11-07 RX ADMIN — ASPIRIN 81 MG: 81 TABLET, CHEWABLE ORAL at 08:21

## 2024-11-07 RX ADMIN — Medication 125 MG: at 16:56

## 2024-11-07 RX ADMIN — RIVAROXABAN 20 MG: 20 TABLET, FILM COATED ORAL at 08:23

## 2024-11-07 ASSESSMENT — PAIN SCALES - GENERAL
PAINLEVEL_OUTOF10: 3
PAINLEVEL_OUTOF10: 2

## 2024-11-07 ASSESSMENT — PAIN DESCRIPTION - DESCRIPTORS
DESCRIPTORS: DISCOMFORT
DESCRIPTORS: DISCOMFORT
DESCRIPTORS: ACHING;DISCOMFORT

## 2024-11-07 ASSESSMENT — PAIN DESCRIPTION - LOCATION
LOCATION: ABDOMEN
LOCATION: ARM
LOCATION: ARM

## 2024-11-07 ASSESSMENT — PAIN DESCRIPTION - ORIENTATION
ORIENTATION: RIGHT
ORIENTATION: RIGHT

## 2024-11-07 ASSESSMENT — PAIN DESCRIPTION - PAIN TYPE: TYPE: ACUTE PAIN

## 2024-11-07 ASSESSMENT — PAIN SCALES - WONG BAKER: WONGBAKER_NUMERICALRESPONSE: HURTS A LITTLE BIT

## 2024-11-07 NOTE — PROGRESS NOTES
Term Goal 4: mod assist w/c mobility 50 feet  Long Term Goal 5: Pt able to maintain sitting and perform scooting tasks along EOB with min- mod assist  Additional Goals?: Yes  Long term goal 6: pt to require min assist for curb step to allow for home entry    PLAN OF CARE/Safety:   Safety Devices  Type of Devices: All fall risk precautions in place;Call light within reach;Chair alarm in place;Left in chair      Therapy Time:   Individual   Time In 930   Time Out 1030   Minutes 60      Minutes: 60  Transfer/Bed mobility trainin  Gait training:  WC: 15  Therapeutic ex: 15      Nasir Armijo PTA, 24 at 11:04 AM

## 2024-11-07 NOTE — PROGRESS NOTES
OCCUPATIONAL THERAPY  INPATIENT REHAB TREATMENT NOTE  [facility]      NAME: Aramis David  : 1955 (68 y.o.)  MRN: [id number]  CODE STATUS: Full Code  Room: [facility]/R2-01    Date of Service: 2024    Referring Physician: Alicia Milner DO  Rehab Diagnosis: Impaired mobility and ADL's d/t new onset mental status change possible seizure disorder triggered by UTI    Hospital course:   Comments: 68 y.o. male patient who presented to ER via EMS 10/22 with altered mental status (confusion, staring off and now answering questions.) Patient with PMHx of CVA with right sided deficits. No acute findings on CT of head.  Initial lab testing remarkable for white blood cells 17.9, creatinine of 2.08, high-sensitivity troponin of 185, CRP of 29.8, procalcitonin of 1.78, lactate of 2.3, CK of 2718. Though ECG showing SR and no acute changes, troponins elevated. Patient admitted for additional work up and evaluation with consults from neurology and cardiology.      Restrictions  Restrictions/Precautions  Restrictions/Precautions: Fall Risk       Patient's date of birth confirmed: Yes    SAFETY:  Safety Devices  Safety Devices in place: Yes  Type of devices: All fall risk precautions in place    SUBJECTIVE: \"Thank You.\"     Pain   Pain at start of treatment: Yes: 4/10    Pain at end of treatment: Yes: 3/10    Location: Right Arm   Nursing notified: Declined  Intervention: None  Pt uses FACES scale. Pt reports that he had pain medication.     COGNITION:  Overall Cognitive Status: Exceptions  Arousal/Alertness: Inconsistent responses to stimuli  Attention Span: Appears intact  Insights: Decreased awareness of deficits  Initiation: Requires cues for some  Sequencing: Requires cues for some      Pt's current cognitive status is:  Comprehension: SBA  Expression: Min A  Social Interaction: SBA  Problem Solving: SBA  Memory: SBA    OBJECTIVE: Pt washed up while seated       Grooming/Oral Hygiene  Assistance Level:

## 2024-11-07 NOTE — PROGRESS NOTES
This RN taking over care of pt. Pt awake with equal and unlabored respirations. Pt denies needs at this time.

## 2024-11-07 NOTE — PLAN OF CARE
Problem: Chronic Conditions and Co-morbidities  Goal: Patient's chronic conditions and co-morbidity symptoms are monitored and maintained or improved  11/7/2024 0159 by Marilyn Quinones RN  Outcome: Progressing  11/6/2024 1353 by Emily Sparks RN  Outcome: Progressing     Problem: Discharge Planning  Goal: Discharge to home or other facility with appropriate resources  11/7/2024 0159 by Marilyn Quinones RN  Outcome: Progressing  11/6/2024 1353 by Emily Sparks RN  Outcome: Progressing     Problem: Safety - Adult  Goal: Free from fall injury  11/7/2024 0159 by Marilyn Quinones RN  Outcome: Progressing  11/6/2024 1353 by Emily Sparks RN  Outcome: Progressing     Problem: Skin/Tissue Integrity  Goal: Absence of new skin breakdown  Description: 1.  Monitor for areas of redness and/or skin breakdown  2.  Assess vascular access sites hourly  3.  Every 4-6 hours minimum:  Change oxygen saturation probe site  4.  Every 4-6 hours:  If on nasal continuous positive airway pressure, respiratory therapy assess nares and determine need for appliance change or resting period.  11/7/2024 0159 by Marilyn Quinones RN  Outcome: Progressing  11/6/2024 1353 by Emily Sparks RN  Outcome: Progressing     Problem: Neurosensory - Adult  Goal: Achieves stable or improved neurological status  11/7/2024 0159 by Marilyn Quinones RN  Outcome: Progressing  11/6/2024 1353 by Emily Sparks RN  Outcome: Progressing  Goal: Absence of seizures  11/7/2024 0159 by Marilyn Quinones RN  Outcome: Progressing  11/6/2024 1353 by Emily Sparks RN  Outcome: Progressing  Goal: Remains free of injury related to seizures activity  11/7/2024 0159 by Marilyn Quinones RN  Outcome: Progressing  11/6/2024 1353 by Emily Sparks RN  Outcome: Progressing  Goal: Achieves maximal functionality and self care  11/7/2024 0159 by Marilyn Quinones RN  Outcome: Progressing  11/6/2024 1353 by Emily Sparks RN  Outcome: Progressing

## 2024-11-07 NOTE — PROGRESS NOTES
W/C. Patient requires increased time to complete W/C mobility, VCs for Lt UE use and to improve overall technique.    Occupational therapy: FIMS:   ,  , Assessment: Pt is a 68-year-old male presenting to [facility] from home with altered mental status with potential seizure-like activity. Pt has a history of CVA w/ residual deficits of R-sided hypertonia. Pt is limited by R-sided hypertonia and other deficits listed and would benefit from continued OT to address these deficits to maximize safety and independence w/ ADL completion.    Speech therapy: FIMS:        Lab/X-ray studies reviewed, analyzed and discussed with patient and staff:   Recent Results (from the past 24 hour(s))   CBC with Auto Differential    Collection Time: 11/07/24  5:23 AM   Result Value Ref Range    WBC 5.9 4.8 - 10.8 K/uL    RBC 3.76 (L) 4.70 - 6.10 M/uL    Hemoglobin 11.5 (L) 14.0 - 18.0 g/dL    Hematocrit 33.3 (L) 42.0 - 52.0 %    MCV 88.6 79.0 - 92.2 fL    MCH 30.6 27.0 - 31.3 pg    MCHC 34.5 33.0 - 37.0 %    RDW 14.5 11.5 - 14.5 %    Platelets 267 130 - 400 K/uL    Neutrophils % 51.3 %    Lymphocytes % 38.6 %    Monocytes % 6.7 %    Eosinophils % 1.5 %    Basophils % 0.5 %    Neutrophils Absolute 3.0 1.4 - 6.5 K/uL    Lymphocytes Absolute 2.3 1.0 - 4.8 K/uL    Monocytes Absolute 0.4 0.2 - 0.8 K/uL    Eosinophils Absolute 0.1 0.0 - 0.7 K/uL    Basophils Absolute 0.0 0.0 - 0.2 K/uL   Basic Metabolic Panel    Collection Time: 11/07/24  5:23 AM   Result Value Ref Range    Sodium 139 135 - 144 mEq/L    Potassium 4.7 3.4 - 4.9 mEq/L    Chloride 109 (H) 95 - 107 mEq/L    CO2 22 20 - 31 mEq/L    Anion Gap 8 (L) 9 - 15 mEq/L    Glucose 97 70 - 99 mg/dL    BUN 9 8 - 23 mg/dL    Creatinine 0.76 0.70 - 1.20 mg/dL    Est, Glom Filt Rate >90.0 >60    Calcium 8.4 (L) 8.5 - 9.9 mg/dL           Previous extensive, complex labs, notes and diagnostics reviewed and analyzed.     ALLERGIES:    Allergies as of 10/27/2024    (No Known Allergies)      (please also  after therapy with especial focus on preventing orthostasis and falls risk.    Recurrent depression -emotional support provided daily, vitamin B12, encourage participation in rehabilitation support group and recreational therapy, adjust/add medications.    Hypoxemia requiring supplemental oxygen-Acute rehab for endurance traing with Pulse Ox to monitoring oxygen saturation and heart rate with O2 titration to lowest effective dose. Pulse oximeter checks to shift and at HS to dose and titrate oxygen and aerosol treatments monitor for nocturnal hypoxemia, monitor vital signs, oxygen prn.  Focus on energy conservation.    Hemiparesis affecting dominant side as late effect of cerebrovascular accident (CVA)      Hypothyroidism-  Synthroid and titrate dosing.  Follow vital signs, recheck TSH and thyroid studies as outpatient.    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      Focus on reassessing rehab goals and coordinating therapy and medical self care issues.  Scheduled toileting        Alicia Milner D.O., PM&R     Attending    [phone]   [facility]

## 2024-11-07 NOTE — FLOWSHEET NOTE
Patient assessment complete. Patient is resting in bed at this time with some complaints of right arm discomfort rated 2/10. Patient medicated with scheduled Tylenol 500 MG PO and Baclofen 10 MG PO for right arm discomfort with the rest of his evening medications. Patient took medications whole one at a time with sips of water without difficulty Patient requested apple juice and spike crackers for an HS snack. Patient verbalized no further needs at this time. Bed alarm engaged and call light within reach.

## 2024-11-07 NOTE — CARE COORDINATION
LSW spoke with Angelica from [facility] and she stated that they accepted. Pt will discharge on 11/9 to [facility]. LSW called Karthikeyan and informed him of [facility] accepting with the planned discharge date of 11/9. LSW will arrange transportation via ambulette. LSW then updated pt while he was with Jimenez (PT). Pt verbalized understanding and is in agreement with plan. Electronically signed by ABHAY Cruz, JENNIFER on 11/7/2024 at 9:57 AM

## 2024-11-07 NOTE — DISCHARGE INSTR - COC
Continuity of Care Form    Patient Name: Aramis David   :  1955  MRN:  [id number]    Admit date:  10/27/2024  Discharge date:  2024    Code Status Order: Full Code   Advance Directives:   Advance Care Flowsheet Documentation             Admitting Physician:  Alicia Milner DO  PCP: Syd Maher MD    Discharging Nurse: Eunice Castellano RN  Discharging Hospital Unit/Room#: R233/R233-01  Discharging Unit Phone Number: [phone]    Emergency Contact:   Extended Emergency Contact Information  Primary Emergency Contact: Nikhil David   [facility]  Home Phone: [phone]  Mobile Phone: [phone]  Relation: Brother/Sister  Secondary Emergency Contact: Ivette Magallon   [facility]  Home Phone: [phone]  Mobile Phone: [phone]  Relation: Brother/Sister    Past Surgical History:  No past surgical history on file.    Immunization History:   Immunization History   Administered Date(s) Administered    COVID-19, MODERNA BLUE border, Primary or Immunocompromised, (age 12y+), IM, 100 mcg/0.5mL 2021, 2021, 2021, 2022    COVID-19, MODERNA, (age 12y+), IM, 50mcg/0.5mL 10/17/2023    Influenza, FLUAD, (age 65 y+), IM, Quadv, 0.5mL 2021    Influenza, FLUARIX, FLULAVAL, FLUZONE (age 6 mo+) and AFLURIA, (age 3 y+), Quadv PF, 0.5mL 10/07/2014, 2016    Influenza, FLUZONE High Dose (age 65 y+), IM, Quadv, 0.7mL 10/17/2023    Pneumococcal, PCV20, PREVNAR 20, (age 6w+), IM, 0.5mL 10/17/2023    Pneumococcal, PPSV23, PNEUMOVAX 23, (age 2y+), SC/IM, 0.5mL 2020    TDaP, ADACEL (age 10y-64y), BOOSTRIX (age 10y+), IM, 0.5mL 2016    Td, unspecified formulation 12/15/2004    Zoster Recombinant (Shingrix) 2022, 2023       Active Problems:  Patient Active Problem List   Diagnosis Code    History of CVA (cerebrovascular accident) Z86.73    Essential hypertension I10    Recurrent depression (HCC) F33.9    Seizure (HCC) R56.9

## 2024-11-07 NOTE — PROGRESS NOTES
Physical Therapy Rehab Treatment Note  Facility/Department: [facility]  [facility]       NAME: Aramis David  : 1955 (68 y.o.)  MRN: [id number]  CODE STATUS: Full Code    Date of Service: 2024       Restrictions:  Restrictions/Precautions: Fall Risk       SUBJECTIVE:   Subjective: \"Good game\"    Pain  Pain: 3/10 right sided pain. pt held up left hand to represent #3/10 pain      OBJECTIVE:   Bed Mobility  Overall Assistance Level: Minimal Assistance  Additional Factors: Verbal cues;Increased time to complete;Head of bed flat;Without handrails  Roll Left  Assistance Level: Minimal assistance  Skilled Clinical Factors: increased assist required d/t fatigue, completed multiple times during hygeine and lower body dressing  Roll Right  Assistance Level: Independent  Skilled Clinical Factors: improved technique and facilitation this session, completed multiple times during hygeine and lower body dressing  Sit to Supine  Assistance Level: Minimal assistance  Skilled Clinical Factors: assist at RLE, pt able to initiate movement but required assistance to complete, increased assist required d/t fatigue  Supine to Sit  Assistance Level: Minimal assistance  Skilled Clinical Factors: assist at trunk for EOB balance, vc's throughout for technique with fair carry over  Scooting  Assistance Level: Minimal assistance  Skilled Clinical Factors: scooting towards EOB    Transfers  Surface: Wheelchair;To bed;From bed  Additional Factors: Verbal cues;Increased time to complete  Device:  (jose rail)  Sit to Stand  Assistance Level: Minimal assistance;Stand by assist  Skilled Clinical Factors: face to face transfer, RLE blocked at knee and ankle for stability,  completed x 3 this date with improved ability to obtain upright posture, able to complete with SBA by end of session  Stand to Sit  Assistance Level: Minimal assistance  Skilled Clinical Factors: assist to improve eccentric lowering  Stand Pivot  Assistance

## 2024-11-07 NOTE — PLAN OF CARE
Problem: Chronic Conditions and Co-morbidities  Goal: Patient's chronic conditions and co-morbidity symptoms are monitored and maintained or improved  11/7/2024 1020 by Emily Sparks RN  Outcome: Progressing  11/7/2024 0159 by Marilyn Quinones RN  Outcome: Progressing     Problem: Discharge Planning  Goal: Discharge to home or other facility with appropriate resources  11/7/2024 1020 by Emily Sparks RN  Outcome: Progressing  11/7/2024 0159 by Marilyn Quinones RN  Outcome: Progressing     Problem: Safety - Adult  Goal: Free from fall injury  11/7/2024 1020 by Emily Sparks RN  Outcome: Progressing  11/7/2024 0159 by Marilyn Quinones RN  Outcome: Progressing     Problem: Skin/Tissue Integrity  Goal: Absence of new skin breakdown  Description: 1.  Monitor for areas of redness and/or skin breakdown  2.  Assess vascular access sites hourly  3.  Every 4-6 hours minimum:  Change oxygen saturation probe site  4.  Every 4-6 hours:  If on nasal continuous positive airway pressure, respiratory therapy assess nares and determine need for appliance change or resting period.  11/7/2024 1020 by Emily Sparks RN  Outcome: Progressing  11/7/2024 0159 by Marilyn Quinones RN  Outcome: Progressing     Problem: Neurosensory - Adult  Goal: Achieves stable or improved neurological status  11/7/2024 1020 by Emily Sparks RN  Outcome: Progressing  11/7/2024 0159 by Marilyn Quinones RN  Outcome: Progressing  Goal: Absence of seizures  11/7/2024 1020 by Emily Sparks RN  Outcome: Progressing  11/7/2024 0159 by Marilyn Quinones RN  Outcome: Progressing  Goal: Remains free of injury related to seizures activity  11/7/2024 1020 by Emily Sparks RN  Outcome: Progressing  11/7/2024 0159 by Marilyn Quinones RN  Outcome: Progressing  Goal: Achieves maximal functionality and self care  11/7/2024 1020 by Emily Sparks RN  Outcome: Progressing  11/7/2024 0159 by Marilyn Quinones RN  Outcome: Progressing

## 2024-11-07 NOTE — PROGRESS NOTES
OCCUPATIONAL THERAPY  INPATIENT REHAB TREATMENT NOTE  [facility]      NAME: Aramis David  : 1955 (68 y.o.)  MRN: [id number]  CODE STATUS: Full Code  Room: 33/R233-01    Date of Service: 2024    Referring Physician: Alicia Milner DO  Rehab Diagnosis: Impaired mobility and ADL's d/t new onset mental status change possible seizure disorder triggered by UTI    Hospital course:   Comments: 68 y.o. male patient who presented to ER via EMS 10/22 with altered mental status (confusion, staring off and now answering questions.) Patient with PMHx of CVA with right sided deficits. No acute findings on CT of head.  Initial lab testing remarkable for white blood cells 17.9, creatinine of 2.08, high-sensitivity troponin of 185, CRP of 29.8, procalcitonin of 1.78, lactate of 2.3, CK of 2718. Though ECG showing SR and no acute changes, troponins elevated. Patient admitted for additional work up and evaluation with consults from neurology and cardiology.      Restrictions  Restrictions/Precautions  Restrictions/Precautions: Fall Risk                 Patient's date of birth confirmed: Yes    SAFETY:  Safety Devices  Safety Devices in place: Yes  Type of devices: All fall risk precautions in place    SUBJECTIVE:   Patient reported that the pain that he has in his right arm is long standing    Pain at start of treatment: Yes: 3/10    Pain at end of treatment: Yes: 3/10    Location: right arm  Description: Patient unable to describe   Nursing notified: Declined  Nurse: N/A  Intervention: None    COGNITION:     Followed directions well        OBJECTIVE:       Patient requested to work on left UE strengthening as indicated by him making the motions of weight lifting when asked what he would like to do this pm. Patient used a one pound weight for the shoulder and wrist exercises and a 2 pound weight for the elbow and forearm exercises.      Gentle massage was performed to the right forearm for pain relief

## 2024-11-07 NOTE — PROGRESS NOTES
[facility]  Facility/Department: [facility]  Speech Language Pathology   Treatment Note          Aramis David  1955  R233/R233-01  [x]   confirmed    Date: 2024      Restrictions/Precautions: Fall Risk     ADULT DIET; Regular  ADULT ORAL NUTRITION SUPPLEMENT; Breakfast, Dinner; Standard High Calorie/High Protein Oral Supplement     Respiratory Status:   room air   C Diff Contact    Rehab Diagnosis: Impaired mobility and ADL's due to new onset mental status change possible seizure disorder triggered by UTI     Subjective:  Alert, Cooperative, and Pleasant      SLP located pts personal AAC device in room. It was 60% charged. SLP continued to encourage pt to keep device with him to improve communication atts.    Interventions used this date:  AAC    Objective/Assessment:  Patient progressing towards goals:  Short Term Goals  Time Frame for Short Term Goals: 1-2 weeks  Goal 1: Pt will answer simple WH and yes/no questions using their communication board/AAC with min assist in 90% of opportunities to increase his/her ability to effectively communicate their wants and needs.  Low level Yes/No: 100% acc ind  Mid level Yes/No: 90% acc ind  High level Yes/No: 50% acc ind increasing to 74% acc with min cues and rephrasing. Difficulty with \"heavier than.... and lighter than...\" questions.   Goal 2: Patient will identify objects/pictures within a field 2-4 with min cues with 90% accuracy.  Fo3: 97% acc ind  When asked questions in regards to prompted page (I.e. what's your favorite restaurant) pt selected an icon with 100% with appropriate response to follow up questions sign yes/no or verbal speech.   Goal 3: Patient will follow 1-2 step verbal directions with mincues with 90% accuracy.  Not addressed   Goal 4: Pt will use his picture system to request a recurrence using # icons (more, again, etc.) with min to mod in 50% of opportunities to increase is/her ability to effectively communicate their wants and

## 2024-11-08 PROCEDURE — 6370000000 HC RX 637 (ALT 250 FOR IP): Performed by: PHYSICAL MEDICINE & REHABILITATION

## 2024-11-08 PROCEDURE — APPSS15 APP SPLIT SHARED TIME 0-15 MINUTES: Performed by: NURSE PRACTITIONER

## 2024-11-08 PROCEDURE — 92507 TX SP LANG VOICE COMM INDIV: CPT

## 2024-11-08 PROCEDURE — 6370000000 HC RX 637 (ALT 250 FOR IP): Performed by: INTERNAL MEDICINE

## 2024-11-08 PROCEDURE — 97535 SELF CARE MNGMENT TRAINING: CPT

## 2024-11-08 PROCEDURE — 1180000000 HC REHAB R&B

## 2024-11-08 PROCEDURE — 99232 SBSQ HOSP IP/OBS MODERATE 35: CPT | Performed by: PHYSICAL MEDICINE & REHABILITATION

## 2024-11-08 PROCEDURE — 97530 THERAPEUTIC ACTIVITIES: CPT

## 2024-11-08 PROCEDURE — 99232 SBSQ HOSP IP/OBS MODERATE 35: CPT | Performed by: PSYCHIATRY & NEUROLOGY

## 2024-11-08 PROCEDURE — 97110 THERAPEUTIC EXERCISES: CPT

## 2024-11-08 RX ORDER — DICYCLOMINE HCL 20 MG
20 TABLET ORAL
Qty: 120 TABLET | Refills: 3
Start: 2024-11-08

## 2024-11-08 RX ORDER — UBIDECARENONE 100 MG
100 CAPSULE ORAL DAILY
Qty: 60 CAPSULE | Refills: 5
Start: 2024-11-09

## 2024-11-08 RX ORDER — CHOLECALCIFEROL (VITAMIN D3) 50 MCG
2000 TABLET ORAL
Qty: 60 TABLET | Refills: 4
Start: 2024-11-08

## 2024-11-08 RX ORDER — LACTOBACILLUS RHAMNOSUS GG 10B CELL
2 CAPSULE ORAL 3 TIMES DAILY
Status: DISCONTINUED | OUTPATIENT
Start: 2024-11-08 | End: 2024-11-09 | Stop reason: HOSPADM

## 2024-11-08 RX ADMIN — DICYCLOMINE HYDROCHLORIDE 20 MG: 20 TABLET ORAL at 06:20

## 2024-11-08 RX ADMIN — Medication 2 CAPSULE: at 11:03

## 2024-11-08 RX ADMIN — Medication 125 MG: at 17:18

## 2024-11-08 RX ADMIN — ASPIRIN 81 MG: 81 TABLET, CHEWABLE ORAL at 10:44

## 2024-11-08 RX ADMIN — ACETAMINOPHEN 325MG 650 MG: 325 TABLET ORAL at 06:19

## 2024-11-08 RX ADMIN — DULOXETINE HYDROCHLORIDE 60 MG: 60 CAPSULE, DELAYED RELEASE ORAL at 10:44

## 2024-11-08 RX ADMIN — Medication 100 MG: at 10:43

## 2024-11-08 RX ADMIN — Medication 2000 UNITS: at 17:02

## 2024-11-08 RX ADMIN — DICYCLOMINE HYDROCHLORIDE 20 MG: 20 TABLET ORAL at 17:02

## 2024-11-08 RX ADMIN — BACLOFEN 10 MG: 10 TABLET ORAL at 15:07

## 2024-11-08 RX ADMIN — LEVETIRACETAM 1250 MG: 500 TABLET, FILM COATED ORAL at 23:30

## 2024-11-08 RX ADMIN — Medication 125 MG: at 23:30

## 2024-11-08 RX ADMIN — RIVAROXABAN 20 MG: 20 TABLET, FILM COATED ORAL at 11:03

## 2024-11-08 RX ADMIN — Medication 125 MG: at 12:45

## 2024-11-08 RX ADMIN — ACETAMINOPHEN 500 MG: 500 TABLET ORAL at 10:44

## 2024-11-08 RX ADMIN — HYDROCORTISONE: 25 CREAM TOPICAL at 23:31

## 2024-11-08 RX ADMIN — Medication 125 MG: at 06:20

## 2024-11-08 RX ADMIN — LEVOTHYROXINE SODIUM 50 MCG: 0.05 TABLET ORAL at 06:20

## 2024-11-08 RX ADMIN — Medication 2 CAPSULE: at 15:07

## 2024-11-08 RX ADMIN — DICYCLOMINE HYDROCHLORIDE 20 MG: 20 TABLET ORAL at 10:42

## 2024-11-08 RX ADMIN — POTASSIUM CHLORIDE 40 MEQ: 1500 TABLET, EXTENDED RELEASE ORAL at 10:42

## 2024-11-08 RX ADMIN — BACLOFEN 10 MG: 10 TABLET ORAL at 23:29

## 2024-11-08 RX ADMIN — LEVETIRACETAM 1250 MG: 500 TABLET, FILM COATED ORAL at 10:42

## 2024-11-08 RX ADMIN — ARIPIPRAZOLE 2 MG: 2 TABLET ORAL at 10:42

## 2024-11-08 RX ADMIN — POTASSIUM CHLORIDE 40 MEQ: 1500 TABLET, EXTENDED RELEASE ORAL at 23:29

## 2024-11-08 RX ADMIN — ACETAMINOPHEN 500 MG: 500 TABLET ORAL at 15:08

## 2024-11-08 RX ADMIN — DICYCLOMINE HYDROCHLORIDE 20 MG: 20 TABLET ORAL at 23:29

## 2024-11-08 RX ADMIN — ACETAMINOPHEN 500 MG: 500 TABLET ORAL at 23:30

## 2024-11-08 RX ADMIN — HYDROCORTISONE: 25 CREAM TOPICAL at 12:47

## 2024-11-08 RX ADMIN — ATORVASTATIN CALCIUM 80 MG: 80 TABLET, FILM COATED ORAL at 23:29

## 2024-11-08 RX ADMIN — Medication 2 CAPSULE: at 23:29

## 2024-11-08 RX ADMIN — BACLOFEN 10 MG: 10 TABLET ORAL at 10:43

## 2024-11-08 ASSESSMENT — ENCOUNTER SYMPTOMS
CHEST TIGHTNESS: 0
CHOKING: 0
COLOR CHANGE: 0
SHORTNESS OF BREATH: 0
WHEEZING: 0
NAUSEA: 0
COUGH: 0
PHOTOPHOBIA: 0
VOMITING: 0
TROUBLE SWALLOWING: 0
BACK PAIN: 0

## 2024-11-08 ASSESSMENT — PAIN SCALES - GENERAL
PAINLEVEL_OUTOF10: 4
PAINLEVEL_OUTOF10: 2
PAINLEVEL_OUTOF10: 3
PAINLEVEL_OUTOF10: 4

## 2024-11-08 ASSESSMENT — PAIN DESCRIPTION - DESCRIPTORS
DESCRIPTORS: ACHING
DESCRIPTORS: ACHING
DESCRIPTORS: ACHING;CRAMPING
DESCRIPTORS: ACHING;DISCOMFORT

## 2024-11-08 ASSESSMENT — PAIN DESCRIPTION - ORIENTATION
ORIENTATION: RIGHT

## 2024-11-08 ASSESSMENT — PAIN DESCRIPTION - LOCATION
LOCATION: ARM

## 2024-11-08 NOTE — CARE COORDINATION
Called Physicians to schedule transport from [facility] to [facility] tomorrow at 2pm: however Dominga from physicians stated they can not schedule transports until there is a DC order and paperwork.  They no longer will schedule in advance that the staff must call to schedule when the discharge paperwork and patient are ready.  Will have the transport papers on chart and staff can call DC paperwork/order is completed.  Electronically signed by Cristin Turner RN on 11/8/24 at 11:31 AM EST

## 2024-11-08 NOTE — PROGRESS NOTES
Physical Therapy Rehab Treatment Note  Facility[facility]Department[facility]  [facility]       NAME: Aramis David  : 1955 (68 y.o.)  MRN: [id number]  CODE STATUS: Full Code    Date of Service: 2024       Restrictions:  Restrictions/Precautions: Contact Precautions, Fall Risk       SUBJECTIVE:   Subjective: \"I am leaving\"    Pain  Pain: 3/10 right sided pain. pt held up left hand to represent #3/10 pain      OBJECTIVE:     Bed Mobility  Overall Assistance Level: Minimal Assistance  Additional Factors: Verbal cues;Increased time to complete;Head of bed flat;Without handrails  Roll Left  Assistance Level: Minimal assistance  Skilled Clinical Factors: increased assist required d/t fatigue  Roll Right  Assistance Level: Minimal assistance  Skilled Clinical Factors: improved technique and facilitation this session  Sit to Supine  Assistance Level: Minimal assistance  Skilled Clinical Factors: assist at RLE, pt able to initiate movement but required assistance to complete, increased assist required d/t fatigue  Supine to Sit  Assistance Level: Minimal assistance  Skilled Clinical Factors: assist at trunk for EOB balance, vc's throughout for technique with fair carry over  Scooting  Assistance Level: Minimal assistance  Skilled Clinical Factors: scooting towards EOB    Transfers  Surface: Wheelchair;To bed  Additional Factors: Verbal cues;Increased time to complete  Device:  (jose rail)  Sit to Stand  Assistance Level: Minimal assistance;Stand by assist  Skilled Clinical Factors: face to face transfer, RLE blocked at knee and ankle for stability,  completed x 3 this date with improved ability to obtain upright posture, able to complete with SBA by end of session  Stand to Sit  Assistance Level: Minimal assistance  Skilled Clinical Factors: assist to improve eccentric lowering  Bed To/From Chair  Technique: Stand pivot  Assistance Level: Moderate assistance  Skilled Clinical Factors: face to face SPT

## 2024-11-08 NOTE — PROGRESS NOTES
oxygen-Acute rehab for endurance traing with Pulse Ox to monitoring oxygen saturation and heart rate with O2 titration to lowest effective dose. Pulse oximeter checks to shift and at HS to dose and titrate oxygen and aerosol treatments monitor for nocturnal hypoxemia, monitor vital signs, oxygen prn.  Focus on energy conservation.    Hemiparesis affecting dominant side as late effect of cerebrovascular accident (CVA)      Hypothyroidism-  Synthroid and titrate dosing.  Follow vital signs, recheck TSH and thyroid studies as outpatient.    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      Focus on emotional health and caregiver involvement in care.  Continue to monitor stools and complete vancomycin          Alicia Milner D.O., PM&R     Attending    [phone]   [facility]

## 2024-11-08 NOTE — PROGRESS NOTES
OCCUPATIONAL THERAPY  INPATIENT REHAB TREATMENT NOTE  [facility]      NAME: Aramis David  : 1955 (68 y.o.)  MRN: [id number]  CODE STATUS: Full Code  Room: R233/R233-01    Date of Service: 2024    Referring Physician: Alicia Milner DO  Rehab Diagnosis: Impaired mobility and ADL's d/t new onset mental status change possible seizure disorder triggered by UTI    Hospital course:   Comments: 68 y.o. male patient who presented to ER via EMS 10/22 with altered mental status (confusion, staring off and now answering questions.) Patient with PMHx of CVA with right sided deficits. No acute findings on CT of head.  Initial lab testing remarkable for white blood cells 17.9, creatinine of 2.08, high-sensitivity troponin of 185, CRP of 29.8, procalcitonin of 1.78, lactate of 2.3, CK of 2718. Though ECG showing SR and no acute changes, troponins elevated. Patient admitted for additional work up and evaluation with consults from neurology and cardiology.      Restrictions  Restrictions/Precautions  Restrictions/Precautions: Fall Risk, Contact Precautions (C-Diff)     Patient's date of birth confirmed: Yes - with increased time and effort    SAFETY:  Safety Devices  Safety Devices in place: Yes  Type of devices: All fall risk precautions in place    SUBJECTIVE: \"What's this\" pt stated when handing therapist broken clothes pin.     Pain  Pain at start of treatment: Yes: 4/10    Pain at end of treatment: Yes: 4/10    Location: RUE  Nursing notified: Declined  Intervention: Repositioned  Pillow placed under pt's RUE at end of session to provide support.       OBJECTIVE: Pt in bed resting upon arrival. Pt initially shakes his head \"no\" when asked if he is ready for therapy. Pt reports increased fatigue. Pt agreeable to participating in therapeutic activities at bed level. Pt completed as follows.       Coordination     Clothes Pins: Pt used his left hand to don/doff plastic clothes pins onto a string at

## 2024-11-08 NOTE — PROGRESS NOTES
Appears intact  Problem Solving: Assistance required to implement solutions  Insights: Decreased awareness of deficits  Initiation: Requires cues for some  Sequencing: Requires cues for some  Cognition Comment: Pt has baseline aphasia from previous CVA      Pt's current cognitive status is:  Comprehension: Mod I  Expression: SBA  Social Interaction: SBA  Problem Solving: SBA  Memory: Supervision    OBJECTIVE:       ADL  Grooming/Oral Hygiene  Assistance Level: Set-up  Skilled Clinical Factors: Pt completed oral care, combed his hair, and washed his face sitting upright in bed.  Upper Extremity Bathing  Assistance Level: Moderate assistance  Skilled Clinical Factors: Assist to bathe LUE and thoroughly bathe R hand to avoid staph buildup in hand contracture.  Lower Extremity Bathing  Assistance Level: Minimal assistance  Skilled Clinical Factors: Assist to bathe B feet and posterior ara area while pt sitting up in bed.  Upper Extremity Dressing  Assistance Level: Minimal assistance  Skilled Clinical Factors: Assist to thread RUE through shirt sleeve  Lower Extremity Dressing  Assistance Level: Moderate assistance  Skilled Clinical Factors: Assist to thread BLEs through pant legs; Min A to pull pants up over hips. Pt rolled well in bed for old brief to be pulled away and new brief to be donned.  Pt assisted in fastening new brief.  Putting On/Taking Off Footwear  Assistance Level: Stand by assist  Skilled Clinical Factors: to [facility] hospital socks, don compression socks, and slip B feet into shoes  Toileting  Skilled Clinical Factors: pt declined the need to toilet this date.  Toilet Transfers  Skilled Clinical Factors: Pt declined need.       Functional mobility  Roll Left  Assistance Level: Stand by assist  Roll Right  Assistance Level: Stand by assist  Supine to Sit  Assistance Level: Stand by assist  Transfers  Surface: From bed;To chair with arms  Sit to Stand  Assistance Level: Stand by assist  Stand to  listed below to acheive specific LTGs as stated in the initial evaluation.  Long Term Goal 1: Increase strength for ADL and functional mobility independence  Long Term Goal 2: Increase endurance for ADL and functional mobility independence  Long Term Goal 3: Improve RUE mobility for increased ADL and functional mobility independence  Long Term Goal 4: Improve cognition for increased safety and independce for ADLs and functional mobility        Therapy Time:   Individual Group Co-Treat   Time In 0830       Time Out 0930         Minutes 60                   ADL/IADL trainin minutes  Therapeutic activities: 20 minutes     Electronically signed by:    Ida Gilliam OT,   2024, 11:21 AM

## 2024-11-08 NOTE — PROGRESS NOTES
Physical Therapy Rehab Treatment Note  Facility/Department: [facility]  Room: [id number]       NAME: Aramis David  : 1955 (68 y.o.)  MRN: [id number]  CODE STATUS: Full Code    Date of Service: 2024       Restrictions:  Restrictions/Precautions: Contact Precautions, Fall Risk       SUBJECTIVE:   Subjective: \"So tired\"    Pain  Pain: 3/10 right sided pain. pt held up left hand to represent #3/10 pain      OBJECTIVE:     PT Exercises  A/AROM Exercises: supine AP/HS/Hip Abd/SLR x10 BLE    Activity Tolerance  Activity Tolerance: Patient tolerated treatment well    ASSESSMENT/PROGRESS TOWARDS GOALS:   Assessment  Assessment: Pt DC'ing to SNF tomorrow. Expresses increased fatigue this PM, agreeable to supine therex with encouragement but then requests to rest for remainder of time d/t fatigue.  Activity Tolerance: Patient tolerated treatment well    Goals:  Long Term Goals  Long Term Goal 1: Pt to complete bed mobility with min assist  Long Term Goal 2: Pt to complete bed transfers with mod assist +1  Long Term Goal 3: Pt to ambulate 10ft with appropriate device and mod assist +2 at hemirail  Long Term Goal 4: mod assist w/c mobility 50 feet  Long Term Goal 5: Pt able to maintain sitting and perform scooting tasks along EOB with min- mod assist  Additional Goals?: Yes  Long term goal 6: pt to require min assist for curb step to allow for home entry    PLAN OF CARE/Safety:   Safety Devices  Type of Devices: All fall risk precautions in place;Call light within reach;Bed alarm in place;Left in bed      Therapy Time:   Individual   Time In 1300   Time Out 1315   Minutes 15      Minutes: 15  Transfer/Bed mobility training:  Gait training:  Neuro re education:  Therapeutic ex: 15  Pt missed 45 mins d/t increased fatigue.     Nasir Armijo PTA, 24 at 1:21 PM

## 2024-11-08 NOTE — PROGRESS NOTES
Assessment completed this shift. Alert and oriented x 3. Uncertain of date. Delayed responses.Lungs clear and BS active. No BM yet this shift. Abrasion just below right elbow. Bordered foam dressing applied. In bed with call light in reach.  Electronically signed by Eunice Castellano RN on 11/8/2024 at 2:07 PM

## 2024-11-08 NOTE — PROGRESS NOTES
[facility]   Facility/Department: [facility]  Speech Language Pathology  Discharge Report        Patient: Aramis David  : 1955    Date: 2024    Initial Status:  Diet:   Regular diet with thin liquids  Dysphagia Outcome Severity Scale:  Ratin    Speech Therapy Level of Assistance Scale:  Auditory Comprehension:  Rating: Moderate Assistance-Maximal Assistance  Verbal Expression:  Rating:Maximal Assistance  Motor Speech:  Rating: Maximal Assistance      Long Term Goals:  Long Term Goals  Time Frame for Long Term Goals: 2-3 weeks  Goal 1: Patient will improve Receptive Language abilities to increase comprehension of conversation and safety directions with familiar and unfamiliar communication partners.  Goal 2: Pt will use his augmentative and alternative communication system (i.e SGD, PECS, etc/) to effectively communicate his/her wants and needs to family, school, and community members in 70% of opportunities across all environments independently, within 1-2 weeks.        Patient's Response to Therapy:  Patient's brother brought in pt's AAC device and pt was encouraged to use with communication exchanges.  In structured tasks, pt uses device to answer questions with min cues, if unable to answer verbally.        Discharge Status:  Diet:   ADULT DIET; Regular  ADULT ORAL NUTRITION SUPPLEMENT; Breakfast, Dinner; Standard High Calorie/High Protein Oral Supplement     Dysphagia Outcome Severity Scale:  Ratin    Speech Therapy Level of Assistance Scale:  Auditory Comprehension:  Rating: Minimal Assistance  Verbal Expression:  Rating:Moderate Assistance  Motor Speech:  Rating: Moderate Assistance      Functional Status at time of Discharge:    Language: Patient demonstrates moderate-severe language deficits.    Motor Speech: Patient demonstrates severe motor speech deficits.    Swallow: Patient demonstrates no dysphagia.                                 Patient is discharged to Skilled nursing

## 2024-11-08 NOTE — CARE COORDINATION
Pt will discharge to [facility] on 11/9. 7000 form and discharge envelope were completed and given to the unit secretary. Patient satisfaction survey completed. Transport has to be called in by  tomorrow when pt is ready to discharge. Electronically signed by BAHAY Cruz, JENNIFER on 11/8/2024 at 1:36 PM

## 2024-11-08 NOTE — PROGRESS NOTES
[facility]  Facility/Department: [facility]  Speech Language Pathology   Treatment Note          Aramis David  1955  R233/R233-01  [x]   confirmed    Date: 2024      Restrictions/Precautions: Contact Precautions, Fall Risk     ADULT DIET; Regular  ADULT ORAL NUTRITION SUPPLEMENT; Breakfast, Dinner; Standard High Calorie/High Protein Oral Supplement     Respiratory Status:   Room air  C Diff Contact    Rehab Diagnosis: Impaired mobility and ADL's due to new onset mental status change possible seizure disorder triggered by UTI     Subjective:  Alert and Cooperative        Interventions used this date:  AAC    Objective/Assessment:  Patient progressing towards goals:  Goal 1: Pt will answer simple WH and yes/no questions using their communication board/AAC with min assist in 90% of opportunities to increase his/her ability to effectively communicate their wants and needs.  Pt answered Wh questions regarding self either with verbal response or with AAC device with 75% accuracy.   Goal 2: Patient will identify objects/pictures within a field 2-4 with min cues with 90% accuracy.  Goal 3: Patient will follow 1-2 step verbal directions with mincues with 90% accuracy.  Goal 4: Pt will use his picture system to request a recurrence using # icons (more, again, etc.) with min to mod in 50% of opportunities to increase is/her ability to effectively communicate their wants and needs.  Goal 5: Pt will locate the target icon given a verbal prompt (i.e., \"show me the ___.\", point to the ___.\", \"where is the ___.\") on their picture system with min to mod assist in50% of opportunities to increase his/her ability to effectively communicate their wants and needs, within 1-2 weeks.  Assistance needed to locate family page and keyboard.  BIMS was completed with device:  3 word recall: pt stated \"I can't\" but repeated 1/3 words immediately after  Year: using keyboard pt typed '  Month: using keyboard pt typed  'Nov'  Day: verbally stated \"Friday\"  Goal 6: Pt will comment, question, or request using their picture system with min to mod in 50% of opportunities to increase his/her ability to effectively communicate their wants and needs, within 1-2 weeks      Treatment/Activity Tolerance:  Patient tolerated treatment well    Plan:  Discharge date set for tomorrow    Pain Assessment:  Patient c/o pain: Location and pain level: 3/10 right arm  Patient declined intervention.  Patient able to proceed with session.    Pain Re-assessment:  Patient c/o pain: Described as same as above    Patient/Caregiver Education:  Patient educated on session and progression towards goals.  Patient stated verbal understanding of directions.    Safety Devices:  Call light within reach and Chair alarm in place        Speech Therapy Level of Assistance Scale    AUDITORY COMPREHENSION  Rating:  Minimal Assistance    VERBAL EXPRESSION  Rating:  Moderate Assistance    MOTOR SPEECH  Rating: Moderate Assistance        Therapy Time  SLP Individual Minutes  Time In: 1005  Time Out: 1025  Minutes: 20            Signature: Electronically signed by OSCAR Crump on 11/8/2024 at 12:10 PM

## 2024-11-08 NOTE — PROGRESS NOTES
[facility]  MUSIC THERAPY  Missed Visit      Date:  11/8/2024        Patient Name: Aramis David       MRN: [id number]        YOB: 1955 (68 y.o.)       Gender: male  Diagnosis: Impaired mobility and ADL's d/t new onset mental status change possible seizure disorder triggered by UTI  Referring Practitioner: Alicia Milner DO    RESTRICTIONS/PRECAUTIONS:  Restrictions/Precautions: Contact Precautions, Fall Risk  Vision: Impaired  Hearing: Within functional limits    ASSESSMENT/OBSERVATIONS:     MT-BC attempted to see pt for music therapy at 10:45am, however patient was not in his room at this time. Patient's therapy schedule does indicate that he should've been in his room, but perhaps there was a change made to the schedule that LEVY was unaware of.     PLAN:     MT-BC will attempt to see patient again at later time, if the schedule/time allows.       АЛЕКСАНДР Yanez, LEVY    11/8/2024  Electronically signed by Jimena Myers on 11/8/2024 at 2:16 PM

## 2024-11-08 NOTE — FLOWSHEET NOTE
Patient resting in bed with some complaints of his right arm feeling sore with discomfort rated 2/10. Patient medicated with scheduled Tylenol 500 MG PO with the rest of his evening medications. Patient was able to take medications one at a time with sips of water without difficulty. Patient repositioned in bed and assisted patient to remove his pants. Patient has been voiding per urinal without difficulty. Cleaned patient with ara wipes and applied scheduled Anusol-HC to areas of redness on coccyx/rectum. Kept the left side of his depends open so patient can void per urinal without difficulty. Patient repositioned in bed with several pillows in place for comfort. Patient requested spike crackers and apple juice for an HS snack. Patient verbalized no further needs at this time. Patient remains on seizure precautions and Enteric Precautions. Patient states he is comfortable and verbalized no further needs at this time. Bed alarm engaged and call light within reach.

## 2024-11-09 VITALS
HEART RATE: 60 BPM | TEMPERATURE: 98.2 F | BODY MASS INDEX: 25.17 KG/M2 | WEIGHT: 166.1 LBS | OXYGEN SATURATION: 99 % | RESPIRATION RATE: 18 BRPM | SYSTOLIC BLOOD PRESSURE: 104 MMHG | DIASTOLIC BLOOD PRESSURE: 69 MMHG | HEIGHT: 68 IN

## 2024-11-09 PROCEDURE — 97110 THERAPEUTIC EXERCISES: CPT

## 2024-11-09 PROCEDURE — 6370000000 HC RX 637 (ALT 250 FOR IP): Performed by: PHYSICAL MEDICINE & REHABILITATION

## 2024-11-09 PROCEDURE — 6370000000 HC RX 637 (ALT 250 FOR IP): Performed by: INTERNAL MEDICINE

## 2024-11-09 RX ORDER — LEVETIRACETAM 1000 MG/1
1000 TABLET ORAL 2 TIMES DAILY
Qty: 60 TABLET | Refills: 0 | Status: SHIPPED | OUTPATIENT
Start: 2024-11-09 | End: 2024-12-09

## 2024-11-09 RX ORDER — POTASSIUM CHLORIDE 1500 MG/1
20 TABLET, EXTENDED RELEASE ORAL DAILY
Qty: 30 TABLET | Refills: 0 | Status: SHIPPED | OUTPATIENT
Start: 2024-11-09 | End: 2024-12-09

## 2024-11-09 RX ORDER — ATORVASTATIN CALCIUM 80 MG/1
80 TABLET, FILM COATED ORAL NIGHTLY
Qty: 30 TABLET | Refills: 3 | Status: SHIPPED | OUTPATIENT
Start: 2024-11-09

## 2024-11-09 RX ORDER — METOPROLOL SUCCINATE 25 MG/1
12.5 TABLET, EXTENDED RELEASE ORAL DAILY
Qty: 30 TABLET | Refills: 3 | Status: SHIPPED | OUTPATIENT
Start: 2024-11-10

## 2024-11-09 RX ADMIN — DICYCLOMINE HYDROCHLORIDE 20 MG: 20 TABLET ORAL at 10:02

## 2024-11-09 RX ADMIN — BACLOFEN 10 MG: 10 TABLET ORAL at 09:50

## 2024-11-09 RX ADMIN — Medication 125 MG: at 12:46

## 2024-11-09 RX ADMIN — ACETAMINOPHEN 500 MG: 500 TABLET ORAL at 14:25

## 2024-11-09 RX ADMIN — METOPROLOL SUCCINATE 12.5 MG: 25 TABLET, FILM COATED, EXTENDED RELEASE ORAL at 09:55

## 2024-11-09 RX ADMIN — Medication 125 MG: at 05:30

## 2024-11-09 RX ADMIN — ACETAMINOPHEN 500 MG: 500 TABLET ORAL at 09:50

## 2024-11-09 RX ADMIN — LEVETIRACETAM 1250 MG: 500 TABLET, FILM COATED ORAL at 09:56

## 2024-11-09 RX ADMIN — ASPIRIN 81 MG: 81 TABLET, CHEWABLE ORAL at 09:50

## 2024-11-09 RX ADMIN — HYDROCORTISONE: 25 CREAM TOPICAL at 12:47

## 2024-11-09 RX ADMIN — HYDROCORTISONE: 25 CREAM TOPICAL at 09:51

## 2024-11-09 RX ADMIN — LEVOTHYROXINE SODIUM 50 MCG: 0.05 TABLET ORAL at 05:30

## 2024-11-09 RX ADMIN — Medication 2 CAPSULE: at 14:25

## 2024-11-09 RX ADMIN — ARIPIPRAZOLE 2 MG: 2 TABLET ORAL at 09:50

## 2024-11-09 RX ADMIN — DULOXETINE HYDROCHLORIDE 60 MG: 60 CAPSULE, DELAYED RELEASE ORAL at 09:50

## 2024-11-09 RX ADMIN — RIVAROXABAN 20 MG: 20 TABLET, FILM COATED ORAL at 09:50

## 2024-11-09 RX ADMIN — DICYCLOMINE HYDROCHLORIDE 20 MG: 20 TABLET ORAL at 05:30

## 2024-11-09 RX ADMIN — BACLOFEN 10 MG: 10 TABLET ORAL at 14:25

## 2024-11-09 RX ADMIN — POTASSIUM CHLORIDE 40 MEQ: 1500 TABLET, EXTENDED RELEASE ORAL at 09:49

## 2024-11-09 RX ADMIN — Medication 100 MG: at 09:50

## 2024-11-09 RX ADMIN — Medication 2 CAPSULE: at 09:51

## 2024-11-09 ASSESSMENT — PAIN SCALES - GENERAL
PAINLEVEL_OUTOF10: 4
PAINLEVEL_OUTOF10: 0
PAINLEVEL_OUTOF10: 4
PAINLEVEL_OUTOF10: 4

## 2024-11-09 ASSESSMENT — PAIN DESCRIPTION - LOCATION
LOCATION: ARM

## 2024-11-09 ASSESSMENT — PAIN - FUNCTIONAL ASSESSMENT: PAIN_FUNCTIONAL_ASSESSMENT: ACTIVITIES ARE NOT PREVENTED

## 2024-11-09 ASSESSMENT — PAIN DESCRIPTION - DESCRIPTORS
DESCRIPTORS: CRAMPING

## 2024-11-09 ASSESSMENT — PAIN DESCRIPTION - ORIENTATION
ORIENTATION: RIGHT

## 2024-11-09 NOTE — FLOWSHEET NOTE
Patient resting in bed at this time with some complaints of right arm discomfort rated 2/10. Assisted patient to reposition in bed and removed his sweatshirt to put on a hospital gown. Patient has been continent of urine and voiding per urinal without difficulty. Patient took evening medications whole one at a time with sips of water without difficulty. Patient requested crackers and apple juice for an HS snack. Patient verbalized no further needs at this time. Patient has seizure precautions and contact enteric precautions in place. Bed alarm engaged and call light within reach.

## 2024-11-09 NOTE — PROGRESS NOTES
Report called to nurse Radhika Barlow. Scheduled p/u 1430.  Electronically signed by Eunice Castellano RN on 11/9/2024 at 2:21 PM

## 2024-11-09 NOTE — PLAN OF CARE
Problem: Chronic Conditions and Co-morbidities  Goal: Patient's chronic conditions and co-morbidity symptoms are monitored and maintained or improved  11/9/2024 0130 by Marilyn Quinones RN  Outcome: Progressing  11/8/2024 1837 by Eunice Castellano RN  Outcome: Progressing     Problem: Discharge Planning  Goal: Discharge to home or other facility with appropriate resources  11/9/2024 0130 by Marilyn Quinones RN  Outcome: Progressing  11/8/2024 1837 by Eunice Castellano RN  Outcome: Progressing  Flowsheets  Taken 11/8/2024 1346  Discharge to home or other facility with appropriate resources: Identify barriers to discharge with patient and caregiver  Taken 11/8/2024 0807  Discharge to home or other facility with appropriate resources: Identify barriers to discharge with patient and caregiver     Problem: Safety - Adult  Goal: Free from fall injury  11/9/2024 0130 by Marilyn Quinones RN  Outcome: Progressing  11/8/2024 1837 by Eunice Castellano RN  Outcome: Progressing     Problem: Skin/Tissue Integrity  Goal: Absence of new skin breakdown  Description: 1.  Monitor for areas of redness and/or skin breakdown  2.  Assess vascular access sites hourly  3.  Every 4-6 hours minimum:  Change oxygen saturation probe site  4.  Every 4-6 hours:  If on nasal continuous positive airway pressure, respiratory therapy assess nares and determine need for appliance change or resting period.  11/9/2024 0130 by Marilyn Quinones RN  Outcome: Progressing  11/8/2024 1837 by Eunice Castellano RN  Outcome: Progressing     Problem: Neurosensory - Adult  Goal: Achieves stable or improved neurological status  11/9/2024 0130 by Marilyn Quinones RN  Outcome: Progressing  11/8/2024 1837 by Eunice Castellano RN  Outcome: Progressing  Goal: Absence of seizures  11/9/2024 0130 by Marilyn Quinones RN  Outcome: Progressing  11/8/2024 1837 by Eunice Castellano RN  Outcome:

## 2024-11-09 NOTE — PROGRESS NOTES
Physical Therapy Rehab Treatment Note  Facility/Department: [facility]  Room: [place] [address]       NAME: Aramis David  : 1955 (68 y.o.)  MRN: [id number]  CODE STATUS: Full Code    Date of Service: 2024       SUBJECTIVE:    Pt reports tired, requests treatment in room    Pain   Pt reports Rt arm pain, did not rate on numerical scale      OBJECTIVE:   Bed mobility  Sit to Supine: Minimal assistance  Scooting: Minimal assistance  Bed Mobility Comments: Bed flat with increased time to complete; use of HR    Transfers  Sit to Stand: Moderate Assistance  Stand to Sit: Moderate Assistance  Bed to Chair: Maximum assistance     Ambulation N/T due to fatigue      PT Exercises  Exercise Treatment: supine ex 10 x 2  A/AROM Exercises: supine AP/HS/Hip Abd/SLR x10 BLE       ASSESSMENT/PROGRESS TOWARDS GOALS:  Assessment: Pt fatigued this date.  Able to complete supine ex with vc's for increase quality.  Treatment time limited by bathroom needs.    Goals:  Long Term Goals  Long Term Goal 1: Pt to complete bed mobility with min assist  Long Term Goal 2: Pt to complete bed transfers with mod assist +1  Long Term Goal 3: Pt to ambulate 10ft with appropriate device and mod assist +2 at hemirail  Long Term Goal 4: mod assist w/c mobility 50 feet  Long Term Goal 5: Pt able to maintain sitting and perform scooting tasks along EOB with min- mod assist  Additional Goals?: Yes  Long term goal 6: pt to require min assist for curb step to allow for home entry    PLAN OF CARE/Safety:          Therapy Time:   Individual   Time In 1030   Time Out 1048   Minutes 18      Minutes:18  Transfer/Bed mobility trainin  Gait training:  Neuro re education:3  Therapeutic ex:10      Tonia Nelson PTA, 24 at 10:56 AM

## 2024-11-09 NOTE — CARE COORDINATION
Called [facility] for transport to [facility]. Explained that discharge almost complete, just waiting for Hospitalist who will see patient after lunch.  time set for 2;30 PM. Notified RN of  time. Electronically signed by Isauro Nolen RN on 11/9/24 at 10:39 AM EST

## 2024-11-09 NOTE — PROGRESS NOTES
Assessment completed this shift. Alert and oriented x 4. Delayed responses and expressive aphasia.Weakness and limited movement right side. Right hand contracted.Lungs clear. Reports 4/10 pain right arm.Scheduled Tylenol and Baclofen given as per MAR. 2 large, brown soft BMs this am. In bed with call light in reach.  Electronically signed by Eunice Castellano RN on 11/9/2024 at 11:32 AM

## 2024-11-09 NOTE — PROGRESS NOTES
Hospitalist Progress Note      PCP: Syd Maher MD    Date of Admission: 10/27/2024    Chief Complaint: weakness    Subjective: patient awake/alert     Medications:  Reviewed    Infusion Medications   Scheduled Medications    lactobacillus  2 capsule Oral TID    potassium chloride  40 mEq Oral BID    vancomycin  125 mg Oral 4 times per day    hydrocortisone   Rectal 4x daily    dicyclomine  20 mg Oral 4x Daily AC & HS    Vitamin D  2,000 Units Oral Dinner    cyanocobalamin  1,000 mcg IntraMUSCular Weekly    coenzyme Q10  100 mg Oral Daily    lidocaine  3 patch TransDERmal Daily    atorvastatin  80 mg Oral Nightly    acetaminophen  500 mg Oral TID    ARIPiprazole  2 mg Oral Daily    aspirin  81 mg Oral Daily    baclofen  10 mg Oral TID    DULoxetine  60 mg Oral Daily    levETIRAcetam  1,250 mg Oral BID    levothyroxine  50 mcg Oral Daily    metoprolol succinate  12.5 mg Oral Daily    rivaroxaban  20 mg Oral Daily with breakfast     PRN Meds: camphor-menthol-methyl salicylate, acetaminophen, bisacodyl, sodium phosphate      Intake/Output Summary (Last 24 hours) at 11/9/2024 1219  Last data filed at 11/9/2024 0344  Gross per 24 hour   Intake 1425 ml   Output 2500 ml   Net -1075 ml       Exam:    /69   Pulse 60   Temp 98.2 °F (36.8 °C) (Oral)   Resp 18   Ht 1.727 m (5' 8\")   Wt 75.3 kg (166 lb 1.6 oz)   SpO2 99%   BMI 25.26 kg/m²     General appearance: No apparent distress, appears stated age and cooperative.  HEENT: Pupils equal, round, and reactive to light. Conjunctivae/corneas clear.  Neck: Supple, with full range of motion. No jugular venous distention. Trachea midline.  Respiratory:  Normal respiratory effort. Clear to auscultation, bilaterally without Rales/Wheezes/Rhonchi.  Cardiovascular: Regular rate and rhythm with normal S1/S2 without murmurs, rubs or gallops.  Abdomen: Soft, non-tender, non-distended with normal bowel sounds.  Musculoskeletal: R sided weakness   Skin: Skin color,     DJD (degenerative joint disease), cervical [M47.812] 12/21/2021    DJD (degenerative joint disease), lumbar [M47.816] 12/21/2021    Aphasia [R47.01] 12/21/2021    Generalized weakness [R53.1] 12/20/2021    Hypothyroidism [E03.9] 03/26/2019    Chronic deep vein thrombosis (DVT) of left popliteal vein (HCC) [I82.532] 03/26/2019    Hemiparesis affecting dominant side as late effect of cerebrovascular accident (CVA) (HCC) [I69.359] 01/30/2018    Hypoxemia requiring supplemental oxygen [R09.02, Z99.81] 10/04/2017    Recurrent depression (HCC) [F33.9] 08/02/2017    Hyperlipidemia [E78.5] 08/02/2017    Essential hypertension [I10] 08/02/2017    History of CVA (cerebrovascular accident) [Z86.73] 08/02/2017    S/P patent foramen ovale closure [Z87.74] 08/02/2017         DVT Prophylaxis: xarelto  Diet: ADULT DIET; Regular  ADULT ORAL NUTRITION SUPPLEMENT; Breakfast, Dinner; Standard High Calorie/High Protein Oral Supplement  Code Status: Full Code    PT/OT Eval Status: done  Dispo -    Acute CVA/seizure- evaluated by neurology service, on keppra. Tramadol were DC  History CVA/R sided weakness- chronic, on ASA  History PE/DVT-full anticoagulation  Hypothyroidism- synthroid, TSH done  Hypotension- resolved,   continue low dose of B blockers   NSTEMI- B blocker/xarelto, appreciate cardiology recommendations    C diff colitis- better with PO vanco, diarrhea resolved  Leucocytosis due to above? Trending down   Hypokalemia- were replaced   Medically stable for acute rehab admission at [facility]     TTS: 45 minutes where I focused more than 75% of my attention on rendering care, and planning treatment course for this patient, in addition to talking to RN team, mid levels, consulting with other physicians and following up on labs and imaging.    Electronically signed by Oj Mora MD on 11/9/2024 at 12:19 PM

## 2024-11-11 ENCOUNTER — CARE COORDINATION (OUTPATIENT)
Dept: CARE COORDINATION | Age: 69
End: 2024-11-11

## 2024-11-11 NOTE — DISCHARGE SUMMARY
DISCHARGE SUMMARY    Hospital Course: The patient was admitted to the Rehabilitation Unit to address ADL and mobility deficits-as detailed above and below.  The patient was enrolled in acute PT, OT program.  Weekly team meetings were held to assess functional progress toward their goals-and modify the therapy program.  The patient's medical, emotional, psychosocial and functional issues were addressed.  The patient progressed in the rehab program and is now ready for discharge home.  Refer to functional assessments summary report for detailed functional status.  Refer to the medical problem list below to see the medical issues addressed.  The social and DC complexities are detailed in the DC planning section below.    Greater than 3 5 minutes was spent on coordinating patients discharge including follow-up care, medications and patient/family education.  Extended time needed because of the potential use of controlled medications are high risk medications and a high risk population individual.  Patient and family were instructed to use lowest effective dose of these medications and slowly titrate off over the next 2 to 4 weeks.  They are not to combine opiates with sedatives.     I reviewed her [place] prescription monitoring service data sheets in hopes of eliminating polypharmacy and weaning to the lowest effective dose of pain medications and eliminating the concomitant use of benzodiazepines.  I see   no medications of concern.  I see   no habits of combining sedatives and narcotics.  Controlled Substance Monitoring:    Acute and Chronic Pain Monitoring:   RX Monitoring Acute Pain Prescriptions Periodic Controlled Substance Monitoring   11/8/2024  12:32 PM Prescription exceeds daily limit for a specific reason. See comments or note.;Severe pain not adequately treated with lower dose.;Not required given exclusionary diagnoses... Possible medication side effects, risk of tolerance/dependence & alternative treatments

## 2024-11-11 NOTE — PROGRESS NOTES
Physical Therapy  Facility/Department: [facility]  Rehabilitation Discharge note    NAME: Aramis David  : 1955  MRN: [id number]    Date of discharge: 24    Past Medical History:   Diagnosis Date    Chronic right shoulder pain 10/04/2017    Closed TBI (traumatic brain injury) 2012    Essential hypertension 2017    Hemiparesis affecting dominant side as late effect of cerebrovascular accident (CVA) ([facility]) 2018    History of CVA (cerebrovascular accident) 2017    Hyperlipidemia 2017    Hypoxemia requiring supplemental oxygen 10/04/2017    Lumbosacral disc disease     Osteoarthritis     Recurrent depression ([facility]) 2017    S/P patent foramen ovale closure 2017    Seizure ([facility]) 2017     No past surgical history on file.    Restrictions  Restrictions/Precautions  Restrictions/Precautions: Fall Risk, Contact Precautions    Objective    Bed Mobility  Overall Assistance Level: Minimal Assistance  Additional Factors: Verbal cues;Increased time to complete;Head of bed flat;Without handrails  Roll Left  Assistance Level: Minimal assistance  Skilled Clinical Factors: increased assist required d/t fatigue  Roll Right  Assistance Level: Minimal assistance  Skilled Clinical Factors: improved technique and facilitation this session  Sit to Supine  Assistance Level: Minimal assistance  Skilled Clinical Factors: assist at RLE, pt able to initiate movement but required assistance to complete, increased assist required d/t fatigue  Supine to Sit  Assistance Level: Minimal assistance  Skilled Clinical Factors: assist at trunk for EOB balance, vc's throughout for technique with fair carry over  Scooting  Assistance Level: Minimal assistance  Skilled Clinical Factors: scooting towards EOB     Transfers  Surface: Wheelchair;To bed  Additional Factors: Verbal cues;Increased time to complete  Device:  (jose rail)  Sit to Stand  Assistance Level: Minimal assistance;Stand by

## 2024-11-12 ENCOUNTER — OFFICE VISIT (OUTPATIENT)
Dept: PALLATIVE CARE | Age: 69
End: 2024-11-12
Payer: MEDICARE

## 2024-11-12 DIAGNOSIS — Z51.5 PALLIATIVE CARE ENCOUNTER: ICD-10-CM

## 2024-11-12 DIAGNOSIS — Z74.09 IMPAIRED MOBILITY AND ADLS: Primary | ICD-10-CM

## 2024-11-12 DIAGNOSIS — Z87.820 HX OF TRAUMATIC BRAIN INJURY: ICD-10-CM

## 2024-11-12 DIAGNOSIS — G40.909 SEIZURE DISORDER (HCC): ICD-10-CM

## 2024-11-12 DIAGNOSIS — G89.29 OTHER CHRONIC PAIN: ICD-10-CM

## 2024-11-12 DIAGNOSIS — Z86.73 HISTORY OF STROKE: ICD-10-CM

## 2024-11-12 DIAGNOSIS — R25.2 SPASTICITY AS LATE EFFECT OF CEREBROVASCULAR ACCIDENT (CVA): ICD-10-CM

## 2024-11-12 DIAGNOSIS — Z78.9 IMPAIRED MOBILITY AND ADLS: Primary | ICD-10-CM

## 2024-11-12 DIAGNOSIS — A49.8 CLOSTRIDIOIDES DIFFICILE INFECTION: ICD-10-CM

## 2024-11-12 DIAGNOSIS — I69.398 SPASTICITY AS LATE EFFECT OF CEREBROVASCULAR ACCIDENT (CVA): ICD-10-CM

## 2024-11-12 PROCEDURE — 99310 SBSQ NF CARE HIGH MDM 45: CPT | Performed by: NURSE PRACTITIONER

## 2024-11-12 PROCEDURE — G8484 FLU IMMUNIZE NO ADMIN: HCPCS | Performed by: NURSE PRACTITIONER

## 2024-11-12 PROCEDURE — 1123F ACP DISCUSS/DSCN MKR DOCD: CPT | Performed by: NURSE PRACTITIONER

## 2024-11-12 RX ORDER — ACETAMINOPHEN 325 MG/1
650 TABLET ORAL EVERY 4 HOURS PRN
COMMUNITY

## 2024-11-12 ASSESSMENT — ENCOUNTER SYMPTOMS
RESPIRATORY NEGATIVE: 1
GASTROINTESTINAL NEGATIVE: 1
TROUBLE SWALLOWING: 0

## 2024-11-12 NOTE — PROGRESS NOTES
performing medically appropriate evaluation and examination, and documenting in the medical record.    JULITA Schwartz - CNP    Collaborating physician: Dr. Yeh

## 2024-11-13 ENCOUNTER — CARE COORDINATION (OUTPATIENT)
Dept: CARE COORDINATION | Age: 69
End: 2024-11-13

## 2024-11-14 NOTE — CARE COORDINATION
Care Transitions Post-Acute Facility Update Call    2024    Patient: Aramis David Patient : 1955   MRN: <[id number]>  Reason for Admission: Impaired mobility and activities of daily living dt late effects of CVA   Discharge Date: 24 RARS: Readmission Risk Score: 18.6         Post Acute Facility Update    Care Transitions Post Acute Facility Update    Care Transitions Interventions      Post Acute Facility Update   Post Acute Facility: [facility]          Nursing   Prescribed diet: regular    Nutrition intake assessment: fair   Wounds: Neg   Reported Nursing Updates: VSS, on vanco for C diff until ,  is care conference       Rehab/Functional   Prior Level of Functioning: independent- had paid assist with housekeeping   Cognitive function assessment: A&O X3   ADLs: Moderate Assistance   Bed Mobility: Minimal Assistance   Transfer Assistance: Minimal Assistance   Ambulation Assistance: Minimal Assistance   How far (in feet) is the patient ambulating?: 10   Does patient use an assistive device?: Yes   Assistive Devices: FWW   Rehab Notes: Min for bed/tx/gait 10-15 ft, mod toileting, lower body ADL mod/max       SW/Discharge Planning   Goals of Care: home with HH   Caregiver support: PRN from brother, paid assist for housekeeping   Discharge Planning / Barriers: Limited mobility, limited caregiver support   Next IDT Planned Review: 24           Next IDT Planned Review: 24    No future appointments.    SN Notes:   Diet: - Oral Diet:  General  Wounds: none  Medications: Other: facility  Other:    Post-acute CC Notes:     NONI DELACRUZ RN

## 2024-11-18 ENCOUNTER — OFFICE VISIT (OUTPATIENT)
Dept: GERIATRIC MEDICINE | Age: 69
End: 2024-11-18
Payer: MEDICARE

## 2024-11-18 DIAGNOSIS — M25.50 JOINT PAIN OF LOWER EXTREMITY: ICD-10-CM

## 2024-11-18 DIAGNOSIS — I63.9 CEREBROVASCULAR ACCIDENT (CVA), UNSPECIFIED MECHANISM (HCC): Primary | ICD-10-CM

## 2024-11-18 PROCEDURE — G8484 FLU IMMUNIZE NO ADMIN: HCPCS | Performed by: PHYSICIAN ASSISTANT

## 2024-11-18 PROCEDURE — 99308 SBSQ NF CARE LOW MDM 20: CPT | Performed by: PHYSICIAN ASSISTANT

## 2024-11-18 PROCEDURE — 1123F ACP DISCUSS/DSCN MKR DOCD: CPT | Performed by: PHYSICIAN ASSISTANT

## 2024-11-20 ENCOUNTER — CARE COORDINATION (OUTPATIENT)
Dept: CARE COORDINATION | Age: 69
End: 2024-11-20

## 2024-11-20 NOTE — CARE COORDINATION
Care Transitions Post-Acute Facility Update Call    2024    Patient: Aramis David Patient : 1955   MRN: <[id number]>  Reason for Admission: Impaired mobility and activities of daily living dt late effects of CVA   Discharge Date: 24 RARS: Readmission Risk Score: 18.6       Post Acute Facility Update    Care Transitions Post Acute Facility Update    Care Transitions Interventions      Post Acute Facility Update   Post Acute Facility: [facility]          Nursing   Prescribed diet: regular    Wounds: Neg   Reported Nursing Updates: VSS, vanco through , uses power chair PTA, hx CVA       Rehab/Functional   Prior Level of Functioning: had lived on own with PRN A from friends/brother,   Cognitive function assessment: A&O X3   ADLs: Moderate Assistance   Bed Mobility: Contact Guard Assist - Hands on patient for balance   Transfer Assistance: Contact Guard Assist - Hands on patient for balance   Ambulation Assistance: Contact Guard Assist - Hands on patient for balance   How far (in feet) is the patient ambulating?: 75   Does patient use an assistive device?: Yes   Assistive Devices: FWW   Rehab Notes: CGA/SBA tx, CGA ambulation, ADL upper set up, lower max, toileting min, speech working on dysphagia       SW/Discharge Planning   Goals of Care: DC to retirement   Discharge Planning / Barriers: Limited mobility, limited caregiver support   Next IDT Planned Review: 24           Next IDT Planned Review: 24    No future appointments.    SN Notes:   Diet: - Oral Diet:  General  Wounds: none  Medications: Other: facility  Other:    Post-acute CC Notes:     NONI DELACRUZ, RN

## 2024-11-21 ENCOUNTER — OFFICE VISIT (OUTPATIENT)
Dept: GERIATRIC MEDICINE | Age: 69
End: 2024-11-21

## 2024-11-21 DIAGNOSIS — I10 ESSENTIAL HYPERTENSION: Primary | ICD-10-CM

## 2024-11-21 DIAGNOSIS — R56.9 SEIZURE (HCC): ICD-10-CM

## 2024-11-21 DIAGNOSIS — I63.9 CEREBROVASCULAR ACCIDENT (CVA), UNSPECIFIED MECHANISM (HCC): ICD-10-CM

## 2024-12-04 ENCOUNTER — CARE COORDINATION (OUTPATIENT)
Dept: CARE COORDINATION | Age: 69
End: 2024-12-04

## 2024-12-05 NOTE — CARE COORDINATION
Care Transitions Post-Acute Facility Update Call    2024    Patient: Aramis David Patient : 1955   MRN: <[id number]>  Reason for Admission: Impaired mobility and activities of daily living dt late effects of CVA   Discharge Date: 24 RARS: Readmission Risk Score: 18.6    Post Acute Facility Update    Care Transitions Post Acute Facility Update    Care Transitions Interventions      Post Acute Facility Update   Post Acute Facility: [facility]          Nursing   Prescribed diet: regular    Reported Nursing Updates: VSS, no nursing barriers identified       Rehab/Functional   Prior Level of Functioning: had lived on own with PRN A from friends/brother,   Cognitive function assessment: A&o X3   ADLs: Minimal Assistance   Bed Mobility: Stand by Assist - Presence and Cueing   Transfer Assistance: Stand by Assist - Presence and Cueing   Ambulation Assistance: Contact Guard Assist - Hands on patient for balance, Stand by Assist - Presence and Cueing   How far (in feet) is the patient ambulating?: 75   Does patient use an assistive device?: Yes   Assistive Devices: FWW, power chair   Rehab Notes: Sba/CGA with most activities, ADL- upper: SBA, lower: mod, toileting min       SW/Discharge Planning   Goals of Care: DC to [facility]   Palliative/Hospice Referral indicated: Neg   Discharge Planning / Barriers: Limited mobility, limited caregiver support   Care Progression on Track?: Pos  Next IDT Planned Review: 24           Next IDT Planned Review: 24    No future appointments.    SN Notes:   Diet: - Oral Diet:  General  Wounds: none  Medications: Other: facility  Other:    Post-acute CC Notes:     NONI DELACRUZ RN

## 2024-12-11 ENCOUNTER — OFFICE VISIT (OUTPATIENT)
Dept: GERIATRIC MEDICINE | Age: 69
End: 2024-12-11

## 2024-12-11 DIAGNOSIS — I69.359 HEMIPARESIS AFFECTING DOMINANT SIDE AS LATE EFFECT OF CEREBROVASCULAR ACCIDENT (CVA) (HCC): ICD-10-CM

## 2024-12-11 DIAGNOSIS — R26.9 ABNORMALITY OF GAIT AND MOBILITY: ICD-10-CM

## 2024-12-11 DIAGNOSIS — I63.9 CEREBROVASCULAR ACCIDENT (CVA), UNSPECIFIED MECHANISM (HCC): Primary | ICD-10-CM

## 2024-12-12 ENCOUNTER — CARE COORDINATION (OUTPATIENT)
Dept: CARE COORDINATION | Age: 69
End: 2024-12-12

## 2024-12-13 ENCOUNTER — CARE COORDINATION (OUTPATIENT)
Dept: CARE COORDINATION | Age: 69
End: 2024-12-13

## 2024-12-13 NOTE — CARE COORDINATION
Care Transitions Post-Acute Facility Update Call    2024    Patient: Aramis David Patient : 1955   MRN: <[id number]>  Reason for Admission:  Impaired mobility and activities of daily living dt late effects of CVA   Discharge Date: 24 RARS: Readmission Risk Score: 18.6         Post Acute Facility Update    Care Transitions Post Acute Facility Update    Care Transitions Interventions      Post Acute Facility Update   Post Acute Facility: [facility]          Nursing   Prescribed diet: regular    Nutrition intake assessment: fair- good   Wounds: Neg   Reported Nursing Updates: VSS, no nursing barriers identified       Rehab/Functional   Prior Level of Functioning: PRN A from friend/brother   Cognitive function assessment: A&O X3   ADLs: Minimal Assistance   Bed Mobility: Stand by Assist - Presence and Cueing   Transfer Assistance: Stand by Assist - Presence and Cueing   Ambulation Assistance: Contact Guard Assist - Hands on patient for balance   How far (in feet) is the patient ambulating?: 75   Does patient use an assistive device?: Yes   Assistive Devices: FWW, power chair   Rehab Notes: SBA/CGA with most activities, CGA/min A with ADL, min/mod lower body ADL       SW/Discharge Planning   Goals of Care: DC to [facility]   Anticipated date for discharge: 24   Discharge Planning / Barriers: Limited mobility, limited caregiver support   Care Progression on Track?: Pos  Next IDT Planned Review: 24           Next IDT Planned Review: 24    No future appointments.    SN Notes:   Diet: - Oral Diet:  General  Wounds: none  Medications: Other: facility  Other:    Post-acute CC Notes:     NONI DELACRUZ RN

## 2024-12-13 NOTE — CARE COORDINATION
Care Transitions Note    Initial Call - Call within 2 business days of discharge: Yes    Patient Current Location:   [facility]    Post Acute Care Manager contacted the family, brother  by telephone to perform post hospital discharge assessment, verified name and  as identifiers. Provided introduction to self, and explanation of the Post Acute Care Manager role.     Patient: Aramis David    Patient : 1955   MRN: <[id number]>    Reason for Admission: Impaired mobility and activities of daily living dt late effects of CVA   Discharge Date: 24  RURS: Readmission Risk Score: 18.6      Last Discharge Facility       Date Complaint Diagnosis Description Type Department Provider    10/27/24  Cerebrovascular accident (CVA) due to occlusion of small artery (HCC) Admission (Discharged) MLOZ CLAYTONAB Alicia Milner, DO            Was this an external facility discharge? Yes. Discharge Date: 24. Facility Name: [facility]    Additional needs identified to be addressed with provider   No needs identified             Method of communication with provider: chart routing.    Patients top risk factors for readmission: functional physical ability    Interventions to address risk factors:   Now residing in [facility]    Care Summary Note: Spoke briefly to brothnile Napier, patient was DC to [facility] ([facility]), has access to care  but may also continue to see Dr. Maher per brother, no concerns     Post Acute Care Manager reviewed discharge instructions with family. The family was given an opportunity to ask questions; no further questions or concerns at this time.. The family verbalized understanding.   Were discharge instructions available to patient? At [facility].   Reviewed appropriate site of care based on symptoms and resources available to patient including: PCP. The caregiver agrees to contact the primary care provider and/or specialist office for questions related to their healthcare.      [facility]

## 2024-12-14 DIAGNOSIS — R52 PAIN: Primary | ICD-10-CM

## 2024-12-14 DIAGNOSIS — M25.50 JOINT PAIN OF LOWER EXTREMITY: Primary | ICD-10-CM

## 2024-12-14 RX ORDER — TRAMADOL HYDROCHLORIDE 50 MG/1
25 TABLET ORAL 2 TIMES DAILY
Qty: 30 TABLET | Refills: 2 | Status: SHIPPED | OUTPATIENT
Start: 2024-12-14 | End: 2025-03-14

## 2024-12-17 ASSESSMENT — ENCOUNTER SYMPTOMS
BACK PAIN: 1
COUGH: 0
SHORTNESS OF BREATH: 0

## 2024-12-17 NOTE — PROGRESS NOTES
Subjective:      Patient ID: Aramis David is a pleasant 69 y.o. male who presents today for:  Chief Complaint   Patient presents with    Other     Cerebrovascular accident (cva), unspecified mechanism (hcc)  (primary encounter diagnosis)  Joint pain of lower extremity         [facility]  Patient complaining of ongoing chronic right side pain to joints since last hospital stay after patient suffered from stroke.  Currently Tylenol ineffective for overall pain especially with therapy pre and post.  Did speak with patient about adding low-dose tramadol which she is agreeable to.  Will add dose today and/tolerance.  Vital signs today 130/85, 72 bpm, 16 RR, 98.0 °F, 90% SpO2 on room air, WT = 159.6 LBS.  No further acute concerns otherwise.  He is making modest strides in therapy so far, hopefully pain control will improve over outcomes.      Patient Active Problem List   Diagnosis    History of CVA (cerebrovascular accident)    Essential hypertension    Recurrent depression ([facility])    Seizure ([facility])    S/P patent foramen ovale closure    Hyperlipidemia    Chronic right shoulder pain    Hypoxemia requiring supplemental oxygen    Hemiparesis affecting dominant side as late effect of cerebrovascular accident (CVA) ([facility])    Closed TBI (traumatic brain injury)    Chronic pain syndrome    Hypothyroidism    Chronic deep vein thrombosis (DVT) of left popliteal vein ([facility])    Generalized weakness    Cognitive deficit due to old head injury    DJD (degenerative joint disease), lumbar    DJD (degenerative joint disease), cervical    Bowel and bladder incontinence    Aphasia    Impaired mobility and activities of daily living dt late effects of CVA    Abnormality of gait and mobility    Blood thinned due to long-term anticoagulant use-Xaralto    Opioid dependence with current use (HCC)    Altered mental status    Cerebrovascular accident (CVA) ([facility])    Hyperkalemia    Acute cystitis    Palliative care encounter    Goals of

## 2024-12-18 RX ORDER — TRAMADOL HYDROCHLORIDE 50 MG/1
25 TABLET ORAL 2 TIMES DAILY
Qty: 30 TABLET | Refills: 0 | Status: SHIPPED | OUTPATIENT
Start: 2024-12-18 | End: 2025-01-17

## 2024-12-21 NOTE — PROGRESS NOTES
SUBJECTIVE:        ROS:  The rest of the 14 point ROS negative    PHYSICAL EXAM: VSS per facility record      ASSESSMENT & PLAN:   Diagnosis Orders   1. Essential hypertension        2. Seizure ([facility])        3. Cerebrovascular accident (CVA), unspecified mechanism ([facility])                      Past Medical History:   Diagnosis Date    Chronic right shoulder pain 10/04/2017    Closed TBI (traumatic brain injury) 12/28/2012    Essential hypertension 08/02/2017    Hemiparesis affecting dominant side as late effect of cerebrovascular accident (CVA) ([facility]) 01/30/2018    History of CVA (cerebrovascular accident) 08/02/2017    Hyperlipidemia 08/02/2017    Hypoxemia requiring supplemental oxygen 10/04/2017    Lumbosacral disc disease     Osteoarthritis     Recurrent depression ([facility]) 08/02/2017    S/P patent foramen ovale closure 08/02/2017    Seizure ([facility]) 08/02/2017         No past surgical history on file.      Current Outpatient Medications on File Prior to Visit   Medication Sig Dispense Refill    acetaminophen (TYLENOL) 325 MG tablet Take 2 tablets by mouth every 4 hours as needed for Pain or Fever      metoprolol succinate (TOPROL XL) 25 MG extended release tablet Take 0.5 tablets by mouth daily 30 tablet 3    atorvastatin (LIPITOR) 80 MG tablet Take 1 tablet by mouth nightly 30 tablet 3    levETIRAcetam (KEPPRA) 1000 MG tablet Take 1 tablet by mouth 2 times daily 60 tablet 0    potassium chloride (KLOR-CON M) 20 MEQ extended release tablet Take 1 tablet by mouth daily 30 tablet 0    coenzyme Q10 100 MG CAPS capsule Take 1 capsule by mouth daily 60 capsule 5    dicyclomine (BENTYL) 20 MG tablet Take 1 tablet by mouth 4 times daily (before meals and nightly) 120 tablet 3    Vitamin D (CHOLECALCIFEROL) 50 MCG (2000 UT) TABS tablet Take 1 tablet by mouth Daily with supper 60 tablet 4    ARIPiprazole (ABILIFY) 2 MG tablet TAKE 1 TABLET BY MOUTH DAILY (Patient taking differently: Take 1 tablet by mouth daily Indications:

## 2025-01-03 ASSESSMENT — ENCOUNTER SYMPTOMS
BACK PAIN: 1
COUGH: 0
SHORTNESS OF BREATH: 0

## 2025-01-03 NOTE — PROGRESS NOTES
Subjective:      Patient ID: Aramis David is a pleasant 69 y.o. male who presents today for:  Chief Complaint   Patient presents with    Other     Cerebrovascular accident (cva), unspecified mechanism (hcc)  (primary encounter diagnosis)  Hemiparesis affecting dominant side as late effect of cerebrovascular accident (cva) (hcc)  Abnormality of gait and mobility         [facility]    Patient seen today for transfer to assisted living.  Patient will be moving tomorrow morning to [facility] assisted living which is around the [facility] and this [facility].  He is currently recovering from CVA with gait and mobility deficiencies.  Working with PT/OT and getting some increased independence.  Has maintained good intake of food and fluids since [facility] admission.  Will continue to see due to multiple comorbidities.  However patient is able to discharge to assisted living at this time.  Will follow-up with him after a few weeks to monitor progress with new independence.  Continue with PT/OT.      Patient Active Problem List   Diagnosis    History of CVA (cerebrovascular accident)    Essential hypertension    Recurrent depression (HCC)    Seizure (HCC)    S/P patent foramen ovale closure    Hyperlipidemia    Chronic right shoulder pain    Hypoxemia requiring supplemental oxygen    Hemiparesis affecting dominant side as late effect of cerebrovascular accident (CVA) (HCC)    Closed TBI (traumatic brain injury)    Chronic pain syndrome    Hypothyroidism    Chronic deep vein thrombosis (DVT) of left popliteal vein ([facility])    Generalized weakness    Cognitive deficit due to old head injury    DJD (degenerative joint disease), lumbar    DJD (degenerative joint disease), cervical    Bowel and bladder incontinence    Aphasia    Impaired mobility and activities of daily living dt late effects of CVA    Abnormality of gait and mobility    Blood thinned due to long-term anticoagulant use-Xaralto    Opioid dependence with current use

## 2025-01-31 ENCOUNTER — OFFICE VISIT (OUTPATIENT)
Dept: GERIATRIC MEDICINE | Age: 70
End: 2025-01-31

## 2025-01-31 DIAGNOSIS — U07.1 COVID-19: Primary | ICD-10-CM

## 2025-02-03 ENCOUNTER — OFFICE VISIT (OUTPATIENT)
Dept: GERIATRIC MEDICINE | Age: 70
End: 2025-02-03

## 2025-02-03 DIAGNOSIS — U07.1 ASYMPTOMATIC COVID-19 VIRUS INFECTION: Primary | ICD-10-CM

## 2025-03-04 ASSESSMENT — ENCOUNTER SYMPTOMS
SHORTNESS OF BREATH: 0
BACK PAIN: 0
COUGH: 0

## 2025-03-06 ASSESSMENT — ENCOUNTER SYMPTOMS
SORE THROAT: 0
CHEST TIGHTNESS: 0
COUGH: 1
CHOKING: 0
SHORTNESS OF BREATH: 0
TROUBLE SWALLOWING: 0
EYE REDNESS: 0
EYE DISCHARGE: 0
RHINORRHEA: 0
SINUS PRESSURE: 0
WHEEZING: 0
SINUS PAIN: 0
EYE ITCHING: 0

## 2025-03-06 NOTE — PROGRESS NOTES
Determinants of Health     Financial Resource Strain: Low Risk  (7/1/2024)    Overall Financial Resource Strain (CARDIA)     Difficulty of Paying Living Expenses: Not very hard   Food Insecurity: No Food Insecurity (10/27/2024)    Hunger Vital Sign     Worried About Running Out of Food in the Last Year: Never true     Ran Out of Food in the Last Year: Never true   Transportation Needs: No Transportation Needs (10/27/2024)    PRAPARE - Transportation     Lack of Transportation (Medical): No     Lack of Transportation (Non-Medical): No   Physical Activity: Insufficiently Active (7/3/2023)    Exercise Vital Sign     Days of Exercise per Week: 2 days     Minutes of Exercise per Session: 10 min   Stress: Not on file   Social Connections: Not on file   Intimate Partner Violence: Not on file   Housing Stability: Low Risk  (10/27/2024)    Housing Stability Vital Sign     Unable to Pay for Housing in the Last Year: No     Number of Times Moved in the Last Year: 0     Homeless in the Last Year: No     No family history on file.  No Known Allergies        Review of Systems   Constitutional:  Negative for activity change, appetite change, chills, fatigue and fever.   HENT:  Negative for congestion, postnasal drip, rhinorrhea, sinus pressure, sinus pain, sore throat and trouble swallowing.    Eyes:  Negative for discharge, redness and itching.   Respiratory:  Positive for cough (very mild). Negative for choking, chest tightness, shortness of breath and wheezing.    Cardiovascular:  Negative for chest pain.   Neurological:  Positive for speech difficulty.   Psychiatric/Behavioral:  Negative for agitation and behavioral problems.    All other systems reviewed and are negative.      Objective:   VS: See PCC. Reviewed.    Physical Exam  Vitals and nursing note reviewed.   Constitutional:       Appearance: Normal appearance. He is normal weight.   HENT:      Head: Normocephalic and atraumatic.      Jaw: Tenderness present.      Nose:

## 2025-03-07 ENCOUNTER — HOSPITAL ENCOUNTER (OUTPATIENT)
Dept: GENERAL RADIOLOGY | Age: 70
Discharge: HOME | End: 2025-03-09
Attending: INTERNAL MEDICINE
Payer: MEDICARE
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
## 2025-03-07 PROCEDURE — 71100 X-RAY EXAM RIBS UNI 2 VIEWS: CPT

## 2025-03-13 DIAGNOSIS — R52 PAIN: ICD-10-CM

## 2025-03-13 RX ORDER — TRAMADOL HYDROCHLORIDE 50 MG/1
25 TABLET ORAL 2 TIMES DAILY
Qty: 30 TABLET | Refills: 0 | Status: SHIPPED | OUTPATIENT
Start: 2025-03-13 | End: 2025-04-12

## 2025-03-13 RX ORDER — TRAMADOL HYDROCHLORIDE 50 MG/1
25 TABLET ORAL 2 TIMES DAILY
Status: CANCELLED | OUTPATIENT
Start: 2025-03-13

## 2025-04-10 DIAGNOSIS — R52 PAIN: ICD-10-CM

## 2025-04-11 RX ORDER — TRAMADOL HYDROCHLORIDE 50 MG/1
25 TABLET ORAL 2 TIMES DAILY
Qty: 30 TABLET | Refills: 0 | Status: SHIPPED | OUTPATIENT
Start: 2025-04-11 | End: 2025-05-11

## 2025-06-04 DIAGNOSIS — R52 PAIN: ICD-10-CM

## 2025-06-04 RX ORDER — TRAMADOL HYDROCHLORIDE 50 MG/1
25 TABLET ORAL 2 TIMES DAILY
Qty: 30 TABLET | Refills: 0 | Status: SHIPPED | OUTPATIENT
Start: 2025-06-04 | End: 2025-07-04

## 2025-07-07 DIAGNOSIS — R52 PAIN: ICD-10-CM

## 2025-07-07 RX ORDER — TRAMADOL HYDROCHLORIDE 50 MG/1
25 TABLET ORAL 2 TIMES DAILY
Qty: 30 TABLET | Refills: 0 | Status: SHIPPED | OUTPATIENT
Start: 2025-07-07 | End: 2025-08-06

## 2025-07-16 ENCOUNTER — TELEPHONE (OUTPATIENT)
Dept: FAMILY MEDICINE CLINIC | Age: 70
End: 2025-07-16

## 2025-08-07 DIAGNOSIS — R52 PAIN: ICD-10-CM

## 2025-08-07 RX ORDER — TRAMADOL HYDROCHLORIDE 50 MG/1
25 TABLET ORAL 2 TIMES DAILY
Qty: 30 TABLET | Refills: 0 | Status: SHIPPED | OUTPATIENT
Start: 2025-08-07 | End: 2025-09-06